# Patient Record
Sex: FEMALE | Race: WHITE | NOT HISPANIC OR LATINO | URBAN - METROPOLITAN AREA
[De-identification: names, ages, dates, MRNs, and addresses within clinical notes are randomized per-mention and may not be internally consistent; named-entity substitution may affect disease eponyms.]

---

## 2017-04-12 ENCOUNTER — APPOINTMENT (RX ONLY)
Dept: URBAN - METROPOLITAN AREA CLINIC 23 | Facility: CLINIC | Age: 67
Setting detail: DERMATOLOGY
End: 2017-04-12

## 2017-04-12 DIAGNOSIS — L81.4 OTHER MELANIN HYPERPIGMENTATION: ICD-10-CM

## 2017-04-12 DIAGNOSIS — L82.1 OTHER SEBORRHEIC KERATOSIS: ICD-10-CM

## 2017-04-12 DIAGNOSIS — Z85.828 PERSONAL HISTORY OF OTHER MALIGNANT NEOPLASM OF SKIN: ICD-10-CM

## 2017-04-12 DIAGNOSIS — D485 NEOPLASM OF UNCERTAIN BEHAVIOR OF SKIN: ICD-10-CM

## 2017-04-12 DIAGNOSIS — D22 MELANOCYTIC NEVI: ICD-10-CM

## 2017-04-12 PROBLEM — D48.5 NEOPLASM OF UNCERTAIN BEHAVIOR OF SKIN: Status: ACTIVE | Noted: 2017-04-12

## 2017-04-12 PROBLEM — K21.9 GASTRO-ESOPHAGEAL REFLUX DISEASE WITHOUT ESOPHAGITIS: Status: ACTIVE | Noted: 2017-04-12

## 2017-04-12 PROBLEM — J30.1 ALLERGIC RHINITIS DUE TO POLLEN: Status: ACTIVE | Noted: 2017-04-12

## 2017-04-12 PROBLEM — D22.5 MELANOCYTIC NEVI OF TRUNK: Status: ACTIVE | Noted: 2017-04-12

## 2017-04-12 PROCEDURE — ? COUNSELING

## 2017-04-12 PROCEDURE — 99214 OFFICE O/P EST MOD 30 MIN: CPT | Mod: 25

## 2017-04-12 PROCEDURE — 11100: CPT

## 2017-04-12 PROCEDURE — ? BIOPSY BY SHAVE METHOD

## 2017-04-12 ASSESSMENT — LOCATION SIMPLE DESCRIPTION DERM
LOCATION SIMPLE: UPPER BACK
LOCATION SIMPLE: POSTERIOR NECK
LOCATION SIMPLE: LEFT UPPER BACK
LOCATION SIMPLE: ABDOMEN
LOCATION SIMPLE: RIGHT UPPER BACK
LOCATION SIMPLE: RIGHT CLAVICULAR SKIN

## 2017-04-12 ASSESSMENT — LOCATION DETAILED DESCRIPTION DERM
LOCATION DETAILED: LEFT INFERIOR MEDIAL UPPER BACK
LOCATION DETAILED: SUPERIOR THORACIC SPINE
LOCATION DETAILED: LEFT SUPERIOR UPPER BACK
LOCATION DETAILED: RIGHT SUPERIOR UPPER BACK
LOCATION DETAILED: EPIGASTRIC SKIN
LOCATION DETAILED: RIGHT CLAVICULAR SKIN
LOCATION DETAILED: RIGHT LATERAL TRAPEZIAL NECK
LOCATION DETAILED: RIGHT MEDIAL UPPER BACK

## 2017-04-12 ASSESSMENT — LOCATION ZONE DERM
LOCATION ZONE: NECK
LOCATION ZONE: TRUNK

## 2017-04-12 NOTE — PROCEDURE: BIOPSY BY SHAVE METHOD
Render Post-Care Instructions In Note?: no
Notification Instructions: Patient will be notified of biopsy results. However, patient instructed to call the office if not contacted within 2 weeks.
Additional Anesthesia Volume In Cc (Will Not Render If 0): 0
Post-care instructions were reviewed in detail and written instructions are provided. Patient is to keep the biopsy site dry overnight, and then apply bacitracin twice daily until healed. Patient may apply hydrogen peroxide soaks to remove any crusting.
Billing Type: Third-Party Bill
Hemostasis: Aluminum Chloride
Anesthesia Type: 1% lidocaine with epinephrine
Wound Care: Vaseline
Consent: Written consent was obtained and risks were reviewed including but not limited to scarring, infection, bleeding, scabbing, incomplete removal, and allergy to anesthesia.
Electrodesiccation Text: The wound bed was treated with electrodesiccation after the biopsy was performed.
Anesthesia Volume In Cc: 0.3
Biopsy Type: H and E
Dressing: bandage
Electrodesiccation And Curettage Text: The wound bed was treated with electrodesiccation and curettage after the biopsy was performed.
Cryotherapy Text: The wound bed was treated with cryotherapy after the biopsy was performed.
Detail Level: Detailed
Type Of Destruction Used: Curettage
Silver Nitrate Text: The wound bed was treated with silver nitrate after the biopsy was performed.
Accession #: [id number]
Biopsy Method: Dermablade

## 2017-05-26 ENCOUNTER — APPOINTMENT (RX ONLY)
Dept: URBAN - METROPOLITAN AREA CLINIC 23 | Facility: CLINIC | Age: 67
Setting detail: DERMATOLOGY
End: 2017-05-26

## 2017-05-26 DIAGNOSIS — D485 NEOPLASM OF UNCERTAIN BEHAVIOR OF SKIN: ICD-10-CM

## 2017-05-26 PROBLEM — E03.9 HYPOTHYROIDISM, UNSPECIFIED: Status: ACTIVE | Noted: 2017-05-26

## 2017-05-26 PROBLEM — L55.1 SUNBURN OF SECOND DEGREE: Status: ACTIVE | Noted: 2017-05-26

## 2017-05-26 PROBLEM — K21.9 GASTRO-ESOPHAGEAL REFLUX DISEASE WITHOUT ESOPHAGITIS: Status: ACTIVE | Noted: 2017-05-26

## 2017-05-26 PROBLEM — C44.519 BASAL CELL CARCINOMA OF SKIN OF OTHER PART OF TRUNK: Status: ACTIVE | Noted: 2017-05-26

## 2017-05-26 PROBLEM — D48.5 NEOPLASM OF UNCERTAIN BEHAVIOR OF SKIN: Status: ACTIVE | Noted: 2017-05-26

## 2017-05-26 PROBLEM — J45.909 UNSPECIFIED ASTHMA, UNCOMPLICATED: Status: ACTIVE | Noted: 2017-05-26

## 2017-05-26 PROCEDURE — ? EXCISION

## 2017-05-26 PROCEDURE — 11602 EXC TR-EXT MAL+MARG 1.1-2 CM: CPT

## 2017-05-26 PROCEDURE — 11101: CPT

## 2017-05-26 PROCEDURE — ? BIOPSY BY SHAVE METHOD

## 2017-05-26 PROCEDURE — 12032 INTMD RPR S/A/T/EXT 2.6-7.5: CPT

## 2017-05-26 PROCEDURE — 11100: CPT | Mod: 59

## 2017-05-26 ASSESSMENT — LOCATION DETAILED DESCRIPTION DERM
LOCATION DETAILED: LEFT SUPERIOR MEDIAL UPPER BACK
LOCATION DETAILED: SUPERIOR THORACIC SPINE

## 2017-05-26 ASSESSMENT — LOCATION SIMPLE DESCRIPTION DERM
LOCATION SIMPLE: LEFT UPPER BACK
LOCATION SIMPLE: UPPER BACK

## 2017-05-26 ASSESSMENT — LOCATION ZONE DERM: LOCATION ZONE: TRUNK

## 2017-05-26 NOTE — PROCEDURE: EXCISION
Melolabial Interpolation Flap Text: A decision was made to reconstruct the defect utilizing an interpolation axial flap and a staged reconstruction.  A telfa template was made of the defect.  This telfa template was then used to outline the melolabial interpolation flap.  The donor area for the pedicle flap was then injected with anesthesia.  The flap was excised through the skin and subcutaneous tissue down to the layer of the underlying musculature.  The pedicle flap was carefully excised within this deep plane to maintain its blood supply.  The edges of the donor site were undermined.   The donor site was closed in a primary fashion.  The pedicle was then rotated into position and sutured.  Once the tube was sutured into place, adequate blood supply was confirmed with blanching and refill.  The pedicle was then wrapped with xeroform gauze and dressed appropriately with a telfa and gauze bandage to ensure continued blood supply and protect the attached pedicle.
Epidermal Closure: running
Fusiform Excision Additional Text (Leave Blank If You Do Not Want): The margin was drawn around the clinically apparent lesion.  A fusiform shape was then drawn on the skin incorporating the lesion and margins.  Incisions were then made along these lines to the appropriate tissue plane and the lesion was extirpated.
Primary Defect Width (In Cm): 0
Advancement-Rotation Flap Text: The defect edges were debeveled with a #15 scalpel blade.  Given the location of the defect, shape of the defect and the proximity to free margins an advancement-rotation flap was deemed most appropriate.  Using a sterile surgical marker, an appropriate flap was drawn incorporating the defect and placing the expected incisions within the relaxed skin tension lines where possible. The area thus outlined was incised deep to adipose tissue with a #15 scalpel blade.  The skin margins were undermined to an appropriate distance in all directions utilizing iris scissors.
Melolabial Transposition Flap Text: The defect edges were debeveled with a #15 scalpel blade.  Given the location of the defect and the proximity to free margins a melolabial flap was deemed most appropriate.  Using a sterile surgical marker, an appropriate melolabial transposition flap was drawn incorporating the defect.    The area thus outlined was incised deep to adipose tissue with a #15 scalpel blade.  The skin margins were undermined to an appropriate distance in all directions utilizing iris scissors.
Star Wedge Flap Text: The defect edges were debeveled with a #15 scalpel blade.  Given the location of the defect, shape of the defect and the proximity to free margins a star wedge flap was deemed most appropriate.  Using a sterile surgical marker, an appropriate rotation flap was drawn incorporating the defect and placing the expected incisions within the relaxed skin tension lines where possible. The area thus outlined was incised deep to adipose tissue with a #15 scalpel blade.  The skin margins were undermined to an appropriate distance in all directions utilizing iris scissors.
Scalpel Size: 15 blade
Cheek Interpolation Flap Text: A decision was made to reconstruct the defect utilizing an interpolation axial flap and a staged reconstruction.  A telfa template was made of the defect.  This telfa template was then used to outline the Cheek Interpolation flap.  The donor area for the pedicle flap was then injected with anesthesia.  The flap was excised through the skin and subcutaneous tissue down to the layer of the underlying musculature.  The interpolation flap was carefully excised within this deep plane to maintain its blood supply.  The edges of the donor site were undermined.   The donor site was closed in a primary fashion.  The pedicle was then rotated into position and sutured.  Once the tube was sutured into place, adequate blood supply was confirmed with blanching and refill.  The pedicle was then wrapped with xeroform gauze and dressed appropriately with a telfa and gauze bandage to ensure continued blood supply and protect the attached pedicle.
Complex Repair And Split-Thickness Skin Graft Text: The defect edges were debeveled with a #15 scalpel blade.  The primary defect was closed partially with a complex linear closure.  Given the location of the defect, shape of the defect and the proximity to free margins a split thickness skin graft was deemed most appropriate to repair the remaining defect.  The graft was trimmed to fit the size of the remaining defect.  The graft was then placed in the primary defect, oriented appropriately, and sutured into place.
Partial Purse String (Intermediate) Text: Given the location of the defect and the characteristics of the surrounding skin an intermediate purse string closure was deemed most appropriate.  Undermining was performed circumferentially around the surgical defect.  A purse string suture was then placed and tightened. Wound tension of the circular defect prevented complete closure of the wound.
Complex Repair And Z Plasty Text: The defect edges were debeveled with a #15 scalpel blade.  The primary defect was closed partially with a complex linear closure.  Given the location of the remaining defect, shape of the defect and the proximity to free margins a Z plasty was deemed most appropriate for complete closure of the defect.  Using a sterile surgical marker, an appropriate advancement flap was drawn incorporating the defect and placing the expected incisions within the relaxed skin tension lines where possible.    The area thus outlined was incised deep to adipose tissue with a #15 scalpel blade.  The skin margins were undermined to an appropriate distance in all directions utilizing iris scissors.
V-Y Plasty Text: The defect edges were debeveled with a #15 scalpel blade.  Given the location of the defect, shape of the defect and the proximity to free margins an V-Y advancement flap was deemed most appropriate.  Using a sterile surgical marker, an appropriate advancement flap was drawn incorporating the defect and placing the expected incisions within the relaxed skin tension lines where possible.    The area thus outlined was incised deep to adipose tissue with a #15 scalpel blade.  The skin margins were undermined to an appropriate distance in all directions utilizing iris scissors.
Complex Repair Preamble Text (Leave Blank If You Do Not Want): Extensive wide undermining was performed.
Cartilage Graft Text: The defect edges were debeveled with a #15 scalpel blade.  Given the location of the defect, shape of the defect, the fact the defect involved a full thickness cartilage defect a cartilage graft was deemed most appropriate.  An appropriate donor site was identified, cleansed, and anesthetized. The cartilage graft was then harvested and transferred to the recipient site, oriented appropriately and then sutured into place.  The secondary defect was then repaired using a primary closure.
Bill For Surgical Tray: no
Complex Repair And Tissue Cultured Epidermal Autograft Text: The defect edges were debeveled with a #15 scalpel blade.  The primary defect was closed partially with a complex linear closure.  Given the location of the defect, shape of the defect and the proximity to free margins an tissue cultured epidermal autograft was deemed most appropriate to repair the remaining defect.  The graft was trimmed to fit the size of the remaining defect.  The graft was then placed in the primary defect, oriented appropriately, and sutured into place.
Modified Advancement Flap Text: The defect edges were debeveled with a #15 scalpel blade.  Given the location of the defect, shape of the defect and the proximity to free margins a modified advancement flap was deemed most appropriate.  Using a sterile surgical marker, an appropriate advancement flap was drawn incorporating the defect and placing the expected incisions within the relaxed skin tension lines where possible.    The area thus outlined was incised deep to adipose tissue with a #15 scalpel blade.  The skin margins were undermined to an appropriate distance in all directions utilizing iris scissors.
Saucerization Excision Additional Text (Leave Blank If You Do Not Want): The margin was drawn around the clinically apparent lesion.  Incisions were then made along these lines, in a tangential fashion, to the appropriate tissue plane and the lesion was extirpated.
Size Of Margin In Cm: 0.2
Excision Method: Elliptical
Skin Substitute Text: The defect edges were debeveled with a #15 scalpel blade.  Given the location of the defect, shape of the defect and the proximity to free margins a skin substitute graft was deemed most appropriate.  The graft material was trimmed to fit the size of the defect. The graft was then placed in the primary defect and oriented appropriately.
Mastoid Interpolation Flap Text: A decision was made to reconstruct the defect utilizing an interpolation axial flap and a staged reconstruction.  A telfa template was made of the defect.  This telfa template was then used to outline the mastoid interpolation flap.  The donor area for the pedicle flap was then injected with anesthesia.  The flap was excised through the skin and subcutaneous tissue down to the layer of the underlying musculature.  The pedicle flap was carefully excised within this deep plane to maintain its blood supply.  The edges of the donor site were undermined.   The donor site was closed in a primary fashion.  The pedicle was then rotated into position and sutured.  Once the tube was sutured into place, adequate blood supply was confirmed with blanching and refill.  The pedicle was then wrapped with xeroform gauze and dressed appropriately with a telfa and gauze bandage to ensure continued blood supply and protect the attached pedicle.
Muscle Hinge Flap Text: The defect edges were debeveled with a #15 scalpel blade.  Given the size, depth and location of the defect and the proximity to free margins a muscle hinge flap was deemed most appropriate.  Using a sterile surgical marker, an appropriate hinge flap was drawn incorporating the defect. The area thus outlined was incised with a #15 scalpel blade.  The skin margins were undermined to an appropriate distance in all directions utilizing iris scissors.
No Repair - Repaired With Adjacent Surgical Defect Text (Leave Blank If You Do Not Want): After the excision the defect was repaired concurrently with another surgical defect which was in close approximation.
Purse String (Intermediate) Text: Given the location of the defect and the characteristics of the surrounding skin a pursestring intermediate closure was deemed most appropriate.  Undermining was performed circumfirentially around the surgical defect.  A purstring suture was then placed and tightened.
W Plasty Text: The lesion was extirpated to the level of the fat with a #15 scalpel blade.  Given the location of the defect, shape of the defect and the proximity to free margins a W-plasty was deemed most appropriate for repair.  Using a sterile surgical marker, the appropriate transposition arms of the W-plasty were drawn incorporating the defect and placing the expected incisions within the relaxed skin tension lines where possible.    The area thus outlined was incised deep to adipose tissue with a #15 scalpel blade.  The skin margins were undermined to an appropriate distance in all directions utilizing iris scissors.  The opposing transposition arms were then transposed into place in opposite direction and anchored with interrupted buried subcutaneous sutures.
Post-Care Instructions: I reviewed with the patient in detail post-care instructions. Patient is not to engage in any heavy lifting, exercise, or swimming for the next 7 days. Should the patient develop any fevers, chills, bleeding, severe pain patient will contact the office immediately.
Z Plasty Text: The lesion was extirpated to the level of the fat with a #15 scalpel blade.  Given the location of the defect, shape of the defect and the proximity to free margins a Z-plasty was deemed most appropriate for repair.  Using a sterile surgical marker, the appropriate transposition arms of the Z-plasty were drawn incorporating the defect and placing the expected incisions within the relaxed skin tension lines where possible.    The area thus outlined was incised deep to adipose tissue with a #15 scalpel blade.  The skin margins were undermined to an appropriate distance in all directions utilizing iris scissors.  The opposing transposition arms were then transposed into place in opposite direction and anchored with interrupted buried subcutaneous sutures.
Size Of Lesion In Cm: 1.5
Complex Repair And Bilobe Flap Text: The defect edges were debeveled with a #15 scalpel blade.  The primary defect was closed partially with a complex linear closure.  Given the location of the remaining defect, shape of the defect and the proximity to free margins a bilobe flap was deemed most appropriate for complete closure of the defect.  Using a sterile surgical marker, an appropriate advancement flap was drawn incorporating the defect and placing the expected incisions within the relaxed skin tension lines where possible.    The area thus outlined was incised deep to adipose tissue with a #15 scalpel blade.  The skin margins were undermined to an appropriate distance in all directions utilizing iris scissors.
O-L Flap Text: The defect edges were debeveled with a #15 scalpel blade.  Given the location of the defect, shape of the defect and the proximity to free margins an O-L flap was deemed most appropriate.  Using a sterile surgical marker, an appropriate advancement flap was drawn incorporating the defect and placing the expected incisions within the relaxed skin tension lines where possible.    The area thus outlined was incised deep to adipose tissue with a #15 scalpel blade.  The skin margins were undermined to an appropriate distance in all directions utilizing iris scissors.
Spiral Flap Text: The defect edges were debeveled with a #15 scalpel blade.  Given the location of the defect, shape of the defect and the proximity to free margins a spiral flap was deemed most appropriate.  Using a sterile surgical marker, an appropriate rotation flap was drawn incorporating the defect and placing the expected incisions within the relaxed skin tension lines where possible. The area thus outlined was incised deep to adipose tissue with a #15 scalpel blade.  The skin margins were undermined to an appropriate distance in all directions utilizing iris scissors.
Keystone Flap Text: The defect edges were debeveled with a #15 scalpel blade.  Given the location of the defect, shape of the defect a keystone flap was deemed most appropriate.  Using a sterile surgical marker, an appropriate keystone flap was drawn incorporating the defect, outlining the appropriate donor tissue and placing the expected incisions within the relaxed skin tension lines where possible. The area thus outlined was incised deep to adipose tissue with a #15 scalpel blade.  The skin margins were undermined to an appropriate distance in all directions around the primary defect and laterally outward around the flap utilizing iris scissors.
Anesthesia Volume In Cc: 5
Consent was obtained from the patient. The risks and benefits to therapy were discussed in detail. Specifically, the risks of infection, scarring, bleeding, prolonged wound healing, incomplete removal, allergy to anesthesia, nerve injury and recurrence were addressed. Prior to the procedure, the treatment site was clearly identified and confirmed by the patient. All components of Universal Protocol/PAUSE Rule completed.
Hemostasis: Electrocautery
A-T Advancement Flap Text: The defect edges were debeveled with a #15 scalpel blade.  Given the location of the defect, shape of the defect and the proximity to free margins an A-T advancement flap was deemed most appropriate.  Using a sterile surgical marker, an appropriate advancement flap was drawn incorporating the defect and placing the expected incisions within the relaxed skin tension lines where possible.    The area thus outlined was incised deep to adipose tissue with a #15 scalpel blade.  The skin margins were undermined to an appropriate distance in all directions utilizing iris scissors.
Burow's Advancement Flap Text: The defect edges were debeveled with a #15 scalpel blade.  Given the location of the defect and the proximity to free margins a Burow's advancement flap was deemed most appropriate.  Using a sterile surgical marker, the appropriate advancement flap was drawn incorporating the defect and placing the expected incisions within the relaxed skin tension lines where possible.    The area thus outlined was incised deep to adipose tissue with a #15 scalpel blade.  The skin margins were undermined to an appropriate distance in all directions utilizing iris scissors.
Purse String (Simple) Text: Given the location of the defect and the characteristics of the surrounding skin a purse string simple closure was deemed most appropriate.  Undermining was performed circumferentially around the surgical defect.  A purse string suture was then placed and tightened.
O-T Plasty Text: The defect edges were debeveled with a #15 scalpel blade.  Given the location of the defect, shape of the defect and the proximity to free margins an O-T plasty was deemed most appropriate.  Using a sterile surgical marker, an appropriate O-T plasty was drawn incorporating the defect and placing the expected incisions within the relaxed skin tension lines where possible.    The area thus outlined was incised deep to adipose tissue with a #15 scalpel blade.  The skin margins were undermined to an appropriate distance in all directions utilizing iris scissors.
Complex Repair And Dorsal Nasal Flap Text: The defect edges were debeveled with a #15 scalpel blade.  The primary defect was closed partially with a complex linear closure.  Given the location of the remaining defect, shape of the defect and the proximity to free margins a dorsal nasal flap was deemed most appropriate for complete closure of the defect.  Using a sterile surgical marker, an appropriate flap was drawn incorporating the defect and placing the expected incisions within the relaxed skin tension lines where possible.    The area thus outlined was incised deep to adipose tissue with a #15 scalpel blade.  The skin margins were undermined to an appropriate distance in all directions utilizing iris scissors.
Complex Repair And V-Y Plasty Text: The defect edges were debeveled with a #15 scalpel blade.  The primary defect was closed partially with a complex linear closure.  Given the location of the remaining defect, shape of the defect and the proximity to free margins a V-Y plasty was deemed most appropriate for complete closure of the defect.  Using a sterile surgical marker, an appropriate advancement flap was drawn incorporating the defect and placing the expected incisions within the relaxed skin tension lines where possible.    The area thus outlined was incised deep to adipose tissue with a #15 scalpel blade.  The skin margins were undermined to an appropriate distance in all directions utilizing iris scissors.
Complex Repair And M Plasty Text: The defect edges were debeveled with a #15 scalpel blade.  The primary defect was closed partially with a complex linear closure.  Given the location of the remaining defect, shape of the defect and the proximity to free margins an M plasty was deemed most appropriate for complete closure of the defect.  Using a sterile surgical marker, an appropriate advancement flap was drawn incorporating the defect and placing the expected incisions within the relaxed skin tension lines where possible.    The area thus outlined was incised deep to adipose tissue with a #15 scalpel blade.  The skin margins were undermined to an appropriate distance in all directions utilizing iris scissors.
Complex Repair And Rotation Flap Text: The defect edges were debeveled with a #15 scalpel blade.  The primary defect was closed partially with a complex linear closure.  Given the location of the remaining defect, shape of the defect and the proximity to free margins a rotation flap was deemed most appropriate for complete closure of the defect.  Using a sterile surgical marker, an appropriate advancement flap was drawn incorporating the defect and placing the expected incisions within the relaxed skin tension lines where possible.    The area thus outlined was incised deep to adipose tissue with a #15 scalpel blade.  The skin margins were undermined to an appropriate distance in all directions utilizing iris scissors.
Anesthesia Type: 1% lidocaine with epinephrine
Dressing: pressure dressing
V-Y Flap Text: The defect edges were debeveled with a #15 scalpel blade.  Given the location of the defect, shape of the defect and the proximity to free margins a V-Y flap was deemed most appropriate.  Using a sterile surgical marker, an appropriate advancement flap was drawn incorporating the defect and placing the expected incisions within the relaxed skin tension lines where possible.    The area thus outlined was incised deep to adipose tissue with a #15 scalpel blade.  The skin margins were undermined to an appropriate distance in all directions utilizing iris scissors.
Complex Repair And Dermal Autograft Text: The defect edges were debeveled with a #15 scalpel blade.  The primary defect was closed partially with a complex linear closure.  Given the location of the defect, shape of the defect and the proximity to free margins an dermal autograft was deemed most appropriate to repair the remaining defect.  The graft was trimmed to fit the size of the remaining defect.  The graft was then placed in the primary defect, oriented appropriately, and sutured into place.
Mucosal Advancement Flap Text: Given the location of the defect, shape of the defect and the proximity to free margins a mucosal advancement flap was deemed most appropriate. Incisions were made with a 15 blade scalpel in the appropriate fashion along the cutaneous vermillion border and the mucosal lip. The remaining actinically damaged mucosal tissue was excised.  The mucosal advancement flap was then elevated to the gingival sulcus with care taken to preserve the neurovascular structures and advanced into the primary defect. Care was taken to ensure that precise realignment of the vermillion border was achieved.
Elliptical Excision Additional Text (Leave Blank If You Do Not Want): The margin was drawn around the clinically apparent lesion.  An elliptical shape was then drawn on the skin incorporating the lesion and margins.  Incisions were then made along these lines to the appropriate tissue plane and the lesion was extirpated.
Complex Repair And Melolabial Flap Text: The defect edges were debeveled with a #15 scalpel blade.  The primary defect was closed partially with a complex linear closure.  Given the location of the remaining defect, shape of the defect and the proximity to free margins a melolabial flap was deemed most appropriate for complete closure of the defect.  Using a sterile surgical marker, an appropriate advancement flap was drawn incorporating the defect and placing the expected incisions within the relaxed skin tension lines where possible.    The area thus outlined was incised deep to adipose tissue with a #15 scalpel blade.  The skin margins were undermined to an appropriate distance in all directions utilizing iris scissors.
Repair Type: Intermediate
Perilesional Excision Additional Text (Leave Blank If You Do Not Want): The margin was drawn around the clinically apparent lesion. Incisions were then made along these lines to the appropriate tissue plane and the lesion was extirpated.
Interpolation Flap Text: A decision was made to reconstruct the defect utilizing an interpolation axial flap and a staged reconstruction.  A telfa template was made of the defect.  This telfa template was then used to outline the interpolation flap.  The donor area for the pedicle flap was then injected with anesthesia.  The flap was excised through the skin and subcutaneous tissue down to the layer of the underlying musculature.  The interpolation flap was carefully excised within this deep plane to maintain its blood supply.  The edges of the donor site were undermined.   The donor site was closed in a primary fashion.  The pedicle was then rotated into position and sutured.  Once the tube was sutured into place, adequate blood supply was confirmed with blanching and refill.  The pedicle was then wrapped with xeroform gauze and dressed appropriately with a telfa and gauze bandage to ensure continued blood supply and protect the attached pedicle.
Trilobed Flap Text: The defect edges were debeveled with a #15 scalpel blade.  Given the location of the defect and the proximity to free margins a trilobed flap was deemed most appropriate.  Using a sterile surgical marker, an appropriate trilobed flap drawn around the defect.    The area thus outlined was incised deep to adipose tissue with a #15 scalpel blade.  The skin margins were undermined to an appropriate distance in all directions utilizing iris scissors.
Crescentic Advancement Flap Text: The defect edges were debeveled with a #15 scalpel blade.  Given the location of the defect and the proximity to free margins a crescentic advancement flap was deemed most appropriate.  Using a sterile surgical marker, the appropriate advancement flap was drawn incorporating the defect and placing the expected incisions within the relaxed skin tension lines where possible.    The area thus outlined was incised deep to adipose tissue with a #15 scalpel blade.  The skin margins were undermined to an appropriate distance in all directions utilizing iris scissors.
H Plasty Text: Given the location of the defect, shape of the defect and the proximity to free margins a H-plasty was deemed most appropriate for repair.  Using a sterile surgical marker, the appropriate advancement arms of the H-plasty were drawn incorporating the defect and placing the expected incisions within the relaxed skin tension lines where possible. The area thus outlined was incised deep to adipose tissue with a #15 scalpel blade. The skin margins were undermined to an appropriate distance in all directions utilizing iris scissors.  The opposing advancement arms were then advanced into place in opposite direction and anchored with interrupted buried subcutaneous sutures.
Rhombic Flap Text: The defect edges were debeveled with a #15 scalpel blade.  Given the location of the defect and the proximity to free margins a rhombic flap was deemed most appropriate.  Using a sterile surgical marker, an appropriate rhombic flap was drawn incorporating the defect.    The area thus outlined was incised deep to adipose tissue with a #15 scalpel blade.  The skin margins were undermined to an appropriate distance in all directions utilizing iris scissors.
Detail Level: Detailed
Complex Repair And Double Advancement Flap Text: The defect edges were debeveled with a #15 scalpel blade.  The primary defect was closed partially with a complex linear closure.  Given the location of the remaining defect, shape of the defect and the proximity to free margins a double advancement flap was deemed most appropriate for complete closure of the defect.  Using a sterile surgical marker, an appropriate advancement flap was drawn incorporating the defect and placing the expected incisions within the relaxed skin tension lines where possible.    The area thus outlined was incised deep to adipose tissue with a #15 scalpel blade.  The skin margins were undermined to an appropriate distance in all directions utilizing iris scissors.
Path Notes (To The Dermatopathologist): Please check margins
Accession #: [id number]
Slit Excision Additional Text (Leave Blank If You Do Not Want): A linear line was drawn on the skin overlying the lesion. An incision was made slowly until the lesion was visualized.  Once visualized, the lesion was removed with blunt dissection.
Intermediate / Complex Repair - Final Wound Length In Cm: 4.6
Lip Wedge Excision Repair Text: Given the location of the defect and the proximity to free margins a full thickness wedge repair was deemed most appropriate.  Using a sterile surgical marker, the appropriate repair was drawn incorporating the defect and placing the expected incisions perpendicular to the vermillion border.  The vermillion border was also meticulously outlined to ensure appropriate reapproximation during the repair.  The area thus outlined was incised through and through with a #15 scalpel blade.  The muscularis and dermis were reaproximated with deep sutures following hemostasis. Care was taken to realign the vermillion border before proceeding with the superficial closure.  Once the vermillion was realigned the superfical and mucosal closure was finished.
Suture Removal: 14 days
Pre-Excision Curettage Text (Leave Blank If You Do Not Want): Prior to drawing the surgical margin the visible lesion was removed with electrodesiccation and curettage to clearly define the lesion size.
Composite Graft Text: The defect edges were debeveled with a #15 scalpel blade.  Given the location of the defect, shape of the defect, the proximity to free margins and the fact the defect was full thickness a composite graft was deemed most appropriate.  The defect was outline and then transferred to the donor site.  A full thickness graft was then excised from the donor site. The graft was then placed in the primary defect, oriented appropriately and then sutured into place.  The secondary defect was then repaired using a primary closure.
Tissue Cultured Epidermal Autograft Text: The defect edges were debeveled with a #15 scalpel blade.  Given the location of the defect, shape of the defect and the proximity to free margins a tissue cultured epidermal autograft was deemed most appropriate.  The graft was then trimmed to fit the size of the defect.  The graft was then placed in the primary defect and oriented appropriately.
Dorsal Nasal Flap Text: The defect edges were debeveled with a #15 scalpel blade.  Given the location of the defect and the proximity to free margins a dorsal nasal flap was deemed most appropriate.  Using a sterile surgical marker, an appropriate dorsal nasal flap was drawn around the defect.    The area thus outlined was incised deep to adipose tissue with a #15 scalpel blade.  The skin margins were undermined to an appropriate distance in all directions utilizing iris scissors.
Ftsg Text: The defect edges were debeveled with a #15 scalpel blade.  Given the location of the defect, shape of the defect and the proximity to free margins a full thickness skin graft was deemed most appropriate.  Using a sterile surgical marker, the primary defect shape was transferred to the donor site. The area thus outlined was incised deep to adipose tissue with a #15 scalpel blade.  The harvested graft was then trimmed of adipose tissue until only dermis and epidermis was left.  The skin margins of the secondary defect were undermined to an appropriate distance in all directions utilizing iris scissors.  The secondary defect was closed with interrupted buried subcutaneous sutures.  The skin edges were then re-apposed with running  sutures.  The skin graft was then placed in the primary defect and oriented appropriately.
Complex Repair And Skin Substitute Graft Text: The defect edges were debeveled with a #15 scalpel blade.  The primary defect was closed partially with a complex linear closure.  Given the location of the remaining defect, shape of the defect and the proximity to free margins a skin substitute graft was deemed most appropriate to repair the remaining defect.  The graft was trimmed to fit the size of the remaining defect.  The graft was then placed in the primary defect, oriented appropriately, and sutured into place.
Island Pedicle Flap Text: The defect edges were debeveled with a #15 scalpel blade.  Given the location of the defect, shape of the defect and the proximity to free margins an island pedicle advancement flap was deemed most appropriate.  Using a sterile surgical marker, an appropriate advancement flap was drawn incorporating the defect, outlining the appropriate donor tissue and placing the expected incisions within the relaxed skin tension lines where possible.    The area thus outlined was incised deep to adipose tissue with a #15 scalpel blade.  The skin margins were undermined to an appropriate distance in all directions around the primary defect and laterally outward around the island pedicle utilizing iris scissors.  There was minimal undermining beneath the pedicle flap.
Complex Repair And Rhombic Flap Text: The defect edges were debeveled with a #15 scalpel blade.  The primary defect was closed partially with a complex linear closure.  Given the location of the remaining defect, shape of the defect and the proximity to free margins a rhombic flap was deemed most appropriate for complete closure of the defect.  Using a sterile surgical marker, an appropriate advancement flap was drawn incorporating the defect and placing the expected incisions within the relaxed skin tension lines where possible.    The area thus outlined was incised deep to adipose tissue with a #15 scalpel blade.  The skin margins were undermined to an appropriate distance in all directions utilizing iris scissors.
Alar Island Pedicle Flap Text: The defect edges were debeveled with a #15 scalpel blade.  Given the location of the defect, shape of the defect and the proximity to the alar rim an island pedicle advancement flap was deemed most appropriate.  Using a sterile surgical marker, an appropriate advancement flap was drawn incorporating the defect, outlining the appropriate donor tissue and placing the expected incisions within the nasal ala running parallel to the alar rim. The area thus outlined was incised with a #15 scalpel blade.  The skin margins were undermined minimally to an appropriate distance in all directions around the primary defect and laterally outward around the island pedicle utilizing iris scissors.  There was minimal undermining beneath the pedicle flap.
O-Z Plasty Text: The defect edges were debeveled with a #15 scalpel blade.  Given the location of the defect, shape of the defect and the proximity to free margins an O-Z plasty (double transposition flap) was deemed most appropriate.  Using a sterile surgical marker, the appropriate transposition flaps were drawn incorporating the defect and placing the expected incisions within the relaxed skin tension lines where possible.    The area thus outlined was incised deep to adipose tissue with a #15 scalpel blade.  The skin margins were undermined to an appropriate distance in all directions utilizing iris scissors.  Hemostasis was achieved with electrocautery.  The flaps were then transposed into place, one clockwise and the other counterclockwise, and anchored with interrupted buried subcutaneous sutures.
Home Suture Removal Text: Patient was provided a home suture removal kit and will remove their sutures at home.  If they have any questions or difficulties they will call the office.
Island Pedicle Flap With Canthal Suspension Text: The defect edges were debeveled with a #15 scalpel blade.  Given the location of the defect, shape of the defect and the proximity to free margins an island pedicle advancement flap was deemed most appropriate.  Using a sterile surgical marker, an appropriate advancement flap was drawn incorporating the defect, outlining the appropriate donor tissue and placing the expected incisions within the relaxed skin tension lines where possible. The area thus outlined was incised deep to adipose tissue with a #15 scalpel blade.  The skin margins were undermined to an appropriate distance in all directions around the primary defect and laterally outward around the island pedicle utilizing iris scissors.  There was minimal undermining beneath the pedicle flap. A suspension suture was placed in the canthal tendon to prevent tension and prevent ectropion.
Island Pedicle Flap-Requiring Vessel Identification Text: The defect edges were debeveled with a #15 scalpel blade.  Given the location of the defect, shape of the defect and the proximity to free margins an island pedicle advancement flap was deemed most appropriate.  Using a sterile surgical marker, an appropriate advancement flap was drawn, based on the axial vessel mentioned above, incorporating the defect, outlining the appropriate donor tissue and placing the expected incisions within the relaxed skin tension lines where possible.    The area thus outlined was incised deep to adipose tissue with a #15 scalpel blade.  The skin margins were undermined to an appropriate distance in all directions around the primary defect and laterally outward around the island pedicle utilizing iris scissors.  There was minimal undermining beneath the pedicle flap.
Complex Repair And O-L Flap Text: The defect edges were debeveled with a #15 scalpel blade.  The primary defect was closed partially with a complex linear closure.  Given the location of the remaining defect, shape of the defect and the proximity to free margins an O-L flap was deemed most appropriate for complete closure of the defect.  Using a sterile surgical marker, an appropriate flap was drawn incorporating the defect and placing the expected incisions within the relaxed skin tension lines where possible.    The area thus outlined was incised deep to adipose tissue with a #15 scalpel blade.  The skin margins were undermined to an appropriate distance in all directions utilizing iris scissors.
Render Post-Care Instructions In Note?: yes
Complex Repair And Ftsg Text: The defect edges were debeveled with a #15 scalpel blade.  The primary defect was closed partially with a complex linear closure.  Given the location of the defect, shape of the defect and the proximity to free margins a full thickness skin graft was deemed most appropriate to repair the remaining defect.  The graft was trimmed to fit the size of the remaining defect.  The graft was then placed in the primary defect, oriented appropriately, and sutured into place.
Partial Purse String (Simple) Text: Given the location of the defect and the characteristics of the surrounding skin a simple purse string closure was deemed most appropriate.  Undermining was performed circumferentially around the surgical defect.  A purse string suture was then placed and tightened. Wound tension of the circular defect prevented complete closure of the wound.
Complex Repair And Epidermal Autograft Text: The defect edges were debeveled with a #15 scalpel blade.  The primary defect was closed partially with a complex linear closure.  Given the location of the defect, shape of the defect and the proximity to free margins an epidermal autograft was deemed most appropriate to repair the remaining defect.  The graft was trimmed to fit the size of the remaining defect.  The graft was then placed in the primary defect, oriented appropriately, and sutured into place.
S Plasty Text: Given the location and shape of the defect, and the orientation of relaxed skin tension lines, an S-plasty was deemed most appropriate for repair.  Using a sterile surgical marker, the appropriate outline of the S-plasty was drawn, incorporating the defect and placing the expected incisions within the relaxed skin tension lines where possible.  The area thus outlined was incised deep to adipose tissue with a #15 scalpel blade.  The skin margins were undermined to an appropriate distance in all directions utilizing iris scissors. The skin flaps were advanced over the defect.  The opposing margins were then approximated with interrupted buried subcutaneous sutures.
Complex Repair And Modified Advancement Flap Text: The defect edges were debeveled with a #15 scalpel blade.  The primary defect was closed partially with a complex linear closure.  Given the location of the remaining defect, shape of the defect and the proximity to free margins a modified advancement flap was deemed most appropriate for complete closure of the defect.  Using a sterile surgical marker, an appropriate advancement flap was drawn incorporating the defect and placing the expected incisions within the relaxed skin tension lines where possible.    The area thus outlined was incised deep to adipose tissue with a #15 scalpel blade.  The skin margins were undermined to an appropriate distance in all directions utilizing iris scissors.
Complex Repair And Single Advancement Flap Text: The defect edges were debeveled with a #15 scalpel blade.  The primary defect was closed partially with a complex linear closure.  Given the location of the remaining defect, shape of the defect and the proximity to free margins a single advancement flap was deemed most appropriate for complete closure of the defect.  Using a sterile surgical marker, an appropriate advancement flap was drawn incorporating the defect and placing the expected incisions within the relaxed skin tension lines where possible.    The area thus outlined was incised deep to adipose tissue with a #15 scalpel blade.  The skin margins were undermined to an appropriate distance in all directions utilizing iris scissors.
Curvilinear Excision Additional Text (Leave Blank If You Do Not Want): The margin was drawn around the clinically apparent lesion.  A curvilinear shape was then drawn on the skin incorporating the lesion and margins.  Incisions were then made along these lines to the appropriate tissue plane and the lesion was extirpated.
Complex Repair And Double M Plasty Text: The defect edges were debeveled with a #15 scalpel blade.  The primary defect was closed partially with a complex linear closure.  Given the location of the remaining defect, shape of the defect and the proximity to free margins a double M plasty was deemed most appropriate for complete closure of the defect.  Using a sterile surgical marker, an appropriate advancement flap was drawn incorporating the defect and placing the expected incisions within the relaxed skin tension lines where possible.    The area thus outlined was incised deep to adipose tissue with a #15 scalpel blade.  The skin margins were undermined to an appropriate distance in all directions utilizing iris scissors.
Split-Thickness Skin Graft Text: The defect edges were debeveled with a #15 scalpel blade.  Given the location of the defect, shape of the defect and the proximity to free margins a split thickness skin graft was deemed most appropriate.  Using a sterile surgical marker, the primary defect shape was transferred to the donor site. The split thickness graft was then harvested.  The skin graft was then placed in the primary defect and oriented appropriately.
Helical Rim Advancement Flap Text: The defect edges were debeveled with a #15 blade scalpel.  Given the location of the defect and the proximity to free margins (helical rim) a double helical rim advancement flap was deemed most appropriate.  Using a sterile surgical marker, the appropriate advancement flaps were drawn incorporating the defect and placing the expected incisions between the helical rim and antihelix where possible.  The area thus outlined was incised through and through with a #15 scalpel blade.  With a skin hook and iris scissors, the flaps were gently and sharply undermined and freed up.
Intermediate Repair Preamble Text (Leave Blank If You Do Not Want): Undermining was performed with blunt dissection.
Bilobed Transposition Flap Text: The defect edges were debeveled with a #15 scalpel blade.  Given the location of the defect and the proximity to free margins a bilobed transposition flap was deemed most appropriate.  Using a sterile surgical marker, an appropriate bilobe flap drawn around the defect.    The area thus outlined was incised deep to adipose tissue with a #15 scalpel blade.  The skin margins were undermined to an appropriate distance in all directions utilizing iris scissors.
Transposition Flap Text: The defect edges were debeveled with a #15 scalpel blade.  Given the location of the defect and the proximity to free margins a transposition flap was deemed most appropriate.  Using a sterile surgical marker, an appropriate transposition flap was drawn incorporating the defect.    The area thus outlined was incised deep to adipose tissue with a #15 scalpel blade.  The skin margins were undermined to an appropriate distance in all directions utilizing iris scissors.
Hatchet Flap Text: The defect edges were debeveled with a #15 scalpel blade.  Given the location of the defect, shape of the defect and the proximity to free margins a hatchet flap was deemed most appropriate.  Using a sterile surgical marker, an appropriate hatchet flap was drawn incorporating the defect and placing the expected incisions within the relaxed skin tension lines where possible.    The area thus outlined was incised deep to adipose tissue with a #15 scalpel blade.  The skin margins were undermined to an appropriate distance in all directions utilizing iris scissors.
Dermal Autograft Text: The defect edges were debeveled with a #15 scalpel blade.  Given the location of the defect, shape of the defect and the proximity to free margins a dermal autograft was deemed most appropriate.  Using a sterile surgical marker, the primary defect shape was transferred to the donor site. The area thus outlined was incised deep to adipose tissue with a #15 scalpel blade.  The harvested graft was then trimmed of adipose and epidermal tissue until only dermis was left.  The skin graft was then placed in the primary defect and oriented appropriately.
Ear Star Wedge Flap Text: The defect edges were debeveled with a #15 blade scalpel.  Given the location of the defect and the proximity to free margins (helical rim) an ear star wedge flap was deemed most appropriate.  Using a sterile surgical marker, the appropriate flap was drawn incorporating the defect and placing the expected incisions between the helical rim and antihelix where possible.  The area thus outlined was incised through and through with a #15 scalpel blade.
Advancement Flap (Double) Text: The defect edges were debeveled with a #15 scalpel blade.  Given the location of the defect and the proximity to free margins a double advancement flap was deemed most appropriate.  Using a sterile surgical marker, the appropriate advancement flaps were drawn incorporating the defect and placing the expected incisions within the relaxed skin tension lines where possible.    The area thus outlined was incised deep to adipose tissue with a #15 scalpel blade.  The skin margins were undermined to an appropriate distance in all directions utilizing iris scissors.
Epidermal Autograft Text: The defect edges were debeveled with a #15 scalpel blade.  Given the location of the defect, shape of the defect and the proximity to free margins an epidermal autograft was deemed most appropriate.  Using a sterile surgical marker, the primary defect shape was transferred to the donor site. The epidermal graft was then harvested.  The skin graft was then placed in the primary defect and oriented appropriately.
Cheek-To-Nose Interpolation Flap Text: A decision was made to reconstruct the defect utilizing an interpolation axial flap and a staged reconstruction.  A telfa template was made of the defect.  This telfa template was then used to outline the Cheek-To-Nose Interpolation flap.  The donor area for the pedicle flap was then injected with anesthesia.  The flap was excised through the skin and subcutaneous tissue down to the layer of the underlying musculature.  The interpolation flap was carefully excised within this deep plane to maintain its blood supply.  The edges of the donor site were undermined.   The donor site was closed in a primary fashion.  The pedicle was then rotated into position and sutured.  Once the tube was sutured into place, adequate blood supply was confirmed with blanching and refill.  The pedicle was then wrapped with xeroform gauze and dressed appropriately with a telfa and gauze bandage to ensure continued blood supply and protect the attached pedicle.
Estimated Blood Loss (Cc): minimal
Complex Repair And O-T Advancement Flap Text: The defect edges were debeveled with a #15 scalpel blade.  The primary defect was closed partially with a complex linear closure.  Given the location of the remaining defect, shape of the defect and the proximity to free margins an O-T advancement flap was deemed most appropriate for complete closure of the defect.  Using a sterile surgical marker, an appropriate advancement flap was drawn incorporating the defect and placing the expected incisions within the relaxed skin tension lines where possible.    The area thus outlined was incised deep to adipose tissue with a #15 scalpel blade.  The skin margins were undermined to an appropriate distance in all directions utilizing iris scissors.
Paramedian Forehead Flap Text: A decision was made to reconstruct the defect utilizing an interpolation axial flap and a staged reconstruction.  A telfa template was made of the defect.  This telfa template was then used to outline the paramedian forehead pedicle flap.  The donor area for the pedicle flap was then injected with anesthesia.  The flap was excised through the skin and subcutaneous tissue down to the layer of the underlying musculature.  The pedicle flap was carefully excised within this deep plane to maintain its blood supply.  The edges of the donor site were undermined.   The donor site was closed in a primary fashion.  The pedicle was then rotated into position and sutured.  Once the tube was sutured into place, adequate blood supply was confirmed with blanching and refill.  The pedicle was then wrapped with xeroform gauze and dressed appropriately with a telfa and gauze bandage to ensure continued blood supply and protect the attached pedicle.
Rotation Flap Text: The defect edges were debeveled with a #15 scalpel blade.  Given the location of the defect, shape of the defect and the proximity to free margins a rotation flap was deemed most appropriate.  Using a sterile surgical marker, an appropriate rotation flap was drawn incorporating the defect and placing the expected incisions within the relaxed skin tension lines where possible.    The area thus outlined was incised deep to adipose tissue with a #15 scalpel blade.  The skin margins were undermined to an appropriate distance in all directions utilizing iris scissors.
Xenograft Text: The defect edges were debeveled with a #15 scalpel blade.  Given the location of the defect, shape of the defect and the proximity to free margins a xenograft was deemed most appropriate.  The graft was then trimmed to fit the size of the defect.  The graft was then placed in the primary defect and oriented appropriately.
Billing Type: Third-Party Bill
Epidermal Sutures: 4-0 Prolene
O-T Advancement Flap Text: The defect edges were debeveled with a #15 scalpel blade.  Given the location of the defect, shape of the defect and the proximity to free margins an O-T advancement flap was deemed most appropriate.  Using a sterile surgical marker, an appropriate advancement flap was drawn incorporating the defect and placing the expected incisions within the relaxed skin tension lines where possible.    The area thus outlined was incised deep to adipose tissue with a #15 scalpel blade.  The skin margins were undermined to an appropriate distance in all directions utilizing iris scissors.
Complex Repair And W Plasty Text: The defect edges were debeveled with a #15 scalpel blade.  The primary defect was closed partially with a complex linear closure.  Given the location of the remaining defect, shape of the defect and the proximity to free margins a W plasty was deemed most appropriate for complete closure of the defect.  Using a sterile surgical marker, an appropriate advancement flap was drawn incorporating the defect and placing the expected incisions within the relaxed skin tension lines where possible.    The area thus outlined was incised deep to adipose tissue with a #15 scalpel blade.  The skin margins were undermined to an appropriate distance in all directions utilizing iris scissors.
Excision Depth: adipose tissue
Wound Care: Vaseline
Bilobed Flap Text: The defect edges were debeveled with a #15 scalpel blade.  Given the location of the defect and the proximity to free margins a bilobe flap was deemed most appropriate.  Using a sterile surgical marker, an appropriate bilobe flap drawn around the defect.    The area thus outlined was incised deep to adipose tissue with a #15 scalpel blade.  The skin margins were undermined to an appropriate distance in all directions utilizing iris scissors.
Posterior Auricular Interpolation Flap Text: A decision was made to reconstruct the defect utilizing an interpolation axial flap and a staged reconstruction.  A telfa template was made of the defect.  This telfa template was then used to outline the posterior auricular interpolation flap.  The donor area for the pedicle flap was then injected with anesthesia.  The flap was excised through the skin and subcutaneous tissue down to the layer of the underlying musculature.  The pedicle flap was carefully excised within this deep plane to maintain its blood supply.  The edges of the donor site were undermined.   The donor site was closed in a primary fashion.  The pedicle was then rotated into position and sutured.  Once the tube was sutured into place, adequate blood supply was confirmed with blanching and refill.  The pedicle was then wrapped with xeroform gauze and dressed appropriately with a telfa and gauze bandage to ensure continued blood supply and protect the attached pedicle.
Bi-Rhombic Flap Text: The defect edges were debeveled with a #15 scalpel blade.  Given the location of the defect and the proximity to free margins a bi-rhombic flap was deemed most appropriate.  Using a sterile surgical marker, an appropriate rhombic flap was drawn incorporating the defect. The area thus outlined was incised deep to adipose tissue with a #15 scalpel blade.  The skin margins were undermined to an appropriate distance in all directions utilizing iris scissors.
Complex Repair And A-T Advancement Flap Text: The defect edges were debeveled with a #15 scalpel blade.  The primary defect was closed partially with a complex linear closure.  Given the location of the remaining defect, shape of the defect and the proximity to free margins an A-T advancement flap was deemed most appropriate for complete closure of the defect.  Using a sterile surgical marker, an appropriate advancement flap was drawn incorporating the defect and placing the expected incisions within the relaxed skin tension lines where possible.    The area thus outlined was incised deep to adipose tissue with a #15 scalpel blade.  The skin margins were undermined to an appropriate distance in all directions utilizing iris scissors.
Double Island Pedicle Flap Text: The defect edges were debeveled with a #15 scalpel blade.  Given the location of the defect, shape of the defect and the proximity to free margins a double island pedicle advancement flap was deemed most appropriate.  Using a sterile surgical marker, an appropriate advancement flap was drawn incorporating the defect, outlining the appropriate donor tissue and placing the expected incisions within the relaxed skin tension lines where possible.    The area thus outlined was incised deep to adipose tissue with a #15 scalpel blade.  The skin margins were undermined to an appropriate distance in all directions around the primary defect and laterally outward around the island pedicle utilizing iris scissors.  There was minimal undermining beneath the pedicle flap.
Advancement Flap (Single) Text: The defect edges were debeveled with a #15 scalpel blade.  Given the location of the defect and the proximity to free margins a single advancement flap was deemed most appropriate.  Using a sterile surgical marker, an appropriate advancement flap was drawn incorporating the defect and placing the expected incisions within the relaxed skin tension lines where possible.    The area thus outlined was incised deep to adipose tissue with a #15 scalpel blade.  The skin margins were undermined to an appropriate distance in all directions utilizing iris scissors.
Complex Repair And Transposition Flap Text: The defect edges were debeveled with a #15 scalpel blade.  The primary defect was closed partially with a complex linear closure.  Given the location of the remaining defect, shape of the defect and the proximity to free margins a transposition flap was deemed most appropriate for complete closure of the defect.  Using a sterile surgical marker, an appropriate advancement flap was drawn incorporating the defect and placing the expected incisions within the relaxed skin tension lines where possible.    The area thus outlined was incised deep to adipose tissue with a #15 scalpel blade.  The skin margins were undermined to an appropriate distance in all directions utilizing iris scissors.
Bilateral Helical Rim Advancement Flap Text: The defect edges were debeveled with a #15 blade scalpel.  Given the location of the defect and the proximity to free margins (helical rim) a bilateral helical rim advancement flap was deemed most appropriate.  Using a sterile surgical marker, the appropriate advancement flaps were drawn incorporating the defect and placing the expected incisions between the helical rim and antihelix where possible.  The area thus outlined was incised through and through with a #15 scalpel blade.  With a skin hook and iris scissors, the flaps were gently and sharply undermined and freed up.
Deep Sutures: 4-0 Vicryl

## 2017-05-26 NOTE — PROCEDURE: BIOPSY BY SHAVE METHOD
Detail Level: Detailed
Biopsy Method: Dermablade
Type Of Destruction Used: Curettage
Post-care instructions were reviewed in detail and written instructions are provided. Patient is to keep the biopsy site dry overnight, and then apply bacitracin twice daily until healed. Patient may apply hydrogen peroxide soaks to remove any crusting.
Wound Care: Vaseline
Dressing: bandage
Electrodesiccation And Curettage Text: The wound bed was treated with electrodesiccation and curettage after the biopsy was performed.
Notification Instructions: Patient will be notified of biopsy results. However, patient instructed to call the office if not contacted within 2 weeks.
Anesthesia Type: 1% lidocaine with epinephrine
Anesthesia Volume In Cc: 0.3
Biopsy Type: H and E
Silver Nitrate Text: The wound bed was treated with silver nitrate after the biopsy was performed.
Electrodesiccation Text: The wound bed was treated with electrodesiccation after the biopsy was performed.
Render Post-Care Instructions In Note?: no
Hemostasis: Aluminum Chloride
Size Of Lesion In Cm: 0
Billing Type: Third-Party Bill
Cryotherapy Text: The wound bed was treated with cryotherapy after the biopsy was performed.
Accession #: [id number]
Consent: Written consent was obtained and risks were reviewed including but not limited to scarring, infection, bleeding, scabbing, incomplete removal, and allergy to anesthesia.

## 2017-06-09 ENCOUNTER — APPOINTMENT (RX ONLY)
Dept: URBAN - METROPOLITAN AREA CLINIC 23 | Facility: CLINIC | Age: 67
Setting detail: DERMATOLOGY
End: 2017-06-09

## 2017-06-09 DIAGNOSIS — Z48.01 ENCOUNTER FOR CHANGE OR REMOVAL OF SURGICAL WOUND DRESSING: ICD-10-CM

## 2017-06-09 PROBLEM — E03.9 HYPOTHYROIDISM, UNSPECIFIED: Status: ACTIVE | Noted: 2017-06-09

## 2017-06-09 PROCEDURE — ? SUTURE REMOVAL (GLOBAL PERIOD)

## 2017-06-09 PROCEDURE — ? PRESCRIPTION

## 2017-06-09 RX ORDER — CEPHALEXIN 500 MG/1
CAPSULE ORAL
Qty: 20 | Refills: 0 | Status: ERX

## 2017-06-09 ASSESSMENT — LOCATION SIMPLE DESCRIPTION DERM: LOCATION SIMPLE: RIGHT UPPER BACK

## 2017-06-09 ASSESSMENT — LOCATION DETAILED DESCRIPTION DERM: LOCATION DETAILED: RIGHT MEDIAL UPPER BACK

## 2017-06-09 ASSESSMENT — LOCATION ZONE DERM: LOCATION ZONE: TRUNK

## 2017-06-09 NOTE — PROCEDURE: SUTURE REMOVAL (GLOBAL PERIOD)
Detail Level: Simple
Add 53574 Cpt? (Important Note: In 2017 The Use Of 25626 Is Being Tracked By Cms To Determine Future Global Period Reimbursement For Global Periods): no

## 2017-06-19 ENCOUNTER — HOSPITAL ENCOUNTER (OUTPATIENT)
Dept: ULTRASOUND IMAGING | Age: 67
Discharge: HOME | End: 2017-06-19
Attending: PHYSICIAN ASSISTANT
Payer: MEDICARE

## 2017-06-19 DIAGNOSIS — R09.89 BILATERAL CAROTID BRUITS: ICD-10-CM

## 2017-06-19 PROCEDURE — 93880 EXTRACRANIAL BILAT STUDY: CPT

## 2017-06-22 ENCOUNTER — RX ONLY (OUTPATIENT)
Age: 67
Setting detail: RX ONLY
End: 2017-06-22

## 2017-06-22 RX ORDER — IMIQUIMOD 50 MG/G
CREAM TOPICAL
Qty: 1 | Refills: 0 | Status: ERX | COMMUNITY
Start: 2017-06-22 | End: 2019-04-24

## 2017-06-22 RX ORDER — CEPHALEXIN 500 MG/1
CAPSULE ORAL
Qty: 6 | Refills: 0 | Status: ERX

## 2017-07-19 ENCOUNTER — APPOINTMENT (RX ONLY)
Dept: URBAN - METROPOLITAN AREA CLINIC 23 | Facility: CLINIC | Age: 67
Setting detail: DERMATOLOGY
End: 2017-07-19

## 2017-07-19 PROBLEM — C44.519 BASAL CELL CARCINOMA OF SKIN OF OTHER PART OF TRUNK: Status: ACTIVE | Noted: 2017-07-19

## 2017-07-19 PROCEDURE — ? OTHER

## 2017-07-19 PROCEDURE — 99212 OFFICE O/P EST SF 10 MIN: CPT | Mod: 25

## 2017-07-19 PROCEDURE — 11602 EXC TR-EXT MAL+MARG 1.1-2 CM: CPT

## 2017-07-19 PROCEDURE — 12032 INTMD RPR S/A/T/EXT 2.6-7.5: CPT

## 2017-07-19 PROCEDURE — ? EXCISION

## 2017-07-19 NOTE — PROCEDURE: EXCISION
Size Of Margin In Cm: 0.2
Secondary Defect Length (In Cm): 0
Dermal Autograft Text: The defect edges were debeveled with a #15 scalpel blade.  Given the location of the defect, shape of the defect and the proximity to free margins a dermal autograft was deemed most appropriate.  Using a sterile surgical marker, the primary defect shape was transferred to the donor site. The area thus outlined was incised deep to adipose tissue with a #15 scalpel blade.  The harvested graft was then trimmed of adipose and epidermal tissue until only dermis was left.  The skin graft was then placed in the primary defect and oriented appropriately.
Purse String (Intermediate) Text: Given the location of the defect and the characteristics of the surrounding skin a pursestring intermediate closure was deemed most appropriate.  Undermining was performed circumfirentially around the surgical defect.  A purstring suture was then placed and tightened.
Spiral Flap Text: The defect edges were debeveled with a #15 scalpel blade.  Given the location of the defect, shape of the defect and the proximity to free margins a spiral flap was deemed most appropriate.  Using a sterile surgical marker, an appropriate rotation flap was drawn incorporating the defect and placing the expected incisions within the relaxed skin tension lines where possible. The area thus outlined was incised deep to adipose tissue with a #15 scalpel blade.  The skin margins were undermined to an appropriate distance in all directions utilizing iris scissors.
Complex Repair And Rhombic Flap Text: The defect edges were debeveled with a #15 scalpel blade.  The primary defect was closed partially with a complex linear closure.  Given the location of the remaining defect, shape of the defect and the proximity to free margins a rhombic flap was deemed most appropriate for complete closure of the defect.  Using a sterile surgical marker, an appropriate advancement flap was drawn incorporating the defect and placing the expected incisions within the relaxed skin tension lines where possible.    The area thus outlined was incised deep to adipose tissue with a #15 scalpel blade.  The skin margins were undermined to an appropriate distance in all directions utilizing iris scissors.
Curvilinear Excision Additional Text (Leave Blank If You Do Not Want): The margin was drawn around the clinically apparent lesion.  A curvilinear shape was then drawn on the skin incorporating the lesion and margins.  Incisions were then made along these lines to the appropriate tissue plane and the lesion was extirpated.
Island Pedicle Flap Text: The defect edges were debeveled with a #15 scalpel blade.  Given the location of the defect, shape of the defect and the proximity to free margins an island pedicle advancement flap was deemed most appropriate.  Using a sterile surgical marker, an appropriate advancement flap was drawn incorporating the defect, outlining the appropriate donor tissue and placing the expected incisions within the relaxed skin tension lines where possible.    The area thus outlined was incised deep to adipose tissue with a #15 scalpel blade.  The skin margins were undermined to an appropriate distance in all directions around the primary defect and laterally outward around the island pedicle utilizing iris scissors.  There was minimal undermining beneath the pedicle flap.
Star Wedge Flap Text: The defect edges were debeveled with a #15 scalpel blade.  Given the location of the defect, shape of the defect and the proximity to free margins a star wedge flap was deemed most appropriate.  Using a sterile surgical marker, an appropriate rotation flap was drawn incorporating the defect and placing the expected incisions within the relaxed skin tension lines where possible. The area thus outlined was incised deep to adipose tissue with a #15 scalpel blade.  The skin margins were undermined to an appropriate distance in all directions utilizing iris scissors.
V-Y Flap Text: The defect edges were debeveled with a #15 scalpel blade.  Given the location of the defect, shape of the defect and the proximity to free margins a V-Y flap was deemed most appropriate.  Using a sterile surgical marker, an appropriate advancement flap was drawn incorporating the defect and placing the expected incisions within the relaxed skin tension lines where possible.    The area thus outlined was incised deep to adipose tissue with a #15 scalpel blade.  The skin margins were undermined to an appropriate distance in all directions utilizing iris scissors.
O-T Plasty Text: The defect edges were debeveled with a #15 scalpel blade.  Given the location of the defect, shape of the defect and the proximity to free margins an O-T plasty was deemed most appropriate.  Using a sterile surgical marker, an appropriate O-T plasty was drawn incorporating the defect and placing the expected incisions within the relaxed skin tension lines where possible.    The area thus outlined was incised deep to adipose tissue with a #15 scalpel blade.  The skin margins were undermined to an appropriate distance in all directions utilizing iris scissors.
Cartilage Graft Text: The defect edges were debeveled with a #15 scalpel blade.  Given the location of the defect, shape of the defect, the fact the defect involved a full thickness cartilage defect a cartilage graft was deemed most appropriate.  An appropriate donor site was identified, cleansed, and anesthetized. The cartilage graft was then harvested and transferred to the recipient site, oriented appropriately and then sutured into place.  The secondary defect was then repaired using a primary closure.
Melolabial Transposition Flap Text: The defect edges were debeveled with a #15 scalpel blade.  Given the location of the defect and the proximity to free margins a melolabial flap was deemed most appropriate.  Using a sterile surgical marker, an appropriate melolabial transposition flap was drawn incorporating the defect.    The area thus outlined was incised deep to adipose tissue with a #15 scalpel blade.  The skin margins were undermined to an appropriate distance in all directions utilizing iris scissors.
Excision Method: Elliptical
V-Y Plasty Text: The defect edges were debeveled with a #15 scalpel blade.  Given the location of the defect, shape of the defect and the proximity to free margins an V-Y advancement flap was deemed most appropriate.  Using a sterile surgical marker, an appropriate advancement flap was drawn incorporating the defect and placing the expected incisions within the relaxed skin tension lines where possible.    The area thus outlined was incised deep to adipose tissue with a #15 scalpel blade.  The skin margins were undermined to an appropriate distance in all directions utilizing iris scissors.
Intermediate Repair Preamble Text (Leave Blank If You Do Not Want): Undermining was performed with blunt dissection.
Excisional Biopsy Additional Text (Leave Blank If You Do Not Want): The margin was drawn around the clinically apparent lesion.  An elliptical shape was then drawn on the skin incorporating the lesion and margins.  Incisions were then made along these lines to the appropriate tissue plane and the lesion was extirpated.
Pre-Excision Curettage Text (Leave Blank If You Do Not Want): Prior to drawing the surgical margin the visible lesion was removed with electrodesiccation and curettage to clearly define the lesion size.
Transposition Flap Text: The defect edges were debeveled with a #15 scalpel blade.  Given the location of the defect and the proximity to free margins a transposition flap was deemed most appropriate.  Using a sterile surgical marker, an appropriate transposition flap was drawn incorporating the defect.    The area thus outlined was incised deep to adipose tissue with a #15 scalpel blade.  The skin margins were undermined to an appropriate distance in all directions utilizing iris scissors.
Split-Thickness Skin Graft Text: The defect edges were debeveled with a #15 scalpel blade.  Given the location of the defect, shape of the defect and the proximity to free margins a split thickness skin graft was deemed most appropriate.  Using a sterile surgical marker, the primary defect shape was transferred to the donor site. The split thickness graft was then harvested.  The skin graft was then placed in the primary defect and oriented appropriately.
Advancement Flap (Double) Text: The defect edges were debeveled with a #15 scalpel blade.  Given the location of the defect and the proximity to free margins a double advancement flap was deemed most appropriate.  Using a sterile surgical marker, the appropriate advancement flaps were drawn incorporating the defect and placing the expected incisions within the relaxed skin tension lines where possible.    The area thus outlined was incised deep to adipose tissue with a #15 scalpel blade.  The skin margins were undermined to an appropriate distance in all directions utilizing iris scissors.
Tissue Cultured Epidermal Autograft Text: The defect edges were debeveled with a #15 scalpel blade.  Given the location of the defect, shape of the defect and the proximity to free margins a tissue cultured epidermal autograft was deemed most appropriate.  The graft was then trimmed to fit the size of the defect.  The graft was then placed in the primary defect and oriented appropriately.
Bill For Surgical Tray: no
Anesthesia Type: 1% lidocaine with epinephrine
Suture Removal: 14 days
Complex Repair And O-T Advancement Flap Text: The defect edges were debeveled with a #15 scalpel blade.  The primary defect was closed partially with a complex linear closure.  Given the location of the remaining defect, shape of the defect and the proximity to free margins an O-T advancement flap was deemed most appropriate for complete closure of the defect.  Using a sterile surgical marker, an appropriate advancement flap was drawn incorporating the defect and placing the expected incisions within the relaxed skin tension lines where possible.    The area thus outlined was incised deep to adipose tissue with a #15 scalpel blade.  The skin margins were undermined to an appropriate distance in all directions utilizing iris scissors.
Complex Repair And Split-Thickness Skin Graft Text: The defect edges were debeveled with a #15 scalpel blade.  The primary defect was closed partially with a complex linear closure.  Given the location of the defect, shape of the defect and the proximity to free margins a split thickness skin graft was deemed most appropriate to repair the remaining defect.  The graft was trimmed to fit the size of the remaining defect.  The graft was then placed in the primary defect, oriented appropriately, and sutured into place.
Complex Repair And Dermal Autograft Text: The defect edges were debeveled with a #15 scalpel blade.  The primary defect was closed partially with a complex linear closure.  Given the location of the defect, shape of the defect and the proximity to free margins an dermal autograft was deemed most appropriate to repair the remaining defect.  The graft was trimmed to fit the size of the remaining defect.  The graft was then placed in the primary defect, oriented appropriately, and sutured into place.
Helical Rim Advancement Flap Text: The defect edges were debeveled with a #15 blade scalpel.  Given the location of the defect and the proximity to free margins (helical rim) a double helical rim advancement flap was deemed most appropriate.  Using a sterile surgical marker, the appropriate advancement flaps were drawn incorporating the defect and placing the expected incisions between the helical rim and antihelix where possible.  The area thus outlined was incised through and through with a #15 scalpel blade.  With a skin hook and iris scissors, the flaps were gently and sharply undermined and freed up.
Rhombic Flap Text: The defect edges were debeveled with a #15 scalpel blade.  Given the location of the defect and the proximity to free margins a rhombic flap was deemed most appropriate.  Using a sterile surgical marker, an appropriate rhombic flap was drawn incorporating the defect.    The area thus outlined was incised deep to adipose tissue with a #15 scalpel blade.  The skin margins were undermined to an appropriate distance in all directions utilizing iris scissors.
Bilateral Helical Rim Advancement Flap Text: The defect edges were debeveled with a #15 blade scalpel.  Given the location of the defect and the proximity to free margins (helical rim) a bilateral helical rim advancement flap was deemed most appropriate.  Using a sterile surgical marker, the appropriate advancement flaps were drawn incorporating the defect and placing the expected incisions between the helical rim and antihelix where possible.  The area thus outlined was incised through and through with a #15 scalpel blade.  With a skin hook and iris scissors, the flaps were gently and sharply undermined and freed up.
H Plasty Text: Given the location of the defect, shape of the defect and the proximity to free margins a H-plasty was deemed most appropriate for repair.  Using a sterile surgical marker, the appropriate advancement arms of the H-plasty were drawn incorporating the defect and placing the expected incisions within the relaxed skin tension lines where possible. The area thus outlined was incised deep to adipose tissue with a #15 scalpel blade. The skin margins were undermined to an appropriate distance in all directions utilizing iris scissors.  The opposing advancement arms were then advanced into place in opposite direction and anchored with interrupted buried subcutaneous sutures.
Paramedian Forehead Flap Text: A decision was made to reconstruct the defect utilizing an interpolation axial flap and a staged reconstruction.  A telfa template was made of the defect.  This telfa template was then used to outline the paramedian forehead pedicle flap.  The donor area for the pedicle flap was then injected with anesthesia.  The flap was excised through the skin and subcutaneous tissue down to the layer of the underlying musculature.  The pedicle flap was carefully excised within this deep plane to maintain its blood supply.  The edges of the donor site were undermined.   The donor site was closed in a primary fashion.  The pedicle was then rotated into position and sutured.  Once the tube was sutured into place, adequate blood supply was confirmed with blanching and refill.  The pedicle was then wrapped with xeroform gauze and dressed appropriately with a telfa and gauze bandage to ensure continued blood supply and protect the attached pedicle.
Complex Repair And Skin Substitute Graft Text: The defect edges were debeveled with a #15 scalpel blade.  The primary defect was closed partially with a complex linear closure.  Given the location of the remaining defect, shape of the defect and the proximity to free margins a skin substitute graft was deemed most appropriate to repair the remaining defect.  The graft was trimmed to fit the size of the remaining defect.  The graft was then placed in the primary defect, oriented appropriately, and sutured into place.
Cheek-To-Nose Interpolation Flap Text: A decision was made to reconstruct the defect utilizing an interpolation axial flap and a staged reconstruction.  A telfa template was made of the defect.  This telfa template was then used to outline the Cheek-To-Nose Interpolation flap.  The donor area for the pedicle flap was then injected with anesthesia.  The flap was excised through the skin and subcutaneous tissue down to the layer of the underlying musculature.  The interpolation flap was carefully excised within this deep plane to maintain its blood supply.  The edges of the donor site were undermined.   The donor site was closed in a primary fashion.  The pedicle was then rotated into position and sutured.  Once the tube was sutured into place, adequate blood supply was confirmed with blanching and refill.  The pedicle was then wrapped with xeroform gauze and dressed appropriately with a telfa and gauze bandage to ensure continued blood supply and protect the attached pedicle.
Partial Purse String (Simple) Text: Given the location of the defect and the characteristics of the surrounding skin a simple purse string closure was deemed most appropriate.  Undermining was performed circumferentially around the surgical defect.  A purse string suture was then placed and tightened. Wound tension of the circular defect prevented complete closure of the wound.
O-Z Plasty Text: The defect edges were debeveled with a #15 scalpel blade.  Given the location of the defect, shape of the defect and the proximity to free margins an O-Z plasty (double transposition flap) was deemed most appropriate.  Using a sterile surgical marker, the appropriate transposition flaps were drawn incorporating the defect and placing the expected incisions within the relaxed skin tension lines where possible.    The area thus outlined was incised deep to adipose tissue with a #15 scalpel blade.  The skin margins were undermined to an appropriate distance in all directions utilizing iris scissors.  Hemostasis was achieved with electrocautery.  The flaps were then transposed into place, one clockwise and the other counterclockwise, and anchored with interrupted buried subcutaneous sutures.
Epidermal Closure: running
Dressing: pressure dressing
Keystone Flap Text: The defect edges were debeveled with a #15 scalpel blade.  Given the location of the defect, shape of the defect a keystone flap was deemed most appropriate.  Using a sterile surgical marker, an appropriate keystone flap was drawn incorporating the defect, outlining the appropriate donor tissue and placing the expected incisions within the relaxed skin tension lines where possible. The area thus outlined was incised deep to adipose tissue with a #15 scalpel blade.  The skin margins were undermined to an appropriate distance in all directions around the primary defect and laterally outward around the flap utilizing iris scissors.
Accession #: [id number]
Burow's Advancement Flap Text: The defect edges were debeveled with a #15 scalpel blade.  Given the location of the defect and the proximity to free margins a Burow's advancement flap was deemed most appropriate.  Using a sterile surgical marker, the appropriate advancement flap was drawn incorporating the defect and placing the expected incisions within the relaxed skin tension lines where possible.    The area thus outlined was incised deep to adipose tissue with a #15 scalpel blade.  The skin margins were undermined to an appropriate distance in all directions utilizing iris scissors.
Perilesional Excision Additional Text (Leave Blank If You Do Not Want): The margin was drawn around the clinically apparent lesion. Incisions were then made along these lines to the appropriate tissue plane and the lesion was extirpated.
Complex Repair And M Plasty Text: The defect edges were debeveled with a #15 scalpel blade.  The primary defect was closed partially with a complex linear closure.  Given the location of the remaining defect, shape of the defect and the proximity to free margins an M plasty was deemed most appropriate for complete closure of the defect.  Using a sterile surgical marker, an appropriate advancement flap was drawn incorporating the defect and placing the expected incisions within the relaxed skin tension lines where possible.    The area thus outlined was incised deep to adipose tissue with a #15 scalpel blade.  The skin margins were undermined to an appropriate distance in all directions utilizing iris scissors.
Z Plasty Text: The lesion was extirpated to the level of the fat with a #15 scalpel blade.  Given the location of the defect, shape of the defect and the proximity to free margins a Z-plasty was deemed most appropriate for repair.  Using a sterile surgical marker, the appropriate transposition arms of the Z-plasty were drawn incorporating the defect and placing the expected incisions within the relaxed skin tension lines where possible.    The area thus outlined was incised deep to adipose tissue with a #15 scalpel blade.  The skin margins were undermined to an appropriate distance in all directions utilizing iris scissors.  The opposing transposition arms were then transposed into place in opposite direction and anchored with interrupted buried subcutaneous sutures.
Partial Purse String (Intermediate) Text: Given the location of the defect and the characteristics of the surrounding skin an intermediate purse string closure was deemed most appropriate.  Undermining was performed circumferentially around the surgical defect.  A purse string suture was then placed and tightened. Wound tension of the circular defect prevented complete closure of the wound.
Bilobed Flap Text: The defect edges were debeveled with a #15 scalpel blade.  Given the location of the defect and the proximity to free margins a bilobe flap was deemed most appropriate.  Using a sterile surgical marker, an appropriate bilobe flap drawn around the defect.    The area thus outlined was incised deep to adipose tissue with a #15 scalpel blade.  The skin margins were undermined to an appropriate distance in all directions utilizing iris scissors.
Ftsg Text: The defect edges were debeveled with a #15 scalpel blade.  Given the location of the defect, shape of the defect and the proximity to free margins a full thickness skin graft was deemed most appropriate.  Using a sterile surgical marker, the primary defect shape was transferred to the donor site. The area thus outlined was incised deep to adipose tissue with a #15 scalpel blade.  The harvested graft was then trimmed of adipose tissue until only dermis and epidermis was left.  The skin margins of the secondary defect were undermined to an appropriate distance in all directions utilizing iris scissors.  The secondary defect was closed with interrupted buried subcutaneous sutures.  The skin edges were then re-apposed with running  sutures.  The skin graft was then placed in the primary defect and oriented appropriately.
Purse String (Simple) Text: Given the location of the defect and the characteristics of the surrounding skin a purse string simple closure was deemed most appropriate.  Undermining was performed circumferentially around the surgical defect.  A purse string suture was then placed and tightened.
Size Of Lesion In Cm: 1.3
Bi-Rhombic Flap Text: The defect edges were debeveled with a #15 scalpel blade.  Given the location of the defect and the proximity to free margins a bi-rhombic flap was deemed most appropriate.  Using a sterile surgical marker, an appropriate rhombic flap was drawn incorporating the defect. The area thus outlined was incised deep to adipose tissue with a #15 scalpel blade.  The skin margins were undermined to an appropriate distance in all directions utilizing iris scissors.
Consent was obtained from the patient. The risks and benefits to therapy were discussed in detail. Specifically, the risks of infection, scarring, bleeding, prolonged wound healing, incomplete removal, allergy to anesthesia, nerve injury and recurrence were addressed. Prior to the procedure, the treatment site was clearly identified and confirmed by the patient. All components of Universal Protocol/PAUSE Rule completed.
Repair Type: Intermediate
Complex Repair And Bilobe Flap Text: The defect edges were debeveled with a #15 scalpel blade.  The primary defect was closed partially with a complex linear closure.  Given the location of the remaining defect, shape of the defect and the proximity to free margins a bilobe flap was deemed most appropriate for complete closure of the defect.  Using a sterile surgical marker, an appropriate advancement flap was drawn incorporating the defect and placing the expected incisions within the relaxed skin tension lines where possible.    The area thus outlined was incised deep to adipose tissue with a #15 scalpel blade.  The skin margins were undermined to an appropriate distance in all directions utilizing iris scissors.
Complex Repair And A-T Advancement Flap Text: The defect edges were debeveled with a #15 scalpel blade.  The primary defect was closed partially with a complex linear closure.  Given the location of the remaining defect, shape of the defect and the proximity to free margins an A-T advancement flap was deemed most appropriate for complete closure of the defect.  Using a sterile surgical marker, an appropriate advancement flap was drawn incorporating the defect and placing the expected incisions within the relaxed skin tension lines where possible.    The area thus outlined was incised deep to adipose tissue with a #15 scalpel blade.  The skin margins were undermined to an appropriate distance in all directions utilizing iris scissors.
Advancement Flap (Single) Text: The defect edges were debeveled with a #15 scalpel blade.  Given the location of the defect and the proximity to free margins a single advancement flap was deemed most appropriate.  Using a sterile surgical marker, an appropriate advancement flap was drawn incorporating the defect and placing the expected incisions within the relaxed skin tension lines where possible.    The area thus outlined was incised deep to adipose tissue with a #15 scalpel blade.  The skin margins were undermined to an appropriate distance in all directions utilizing iris scissors.
Home Suture Removal Text: Patient was provided a home suture removal kit and will remove their sutures at home.  If they have any questions or difficulties they will call the office.
W Plasty Text: The lesion was extirpated to the level of the fat with a #15 scalpel blade.  Given the location of the defect, shape of the defect and the proximity to free margins a W-plasty was deemed most appropriate for repair.  Using a sterile surgical marker, the appropriate transposition arms of the W-plasty were drawn incorporating the defect and placing the expected incisions within the relaxed skin tension lines where possible.    The area thus outlined was incised deep to adipose tissue with a #15 scalpel blade.  The skin margins were undermined to an appropriate distance in all directions utilizing iris scissors.  The opposing transposition arms were then transposed into place in opposite direction and anchored with interrupted buried subcutaneous sutures.
Complex Repair And V-Y Plasty Text: The defect edges were debeveled with a #15 scalpel blade.  The primary defect was closed partially with a complex linear closure.  Given the location of the remaining defect, shape of the defect and the proximity to free margins a V-Y plasty was deemed most appropriate for complete closure of the defect.  Using a sterile surgical marker, an appropriate advancement flap was drawn incorporating the defect and placing the expected incisions within the relaxed skin tension lines where possible.    The area thus outlined was incised deep to adipose tissue with a #15 scalpel blade.  The skin margins were undermined to an appropriate distance in all directions utilizing iris scissors.
Excision Depth: adipose tissue
Complex Repair And O-L Flap Text: The defect edges were debeveled with a #15 scalpel blade.  The primary defect was closed partially with a complex linear closure.  Given the location of the remaining defect, shape of the defect and the proximity to free margins an O-L flap was deemed most appropriate for complete closure of the defect.  Using a sterile surgical marker, an appropriate flap was drawn incorporating the defect and placing the expected incisions within the relaxed skin tension lines where possible.    The area thus outlined was incised deep to adipose tissue with a #15 scalpel blade.  The skin margins were undermined to an appropriate distance in all directions utilizing iris scissors.
Post-Care Instructions: I reviewed with the patient in detail post-care instructions. Patient is not to engage in any heavy lifting, exercise, or swimming for the next 7 days. Should the patient develop any fevers, chills, bleeding, severe pain patient will contact the office immediately.
Cheek Interpolation Flap Text: A decision was made to reconstruct the defect utilizing an interpolation axial flap and a staged reconstruction.  A telfa template was made of the defect.  This telfa template was then used to outline the Cheek Interpolation flap.  The donor area for the pedicle flap was then injected with anesthesia.  The flap was excised through the skin and subcutaneous tissue down to the layer of the underlying musculature.  The interpolation flap was carefully excised within this deep plane to maintain its blood supply.  The edges of the donor site were undermined.   The donor site was closed in a primary fashion.  The pedicle was then rotated into position and sutured.  Once the tube was sutured into place, adequate blood supply was confirmed with blanching and refill.  The pedicle was then wrapped with xeroform gauze and dressed appropriately with a telfa and gauze bandage to ensure continued blood supply and protect the attached pedicle.
Xenograft Text: The defect edges were debeveled with a #15 scalpel blade.  Given the location of the defect, shape of the defect and the proximity to free margins a xenograft was deemed most appropriate.  The graft was then trimmed to fit the size of the defect.  The graft was then placed in the primary defect and oriented appropriately.
Saucerization Excision Additional Text (Leave Blank If You Do Not Want): The margin was drawn around the clinically apparent lesion.  Incisions were then made along these lines, in a tangential fashion, to the appropriate tissue plane and the lesion was extirpated.
Epidermal Sutures: 4-0 Prolene
Complex Repair And Z Plasty Text: The defect edges were debeveled with a #15 scalpel blade.  The primary defect was closed partially with a complex linear closure.  Given the location of the remaining defect, shape of the defect and the proximity to free margins a Z plasty was deemed most appropriate for complete closure of the defect.  Using a sterile surgical marker, an appropriate advancement flap was drawn incorporating the defect and placing the expected incisions within the relaxed skin tension lines where possible.    The area thus outlined was incised deep to adipose tissue with a #15 scalpel blade.  The skin margins were undermined to an appropriate distance in all directions utilizing iris scissors.
Complex Repair And Dorsal Nasal Flap Text: The defect edges were debeveled with a #15 scalpel blade.  The primary defect was closed partially with a complex linear closure.  Given the location of the remaining defect, shape of the defect and the proximity to free margins a dorsal nasal flap was deemed most appropriate for complete closure of the defect.  Using a sterile surgical marker, an appropriate flap was drawn incorporating the defect and placing the expected incisions within the relaxed skin tension lines where possible.    The area thus outlined was incised deep to adipose tissue with a #15 scalpel blade.  The skin margins were undermined to an appropriate distance in all directions utilizing iris scissors.
Alar Island Pedicle Flap Text: The defect edges were debeveled with a #15 scalpel blade.  Given the location of the defect, shape of the defect and the proximity to the alar rim an island pedicle advancement flap was deemed most appropriate.  Using a sterile surgical marker, an appropriate advancement flap was drawn incorporating the defect, outlining the appropriate donor tissue and placing the expected incisions within the nasal ala running parallel to the alar rim. The area thus outlined was incised with a #15 scalpel blade.  The skin margins were undermined minimally to an appropriate distance in all directions around the primary defect and laterally outward around the island pedicle utilizing iris scissors.  There was minimal undermining beneath the pedicle flap.
Mucosal Advancement Flap Text: Given the location of the defect, shape of the defect and the proximity to free margins a mucosal advancement flap was deemed most appropriate. Incisions were made with a 15 blade scalpel in the appropriate fashion along the cutaneous vermillion border and the mucosal lip. The remaining actinically damaged mucosal tissue was excised.  The mucosal advancement flap was then elevated to the gingival sulcus with care taken to preserve the neurovascular structures and advanced into the primary defect. Care was taken to ensure that precise realignment of the vermillion border was achieved.
Epidermal Autograft Text: The defect edges were debeveled with a #15 scalpel blade.  Given the location of the defect, shape of the defect and the proximity to free margins an epidermal autograft was deemed most appropriate.  Using a sterile surgical marker, the primary defect shape was transferred to the donor site. The epidermal graft was then harvested.  The skin graft was then placed in the primary defect and oriented appropriately.
Posterior Auricular Interpolation Flap Text: A decision was made to reconstruct the defect utilizing an interpolation axial flap and a staged reconstruction.  A telfa template was made of the defect.  This telfa template was then used to outline the posterior auricular interpolation flap.  The donor area for the pedicle flap was then injected with anesthesia.  The flap was excised through the skin and subcutaneous tissue down to the layer of the underlying musculature.  The pedicle flap was carefully excised within this deep plane to maintain its blood supply.  The edges of the donor site were undermined.   The donor site was closed in a primary fashion.  The pedicle was then rotated into position and sutured.  Once the tube was sutured into place, adequate blood supply was confirmed with blanching and refill.  The pedicle was then wrapped with xeroform gauze and dressed appropriately with a telfa and gauze bandage to ensure continued blood supply and protect the attached pedicle.
Billing Type: Third-Party Bill
Complex Repair And Tissue Cultured Epidermal Autograft Text: The defect edges were debeveled with a #15 scalpel blade.  The primary defect was closed partially with a complex linear closure.  Given the location of the defect, shape of the defect and the proximity to free margins an tissue cultured epidermal autograft was deemed most appropriate to repair the remaining defect.  The graft was trimmed to fit the size of the remaining defect.  The graft was then placed in the primary defect, oriented appropriately, and sutured into place.
Advancement-Rotation Flap Text: The defect edges were debeveled with a #15 scalpel blade.  Given the location of the defect, shape of the defect and the proximity to free margins an advancement-rotation flap was deemed most appropriate.  Using a sterile surgical marker, an appropriate flap was drawn incorporating the defect and placing the expected incisions within the relaxed skin tension lines where possible. The area thus outlined was incised deep to adipose tissue with a #15 scalpel blade.  The skin margins were undermined to an appropriate distance in all directions utilizing iris scissors.
Complex Repair And Single Advancement Flap Text: The defect edges were debeveled with a #15 scalpel blade.  The primary defect was closed partially with a complex linear closure.  Given the location of the remaining defect, shape of the defect and the proximity to free margins a single advancement flap was deemed most appropriate for complete closure of the defect.  Using a sterile surgical marker, an appropriate advancement flap was drawn incorporating the defect and placing the expected incisions within the relaxed skin tension lines where possible.    The area thus outlined was incised deep to adipose tissue with a #15 scalpel blade.  The skin margins were undermined to an appropriate distance in all directions utilizing iris scissors.
Slit Excision Additional Text (Leave Blank If You Do Not Want): A linear line was drawn on the skin overlying the lesion. An incision was made slowly until the lesion was visualized.  Once visualized, the lesion was removed with blunt dissection.
Complex Repair And Modified Advancement Flap Text: The defect edges were debeveled with a #15 scalpel blade.  The primary defect was closed partially with a complex linear closure.  Given the location of the remaining defect, shape of the defect and the proximity to free margins a modified advancement flap was deemed most appropriate for complete closure of the defect.  Using a sterile surgical marker, an appropriate advancement flap was drawn incorporating the defect and placing the expected incisions within the relaxed skin tension lines where possible.    The area thus outlined was incised deep to adipose tissue with a #15 scalpel blade.  The skin margins were undermined to an appropriate distance in all directions utilizing iris scissors.
Complex Repair And W Plasty Text: The defect edges were debeveled with a #15 scalpel blade.  The primary defect was closed partially with a complex linear closure.  Given the location of the remaining defect, shape of the defect and the proximity to free margins a W plasty was deemed most appropriate for complete closure of the defect.  Using a sterile surgical marker, an appropriate advancement flap was drawn incorporating the defect and placing the expected incisions within the relaxed skin tension lines where possible.    The area thus outlined was incised deep to adipose tissue with a #15 scalpel blade.  The skin margins were undermined to an appropriate distance in all directions utilizing iris scissors.
Interpolation Flap Text: A decision was made to reconstruct the defect utilizing an interpolation axial flap and a staged reconstruction.  A telfa template was made of the defect.  This telfa template was then used to outline the interpolation flap.  The donor area for the pedicle flap was then injected with anesthesia.  The flap was excised through the skin and subcutaneous tissue down to the layer of the underlying musculature.  The interpolation flap was carefully excised within this deep plane to maintain its blood supply.  The edges of the donor site were undermined.   The donor site was closed in a primary fashion.  The pedicle was then rotated into position and sutured.  Once the tube was sutured into place, adequate blood supply was confirmed with blanching and refill.  The pedicle was then wrapped with xeroform gauze and dressed appropriately with a telfa and gauze bandage to ensure continued blood supply and protect the attached pedicle.
Scalpel Size: 15 blade
Intermediate / Complex Repair - Final Wound Length In Cm: 4.5
Detail Level: Detailed
Skin Substitute Text: The defect edges were debeveled with a #15 scalpel blade.  Given the location of the defect, shape of the defect and the proximity to free margins a skin substitute graft was deemed most appropriate.  The graft material was trimmed to fit the size of the defect. The graft was then placed in the primary defect and oriented appropriately.
Bilobed Transposition Flap Text: The defect edges were debeveled with a #15 scalpel blade.  Given the location of the defect and the proximity to free margins a bilobed transposition flap was deemed most appropriate.  Using a sterile surgical marker, an appropriate bilobe flap drawn around the defect.    The area thus outlined was incised deep to adipose tissue with a #15 scalpel blade.  The skin margins were undermined to an appropriate distance in all directions utilizing iris scissors.
Complex Repair And Epidermal Autograft Text: The defect edges were debeveled with a #15 scalpel blade.  The primary defect was closed partially with a complex linear closure.  Given the location of the defect, shape of the defect and the proximity to free margins an epidermal autograft was deemed most appropriate to repair the remaining defect.  The graft was trimmed to fit the size of the remaining defect.  The graft was then placed in the primary defect, oriented appropriately, and sutured into place.
A-T Advancement Flap Text: The defect edges were debeveled with a #15 scalpel blade.  Given the location of the defect, shape of the defect and the proximity to free margins an A-T advancement flap was deemed most appropriate.  Using a sterile surgical marker, an appropriate advancement flap was drawn incorporating the defect and placing the expected incisions within the relaxed skin tension lines where possible.    The area thus outlined was incised deep to adipose tissue with a #15 scalpel blade.  The skin margins were undermined to an appropriate distance in all directions utilizing iris scissors.
Ear Star Wedge Flap Text: The defect edges were debeveled with a #15 blade scalpel.  Given the location of the defect and the proximity to free margins (helical rim) an ear star wedge flap was deemed most appropriate.  Using a sterile surgical marker, the appropriate flap was drawn incorporating the defect and placing the expected incisions between the helical rim and antihelix where possible.  The area thus outlined was incised through and through with a #15 scalpel blade.
Estimated Blood Loss (Cc): minimal
Render Post-Care Instructions In Note?: yes
Path Notes (To The Dermatopathologist): Please check margins
Fusiform Excision Additional Text (Leave Blank If You Do Not Want): The margin was drawn around the clinically apparent lesion.  A fusiform shape was then drawn on the skin incorporating the lesion and margins.  Incisions were then made along these lines to the appropriate tissue plane and the lesion was extirpated.
Complex Repair Preamble Text (Leave Blank If You Do Not Want): Extensive wide undermining was performed.
Dorsal Nasal Flap Text: The defect edges were debeveled with a #15 scalpel blade.  Given the location of the defect and the proximity to free margins a dorsal nasal flap was deemed most appropriate.  Using a sterile surgical marker, an appropriate dorsal nasal flap was drawn around the defect.    The area thus outlined was incised deep to adipose tissue with a #15 scalpel blade.  The skin margins were undermined to an appropriate distance in all directions utilizing iris scissors.
Melolabial Interpolation Flap Text: A decision was made to reconstruct the defect utilizing an interpolation axial flap and a staged reconstruction.  A telfa template was made of the defect.  This telfa template was then used to outline the melolabial interpolation flap.  The donor area for the pedicle flap was then injected with anesthesia.  The flap was excised through the skin and subcutaneous tissue down to the layer of the underlying musculature.  The pedicle flap was carefully excised within this deep plane to maintain its blood supply.  The edges of the donor site were undermined.   The donor site was closed in a primary fashion.  The pedicle was then rotated into position and sutured.  Once the tube was sutured into place, adequate blood supply was confirmed with blanching and refill.  The pedicle was then wrapped with xeroform gauze and dressed appropriately with a telfa and gauze bandage to ensure continued blood supply and protect the attached pedicle.
Modified Advancement Flap Text: The defect edges were debeveled with a #15 scalpel blade.  Given the location of the defect, shape of the defect and the proximity to free margins a modified advancement flap was deemed most appropriate.  Using a sterile surgical marker, an appropriate advancement flap was drawn incorporating the defect and placing the expected incisions within the relaxed skin tension lines where possible.    The area thus outlined was incised deep to adipose tissue with a #15 scalpel blade.  The skin margins were undermined to an appropriate distance in all directions utilizing iris scissors.
Positioning (Leave Blank If You Do Not Want): The patient was placed in a comfortable position exposing the surgical site.
Anesthesia Volume In Cc: 5
Repair Performed By Another Provider Text (Leave Blank If You Do Not Want): After the tissue was excised the defect was repaired by another provider.
Island Pedicle Flap-Requiring Vessel Identification Text: The defect edges were debeveled with a #15 scalpel blade.  Given the location of the defect, shape of the defect and the proximity to free margins an island pedicle advancement flap was deemed most appropriate.  Using a sterile surgical marker, an appropriate advancement flap was drawn, based on the axial vessel mentioned above, incorporating the defect, outlining the appropriate donor tissue and placing the expected incisions within the relaxed skin tension lines where possible.    The area thus outlined was incised deep to adipose tissue with a #15 scalpel blade.  The skin margins were undermined to an appropriate distance in all directions around the primary defect and laterally outward around the island pedicle utilizing iris scissors.  There was minimal undermining beneath the pedicle flap.
Crescentic Advancement Flap Text: The defect edges were debeveled with a #15 scalpel blade.  Given the location of the defect and the proximity to free margins a crescentic advancement flap was deemed most appropriate.  Using a sterile surgical marker, the appropriate advancement flap was drawn incorporating the defect and placing the expected incisions within the relaxed skin tension lines where possible.    The area thus outlined was incised deep to adipose tissue with a #15 scalpel blade.  The skin margins were undermined to an appropriate distance in all directions utilizing iris scissors.
Hatchet Flap Text: The defect edges were debeveled with a #15 scalpel blade.  Given the location of the defect, shape of the defect and the proximity to free margins a hatchet flap was deemed most appropriate.  Using a sterile surgical marker, an appropriate hatchet flap was drawn incorporating the defect and placing the expected incisions within the relaxed skin tension lines where possible.    The area thus outlined was incised deep to adipose tissue with a #15 scalpel blade.  The skin margins were undermined to an appropriate distance in all directions utilizing iris scissors.
S Plasty Text: Given the location and shape of the defect, and the orientation of relaxed skin tension lines, an S-plasty was deemed most appropriate for repair.  Using a sterile surgical marker, the appropriate outline of the S-plasty was drawn, incorporating the defect and placing the expected incisions within the relaxed skin tension lines where possible.  The area thus outlined was incised deep to adipose tissue with a #15 scalpel blade.  The skin margins were undermined to an appropriate distance in all directions utilizing iris scissors. The skin flaps were advanced over the defect.  The opposing margins were then approximated with interrupted buried subcutaneous sutures.
Hemostasis: Electrocautery
Complex Repair And Transposition Flap Text: The defect edges were debeveled with a #15 scalpel blade.  The primary defect was closed partially with a complex linear closure.  Given the location of the remaining defect, shape of the defect and the proximity to free margins a transposition flap was deemed most appropriate for complete closure of the defect.  Using a sterile surgical marker, an appropriate advancement flap was drawn incorporating the defect and placing the expected incisions within the relaxed skin tension lines where possible.    The area thus outlined was incised deep to adipose tissue with a #15 scalpel blade.  The skin margins were undermined to an appropriate distance in all directions utilizing iris scissors.
Rotation Flap Text: The defect edges were debeveled with a #15 scalpel blade.  Given the location of the defect, shape of the defect and the proximity to free margins a rotation flap was deemed most appropriate.  Using a sterile surgical marker, an appropriate rotation flap was drawn incorporating the defect and placing the expected incisions within the relaxed skin tension lines where possible.    The area thus outlined was incised deep to adipose tissue with a #15 scalpel blade.  The skin margins were undermined to an appropriate distance in all directions utilizing iris scissors.
Complex Repair And Ftsg Text: The defect edges were debeveled with a #15 scalpel blade.  The primary defect was closed partially with a complex linear closure.  Given the location of the defect, shape of the defect and the proximity to free margins a full thickness skin graft was deemed most appropriate to repair the remaining defect.  The graft was trimmed to fit the size of the remaining defect.  The graft was then placed in the primary defect, oriented appropriately, and sutured into place.
Complex Repair And Rotation Flap Text: The defect edges were debeveled with a #15 scalpel blade.  The primary defect was closed partially with a complex linear closure.  Given the location of the remaining defect, shape of the defect and the proximity to free margins a rotation flap was deemed most appropriate for complete closure of the defect.  Using a sterile surgical marker, an appropriate advancement flap was drawn incorporating the defect and placing the expected incisions within the relaxed skin tension lines where possible.    The area thus outlined was incised deep to adipose tissue with a #15 scalpel blade.  The skin margins were undermined to an appropriate distance in all directions utilizing iris scissors.
Composite Graft Text: The defect edges were debeveled with a #15 scalpel blade.  Given the location of the defect, shape of the defect, the proximity to free margins and the fact the defect was full thickness a composite graft was deemed most appropriate.  The defect was outline and then transferred to the donor site.  A full thickness graft was then excised from the donor site. The graft was then placed in the primary defect, oriented appropriately and then sutured into place.  The secondary defect was then repaired using a primary closure.
Muscle Hinge Flap Text: The defect edges were debeveled with a #15 scalpel blade.  Given the size, depth and location of the defect and the proximity to free margins a muscle hinge flap was deemed most appropriate.  Using a sterile surgical marker, an appropriate hinge flap was drawn incorporating the defect. The area thus outlined was incised with a #15 scalpel blade.  The skin margins were undermined to an appropriate distance in all directions utilizing iris scissors.
No Repair - Repaired With Adjacent Surgical Defect Text (Leave Blank If You Do Not Want): After the excision the defect was repaired concurrently with another surgical defect which was in close approximation.
O-T Advancement Flap Text: The defect edges were debeveled with a #15 scalpel blade.  Given the location of the defect, shape of the defect and the proximity to free margins an O-T advancement flap was deemed most appropriate.  Using a sterile surgical marker, an appropriate advancement flap was drawn incorporating the defect and placing the expected incisions within the relaxed skin tension lines where possible.    The area thus outlined was incised deep to adipose tissue with a #15 scalpel blade.  The skin margins were undermined to an appropriate distance in all directions utilizing iris scissors.
Island Pedicle Flap With Canthal Suspension Text: The defect edges were debeveled with a #15 scalpel blade.  Given the location of the defect, shape of the defect and the proximity to free margins an island pedicle advancement flap was deemed most appropriate.  Using a sterile surgical marker, an appropriate advancement flap was drawn incorporating the defect, outlining the appropriate donor tissue and placing the expected incisions within the relaxed skin tension lines where possible. The area thus outlined was incised deep to adipose tissue with a #15 scalpel blade.  The skin margins were undermined to an appropriate distance in all directions around the primary defect and laterally outward around the island pedicle utilizing iris scissors.  There was minimal undermining beneath the pedicle flap. A suspension suture was placed in the canthal tendon to prevent tension and prevent ectropion.
Wound Care: Vaseline
Lip Wedge Excision Repair Text: Given the location of the defect and the proximity to free margins a full thickness wedge repair was deemed most appropriate.  Using a sterile surgical marker, the appropriate repair was drawn incorporating the defect and placing the expected incisions perpendicular to the vermillion border.  The vermillion border was also meticulously outlined to ensure appropriate reapproximation during the repair.  The area thus outlined was incised through and through with a #15 scalpel blade.  The muscularis and dermis were reaproximated with deep sutures following hemostasis. Care was taken to realign the vermillion border before proceeding with the superficial closure.  Once the vermillion was realigned the superfical and mucosal closure was finished.
O-L Flap Text: The defect edges were debeveled with a #15 scalpel blade.  Given the location of the defect, shape of the defect and the proximity to free margins an O-L flap was deemed most appropriate.  Using a sterile surgical marker, an appropriate advancement flap was drawn incorporating the defect and placing the expected incisions within the relaxed skin tension lines where possible.    The area thus outlined was incised deep to adipose tissue with a #15 scalpel blade.  The skin margins were undermined to an appropriate distance in all directions utilizing iris scissors.
Mastoid Interpolation Flap Text: A decision was made to reconstruct the defect utilizing an interpolation axial flap and a staged reconstruction.  A telfa template was made of the defect.  This telfa template was then used to outline the mastoid interpolation flap.  The donor area for the pedicle flap was then injected with anesthesia.  The flap was excised through the skin and subcutaneous tissue down to the layer of the underlying musculature.  The pedicle flap was carefully excised within this deep plane to maintain its blood supply.  The edges of the donor site were undermined.   The donor site was closed in a primary fashion.  The pedicle was then rotated into position and sutured.  Once the tube was sutured into place, adequate blood supply was confirmed with blanching and refill.  The pedicle was then wrapped with xeroform gauze and dressed appropriately with a telfa and gauze bandage to ensure continued blood supply and protect the attached pedicle.
Double Island Pedicle Flap Text: The defect edges were debeveled with a #15 scalpel blade.  Given the location of the defect, shape of the defect and the proximity to free margins a double island pedicle advancement flap was deemed most appropriate.  Using a sterile surgical marker, an appropriate advancement flap was drawn incorporating the defect, outlining the appropriate donor tissue and placing the expected incisions within the relaxed skin tension lines where possible.    The area thus outlined was incised deep to adipose tissue with a #15 scalpel blade.  The skin margins were undermined to an appropriate distance in all directions around the primary defect and laterally outward around the island pedicle utilizing iris scissors.  There was minimal undermining beneath the pedicle flap.
Trilobed Flap Text: The defect edges were debeveled with a #15 scalpel blade.  Given the location of the defect and the proximity to free margins a trilobed flap was deemed most appropriate.  Using a sterile surgical marker, an appropriate trilobed flap drawn around the defect.    The area thus outlined was incised deep to adipose tissue with a #15 scalpel blade.  The skin margins were undermined to an appropriate distance in all directions utilizing iris scissors.
Deep Sutures: 4-0 Vicryl
Complex Repair And Double Advancement Flap Text: The defect edges were debeveled with a #15 scalpel blade.  The primary defect was closed partially with a complex linear closure.  Given the location of the remaining defect, shape of the defect and the proximity to free margins a double advancement flap was deemed most appropriate for complete closure of the defect.  Using a sterile surgical marker, an appropriate advancement flap was drawn incorporating the defect and placing the expected incisions within the relaxed skin tension lines where possible.    The area thus outlined was incised deep to adipose tissue with a #15 scalpel blade.  The skin margins were undermined to an appropriate distance in all directions utilizing iris scissors.
Complex Repair And Melolabial Flap Text: The defect edges were debeveled with a #15 scalpel blade.  The primary defect was closed partially with a complex linear closure.  Given the location of the remaining defect, shape of the defect and the proximity to free margins a melolabial flap was deemed most appropriate for complete closure of the defect.  Using a sterile surgical marker, an appropriate advancement flap was drawn incorporating the defect and placing the expected incisions within the relaxed skin tension lines where possible.    The area thus outlined was incised deep to adipose tissue with a #15 scalpel blade.  The skin margins were undermined to an appropriate distance in all directions utilizing iris scissors.
Complex Repair And Double M Plasty Text: The defect edges were debeveled with a #15 scalpel blade.  The primary defect was closed partially with a complex linear closure.  Given the location of the remaining defect, shape of the defect and the proximity to free margins a double M plasty was deemed most appropriate for complete closure of the defect.  Using a sterile surgical marker, an appropriate advancement flap was drawn incorporating the defect and placing the expected incisions within the relaxed skin tension lines where possible.    The area thus outlined was incised deep to adipose tissue with a #15 scalpel blade.  The skin margins were undermined to an appropriate distance in all directions utilizing iris scissors.

## 2017-07-19 NOTE — PROCEDURE: OTHER
Note Text (......Xxx Chief Complaint.): This diagnosis correlates with the
Detail Level: Simple
Other (Free Text): Pt stated she was on her 2 weeks break with the Zyclara. Pt did get some redness while applying the medication. Lesion clinically looks like it should at this point in the treatment.

## 2017-07-31 ENCOUNTER — APPOINTMENT (RX ONLY)
Dept: URBAN - METROPOLITAN AREA CLINIC 23 | Facility: CLINIC | Age: 67
Setting detail: DERMATOLOGY
End: 2017-07-31

## 2017-07-31 DIAGNOSIS — Z48.01 ENCOUNTER FOR CHANGE OR REMOVAL OF SURGICAL WOUND DRESSING: ICD-10-CM

## 2017-07-31 PROBLEM — J30.1 ALLERGIC RHINITIS DUE TO POLLEN: Status: ACTIVE | Noted: 2017-07-31

## 2017-07-31 PROBLEM — E03.9 HYPOTHYROIDISM, UNSPECIFIED: Status: ACTIVE | Noted: 2017-07-31

## 2017-07-31 PROCEDURE — ? SUTURE REMOVAL (GLOBAL PERIOD)

## 2017-07-31 ASSESSMENT — LOCATION ZONE DERM: LOCATION ZONE: TRUNK

## 2017-07-31 ASSESSMENT — LOCATION DETAILED DESCRIPTION DERM: LOCATION DETAILED: LEFT SUPERIOR MEDIAL UPPER BACK

## 2017-07-31 ASSESSMENT — LOCATION SIMPLE DESCRIPTION DERM: LOCATION SIMPLE: LEFT UPPER BACK

## 2017-07-31 NOTE — PROCEDURE: SUTURE REMOVAL (GLOBAL PERIOD)
Add 11843 Cpt? (Important Note: In 2017 The Use Of 45735 Is Being Tracked By Cms To Determine Future Global Period Reimbursement For Global Periods): no
Detail Level: Simple

## 2017-09-13 ENCOUNTER — HOSPITAL ENCOUNTER (OUTPATIENT)
Dept: MAMMOGRAPHY | Age: 67
Discharge: HOME | End: 2017-09-13
Attending: PHYSICIAN ASSISTANT
Payer: MEDICARE

## 2017-09-13 DIAGNOSIS — Z12.31 ENCOUNTER FOR SCREENING MAMMOGRAM FOR BREAST CANCER: ICD-10-CM

## 2017-09-13 PROCEDURE — 77067 SCR MAMMO BI INCL CAD: CPT

## 2018-04-11 ENCOUNTER — APPOINTMENT (RX ONLY)
Dept: URBAN - METROPOLITAN AREA CLINIC 23 | Facility: CLINIC | Age: 68
Setting detail: DERMATOLOGY
End: 2018-04-11

## 2018-04-11 DIAGNOSIS — Z85.828 PERSONAL HISTORY OF OTHER MALIGNANT NEOPLASM OF SKIN: ICD-10-CM

## 2018-04-11 DIAGNOSIS — L81.4 OTHER MELANIN HYPERPIGMENTATION: ICD-10-CM

## 2018-04-11 DIAGNOSIS — L82.1 OTHER SEBORRHEIC KERATOSIS: ICD-10-CM

## 2018-04-11 DIAGNOSIS — D18.0 HEMANGIOMA: ICD-10-CM

## 2018-04-11 PROBLEM — J45.909 UNSPECIFIED ASTHMA, UNCOMPLICATED: Status: ACTIVE | Noted: 2018-04-11

## 2018-04-11 PROBLEM — K21.9 GASTRO-ESOPHAGEAL REFLUX DISEASE WITHOUT ESOPHAGITIS: Status: ACTIVE | Noted: 2018-04-11

## 2018-04-11 PROBLEM — L55.1 SUNBURN OF SECOND DEGREE: Status: ACTIVE | Noted: 2018-04-11

## 2018-04-11 PROBLEM — L70.0 ACNE VULGARIS: Status: ACTIVE | Noted: 2018-04-11

## 2018-04-11 PROBLEM — D18.01 HEMANGIOMA OF SKIN AND SUBCUTANEOUS TISSUE: Status: ACTIVE | Noted: 2018-04-11

## 2018-04-11 PROBLEM — J30.1 ALLERGIC RHINITIS DUE TO POLLEN: Status: ACTIVE | Noted: 2018-04-11

## 2018-04-11 PROBLEM — E03.9 HYPOTHYROIDISM, UNSPECIFIED: Status: ACTIVE | Noted: 2018-04-11

## 2018-04-11 PROCEDURE — ? COUNSELING

## 2018-04-11 PROCEDURE — 99214 OFFICE O/P EST MOD 30 MIN: CPT

## 2018-04-11 ASSESSMENT — LOCATION SIMPLE DESCRIPTION DERM
LOCATION SIMPLE: LEFT SHOULDER
LOCATION SIMPLE: RIGHT UPPER BACK
LOCATION SIMPLE: RIGHT SHOULDER
LOCATION SIMPLE: ABDOMEN
LOCATION SIMPLE: RIGHT THIGH
LOCATION SIMPLE: LEFT UPPER BACK
LOCATION SIMPLE: UPPER BACK
LOCATION SIMPLE: LEFT THIGH
LOCATION SIMPLE: LEFT LOWER BACK
LOCATION SIMPLE: RIGHT CALF

## 2018-04-11 ASSESSMENT — LOCATION DETAILED DESCRIPTION DERM
LOCATION DETAILED: RIGHT MEDIAL UPPER BACK
LOCATION DETAILED: RIGHT PROXIMAL CALF
LOCATION DETAILED: SUPERIOR THORACIC SPINE
LOCATION DETAILED: LEFT SUPERIOR MEDIAL MIDBACK
LOCATION DETAILED: RIGHT INFERIOR UPPER BACK
LOCATION DETAILED: RIGHT ANTERIOR PROXIMAL THIGH
LOCATION DETAILED: RIGHT POSTERIOR SHOULDER
LOCATION DETAILED: LEFT SUPERIOR UPPER BACK
LOCATION DETAILED: EPIGASTRIC SKIN
LOCATION DETAILED: LEFT POSTERIOR SHOULDER
LOCATION DETAILED: LEFT SUPERIOR MEDIAL UPPER BACK
LOCATION DETAILED: LEFT ANTERIOR DISTAL THIGH

## 2018-04-11 ASSESSMENT — LOCATION ZONE DERM
LOCATION ZONE: ARM
LOCATION ZONE: LEG
LOCATION ZONE: TRUNK

## 2018-09-17 ENCOUNTER — HOSPITAL ENCOUNTER (OUTPATIENT)
Dept: MAMMOGRAPHY | Age: 68
Discharge: HOME | End: 2018-09-17
Attending: OBSTETRICS & GYNECOLOGY
Payer: MEDICARE

## 2018-09-17 DIAGNOSIS — Z12.39 SCREENING BREAST EXAMINATION: ICD-10-CM

## 2018-09-17 PROCEDURE — 77067 SCR MAMMO BI INCL CAD: CPT

## 2018-10-12 PROBLEM — I65.23 CAROTID STENOSIS, ASYMPTOMATIC, BILATERAL: Status: ACTIVE | Noted: 2018-10-12

## 2019-01-01 LAB
ACID FAST STN SPEC: NEGATIVE
MYCOBACTERIUM SPEC QL CULT: NEGATIVE
SPECIMEN PREPARATION: NORMAL
SPECIMEN SOURCE: NORMAL

## 2019-04-24 ENCOUNTER — APPOINTMENT (RX ONLY)
Dept: URBAN - METROPOLITAN AREA CLINIC 23 | Facility: CLINIC | Age: 69
Setting detail: DERMATOLOGY
End: 2019-04-24

## 2019-04-24 DIAGNOSIS — L81.4 OTHER MELANIN HYPERPIGMENTATION: ICD-10-CM

## 2019-04-24 DIAGNOSIS — Z85.828 PERSONAL HISTORY OF OTHER MALIGNANT NEOPLASM OF SKIN: ICD-10-CM

## 2019-04-24 DIAGNOSIS — L81.8 OTHER SPECIFIED DISORDERS OF PIGMENTATION: ICD-10-CM

## 2019-04-24 DIAGNOSIS — D22 MELANOCYTIC NEVI: ICD-10-CM

## 2019-04-24 PROBLEM — K21.9 GASTRO-ESOPHAGEAL REFLUX DISEASE WITHOUT ESOPHAGITIS: Status: ACTIVE | Noted: 2019-04-24

## 2019-04-24 PROBLEM — D22.5 MELANOCYTIC NEVI OF TRUNK: Status: ACTIVE | Noted: 2019-04-24

## 2019-04-24 PROBLEM — J45.909 UNSPECIFIED ASTHMA, UNCOMPLICATED: Status: ACTIVE | Noted: 2019-04-24

## 2019-04-24 PROCEDURE — ? COUNSELING

## 2019-04-24 PROCEDURE — 99214 OFFICE O/P EST MOD 30 MIN: CPT

## 2019-04-24 ASSESSMENT — LOCATION SIMPLE DESCRIPTION DERM
LOCATION SIMPLE: LEFT UPPER BACK
LOCATION SIMPLE: CHEST
LOCATION SIMPLE: ABDOMEN
LOCATION SIMPLE: RIGHT PRETIBIAL REGION
LOCATION SIMPLE: RIGHT UPPER BACK
LOCATION SIMPLE: LEFT PRETIBIAL REGION
LOCATION SIMPLE: UPPER BACK
LOCATION SIMPLE: LEFT FOREARM
LOCATION SIMPLE: LOWER BACK
LOCATION SIMPLE: RIGHT FOREARM

## 2019-04-24 ASSESSMENT — LOCATION ZONE DERM
LOCATION ZONE: ARM
LOCATION ZONE: LEG
LOCATION ZONE: TRUNK

## 2019-04-24 ASSESSMENT — LOCATION DETAILED DESCRIPTION DERM
LOCATION DETAILED: LEFT INFERIOR MEDIAL UPPER BACK
LOCATION DETAILED: EPIGASTRIC SKIN
LOCATION DETAILED: SUPERIOR LUMBAR SPINE
LOCATION DETAILED: RIGHT MEDIAL UPPER BACK
LOCATION DETAILED: LEFT SUPERIOR UPPER BACK
LOCATION DETAILED: SUPERIOR THORACIC SPINE
LOCATION DETAILED: LEFT SUPERIOR MEDIAL UPPER BACK
LOCATION DETAILED: MIDDLE STERNUM
LOCATION DETAILED: LEFT DISTAL DORSAL FOREARM
LOCATION DETAILED: RIGHT DISTAL PRETIBIAL REGION
LOCATION DETAILED: RIGHT DISTAL DORSAL FOREARM
LOCATION DETAILED: LEFT DISTAL PRETIBIAL REGION

## 2019-04-24 NOTE — HPI: EVALUATION OF SKIN LESION(S)
Hpi Title: Evaluation of Skin Lesions
Problem: Patient Care Overview  Goal: Plan of Care/Patient Progress Review  Outcome: No Change  AOx4, ice applied to painful left shoulder area and left thigh. Remains on RA, sats 94%, lung sounds clear and equal bilaterally. Up w/SBA to BRV. Rested comfortably this shift.      
How Severe Are Your Spot(S)?: mild
Have Your Spot(S) Been Treated In The Past?: has not been treated

## 2019-06-07 ENCOUNTER — HOSPITAL ENCOUNTER (OUTPATIENT)
Dept: MAMMOGRAPHY | Age: 69
Discharge: HOME | End: 2019-06-07
Attending: INTERNAL MEDICINE
Payer: MEDICARE

## 2019-06-07 DIAGNOSIS — M94.9 DISORDER OF BONE AND CARTILAGE: ICD-10-CM

## 2019-06-07 DIAGNOSIS — M89.9 DISORDER OF BONE AND CARTILAGE: ICD-10-CM

## 2019-06-07 PROCEDURE — 77080 DXA BONE DENSITY AXIAL: CPT

## 2019-06-27 ENCOUNTER — HOSPITAL ENCOUNTER (OUTPATIENT)
Dept: CT IMAGING | Age: 69
Discharge: HOME | End: 2019-06-27
Attending: NURSE PRACTITIONER
Payer: MEDICARE

## 2019-06-27 DIAGNOSIS — G45.3 AMAUROSIS FUGAX OF RIGHT EYE: ICD-10-CM

## 2019-06-27 DIAGNOSIS — I65.23 CAROTID STENOSIS, ASYMPTOMATIC, BILATERAL: ICD-10-CM

## 2019-06-27 PROCEDURE — 74011636320 HC RX REV CODE- 636/320: Performed by: NURSE PRACTITIONER

## 2019-06-27 PROCEDURE — 70498 CT ANGIOGRAPHY NECK: CPT

## 2019-06-27 PROCEDURE — 74011000258 HC RX REV CODE- 258: Performed by: NURSE PRACTITIONER

## 2019-06-27 RX ORDER — SODIUM CHLORIDE 0.9 % (FLUSH) 0.9 %
10 SYRINGE (ML) INJECTION
Status: COMPLETED | OUTPATIENT
Start: 2019-06-27 | End: 2019-06-27

## 2019-06-27 RX ADMIN — IOPAMIDOL 80 ML: 755 INJECTION, SOLUTION INTRAVENOUS at 15:24

## 2019-06-27 RX ADMIN — Medication 10 ML: at 15:24

## 2019-06-27 RX ADMIN — SODIUM CHLORIDE 100 ML: 900 INJECTION, SOLUTION INTRAVENOUS at 15:24

## 2019-09-18 ENCOUNTER — APPOINTMENT (RX ONLY)
Dept: URBAN - METROPOLITAN AREA CLINIC 23 | Facility: CLINIC | Age: 69
Setting detail: DERMATOLOGY
End: 2019-09-18

## 2019-09-18 DIAGNOSIS — H61.03 CHONDRITIS OF EXTERNAL EAR: ICD-10-CM

## 2019-09-18 PROBLEM — H61.031 CHONDRITIS OF RIGHT EXTERNAL EAR: Status: ACTIVE | Noted: 2019-09-18

## 2019-09-18 PROBLEM — L70.0 ACNE VULGARIS: Status: ACTIVE | Noted: 2019-09-18

## 2019-09-18 PROBLEM — L55.1 SUNBURN OF SECOND DEGREE: Status: ACTIVE | Noted: 2019-09-18

## 2019-09-18 PROBLEM — J45.909 UNSPECIFIED ASTHMA, UNCOMPLICATED: Status: ACTIVE | Noted: 2019-09-18

## 2019-09-18 PROCEDURE — ? COUNSELING

## 2019-09-18 PROCEDURE — 99212 OFFICE O/P EST SF 10 MIN: CPT

## 2019-09-18 ASSESSMENT — LOCATION SIMPLE DESCRIPTION DERM: LOCATION SIMPLE: RIGHT EAR

## 2019-09-18 ASSESSMENT — LOCATION DETAILED DESCRIPTION DERM: LOCATION DETAILED: RIGHT ANTIHELIX

## 2019-09-18 ASSESSMENT — LOCATION ZONE DERM: LOCATION ZONE: EAR

## 2019-09-19 ENCOUNTER — HOSPITAL ENCOUNTER (OUTPATIENT)
Dept: MAMMOGRAPHY | Age: 69
Discharge: HOME | End: 2019-09-19
Attending: OBSTETRICS & GYNECOLOGY
Payer: MEDICARE

## 2019-09-19 DIAGNOSIS — Z12.39 BREAST SCREENING, UNSPECIFIED: ICD-10-CM

## 2019-09-19 PROCEDURE — 77067 SCR MAMMO BI INCL CAD: CPT

## 2019-09-26 ENCOUNTER — HOSPITAL ENCOUNTER (OUTPATIENT)
Dept: MAMMOGRAPHY | Age: 69
Discharge: HOME | End: 2019-09-26
Attending: OBSTETRICS & GYNECOLOGY
Payer: MEDICARE

## 2019-09-26 DIAGNOSIS — R92.8 ABNORMAL SCREENING MAMMOGRAM: ICD-10-CM

## 2019-09-26 PROCEDURE — 77065 DX MAMMO INCL CAD UNI: CPT

## 2019-10-02 NOTE — PROGRESS NOTES
Her left diagnostic mammogram noted some increase in calcification that still looked benign but 6 mo f/u mammogram is suggested by them

## 2019-10-09 ENCOUNTER — HOSPITAL ENCOUNTER (OUTPATIENT)
Dept: GENERAL RADIOLOGY | Age: 69
Discharge: HOME | End: 2019-10-09

## 2019-10-09 DIAGNOSIS — R04.2 HEMOPTYSIS: ICD-10-CM

## 2019-10-11 PROBLEM — I77.1 SUBCLAVIAN ARTERY STENOSIS (HCC): Status: ACTIVE | Noted: 2019-10-11

## 2019-10-11 PROBLEM — I65.22 LEFT CAROTID STENOSIS: Status: ACTIVE | Noted: 2018-10-12

## 2019-10-11 PROBLEM — I65.21 RIGHT CAROTID ARTERY OCCLUSION: Status: ACTIVE | Noted: 2019-10-11

## 2019-10-11 NOTE — H&P (VIEW-ONLY)
[address] Cristal Aparicio. [address] [facility], [address] [place] [address] 
[phone] Patient Name:  Crispin Avelar YOB: 1950 Office Visit 10/11/2019 CHIEF COMPLAINT:   
Chief Complaint Patient presents with  Asthma HISTORY OF PRESENT ILLNESS:   
I had the pleasure of seeing Ms. Drake Carpenter in our clinic today. Ms. Benny Johnson is a 78 y.o. female who presents with a history of asthma and chronic sinusitis here for routine follow-up appointment. She states since her last appointment her breathing has been quite stable. She denies any major exacerbations of her asthma. She states she is using her albuterol inhaler about 1-2 times a week. She reports compliance with her Brio 200 mg inhaler. Her only new complaint is 2 episodes of hemoptysis that occurred this past week. She describes coughing up small amounts of blood twice, most recently 2 days ago. She blames this on increasing sinus congestion and has since started using her Flonase nasal spray without any further episodes. She was also taking off of her Xyzal and aspirin by her primary care physician. A chest x-ray was performed on 10/9 with no new changes compared to old scans going back to 2013. In addition we reviewed a CT scan of the neck from June which showed some likely postradiation changes in the left lung but no obvious new pathology seen. Past Medical History:  
Diagnosis Date  Asthma  Bite of nonvenomous arthropod ? ??  
 Cough  Esophageal stricture 2002  
 dilated 2002  GERD (gastroesophageal reflux disease)  History of rectal bleeding ???  
 Hodgkin's lymphoma ([facility] [address] 1980 Radiation therapy  Hypercholesterolemia  Menopause  Positive RAST testing 2007 IgE level of 1,391  Thyroid disease Problem List  Date Reviewed: 10/11/2019 Codes Class Noted Right carotid artery occlusion ICD-10-CM: F09.35 ICD-9-CM: 433.10  10/11/2019 Subclavian artery stenosis (HCC) ICD-10-CM: I77.1 ICD-9-CM: 447.1  10/11/2019 Left carotid stenosis ICD-10-CM: R45.47 
ICD-9-CM: 433.10  10/12/2018 Hypercholesterolemia (Chronic) ICD-10-CM: E78.00 ICD-9-CM: 272.0  6/30/2015 Dysphagia (Chronic) ICD-10-CM: R13.10 ICD-9-CM: 787.20  6/30/2015 Mild persistent asthma without complication (Chronic) TFH-60-MI: J45.30 ICD-9-CM: 493.90  10/17/2013 Acquired hypothyroidism ICD-10-CM: E03.9 ICD-9-CM: 244.9  10/17/2013 Allergic rhinitis (Chronic) ICD-10-CM: J30.9 ICD-9-CM: 477.9  10/17/2013 GERD (gastroesophageal reflux disease) (Chronic) ICD-10-CM: K21.9 ICD-9-CM: 530.81  10/17/2013 Past Surgical History:  
Procedure Laterality Date [place]  HX CATARACT REMOVAL Bilateral 2007, 2009  HX COLONOSCOPY    
 HX OTHER SURGICAL    
 dental x3  
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 [facility] Pulmonology Studies 10/18/2013 Spirometry Date 10/18/2013 Spirometry Result Numeric clean normal spirometry with no significant change. Social History Socioeconomic History  Marital status:  Spouse name: Not on file  Number of children: Not on file  Years of education: Not on file  Highest education level: Not on file Occupational History  Occupation:  Social Needs  Financial resource strain: Not on file  Food insecurity:  
  Worry: Not on file Inability: Not on file  Transportation needs:  
  Medical: Not on file Non-medical: Not on file Tobacco Use  Smoking status: Never Smoker  Smokeless tobacco: Never Used Substance and Sexual Activity  Alcohol use: Yes Alcohol/week: 21.0 standard drinks Types: 7 Shots of liquor, 14 Standard drinks or equivalent per week  Drug use: No  
 Sexual activity: Not on file Lifestyle  Physical activity:  
  Days per week: Not on file Minutes per session: Not on file  Stress: Not on file Relationships  Social connections:  
  Talks on phone: Not on file Gets together: Not on file Attends Yarsani service: Not on file Active member of club or organization: Not on file Attends meetings of clubs or organizations: Not on file Relationship status: Not on file  Intimate partner violence:  
  Fear of current or ex partner: Not on file Emotionally abused: Not on file Physically abused: Not on file Forced sexual activity: Not on file Other Topics Concern  Not on file Social History Narrative There is no significant environmental or industrial exposure. She has no known exposure to TB. She has worked as an . Family History Problem Relation Age of Onset  Cancer Mother Kidney cancer  Cancer Father Prostate cnaceer  Heart Surgery Brother  Breast Cancer Neg Hx Allergies Allergen Reactions  Compazine [Prochlorperazine Edisylate] Swelling Current Outpatient Medications Medication Sig  
 montelukast (SINGULAIR) 10 mg tablet Take 1 Tab by mouth daily.  levocetirizine (XYZAL) 5 mg tablet Take 1 Tab by mouth daily.  fluticasone furoate-vilanterol (BREO ELLIPTA) 200-25 mcg/dose inhaler Take 1 Puff by inhalation daily.  albuterol (VENTOLIN HFA) 90 mcg/actuation inhaler Take 2 Puffs by inhalation four (4) times daily. For wheezing.  fluticasone propionate (FLONASE) 50 mcg/actuation nasal spray 2 Sprays by Both Nostrils route daily.  melatonin 10 mg cap Take 10 mg by mouth nightly as needed.  levothyroxine (SYNTHROID) 75 mcg tablet 1/2 tab MW and one AOD (Patient taking differently: Take 75 mcg by mouth Daily (before breakfast). 1/2 tab MW and one AOD)  Omeprazole delayed release (PRILOSEC D/R) 20 mg tablet Take 1 Tab by mouth daily.  simvastatin (ZOCOR) 20 mg tablet Take 1 Tab by mouth nightly.  desonide (DESOWEN) 0.05 % topical ointment Apply  to affected area three (3) times daily as needed for Skin Irritation.  aspirin delayed-release 81 mg tablet Take 81 mg by mouth daily.  omega-3 fatty acids-vitamin e 1,000 mg cap Take 1 Cap by mouth.  multivitamin (ONE A DAY) tablet Take 1 Tab by mouth daily. No current facility-administered medications for this visit. REVIEW OF SYSTEMS: 
 
Review of Systems Constitutional: Positive for malaise/fatigue. Negative for chills, fever and weight loss. HENT: Positive for congestion. Negative for ear discharge, ear pain, hearing loss, nosebleeds, sinus pain, sore throat and tinnitus. Eyes: Negative for blurred vision, pain and redness. Respiratory: Negative for cough, hemoptysis, sputum production, shortness of breath and wheezing. Cardiovascular: Negative for chest pain, palpitations, orthopnea, claudication and leg swelling. Gastrointestinal: Negative for abdominal pain, blood in stool, constipation, diarrhea, heartburn, nausea and vomiting. Genitourinary: Positive for urgency. Negative for dysuria, frequency and hematuria. Musculoskeletal: Negative for back pain, joint pain and neck pain. Skin: Negative for itching and rash. Neurological: Positive for dizziness. Negative for tingling, tremors, focal weakness, seizures, weakness and headaches. Endo/Heme/Allergies: Positive for environmental allergies. Bruises/bleeds easily. Psychiatric/Behavioral: Negative for depression, hallucinations, memory loss and suicidal ideas. The patient is nervous/anxious. PHYSICAL EXAM: 
Visit Vitals /67 (BP 1 Location: Left arm, BP Patient Position: Sitting) Pulse 90 Temp 97.8 °F (36.6 °C) (Temporal) Resp 16 Ht 5' 2.99\" (1.6 m) Wt 120 lb 14.4 oz (54.8 kg) SpO2 100% BMI 21.42 kg/m² GENERAL APPEARANCE: 
The patient is normal weight and in no respiratory distress. HEENT: 
PERRL. Conjunctivae unremarkable. Nasal mucosa is without epistaxis, exudate, or polyps. Gums and dentition are unremarkable. There is no oropharyngeal narrowing. NECK/LYMPHATIC: 
Symmetrical with no elevation of jugular venous pulsation. Trachea midline. No thyroid enlargement. No cervical adenopathy. LUNGS: 
Normal respiratory effort with symmetrical lung expansion. Breath sounds are clear to auscultation bilaterally. HEART: 
There is a regular rate and rhythm. No murmur, rub, or gallop. ABDOMEN: 
Soft and non-tender. No hepatosplenomegaly. Bowel sounds are normal.   
NEURO/PSYCH: 
The patient is alert and oriented to person, place, and time. Memory appears intact and mood is normal.  No gross sensorimotor deficits are present. MS/SKIN: 
There is no edema in the lower extremities. No rashes, bruises, lesions, ulcers visible. DIAGNOSTIC TESTS: 
CT of chest:   
Results from Hospital Encounter encounter on 06/27/19 CTA NECK Impression Impression: Extensive, severe atherosclerotic disease. 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 Left brachiocephalic artery is the 1st branch off the aortic arch. Left common carotid artery arises from the left brachiocephalic artery and is 
patent. 15-20% stenosis in the left carotid bulb. Left vertebral artery arises from the proximal left subclavian artery and is 
patent. Right common carotid artery is the 2nd branch off the aortic arch and is 
severely diseased with only faint contrast enhancement. Right internal carotid 
artery is occluded. Right subclavian artery is the 3rd branch off the aortic arch and is occluded at 
its origin. The distal right subclavian artery is reconstituted via retrograde 
flow in the right vertebral artery consistent with SUBCLAVIAN STEAL SYNDROME. No results found for this or any previous visit. No results found for this or any previous visit. No results found for this or any previous visit.  
 
CXR:   
 Results for orders placed during the hospital encounter of 10/09/19 XR CHEST PA LAT Impression IMPRESSION: 
 
1. Left lung volume loss, similar to prior exam. No evidence of acute pulmonary 
disease. PET/CT:  
No results found for this or any previous visit. No results found for this or any previous visit. Exercise oximetry:   
 
Spirometry:  
Date:   10/11/2019 FVC    2.75-84 FEV1    2.16-76 FEV1/FVC    79-91 FEF 25-75%   2.07-57 Bronchodilator Response TLC           
RV           
DLCO Echo: 
No results found for this or any previous visit. ASSESSMENT:  (Medical Decision Making) ICD-10-CM ICD-9-CM 1. Mild persistent asthma without complication Z86.17 925.26 AMB POC SPIROMETRY 2. Hemoptysis R04.2 786.30 Patient with mild persistent asthma well controlled on her current regimen. Hemoptysis seems likely due to sinus congestion and postnasal drip. Discussed with her that the options going forward include monitoring for worsening symptoms while treating her sinus issues versus performing a CT scan of the chest now. She would like to wait and monitor for any further episodes of hemoptysis. PLAN: 
-Refilled all of her inhalers today. 
-Continue her Brio 200 mg and as needed albuterol. 
-She will call us if she has any further episodes of hemoptysis and if that occurs she should have a CT scan of the chest immediately. 
-Up-to-date on pneumonia and flu vaccines 
-Otherwise we will plan to see her back in 1 year. Portions of this note were created using voice recognition software. As such, error of speech recognition may have occurred. Orders Placed This Encounter  AMB POC SPIROMETRY  montelukast (SINGULAIR) 10 mg tablet Sig: Take 1 Tab by mouth daily. Dispense:  90 Tab Refill:  3  
 levocetirizine (XYZAL) 5 mg tablet Sig: Take 1 Tab by mouth daily. Dispense:  90 Tab Refill:  3  fluticasone furoate-vilanterol (BREO ELLIPTA) 200-25 mcg/dose inhaler Sig: Take 1 Puff by inhalation daily. Dispense:  3 Inhaler Refill:  3  
 albuterol (VENTOLIN HFA) 90 mcg/actuation inhaler Sig: Take 2 Puffs by inhalation four (4) times daily. For wheezing. Dispense:  3 Inhaler Refill:  3  
 fluticasone propionate (FLONASE) 50 mcg/actuation nasal spray Si Sprays by Both Nostrils route daily. Dispense:  3 Bottle Refill:  3 Sam Carrillo MD 
Electronically signed

## 2019-10-14 ENCOUNTER — HOSPITAL ENCOUNTER (OUTPATIENT)
Dept: CT IMAGING | Age: 69
Discharge: HOME | End: 2019-10-14
Attending: INTERNAL MEDICINE
Payer: MEDICARE

## 2019-10-14 DIAGNOSIS — R04.2 HEMOPTYSIS: ICD-10-CM

## 2019-10-14 PROCEDURE — 71250 CT THORAX DX C-: CPT

## 2019-10-16 ENCOUNTER — APPOINTMENT (OUTPATIENT)
Dept: GENERAL RADIOLOGY | Age: 69
End: 2019-10-16
Attending: INTERNAL MEDICINE
Payer: MEDICARE

## 2019-10-16 ENCOUNTER — HOSPITAL ENCOUNTER (OUTPATIENT)
Age: 69
Setting detail: OUTPATIENT SURGERY
Discharge: HOME | End: 2019-10-16
Attending: INTERNAL MEDICINE | Admitting: INTERNAL MEDICINE
Payer: MEDICARE

## 2019-10-16 VITALS
TEMPERATURE: 97.6 F | WEIGHT: 120 LBS | DIASTOLIC BLOOD PRESSURE: 64 MMHG | SYSTOLIC BLOOD PRESSURE: 144 MMHG | OXYGEN SATURATION: 99 % | RESPIRATION RATE: 18 BRPM | HEART RATE: 79 BPM | HEIGHT: 62 IN | BODY MASS INDEX: 22.08 KG/M2

## 2019-10-16 DIAGNOSIS — R91.8 MASS OF LINGULA OF LUNG: ICD-10-CM

## 2019-10-16 LAB
BRONCH. LAVAGE DIFF.,BR: NORMAL
EOSINOPHIL NFR BRONCH MANUAL: 8 %
LYMPHOCYTES NFR BRONCH MANUAL: 22 %
MACROPHAGES NFR BRONCH MANUAL: 1 %
NEUTROPHILS NFR BRONCH MANUAL: 69 %

## 2019-10-16 PROCEDURE — 31628 BRONCHOSCOPY/LUNG BX EACH: CPT | Performed by: INTERNAL MEDICINE

## 2019-10-16 PROCEDURE — 31654 BRONCH EBUS IVNTJ PERPH LES: CPT | Performed by: INTERNAL MEDICINE

## 2019-10-16 PROCEDURE — 87102 FUNGUS ISOLATION CULTURE: CPT

## 2019-10-16 PROCEDURE — 89051 BODY FLUID CELL COUNT: CPT

## 2019-10-16 PROCEDURE — 77030031404 HC PTCH SENS SD DISP SPDM -A: Performed by: INTERNAL MEDICINE

## 2019-10-16 PROCEDURE — 76040000026: Performed by: INTERNAL MEDICINE

## 2019-10-16 PROCEDURE — 31624 DX BRONCHOSCOPE/LAVAGE: CPT | Performed by: INTERNAL MEDICINE

## 2019-10-16 PROCEDURE — 88305 TISSUE EXAM BY PATHOLOGIST: CPT

## 2019-10-16 PROCEDURE — 77030028571 HC CATH ENDOBRNCH DISP BIOP KT SPMD -G1: Performed by: INTERNAL MEDICINE

## 2019-10-16 PROCEDURE — 99152 MOD SED SAME PHYS/QHP 5/>YRS: CPT | Performed by: INTERNAL MEDICINE

## 2019-10-16 PROCEDURE — 88112 CYTOPATH CELL ENHANCE TECH: CPT

## 2019-10-16 PROCEDURE — 74011250636 HC RX REV CODE- 250/636: Performed by: INTERNAL MEDICINE

## 2019-10-16 PROCEDURE — 99153 MOD SED SAME PHYS/QHP EA: CPT | Performed by: INTERNAL MEDICINE

## 2019-10-16 PROCEDURE — 74011000250 HC RX REV CODE- 250: Performed by: INTERNAL MEDICINE

## 2019-10-16 PROCEDURE — 77030012699 HC VLV SUC CNTRL OCOA -A: Performed by: INTERNAL MEDICINE

## 2019-10-16 PROCEDURE — 31627 NAVIGATIONAL BRONCHOSCOPY: CPT | Performed by: INTERNAL MEDICINE

## 2019-10-16 PROCEDURE — 77030021922 HC FCPS BIOP SD DISP SPDM -D: Performed by: INTERNAL MEDICINE

## 2019-10-16 PROCEDURE — 87070 CULTURE OTHR SPECIMN AEROBIC: CPT

## 2019-10-16 PROCEDURE — 87116 MYCOBACTERIA CULTURE: CPT

## 2019-10-16 RX ORDER — SODIUM CHLORIDE 9 MG/ML
50 INJECTION, SOLUTION INTRAVENOUS CONTINUOUS
Status: DISCONTINUED | OUTPATIENT
Start: 2019-10-16 | End: 2019-10-16 | Stop reason: HOSPADM

## 2019-10-16 RX ORDER — LIDOCAINE HYDROCHLORIDE 20 MG/ML
JELLY TOPICAL AS NEEDED
Status: DISCONTINUED | OUTPATIENT
Start: 2019-10-16 | End: 2019-10-16 | Stop reason: HOSPADM

## 2019-10-16 RX ORDER — NALOXONE HYDROCHLORIDE 0.4 MG/ML
0.4 INJECTION, SOLUTION INTRAMUSCULAR; INTRAVENOUS; SUBCUTANEOUS
Status: DISCONTINUED | OUTPATIENT
Start: 2019-10-16 | End: 2019-10-16 | Stop reason: SDUPTHER

## 2019-10-16 RX ORDER — FENTANYL CITRATE 50 UG/ML
25-100 INJECTION, SOLUTION INTRAMUSCULAR; INTRAVENOUS
Status: DISCONTINUED | OUTPATIENT
Start: 2019-10-16 | End: 2019-10-16 | Stop reason: HOSPADM

## 2019-10-16 RX ORDER — SODIUM CHLORIDE 9 MG/ML
1000 INJECTION, SOLUTION INTRAVENOUS CONTINUOUS
Status: DISCONTINUED | OUTPATIENT
Start: 2019-10-16 | End: 2019-10-16 | Stop reason: HOSPADM

## 2019-10-16 RX ORDER — SODIUM CHLORIDE 0.9 % (FLUSH) 0.9 %
5-40 SYRINGE (ML) INJECTION AS NEEDED
Status: DISCONTINUED | OUTPATIENT
Start: 2019-10-16 | End: 2019-10-16 | Stop reason: HOSPADM

## 2019-10-16 RX ORDER — FLUMAZENIL 0.1 MG/ML
0.2 INJECTION INTRAVENOUS
Status: DISCONTINUED | OUTPATIENT
Start: 2019-10-16 | End: 2019-10-16 | Stop reason: HOSPADM

## 2019-10-16 RX ORDER — FLUMAZENIL 0.1 MG/ML
0.2 INJECTION INTRAVENOUS
Status: DISCONTINUED | OUTPATIENT
Start: 2019-10-16 | End: 2019-10-16 | Stop reason: SDUPTHER

## 2019-10-16 RX ORDER — ONDANSETRON 2 MG/ML
4-8 INJECTION INTRAMUSCULAR; INTRAVENOUS
Status: DISCONTINUED | OUTPATIENT
Start: 2019-10-16 | End: 2019-10-16 | Stop reason: HOSPADM

## 2019-10-16 RX ORDER — LIDOCAINE HYDROCHLORIDE 20 MG/ML
JELLY TOPICAL ONCE
Status: DISCONTINUED | OUTPATIENT
Start: 2019-10-16 | End: 2019-10-16 | Stop reason: HOSPADM

## 2019-10-16 RX ORDER — MIDAZOLAM HYDROCHLORIDE 1 MG/ML
.25-5 INJECTION, SOLUTION INTRAMUSCULAR; INTRAVENOUS
Status: DISCONTINUED | OUTPATIENT
Start: 2019-10-16 | End: 2019-10-16 | Stop reason: HOSPADM

## 2019-10-16 RX ORDER — NALOXONE HYDROCHLORIDE 0.4 MG/ML
0.4 INJECTION, SOLUTION INTRAMUSCULAR; INTRAVENOUS; SUBCUTANEOUS
Status: DISCONTINUED | OUTPATIENT
Start: 2019-10-16 | End: 2019-10-16 | Stop reason: HOSPADM

## 2019-10-16 RX ORDER — ONDANSETRON 2 MG/ML
4 INJECTION INTRAMUSCULAR; INTRAVENOUS
Status: DISCONTINUED | OUTPATIENT
Start: 2019-10-16 | End: 2019-10-16 | Stop reason: SDUPTHER

## 2019-10-16 RX ORDER — MIDAZOLAM HYDROCHLORIDE 1 MG/ML
.25-5 INJECTION, SOLUTION INTRAMUSCULAR; INTRAVENOUS
Status: DISCONTINUED | OUTPATIENT
Start: 2019-10-16 | End: 2019-10-16 | Stop reason: SDUPTHER

## 2019-10-16 RX ORDER — SODIUM CHLORIDE 0.9 % (FLUSH) 0.9 %
5-40 SYRINGE (ML) INJECTION EVERY 8 HOURS
Status: DISCONTINUED | OUTPATIENT
Start: 2019-10-16 | End: 2019-10-16 | Stop reason: HOSPADM

## 2019-10-16 RX ORDER — FENTANYL CITRATE 50 UG/ML
25 INJECTION, SOLUTION INTRAMUSCULAR; INTRAVENOUS
Status: DISCONTINUED | OUTPATIENT
Start: 2019-10-16 | End: 2019-10-16 | Stop reason: SDUPTHER

## 2019-10-16 RX ORDER — LIDOCAINE HYDROCHLORIDE 40 MG/ML
SOLUTION TOPICAL ONCE
Status: COMPLETED | OUTPATIENT
Start: 2019-10-16 | End: 2019-10-16

## 2019-10-16 RX ADMIN — LIDOCAINE HYDROCHLORIDE: 40 SOLUTION TOPICAL at 08:52

## 2019-10-16 RX ADMIN — SODIUM CHLORIDE 1000 ML: 900 INJECTION, SOLUTION INTRAVENOUS at 07:55

## 2019-10-16 RX ADMIN — LIDOCAINE HYDROCHLORIDE: 20 JELLY TOPICAL at 08:53

## 2019-10-16 RX ADMIN — MIDAZOLAM 2 MG: 1 INJECTION INTRAMUSCULAR; INTRAVENOUS at 08:37

## 2019-10-16 RX ADMIN — FENTANYL CITRATE 50 MCG: 50 INJECTION INTRAMUSCULAR; INTRAVENOUS at 08:45

## 2019-10-16 RX ADMIN — FENTANYL CITRATE 25 MCG: 50 INJECTION INTRAMUSCULAR; INTRAVENOUS at 09:14

## 2019-10-16 RX ADMIN — FENTANYL CITRATE 50 MCG: 50 INJECTION INTRAMUSCULAR; INTRAVENOUS at 08:37

## 2019-10-16 NOTE — PROCEDURES
PROCEDURE Bronchoscopy with  EMN aided biopsy of peripheral lung nodule. EQUIPMENT: 
Olympus T180 bronchoscope, Super Dimension EMN system, 90 deg steareble sheath, UM 17 Radial probe, Super D forceps, Fluoroscopy. INDICATION Peripheral nodule/mass suspicious for malignancy/staging of presumed malignancy POST OP DIAGNOSIS: 
Super Dimension EMN system was employed to identify and biopsy the LINGULAR mass seen on CT. 
5 biopsies from smaller lesion(see first image) and a BAL were perfomed with touch preparations revealing atypical cells on VINI. Histology from obtained  Biopsies is pending. The larger lesion could not be reached with ENB technique despite several attempts. There were no targets for EBUS Await final path and if negative- will schedule CT guided lingular mass biopsy BAL was sent for cytology AFB Fungus Cell count with diff Routine cultures. ANESTHESIA Concious sedation with: Fentanyl 125 mcg IV; Versed 2 mg IV; Lidocaine 220 mg to tracheo-bronchial tree and vocal cords. AIRWAY INSPECTION After obtaining informed consent, using a bite block, an Olympus T180 viedeo bronchoscope was  introduced into the trachea through the vocal cords without complication. RIGHT 
LOCATION NORM/ABNORMAL DESCRIPTION  
VOCAL CORDS NL   
TRACHEA NL   
DALTON NL   
RMSB NL   
RUL NL   
BI NL   
RML NL   
SUP SEGM RLL NL   
MED BASAL NL   
ANTERIOR BASAL NL   
LATERAL BASAL NL   
POSTERIOR BASAL NL   
     
 
 
 
 
LEFT 
LOCATION NORMAL/ABNORMAL TYPE  
LMSB NL   
ISAURA NL   
LINGULA NL   
SUPERIOR DIVISION NL   
SUPERIOR SEG LLL NL   
HUEY-MEDIAL LLL NL   
LATERAL LLL NL   
POSTERIOR LLL NL Olympus T 180   bronchoscope wasintroduced into the airways to identify and biopsy the Lingular mass/nodule with the aid of Super D EMN system and radial probe US. CT image was acquired on with Super D protocol were used and the pathway to target  planned using super D planning software. Planing data was then used to navigate to the nodule, after verification with radial US: 
 
5 biopsies from smaller lesion(see first image) and a BAL were perfomed with touch preparations revealing atypical cells on VINI. Histology from obtained  Biopsies is pending. The larger lesion could not be reached with ENB technique despite several attempts TOUCH PREP BIOPSY# MALIGNANT SUSPICIOUS ATYPIA H[address]ateig[address] 32 Lingular nodule 1 - - +   
 2 - - - Bronch epith 3 - - +   
 4 - - - Bronch epith 5 - - - Bronch epith EBL: 5 ml Complications: NONE with no evidence of pneumothorax on post-op flouroscopy.  
 
Jean Paul Richardson MD.

## 2019-10-16 NOTE — DISCHARGE INSTRUCTIONS
RESPIRATORY CARE - BRONCHOSCOPY/NAVIGATION/BRONCHIAL LAVAGE - DISCHARGE INSTRUCTIONS      You received a lot of numbing medication for your throat and nose, and you also received medication to make you sleepy during your procedure. Because of this and because the bronchoscopy may have irritated your airways, we ask that you follow these directions:    1. Do not eat or drink until  1030am .   After that, you may have what you please. You may want to try some liquids first, because your throat may be a little sore. 2. Medication may cause drowsiness for several hours, therefore:  · Do not drive or operate machinery for remainder of the day. · No alcohol today  · Do not make any important or legal decisions for 24 hours  · Do not sign any legal documents for 24 hours    3. You may cough up more mucus than usual and you may see some blood, but this is expected and should subside by the following day. 4. If severe throat irritation, coughing, or bleeding continue, call your doctor. 5.         If you run a fever greater than 102, take tylenol and motrin then call [facility] at [phone]. 6.         Dr. Nanci Martínez has asked that you:                A. Call the doctor's office at [phone] for any questions or problems that may occur. Kiran Sanchez will be in touch with you when the results of today's procedure are back. Discharge Medications:  Any additional instructions: Resume all medication as before procedure using caution with any pain medication.   Instructions given to Juliet Short and other family members

## 2019-10-16 NOTE — INTERVAL H&P NOTE
H&P Update: 
Hugo Neville was seen and examined. History and physical has been reviewed. The patient has been examined.  There have been no significant clinical changes since the completion of the originally dated History and Physical.

## 2019-10-16 NOTE — ROUTINE PROCESS
Pt. Discharged to car by [facility] and April with family . Vital signs stable. Able to tolerate ice chips.   Seen by MD.

## 2019-10-18 LAB
BACTERIA SPEC CULT: NORMAL
GRAM STN SPEC: NORMAL
GRAM STN SPEC: NORMAL
SERVICE CMNT-IMP: NORMAL

## 2019-10-25 LAB
FUNGUS CULTURE, RFCO2T: POSITIVE
FUNGUS SMEAR, RFCO1T: ABNORMAL
FUNGUS SPEC CULT: NORMAL
FUNGUS STAIN, 188244: NORMAL
REFLEX TO ID, RFCO3T: ABNORMAL
SPECIMEN SOURCE: ABNORMAL
SPECIMEN SOURCE: NORMAL

## 2019-10-29 ENCOUNTER — HOSPITAL ENCOUNTER (OUTPATIENT)
Dept: PET IMAGING | Age: 69
Discharge: HOME | End: 2019-10-29
Payer: MEDICARE

## 2019-10-29 DIAGNOSIS — R91.8 LUNG MASS: ICD-10-CM

## 2019-10-29 PROCEDURE — A9552 F18 FDG: HCPCS

## 2019-10-29 PROCEDURE — 74011636320 HC RX REV CODE- 636/320: Performed by: INTERNAL MEDICINE

## 2019-10-29 RX ORDER — SODIUM CHLORIDE 0.9 % (FLUSH) 0.9 %
10 SYRINGE (ML) INJECTION
Status: COMPLETED | OUTPATIENT
Start: 2019-10-29 | End: 2019-10-29

## 2019-10-29 RX ADMIN — Medication 10 ML: at 09:27

## 2019-10-29 RX ADMIN — DIATRIZOATE MEGLUMINE AND DIATRIZOATE SODIUM 10 ML: 660; 100 LIQUID ORAL; RECTAL at 09:27

## 2019-11-04 ENCOUNTER — HOSPITAL ENCOUNTER (OUTPATIENT)
Dept: LAB | Age: 69
Discharge: HOME | End: 2019-11-04
Attending: INTERNAL MEDICINE
Payer: MEDICARE

## 2019-11-04 LAB
ANION GAP SERPL CALC-SCNC: 5 MMOL/L (ref 7–16)
APTT PPP: 28.4 SEC (ref 24.7–39.8)
BASOPHILS # BLD: 0.1 K/UL (ref 0–0.2)
BASOPHILS NFR BLD: 1 % (ref 0–2)
BUN SERPL-MCNC: 17 MG/DL (ref 8–23)
CALCIUM SERPL-MCNC: 9.5 MG/DL (ref 8.3–10.4)
CHLORIDE SERPL-SCNC: 104 MMOL/L (ref 98–107)
CO2 SERPL-SCNC: 30 MMOL/L (ref 21–32)
CREAT SERPL-MCNC: 0.72 MG/DL (ref 0.6–1)
DIFFERENTIAL METHOD BLD: ABNORMAL
EOSINOPHIL # BLD: 0.6 K/UL (ref 0–0.8)
EOSINOPHIL NFR BLD: 5 % (ref 0.5–7.8)
ERYTHROCYTE [DISTWIDTH] IN BLOOD BY AUTOMATED COUNT: 13.5 % (ref 11.9–14.6)
GLUCOSE SERPL-MCNC: 107 MG/DL (ref 65–100)
HCT VFR BLD AUTO: 40.6 % (ref 35.8–46.3)
HGB BLD-MCNC: 13.1 G/DL (ref 11.7–15.4)
IMM GRANULOCYTES # BLD AUTO: 0 K/UL (ref 0–0.5)
IMM GRANULOCYTES NFR BLD AUTO: 0 % (ref 0–5)
INR PPP: 0.8
LYMPHOCYTES # BLD: 2.6 K/UL (ref 0.5–4.6)
LYMPHOCYTES NFR BLD: 22 % (ref 13–44)
MCH RBC QN AUTO: 31.6 PG (ref 26.1–32.9)
MCHC RBC AUTO-ENTMCNC: 32.3 G/DL (ref 31.4–35)
MCV RBC AUTO: 97.8 FL (ref 79.6–97.8)
MONOCYTES # BLD: 1.2 K/UL (ref 0.1–1.3)
MONOCYTES NFR BLD: 11 % (ref 4–12)
NEUTS SEG # BLD: 7.2 K/UL (ref 1.7–8.2)
NEUTS SEG NFR BLD: 62 % (ref 43–78)
NRBC # BLD: 0 K/UL (ref 0–0.2)
PLATELET # BLD AUTO: 439 K/UL (ref 150–450)
PMV BLD AUTO: 9.1 FL (ref 9.4–12.3)
POTASSIUM SERPL-SCNC: 4.3 MMOL/L (ref 3.5–5.1)
PROTHROMBIN TIME: 11.4 SEC (ref 11.7–14.5)
RBC # BLD AUTO: 4.15 M/UL (ref 4.05–5.2)
SODIUM SERPL-SCNC: 139 MMOL/L (ref 136–145)
WBC # BLD AUTO: 11.7 K/UL (ref 4.3–11.1)

## 2019-11-04 PROCEDURE — 85730 THROMBOPLASTIN TIME PARTIAL: CPT

## 2019-11-04 PROCEDURE — 85610 PROTHROMBIN TIME: CPT

## 2019-11-04 PROCEDURE — 80048 BASIC METABOLIC PNL TOTAL CA: CPT

## 2019-11-04 PROCEDURE — 36415 COLL VENOUS BLD VENIPUNCTURE: CPT

## 2019-11-04 PROCEDURE — 85025 COMPLETE CBC W/AUTO DIFF WBC: CPT

## 2019-11-11 ENCOUNTER — HOSPITAL ENCOUNTER (OUTPATIENT)
Dept: CT IMAGING | Age: 69
Discharge: HOME | End: 2019-11-11
Attending: INTERNAL MEDICINE
Payer: MEDICARE

## 2019-11-11 ENCOUNTER — HOSPITAL ENCOUNTER (OUTPATIENT)
Dept: GENERAL RADIOLOGY | Age: 69
Discharge: HOME | End: 2019-11-11
Attending: RADIOLOGY
Payer: MEDICARE

## 2019-11-11 VITALS
TEMPERATURE: 97.6 F | DIASTOLIC BLOOD PRESSURE: 65 MMHG | OXYGEN SATURATION: 97 % | RESPIRATION RATE: 16 BRPM | SYSTOLIC BLOOD PRESSURE: 104 MMHG | HEART RATE: 90 BPM | BODY MASS INDEX: 21.58 KG/M2 | WEIGHT: 118 LBS

## 2019-11-11 DIAGNOSIS — R91.8 LUNG MASS: ICD-10-CM

## 2019-11-11 PROCEDURE — 99152 MOD SED SAME PHYS/QHP 5/>YRS: CPT

## 2019-11-11 PROCEDURE — 71045 X-RAY EXAM CHEST 1 VIEW: CPT

## 2019-11-11 PROCEDURE — 88305 TISSUE EXAM BY PATHOLOGIST: CPT

## 2019-11-11 PROCEDURE — 32405 CT BX LUNG NDL PERC: CPT

## 2019-11-11 PROCEDURE — 74011250636 HC RX REV CODE- 250/636: Performed by: RADIOLOGY

## 2019-11-11 PROCEDURE — 74011000250 HC RX REV CODE- 250: Performed by: RADIOLOGY

## 2019-11-11 RX ORDER — LIDOCAINE HYDROCHLORIDE 20 MG/ML
8-10 INJECTION, SOLUTION EPIDURAL; INFILTRATION; INTRACAUDAL; PERINEURAL ONCE
Status: COMPLETED | OUTPATIENT
Start: 2019-11-11 | End: 2019-11-11

## 2019-11-11 RX ORDER — SODIUM CHLORIDE 9 MG/ML
25 INJECTION, SOLUTION INTRAVENOUS ONCE
Status: COMPLETED | OUTPATIENT
Start: 2019-11-11 | End: 2019-11-11

## 2019-11-11 RX ORDER — MIDAZOLAM HYDROCHLORIDE 1 MG/ML
.5-2 INJECTION, SOLUTION INTRAMUSCULAR; INTRAVENOUS
Status: DISCONTINUED | OUTPATIENT
Start: 2019-11-11 | End: 2019-11-15 | Stop reason: HOSPADM

## 2019-11-11 RX ORDER — DIPHENHYDRAMINE HYDROCHLORIDE 50 MG/ML
12.5-5 INJECTION, SOLUTION INTRAMUSCULAR; INTRAVENOUS ONCE
Status: COMPLETED | OUTPATIENT
Start: 2019-11-11 | End: 2019-11-11

## 2019-11-11 RX ORDER — FENTANYL CITRATE 50 UG/ML
25-50 INJECTION, SOLUTION INTRAMUSCULAR; INTRAVENOUS
Status: DISCONTINUED | OUTPATIENT
Start: 2019-11-11 | End: 2019-11-15 | Stop reason: HOSPADM

## 2019-11-11 RX ADMIN — MIDAZOLAM 1 MG: 1 INJECTION INTRAMUSCULAR; INTRAVENOUS at 10:05

## 2019-11-11 RX ADMIN — MIDAZOLAM 1 MG: 1 INJECTION INTRAMUSCULAR; INTRAVENOUS at 09:57

## 2019-11-11 RX ADMIN — FENTANYL CITRATE 50 MCG: 50 INJECTION, SOLUTION INTRAMUSCULAR; INTRAVENOUS at 10:03

## 2019-11-11 RX ADMIN — SODIUM CHLORIDE 500 ML: 900 INJECTION, SOLUTION INTRAVENOUS at 11:23

## 2019-11-11 RX ADMIN — LIDOCAINE HYDROCHLORIDE 10 ML: 20 INJECTION, SOLUTION EPIDURAL; INFILTRATION; INTRACAUDAL; PERINEURAL at 10:13

## 2019-11-11 RX ADMIN — SODIUM CHLORIDE 25 ML/HR: 900 INJECTION, SOLUTION INTRAVENOUS at 09:45

## 2019-11-11 RX ADMIN — DIPHENHYDRAMINE HYDROCHLORIDE 50 MG: 50 INJECTION, SOLUTION INTRAMUSCULAR; INTRAVENOUS at 09:35

## 2019-11-11 NOTE — DISCHARGE INSTRUCTIONS
[address]  Department of Interventional Radiology  [facility]  [phone] Office  [phone] Fax    BIOPSY DISCHARGE INSTRUCTIONS    General Instructions:       A biopsy is the removal of a small piece of tissue for microscopic examination or testing. Healthy tissue can be obtained for the purpose of tissue-type matching for transplants. Unhealthy tissues are more commonly biopsied to diagnose disease. Lung Biopsy:     A needle lung biopsy is performed when there is a mass discovered in the lung area. The most serious risk is infection, bleeding, and pneumothorax (a collapsed lung). Signs of pneumothorax include shortness of breath, rapid heart rate, and blueness of the skin. If any of these occur, call 911. Home Care Instructions: You may resume your regular diet and medication regimen. Do not drink alcohol, drive, or make any important legal decisions in the next 24 hours. Do not lift anything heavier than a gallon of milk until the soreness goes away. You may use over the counter acetaminophen or ibuprofen for the soreness. You may apply an ice pack to the affected area for 20-30 minutes at time for the first 24 hours. After that, you may apply a heat pack. Call If: You should call your Physician and/or the Radiology Nurse if you have any questions or concerns about the biopsy site. Call if you should have increased pain, fever, redness, drainage, or bleeding more than a small spot on the bandage. Follow-Up Instructions: Please see your ordering doctor as he/she has requested. To Reach Us: If you have any questions about your procedure, please call the Interventional Radiology department at [phone]. After business hours (5pm) and weekends, call the answering service at [phone] and ask for the Radiologist on call to be paged.      Interventional Radiology General Nurse Discharge    After general anesthesia or intravenous sedation, for 24 hours or while taking prescription Narcotics:  · Limit your activities  · Do not drive and operate hazardous machinery  · Do not make important personal or business decisions  · Do  not drink alcoholic beverages  · If you have not urinated within 8 hours after discharge, please contact your surgeon on call. * Please give a list of your current medications to your Primary Care Provider. * Please update this list whenever your medications are discontinued, doses are     changed, or new medications (including over-the-counter products) are added. * Please carry medication information at all times in case of emergency situations. These are general instructions for a healthy lifestyle:    No smoking/ No tobacco products/ Avoid exposure to second hand smoke  Surgeon General's Warning:  Quitting smoking now greatly reduces serious risk to your health. Obesity, smoking, and sedentary lifestyle greatly increases your risk for illness  A healthy diet, regular physical exercise & weight monitoring are important for maintaining a healthy lifestyle    You may be retaining fluid if you have a history of heart failure or if you experience any of the following symptoms:  Weight gain of 3 pounds or more overnight or 5 pounds in a week, increased swelling in our hands or feet or shortness of breath while lying flat in bed. Please call your doctor as soon as you notice any of these symptoms; do not wait until your next office visit. Recognize signs and symptoms of STROKE:  F-face looks uneven    A-arms unable to move or move unevenly    S-speech slurred or non-existent    T-time-call 911 as soon as signs and symptoms begin-DO NOT go       Back to bed or wait to see if you get better-TIME IS BRAIN.         Patient Signature:  Date: 11/11/2019  Discharging Nurse: Cristina Riedel, RN

## 2019-11-11 NOTE — PROCEDURES
Department of Interventional Radiology  [phone]        Interventional Radiology Brief Procedure Note    Patient: Allison York MRN: [id number]  SSN: [id number]    YOB: 1950  Age: 78 y.o. Sex: female      Date of Procedure: 11/11/2019    Pre-Procedure Diagnosis: lung mass    Post-Procedure Diagnosis: SAME    Procedure(s): Image Guided Biopsy    Brief Description of Procedure: CT guided lung mass biopsy    Performed By: Delphine Bassett MD     Assistants: None    Anesthesia:Moderate Sedation    Estimated Blood Loss: Less than 10ml    Specimens:  Pathology    Implants:  None    Findings: Lingular nodule.   Small post procedure pneumo    Complications: None    Recommendations: follow up chest xrays     Follow Up: as above    Signed By: Delphine Bassett MD     November 11, 2019

## 2019-11-11 NOTE — PROGRESS NOTES
Patient ambulating around the department. Denies feeling short of breath or lightheaded. Dual site assessment performed with the .

## 2019-11-11 NOTE — H&P
Department of Interventional Radiology  [phone]    History and Physical    Patient:  Noel Pickett MRN:  [id number]  SSN:  [id number]    YOB: 1950  Age:  78 y.o. Sex:  female      Primary Care Provider:  Ramon Mcginnis MD  Referring Physician:  Mukesh Carter MD    Subjective:     Chief Complaint: biopsy    History of the Present Illness: The patient is a 78 y.o. female who presents for lung mass biopsy. Left lung mass in lingula, non-hypermetabolic. Nonsmoker. NPO. Past Medical History:   Diagnosis Date    Asthma     Bite of nonvenomous arthropod ? ??    Cough     Esophageal stricture 2002    dilated 2002    GERD (gastroesophageal reflux disease)     History of rectal bleeding ???    Hodgkin's lymphoma ([facility] [address] 1980    Radiation therapy    Hypercholesterolemia     Menopause     Positive RAST testing 2007    IgE level of 1,391    Thyroid disease      Past Surgical History:   Procedure Laterality Date    HX APPENDECTOMY  1973    HX CATARACT REMOVAL Bilateral 2007, 2009    HX COLONOSCOPY      HX OTHER SURGICAL      dental x3   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    HX TONSILLECTOMY  1954        Review of Systems:    Pertinent items are noted in the History of Present Illness. Prior to Admission medications    Medication Sig Start Date End Date Taking? Authorizing Provider   montelukast (SINGULAIR) 10 mg tablet Take 1 Tab by mouth daily. 10/11/19  Yes Mukesh Carter MD   levocetirizine (XYZAL) 5 mg tablet Take 1 Tab by mouth daily. 10/11/19  Yes Mukesh Carter MD   fluticasone furoate-vilanterol (BREO ELLIPTA) 200-25 mcg/dose inhaler Take 1 Puff by inhalation daily. 10/11/19  Yes Mukesh Carter MD   albuterol (VENTOLIN HFA) 90 mcg/actuation inhaler Take 2 Puffs by inhalation four (4) times daily. For wheezing. 10/11/19  Yes Mukesh Carter MD   melatonin 10 mg cap Take 10 mg by mouth nightly as needed.    Yes Provider, Historical   levothyroxine (SYNTHROID) 75 mcg tablet 1/2 tab MW and one AOD  Patient taking differently: Take 75 mcg by mouth Daily (before breakfast). 1/2 tab MW and one AOD 5/29/19  Yes Mickey Hardy MD   Omeprazole delayed release (PRILOSEC D/R) 20 mg tablet Take 1 Tab by mouth daily. 5/28/19  Yes Mickey Hardy MD   simvastatin (ZOCOR) 20 mg tablet Take 1 Tab by mouth nightly. 5/28/19  Yes Mickey Hardy MD   omega-3 fatty acids-vitamin e 1,000 mg cap Take 1 Cap by mouth. Yes Provider, Historical   multivitamin (ONE A DAY) tablet Take 1 Tab by mouth daily. Yes Provider, Historical   fluticasone propionate (FLONASE) 50 mcg/actuation nasal spray 2 Sprays by Both Nostrils route daily. 10/11/19   Willis Jurado MD   desonide (DESOWEN) 0.05 % topical ointment Apply  to affected area three (3) times daily as needed for Skin Irritation. 10/17/18   Mickey Hardy MD   aspirin delayed-release 81 mg tablet Take 81 mg by mouth daily. Provider, Historical        Allergies   Allergen Reactions    Compazine [Prochlorperazine Edisylate] Swelling       Family History   Problem Relation Age of Onset    Cancer Mother         Kidney cancer    Cancer Father         Prostate cnaceer    Heart Surgery Brother     Breast Cancer Neg Hx      Social History     Tobacco Use    Smoking status: Never Smoker    Smokeless tobacco: Never Used   Substance Use Topics    Alcohol use:  Yes     Alcohol/week: 21.0 standard drinks     Types: 7 Shots of liquor, 14 Standard drinks or equivalent per week        Objective:       Physical Examination:    Vitals:    11/11/19 0812 11/11/19 0906   BP: 109/66    Pulse: 92    Resp: 16    Temp: 97.6 °F (36.4 °C)    SpO2: 99%    Weight:  53.5 kg (118 lb)       Pain Assessment  Pain Intensity 1: 3 (11/11/19 0859)  Pain Location 1: Back   intervention: reposition  Patient Stated Pain Goal: 0      HEART: regular rate and rhythm  LUNG: clear to auscultation bilaterally  ABDOMEN: normal findings: soft, non-tender  EXTREMITIES: normal strength, tone, and muscle mass    Laboratory:     Lab Results   Component Value Date/Time    Sodium 139 11/04/2019 02:40 PM    Sodium 141 05/28/2019 09:15 AM    Potassium 4.3 11/04/2019 02:40 PM    Potassium 5.1 05/28/2019 09:15 AM    Chloride 104 11/04/2019 02:40 PM    Chloride 101 05/28/2019 09:15 AM    CO2 30 11/04/2019 02:40 PM    CO2 25 05/28/2019 09:15 AM    Anion gap 5 (L) 11/04/2019 02:40 PM    Glucose 107 (H) 11/04/2019 02:40 PM    Glucose 86 05/28/2019 09:15 AM    BUN 17 11/04/2019 02:40 PM    BUN 12 05/28/2019 09:15 AM    Creatinine 0.72 11/04/2019 02:40 PM    Creatinine 0.64 05/28/2019 09:15 AM    GFR est AA >60 11/04/2019 02:40 PM    GFR est  05/28/2019 09:15 AM    GFR est non-AA >60 11/04/2019 02:40 PM    GFR est non-AA 91 05/28/2019 09:15 AM    Calcium 9.5 11/04/2019 02:40 PM    Calcium 10.3 05/28/2019 09:15 AM    Albumin 4.5 05/28/2019 09:15 AM    Albumin 4.4 04/12/2018 10:39 AM    Protein, total 7.2 05/28/2019 09:15 AM    Protein, total 7.2 04/12/2018 10:39 AM    A-G Ratio 1.7 05/28/2019 09:15 AM    A-G Ratio 1.6 04/12/2018 10:39 AM    AST (SGOT) 27 05/28/2019 09:15 AM    AST (SGOT) 25 04/12/2018 10:39 AM    ALT (SGPT) 21 05/28/2019 09:15 AM    ALT (SGPT) 20 04/12/2018 10:39 AM     Lab Results   Component Value Date/Time    WBC 11.7 (H) 11/04/2019 02:40 PM    WBC 11.2 (H) 05/28/2019 09:15 AM    HGB 13.1 11/04/2019 02:40 PM    HGB 14.5 05/28/2019 09:15 AM    HCT 40.6 11/04/2019 02:40 PM    HCT 42.3 05/28/2019 09:15 AM    PLATELET 680 97/10/5214 02:40 PM    PLATELET 114 (H) 80/98/5029 09:15 AM     Lab Results   Component Value Date/Time    aPTT 28.4 11/04/2019 02:40 PM    Prothrombin time 11.4 (L) 11/04/2019 02:40 PM    INR 0.8 11/04/2019 02:40 PM       Assessment:     Left lung mass    Hospital Problems  Date Reviewed: 10/11/2019    None          Plan:     Planned Procedure:  Biopsy lung mass    Risks, benefits, and alternatives reviewed with patient and she agrees to proceed with the procedure.       Signed By: Benito Rosas PA-C     November 11, 2019

## 2019-11-11 NOTE — PROGRESS NOTES
Dr. Lopez Senters at bedside speaking with the patient. Patient verbalizes her understanding to call 911 if she becomes short of breath. Discharge instructions given and questions answered.

## 2019-11-18 NOTE — PROGRESS NOTES
Left patient message via voicemail to please give me a call as soon as they are available. // Tony REYES

## 2019-11-19 NOTE — PROGRESS NOTES
Spoke with the patient in regards to their Lab results, explained to the patient per Dr. 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 I also explained that we would like to do a repeat CT scan in 6 months with a follow up appointment. Patient understood results and did not have any questions or concerns at this time. CT scan has been established for 6 months.  // [place]. A.

## 2020-01-01 ENCOUNTER — APPOINTMENT (OUTPATIENT)
Dept: GENERAL RADIOLOGY | Age: 70
DRG: 193 | End: 2020-01-01
Attending: HOSPITALIST
Payer: MEDICARE

## 2020-01-01 ENCOUNTER — APPOINTMENT (OUTPATIENT)
Dept: GENERAL RADIOLOGY | Age: 70
DRG: 064 | End: 2020-01-01
Attending: NURSE PRACTITIONER
Payer: MEDICARE

## 2020-01-01 ENCOUNTER — APPOINTMENT (OUTPATIENT)
Dept: CT IMAGING | Age: 70
DRG: 064 | End: 2020-01-01
Attending: NURSE PRACTITIONER
Payer: MEDICARE

## 2020-01-01 ENCOUNTER — APPOINTMENT (OUTPATIENT)
Dept: GENERAL RADIOLOGY | Age: 70
DRG: 193 | End: 2020-01-01
Attending: INTERNAL MEDICINE
Payer: MEDICARE

## 2020-01-01 ENCOUNTER — HOSPITAL ENCOUNTER (OUTPATIENT)
Dept: NUCLEAR MEDICINE | Age: 70
Discharge: HOME | End: 2020-09-25
Attending: INTERNAL MEDICINE

## 2020-01-01 ENCOUNTER — APPOINTMENT (OUTPATIENT)
Dept: GENERAL RADIOLOGY | Age: 70
DRG: 064 | End: 2020-01-01
Payer: MEDICARE

## 2020-01-01 ENCOUNTER — HOME HEALTH ADMISSION (OUTPATIENT)
Dept: HOME HEALTH SERVICES | Facility: HOME HEALTH | Age: 70
End: 2020-01-01
Payer: MEDICARE

## 2020-01-01 ENCOUNTER — HOSPITAL ENCOUNTER (INPATIENT)
Age: 70
LOS: 13 days | Discharge: HOSPICE | DRG: 064 | End: 2020-11-07
Admitting: HOSPITALIST
Payer: MEDICARE

## 2020-01-01 ENCOUNTER — HOME CARE VISIT (OUTPATIENT)
Dept: SCHEDULING | Facility: HOME HEALTH | Age: 70
End: 2020-01-01
Payer: MEDICARE

## 2020-01-01 ENCOUNTER — APPOINTMENT (OUTPATIENT)
Dept: ULTRASOUND IMAGING | Age: 70
DRG: 064 | End: 2020-01-01
Attending: HOSPITALIST
Payer: MEDICARE

## 2020-01-01 ENCOUNTER — PATIENT OUTREACH (OUTPATIENT)
Dept: CASE MANAGEMENT | Age: 70
End: 2020-01-01

## 2020-01-01 ENCOUNTER — APPOINTMENT (OUTPATIENT)
Dept: GENERAL RADIOLOGY | Age: 70
DRG: 064 | End: 2020-01-01
Attending: RADIOLOGY
Payer: MEDICARE

## 2020-01-01 ENCOUNTER — HOSPITAL ENCOUNTER (INPATIENT)
Age: 70
LOS: 20 days | Discharge: HOME HEALTH | DRG: 193 | End: 2020-10-21
Attending: EMERGENCY MEDICINE | Admitting: INTERNAL MEDICINE
Payer: MEDICARE

## 2020-01-01 ENCOUNTER — APPOINTMENT (OUTPATIENT)
Dept: MRI IMAGING | Age: 70
DRG: 064 | End: 2020-01-01
Payer: MEDICARE

## 2020-01-01 ENCOUNTER — APPOINTMENT (OUTPATIENT)
Dept: CT IMAGING | Age: 70
DRG: 064 | End: 2020-01-01
Payer: MEDICARE

## 2020-01-01 ENCOUNTER — APPOINTMENT (OUTPATIENT)
Dept: GENERAL RADIOLOGY | Age: 70
DRG: 064 | End: 2020-01-01
Attending: FAMILY MEDICINE
Payer: MEDICARE

## 2020-01-01 ENCOUNTER — HOSPITAL ENCOUNTER (OUTPATIENT)
Dept: INFUSION THERAPY | Age: 70
Discharge: HOME | End: 2020-08-03
Payer: MEDICARE

## 2020-01-01 ENCOUNTER — APPOINTMENT (OUTPATIENT)
Dept: CT IMAGING | Age: 70
DRG: 064 | End: 2020-01-01
Attending: FAMILY MEDICINE
Payer: MEDICARE

## 2020-01-01 ENCOUNTER — APPOINTMENT (OUTPATIENT)
Dept: GENERAL RADIOLOGY | Age: 70
DRG: 064 | End: 2020-01-01
Attending: HOSPITALIST
Payer: MEDICARE

## 2020-01-01 ENCOUNTER — HOSPITAL ENCOUNTER (OUTPATIENT)
Dept: NON INVASIVE DIAGNOSTICS | Age: 70
Discharge: HOME | DRG: 193 | End: 2020-09-30
Attending: NURSE PRACTITIONER
Payer: MEDICARE

## 2020-01-01 ENCOUNTER — HOSPITAL ENCOUNTER (OUTPATIENT)
Dept: GENERAL RADIOLOGY | Age: 70
Discharge: HOME | End: 2020-09-25
Attending: INTERNAL MEDICINE
Payer: MEDICARE

## 2020-01-01 ENCOUNTER — APPOINTMENT (OUTPATIENT)
Dept: GENERAL RADIOLOGY | Age: 70
DRG: 193 | End: 2020-01-01
Attending: PHYSICIAN ASSISTANT
Payer: MEDICARE

## 2020-01-01 ENCOUNTER — HOSPITAL ENCOUNTER (OUTPATIENT)
Dept: CT IMAGING | Age: 70
Discharge: HOME | End: 2020-06-01
Attending: INTERNAL MEDICINE
Payer: MEDICARE

## 2020-01-01 ENCOUNTER — HOSPITAL ENCOUNTER (INPATIENT)
Age: 70
LOS: 17 days | End: 2020-11-24
Attending: INTERNAL MEDICINE | Admitting: INTERNAL MEDICINE

## 2020-01-01 ENCOUNTER — HOME CARE VISIT (OUTPATIENT)
Dept: HOME HEALTH SERVICES | Facility: HOME HEALTH | Age: 70
End: 2020-01-01
Payer: MEDICARE

## 2020-01-01 ENCOUNTER — APPOINTMENT (OUTPATIENT)
Dept: GENERAL RADIOLOGY | Age: 70
DRG: 193 | End: 2020-01-01
Attending: EMERGENCY MEDICINE
Payer: MEDICARE

## 2020-01-01 ENCOUNTER — HOSPITAL ENCOUNTER (OUTPATIENT)
Dept: GENERAL RADIOLOGY | Age: 70
Discharge: HOME | End: 2020-09-17
Payer: MEDICARE

## 2020-01-01 ENCOUNTER — HOSPITAL ENCOUNTER (OUTPATIENT)
Dept: CT IMAGING | Age: 70
Discharge: HOME | End: 2020-09-21
Attending: NURSE PRACTITIONER
Payer: MEDICARE

## 2020-01-01 ENCOUNTER — HOSPITAL ENCOUNTER (OUTPATIENT)
Dept: GENERAL RADIOLOGY | Age: 70
Discharge: HOME | End: 2020-08-18
Payer: MEDICARE

## 2020-01-01 ENCOUNTER — HOSPICE ADMISSION (OUTPATIENT)
Dept: HOSPICE | Facility: HOSPICE | Age: 70
End: 2020-01-01
Payer: MEDICARE

## 2020-01-01 ENCOUNTER — APPOINTMENT (OUTPATIENT)
Dept: CT IMAGING | Age: 70
DRG: 064 | End: 2020-01-01
Attending: INTERNAL MEDICINE
Payer: MEDICARE

## 2020-01-01 ENCOUNTER — HOSPITAL ENCOUNTER (OUTPATIENT)
Dept: MAMMOGRAPHY | Age: 70
Discharge: HOME | End: 2020-05-18
Attending: OBSTETRICS & GYNECOLOGY
Payer: MEDICARE

## 2020-01-01 ENCOUNTER — HOSPITAL ENCOUNTER (OUTPATIENT)
Dept: MAMMOGRAPHY | Age: 70
Discharge: HOME | End: 2020-05-21
Attending: OBSTETRICS & GYNECOLOGY
Payer: MEDICARE

## 2020-01-01 ENCOUNTER — HOSPITAL ENCOUNTER (EMERGENCY)
Dept: CT IMAGING | Age: 70
Discharge: HOME | DRG: 064 | End: 2020-10-25
Payer: MEDICARE

## 2020-01-01 VITALS
TEMPERATURE: 98.7 F | HEART RATE: 90 BPM | OXYGEN SATURATION: 100 % | RESPIRATION RATE: 18 BRPM | SYSTOLIC BLOOD PRESSURE: 137 MMHG | DIASTOLIC BLOOD PRESSURE: 87 MMHG

## 2020-01-01 VITALS
SYSTOLIC BLOOD PRESSURE: 66 MMHG | HEART RATE: 80 BPM | DIASTOLIC BLOOD PRESSURE: 38 MMHG | TEMPERATURE: 97.4 F | RESPIRATION RATE: 18 BRPM

## 2020-01-01 VITALS
SYSTOLIC BLOOD PRESSURE: 115 MMHG | TEMPERATURE: 97.6 F | BODY MASS INDEX: 22.07 KG/M2 | HEIGHT: 61 IN | DIASTOLIC BLOOD PRESSURE: 46 MMHG | HEART RATE: 111 BPM | RESPIRATION RATE: 19 BRPM | WEIGHT: 116.9 LBS | OXYGEN SATURATION: 99 %

## 2020-01-01 VITALS
HEIGHT: 66 IN | HEART RATE: 93 BPM | BODY MASS INDEX: 18.48 KG/M2 | TEMPERATURE: 97.5 F | OXYGEN SATURATION: 100 % | RESPIRATION RATE: 16 BRPM | SYSTOLIC BLOOD PRESSURE: 113 MMHG | DIASTOLIC BLOOD PRESSURE: 68 MMHG | WEIGHT: 115 LBS

## 2020-01-01 VITALS
TEMPERATURE: 97.2 F | SYSTOLIC BLOOD PRESSURE: 98 MMHG | RESPIRATION RATE: 18 BRPM | HEART RATE: 99 BPM | OXYGEN SATURATION: 94 % | DIASTOLIC BLOOD PRESSURE: 60 MMHG

## 2020-01-01 VITALS
HEART RATE: 92 BPM | SYSTOLIC BLOOD PRESSURE: 121 MMHG | RESPIRATION RATE: 18 BRPM | TEMPERATURE: 97.8 F | DIASTOLIC BLOOD PRESSURE: 82 MMHG

## 2020-01-01 VITALS
OXYGEN SATURATION: 90 % | TEMPERATURE: 98 F | HEART RATE: 83 BPM | SYSTOLIC BLOOD PRESSURE: 122 MMHG | DIASTOLIC BLOOD PRESSURE: 74 MMHG

## 2020-01-01 VITALS
HEART RATE: 95 BPM | TEMPERATURE: 96.4 F | DIASTOLIC BLOOD PRESSURE: 52 MMHG | OXYGEN SATURATION: 94 % | SYSTOLIC BLOOD PRESSURE: 99 MMHG

## 2020-01-01 DIAGNOSIS — R91.8 MULTIPLE LUNG NODULES ON CT: Chronic | ICD-10-CM

## 2020-01-01 DIAGNOSIS — R41.82 ALTERED MENTAL STATUS, UNSPECIFIED ALTERED MENTAL STATUS TYPE: ICD-10-CM

## 2020-01-01 DIAGNOSIS — R09.02 HYPOXEMIA: ICD-10-CM

## 2020-01-01 DIAGNOSIS — R93.7 MULTIPLE LESIONS ON CT OF BRAIN AND SPINE: ICD-10-CM

## 2020-01-01 DIAGNOSIS — I77.1 SUBCLAVIAN ARTERY STENOSIS (HCC): ICD-10-CM

## 2020-01-01 DIAGNOSIS — G93.40 ACUTE ENCEPHALOPATHY: ICD-10-CM

## 2020-01-01 DIAGNOSIS — Q85.9 HAMARTOMA (HCC): Chronic | ICD-10-CM

## 2020-01-01 DIAGNOSIS — J90 BILATERAL PLEURAL EFFUSION: ICD-10-CM

## 2020-01-01 DIAGNOSIS — J18.9 PNEUMONIA OF LEFT LUNG DUE TO INFECTIOUS ORGANISM, UNSPECIFIED PART OF LUNG: Primary | ICD-10-CM

## 2020-01-01 DIAGNOSIS — R91.8 MASS OF LINGULA OF LUNG: ICD-10-CM

## 2020-01-01 DIAGNOSIS — R06.02 SOB (SHORTNESS OF BREATH): ICD-10-CM

## 2020-01-01 DIAGNOSIS — G93.89 FRONTAL MASS OF BRAIN: ICD-10-CM

## 2020-01-01 DIAGNOSIS — I63.9 ACUTE CVA (CEREBROVASCULAR ACCIDENT) (HCC): ICD-10-CM

## 2020-01-01 DIAGNOSIS — R47.01 EXPRESSIVE APHASIA: ICD-10-CM

## 2020-01-01 DIAGNOSIS — Z51.5 ENCOUNTER FOR PALLIATIVE CARE: ICD-10-CM

## 2020-01-01 DIAGNOSIS — R92.8 ABNORMAL MAMMOGRAM OF LEFT BREAST: ICD-10-CM

## 2020-01-01 DIAGNOSIS — J90 PLEURAL EFFUSION: ICD-10-CM

## 2020-01-01 DIAGNOSIS — R93.89 ABNORMAL CT OF THE CHEST: ICD-10-CM

## 2020-01-01 DIAGNOSIS — R91.1 PULMONARY NODULE: ICD-10-CM

## 2020-01-01 DIAGNOSIS — J45.30 MILD PERSISTENT ASTHMA WITHOUT COMPLICATION: Chronic | ICD-10-CM

## 2020-01-01 DIAGNOSIS — I73.9 PERIPHERAL VASCULAR DISEASE (HCC): ICD-10-CM

## 2020-01-01 DIAGNOSIS — R53.81 DEBILITY: ICD-10-CM

## 2020-01-01 DIAGNOSIS — C81.90 HODGKIN LYMPHOMA, UNSPECIFIED HODGKIN LYMPHOMA TYPE, UNSPECIFIED BODY REGION (HCC): ICD-10-CM

## 2020-01-01 DIAGNOSIS — Z12.31 ENCOUNTER FOR SCREENING MAMMOGRAM FOR HIGH-RISK PATIENT: ICD-10-CM

## 2020-01-01 DIAGNOSIS — I42.8 OTHER CARDIOMYOPATHY (HCC): ICD-10-CM

## 2020-01-01 DIAGNOSIS — I50.21 ACUTE SYSTOLIC HEART FAILURE (HCC): ICD-10-CM

## 2020-01-01 DIAGNOSIS — K21.9 GASTROESOPHAGEAL REFLUX DISEASE WITHOUT ESOPHAGITIS: Chronic | ICD-10-CM

## 2020-01-01 DIAGNOSIS — R53.1 ACUTE RIGHT-SIDED WEAKNESS: Primary | ICD-10-CM

## 2020-01-01 DIAGNOSIS — R06.09 DYSPNEA ON EXERTION: ICD-10-CM

## 2020-01-01 DIAGNOSIS — R56.9 SEIZURE (HCC): ICD-10-CM

## 2020-01-01 DIAGNOSIS — E78.00 HYPERCHOLESTEROLEMIA: Chronic | ICD-10-CM

## 2020-01-01 DIAGNOSIS — B59 PNEUMONIA DUE TO PNEUMOCYSTIS JIROVECII, UNSPECIFIED LATERALITY, UNSPECIFIED PART OF LUNG (HCC): ICD-10-CM

## 2020-01-01 DIAGNOSIS — R13.10 DYSPHAGIA, UNSPECIFIED TYPE: Chronic | ICD-10-CM

## 2020-01-01 DIAGNOSIS — R93.0 MULTIPLE LESIONS ON CT OF BRAIN AND SPINE: ICD-10-CM

## 2020-01-01 LAB
ALBUMIN SERPL-MCNC: 2.8 G/DL (ref 3.2–4.6)
ALBUMIN SERPL-MCNC: 2.9 G/DL (ref 3.2–4.6)
ALBUMIN SERPL-MCNC: 3.2 G/DL (ref 3.2–4.6)
ALBUMIN SERPL-MCNC: 3.3 G/DL (ref 3.2–4.6)
ALBUMIN/GLOB SERPL: 0.8 {RATIO} (ref 1.2–3.5)
ALBUMIN/GLOB SERPL: 0.8 {RATIO} (ref 1.2–3.5)
ALBUMIN/GLOB SERPL: 0.9 {RATIO} (ref 1.2–3.5)
ALBUMIN/GLOB SERPL: 0.9 {RATIO} (ref 1.2–3.5)
ALP SERPL-CCNC: 110 U/L (ref 50–136)
ALP SERPL-CCNC: 120 U/L (ref 50–136)
ALP SERPL-CCNC: 120 U/L (ref 50–136)
ALP SERPL-CCNC: 85 U/L (ref 50–136)
ALT SERPL-CCNC: 24 U/L (ref 12–65)
ALT SERPL-CCNC: 33 U/L (ref 12–65)
ALT SERPL-CCNC: 58 U/L (ref 12–65)
ALT SERPL-CCNC: 75 U/L (ref 12–65)
ANION GAP SERPL CALC-SCNC: 10 MMOL/L (ref 7–16)
ANION GAP SERPL CALC-SCNC: 2 MMOL/L (ref 7–16)
ANION GAP SERPL CALC-SCNC: 3 MMOL/L (ref 7–16)
ANION GAP SERPL CALC-SCNC: 4 MMOL/L (ref 7–16)
ANION GAP SERPL CALC-SCNC: 5 MMOL/L (ref 7–16)
ANION GAP SERPL CALC-SCNC: 6 MMOL/L (ref 7–16)
ANION GAP SERPL CALC-SCNC: 7 MMOL/L (ref 7–16)
ANION GAP SERPL CALC-SCNC: 8 MMOL/L (ref 7–16)
APPEARANCE FLD: CLEAR
APPEARANCE FLD: NORMAL
APPEARANCE FLD: NORMAL
AST SERPL-CCNC: 22 U/L (ref 15–37)
AST SERPL-CCNC: 25 U/L (ref 15–37)
AST SERPL-CCNC: 38 U/L (ref 15–37)
AST SERPL-CCNC: 49 U/L (ref 15–37)
ATRIAL RATE: 101 BPM
ATRIAL RATE: 102 BPM
ATRIAL RATE: 108 BPM
ATRIAL RATE: 112 BPM
BACTERIA SPEC CULT: NORMAL
BASOPHILS # BLD: 0 K/UL (ref 0–0.2)
BASOPHILS # BLD: 0 K/UL (ref 0–0.2)
BASOPHILS # BLD: 0.1 K/UL (ref 0–0.2)
BASOPHILS NFR BLD: 0 % (ref 0–2)
BASOPHILS NFR BLD: 0 % (ref 0–2)
BASOPHILS NFR BLD: 1 % (ref 0–2)
BILIRUB SERPL-MCNC: 0.3 MG/DL (ref 0.2–1.1)
BILIRUB SERPL-MCNC: 0.3 MG/DL (ref 0.2–1.1)
BILIRUB SERPL-MCNC: 0.4 MG/DL (ref 0.2–1.1)
BILIRUB SERPL-MCNC: 0.4 MG/DL (ref 0.2–1.1)
BNP SERPL-MCNC: 6409 PG/ML (ref 5–125)
BNP SERPL-MCNC: ABNORMAL PG/ML (ref 5–125)
BUN SERPL-MCNC: 10 MG/DL (ref 8–23)
BUN SERPL-MCNC: 10 MG/DL (ref 8–23)
BUN SERPL-MCNC: 11 MG/DL (ref 8–23)
BUN SERPL-MCNC: 11 MG/DL (ref 8–23)
BUN SERPL-MCNC: 12 MG/DL (ref 8–23)
BUN SERPL-MCNC: 13 MG/DL (ref 8–23)
BUN SERPL-MCNC: 13 MG/DL (ref 8–23)
BUN SERPL-MCNC: 14 MG/DL (ref 8–23)
BUN SERPL-MCNC: 15 MG/DL (ref 8–23)
BUN SERPL-MCNC: 16 MG/DL (ref 8–23)
BUN SERPL-MCNC: 17 MG/DL (ref 8–23)
BUN SERPL-MCNC: 17 MG/DL (ref 8–23)
BUN SERPL-MCNC: 18 MG/DL (ref 8–23)
BUN SERPL-MCNC: 18 MG/DL (ref 8–23)
BUN SERPL-MCNC: 20 MG/DL (ref 8–23)
BUN SERPL-MCNC: 22 MG/DL (ref 8–23)
BUN SERPL-MCNC: 23 MG/DL (ref 8–23)
BUN SERPL-MCNC: 25 MG/DL (ref 8–23)
BUN SERPL-MCNC: 26 MG/DL (ref 8–23)
BUN SERPL-MCNC: 27 MG/DL (ref 8–23)
BUN SERPL-MCNC: 37 MG/DL (ref 8–23)
BUN SERPL-MCNC: 42 MG/DL (ref 8–23)
BUN SERPL-MCNC: 44 MG/DL (ref 8–23)
BUN SERPL-MCNC: 48 MG/DL (ref 8–23)
BUN SERPL-MCNC: 48 MG/DL (ref 8–23)
BUN SERPL-MCNC: 49 MG/DL (ref 8–23)
BUN SERPL-MCNC: 54 MG/DL (ref 8–23)
BUN SERPL-MCNC: 54 MG/DL (ref 8–23)
BUN SERPL-MCNC: 55 MG/DL (ref 8–23)
BUN SERPL-MCNC: 56 MG/DL (ref 8–23)
BUN SERPL-MCNC: 60 MG/DL (ref 8–23)
BUN SERPL-MCNC: 9 MG/DL (ref 8–23)
CALCIUM SERPL-MCNC: 7.8 MG/DL (ref 8.3–10.4)
CALCIUM SERPL-MCNC: 8.2 MG/DL (ref 8.3–10.4)
CALCIUM SERPL-MCNC: 8.4 MG/DL (ref 8.3–10.4)
CALCIUM SERPL-MCNC: 8.4 MG/DL (ref 8.3–10.4)
CALCIUM SERPL-MCNC: 8.6 MG/DL (ref 8.3–10.4)
CALCIUM SERPL-MCNC: 8.7 MG/DL (ref 8.3–10.4)
CALCIUM SERPL-MCNC: 8.9 MG/DL (ref 8.3–10.4)
CALCIUM SERPL-MCNC: 8.9 MG/DL (ref 8.3–10.4)
CALCIUM SERPL-MCNC: 9 MG/DL (ref 8.3–10.4)
CALCIUM SERPL-MCNC: 9 MG/DL (ref 8.3–10.4)
CALCIUM SERPL-MCNC: 9.1 MG/DL (ref 8.3–10.4)
CALCIUM SERPL-MCNC: 9.1 MG/DL (ref 8.3–10.4)
CALCIUM SERPL-MCNC: 9.2 MG/DL (ref 8.3–10.4)
CALCIUM SERPL-MCNC: 9.3 MG/DL (ref 8.3–10.4)
CALCIUM SERPL-MCNC: 9.4 MG/DL (ref 8.3–10.4)
CALCIUM SERPL-MCNC: 9.4 MG/DL (ref 8.3–10.4)
CALCIUM SERPL-MCNC: 9.5 MG/DL (ref 8.3–10.4)
CALCIUM SERPL-MCNC: 9.6 MG/DL (ref 8.3–10.4)
CALCIUM SERPL-MCNC: 9.6 MG/DL (ref 8.3–10.4)
CALCIUM SERPL-MCNC: 9.8 MG/DL (ref 8.3–10.4)
CALCULATED P AXIS, ECG09: 100 DEGREES
CALCULATED P AXIS, ECG09: 105 DEGREES
CALCULATED P AXIS, ECG09: 107 DEGREES
CALCULATED P AXIS, ECG09: 69 DEGREES
CALCULATED R AXIS, ECG10: -19 DEGREES
CALCULATED R AXIS, ECG10: 114 DEGREES
CALCULATED R AXIS, ECG10: 116 DEGREES
CALCULATED R AXIS, ECG10: 126 DEGREES
CALCULATED T AXIS, ECG11: -174 DEGREES
CALCULATED T AXIS, ECG11: -82 DEGREES
CALCULATED T AXIS, ECG11: -82 DEGREES
CALCULATED T AXIS, ECG11: -92 DEGREES
CANCER AG125 SERPL-ACNC: 380 U/ML (ref 1.5–35)
CANCER AG15-3 SERPL-ACNC: 20.5 U/ML (ref 1–35)
CANCER AG19-9 SERPL-ACNC: 21.2 U/ML (ref 2–37)
CANCER AG27-29 SERPL-ACNC: 23.5 U/ML (ref 0–38.6)
CEA SERPL-MCNC: 2.5 NG/ML (ref 0–3)
CHLORIDE SERPL-SCNC: 100 MMOL/L (ref 98–107)
CHLORIDE SERPL-SCNC: 100 MMOL/L (ref 98–107)
CHLORIDE SERPL-SCNC: 101 MMOL/L (ref 98–107)
CHLORIDE SERPL-SCNC: 101 MMOL/L (ref 98–107)
CHLORIDE SERPL-SCNC: 103 MMOL/L (ref 98–107)
CHLORIDE SERPL-SCNC: 104 MMOL/L (ref 98–107)
CHLORIDE SERPL-SCNC: 105 MMOL/L (ref 98–107)
CHLORIDE SERPL-SCNC: 106 MMOL/L (ref 98–107)
CHLORIDE SERPL-SCNC: 106 MMOL/L (ref 98–107)
CHLORIDE SERPL-SCNC: 107 MMOL/L (ref 98–107)
CHLORIDE SERPL-SCNC: 108 MMOL/L (ref 98–107)
CHLORIDE SERPL-SCNC: 92 MMOL/L (ref 98–107)
CHLORIDE SERPL-SCNC: 96 MMOL/L (ref 98–107)
CHLORIDE SERPL-SCNC: 96 MMOL/L (ref 98–107)
CHLORIDE SERPL-SCNC: 97 MMOL/L (ref 98–107)
CHLORIDE SERPL-SCNC: 98 MMOL/L (ref 98–107)
CHLORIDE SERPL-SCNC: 99 MMOL/L (ref 98–107)
CHLORIDE SERPL-SCNC: 99 MMOL/L (ref 98–107)
CHOLEST SERPL-MCNC: 243 MG/DL
CO2 SERPL-SCNC: 22 MMOL/L (ref 21–32)
CO2 SERPL-SCNC: 23 MMOL/L (ref 21–32)
CO2 SERPL-SCNC: 25 MMOL/L (ref 21–32)
CO2 SERPL-SCNC: 26 MMOL/L (ref 21–32)
CO2 SERPL-SCNC: 27 MMOL/L (ref 21–32)
CO2 SERPL-SCNC: 28 MMOL/L (ref 21–32)
CO2 SERPL-SCNC: 29 MMOL/L (ref 21–32)
CO2 SERPL-SCNC: 30 MMOL/L (ref 21–32)
CO2 SERPL-SCNC: 31 MMOL/L (ref 21–32)
CO2 SERPL-SCNC: 32 MMOL/L (ref 21–32)
CO2 SERPL-SCNC: 33 MMOL/L (ref 21–32)
CO2 SERPL-SCNC: 34 MMOL/L (ref 21–32)
CO2 SERPL-SCNC: 35 MMOL/L (ref 21–32)
CO2 SERPL-SCNC: 37 MMOL/L (ref 21–32)
COLOR FLD: NORMAL
COLOR FLD: YELLOW
COLOR FLD: YELLOW
CORTIS 30M P CHAL SERPL-MCNC: 18.8 UG/DL
CORTIS BS SERPL-MCNC: 11.1 UG/DL
COVID-19 RAPID TEST, COVR: NOT DETECTED
CREAT BLD-MCNC: 0.8 MG/DL (ref 0.8–1.5)
CREAT SERPL-MCNC: 0.49 MG/DL (ref 0.6–1)
CREAT SERPL-MCNC: 0.5 MG/DL (ref 0.6–1)
CREAT SERPL-MCNC: 0.53 MG/DL (ref 0.6–1)
CREAT SERPL-MCNC: 0.53 MG/DL (ref 0.6–1)
CREAT SERPL-MCNC: 0.55 MG/DL (ref 0.6–1)
CREAT SERPL-MCNC: 0.57 MG/DL (ref 0.6–1)
CREAT SERPL-MCNC: 0.6 MG/DL (ref 0.6–1)
CREAT SERPL-MCNC: 0.61 MG/DL (ref 0.6–1)
CREAT SERPL-MCNC: 0.62 MG/DL (ref 0.6–1)
CREAT SERPL-MCNC: 0.63 MG/DL (ref 0.6–1)
CREAT SERPL-MCNC: 0.64 MG/DL (ref 0.6–1)
CREAT SERPL-MCNC: 0.65 MG/DL (ref 0.6–1)
CREAT SERPL-MCNC: 0.66 MG/DL (ref 0.6–1)
CREAT SERPL-MCNC: 0.66 MG/DL (ref 0.6–1)
CREAT SERPL-MCNC: 0.67 MG/DL (ref 0.6–1)
CREAT SERPL-MCNC: 0.68 MG/DL (ref 0.6–1)
CREAT SERPL-MCNC: 0.69 MG/DL (ref 0.6–1)
CREAT SERPL-MCNC: 0.69 MG/DL (ref 0.6–1)
CREAT SERPL-MCNC: 0.71 MG/DL (ref 0.6–1)
CREAT SERPL-MCNC: 0.72 MG/DL (ref 0.6–1)
CREAT SERPL-MCNC: 0.73 MG/DL (ref 0.6–1)
CREAT SERPL-MCNC: 0.73 MG/DL (ref 0.6–1)
CREAT SERPL-MCNC: 0.75 MG/DL (ref 0.6–1)
CREAT SERPL-MCNC: 0.77 MG/DL (ref 0.6–1)
CREAT SERPL-MCNC: 0.81 MG/DL (ref 0.6–1)
CREAT SERPL-MCNC: 0.88 MG/DL (ref 0.6–1)
CREAT SERPL-MCNC: 0.9 MG/DL (ref 0.6–1)
CREAT SERPL-MCNC: 0.96 MG/DL (ref 0.6–1)
CREAT SERPL-MCNC: 0.99 MG/DL (ref 0.6–1)
CREAT SERPL-MCNC: 1.05 MG/DL (ref 0.6–1)
D DIMER PPP FEU-MCNC: 0.48 UG/ML(FEU)
DIAGNOSIS, 93000: NORMAL
DIFFERENTIAL METHOD BLD: ABNORMAL
DIFFERENTIAL METHOD BLD: NORMAL
EOSINOPHIL # BLD: 0 K/UL (ref 0–0.8)
EOSINOPHIL # BLD: 0.5 K/UL (ref 0–0.8)
EOSINOPHIL # BLD: 0.5 K/UL (ref 0–0.8)
EOSINOPHIL # BLD: 0.6 K/UL (ref 0–0.8)
EOSINOPHIL # BLD: 0.7 K/UL (ref 0–0.8)
EOSINOPHIL # BLD: 0.8 K/UL (ref 0–0.8)
EOSINOPHIL # BLD: 0.9 K/UL (ref 0–0.8)
EOSINOPHIL # BLD: 1 K/UL (ref 0–0.8)
EOSINOPHIL # BLD: 1.1 K/UL (ref 0–0.8)
EOSINOPHIL # BLD: 1.1 K/UL (ref 0–0.8)
EOSINOPHIL # BLD: 1.4 K/UL (ref 0–0.8)
EOSINOPHIL NFR BLD: 0 % (ref 0.5–7.8)
EOSINOPHIL NFR BLD: 10 % (ref 0.5–7.8)
EOSINOPHIL NFR BLD: 11 % (ref 0.5–7.8)
EOSINOPHIL NFR BLD: 12 % (ref 0.5–7.8)
EOSINOPHIL NFR BLD: 5 % (ref 0.5–7.8)
EOSINOPHIL NFR BLD: 5 % (ref 0.5–7.8)
EOSINOPHIL NFR BLD: 6 % (ref 0.5–7.8)
EOSINOPHIL NFR BLD: 7 % (ref 0.5–7.8)
EOSINOPHIL NFR BLD: 9 % (ref 0.5–7.8)
EOSINOPHIL NFR BRONCH MANUAL: 1 %
EOSINOPHIL NFR BRONCH MANUAL: 3 %
ERYTHROCYTE [DISTWIDTH] IN BLOOD BY AUTOMATED COUNT: 13.2 % (ref 11.9–14.6)
ERYTHROCYTE [DISTWIDTH] IN BLOOD BY AUTOMATED COUNT: 13.3 % (ref 11.9–14.6)
ERYTHROCYTE [DISTWIDTH] IN BLOOD BY AUTOMATED COUNT: 13.4 % (ref 11.9–14.6)
ERYTHROCYTE [DISTWIDTH] IN BLOOD BY AUTOMATED COUNT: 13.5 % (ref 11.9–14.6)
ERYTHROCYTE [DISTWIDTH] IN BLOOD BY AUTOMATED COUNT: 13.7 % (ref 11.9–14.6)
ERYTHROCYTE [DISTWIDTH] IN BLOOD BY AUTOMATED COUNT: 13.8 % (ref 11.9–14.6)
ERYTHROCYTE [DISTWIDTH] IN BLOOD BY AUTOMATED COUNT: 13.8 % (ref 11.9–14.6)
ERYTHROCYTE [DISTWIDTH] IN BLOOD BY AUTOMATED COUNT: 13.9 % (ref 11.9–14.6)
ERYTHROCYTE [DISTWIDTH] IN BLOOD BY AUTOMATED COUNT: 14 % (ref 11.9–14.6)
EST. AVERAGE GLUCOSE BLD GHB EST-MCNC: 114 MG/DL
FLOW CYTOMETRY, FBTC1: NORMAL
FUNGUS CULTURE, RFCO2T: NEGATIVE
FUNGUS SMEAR, RFCO1T: NORMAL
FUNGUS SPEC CULT: NORMAL
FUNGUS STAIN, 188244: NORMAL
GLOBULIN SER CALC-MCNC: 3.6 G/DL (ref 2.3–3.5)
GLOBULIN SER CALC-MCNC: 3.7 G/DL (ref 2.3–3.5)
GLUCOSE BLD STRIP.AUTO-MCNC: 128 MG/DL (ref 65–100)
GLUCOSE BLD STRIP.AUTO-MCNC: 129 MG/DL (ref 65–100)
GLUCOSE BLD STRIP.AUTO-MCNC: 129 MG/DL (ref 65–100)
GLUCOSE BLD STRIP.AUTO-MCNC: 130 MG/DL (ref 65–100)
GLUCOSE BLD STRIP.AUTO-MCNC: 131 MG/DL (ref 65–100)
GLUCOSE BLD STRIP.AUTO-MCNC: 133 MG/DL (ref 65–100)
GLUCOSE BLD STRIP.AUTO-MCNC: 134 MG/DL (ref 65–100)
GLUCOSE BLD STRIP.AUTO-MCNC: 134 MG/DL (ref 65–100)
GLUCOSE BLD STRIP.AUTO-MCNC: 136 MG/DL (ref 65–100)
GLUCOSE BLD STRIP.AUTO-MCNC: 138 MG/DL (ref 65–100)
GLUCOSE BLD STRIP.AUTO-MCNC: 139 MG/DL (ref 65–100)
GLUCOSE BLD STRIP.AUTO-MCNC: 145 MG/DL (ref 65–100)
GLUCOSE BLD STRIP.AUTO-MCNC: 145 MG/DL (ref 65–100)
GLUCOSE BLD STRIP.AUTO-MCNC: 149 MG/DL (ref 65–100)
GLUCOSE BLD STRIP.AUTO-MCNC: 149 MG/DL (ref 65–100)
GLUCOSE BLD STRIP.AUTO-MCNC: 150 MG/DL (ref 65–100)
GLUCOSE BLD STRIP.AUTO-MCNC: 153 MG/DL (ref 65–100)
GLUCOSE BLD STRIP.AUTO-MCNC: 154 MG/DL (ref 65–100)
GLUCOSE BLD STRIP.AUTO-MCNC: 154 MG/DL (ref 65–100)
GLUCOSE BLD STRIP.AUTO-MCNC: 156 MG/DL (ref 65–100)
GLUCOSE BLD STRIP.AUTO-MCNC: 157 MG/DL (ref 65–100)
GLUCOSE BLD STRIP.AUTO-MCNC: 159 MG/DL (ref 65–100)
GLUCOSE BLD STRIP.AUTO-MCNC: 159 MG/DL (ref 65–100)
GLUCOSE BLD STRIP.AUTO-MCNC: 160 MG/DL (ref 65–100)
GLUCOSE BLD STRIP.AUTO-MCNC: 160 MG/DL (ref 65–100)
GLUCOSE BLD STRIP.AUTO-MCNC: 161 MG/DL (ref 65–100)
GLUCOSE BLD STRIP.AUTO-MCNC: 161 MG/DL (ref 65–100)
GLUCOSE BLD STRIP.AUTO-MCNC: 163 MG/DL (ref 65–100)
GLUCOSE BLD STRIP.AUTO-MCNC: 170 MG/DL (ref 65–100)
GLUCOSE BLD STRIP.AUTO-MCNC: 171 MG/DL (ref 65–100)
GLUCOSE BLD STRIP.AUTO-MCNC: 173 MG/DL (ref 65–100)
GLUCOSE BLD STRIP.AUTO-MCNC: 175 MG/DL (ref 65–100)
GLUCOSE BLD STRIP.AUTO-MCNC: 178 MG/DL (ref 65–100)
GLUCOSE BLD STRIP.AUTO-MCNC: 181 MG/DL (ref 65–100)
GLUCOSE BLD STRIP.AUTO-MCNC: 182 MG/DL (ref 65–100)
GLUCOSE BLD STRIP.AUTO-MCNC: 192 MG/DL (ref 65–100)
GLUCOSE BLD STRIP.AUTO-MCNC: 198 MG/DL (ref 65–100)
GLUCOSE BLD STRIP.AUTO-MCNC: 204 MG/DL (ref 65–100)
GLUCOSE BLD STRIP.AUTO-MCNC: 205 MG/DL (ref 65–100)
GLUCOSE BLD STRIP.AUTO-MCNC: 215 MG/DL (ref 65–100)
GLUCOSE BLD STRIP.AUTO-MCNC: 217 MG/DL (ref 65–100)
GLUCOSE BLD STRIP.AUTO-MCNC: 238 MG/DL (ref 65–100)
GLUCOSE BLD STRIP.AUTO-MCNC: 279 MG/DL (ref 65–100)
GLUCOSE BLD STRIP.AUTO-MCNC: 95 MG/DL (ref 65–100)
GLUCOSE FLD-MCNC: 112 MG/DL
GLUCOSE FLD-MCNC: 97 MG/DL
GLUCOSE FLD-MCNC: 99 MG/DL
GLUCOSE SERPL-MCNC: 106 MG/DL (ref 65–100)
GLUCOSE SERPL-MCNC: 116 MG/DL (ref 65–100)
GLUCOSE SERPL-MCNC: 132 MG/DL (ref 65–100)
GLUCOSE SERPL-MCNC: 140 MG/DL (ref 65–100)
GLUCOSE SERPL-MCNC: 143 MG/DL (ref 65–100)
GLUCOSE SERPL-MCNC: 150 MG/DL (ref 65–100)
GLUCOSE SERPL-MCNC: 153 MG/DL (ref 65–100)
GLUCOSE SERPL-MCNC: 154 MG/DL (ref 65–100)
GLUCOSE SERPL-MCNC: 158 MG/DL (ref 65–100)
GLUCOSE SERPL-MCNC: 158 MG/DL (ref 65–100)
GLUCOSE SERPL-MCNC: 164 MG/DL (ref 65–100)
GLUCOSE SERPL-MCNC: 174 MG/DL (ref 65–100)
GLUCOSE SERPL-MCNC: 182 MG/DL (ref 65–100)
GLUCOSE SERPL-MCNC: 76 MG/DL (ref 65–100)
GLUCOSE SERPL-MCNC: 77 MG/DL (ref 65–100)
GLUCOSE SERPL-MCNC: 78 MG/DL (ref 65–100)
GLUCOSE SERPL-MCNC: 83 MG/DL (ref 65–100)
GLUCOSE SERPL-MCNC: 84 MG/DL (ref 65–100)
GLUCOSE SERPL-MCNC: 84 MG/DL (ref 65–100)
GLUCOSE SERPL-MCNC: 85 MG/DL (ref 65–100)
GLUCOSE SERPL-MCNC: 87 MG/DL (ref 65–100)
GLUCOSE SERPL-MCNC: 87 MG/DL (ref 65–100)
GLUCOSE SERPL-MCNC: 88 MG/DL (ref 65–100)
GLUCOSE SERPL-MCNC: 89 MG/DL (ref 65–100)
GLUCOSE SERPL-MCNC: 89 MG/DL (ref 65–100)
GLUCOSE SERPL-MCNC: 90 MG/DL (ref 65–100)
GLUCOSE SERPL-MCNC: 91 MG/DL (ref 65–100)
GLUCOSE SERPL-MCNC: 91 MG/DL (ref 65–100)
GLUCOSE SERPL-MCNC: 93 MG/DL (ref 65–100)
GLUCOSE SERPL-MCNC: 96 MG/DL (ref 65–100)
GLUCOSE SERPL-MCNC: 96 MG/DL (ref 65–100)
GLUCOSE SERPL-MCNC: 98 MG/DL (ref 65–100)
GRAM STN SPEC: NORMAL
HBA1C MFR BLD: 5.6 % (ref 4.8–6)
HCT VFR BLD AUTO: 33 % (ref 35.8–46.3)
HCT VFR BLD AUTO: 33 % (ref 35.8–46.3)
HCT VFR BLD AUTO: 33.6 % (ref 35.8–46.3)
HCT VFR BLD AUTO: 33.7 % (ref 35.8–46.3)
HCT VFR BLD AUTO: 33.9 % (ref 35.8–46.3)
HCT VFR BLD AUTO: 34.3 % (ref 35.8–46.3)
HCT VFR BLD AUTO: 34.4 % (ref 35.8–46.3)
HCT VFR BLD AUTO: 34.7 % (ref 35.8–46.3)
HCT VFR BLD AUTO: 34.8 % (ref 35.8–46.3)
HCT VFR BLD AUTO: 34.8 % (ref 35.8–46.3)
HCT VFR BLD AUTO: 34.9 % (ref 35.8–46.3)
HCT VFR BLD AUTO: 35.2 % (ref 35.8–46.3)
HCT VFR BLD AUTO: 35.4 % (ref 35.8–46.3)
HCT VFR BLD AUTO: 36.1 % (ref 35.8–46.3)
HCT VFR BLD AUTO: 36.4 % (ref 35.8–46.3)
HCT VFR BLD AUTO: 36.6 % (ref 35.8–46.3)
HCT VFR BLD AUTO: 37.7 % (ref 35.8–46.3)
HCT VFR BLD AUTO: 38.7 % (ref 35.8–46.3)
HCT VFR BLD AUTO: 39.1 % (ref 35.8–46.3)
HCT VFR BLD AUTO: 39.7 % (ref 35.8–46.3)
HCT VFR BLD AUTO: 41.3 % (ref 35.8–46.3)
HCT VFR BLD AUTO: 41.5 % (ref 35.8–46.3)
HDLC SERPL-MCNC: 60 MG/DL (ref 40–60)
HDLC SERPL: 4.1 {RATIO}
HGB BLD-MCNC: 10.6 G/DL (ref 11.7–15.4)
HGB BLD-MCNC: 10.9 G/DL (ref 11.7–15.4)
HGB BLD-MCNC: 10.9 G/DL (ref 11.7–15.4)
HGB BLD-MCNC: 11 G/DL (ref 11.7–15.4)
HGB BLD-MCNC: 11.1 G/DL (ref 11.7–15.4)
HGB BLD-MCNC: 11.2 G/DL (ref 11.7–15.4)
HGB BLD-MCNC: 11.2 G/DL (ref 11.7–15.4)
HGB BLD-MCNC: 11.3 G/DL (ref 11.7–15.4)
HGB BLD-MCNC: 11.6 G/DL (ref 11.7–15.4)
HGB BLD-MCNC: 11.7 G/DL (ref 11.7–15.4)
HGB BLD-MCNC: 11.8 G/DL (ref 11.7–15.4)
HGB BLD-MCNC: 11.9 G/DL (ref 11.7–15.4)
HGB BLD-MCNC: 12.1 G/DL (ref 11.7–15.4)
HGB BLD-MCNC: 12.1 G/DL (ref 11.7–15.4)
HGB BLD-MCNC: 12.5 G/DL (ref 11.7–15.4)
HGB BLD-MCNC: 12.7 G/DL (ref 11.7–15.4)
HGB BLD-MCNC: 12.7 G/DL (ref 11.7–15.4)
HGB BLD-MCNC: 12.9 G/DL (ref 11.7–15.4)
HGB BLD-MCNC: 13.4 G/DL (ref 11.7–15.4)
HGB BLD-MCNC: 13.7 G/DL (ref 11.7–15.4)
IMM GRANULOCYTES # BLD AUTO: 0 K/UL (ref 0–0.5)
IMM GRANULOCYTES # BLD AUTO: 0.1 K/UL (ref 0–0.5)
IMM GRANULOCYTES # BLD AUTO: 0.3 K/UL (ref 0–0.5)
IMM GRANULOCYTES NFR BLD AUTO: 0 % (ref 0–5)
IMM GRANULOCYTES NFR BLD AUTO: 1 % (ref 0–5)
INR BLD: 1 (ref 0.9–1.2)
INR PPP: 0.9
LACTATE SERPL-SCNC: 1.2 MMOL/L (ref 0.4–2)
LDH FLD L TO P-CCNC: 63 U/L
LDH FLD L TO P-CCNC: 71 U/L
LDH FLD L TO P-CCNC: 85 U/L
LDH SERPL L TO P-CCNC: 176 U/L (ref 110–210)
LDLC SERPL CALC-MCNC: 161.8 MG/DL
LIPID PROFILE,FLP: ABNORMAL
LYMPHOCYTES # BLD: 1 K/UL (ref 0.5–4.6)
LYMPHOCYTES # BLD: 1.9 K/UL (ref 0.5–4.6)
LYMPHOCYTES # BLD: 2.1 K/UL (ref 0.5–4.6)
LYMPHOCYTES # BLD: 2.1 K/UL (ref 0.5–4.6)
LYMPHOCYTES # BLD: 2.2 K/UL (ref 0.5–4.6)
LYMPHOCYTES # BLD: 2.2 K/UL (ref 0.5–4.6)
LYMPHOCYTES # BLD: 2.3 K/UL (ref 0.5–4.6)
LYMPHOCYTES # BLD: 2.4 K/UL (ref 0.5–4.6)
LYMPHOCYTES # BLD: 2.4 K/UL (ref 0.5–4.6)
LYMPHOCYTES # BLD: 2.5 K/UL (ref 0.5–4.6)
LYMPHOCYTES # BLD: 2.7 K/UL (ref 0.5–4.6)
LYMPHOCYTES # BLD: 2.8 K/UL (ref 0.5–4.6)
LYMPHOCYTES NFR BLD: 19 % (ref 13–44)
LYMPHOCYTES NFR BLD: 20 % (ref 13–44)
LYMPHOCYTES NFR BLD: 21 % (ref 13–44)
LYMPHOCYTES NFR BLD: 21 % (ref 13–44)
LYMPHOCYTES NFR BLD: 22 % (ref 13–44)
LYMPHOCYTES NFR BLD: 23 % (ref 13–44)
LYMPHOCYTES NFR BLD: 25 % (ref 13–44)
LYMPHOCYTES NFR BLD: 26 % (ref 13–44)
LYMPHOCYTES NFR BLD: 4 % (ref 13–44)
LYMPHOCYTES NFR BRONCH MANUAL: 82 %
LYMPHOCYTES NFR BRONCH MANUAL: 91 %
LYMPHOCYTES NFR BRONCH MANUAL: 95 %
MACROPHAGES NFR BRONCH MANUAL: 1 %
MACROPHAGES NFR BRONCH MANUAL: 14 %
MACROPHAGES NFR BRONCH MANUAL: 6 %
MAGNESIUM SERPL-MCNC: 2.3 MG/DL (ref 1.8–2.4)
MAGNESIUM SERPL-MCNC: 2.4 MG/DL (ref 1.8–2.4)
MAGNESIUM SERPL-MCNC: 2.4 MG/DL (ref 1.8–2.4)
MAGNESIUM SERPL-MCNC: 2.5 MG/DL (ref 1.8–2.4)
MAGNESIUM SERPL-MCNC: 2.6 MG/DL (ref 1.8–2.4)
MAGNESIUM SERPL-MCNC: 3.4 MG/DL (ref 1.8–2.4)
MCH RBC QN AUTO: 30.4 PG (ref 26.1–32.9)
MCH RBC QN AUTO: 30.4 PG (ref 26.1–32.9)
MCH RBC QN AUTO: 30.5 PG (ref 26.1–32.9)
MCH RBC QN AUTO: 30.6 PG (ref 26.1–32.9)
MCH RBC QN AUTO: 30.7 PG (ref 26.1–32.9)
MCH RBC QN AUTO: 30.8 PG (ref 26.1–32.9)
MCH RBC QN AUTO: 30.9 PG (ref 26.1–32.9)
MCH RBC QN AUTO: 31 PG (ref 26.1–32.9)
MCH RBC QN AUTO: 31.1 PG (ref 26.1–32.9)
MCH RBC QN AUTO: 31.2 PG (ref 26.1–32.9)
MCH RBC QN AUTO: 31.4 PG (ref 26.1–32.9)
MCH RBC QN AUTO: 31.6 PG (ref 26.1–32.9)
MCHC RBC AUTO-ENTMCNC: 32.1 G/DL (ref 31.4–35)
MCHC RBC AUTO-ENTMCNC: 32.1 G/DL (ref 31.4–35)
MCHC RBC AUTO-ENTMCNC: 32.2 G/DL (ref 31.4–35)
MCHC RBC AUTO-ENTMCNC: 32.3 G/DL (ref 31.4–35)
MCHC RBC AUTO-ENTMCNC: 32.3 G/DL (ref 31.4–35)
MCHC RBC AUTO-ENTMCNC: 32.4 G/DL (ref 31.4–35)
MCHC RBC AUTO-ENTMCNC: 32.4 G/DL (ref 31.4–35)
MCHC RBC AUTO-ENTMCNC: 32.5 G/DL (ref 31.4–35)
MCHC RBC AUTO-ENTMCNC: 32.5 G/DL (ref 31.4–35)
MCHC RBC AUTO-ENTMCNC: 32.7 G/DL (ref 31.4–35)
MCHC RBC AUTO-ENTMCNC: 32.8 G/DL (ref 31.4–35)
MCHC RBC AUTO-ENTMCNC: 33 G/DL (ref 31.4–35)
MCHC RBC AUTO-ENTMCNC: 33.1 G/DL (ref 31.4–35)
MCHC RBC AUTO-ENTMCNC: 33.1 G/DL (ref 31.4–35)
MCHC RBC AUTO-ENTMCNC: 33.2 G/DL (ref 31.4–35)
MCHC RBC AUTO-ENTMCNC: 33.5 G/DL (ref 31.4–35)
MCHC RBC AUTO-ENTMCNC: 33.6 G/DL (ref 31.4–35)
MCHC RBC AUTO-ENTMCNC: 33.7 G/DL (ref 31.4–35)
MCHC RBC AUTO-ENTMCNC: 33.8 G/DL (ref 31.4–35)
MCHC RBC AUTO-ENTMCNC: 34 G/DL (ref 31.4–35)
MCV RBC AUTO: 90.8 FL (ref 79.6–97.8)
MCV RBC AUTO: 91.7 FL (ref 79.6–97.8)
MCV RBC AUTO: 92.3 FL (ref 79.6–97.8)
MCV RBC AUTO: 92.3 FL (ref 79.6–97.8)
MCV RBC AUTO: 93.3 FL (ref 79.6–97.8)
MCV RBC AUTO: 93.4 FL (ref 79.6–97.8)
MCV RBC AUTO: 93.5 FL (ref 79.6–97.8)
MCV RBC AUTO: 93.5 FL (ref 79.6–97.8)
MCV RBC AUTO: 93.6 FL (ref 79.6–97.8)
MCV RBC AUTO: 93.9 FL (ref 79.6–97.8)
MCV RBC AUTO: 94 FL (ref 79.6–97.8)
MCV RBC AUTO: 94.4 FL (ref 79.6–97.8)
MCV RBC AUTO: 94.5 FL (ref 79.6–97.8)
MCV RBC AUTO: 94.6 FL (ref 79.6–97.8)
MCV RBC AUTO: 94.7 FL (ref 79.6–97.8)
MCV RBC AUTO: 94.8 FL (ref 79.6–97.8)
MCV RBC AUTO: 94.9 FL (ref 79.6–97.8)
MCV RBC AUTO: 95 FL (ref 79.6–97.8)
MCV RBC AUTO: 95.3 FL (ref 79.6–97.8)
MCV RBC AUTO: 95.3 FL (ref 79.6–97.8)
MCV RBC AUTO: 95.7 FL (ref 79.6–97.8)
MCV RBC AUTO: 95.8 FL (ref 79.6–97.8)
MM INDURATION POC: 0 MM (ref 0–5)
MM INDURATION POC: 0 MM (ref 0–5)
MONOCYTES # BLD: 0.9 K/UL (ref 0.1–1.3)
MONOCYTES # BLD: 1 K/UL (ref 0.1–1.3)
MONOCYTES # BLD: 1 K/UL (ref 0.1–1.3)
MONOCYTES # BLD: 1.1 K/UL (ref 0.1–1.3)
MONOCYTES # BLD: 1.2 K/UL (ref 0.1–1.3)
MONOCYTES # BLD: 1.3 K/UL (ref 0.1–1.3)
MONOCYTES # BLD: 1.4 K/UL (ref 0.1–1.3)
MONOCYTES # BLD: 1.5 K/UL (ref 0.1–1.3)
MONOCYTES # BLD: 1.5 K/UL (ref 0.1–1.3)
MONOCYTES NFR BLD: 10 % (ref 4–12)
MONOCYTES NFR BLD: 10 % (ref 4–12)
MONOCYTES NFR BLD: 11 % (ref 4–12)
MONOCYTES NFR BLD: 12 % (ref 4–12)
MONOCYTES NFR BLD: 12 % (ref 4–12)
MONOCYTES NFR BLD: 13 % (ref 4–12)
MONOCYTES NFR BLD: 14 % (ref 4–12)
MONOCYTES NFR BLD: 15 % (ref 4–12)
MONOCYTES NFR BLD: 4 % (ref 4–12)
MONOCYTES NFR BLD: 9 % (ref 4–12)
NEUTROPHILS NFR BRONCH MANUAL: 3 %
NEUTROPHILS NFR BRONCH MANUAL: 4 %
NEUTS SEG # BLD: 23.8 K/UL (ref 1.7–8.2)
NEUTS SEG # BLD: 4.6 K/UL (ref 1.7–8.2)
NEUTS SEG # BLD: 5.3 K/UL (ref 1.7–8.2)
NEUTS SEG # BLD: 5.4 K/UL (ref 1.7–8.2)
NEUTS SEG # BLD: 5.4 K/UL (ref 1.7–8.2)
NEUTS SEG # BLD: 5.7 K/UL (ref 1.7–8.2)
NEUTS SEG # BLD: 6 K/UL (ref 1.7–8.2)
NEUTS SEG # BLD: 6.1 K/UL (ref 1.7–8.2)
NEUTS SEG # BLD: 6.4 K/UL (ref 1.7–8.2)
NEUTS SEG # BLD: 6.6 K/UL (ref 1.7–8.2)
NEUTS SEG # BLD: 6.6 K/UL (ref 1.7–8.2)
NEUTS SEG # BLD: 6.7 K/UL (ref 1.7–8.2)
NEUTS SEG # BLD: 6.9 K/UL (ref 1.7–8.2)
NEUTS SEG # BLD: 7.1 K/UL (ref 1.7–8.2)
NEUTS SEG NFR BLD: 49 % (ref 43–78)
NEUTS SEG NFR BLD: 53 % (ref 43–78)
NEUTS SEG NFR BLD: 54 % (ref 43–78)
NEUTS SEG NFR BLD: 56 % (ref 43–78)
NEUTS SEG NFR BLD: 56 % (ref 43–78)
NEUTS SEG NFR BLD: 57 % (ref 43–78)
NEUTS SEG NFR BLD: 58 % (ref 43–78)
NEUTS SEG NFR BLD: 59 % (ref 43–78)
NEUTS SEG NFR BLD: 60 % (ref 43–78)
NEUTS SEG NFR BLD: 63 % (ref 43–78)
NEUTS SEG NFR BLD: 64 % (ref 43–78)
NEUTS SEG NFR BLD: 91 % (ref 43–78)
NRBC # BLD: 0 K/UL (ref 0–0.2)
NRBC # BLD: 0.02 K/UL (ref 0–0.2)
NRBC # BLD: 0.02 K/UL (ref 0–0.2)
NRBC # BLD: 0.03 K/UL (ref 0–0.2)
NRBC # BLD: 0.04 K/UL (ref 0–0.2)
NRBC # BLD: 0.05 K/UL (ref 0–0.2)
NUC CELL # FLD: 1200 /CU MM
NUC CELL # FLD: 1596 /CU MM
NUC CELL # FLD: 503 /CU MM
P-R INTERVAL, ECG05: 148 MS
P-R INTERVAL, ECG05: 156 MS
P-R INTERVAL, ECG05: 158 MS
P-R INTERVAL, ECG05: 160 MS
PHOSPHATE SERPL-MCNC: 4 MG/DL (ref 2.3–3.7)
PLATELET # BLD AUTO: 243 K/UL (ref 150–450)
PLATELET # BLD AUTO: 341 K/UL (ref 150–450)
PLATELET # BLD AUTO: 345 K/UL (ref 150–450)
PLATELET # BLD AUTO: 353 K/UL (ref 150–450)
PLATELET # BLD AUTO: 369 K/UL (ref 150–450)
PLATELET # BLD AUTO: 370 K/UL (ref 150–450)
PLATELET # BLD AUTO: 373 K/UL (ref 150–450)
PLATELET # BLD AUTO: 378 K/UL (ref 150–450)
PLATELET # BLD AUTO: 390 K/UL (ref 150–450)
PLATELET # BLD AUTO: 391 K/UL (ref 150–450)
PLATELET # BLD AUTO: 399 K/UL (ref 150–450)
PLATELET # BLD AUTO: 403 K/UL (ref 150–450)
PLATELET # BLD AUTO: 409 K/UL (ref 150–450)
PLATELET # BLD AUTO: 415 K/UL (ref 150–450)
PLATELET # BLD AUTO: 415 K/UL (ref 150–450)
PLATELET # BLD AUTO: 419 K/UL (ref 150–450)
PLATELET # BLD AUTO: 425 K/UL (ref 150–450)
PLATELET # BLD AUTO: 428 K/UL (ref 150–450)
PLATELET # BLD AUTO: 441 K/UL (ref 150–450)
PLATELET # BLD AUTO: 446 K/UL (ref 150–450)
PLATELET # BLD AUTO: 476 K/UL (ref 150–450)
PLATELET # BLD AUTO: 502 K/UL (ref 150–450)
PMV BLD AUTO: 10 FL (ref 9.4–12.3)
PMV BLD AUTO: 10.2 FL (ref 9.4–12.3)
PMV BLD AUTO: 10.2 FL (ref 9.4–12.3)
PMV BLD AUTO: 10.3 FL (ref 9.4–12.3)
PMV BLD AUTO: 10.4 FL (ref 9.4–12.3)
PMV BLD AUTO: 11.2 FL (ref 9.4–12.3)
PMV BLD AUTO: 11.5 FL (ref 9.4–12.3)
PMV BLD AUTO: 11.9 FL (ref 9.4–12.3)
PMV BLD AUTO: 9.5 FL (ref 9.4–12.3)
PMV BLD AUTO: 9.5 FL (ref 9.4–12.3)
PMV BLD AUTO: 9.6 FL (ref 9.4–12.3)
PMV BLD AUTO: 9.6 FL (ref 9.4–12.3)
PMV BLD AUTO: 9.7 FL (ref 9.4–12.3)
PMV BLD AUTO: 9.8 FL (ref 9.4–12.3)
PMV BLD AUTO: 9.8 FL (ref 9.4–12.3)
PMV BLD AUTO: 9.9 FL (ref 9.4–12.3)
POTASSIUM SERPL-SCNC: 2.6 MMOL/L (ref 3.5–5.1)
POTASSIUM SERPL-SCNC: 3.1 MMOL/L (ref 3.5–5.1)
POTASSIUM SERPL-SCNC: 3.2 MMOL/L (ref 3.5–5.1)
POTASSIUM SERPL-SCNC: 3.2 MMOL/L (ref 3.5–5.1)
POTASSIUM SERPL-SCNC: 3.4 MMOL/L (ref 3.5–5.1)
POTASSIUM SERPL-SCNC: 3.5 MMOL/L (ref 3.5–5.1)
POTASSIUM SERPL-SCNC: 3.5 MMOL/L (ref 3.5–5.1)
POTASSIUM SERPL-SCNC: 3.6 MMOL/L (ref 3.5–5.1)
POTASSIUM SERPL-SCNC: 3.7 MMOL/L (ref 3.5–5.1)
POTASSIUM SERPL-SCNC: 3.8 MMOL/L (ref 3.5–5.1)
POTASSIUM SERPL-SCNC: 3.9 MMOL/L (ref 3.5–5.1)
POTASSIUM SERPL-SCNC: 4 MMOL/L (ref 3.5–5.1)
POTASSIUM SERPL-SCNC: 4.1 MMOL/L (ref 3.5–5.1)
POTASSIUM SERPL-SCNC: 4.3 MMOL/L (ref 3.5–5.1)
POTASSIUM SERPL-SCNC: 4.4 MMOL/L (ref 3.5–5.1)
POTASSIUM SERPL-SCNC: 4.5 MMOL/L (ref 3.5–5.1)
POTASSIUM SERPL-SCNC: 4.6 MMOL/L (ref 3.5–5.1)
POTASSIUM SERPL-SCNC: 4.7 MMOL/L (ref 3.5–5.1)
POTASSIUM SERPL-SCNC: 4.8 MMOL/L (ref 3.5–5.1)
POTASSIUM SERPL-SCNC: 4.9 MMOL/L (ref 3.5–5.1)
POTASSIUM SERPL-SCNC: 4.9 MMOL/L (ref 3.5–5.1)
POTASSIUM SERPL-SCNC: 5 MMOL/L (ref 3.5–5.1)
POTASSIUM SERPL-SCNC: 5.2 MMOL/L (ref 3.5–5.1)
POTASSIUM SERPL-SCNC: 5.3 MMOL/L (ref 3.5–5.1)
PPD POC: NEGATIVE NEGATIVE
PPD POC: NEGATIVE NEGATIVE
PROCALCITONIN SERPL-MCNC: <0.05 NG/ML
PROCALCITONIN SERPL-MCNC: <0.05 NG/ML
PROT FLD-MCNC: 2.2 G/DL
PROT FLD-MCNC: 2.2 G/DL
PROT FLD-MCNC: 2.6 G/DL
PROT SERPL-MCNC: 5.6 G/DL (ref 6.3–8.2)
PROT SERPL-MCNC: 6.4 G/DL (ref 6.3–8.2)
PROT SERPL-MCNC: 6.6 G/DL (ref 6.3–8.2)
PROT SERPL-MCNC: 6.8 G/DL (ref 6.3–8.2)
PROT SERPL-MCNC: 6.9 G/DL (ref 6.3–8.2)
PROTHROMBIN TIME: 12.4 SEC (ref 12.5–14.7)
PT BLD: 12.5 SECS (ref 9.6–11.6)
Q-T INTERVAL, ECG07: 336 MS
Q-T INTERVAL, ECG07: 368 MS
Q-T INTERVAL, ECG07: 372 MS
Q-T INTERVAL, ECG07: 384 MS
QRS DURATION, ECG06: 104 MS
QRS DURATION, ECG06: 104 MS
QRS DURATION, ECG06: 90 MS
QRS DURATION, ECG06: 94 MS
QTC CALCULATION (BEZET), ECG08: 458 MS
QTC CALCULATION (BEZET), ECG08: 479 MS
QTC CALCULATION (BEZET), ECG08: 497 MS
QTC CALCULATION (BEZET), ECG08: 498 MS
RBC # BLD AUTO: 3.49 M/UL (ref 4.05–5.2)
RBC # BLD AUTO: 3.51 M/UL (ref 4.05–5.2)
RBC # BLD AUTO: 3.54 M/UL (ref 4.05–5.2)
RBC # BLD AUTO: 3.56 M/UL (ref 4.05–5.2)
RBC # BLD AUTO: 3.59 M/UL (ref 4.05–5.2)
RBC # BLD AUTO: 3.62 M/UL (ref 4.05–5.2)
RBC # BLD AUTO: 3.65 M/UL (ref 4.05–5.2)
RBC # BLD AUTO: 3.68 M/UL (ref 4.05–5.2)
RBC # BLD AUTO: 3.77 M/UL (ref 4.05–5.2)
RBC # BLD AUTO: 3.78 M/UL (ref 4.05–5.2)
RBC # BLD AUTO: 3.79 M/UL (ref 4.05–5.2)
RBC # BLD AUTO: 3.82 M/UL (ref 4.05–5.2)
RBC # BLD AUTO: 3.82 M/UL (ref 4.05–5.2)
RBC # BLD AUTO: 3.83 M/UL (ref 4.05–5.2)
RBC # BLD AUTO: 3.84 M/UL (ref 4.05–5.2)
RBC # BLD AUTO: 3.91 M/UL (ref 4.05–5.2)
RBC # BLD AUTO: 4.04 M/UL (ref 4.05–5.2)
RBC # BLD AUTO: 4.06 M/UL (ref 4.05–5.2)
RBC # BLD AUTO: 4.08 M/UL (ref 4.05–5.2)
RBC # BLD AUTO: 4.15 M/UL (ref 4.05–5.2)
RBC # BLD AUTO: 4.4 M/UL (ref 4.05–5.2)
RBC # BLD AUTO: 4.44 M/UL (ref 4.05–5.2)
RBC # FLD: 1000 /CU MM
REFLEX TO ID, RFCO3T: NORMAL
SARS COV-2, XPGCVT: NEGATIVE
SERVICE CMNT-IMP: NORMAL
SODIUM SERPL-SCNC: 131 MMOL/L (ref 136–145)
SODIUM SERPL-SCNC: 133 MMOL/L (ref 136–145)
SODIUM SERPL-SCNC: 134 MMOL/L (ref 136–145)
SODIUM SERPL-SCNC: 135 MMOL/L (ref 136–145)
SODIUM SERPL-SCNC: 135 MMOL/L (ref 136–145)
SODIUM SERPL-SCNC: 136 MMOL/L (ref 136–145)
SODIUM SERPL-SCNC: 136 MMOL/L (ref 138–145)
SODIUM SERPL-SCNC: 137 MMOL/L (ref 136–145)
SODIUM SERPL-SCNC: 138 MMOL/L (ref 136–145)
SODIUM SERPL-SCNC: 138 MMOL/L (ref 138–145)
SODIUM SERPL-SCNC: 139 MMOL/L (ref 136–145)
SODIUM SERPL-SCNC: 140 MMOL/L (ref 136–145)
SODIUM SERPL-SCNC: 141 MMOL/L (ref 136–145)
SODIUM SERPL-SCNC: 141 MMOL/L (ref 136–145)
SODIUM SERPL-SCNC: 141 MMOL/L (ref 138–145)
SOURCE, COVRS: NORMAL
SPECIMEN SOURCE FLD: 1
SPECIMEN SOURCE FLD: NORMAL
SPECIMEN SOURCE: NORMAL
TEST ORDERED:: NORMAL
TRIGL SERPL-MCNC: 106 MG/DL (ref 35–150)
TROPONIN-HIGH SENSITIVITY: 112.3 PG/ML (ref 0–14)
TROPONIN-HIGH SENSITIVITY: 151 PG/ML (ref 0–14)
TROPONIN-HIGH SENSITIVITY: 155.8 PG/ML (ref 0–14)
TROPONIN-HIGH SENSITIVITY: 202.9 PG/ML (ref 0–14)
VENTRICULAR RATE, ECG03: 101 BPM
VENTRICULAR RATE, ECG03: 102 BPM
VENTRICULAR RATE, ECG03: 108 BPM
VENTRICULAR RATE, ECG03: 112 BPM
VLDLC SERPL CALC-MCNC: 21.2 MG/DL (ref 6–23)
WBC # BLD AUTO: 10.5 K/UL (ref 4.3–11.1)
WBC # BLD AUTO: 10.7 K/UL (ref 4.3–11.1)
WBC # BLD AUTO: 10.8 K/UL (ref 4.3–11.1)
WBC # BLD AUTO: 10.8 K/UL (ref 4.3–11.1)
WBC # BLD AUTO: 11.3 K/UL (ref 4.3–11.1)
WBC # BLD AUTO: 11.6 K/UL (ref 4.3–11.1)
WBC # BLD AUTO: 12.2 K/UL (ref 4.3–11.1)
WBC # BLD AUTO: 12.4 K/UL (ref 4.3–11.1)
WBC # BLD AUTO: 13.8 K/UL (ref 4.3–11.1)
WBC # BLD AUTO: 19 K/UL (ref 4.3–11.1)
WBC # BLD AUTO: 20 K/UL (ref 4.3–11.1)
WBC # BLD AUTO: 21.4 K/UL (ref 4.3–11.1)
WBC # BLD AUTO: 22.3 K/UL (ref 4.3–11.1)
WBC # BLD AUTO: 26.3 K/UL (ref 4.3–11.1)
WBC # BLD AUTO: 27.6 K/UL (ref 4.3–11.1)
WBC # BLD AUTO: 38.6 K/UL (ref 4.3–11.1)
WBC # BLD AUTO: 9.3 K/UL (ref 4.3–11.1)
WBC # BLD AUTO: 9.3 K/UL (ref 4.3–11.1)
WBC # BLD AUTO: 9.5 K/UL (ref 4.3–11.1)
WBC # BLD AUTO: 9.5 K/UL (ref 4.3–11.1)
WBC # BLD AUTO: 9.6 K/UL (ref 4.3–11.1)
WBC # BLD AUTO: 9.6 K/UL (ref 4.3–11.1)

## 2020-01-01 PROCEDURE — 74011250637 HC RX REV CODE- 250/637: Performed by: FAMILY MEDICINE

## 2020-01-01 PROCEDURE — 2709999900 HC NON-CHARGEABLE SUPPLY

## 2020-01-01 PROCEDURE — 74011250636 HC RX REV CODE- 250/636: Performed by: INTERNAL MEDICINE

## 2020-01-01 PROCEDURE — 3331090002 HH PPS REVENUE DEBIT

## 2020-01-01 PROCEDURE — 71045 X-RAY EXAM CHEST 1 VIEW: CPT

## 2020-01-01 PROCEDURE — 70553 MRI BRAIN STEM W/O & W/DYE: CPT

## 2020-01-01 PROCEDURE — 3331090001 HH PPS REVENUE CREDIT

## 2020-01-01 PROCEDURE — G0155 HHCP-SVS OF CSW,EA 15 MIN: HCPCS

## 2020-01-01 PROCEDURE — 74011000250 HC RX REV CODE- 250: Performed by: FAMILY MEDICINE

## 2020-01-01 PROCEDURE — G0299 HHS/HOSPICE OF RN EA 15 MIN: HCPCS

## 2020-01-01 PROCEDURE — 92611 MOTION FLUOROSCOPY/SWALLOW: CPT

## 2020-01-01 PROCEDURE — 80053 COMPREHEN METABOLIC PANEL: CPT

## 2020-01-01 PROCEDURE — 74011250637 HC RX REV CODE- 250/637: Performed by: INTERNAL MEDICINE

## 2020-01-01 PROCEDURE — 32555 ASPIRATE PLEURA W/ IMAGING: CPT | Performed by: INTERNAL MEDICINE

## 2020-01-01 PROCEDURE — 77030014147 HC TY THORCENT PARA TELE -B: Performed by: INTERNAL MEDICINE

## 2020-01-01 PROCEDURE — 82962 GLUCOSE BLOOD TEST: CPT

## 2020-01-01 PROCEDURE — 99223 1ST HOSP IP/OBS HIGH 75: CPT | Performed by: INTERNAL MEDICINE

## 2020-01-01 PROCEDURE — 74011250637 HC RX REV CODE- 250/637: Performed by: HOSPITALIST

## 2020-01-01 PROCEDURE — 94760 N-INVAS EAR/PLS OXIMETRY 1: CPT

## 2020-01-01 PROCEDURE — 0656 HSPC GENERAL INPATIENT

## 2020-01-01 PROCEDURE — 97535 SELF CARE MNGMENT TRAINING: CPT

## 2020-01-01 PROCEDURE — 65270000029 HC RM PRIVATE

## 2020-01-01 PROCEDURE — 74011000250 HC RX REV CODE- 250: Performed by: RADIOLOGY

## 2020-01-01 PROCEDURE — 36415 COLL VENOUS BLD VENIPUNCTURE: CPT

## 2020-01-01 PROCEDURE — 0W9930Z DRAINAGE OF RIGHT PLEURAL CAVITY WITH DRAINAGE DEVICE, PERCUTANEOUS APPROACH: ICD-10-PCS | Performed by: INTERNAL MEDICINE

## 2020-01-01 PROCEDURE — 74011250636 HC RX REV CODE- 250/636: Performed by: FAMILY MEDICINE

## 2020-01-01 PROCEDURE — 85025 COMPLETE CBC W/AUTO DIFF WBC: CPT

## 2020-01-01 PROCEDURE — 80048 BASIC METABOLIC PNL TOTAL CA: CPT

## 2020-01-01 PROCEDURE — 97165 OT EVAL LOW COMPLEX 30 MIN: CPT

## 2020-01-01 PROCEDURE — 94640 AIRWAY INHALATION TREATMENT: CPT

## 2020-01-01 PROCEDURE — 74018 RADEX ABDOMEN 1 VIEW: CPT

## 2020-01-01 PROCEDURE — 92523 SPEECH SOUND LANG COMPREHEN: CPT

## 2020-01-01 PROCEDURE — 74011000250 HC RX REV CODE- 250: Performed by: PHYSICIAN ASSISTANT

## 2020-01-01 PROCEDURE — 87635 SARS-COV-2 COVID-19 AMP PRB: CPT

## 2020-01-01 PROCEDURE — 99232 SBSQ HOSP IP/OBS MODERATE 35: CPT | Performed by: INTERNAL MEDICINE

## 2020-01-01 PROCEDURE — G0152 HHCP-SERV OF OT,EA 15 MIN: HCPCS

## 2020-01-01 PROCEDURE — 71046 X-RAY EXAM CHEST 2 VIEWS: CPT

## 2020-01-01 PROCEDURE — 2709999900 HC NON-CHARGEABLE SUPPLY: Performed by: INTERNAL MEDICINE

## 2020-01-01 PROCEDURE — 76040000026: Performed by: INTERNAL MEDICINE

## 2020-01-01 PROCEDURE — 77030018798 HC PMP KT ENTRL FED COVD -A

## 2020-01-01 PROCEDURE — 3336500001 HSPC ELECTION

## 2020-01-01 PROCEDURE — 88305 TISSUE EXAM BY PATHOLOGIST: CPT

## 2020-01-01 PROCEDURE — 97112 NEUROMUSCULAR REEDUCATION: CPT

## 2020-01-01 PROCEDURE — 0W9B3ZZ DRAINAGE OF LEFT PLEURAL CAVITY, PERCUTANEOUS APPROACH: ICD-10-PCS | Performed by: INTERNAL MEDICINE

## 2020-01-01 PROCEDURE — 77010033678 HC OXYGEN DAILY

## 2020-01-01 PROCEDURE — 74011000250 HC RX REV CODE- 250: Performed by: HOSPITALIST

## 2020-01-01 PROCEDURE — 65660000000 HC RM CCU STEPDOWN

## 2020-01-01 PROCEDURE — 84484 ASSAY OF TROPONIN QUANT: CPT

## 2020-01-01 PROCEDURE — 74011250636 HC RX REV CODE- 250/636: Performed by: NEUROLOGICAL SURGERY

## 2020-01-01 PROCEDURE — 74011250636 HC RX REV CODE- 250/636: Performed by: NURSE PRACTITIONER

## 2020-01-01 PROCEDURE — 94664 DEMO&/EVAL PT USE INHALER: CPT

## 2020-01-01 PROCEDURE — 85027 COMPLETE CBC AUTOMATED: CPT

## 2020-01-01 PROCEDURE — 83735 ASSAY OF MAGNESIUM: CPT

## 2020-01-01 PROCEDURE — 65610000001 HC ROOM ICU GENERAL

## 2020-01-01 PROCEDURE — 74011250636 HC RX REV CODE- 250/636: Performed by: PHYSICIAN ASSISTANT

## 2020-01-01 PROCEDURE — 83615 LACTATE (LD) (LDH) ENZYME: CPT

## 2020-01-01 PROCEDURE — 99233 SBSQ HOSP IP/OBS HIGH 50: CPT | Performed by: NURSE PRACTITIONER

## 2020-01-01 PROCEDURE — 93005 ELECTROCARDIOGRAM TRACING: CPT | Performed by: INTERNAL MEDICINE

## 2020-01-01 PROCEDURE — 400013 HH SOC

## 2020-01-01 PROCEDURE — 74011250636 HC RX REV CODE- 250/636: Performed by: EMERGENCY MEDICINE

## 2020-01-01 PROCEDURE — 82945 GLUCOSE OTHER FLUID: CPT

## 2020-01-01 PROCEDURE — 96374 THER/PROPH/DIAG INJ IV PUSH: CPT

## 2020-01-01 PROCEDURE — C8924 2D TTE W OR W/O FOL W/CON,FU: HCPCS

## 2020-01-01 PROCEDURE — 74011636637 HC RX REV CODE- 636/637: Performed by: HOSPITALIST

## 2020-01-01 PROCEDURE — 74011250636 HC RX REV CODE- 250/636: Performed by: HOSPITALIST

## 2020-01-01 PROCEDURE — 86300 IMMUNOASSAY TUMOR CA 15-3: CPT

## 2020-01-01 PROCEDURE — 92507 TX SP LANG VOICE COMM INDIV: CPT

## 2020-01-01 PROCEDURE — 93971 EXTREMITY STUDY: CPT

## 2020-01-01 PROCEDURE — 74011000258 HC RX REV CODE- 258

## 2020-01-01 PROCEDURE — 74011000250 HC RX REV CODE- 250: Performed by: NURSE PRACTITIONER

## 2020-01-01 PROCEDURE — 0651 HSPC ROUTINE HOME CARE

## 2020-01-01 PROCEDURE — 74011000258 HC RX REV CODE- 258: Performed by: NURSE PRACTITIONER

## 2020-01-01 PROCEDURE — 97110 THERAPEUTIC EXERCISES: CPT | Performed by: PHYSICAL THERAPIST

## 2020-01-01 PROCEDURE — 85610 PROTHROMBIN TIME: CPT

## 2020-01-01 PROCEDURE — 4A03X5D MEASUREMENT OF ARTERIAL FLOW, INTRACRANIAL, EXTERNAL APPROACH: ICD-10-PCS | Performed by: RADIOLOGY

## 2020-01-01 PROCEDURE — 74011000258 HC RX REV CODE- 258: Performed by: INTERNAL MEDICINE

## 2020-01-01 PROCEDURE — 97162 PT EVAL MOD COMPLEX 30 MIN: CPT

## 2020-01-01 PROCEDURE — 82533 TOTAL CORTISOL: CPT

## 2020-01-01 PROCEDURE — 77030014146 HC TY THORCENT/PARACENT BD -B

## 2020-01-01 PROCEDURE — 97110 THERAPEUTIC EXERCISES: CPT

## 2020-01-01 PROCEDURE — 77030040393 HC DRSG OPTIFOAM GENT MDII -B

## 2020-01-01 PROCEDURE — 74011250637 HC RX REV CODE- 250/637: Performed by: NURSE PRACTITIONER

## 2020-01-01 PROCEDURE — 99231 SBSQ HOSP IP/OBS SF/LOW 25: CPT | Performed by: INTERNAL MEDICINE

## 2020-01-01 PROCEDURE — 71250 CT THORAX DX C-: CPT

## 2020-01-01 PROCEDURE — 74011000250 HC RX REV CODE- 250: Performed by: INTERNAL MEDICINE

## 2020-01-01 PROCEDURE — 74011000250 HC RX REV CODE- 250

## 2020-01-01 PROCEDURE — 74011000250 HC RX REV CODE- 250: Performed by: EMERGENCY MEDICINE

## 2020-01-01 PROCEDURE — 84100 ASSAY OF PHOSPHORUS: CPT

## 2020-01-01 PROCEDURE — 77030041247 HC PROTECTOR HEEL HEELMEDIX MDII -B

## 2020-01-01 PROCEDURE — 99222 1ST HOSP IP/OBS MODERATE 55: CPT | Performed by: PSYCHIATRY & NEUROLOGY

## 2020-01-01 PROCEDURE — 84155 ASSAY OF PROTEIN SERUM: CPT

## 2020-01-01 PROCEDURE — 86301 IMMUNOASSAY TUMOR CA 19-9: CPT

## 2020-01-01 PROCEDURE — 71260 CT THORAX DX C+: CPT

## 2020-01-01 PROCEDURE — 96368 THER/DIAG CONCURRENT INF: CPT

## 2020-01-01 PROCEDURE — 99222 1ST HOSP IP/OBS MODERATE 55: CPT | Performed by: PHYSICIAN ASSISTANT

## 2020-01-01 PROCEDURE — 87040 BLOOD CULTURE FOR BACTERIA: CPT

## 2020-01-01 PROCEDURE — 86304 IMMUNOASSAY TUMOR CA 125: CPT

## 2020-01-01 PROCEDURE — 97530 THERAPEUTIC ACTIVITIES: CPT

## 2020-01-01 PROCEDURE — 77030016152 HC PRSSR SYS POS VYRM -B

## 2020-01-01 PROCEDURE — 74011000255 HC RX REV CODE- 255: Performed by: HOSPITALIST

## 2020-01-01 PROCEDURE — 74230 X-RAY XM SWLNG FUNCJ C+: CPT

## 2020-01-01 PROCEDURE — 85379 FIBRIN DEGRADATION QUANT: CPT

## 2020-01-01 PROCEDURE — 77030008771 HC TU NG SALEM SUMP -A

## 2020-01-01 PROCEDURE — 87205 SMEAR GRAM STAIN: CPT

## 2020-01-01 PROCEDURE — 92610 EVALUATE SWALLOWING FUNCTION: CPT

## 2020-01-01 PROCEDURE — 74011250636 HC RX REV CODE- 250/636

## 2020-01-01 PROCEDURE — 77065 DX MAMMO INCL CAD UNI: CPT

## 2020-01-01 PROCEDURE — 80061 LIPID PANEL: CPT

## 2020-01-01 PROCEDURE — G0151 HHCP-SERV OF PT,EA 15 MIN: HCPCS

## 2020-01-01 PROCEDURE — 97161 PT EVAL LOW COMPLEX 20 MIN: CPT | Performed by: PHYSICAL THERAPIST

## 2020-01-01 PROCEDURE — 99285 EMERGENCY DEPT VISIT HI MDM: CPT

## 2020-01-01 PROCEDURE — 88112 CYTOPATH CELL ENHANCE TECH: CPT

## 2020-01-01 PROCEDURE — 84145 PROCALCITONIN (PCT): CPT

## 2020-01-01 PROCEDURE — 77030032008 NM LUNG PERFUSION W VENT

## 2020-01-01 PROCEDURE — 83605 ASSAY OF LACTIC ACID: CPT

## 2020-01-01 PROCEDURE — 92526 ORAL FUNCTION THERAPY: CPT

## 2020-01-01 PROCEDURE — 3331090003 HH PPS REVENUE ADJ

## 2020-01-01 PROCEDURE — 51798 US URINE CAPACITY MEASURE: CPT

## 2020-01-01 PROCEDURE — 0BBL3ZX EXCISION OF LEFT LUNG, PERCUTANEOUS APPROACH, DIAGNOSTIC: ICD-10-PCS | Performed by: RADIOLOGY

## 2020-01-01 PROCEDURE — 74011000302 HC RX REV CODE- 302: Performed by: HOSPITALIST

## 2020-01-01 PROCEDURE — 94761 N-INVAS EAR/PLS OXIMETRY MLT: CPT

## 2020-01-01 PROCEDURE — 88185 FLOWCYTOMETRY/TC ADD-ON: CPT

## 2020-01-01 PROCEDURE — 77030040832 HC IRR TRAY MDII -A

## 2020-01-01 PROCEDURE — 97116 GAIT TRAINING THERAPY: CPT | Performed by: PHYSICAL THERAPIST

## 2020-01-01 PROCEDURE — 86580 TB INTRADERMAL TEST: CPT | Performed by: HOSPITALIST

## 2020-01-01 PROCEDURE — 77030040361 HC SLV COMPR DVT MDII -B

## 2020-01-01 PROCEDURE — 96375 TX/PRO/DX INJ NEW DRUG ADDON: CPT

## 2020-01-01 PROCEDURE — 74011000258 HC RX REV CODE- 258: Performed by: FAMILY MEDICINE

## 2020-01-01 PROCEDURE — 70498 CT ANGIOGRAPHY NECK: CPT

## 2020-01-01 PROCEDURE — 19081 BX BREAST 1ST LESION STRTCTC: CPT

## 2020-01-01 PROCEDURE — 82565 ASSAY OF CREATININE: CPT

## 2020-01-01 PROCEDURE — 83880 ASSAY OF NATRIURETIC PEPTIDE: CPT

## 2020-01-01 PROCEDURE — 84157 ASSAY OF PROTEIN OTHER: CPT

## 2020-01-01 PROCEDURE — 93005 ELECTROCARDIOGRAM TRACING: CPT | Performed by: EMERGENCY MEDICINE

## 2020-01-01 PROCEDURE — 88184 FLOWCYTOMETRY/ TC 1 MARKER: CPT

## 2020-01-01 PROCEDURE — 89050 BODY FLUID CELL COUNT: CPT

## 2020-01-01 PROCEDURE — 76604 US EXAM CHEST: CPT | Performed by: INTERNAL MEDICINE

## 2020-01-01 PROCEDURE — 99232 SBSQ HOSP IP/OBS MODERATE 35: CPT | Performed by: PSYCHIATRY & NEUROLOGY

## 2020-01-01 PROCEDURE — 84132 ASSAY OF SERUM POTASSIUM: CPT

## 2020-01-01 PROCEDURE — A9575 INJ GADOTERATE MEGLUMI 0.1ML: HCPCS

## 2020-01-01 PROCEDURE — 70450 CT HEAD/BRAIN W/O DYE: CPT

## 2020-01-01 PROCEDURE — 96365 THER/PROPH/DIAG IV INF INIT: CPT

## 2020-01-01 PROCEDURE — 82378 CARCINOEMBRYONIC ANTIGEN: CPT

## 2020-01-01 PROCEDURE — 76040000025: Performed by: INTERNAL MEDICINE

## 2020-01-01 PROCEDURE — 83036 HEMOGLOBIN GLYCOSYLATED A1C: CPT

## 2020-01-01 PROCEDURE — 74011000636 HC RX REV CODE- 636: Performed by: NURSE PRACTITIONER

## 2020-01-01 PROCEDURE — 87102 FUNGUS ISOLATION CULTURE: CPT

## 2020-01-01 PROCEDURE — 76450000000

## 2020-01-01 PROCEDURE — 93306 TTE W/DOPPLER COMPLETE: CPT

## 2020-01-01 PROCEDURE — BB4BZZZ ULTRASONOGRAPHY OF PLEURA: ICD-10-PCS | Performed by: INTERNAL MEDICINE

## 2020-01-01 PROCEDURE — 77030019905 HC CATH URETH INTMIT MDII -A

## 2020-01-01 PROCEDURE — 0042T CT PERF W CBF: CPT

## 2020-01-01 PROCEDURE — 74011000636 HC RX REV CODE- 636

## 2020-01-01 PROCEDURE — 99152 MOD SED SAME PHYS/QHP 5/>YRS: CPT

## 2020-01-01 PROCEDURE — 74177 CT ABD & PELVIS W/CONTRAST: CPT

## 2020-01-01 PROCEDURE — 87116 MYCOBACTERIA CULTURE: CPT

## 2020-01-01 PROCEDURE — 77030018836 HC SOL IRR NACL ICUM -A

## 2020-01-01 PROCEDURE — 99223 1ST HOSP IP/OBS HIGH 75: CPT | Performed by: NURSE PRACTITIONER

## 2020-01-01 PROCEDURE — 74011000258 HC RX REV CODE- 258: Performed by: EMERGENCY MEDICINE

## 2020-01-01 PROCEDURE — 77030038269 HC DRN EXT URIN PURWCK BARD -A

## 2020-01-01 PROCEDURE — 77030040830 HC CATH URETH FOL MDII -A

## 2020-01-01 PROCEDURE — 74011000636 HC RX REV CODE- 636: Performed by: FAMILY MEDICINE

## 2020-01-01 PROCEDURE — 74011250636 HC RX REV CODE- 250/636: Performed by: RADIOLOGY

## 2020-01-01 PROCEDURE — 77030036720 CT BX LUNG NDL PERC

## 2020-01-01 PROCEDURE — 99233 SBSQ HOSP IP/OBS HIGH 50: CPT | Performed by: PSYCHIATRY & NEUROLOGY

## 2020-01-01 PROCEDURE — 3331090004 HSPC SERVICE INTENSITY ADD-ON

## 2020-01-01 PROCEDURE — 0107U C DIFF TOX AG DETCJ IA STOOL: CPT

## 2020-01-01 RX ORDER — GLYCOPYRROLATE 0.2 MG/ML
0.2 INJECTION INTRAMUSCULAR; INTRAVENOUS
Status: DISCONTINUED | OUTPATIENT
Start: 2020-01-01 | End: 2020-01-01 | Stop reason: HOSPADM

## 2020-01-01 RX ORDER — DEXAMETHASONE SODIUM PHOSPHATE 100 MG/10ML
10 INJECTION INTRAMUSCULAR; INTRAVENOUS
Status: COMPLETED | OUTPATIENT
Start: 2020-01-01 | End: 2020-01-01

## 2020-01-01 RX ORDER — MONTELUKAST SODIUM 10 MG/1
10 TABLET ORAL DAILY
Status: DISCONTINUED | OUTPATIENT
Start: 2020-01-01 | End: 2020-01-01

## 2020-01-01 RX ORDER — LIDOCAINE HYDROCHLORIDE AND EPINEPHRINE 10; 10 MG/ML; UG/ML
4.5 INJECTION, SOLUTION INFILTRATION; PERINEURAL
Status: COMPLETED | OUTPATIENT
Start: 2020-01-01 | End: 2020-01-01

## 2020-01-01 RX ORDER — MIDODRINE HYDROCHLORIDE 5 MG/1
2.5 TABLET ORAL
Status: DISCONTINUED | OUTPATIENT
Start: 2020-01-01 | End: 2020-01-01

## 2020-01-01 RX ORDER — LISINOPRIL 2.5 MG/1
2.5 TABLET ORAL DAILY
Qty: 30 TAB | Refills: 0 | Status: SHIPPED | OUTPATIENT
Start: 2020-01-01 | End: 2020-01-01

## 2020-01-01 RX ORDER — POTASSIUM CHLORIDE 14.9 MG/ML
20 INJECTION INTRAVENOUS
Status: COMPLETED | OUTPATIENT
Start: 2020-01-01 | End: 2020-01-01

## 2020-01-01 RX ORDER — ATROPINE SULFATE 10 MG/ML
3 SOLUTION/ DROPS OPHTHALMIC
Status: DISCONTINUED | OUTPATIENT
Start: 2020-01-01 | End: 2020-01-01 | Stop reason: HOSPADM

## 2020-01-01 RX ORDER — LEVOTHYROXINE SODIUM 75 UG/1
75 TABLET ORAL
Status: DISCONTINUED | OUTPATIENT
Start: 2020-01-01 | End: 2020-01-01

## 2020-01-01 RX ORDER — FENTANYL 25 UG/1
1 PATCH TRANSDERMAL
Status: DISCONTINUED | OUTPATIENT
Start: 2020-01-01 | End: 2020-01-01 | Stop reason: HOSPADM

## 2020-01-01 RX ORDER — SODIUM CHLORIDE 0.9 % (FLUSH) 0.9 %
5-40 SYRINGE (ML) INJECTION AS NEEDED
Status: DISCONTINUED | OUTPATIENT
Start: 2020-01-01 | End: 2020-01-01 | Stop reason: HOSPADM

## 2020-01-01 RX ORDER — FLUTICASONE PROPIONATE 50 MCG
2 SPRAY, SUSPENSION (ML) NASAL DAILY
Status: DISCONTINUED | OUTPATIENT
Start: 2020-01-01 | End: 2020-01-01 | Stop reason: HOSPADM

## 2020-01-01 RX ORDER — CYCLOBENZAPRINE HCL 10 MG
5 TABLET ORAL
Status: DISCONTINUED | OUTPATIENT
Start: 2020-01-01 | End: 2020-01-01 | Stop reason: HOSPADM

## 2020-01-01 RX ORDER — FUROSEMIDE 20 MG/1
10 TABLET ORAL DAILY
Status: DISCONTINUED | OUTPATIENT
Start: 2020-01-01 | End: 2020-01-01

## 2020-01-01 RX ORDER — CARVEDILOL 6.25 MG/1
3.12 TABLET ORAL 2 TIMES DAILY WITH MEALS
Status: DISCONTINUED | OUTPATIENT
Start: 2020-01-01 | End: 2020-01-01

## 2020-01-01 RX ORDER — CARVEDILOL 3.12 MG/1
3.12 TABLET ORAL 2 TIMES DAILY WITH MEALS
Status: DISCONTINUED | OUTPATIENT
Start: 2020-01-01 | End: 2020-01-01

## 2020-01-01 RX ORDER — FUROSEMIDE 40 MG/1
20 TABLET ORAL DAILY
Status: DISCONTINUED | OUTPATIENT
Start: 2020-01-01 | End: 2020-01-01

## 2020-01-01 RX ORDER — FUROSEMIDE 10 MG/ML
20 INJECTION INTRAMUSCULAR; INTRAVENOUS ONCE
Status: COMPLETED | OUTPATIENT
Start: 2020-01-01 | End: 2020-01-01

## 2020-01-01 RX ORDER — LEVALBUTEROL INHALATION SOLUTION 1.25 MG/3ML
1.25 SOLUTION RESPIRATORY (INHALATION)
Status: DISCONTINUED | OUTPATIENT
Start: 2020-01-01 | End: 2020-01-01 | Stop reason: CLARIF

## 2020-01-01 RX ORDER — SODIUM CHLORIDE 0.9 % (FLUSH) 0.9 %
10 SYRINGE (ML) INJECTION
Status: COMPLETED | OUTPATIENT
Start: 2020-01-01 | End: 2020-01-01

## 2020-01-01 RX ORDER — MORPHINE SULFATE 2 MG/ML
2 INJECTION, SOLUTION INTRAMUSCULAR; INTRAVENOUS EVERY 6 HOURS
Status: DISCONTINUED | OUTPATIENT
Start: 2020-01-01 | End: 2020-01-01

## 2020-01-01 RX ORDER — DEXAMETHASONE SODIUM PHOSPHATE 100 MG/10ML
6 INJECTION INTRAMUSCULAR; INTRAVENOUS EVERY 6 HOURS
Status: DISCONTINUED | OUTPATIENT
Start: 2020-01-01 | End: 2020-01-01

## 2020-01-01 RX ORDER — POLYETHYLENE GLYCOL 3350 17 G/17G
17 POWDER, FOR SOLUTION ORAL DAILY PRN
Status: DISCONTINUED | OUTPATIENT
Start: 2020-01-01 | End: 2020-01-01 | Stop reason: HOSPADM

## 2020-01-01 RX ORDER — POLYETHYLENE GLYCOL 3350 17 G/17G
17 POWDER, FOR SOLUTION ORAL DAILY
Status: DISCONTINUED | OUTPATIENT
Start: 2020-01-01 | End: 2020-01-01

## 2020-01-01 RX ORDER — DEXAMETHASONE SODIUM PHOSPHATE 4 MG/ML
4 INJECTION, SOLUTION INTRA-ARTICULAR; INTRALESIONAL; INTRAMUSCULAR; INTRAVENOUS; SOFT TISSUE EVERY 6 HOURS
Status: DISCONTINUED | OUTPATIENT
Start: 2020-01-01 | End: 2020-01-01

## 2020-01-01 RX ORDER — POLYETHYLENE GLYCOL 3350 17 G/17G
17 POWDER, FOR SOLUTION ORAL DAILY PRN
Status: DISCONTINUED | OUTPATIENT
Start: 2020-01-01 | End: 2020-01-01

## 2020-01-01 RX ORDER — LISINOPRIL 5 MG/1
5 TABLET ORAL DAILY
Status: DISCONTINUED | OUTPATIENT
Start: 2020-01-01 | End: 2020-01-01

## 2020-01-01 RX ORDER — FENTANYL CITRATE 50 UG/ML
25-50 INJECTION, SOLUTION INTRAMUSCULAR; INTRAVENOUS
Status: DISCONTINUED | OUTPATIENT
Start: 2020-01-01 | End: 2020-01-01

## 2020-01-01 RX ORDER — SODIUM CHLORIDE 0.9 % (FLUSH) 0.9 %
5-40 SYRINGE (ML) INJECTION EVERY 8 HOURS
Status: DISCONTINUED | OUTPATIENT
Start: 2020-01-01 | End: 2020-01-01

## 2020-01-01 RX ORDER — FUROSEMIDE 10 MG/ML
40 INJECTION INTRAMUSCULAR; INTRAVENOUS 2 TIMES DAILY
Status: DISCONTINUED | OUTPATIENT
Start: 2020-01-01 | End: 2020-01-01

## 2020-01-01 RX ORDER — DEXAMETHASONE 4 MG/1
4 TABLET ORAL EVERY 8 HOURS
Status: DISCONTINUED | OUTPATIENT
Start: 2020-01-01 | End: 2020-01-01

## 2020-01-01 RX ORDER — FLUTICASONE PROPIONATE 50 MCG
2 SPRAY, SUSPENSION (ML) NASAL DAILY
Status: DISCONTINUED | OUTPATIENT
Start: 2020-01-01 | End: 2020-01-01

## 2020-01-01 RX ORDER — MORPHINE SULFATE 4 MG/ML
4 INJECTION INTRAVENOUS
Status: DISCONTINUED | OUTPATIENT
Start: 2020-01-01 | End: 2020-01-01 | Stop reason: HOSPADM

## 2020-01-01 RX ORDER — MIDAZOLAM HYDROCHLORIDE 1 MG/ML
.5-2 INJECTION, SOLUTION INTRAMUSCULAR; INTRAVENOUS
Status: DISCONTINUED | OUTPATIENT
Start: 2020-01-01 | End: 2020-01-01

## 2020-01-01 RX ORDER — PHENOBARBITAL SODIUM 65 MG/ML
65 INJECTION INTRAMUSCULAR EVERY 12 HOURS
Status: DISCONTINUED | OUTPATIENT
Start: 2020-01-01 | End: 2020-01-01 | Stop reason: HOSPADM

## 2020-01-01 RX ORDER — LORAZEPAM 2 MG/ML
1 INJECTION INTRAMUSCULAR
Status: DISCONTINUED | OUTPATIENT
Start: 2020-01-01 | End: 2020-01-01 | Stop reason: HOSPADM

## 2020-01-01 RX ORDER — ALBUTEROL SULFATE 90 UG/1
2 AEROSOL, METERED RESPIRATORY (INHALATION)
Status: DISCONTINUED | OUTPATIENT
Start: 2020-01-01 | End: 2020-01-01

## 2020-01-01 RX ORDER — ENOXAPARIN SODIUM 100 MG/ML
40 INJECTION SUBCUTANEOUS DAILY
Status: DISCONTINUED | OUTPATIENT
Start: 2020-01-01 | End: 2020-01-01 | Stop reason: HOSPADM

## 2020-01-01 RX ORDER — FLUDROCORTISONE ACETATE 0.1 MG/1
0.1 TABLET ORAL DAILY
COMMUNITY
End: 2020-01-01

## 2020-01-01 RX ORDER — ALBUTEROL SULFATE 0.83 MG/ML
2.5 SOLUTION RESPIRATORY (INHALATION)
Status: DISCONTINUED | OUTPATIENT
Start: 2020-01-01 | End: 2020-01-01

## 2020-01-01 RX ORDER — ATORVASTATIN CALCIUM 10 MG/1
10 TABLET, FILM COATED ORAL DAILY
Status: DISCONTINUED | OUTPATIENT
Start: 2020-01-01 | End: 2020-01-01 | Stop reason: HOSPADM

## 2020-01-01 RX ORDER — MORPHINE SULFATE 2 MG/ML
1 INJECTION, SOLUTION INTRAMUSCULAR; INTRAVENOUS
Status: DISCONTINUED | OUTPATIENT
Start: 2020-01-01 | End: 2020-01-01 | Stop reason: HOSPADM

## 2020-01-01 RX ORDER — SODIUM CHLORIDE, SODIUM LACTATE, POTASSIUM CHLORIDE, CALCIUM CHLORIDE 600; 310; 30; 20 MG/100ML; MG/100ML; MG/100ML; MG/100ML
50 INJECTION, SOLUTION INTRAVENOUS CONTINUOUS
Status: DISCONTINUED | OUTPATIENT
Start: 2020-01-01 | End: 2020-01-01

## 2020-01-01 RX ORDER — ONDANSETRON 2 MG/ML
4 INJECTION INTRAMUSCULAR; INTRAVENOUS
Status: DISCONTINUED | OUTPATIENT
Start: 2020-01-01 | End: 2020-01-01 | Stop reason: HOSPADM

## 2020-01-01 RX ORDER — IPRATROPIUM BROMIDE AND ALBUTEROL SULFATE 2.5; .5 MG/3ML; MG/3ML
3 SOLUTION RESPIRATORY (INHALATION)
Status: COMPLETED | OUTPATIENT
Start: 2020-01-01 | End: 2020-01-01

## 2020-01-01 RX ORDER — LEVETIRACETAM 500 MG/1
500 TABLET ORAL EVERY 12 HOURS
Status: DISCONTINUED | OUTPATIENT
Start: 2020-01-01 | End: 2020-01-01 | Stop reason: SDUPTHER

## 2020-01-01 RX ORDER — IBUPROFEN 400 MG/1
400 TABLET ORAL
Status: DISPENSED | OUTPATIENT
Start: 2020-01-01 | End: 2020-01-01

## 2020-01-01 RX ORDER — FUROSEMIDE 40 MG/1
20 TABLET ORAL
Status: DISCONTINUED | OUTPATIENT
Start: 2020-01-01 | End: 2020-01-01

## 2020-01-01 RX ORDER — MORPHINE SULFATE 2 MG/ML
1 INJECTION, SOLUTION INTRAMUSCULAR; INTRAVENOUS
Status: DISCONTINUED | OUTPATIENT
Start: 2020-01-01 | End: 2020-01-01

## 2020-01-01 RX ORDER — CARVEDILOL 6.25 MG/1
6.25 TABLET ORAL 2 TIMES DAILY WITH MEALS
Status: DISCONTINUED | OUTPATIENT
Start: 2020-01-01 | End: 2020-01-01

## 2020-01-01 RX ORDER — CALCIUM CARBONATE 200(500)MG
200 TABLET,CHEWABLE ORAL
Status: DISCONTINUED | OUTPATIENT
Start: 2020-01-01 | End: 2020-01-01 | Stop reason: HOSPADM

## 2020-01-01 RX ORDER — LIDOCAINE HYDROCHLORIDE 10 MG/ML
5 INJECTION INFILTRATION; PERINEURAL
Status: COMPLETED | OUTPATIENT
Start: 2020-01-01 | End: 2020-01-01

## 2020-01-01 RX ORDER — FUROSEMIDE 20 MG/1
20 TABLET ORAL DAILY
Status: DISCONTINUED | OUTPATIENT
Start: 2020-01-01 | End: 2020-01-01

## 2020-01-01 RX ORDER — SODIUM CHLORIDE 0.9 % (FLUSH) 0.9 %
3 SYRINGE (ML) INJECTION EVERY 12 HOURS
Status: DISCONTINUED | OUTPATIENT
Start: 2020-01-01 | End: 2020-01-01 | Stop reason: HOSPADM

## 2020-01-01 RX ORDER — HALOPERIDOL 5 MG/ML
4 INJECTION INTRAMUSCULAR
Status: DISCONTINUED | OUTPATIENT
Start: 2020-01-01 | End: 2020-01-01 | Stop reason: HOSPADM

## 2020-01-01 RX ORDER — LISINOPRIL 5 MG/1
2.5 TABLET ORAL DAILY
Status: DISCONTINUED | OUTPATIENT
Start: 2020-01-01 | End: 2020-01-01 | Stop reason: HOSPADM

## 2020-01-01 RX ORDER — FENTANYL 12.5 UG/1
1 PATCH TRANSDERMAL
Status: DISCONTINUED | OUTPATIENT
Start: 2020-01-01 | End: 2020-01-01

## 2020-01-01 RX ORDER — BUDESONIDE 0.5 MG/2ML
500 INHALANT ORAL
Status: DISCONTINUED | OUTPATIENT
Start: 2020-01-01 | End: 2020-01-01

## 2020-01-01 RX ORDER — MORPHINE SULFATE 2 MG/ML
2 INJECTION, SOLUTION INTRAMUSCULAR; INTRAVENOUS
Status: DISCONTINUED | OUTPATIENT
Start: 2020-01-01 | End: 2020-01-01

## 2020-01-01 RX ORDER — FUROSEMIDE 10 MG/ML
40 INJECTION INTRAMUSCULAR; INTRAVENOUS ONCE
Status: COMPLETED | OUTPATIENT
Start: 2020-01-01 | End: 2020-01-01

## 2020-01-01 RX ORDER — METOPROLOL TARTRATE 5 MG/5ML
5 INJECTION INTRAVENOUS
Status: DISCONTINUED | OUTPATIENT
Start: 2020-01-01 | End: 2020-01-01

## 2020-01-01 RX ORDER — FACIAL-BODY WIPES
10 EACH TOPICAL AS NEEDED
Status: DISCONTINUED | OUTPATIENT
Start: 2020-01-01 | End: 2020-01-01 | Stop reason: HOSPADM

## 2020-01-01 RX ORDER — ALBUTEROL SULFATE 0.83 MG/ML
2.5 SOLUTION RESPIRATORY (INHALATION)
Status: DISCONTINUED | OUTPATIENT
Start: 2020-01-01 | End: 2020-01-01 | Stop reason: HOSPADM

## 2020-01-01 RX ORDER — METOPROLOL SUCCINATE 25 MG/1
25 TABLET, EXTENDED RELEASE ORAL DAILY
Status: DISCONTINUED | OUTPATIENT
Start: 2020-01-01 | End: 2020-01-01 | Stop reason: HOSPADM

## 2020-01-01 RX ORDER — ROSUVASTATIN CALCIUM 20 MG/1
40 TABLET, COATED ORAL
Status: DISCONTINUED | OUTPATIENT
Start: 2020-01-01 | End: 2020-01-01

## 2020-01-01 RX ORDER — MIDODRINE HYDROCHLORIDE 5 MG/1
5 TABLET ORAL ONCE
Status: COMPLETED | OUTPATIENT
Start: 2020-01-01 | End: 2020-01-01

## 2020-01-01 RX ORDER — LISINOPRIL 5 MG/1
2.5 TABLET ORAL DAILY
Status: DISCONTINUED | OUTPATIENT
Start: 2020-01-01 | End: 2020-01-01

## 2020-01-01 RX ORDER — ONDANSETRON 4 MG/1
4 TABLET, ORALLY DISINTEGRATING ORAL
Status: DISCONTINUED | OUTPATIENT
Start: 2020-01-01 | End: 2020-01-01 | Stop reason: HOSPADM

## 2020-01-01 RX ORDER — LEVOTHYROXINE SODIUM 75 UG/1
75 TABLET ORAL
Status: DISCONTINUED | OUTPATIENT
Start: 2020-01-01 | End: 2020-01-01 | Stop reason: HOSPADM

## 2020-01-01 RX ORDER — LORAZEPAM 2 MG/ML
2 INJECTION INTRAMUSCULAR
Status: DISCONTINUED | OUTPATIENT
Start: 2020-01-01 | End: 2020-01-01

## 2020-01-01 RX ORDER — MIDODRINE HYDROCHLORIDE 2.5 MG/1
2.5 TABLET ORAL
COMMUNITY
End: 2020-01-01

## 2020-01-01 RX ORDER — LORAZEPAM 2 MG/ML
2 INJECTION INTRAMUSCULAR
Status: DISCONTINUED | OUTPATIENT
Start: 2020-01-01 | End: 2020-01-01 | Stop reason: HOSPADM

## 2020-01-01 RX ORDER — ACETAMINOPHEN 325 MG/1
650 TABLET ORAL
Status: DISCONTINUED | OUTPATIENT
Start: 2020-01-01 | End: 2020-01-01 | Stop reason: HOSPADM

## 2020-01-01 RX ORDER — SODIUM CHLORIDE 0.9 % (FLUSH) 0.9 %
5-40 SYRINGE (ML) INJECTION EVERY 8 HOURS
Status: DISCONTINUED | OUTPATIENT
Start: 2020-01-01 | End: 2020-01-01 | Stop reason: HOSPADM

## 2020-01-01 RX ORDER — SODIUM CHLORIDE 0.9 % (FLUSH) 0.9 %
5-10 SYRINGE (ML) INJECTION
Status: COMPLETED | OUTPATIENT
Start: 2020-01-01 | End: 2020-01-01

## 2020-01-01 RX ORDER — MONTELUKAST SODIUM 10 MG/1
10 TABLET ORAL DAILY
Status: DISCONTINUED | OUTPATIENT
Start: 2020-01-01 | End: 2020-01-01 | Stop reason: HOSPADM

## 2020-01-01 RX ORDER — FUROSEMIDE 10 MG/ML
40 INJECTION INTRAMUSCULAR; INTRAVENOUS EVERY 12 HOURS
Status: DISCONTINUED | OUTPATIENT
Start: 2020-01-01 | End: 2020-01-01

## 2020-01-01 RX ORDER — TRAMADOL HYDROCHLORIDE 50 MG/1
50 TABLET ORAL
Status: DISCONTINUED | OUTPATIENT
Start: 2020-01-01 | End: 2020-01-01

## 2020-01-01 RX ORDER — ACETAMINOPHEN 650 MG/1
650 SUPPOSITORY RECTAL
Status: DISCONTINUED | OUTPATIENT
Start: 2020-01-01 | End: 2020-01-01 | Stop reason: HOSPADM

## 2020-01-01 RX ORDER — SODIUM CHLORIDE 0.9 % (FLUSH) 0.9 %
3 SYRINGE (ML) INJECTION AS NEEDED
Status: DISCONTINUED | OUTPATIENT
Start: 2020-01-01 | End: 2020-01-01 | Stop reason: HOSPADM

## 2020-01-01 RX ORDER — METOPROLOL SUCCINATE 25 MG/1
25 TABLET, EXTENDED RELEASE ORAL DAILY
Qty: 30 TAB | Refills: 0 | Status: SHIPPED | OUTPATIENT
Start: 2020-01-01 | End: 2020-01-01

## 2020-01-01 RX ORDER — PANTOPRAZOLE SODIUM 40 MG/1
40 TABLET, DELAYED RELEASE ORAL
Status: DISCONTINUED | OUTPATIENT
Start: 2020-01-01 | End: 2020-01-01 | Stop reason: HOSPADM

## 2020-01-01 RX ORDER — POTASSIUM CHLORIDE 20 MEQ/1
40 TABLET, EXTENDED RELEASE ORAL EVERY 4 HOURS
Status: COMPLETED | OUTPATIENT
Start: 2020-01-01 | End: 2020-01-01

## 2020-01-01 RX ORDER — FUROSEMIDE 10 MG/ML
20 INJECTION INTRAMUSCULAR; INTRAVENOUS DAILY
Status: DISCONTINUED | OUTPATIENT
Start: 2020-01-01 | End: 2020-01-01

## 2020-01-01 RX ORDER — SCOLOPAMINE TRANSDERMAL SYSTEM 1 MG/1
1 PATCH, EXTENDED RELEASE TRANSDERMAL
Status: DISCONTINUED | OUTPATIENT
Start: 2020-01-01 | End: 2020-01-01 | Stop reason: HOSPADM

## 2020-01-01 RX ORDER — INSULIN LISPRO 100 [IU]/ML
INJECTION, SOLUTION INTRAVENOUS; SUBCUTANEOUS EVERY 6 HOURS
Status: DISCONTINUED | OUTPATIENT
Start: 2020-01-01 | End: 2020-01-01

## 2020-01-01 RX ORDER — FUROSEMIDE 40 MG/1
40 TABLET ORAL
Status: DISCONTINUED | OUTPATIENT
Start: 2020-01-01 | End: 2020-01-01

## 2020-01-01 RX ORDER — LEVALBUTEROL INHALATION SOLUTION 1.25 MG/3ML
1.25 SOLUTION RESPIRATORY (INHALATION)
Status: DISCONTINUED | OUTPATIENT
Start: 2020-01-01 | End: 2020-01-01

## 2020-01-01 RX ORDER — SODIUM CHLORIDE 9 MG/ML
150 INJECTION, SOLUTION INTRAVENOUS CONTINUOUS
Status: DISCONTINUED | OUTPATIENT
Start: 2020-01-01 | End: 2020-01-01

## 2020-01-01 RX ORDER — ACETAMINOPHEN 650 MG/1
650 SUPPOSITORY RECTAL
Status: DISCONTINUED | OUTPATIENT
Start: 2020-01-01 | End: 2020-01-01

## 2020-01-01 RX ORDER — ENOXAPARIN SODIUM 100 MG/ML
40 INJECTION SUBCUTANEOUS EVERY 24 HOURS
Status: DISCONTINUED | OUTPATIENT
Start: 2020-01-01 | End: 2020-01-01

## 2020-01-01 RX ORDER — FLUDROCORTISONE ACETATE 0.1 MG/1
0.1 TABLET ORAL DAILY
Status: DISCONTINUED | OUTPATIENT
Start: 2020-01-01 | End: 2020-01-01

## 2020-01-01 RX ORDER — IPRATROPIUM BROMIDE AND ALBUTEROL SULFATE 2.5; .5 MG/3ML; MG/3ML
3 SOLUTION RESPIRATORY (INHALATION)
Status: DISCONTINUED | OUTPATIENT
Start: 2020-01-01 | End: 2020-01-01

## 2020-01-01 RX ORDER — DEXTROSE MONOHYDRATE, SODIUM CHLORIDE, SODIUM LACTATE, POTASSIUM CHLORIDE, CALCIUM CHLORIDE 5; 600; 310; 179; 20 G/100ML; MG/100ML; MG/100ML; MG/100ML; MG/100ML
INJECTION, SOLUTION INTRAVENOUS CONTINUOUS
Status: DISCONTINUED | OUTPATIENT
Start: 2020-01-01 | End: 2020-01-01

## 2020-01-01 RX ORDER — POTASSIUM CHLORIDE 20 MEQ/1
40 TABLET, EXTENDED RELEASE ORAL
Status: COMPLETED | OUTPATIENT
Start: 2020-01-01 | End: 2020-01-01

## 2020-01-01 RX ORDER — FUROSEMIDE 40 MG/1
40 TABLET ORAL DAILY
Qty: 30 TAB | Refills: 0 | Status: SHIPPED | OUTPATIENT
Start: 2020-01-01 | End: 2020-01-01

## 2020-01-01 RX ORDER — LORAZEPAM 0.5 MG/1
0.5 TABLET ORAL
Status: DISCONTINUED | OUTPATIENT
Start: 2020-01-01 | End: 2020-01-01

## 2020-01-01 RX ORDER — GADOTERATE MEGLUMINE 376.9 MG/ML
10 INJECTION INTRAVENOUS
Status: COMPLETED | OUTPATIENT
Start: 2020-01-01 | End: 2020-01-01

## 2020-01-01 RX ORDER — SODIUM CHLORIDE 0.9 % (FLUSH) 0.9 %
5-40 SYRINGE (ML) INJECTION AS NEEDED
Status: DISCONTINUED | OUTPATIENT
Start: 2020-01-01 | End: 2020-01-01

## 2020-01-01 RX ORDER — DEXAMETHASONE 4 MG/1
4 TABLET ORAL EVERY 6 HOURS
Qty: 20 TAB | Refills: 0 | Status: SHIPPED | OUTPATIENT
Start: 2020-01-01

## 2020-01-01 RX ORDER — ASPIRIN 325 MG
325 TABLET ORAL ONCE
Status: COMPLETED | OUTPATIENT
Start: 2020-01-01 | End: 2020-01-01

## 2020-01-01 RX ORDER — MIDODRINE HYDROCHLORIDE 5 MG/1
5 TABLET ORAL
Status: DISCONTINUED | OUTPATIENT
Start: 2020-01-01 | End: 2020-01-01

## 2020-01-01 RX ORDER — MIDODRINE HYDROCHLORIDE 5 MG/1
10 TABLET ORAL
Status: DISCONTINUED | OUTPATIENT
Start: 2020-01-01 | End: 2020-01-01

## 2020-01-01 RX ORDER — LIDOCAINE HYDROCHLORIDE 20 MG/ML
2-10 INJECTION, SOLUTION INFILTRATION; PERINEURAL ONCE
Status: COMPLETED | OUTPATIENT
Start: 2020-01-01 | End: 2020-01-01

## 2020-01-01 RX ORDER — SODIUM CHLORIDE 0.9 % (FLUSH) 0.9 %
10 SYRINGE (ML) INJECTION AS NEEDED
Status: DISCONTINUED | OUTPATIENT
Start: 2020-01-01 | End: 2020-01-01 | Stop reason: HOSPADM

## 2020-01-01 RX ORDER — BUDESONIDE AND FORMOTEROL FUMARATE DIHYDRATE 160; 4.5 UG/1; UG/1
2 AEROSOL RESPIRATORY (INHALATION)
Status: DISCONTINUED | OUTPATIENT
Start: 2020-01-01 | End: 2020-01-01

## 2020-01-01 RX ORDER — FUROSEMIDE 40 MG/1
40 TABLET ORAL DAILY
Status: DISCONTINUED | OUTPATIENT
Start: 2020-01-01 | End: 2020-01-01 | Stop reason: HOSPADM

## 2020-01-01 RX ORDER — BUDESONIDE AND FORMOTEROL FUMARATE DIHYDRATE 160; 4.5 UG/1; UG/1
2 AEROSOL RESPIRATORY (INHALATION)
Status: DISCONTINUED | OUTPATIENT
Start: 2020-01-01 | End: 2020-01-01 | Stop reason: HOSPADM

## 2020-01-01 RX ORDER — DEXAMETHASONE SODIUM PHOSPHATE 4 MG/ML
4 INJECTION, SOLUTION INTRA-ARTICULAR; INTRALESIONAL; INTRAMUSCULAR; INTRAVENOUS; SOFT TISSUE EVERY 8 HOURS
Status: DISCONTINUED | OUTPATIENT
Start: 2020-01-01 | End: 2020-01-01

## 2020-01-01 RX ORDER — FUROSEMIDE 10 MG/ML
INJECTION INTRAMUSCULAR; INTRAVENOUS
Status: COMPLETED
Start: 2020-01-01 | End: 2020-01-01

## 2020-01-01 RX ORDER — FUROSEMIDE 10 MG/ML
40 INJECTION INTRAMUSCULAR; INTRAVENOUS DAILY
Status: DISCONTINUED | OUTPATIENT
Start: 2020-01-01 | End: 2020-01-01

## 2020-01-01 RX ORDER — DEXAMETHASONE 4 MG/1
4 TABLET ORAL EVERY 6 HOURS
Status: DISCONTINUED | OUTPATIENT
Start: 2020-01-01 | End: 2020-01-01 | Stop reason: HOSPADM

## 2020-01-01 RX ORDER — GUAIFENESIN 100 MG/5ML
81 LIQUID (ML) ORAL DAILY
Status: DISCONTINUED | OUTPATIENT
Start: 2020-01-01 | End: 2020-01-01

## 2020-01-01 RX ORDER — ONDANSETRON 2 MG/ML
4 INJECTION INTRAMUSCULAR; INTRAVENOUS
Status: DISCONTINUED | OUTPATIENT
Start: 2020-01-01 | End: 2020-01-01

## 2020-01-01 RX ORDER — ASPIRIN 81 MG/1
81 TABLET ORAL DAILY
Status: DISCONTINUED | OUTPATIENT
Start: 2020-01-01 | End: 2020-01-01 | Stop reason: HOSPADM

## 2020-01-01 RX ORDER — MORPHINE SULFATE 2 MG/ML
2 INJECTION, SOLUTION INTRAMUSCULAR; INTRAVENOUS
Status: DISCONTINUED | OUTPATIENT
Start: 2020-01-01 | End: 2020-01-01 | Stop reason: HOSPADM

## 2020-01-01 RX ADMIN — LEVOTHYROXINE SODIUM 75 MCG: 75 TABLET ORAL at 06:09

## 2020-01-01 RX ADMIN — DEXAMETHASONE SODIUM PHOSPHATE 10 MG: 10 INJECTION INTRAMUSCULAR; INTRAVENOUS at 09:02

## 2020-01-01 RX ADMIN — ALBUTEROL SULFATE 2.5 MG: 2.5 SOLUTION RESPIRATORY (INHALATION) at 07:28

## 2020-01-01 RX ADMIN — FLUDROCORTISONE ACETATE 0.1 MG: 0.1 TABLET ORAL at 09:02

## 2020-01-01 RX ADMIN — ATORVASTATIN CALCIUM 10 MG: 10 TABLET, FILM COATED ORAL at 08:50

## 2020-01-01 RX ADMIN — LEVETIRACETAM 500 MG: 100 INJECTION, SOLUTION INTRAVENOUS at 21:09

## 2020-01-01 RX ADMIN — METOPROLOL SUCCINATE 25 MG: 25 TABLET, EXTENDED RELEASE ORAL at 08:35

## 2020-01-01 RX ADMIN — FLUTICASONE PROPIONATE 2 SPRAY: 50 SPRAY, METERED NASAL at 08:50

## 2020-01-01 RX ADMIN — BUDESONIDE AND FORMOTEROL FUMARATE DIHYDRATE 2 PUFF: 160; 4.5 AEROSOL RESPIRATORY (INHALATION) at 08:28

## 2020-01-01 RX ADMIN — ATORVASTATIN CALCIUM 10 MG: 10 TABLET, FILM COATED ORAL at 09:40

## 2020-01-01 RX ADMIN — BUDESONIDE 500 MCG: 0.5 INHALANT RESPIRATORY (INHALATION) at 20:16

## 2020-01-01 RX ADMIN — ATORVASTATIN CALCIUM 10 MG: 10 TABLET, FILM COATED ORAL at 09:13

## 2020-01-01 RX ADMIN — LISINOPRIL 5 MG: 5 TABLET ORAL at 08:50

## 2020-01-01 RX ADMIN — LIDOCAINE HYDROCHLORIDE 100 MG: 20 INJECTION, SOLUTION INFILTRATION; PERINEURAL at 10:40

## 2020-01-01 RX ADMIN — Medication 3 ML: at 08:43

## 2020-01-01 RX ADMIN — DEXAMETHASONE SODIUM PHOSPHATE 6 MG: 10 INJECTION INTRAMUSCULAR; INTRAVENOUS at 18:00

## 2020-01-01 RX ADMIN — FLUTICASONE PROPIONATE 2 SPRAY: 50 SPRAY, METERED NASAL at 08:25

## 2020-01-01 RX ADMIN — SODIUM CHLORIDE 10 ML: 9 INJECTION, SOLUTION INTRAMUSCULAR; INTRAVENOUS; SUBCUTANEOUS at 21:09

## 2020-01-01 RX ADMIN — ENOXAPARIN SODIUM 40 MG: 40 INJECTION SUBCUTANEOUS at 08:52

## 2020-01-01 RX ADMIN — MONTELUKAST SODIUM 10 MG: 10 TABLET, FILM COATED ORAL at 08:50

## 2020-01-01 RX ADMIN — LEVOTHYROXINE SODIUM 75 MCG: 75 TABLET ORAL at 05:31

## 2020-01-01 RX ADMIN — ASPIRIN 81 MG: 81 TABLET, COATED ORAL at 09:49

## 2020-01-01 RX ADMIN — LEVETIRACETAM 500 MG: 100 SOLUTION ORAL at 18:21

## 2020-01-01 RX ADMIN — MONTELUKAST 10 MG: 10 TABLET, FILM COATED ORAL at 08:53

## 2020-01-01 RX ADMIN — Medication 3 ML: at 08:24

## 2020-01-01 RX ADMIN — LEVOTHYROXINE SODIUM 75 MCG: 75 TABLET ORAL at 05:51

## 2020-01-01 RX ADMIN — ASPIRIN 81 MG: 81 TABLET, COATED ORAL at 09:22

## 2020-01-01 RX ADMIN — PANTOPRAZOLE SODIUM 40 MG: 40 TABLET, DELAYED RELEASE ORAL at 09:06

## 2020-01-01 RX ADMIN — DEXAMETHASONE SODIUM PHOSPHATE 4 MG: 4 INJECTION, SOLUTION INTRAMUSCULAR; INTRAVENOUS at 21:58

## 2020-01-01 RX ADMIN — Medication 3 ML: at 09:01

## 2020-01-01 RX ADMIN — LANSOPRAZOLE 30 MG: KIT at 06:10

## 2020-01-01 RX ADMIN — IBUPROFEN 400 MG: 400 TABLET, FILM COATED ORAL at 08:39

## 2020-01-01 RX ADMIN — ALBUTEROL SULFATE 2.5 MG: 2.5 SOLUTION RESPIRATORY (INHALATION) at 20:40

## 2020-01-01 RX ADMIN — SODIUM CHLORIDE 10 ML: 9 INJECTION, SOLUTION INTRAMUSCULAR; INTRAVENOUS; SUBCUTANEOUS at 15:42

## 2020-01-01 RX ADMIN — MORPHINE SULFATE 4 MG: 4 INJECTION INTRAVENOUS at 20:05

## 2020-01-01 RX ADMIN — MONTELUKAST 10 MG: 10 TABLET, FILM COATED ORAL at 09:05

## 2020-01-01 RX ADMIN — INSULIN LISPRO 2 UNITS: 100 INJECTION, SOLUTION INTRAVENOUS; SUBCUTANEOUS at 00:07

## 2020-01-01 RX ADMIN — DEXAMETHASONE SODIUM PHOSPHATE 4 MG: 4 INJECTION, SOLUTION INTRAMUSCULAR; INTRAVENOUS at 00:00

## 2020-01-01 RX ADMIN — DEXAMETHASONE SODIUM PHOSPHATE 6 MG: 10 INJECTION INTRAMUSCULAR; INTRAVENOUS at 12:00

## 2020-01-01 RX ADMIN — MIDODRINE HYDROCHLORIDE 10 MG: 5 TABLET ORAL at 12:16

## 2020-01-01 RX ADMIN — ALBUTEROL SULFATE 2.5 MG: 2.5 SOLUTION RESPIRATORY (INHALATION) at 13:50

## 2020-01-01 RX ADMIN — FLUTICASONE PROPIONATE 2 SPRAY: 50 SPRAY, METERED NASAL at 08:24

## 2020-01-01 RX ADMIN — MORPHINE SULFATE 2 MG: 2 INJECTION, SOLUTION INTRAMUSCULAR; INTRAVENOUS at 22:00

## 2020-01-01 RX ADMIN — MIDODRINE HYDROCHLORIDE 5 MG: 5 TABLET ORAL at 09:11

## 2020-01-01 RX ADMIN — DEXAMETHASONE SODIUM PHOSPHATE 4 MG: 4 INJECTION, SOLUTION INTRAMUSCULAR; INTRAVENOUS at 05:31

## 2020-01-01 RX ADMIN — MORPHINE SULFATE 4 MG: 4 INJECTION INTRAVENOUS at 10:45

## 2020-01-01 RX ADMIN — CARVEDILOL 6.25 MG: 6.25 TABLET, FILM COATED ORAL at 17:36

## 2020-01-01 RX ADMIN — FLUTICASONE PROPIONATE 2 SPRAY: 50 SPRAY, METERED NASAL at 12:25

## 2020-01-01 RX ADMIN — FLUTICASONE PROPIONATE 2 SPRAY: 50 SPRAY, METERED NASAL at 09:02

## 2020-01-01 RX ADMIN — BUDESONIDE 500 MCG: 0.5 INHALANT RESPIRATORY (INHALATION) at 07:17

## 2020-01-01 RX ADMIN — LISINOPRIL 5 MG: 5 TABLET ORAL at 08:37

## 2020-01-01 RX ADMIN — PIPERACILLIN SODIUM AND TAZOBACTAM SODIUM 3.38 G: 3; .375 INJECTION, POWDER, LYOPHILIZED, FOR SOLUTION INTRAVENOUS at 05:35

## 2020-01-01 RX ADMIN — INSULIN LISPRO 4 UNITS: 100 INJECTION, SOLUTION INTRAVENOUS; SUBCUTANEOUS at 17:44

## 2020-01-01 RX ADMIN — FLUTICASONE PROPIONATE 2 SPRAY: 50 SPRAY, METERED NASAL at 09:23

## 2020-01-01 RX ADMIN — FUROSEMIDE 40 MG: 40 TABLET ORAL at 08:50

## 2020-01-01 RX ADMIN — DEXAMETHASONE SODIUM PHOSPHATE 6 MG: 10 INJECTION INTRAMUSCULAR; INTRAVENOUS at 18:22

## 2020-01-01 RX ADMIN — ASPIRIN 81 MG: 81 TABLET, COATED ORAL at 08:40

## 2020-01-01 RX ADMIN — SODIUM CHLORIDE 10 ML: 9 INJECTION, SOLUTION INTRAMUSCULAR; INTRAVENOUS; SUBCUTANEOUS at 21:24

## 2020-01-01 RX ADMIN — ATORVASTATIN CALCIUM 10 MG: 10 TABLET, FILM COATED ORAL at 08:53

## 2020-01-01 RX ADMIN — ROSUVASTATIN CALCIUM 40 MG: 20 TABLET, COATED ORAL at 21:48

## 2020-01-01 RX ADMIN — PIPERACILLIN SODIUM AND TAZOBACTAM SODIUM 3.38 G: 3; .375 INJECTION, POWDER, LYOPHILIZED, FOR SOLUTION INTRAVENOUS at 05:38

## 2020-01-01 RX ADMIN — PHENOBARBITAL SODIUM 65 MG: 65 INJECTION INTRAMUSCULAR; INTRAVENOUS at 20:29

## 2020-01-01 RX ADMIN — FLUTICASONE PROPIONATE 2 SPRAY: 50 SPRAY, METERED NASAL at 08:38

## 2020-01-01 RX ADMIN — LEVOTHYROXINE SODIUM 75 MCG: 75 TABLET ORAL at 07:30

## 2020-01-01 RX ADMIN — ALBUTEROL SULFATE 2.5 MG: 2.5 SOLUTION RESPIRATORY (INHALATION) at 21:11

## 2020-01-01 RX ADMIN — PHENOBARBITAL SODIUM 65 MG: 65 INJECTION INTRAMUSCULAR; INTRAVENOUS at 21:16

## 2020-01-01 RX ADMIN — Medication 3 ML: at 08:35

## 2020-01-01 RX ADMIN — ONDANSETRON 4 MG: 2 INJECTION INTRAMUSCULAR; INTRAVENOUS at 10:54

## 2020-01-01 RX ADMIN — BUDESONIDE AND FORMOTEROL FUMARATE DIHYDRATE 2 PUFF: 160; 4.5 AEROSOL RESPIRATORY (INHALATION) at 20:03

## 2020-01-01 RX ADMIN — LANSOPRAZOLE 30 MG: KIT at 05:45

## 2020-01-01 RX ADMIN — IPRATROPIUM BROMIDE AND ALBUTEROL SULFATE 3 ML: .5; 3 SOLUTION RESPIRATORY (INHALATION) at 00:11

## 2020-01-01 RX ADMIN — MORPHINE SULFATE 2 MG: 2 INJECTION, SOLUTION INTRAMUSCULAR; INTRAVENOUS at 05:38

## 2020-01-01 RX ADMIN — LEVOTHYROXINE SODIUM 75 MCG: 0.07 TABLET ORAL at 08:11

## 2020-01-01 RX ADMIN — SODIUM CHLORIDE, PRESERVATIVE FREE 3 ML: 5 INJECTION INTRAVENOUS at 13:51

## 2020-01-01 RX ADMIN — ALBUTEROL SULFATE 2.5 MG: 2.5 SOLUTION RESPIRATORY (INHALATION) at 05:35

## 2020-01-01 RX ADMIN — MONTELUKAST 10 MG: 10 TABLET, FILM COATED ORAL at 08:36

## 2020-01-01 RX ADMIN — ATORVASTATIN CALCIUM 10 MG: 10 TABLET, FILM COATED ORAL at 08:51

## 2020-01-01 RX ADMIN — CARVEDILOL 6.25 MG: 6.25 TABLET, FILM COATED ORAL at 18:22

## 2020-01-01 RX ADMIN — DEXAMETHASONE SODIUM PHOSPHATE 6 MG: 10 INJECTION INTRAMUSCULAR; INTRAVENOUS at 00:00

## 2020-01-01 RX ADMIN — FLUTICASONE PROPIONATE 2 SPRAY: 50 SPRAY, METERED NASAL at 08:53

## 2020-01-01 RX ADMIN — PANTOPRAZOLE SODIUM 40 MG: 40 TABLET, DELAYED RELEASE ORAL at 09:08

## 2020-01-01 RX ADMIN — BUDESONIDE 500 MCG: 0.5 INHALANT RESPIRATORY (INHALATION) at 08:28

## 2020-01-01 RX ADMIN — DEXAMETHASONE 4 MG: 4 TABLET ORAL at 10:04

## 2020-01-01 RX ADMIN — FLUTICASONE PROPIONATE 2 SPRAY: 50 SPRAY, METERED NASAL at 09:21

## 2020-01-01 RX ADMIN — Medication 3 ML: at 09:10

## 2020-01-01 RX ADMIN — FUROSEMIDE 40 MG: 10 INJECTION, SOLUTION INTRAMUSCULAR; INTRAVENOUS at 08:17

## 2020-01-01 RX ADMIN — PHENOBARBITAL SODIUM 65 MG: 65 INJECTION INTRAMUSCULAR; INTRAVENOUS at 21:11

## 2020-01-01 RX ADMIN — INSULIN LISPRO 2 UNITS: 100 INJECTION, SOLUTION INTRAVENOUS; SUBCUTANEOUS at 11:17

## 2020-01-01 RX ADMIN — MONTELUKAST 10 MG: 10 TABLET, FILM COATED ORAL at 09:03

## 2020-01-01 RX ADMIN — ALBUTEROL SULFATE 2.5 MG: 2.5 SOLUTION RESPIRATORY (INHALATION) at 08:05

## 2020-01-01 RX ADMIN — MIDODRINE HYDROCHLORIDE 10 MG: 5 TABLET ORAL at 17:18

## 2020-01-01 RX ADMIN — MORPHINE SULFATE 2 MG: 2 INJECTION, SOLUTION INTRAMUSCULAR; INTRAVENOUS at 10:53

## 2020-01-01 RX ADMIN — MIDODRINE HYDROCHLORIDE 10 MG: 5 TABLET ORAL at 08:40

## 2020-01-01 RX ADMIN — SODIUM CHLORIDE 10 ML: 9 INJECTION, SOLUTION INTRAMUSCULAR; INTRAVENOUS; SUBCUTANEOUS at 21:26

## 2020-01-01 RX ADMIN — BUDESONIDE 500 MCG: 0.5 INHALANT RESPIRATORY (INHALATION) at 10:20

## 2020-01-01 RX ADMIN — ALBUTEROL SULFATE 2.5 MG: 2.5 SOLUTION RESPIRATORY (INHALATION) at 12:32

## 2020-01-01 RX ADMIN — ALBUTEROL SULFATE 2.5 MG: 2.5 SOLUTION RESPIRATORY (INHALATION) at 13:33

## 2020-01-01 RX ADMIN — DEXAMETHASONE SODIUM PHOSPHATE 4 MG: 4 INJECTION, SOLUTION INTRAMUSCULAR; INTRAVENOUS at 21:42

## 2020-01-01 RX ADMIN — SODIUM CHLORIDE 100 ML: 900 INJECTION, SOLUTION INTRAVENOUS at 08:45

## 2020-01-01 RX ADMIN — DEXAMETHASONE SODIUM PHOSPHATE 4 MG: 4 INJECTION, SOLUTION INTRAMUSCULAR; INTRAVENOUS at 14:38

## 2020-01-01 RX ADMIN — BUDESONIDE AND FORMOTEROL FUMARATE DIHYDRATE 2 PUFF: 160; 4.5 AEROSOL RESPIRATORY (INHALATION) at 21:35

## 2020-01-01 RX ADMIN — SODIUM CHLORIDE, PRESERVATIVE FREE 3 ML: 5 INJECTION INTRAVENOUS at 05:58

## 2020-01-01 RX ADMIN — PHENOBARBITAL SODIUM 65 MG: 65 INJECTION INTRAMUSCULAR; INTRAVENOUS at 20:02

## 2020-01-01 RX ADMIN — BUDESONIDE AND FORMOTEROL FUMARATE DIHYDRATE 2 PUFF: 160; 4.5 AEROSOL RESPIRATORY (INHALATION) at 05:36

## 2020-01-01 RX ADMIN — CARVEDILOL 3.12 MG: 6.25 TABLET, FILM COATED ORAL at 18:39

## 2020-01-01 RX ADMIN — Medication 5 ML: at 22:05

## 2020-01-01 RX ADMIN — ALBUTEROL SULFATE 2.5 MG: 2.5 SOLUTION RESPIRATORY (INHALATION) at 07:01

## 2020-01-01 RX ADMIN — ACETAMINOPHEN 650 MG: 325 TABLET, FILM COATED ORAL at 12:37

## 2020-01-01 RX ADMIN — LEVOTHYROXINE SODIUM 75 MCG: 75 TABLET ORAL at 05:50

## 2020-01-01 RX ADMIN — SODIUM CHLORIDE 5 ML: 9 INJECTION, SOLUTION INTRAMUSCULAR; INTRAVENOUS; SUBCUTANEOUS at 23:24

## 2020-01-01 RX ADMIN — METOPROLOL SUCCINATE 25 MG: 25 TABLET, EXTENDED RELEASE ORAL at 08:07

## 2020-01-01 RX ADMIN — BUDESONIDE 500 MCG: 0.5 INHALANT RESPIRATORY (INHALATION) at 08:19

## 2020-01-01 RX ADMIN — LEVETIRACETAM 500 MG: 100 SOLUTION ORAL at 17:18

## 2020-01-01 RX ADMIN — BUDESONIDE AND FORMOTEROL FUMARATE DIHYDRATE 2 PUFF: 160; 4.5 AEROSOL RESPIRATORY (INHALATION) at 21:28

## 2020-01-01 RX ADMIN — MORPHINE SULFATE 2 MG: 2 INJECTION, SOLUTION INTRAMUSCULAR; INTRAVENOUS at 04:00

## 2020-01-01 RX ADMIN — SODIUM CHLORIDE, SODIUM LACTATE, POTASSIUM CHLORIDE, AND CALCIUM CHLORIDE 50 ML/HR: 600; 310; 30; 20 INJECTION, SOLUTION INTRAVENOUS at 19:14

## 2020-01-01 RX ADMIN — FLUTICASONE PROPIONATE 2 SPRAY: 50 SPRAY, METERED NASAL at 09:06

## 2020-01-01 RX ADMIN — Medication 10 ML: at 05:35

## 2020-01-01 RX ADMIN — MONTELUKAST SODIUM 10 MG: 10 TABLET, FILM COATED ORAL at 09:08

## 2020-01-01 RX ADMIN — FUROSEMIDE 40 MG: 10 INJECTION, SOLUTION INTRAMUSCULAR; INTRAVENOUS at 08:50

## 2020-01-01 RX ADMIN — POTASSIUM CHLORIDE 40 MEQ: 20 TABLET, EXTENDED RELEASE ORAL at 17:10

## 2020-01-01 RX ADMIN — POLYETHYLENE GLYCOL 3350 17 G: 17 POWDER, FOR SOLUTION ORAL at 12:32

## 2020-01-01 RX ADMIN — Medication 3 ML: at 21:12

## 2020-01-01 RX ADMIN — FAMOTIDINE 20 MG: 10 INJECTION INTRAVENOUS at 08:25

## 2020-01-01 RX ADMIN — DEXAMETHASONE SODIUM PHOSPHATE 4 MG: 4 INJECTION, SOLUTION INTRAMUSCULAR; INTRAVENOUS at 12:24

## 2020-01-01 RX ADMIN — CARVEDILOL 3.12 MG: 6.25 TABLET, FILM COATED ORAL at 09:12

## 2020-01-01 RX ADMIN — Medication 3 ML: at 09:21

## 2020-01-01 RX ADMIN — PHENOBARBITAL SODIUM 65 MG: 65 INJECTION INTRAMUSCULAR; INTRAVENOUS at 20:45

## 2020-01-01 RX ADMIN — ASPIRIN 81 MG: 81 TABLET, COATED ORAL at 07:39

## 2020-01-01 RX ADMIN — LORAZEPAM 1 MG: 2 INJECTION INTRAMUSCULAR; INTRAVENOUS at 16:50

## 2020-01-01 RX ADMIN — FLUDROCORTISONE ACETATE 0.1 MG: 0.1 TABLET ORAL at 09:52

## 2020-01-01 RX ADMIN — LEVOTHYROXINE SODIUM 75 MCG: 0.07 TABLET ORAL at 09:06

## 2020-01-01 RX ADMIN — CARVEDILOL 6.25 MG: 6.25 TABLET, FILM COATED ORAL at 17:04

## 2020-01-01 RX ADMIN — SODIUM CHLORIDE, PRESERVATIVE FREE 3 ML: 5 INJECTION INTRAVENOUS at 18:12

## 2020-01-01 RX ADMIN — FUROSEMIDE 20 MG: 10 INJECTION INTRAMUSCULAR; INTRAVENOUS at 14:18

## 2020-01-01 RX ADMIN — Medication 10 ML: at 14:00

## 2020-01-01 RX ADMIN — POTASSIUM CHLORIDE, SODIUM CHLORIDE, CALCIUM CHLORIDE, SODIUM LACTATE, AND DEXTROSE MONOHYDRATE: 1.79; 6; .2; 3.1; 5 INJECTION, SOLUTION INTRAVENOUS at 13:56

## 2020-01-01 RX ADMIN — FLUTICASONE PROPIONATE 2 SPRAY: 50 SPRAY, METERED NASAL at 09:05

## 2020-01-01 RX ADMIN — FUROSEMIDE 40 MG: 10 INJECTION INTRAMUSCULAR; INTRAVENOUS at 08:17

## 2020-01-01 RX ADMIN — ACETAMINOPHEN 650 MG: 325 TABLET, FILM COATED ORAL at 10:34

## 2020-01-01 RX ADMIN — ENOXAPARIN SODIUM 40 MG: 40 INJECTION SUBCUTANEOUS at 13:04

## 2020-01-01 RX ADMIN — COSYNTROPIN 0.25 MG: 0.25 INJECTION, POWDER, LYOPHILIZED, FOR SOLUTION INTRAMUSCULAR; INTRAVENOUS at 09:26

## 2020-01-01 RX ADMIN — ALBUTEROL SULFATE 2.5 MG: 2.5 SOLUTION RESPIRATORY (INHALATION) at 19:28

## 2020-01-01 RX ADMIN — SODIUM CHLORIDE 5 ML: 9 INJECTION, SOLUTION INTRAMUSCULAR; INTRAVENOUS; SUBCUTANEOUS at 23:15

## 2020-01-01 RX ADMIN — ASPIRIN 81 MG: 81 TABLET, COATED ORAL at 08:15

## 2020-01-01 RX ADMIN — PHENOBARBITAL SODIUM 65 MG: 65 INJECTION INTRAMUSCULAR; INTRAVENOUS at 21:58

## 2020-01-01 RX ADMIN — PIPERACILLIN SODIUM AND TAZOBACTAM SODIUM 3.38 G: 3; .375 INJECTION, POWDER, LYOPHILIZED, FOR SOLUTION INTRAVENOUS at 22:21

## 2020-01-01 RX ADMIN — ACETAMINOPHEN 650 MG: 325 TABLET, FILM COATED ORAL at 10:11

## 2020-01-01 RX ADMIN — ENOXAPARIN SODIUM 40 MG: 40 INJECTION SUBCUTANEOUS at 09:05

## 2020-01-01 RX ADMIN — LEVETIRACETAM 500 MG: 100 SOLUTION ORAL at 08:49

## 2020-01-01 RX ADMIN — MORPHINE SULFATE 2 MG: 2 INJECTION, SOLUTION INTRAMUSCULAR; INTRAVENOUS at 00:47

## 2020-01-01 RX ADMIN — FLUTICASONE PROPIONATE 2 SPRAY: 50 SPRAY, METERED NASAL at 08:51

## 2020-01-01 RX ADMIN — ASPIRIN 81 MG: 81 TABLET, COATED ORAL at 09:08

## 2020-01-01 RX ADMIN — LEVETIRACETAM 500 MG: 100 INJECTION, SOLUTION INTRAVENOUS at 08:24

## 2020-01-01 RX ADMIN — PIPERACILLIN SODIUM AND TAZOBACTAM SODIUM 3.38 G: 3; .375 INJECTION, POWDER, LYOPHILIZED, FOR SOLUTION INTRAVENOUS at 22:08

## 2020-01-01 RX ADMIN — CARVEDILOL 6.25 MG: 6.25 TABLET, FILM COATED ORAL at 08:50

## 2020-01-01 RX ADMIN — LORAZEPAM 1 MG: 2 INJECTION INTRAMUSCULAR; INTRAVENOUS at 23:54

## 2020-01-01 RX ADMIN — CYCLOBENZAPRINE 5 MG: 10 TABLET, FILM COATED ORAL at 10:57

## 2020-01-01 RX ADMIN — MIDODRINE HYDROCHLORIDE 2.5 MG: 5 TABLET ORAL at 18:19

## 2020-01-01 RX ADMIN — BUDESONIDE AND FORMOTEROL FUMARATE DIHYDRATE 2 PUFF: 160; 4.5 AEROSOL RESPIRATORY (INHALATION) at 08:52

## 2020-01-01 RX ADMIN — Medication 10 ML: at 16:16

## 2020-01-01 RX ADMIN — BUDESONIDE AND FORMOTEROL FUMARATE DIHYDRATE 2 PUFF: 160; 4.5 AEROSOL RESPIRATORY (INHALATION) at 21:10

## 2020-01-01 RX ADMIN — LEVOTHYROXINE SODIUM 75 MCG: 0.07 TABLET ORAL at 06:44

## 2020-01-01 RX ADMIN — IPRATROPIUM BROMIDE AND ALBUTEROL SULFATE 3 ML: .5; 3 SOLUTION RESPIRATORY (INHALATION) at 09:08

## 2020-01-01 RX ADMIN — MONTELUKAST 10 MG: 10 TABLET, FILM COATED ORAL at 09:46

## 2020-01-01 RX ADMIN — MIDODRINE HYDROCHLORIDE 5 MG: 5 TABLET ORAL at 23:10

## 2020-01-01 RX ADMIN — ALBUTEROL SULFATE 2.5 MG: 2.5 SOLUTION RESPIRATORY (INHALATION) at 08:19

## 2020-01-01 RX ADMIN — METOPROLOL SUCCINATE 25 MG: 25 TABLET, EXTENDED RELEASE ORAL at 08:15

## 2020-01-01 RX ADMIN — FUROSEMIDE 20 MG: 40 TABLET ORAL at 09:24

## 2020-01-01 RX ADMIN — BUDESONIDE AND FORMOTEROL FUMARATE DIHYDRATE 2 PUFF: 160; 4.5 AEROSOL RESPIRATORY (INHALATION) at 08:58

## 2020-01-01 RX ADMIN — MIDODRINE HYDROCHLORIDE 10 MG: 5 TABLET ORAL at 17:21

## 2020-01-01 RX ADMIN — DEXAMETHASONE SODIUM PHOSPHATE 6 MG: 10 INJECTION INTRAMUSCULAR; INTRAVENOUS at 11:33

## 2020-01-01 RX ADMIN — SODIUM CHLORIDE 10 ML: 9 INJECTION, SOLUTION INTRAMUSCULAR; INTRAVENOUS; SUBCUTANEOUS at 06:33

## 2020-01-01 RX ADMIN — ASPIRIN 81 MG: 81 TABLET, COATED ORAL at 08:50

## 2020-01-01 RX ADMIN — Medication 5 ML: at 22:08

## 2020-01-01 RX ADMIN — BUDESONIDE 500 MCG: 0.5 INHALANT RESPIRATORY (INHALATION) at 09:01

## 2020-01-01 RX ADMIN — TRAMADOL HYDROCHLORIDE 50 MG: 50 TABLET ORAL at 18:01

## 2020-01-01 RX ADMIN — FLUTICASONE PROPIONATE 2 SPRAY: 50 SPRAY, METERED NASAL at 08:07

## 2020-01-01 RX ADMIN — FLUTICASONE PROPIONATE 2 SPRAY: 50 SPRAY, METERED NASAL at 09:46

## 2020-01-01 RX ADMIN — ASPIRIN 81 MG: 81 TABLET, CHEWABLE ORAL at 09:05

## 2020-01-01 RX ADMIN — BARIUM SULFATE 45 ML: 980 POWDER, FOR SUSPENSION ORAL at 11:25

## 2020-01-01 RX ADMIN — DEXAMETHASONE SODIUM PHOSPHATE 4 MG: 4 INJECTION, SOLUTION INTRAMUSCULAR; INTRAVENOUS at 17:58

## 2020-01-01 RX ADMIN — DEXAMETHASONE SODIUM PHOSPHATE 4 MG: 4 INJECTION, SOLUTION INTRAMUSCULAR; INTRAVENOUS at 18:05

## 2020-01-01 RX ADMIN — ALBUTEROL SULFATE 2.5 MG: 2.5 SOLUTION RESPIRATORY (INHALATION) at 10:08

## 2020-01-01 RX ADMIN — DEXAMETHASONE SODIUM PHOSPHATE 6 MG: 10 INJECTION INTRAMUSCULAR; INTRAVENOUS at 00:07

## 2020-01-01 RX ADMIN — FLUTICASONE PROPIONATE 2 SPRAY: 50 SPRAY, METERED NASAL at 08:41

## 2020-01-01 RX ADMIN — ALBUTEROL SULFATE 2.5 MG: 2.5 SOLUTION RESPIRATORY (INHALATION) at 01:58

## 2020-01-01 RX ADMIN — LEVETIRACETAM 500 MG: 100 INJECTION, SOLUTION INTRAVENOUS at 08:37

## 2020-01-01 RX ADMIN — ALBUTEROL SULFATE 2.5 MG: 2.5 SOLUTION RESPIRATORY (INHALATION) at 09:13

## 2020-01-01 RX ADMIN — CARVEDILOL 3.12 MG: 3.12 TABLET, FILM COATED ORAL at 16:46

## 2020-01-01 RX ADMIN — CARVEDILOL 3.12 MG: 6.25 TABLET, FILM COATED ORAL at 09:41

## 2020-01-01 RX ADMIN — PANTOPRAZOLE SODIUM 40 MG: 40 TABLET, DELAYED RELEASE ORAL at 07:13

## 2020-01-01 RX ADMIN — PHENOBARBITAL SODIUM 65 MG: 65 INJECTION INTRAMUSCULAR; INTRAVENOUS at 08:43

## 2020-01-01 RX ADMIN — PANTOPRAZOLE SODIUM 40 MG: 40 TABLET, DELAYED RELEASE ORAL at 07:25

## 2020-01-01 RX ADMIN — SODIUM CHLORIDE 10 ML: 9 INJECTION, SOLUTION INTRAMUSCULAR; INTRAVENOUS; SUBCUTANEOUS at 17:37

## 2020-01-01 RX ADMIN — PIPERACILLIN SODIUM AND TAZOBACTAM SODIUM 3.38 G: 3; .375 INJECTION, POWDER, LYOPHILIZED, FOR SOLUTION INTRAVENOUS at 16:35

## 2020-01-01 RX ADMIN — FLUTICASONE PROPIONATE 2 SPRAY: 50 SPRAY, METERED NASAL at 09:00

## 2020-01-01 RX ADMIN — FLUTICASONE PROPIONATE 2 SPRAY: 50 SPRAY, METERED NASAL at 09:10

## 2020-01-01 RX ADMIN — PHENOBARBITAL SODIUM 65 MG: 65 INJECTION INTRAMUSCULAR; INTRAVENOUS at 09:19

## 2020-01-01 RX ADMIN — ALBUTEROL SULFATE 2.5 MG: 2.5 SOLUTION RESPIRATORY (INHALATION) at 10:23

## 2020-01-01 RX ADMIN — DEXAMETHASONE 4 MG: 4 TABLET ORAL at 21:12

## 2020-01-01 RX ADMIN — LEVETIRACETAM 500 MG: 100 SOLUTION ORAL at 17:52

## 2020-01-01 RX ADMIN — MONTELUKAST SODIUM 10 MG: 10 TABLET, FILM COATED ORAL at 08:15

## 2020-01-01 RX ADMIN — CARVEDILOL 6.25 MG: 6.25 TABLET, FILM COATED ORAL at 18:10

## 2020-01-01 RX ADMIN — ALBUTEROL SULFATE 2.5 MG: 2.5 SOLUTION RESPIRATORY (INHALATION) at 13:57

## 2020-01-01 RX ADMIN — MIDODRINE HYDROCHLORIDE 10 MG: 5 TABLET ORAL at 07:39

## 2020-01-01 RX ADMIN — Medication 3 ML: at 08:49

## 2020-01-01 RX ADMIN — ALBUTEROL SULFATE 2.5 MG: 2.5 SOLUTION RESPIRATORY (INHALATION) at 08:52

## 2020-01-01 RX ADMIN — ALBUTEROL SULFATE 2.5 MG: 2.5 SOLUTION RESPIRATORY (INHALATION) at 08:56

## 2020-01-01 RX ADMIN — ALBUTEROL SULFATE 2.5 MG: 2.5 SOLUTION RESPIRATORY (INHALATION) at 14:21

## 2020-01-01 RX ADMIN — LEVOTHYROXINE SODIUM 75 MCG: 0.07 TABLET ORAL at 05:34

## 2020-01-01 RX ADMIN — MONTELUKAST SODIUM 10 MG: 10 TABLET, FILM COATED ORAL at 08:00

## 2020-01-01 RX ADMIN — FLUDROCORTISONE ACETATE 0.1 MG: 0.1 TABLET ORAL at 09:08

## 2020-01-01 RX ADMIN — PIPERACILLIN SODIUM AND TAZOBACTAM SODIUM 3.38 G: 3; .375 INJECTION, POWDER, LYOPHILIZED, FOR SOLUTION INTRAVENOUS at 05:46

## 2020-01-01 RX ADMIN — ALBUTEROL SULFATE 2.5 MG: 2.5 SOLUTION RESPIRATORY (INHALATION) at 20:02

## 2020-01-01 RX ADMIN — IOPAMIDOL 100 ML: 755 INJECTION, SOLUTION INTRAVENOUS at 15:59

## 2020-01-01 RX ADMIN — BUDESONIDE AND FORMOTEROL FUMARATE DIHYDRATE 2 PUFF: 160; 4.5 AEROSOL RESPIRATORY (INHALATION) at 09:30

## 2020-01-01 RX ADMIN — BUDESONIDE 500 MCG: 0.5 INHALANT RESPIRATORY (INHALATION) at 07:09

## 2020-01-01 RX ADMIN — FLUDROCORTISONE ACETATE 0.1 MG: 0.1 TABLET ORAL at 09:12

## 2020-01-01 RX ADMIN — BUDESONIDE AND FORMOTEROL FUMARATE DIHYDRATE 2 PUFF: 160; 4.5 AEROSOL RESPIRATORY (INHALATION) at 21:41

## 2020-01-01 RX ADMIN — Medication 5 ML: at 09:17

## 2020-01-01 RX ADMIN — PHENOBARBITAL SODIUM 65 MG: 65 INJECTION INTRAMUSCULAR; INTRAVENOUS at 21:41

## 2020-01-01 RX ADMIN — DEXAMETHASONE SODIUM PHOSPHATE 4 MG: 4 INJECTION, SOLUTION INTRAMUSCULAR; INTRAVENOUS at 13:26

## 2020-01-01 RX ADMIN — PHENOBARBITAL SODIUM 65 MG: 65 INJECTION INTRAMUSCULAR; INTRAVENOUS at 08:49

## 2020-01-01 RX ADMIN — ROSUVASTATIN CALCIUM 40 MG: 20 TABLET, COATED ORAL at 23:14

## 2020-01-01 RX ADMIN — SODIUM CHLORIDE, PRESERVATIVE FREE 3 ML: 5 INJECTION INTRAVENOUS at 04:13

## 2020-01-01 RX ADMIN — MIDODRINE HYDROCHLORIDE 5 MG: 5 TABLET ORAL at 16:25

## 2020-01-01 RX ADMIN — TRAMADOL HYDROCHLORIDE 50 MG: 50 TABLET ORAL at 14:51

## 2020-01-01 RX ADMIN — LEVETIRACETAM 500 MG: 100 SOLUTION ORAL at 17:32

## 2020-01-01 RX ADMIN — BUDESONIDE 500 MCG: 0.5 INHALANT RESPIRATORY (INHALATION) at 20:40

## 2020-01-01 RX ADMIN — Medication 10 ML: at 13:34

## 2020-01-01 RX ADMIN — MIDODRINE HYDROCHLORIDE 10 MG: 5 TABLET ORAL at 09:21

## 2020-01-01 RX ADMIN — FAMOTIDINE 20 MG: 10 INJECTION INTRAVENOUS at 08:54

## 2020-01-01 RX ADMIN — MIDODRINE HYDROCHLORIDE 5 MG: 5 TABLET ORAL at 17:25

## 2020-01-01 RX ADMIN — DEXAMETHASONE SODIUM PHOSPHATE 4 MG: 4 INJECTION, SOLUTION INTRAMUSCULAR; INTRAVENOUS at 00:44

## 2020-01-01 RX ADMIN — ATORVASTATIN CALCIUM 10 MG: 10 TABLET, FILM COATED ORAL at 08:04

## 2020-01-01 RX ADMIN — Medication 5 ML: at 05:08

## 2020-01-01 RX ADMIN — LEVOTHYROXINE SODIUM 75 MCG: 0.07 TABLET ORAL at 09:40

## 2020-01-01 RX ADMIN — METOPROLOL SUCCINATE 25 MG: 25 TABLET, EXTENDED RELEASE ORAL at 09:01

## 2020-01-01 RX ADMIN — FLUTICASONE PROPIONATE 2 SPRAY: 50 SPRAY, METERED NASAL at 08:04

## 2020-01-01 RX ADMIN — DEXAMETHASONE 4 MG: 4 TABLET ORAL at 13:33

## 2020-01-01 RX ADMIN — MIDODRINE HYDROCHLORIDE 10 MG: 5 TABLET ORAL at 12:39

## 2020-01-01 RX ADMIN — DEXAMETHASONE SODIUM PHOSPHATE 4 MG: 4 INJECTION, SOLUTION INTRAMUSCULAR; INTRAVENOUS at 21:21

## 2020-01-01 RX ADMIN — DEXAMETHASONE SODIUM PHOSPHATE 4 MG: 4 INJECTION, SOLUTION INTRAMUSCULAR; INTRAVENOUS at 05:58

## 2020-01-01 RX ADMIN — SODIUM CHLORIDE 10 ML: 9 INJECTION, SOLUTION INTRAMUSCULAR; INTRAVENOUS; SUBCUTANEOUS at 16:51

## 2020-01-01 RX ADMIN — DEXAMETHASONE SODIUM PHOSPHATE 6 MG: 10 INJECTION INTRAMUSCULAR; INTRAVENOUS at 19:00

## 2020-01-01 RX ADMIN — LEVETIRACETAM 500 MG: 100 SOLUTION ORAL at 09:10

## 2020-01-01 RX ADMIN — Medication 5 ML: at 22:09

## 2020-01-01 RX ADMIN — ENOXAPARIN SODIUM 40 MG: 40 INJECTION SUBCUTANEOUS at 08:58

## 2020-01-01 RX ADMIN — ALBUTEROL SULFATE 2.5 MG: 2.5 SOLUTION RESPIRATORY (INHALATION) at 07:40

## 2020-01-01 RX ADMIN — Medication 3 ML: at 08:41

## 2020-01-01 RX ADMIN — PIPERACILLIN SODIUM AND TAZOBACTAM SODIUM 3.38 G: 3; .375 INJECTION, POWDER, LYOPHILIZED, FOR SOLUTION INTRAVENOUS at 14:23

## 2020-01-01 RX ADMIN — FUROSEMIDE 40 MG: 10 INJECTION, SOLUTION INTRAMUSCULAR; INTRAVENOUS at 17:06

## 2020-01-01 RX ADMIN — BUDESONIDE AND FORMOTEROL FUMARATE DIHYDRATE 2 PUFF: 160; 4.5 AEROSOL RESPIRATORY (INHALATION) at 21:02

## 2020-01-01 RX ADMIN — ALBUTEROL SULFATE 2.5 MG: 2.5 SOLUTION RESPIRATORY (INHALATION) at 15:07

## 2020-01-01 RX ADMIN — SODIUM CHLORIDE, PRESERVATIVE FREE 3 ML: 5 INJECTION INTRAVENOUS at 07:50

## 2020-01-01 RX ADMIN — PIPERACILLIN SODIUM AND TAZOBACTAM SODIUM 3.38 G: 3; .375 INJECTION, POWDER, LYOPHILIZED, FOR SOLUTION INTRAVENOUS at 05:30

## 2020-01-01 RX ADMIN — FUROSEMIDE 40 MG: 10 INJECTION, SOLUTION INTRAMUSCULAR; INTRAVENOUS at 17:04

## 2020-01-01 RX ADMIN — POTASSIUM CHLORIDE 40 MEQ: 20 TABLET, EXTENDED RELEASE ORAL at 17:06

## 2020-01-01 RX ADMIN — MIDODRINE HYDROCHLORIDE 10 MG: 5 TABLET ORAL at 17:15

## 2020-01-01 RX ADMIN — ENOXAPARIN SODIUM 40 MG: 40 INJECTION SUBCUTANEOUS at 09:01

## 2020-01-01 RX ADMIN — FLUTICASONE PROPIONATE 2 SPRAY: 50 SPRAY, METERED NASAL at 08:18

## 2020-01-01 RX ADMIN — BUDESONIDE 500 MCG: 0.5 INHALANT RESPIRATORY (INHALATION) at 08:56

## 2020-01-01 RX ADMIN — SODIUM CHLORIDE 250 ML: 900 INJECTION, SOLUTION INTRAVENOUS at 19:00

## 2020-01-01 RX ADMIN — LEVETIRACETAM 500 MG: 100 SOLUTION ORAL at 12:26

## 2020-01-01 RX ADMIN — MIDODRINE HYDROCHLORIDE 10 MG: 5 TABLET ORAL at 12:08

## 2020-01-01 RX ADMIN — ALBUTEROL SULFATE 2.5 MG: 2.5 SOLUTION RESPIRATORY (INHALATION) at 19:17

## 2020-01-01 RX ADMIN — MORPHINE SULFATE 2 MG: 2 INJECTION, SOLUTION INTRAMUSCULAR; INTRAVENOUS at 13:10

## 2020-01-01 RX ADMIN — ATORVASTATIN CALCIUM 10 MG: 10 TABLET, FILM COATED ORAL at 08:15

## 2020-01-01 RX ADMIN — ATORVASTATIN CALCIUM 10 MG: 10 TABLET, FILM COATED ORAL at 07:39

## 2020-01-01 RX ADMIN — SODIUM CHLORIDE, PRESERVATIVE FREE 3 ML: 5 INJECTION INTRAVENOUS at 00:33

## 2020-01-01 RX ADMIN — INSULIN LISPRO 4 UNITS: 100 INJECTION, SOLUTION INTRAVENOUS; SUBCUTANEOUS at 06:17

## 2020-01-01 RX ADMIN — SODIUM CHLORIDE, PRESERVATIVE FREE 3 ML: 5 INJECTION INTRAVENOUS at 07:51

## 2020-01-01 RX ADMIN — ALBUTEROL SULFATE 2.5 MG: 2.5 SOLUTION RESPIRATORY (INHALATION) at 08:40

## 2020-01-01 RX ADMIN — DEXAMETHASONE SODIUM PHOSPHATE 6 MG: 10 INJECTION INTRAMUSCULAR; INTRAVENOUS at 06:32

## 2020-01-01 RX ADMIN — FENTANYL CITRATE 25 MCG: 50 INJECTION, SOLUTION INTRAMUSCULAR; INTRAVENOUS at 10:39

## 2020-01-01 RX ADMIN — Medication 5 ML: at 17:18

## 2020-01-01 RX ADMIN — TRAMADOL HYDROCHLORIDE 50 MG: 50 TABLET ORAL at 23:31

## 2020-01-01 RX ADMIN — BUDESONIDE AND FORMOTEROL FUMARATE DIHYDRATE 2 PUFF: 160; 4.5 AEROSOL RESPIRATORY (INHALATION) at 09:13

## 2020-01-01 RX ADMIN — ENOXAPARIN SODIUM 40 MG: 40 INJECTION SUBCUTANEOUS at 08:06

## 2020-01-01 RX ADMIN — FENTANYL CITRATE 25 MCG: 50 INJECTION, SOLUTION INTRAMUSCULAR; INTRAVENOUS at 10:44

## 2020-01-01 RX ADMIN — ENOXAPARIN SODIUM 40 MG: 40 INJECTION SUBCUTANEOUS at 08:19

## 2020-01-01 RX ADMIN — ALBUTEROL SULFATE 2.5 MG: 2.5 SOLUTION RESPIRATORY (INHALATION) at 20:00

## 2020-01-01 RX ADMIN — ALBUTEROL SULFATE 2.5 MG: 2.5 SOLUTION RESPIRATORY (INHALATION) at 09:06

## 2020-01-01 RX ADMIN — BUDESONIDE 500 MCG: 0.5 INHALANT RESPIRATORY (INHALATION) at 09:06

## 2020-01-01 RX ADMIN — PANTOPRAZOLE SODIUM 40 MG: 40 TABLET, DELAYED RELEASE ORAL at 09:11

## 2020-01-01 RX ADMIN — DIATRIZOATE MEGLUMINE AND DIATRIZOATE SODIUM 15 ML: 660; 100 LIQUID ORAL; RECTAL at 12:58

## 2020-01-01 RX ADMIN — LEVETIRACETAM 500 MG: 100 SOLUTION ORAL at 08:53

## 2020-01-01 RX ADMIN — DEXAMETHASONE 4 MG: 4 TABLET ORAL at 13:13

## 2020-01-01 RX ADMIN — MORPHINE SULFATE 2 MG: 2 INJECTION, SOLUTION INTRAMUSCULAR; INTRAVENOUS at 06:16

## 2020-01-01 RX ADMIN — BUDESONIDE AND FORMOTEROL FUMARATE DIHYDRATE 2 PUFF: 160; 4.5 AEROSOL RESPIRATORY (INHALATION) at 21:33

## 2020-01-01 RX ADMIN — LEVOTHYROXINE SODIUM 75 MCG: 75 TABLET ORAL at 05:46

## 2020-01-01 RX ADMIN — Medication 10 ML: at 21:58

## 2020-01-01 RX ADMIN — MONTELUKAST 10 MG: 10 TABLET, FILM COATED ORAL at 08:50

## 2020-01-01 RX ADMIN — ATORVASTATIN CALCIUM 10 MG: 10 TABLET, FILM COATED ORAL at 08:40

## 2020-01-01 RX ADMIN — Medication 3 ML: at 13:00

## 2020-01-01 RX ADMIN — MIDAZOLAM 0.5 MG: 1 INJECTION INTRAMUSCULAR; INTRAVENOUS at 10:39

## 2020-01-01 RX ADMIN — ALBUTEROL SULFATE 2.5 MG: 2.5 SOLUTION RESPIRATORY (INHALATION) at 19:26

## 2020-01-01 RX ADMIN — MORPHINE SULFATE 2 MG: 2 INJECTION, SOLUTION INTRAMUSCULAR; INTRAVENOUS at 18:11

## 2020-01-01 RX ADMIN — BUDESONIDE AND FORMOTEROL FUMARATE DIHYDRATE 2 PUFF: 160; 4.5 AEROSOL RESPIRATORY (INHALATION) at 20:36

## 2020-01-01 RX ADMIN — CARVEDILOL 6.25 MG: 6.25 TABLET, FILM COATED ORAL at 17:18

## 2020-01-01 RX ADMIN — PANTOPRAZOLE SODIUM 40 MG: 40 TABLET, DELAYED RELEASE ORAL at 06:44

## 2020-01-01 RX ADMIN — BUDESONIDE AND FORMOTEROL FUMARATE DIHYDRATE 2 PUFF: 160; 4.5 AEROSOL RESPIRATORY (INHALATION) at 21:57

## 2020-01-01 RX ADMIN — MONTELUKAST SODIUM 10 MG: 10 TABLET, FILM COATED ORAL at 08:40

## 2020-01-01 RX ADMIN — MIDODRINE HYDROCHLORIDE 2.5 MG: 5 TABLET ORAL at 12:50

## 2020-01-01 RX ADMIN — ALBUTEROL SULFATE 2.5 MG: 2.5 SOLUTION RESPIRATORY (INHALATION) at 07:19

## 2020-01-01 RX ADMIN — LEVOTHYROXINE SODIUM 75 MCG: 0.07 TABLET ORAL at 06:42

## 2020-01-01 RX ADMIN — FLUTICASONE PROPIONATE 2 SPRAY: 50 SPRAY, METERED NASAL at 03:04

## 2020-01-01 RX ADMIN — LISINOPRIL 2.5 MG: 5 TABLET ORAL at 08:17

## 2020-01-01 RX ADMIN — Medication 10 ML: at 05:07

## 2020-01-01 RX ADMIN — MORPHINE SULFATE 2 MG: 2 INJECTION, SOLUTION INTRAMUSCULAR; INTRAVENOUS at 00:33

## 2020-01-01 RX ADMIN — ALBUTEROL SULFATE 2 PUFF: 108 INHALANT RESPIRATORY (INHALATION) at 08:09

## 2020-01-01 RX ADMIN — LEVOTHYROXINE SODIUM 75 MCG: 0.07 TABLET ORAL at 09:11

## 2020-01-01 RX ADMIN — FUROSEMIDE 40 MG: 10 INJECTION, SOLUTION INTRAMUSCULAR; INTRAVENOUS at 21:14

## 2020-01-01 RX ADMIN — ASPIRIN 81 MG: 81 TABLET, CHEWABLE ORAL at 09:46

## 2020-01-01 RX ADMIN — ATORVASTATIN CALCIUM 10 MG: 10 TABLET, FILM COATED ORAL at 09:52

## 2020-01-01 RX ADMIN — SODIUM CHLORIDE 5 ML: 9 INJECTION, SOLUTION INTRAMUSCULAR; INTRAVENOUS; SUBCUTANEOUS at 05:16

## 2020-01-01 RX ADMIN — DEXAMETHASONE SODIUM PHOSPHATE 6 MG: 10 INJECTION INTRAMUSCULAR; INTRAVENOUS at 17:39

## 2020-01-01 RX ADMIN — ALBUTEROL SULFATE 2.5 MG: 2.5 SOLUTION RESPIRATORY (INHALATION) at 03:26

## 2020-01-01 RX ADMIN — MIDODRINE HYDROCHLORIDE 5 MG: 5 TABLET ORAL at 12:42

## 2020-01-01 RX ADMIN — POTASSIUM CHLORIDE 40 MEQ: 20 TABLET, EXTENDED RELEASE ORAL at 16:26

## 2020-01-01 RX ADMIN — ALBUTEROL SULFATE 2 PUFF: 108 INHALANT RESPIRATORY (INHALATION) at 03:32

## 2020-01-01 RX ADMIN — DEXAMETHASONE SODIUM PHOSPHATE 4 MG: 4 INJECTION, SOLUTION INTRAMUSCULAR; INTRAVENOUS at 17:32

## 2020-01-01 RX ADMIN — FLUTICASONE PROPIONATE 2 SPRAY: 50 SPRAY, METERED NASAL at 07:44

## 2020-01-01 RX ADMIN — GADOTERATE MEGLUMINE 10 ML: 376.9 INJECTION INTRAVENOUS at 11:22

## 2020-01-01 RX ADMIN — PANTOPRAZOLE SODIUM 40 MG: 40 TABLET, DELAYED RELEASE ORAL at 07:26

## 2020-01-01 RX ADMIN — ASPIRIN 81 MG: 81 TABLET, CHEWABLE ORAL at 09:23

## 2020-01-01 RX ADMIN — ALBUTEROL SULFATE 2.5 MG: 2.5 SOLUTION RESPIRATORY (INHALATION) at 21:10

## 2020-01-01 RX ADMIN — ENOXAPARIN SODIUM 40 MG: 40 INJECTION SUBCUTANEOUS at 15:52

## 2020-01-01 RX ADMIN — ALBUTEROL SULFATE 2.5 MG: 2.5 SOLUTION RESPIRATORY (INHALATION) at 13:27

## 2020-01-01 RX ADMIN — SODIUM CHLORIDE 5 ML: 9 INJECTION, SOLUTION INTRAMUSCULAR; INTRAVENOUS; SUBCUTANEOUS at 06:39

## 2020-01-01 RX ADMIN — SODIUM CHLORIDE, PRESERVATIVE FREE 3 ML: 5 INJECTION INTRAVENOUS at 05:07

## 2020-01-01 RX ADMIN — LEVOTHYROXINE SODIUM 75 MCG: 0.07 TABLET ORAL at 06:30

## 2020-01-01 RX ADMIN — POTASSIUM CHLORIDE 40 MEQ: 20 TABLET, EXTENDED RELEASE ORAL at 09:02

## 2020-01-01 RX ADMIN — Medication 10 ML: at 07:14

## 2020-01-01 RX ADMIN — MONTELUKAST SODIUM 10 MG: 10 TABLET, FILM COATED ORAL at 08:51

## 2020-01-01 RX ADMIN — Medication 10 ML: at 22:22

## 2020-01-01 RX ADMIN — PHENOL 1 SPRAY: 1.5 LIQUID ORAL at 17:47

## 2020-01-01 RX ADMIN — BUDESONIDE AND FORMOTEROL FUMARATE DIHYDRATE 2 PUFF: 160; 4.5 AEROSOL RESPIRATORY (INHALATION) at 20:55

## 2020-01-01 RX ADMIN — ASPIRIN 81 MG: 81 TABLET, COATED ORAL at 09:01

## 2020-01-01 RX ADMIN — FLUDROCORTISONE ACETATE 0.1 MG: 0.1 TABLET ORAL at 09:49

## 2020-01-01 RX ADMIN — MORPHINE SULFATE 2 MG: 2 INJECTION, SOLUTION INTRAMUSCULAR; INTRAVENOUS at 19:43

## 2020-01-01 RX ADMIN — MIDODRINE HYDROCHLORIDE 2.5 MG: 5 TABLET ORAL at 13:24

## 2020-01-01 RX ADMIN — ALBUTEROL SULFATE 2.5 MG: 2.5 SOLUTION RESPIRATORY (INHALATION) at 21:27

## 2020-01-01 RX ADMIN — ACETAMINOPHEN 650 MG: 325 TABLET, FILM COATED ORAL at 12:28

## 2020-01-01 RX ADMIN — Medication 10 ML: at 06:07

## 2020-01-01 RX ADMIN — Medication 3 ML: at 20:02

## 2020-01-01 RX ADMIN — ALBUTEROL SULFATE 2.5 MG: 2.5 SOLUTION RESPIRATORY (INHALATION) at 21:02

## 2020-01-01 RX ADMIN — DEXAMETHASONE SODIUM PHOSPHATE 4 MG: 4 INJECTION, SOLUTION INTRAMUSCULAR; INTRAVENOUS at 11:41

## 2020-01-01 RX ADMIN — DEXAMETHASONE 4 MG: 4 TABLET ORAL at 05:57

## 2020-01-01 RX ADMIN — ENOXAPARIN SODIUM 40 MG: 40 INJECTION SUBCUTANEOUS at 08:05

## 2020-01-01 RX ADMIN — TUBERCULIN PURIFIED PROTEIN DERIVATIVE 5 UNITS: 5 INJECTION, SOLUTION INTRADERMAL at 14:31

## 2020-01-01 RX ADMIN — BUDESONIDE AND FORMOTEROL FUMARATE DIHYDRATE 2 PUFF: 160; 4.5 AEROSOL RESPIRATORY (INHALATION) at 20:35

## 2020-01-01 RX ADMIN — MORPHINE SULFATE 2 MG: 2 INJECTION, SOLUTION INTRAMUSCULAR; INTRAVENOUS at 23:51

## 2020-01-01 RX ADMIN — MONTELUKAST SODIUM 10 MG: 10 TABLET, FILM COATED ORAL at 08:04

## 2020-01-01 RX ADMIN — ATORVASTATIN CALCIUM 10 MG: 10 TABLET, FILM COATED ORAL at 08:35

## 2020-01-01 RX ADMIN — SODIUM CHLORIDE 5 ML: 9 INJECTION, SOLUTION INTRAMUSCULAR; INTRAVENOUS; SUBCUTANEOUS at 06:00

## 2020-01-01 RX ADMIN — Medication 10 ML: at 00:45

## 2020-01-01 RX ADMIN — INSULIN LISPRO 2 UNITS: 100 INJECTION, SOLUTION INTRAVENOUS; SUBCUTANEOUS at 23:41

## 2020-01-01 RX ADMIN — MIDODRINE HYDROCHLORIDE 10 MG: 5 TABLET ORAL at 09:05

## 2020-01-01 RX ADMIN — MIDODRINE HYDROCHLORIDE 2.5 MG: 5 TABLET ORAL at 08:12

## 2020-01-01 RX ADMIN — PIPERACILLIN SODIUM AND TAZOBACTAM SODIUM 3.38 G: 3; .375 INJECTION, POWDER, LYOPHILIZED, FOR SOLUTION INTRAVENOUS at 16:00

## 2020-01-01 RX ADMIN — ENOXAPARIN SODIUM 40 MG: 40 INJECTION SUBCUTANEOUS at 09:10

## 2020-01-01 RX ADMIN — FLUTICASONE PROPIONATE 2 SPRAY: 50 SPRAY, METERED NASAL at 09:44

## 2020-01-01 RX ADMIN — ROSUVASTATIN CALCIUM 40 MG: 20 TABLET, COATED ORAL at 21:31

## 2020-01-01 RX ADMIN — ATORVASTATIN CALCIUM 10 MG: 10 TABLET, FILM COATED ORAL at 09:02

## 2020-01-01 RX ADMIN — Medication 5 ML: at 17:06

## 2020-01-01 RX ADMIN — MIDAZOLAM 0.5 MG: 1 INJECTION INTRAMUSCULAR; INTRAVENOUS at 10:44

## 2020-01-01 RX ADMIN — LIDOCAINE HYDROCHLORIDE 5 ML: 10 INJECTION, SOLUTION INFILTRATION; PERINEURAL at 10:47

## 2020-01-01 RX ADMIN — Medication 3 ML: at 21:19

## 2020-01-01 RX ADMIN — MORPHINE SULFATE 2 MG: 2 INJECTION, SOLUTION INTRAMUSCULAR; INTRAVENOUS at 15:41

## 2020-01-01 RX ADMIN — BUDESONIDE AND FORMOTEROL FUMARATE DIHYDRATE 2 PUFF: 160; 4.5 AEROSOL RESPIRATORY (INHALATION) at 21:53

## 2020-01-01 RX ADMIN — ROSUVASTATIN CALCIUM 40 MG: 20 TABLET, COATED ORAL at 21:49

## 2020-01-01 RX ADMIN — PHENOBARBITAL SODIUM 65 MG: 65 INJECTION INTRAMUSCULAR; INTRAVENOUS at 09:00

## 2020-01-01 RX ADMIN — Medication 10 ML: at 21:16

## 2020-01-01 RX ADMIN — BUDESONIDE AND FORMOTEROL FUMARATE DIHYDRATE 2 PUFF: 160; 4.5 AEROSOL RESPIRATORY (INHALATION) at 21:11

## 2020-01-01 RX ADMIN — MORPHINE SULFATE 2 MG: 2 INJECTION, SOLUTION INTRAMUSCULAR; INTRAVENOUS at 21:07

## 2020-01-01 RX ADMIN — SODIUM CHLORIDE 5 ML: 9 INJECTION, SOLUTION INTRAMUSCULAR; INTRAVENOUS; SUBCUTANEOUS at 22:06

## 2020-01-01 RX ADMIN — ATORVASTATIN CALCIUM 10 MG: 10 TABLET, FILM COATED ORAL at 09:21

## 2020-01-01 RX ADMIN — MORPHINE SULFATE 1 MG: 2 INJECTION, SOLUTION INTRAMUSCULAR; INTRAVENOUS at 22:13

## 2020-01-01 RX ADMIN — Medication 10 ML: at 11:22

## 2020-01-01 RX ADMIN — PHENOBARBITAL SODIUM 65 MG: 65 INJECTION INTRAMUSCULAR; INTRAVENOUS at 09:29

## 2020-01-01 RX ADMIN — CARVEDILOL 6.25 MG: 6.25 TABLET, FILM COATED ORAL at 16:33

## 2020-01-01 RX ADMIN — ASPIRIN 325 MG ORAL TABLET 325 MG: 325 PILL ORAL at 19:14

## 2020-01-01 RX ADMIN — FUROSEMIDE 40 MG: 10 INJECTION, SOLUTION INTRAMUSCULAR; INTRAVENOUS at 08:35

## 2020-01-01 RX ADMIN — ALBUTEROL SULFATE 2.5 MG: 2.5 SOLUTION RESPIRATORY (INHALATION) at 21:35

## 2020-01-01 RX ADMIN — POTASSIUM CHLORIDE, SODIUM CHLORIDE, CALCIUM CHLORIDE, SODIUM LACTATE, AND DEXTROSE MONOHYDRATE: 1.79; 6; .2; 3.1; 5 INJECTION, SOLUTION INTRAVENOUS at 09:35

## 2020-01-01 RX ADMIN — ASPIRIN 81 MG: 81 TABLET, CHEWABLE ORAL at 08:50

## 2020-01-01 RX ADMIN — Medication 3 ML: at 21:00

## 2020-01-01 RX ADMIN — Medication 5 ML: at 05:23

## 2020-01-01 RX ADMIN — ALBUTEROL SULFATE 2.5 MG: 2.5 SOLUTION RESPIRATORY (INHALATION) at 02:02

## 2020-01-01 RX ADMIN — Medication 5 ML: at 23:06

## 2020-01-01 RX ADMIN — ROSUVASTATIN CALCIUM 40 MG: 20 TABLET, COATED ORAL at 22:07

## 2020-01-01 RX ADMIN — Medication 10 ML: at 15:59

## 2020-01-01 RX ADMIN — MONTELUKAST SODIUM 10 MG: 10 TABLET, FILM COATED ORAL at 09:49

## 2020-01-01 RX ADMIN — MORPHINE SULFATE 2 MG: 2 INJECTION, SOLUTION INTRAMUSCULAR; INTRAVENOUS at 15:50

## 2020-01-01 RX ADMIN — SODIUM CHLORIDE, PRESERVATIVE FREE 3 ML: 5 INJECTION INTRAVENOUS at 21:21

## 2020-01-01 RX ADMIN — ENOXAPARIN SODIUM 40 MG: 40 INJECTION SUBCUTANEOUS at 09:21

## 2020-01-01 RX ADMIN — SODIUM CHLORIDE 5 ML: 9 INJECTION, SOLUTION INTRAMUSCULAR; INTRAVENOUS; SUBCUTANEOUS at 05:51

## 2020-01-01 RX ADMIN — ENOXAPARIN SODIUM 40 MG: 40 INJECTION SUBCUTANEOUS at 09:40

## 2020-01-01 RX ADMIN — MIDODRINE HYDROCHLORIDE 10 MG: 5 TABLET ORAL at 12:10

## 2020-01-01 RX ADMIN — FLUDROCORTISONE ACETATE 0.1 MG: 0.1 TABLET ORAL at 09:21

## 2020-01-01 RX ADMIN — BUDESONIDE AND FORMOTEROL FUMARATE DIHYDRATE 2 PUFF: 160; 4.5 AEROSOL RESPIRATORY (INHALATION) at 07:55

## 2020-01-01 RX ADMIN — Medication 10 ML: at 05:51

## 2020-01-01 RX ADMIN — CARVEDILOL 6.25 MG: 6.25 TABLET, FILM COATED ORAL at 09:05

## 2020-01-01 RX ADMIN — PANTOPRAZOLE SODIUM 40 MG: 40 TABLET, DELAYED RELEASE ORAL at 05:34

## 2020-01-01 RX ADMIN — ROSUVASTATIN CALCIUM 40 MG: 20 TABLET, COATED ORAL at 21:26

## 2020-01-01 RX ADMIN — MIDODRINE HYDROCHLORIDE 10 MG: 5 TABLET ORAL at 16:26

## 2020-01-01 RX ADMIN — DEXAMETHASONE SODIUM PHOSPHATE 4 MG: 4 INJECTION, SOLUTION INTRAMUSCULAR; INTRAVENOUS at 12:32

## 2020-01-01 RX ADMIN — ACETAMINOPHEN 650 MG: 325 TABLET, FILM COATED ORAL at 12:43

## 2020-01-01 RX ADMIN — MORPHINE SULFATE 2 MG: 2 INJECTION, SOLUTION INTRAMUSCULAR; INTRAVENOUS at 11:42

## 2020-01-01 RX ADMIN — POTASSIUM CHLORIDE 40 MEQ: 20 TABLET, EXTENDED RELEASE ORAL at 12:01

## 2020-01-01 RX ADMIN — PIPERACILLIN SODIUM AND TAZOBACTAM SODIUM 3.38 G: 3; .375 INJECTION, POWDER, LYOPHILIZED, FOR SOLUTION INTRAVENOUS at 05:51

## 2020-01-01 RX ADMIN — MORPHINE SULFATE 2 MG: 2 INJECTION, SOLUTION INTRAMUSCULAR; INTRAVENOUS at 07:49

## 2020-01-01 RX ADMIN — TRAMADOL HYDROCHLORIDE 50 MG: 50 TABLET ORAL at 21:49

## 2020-01-01 RX ADMIN — INSULIN LISPRO 2 UNITS: 100 INJECTION, SOLUTION INTRAVENOUS; SUBCUTANEOUS at 23:38

## 2020-01-01 RX ADMIN — MORPHINE SULFATE 2 MG: 2 INJECTION, SOLUTION INTRAMUSCULAR; INTRAVENOUS at 11:20

## 2020-01-01 RX ADMIN — SODIUM CHLORIDE 100 ML: 900 INJECTION, SOLUTION INTRAVENOUS at 15:59

## 2020-01-01 RX ADMIN — MIDODRINE HYDROCHLORIDE 10 MG: 5 TABLET ORAL at 17:52

## 2020-01-01 RX ADMIN — MONTELUKAST SODIUM 10 MG: 10 TABLET, FILM COATED ORAL at 09:14

## 2020-01-01 RX ADMIN — BUDESONIDE 500 MCG: 0.5 INHALANT RESPIRATORY (INHALATION) at 19:17

## 2020-01-01 RX ADMIN — DEXAMETHASONE SODIUM PHOSPHATE 4 MG: 4 INJECTION, SOLUTION INTRAMUSCULAR; INTRAVENOUS at 11:53

## 2020-01-01 RX ADMIN — INSULIN LISPRO 6 UNITS: 100 INJECTION, SOLUTION INTRAVENOUS; SUBCUTANEOUS at 01:35

## 2020-01-01 RX ADMIN — Medication 10 ML: at 22:00

## 2020-01-01 RX ADMIN — MORPHINE SULFATE 4 MG: 4 INJECTION INTRAVENOUS at 13:51

## 2020-01-01 RX ADMIN — Medication 5 ML: at 13:13

## 2020-01-01 RX ADMIN — BUDESONIDE AND FORMOTEROL FUMARATE DIHYDRATE 2 PUFF: 160; 4.5 AEROSOL RESPIRATORY (INHALATION) at 07:40

## 2020-01-01 RX ADMIN — ASPIRIN 81 MG: 81 TABLET, COATED ORAL at 08:05

## 2020-01-01 RX ADMIN — DEXAMETHASONE SODIUM PHOSPHATE 4 MG: 4 INJECTION, SOLUTION INTRAMUSCULAR; INTRAVENOUS at 12:23

## 2020-01-01 RX ADMIN — ENOXAPARIN SODIUM 40 MG: 40 INJECTION SUBCUTANEOUS at 09:09

## 2020-01-01 RX ADMIN — METOPROLOL SUCCINATE 25 MG: 25 TABLET, EXTENDED RELEASE ORAL at 08:05

## 2020-01-01 RX ADMIN — ALBUTEROL SULFATE 2.5 MG: 2.5 SOLUTION RESPIRATORY (INHALATION) at 13:30

## 2020-01-01 RX ADMIN — ALBUTEROL SULFATE 2.5 MG: 2.5 SOLUTION RESPIRATORY (INHALATION) at 21:41

## 2020-01-01 RX ADMIN — SODIUM CHLORIDE 10 ML: 9 INJECTION, SOLUTION INTRAMUSCULAR; INTRAVENOUS; SUBCUTANEOUS at 06:32

## 2020-01-01 RX ADMIN — IOPAMIDOL 100 ML: 755 INJECTION, SOLUTION INTRAVENOUS at 07:14

## 2020-01-01 RX ADMIN — PHENOBARBITAL SODIUM 65 MG: 65 INJECTION INTRAMUSCULAR; INTRAVENOUS at 08:16

## 2020-01-01 RX ADMIN — Medication 5 ML: at 05:39

## 2020-01-01 RX ADMIN — Medication 5 ML: at 05:31

## 2020-01-01 RX ADMIN — FUROSEMIDE 40 MG: 10 INJECTION, SOLUTION INTRAMUSCULAR; INTRAVENOUS at 08:25

## 2020-01-01 RX ADMIN — TRAMADOL HYDROCHLORIDE 50 MG: 50 TABLET ORAL at 17:04

## 2020-01-01 RX ADMIN — ALBUTEROL SULFATE 2.5 MG: 2.5 SOLUTION RESPIRATORY (INHALATION) at 14:52

## 2020-01-01 RX ADMIN — DEXAMETHASONE SODIUM PHOSPHATE 4 MG: 4 INJECTION, SOLUTION INTRAMUSCULAR; INTRAVENOUS at 14:00

## 2020-01-01 RX ADMIN — MIDODRINE HYDROCHLORIDE 2.5 MG: 5 TABLET ORAL at 09:40

## 2020-01-01 RX ADMIN — ASPIRIN 81 MG: 81 TABLET, CHEWABLE ORAL at 09:03

## 2020-01-01 RX ADMIN — LEVETIRACETAM 500 MG: 100 SOLUTION ORAL at 09:23

## 2020-01-01 RX ADMIN — CARVEDILOL 6.25 MG: 6.25 TABLET, FILM COATED ORAL at 12:24

## 2020-01-01 RX ADMIN — ENOXAPARIN SODIUM 40 MG: 40 INJECTION SUBCUTANEOUS at 08:41

## 2020-01-01 RX ADMIN — BUDESONIDE AND FORMOTEROL FUMARATE DIHYDRATE 2 PUFF: 160; 4.5 AEROSOL RESPIRATORY (INHALATION) at 09:16

## 2020-01-01 RX ADMIN — DEXAMETHASONE SODIUM PHOSPHATE 4 MG: 4 INJECTION, SOLUTION INTRAMUSCULAR; INTRAVENOUS at 05:51

## 2020-01-01 RX ADMIN — PANTOPRAZOLE SODIUM 40 MG: 40 TABLET, DELAYED RELEASE ORAL at 06:35

## 2020-01-01 RX ADMIN — Medication 3 ML: at 21:07

## 2020-01-01 RX ADMIN — BUDESONIDE AND FORMOTEROL FUMARATE DIHYDRATE 2 PUFF: 160; 4.5 AEROSOL RESPIRATORY (INHALATION) at 08:05

## 2020-01-01 RX ADMIN — PERFLUTREN 1 ML: 6.52 INJECTION, SUSPENSION INTRAVENOUS at 09:00

## 2020-01-01 RX ADMIN — MORPHINE SULFATE 1 MG: 2 INJECTION, SOLUTION INTRAMUSCULAR; INTRAVENOUS at 01:34

## 2020-01-01 RX ADMIN — PANTOPRAZOLE SODIUM 40 MG: 40 TABLET, DELAYED RELEASE ORAL at 08:17

## 2020-01-01 RX ADMIN — Medication 3 ML: at 20:05

## 2020-01-01 RX ADMIN — SODIUM CHLORIDE, PRESERVATIVE FREE 3 ML: 5 INJECTION INTRAVENOUS at 17:56

## 2020-01-01 RX ADMIN — POTASSIUM CHLORIDE 40 MEQ: 20 TABLET, EXTENDED RELEASE ORAL at 17:56

## 2020-01-01 RX ADMIN — ASPIRIN 81 MG: 81 TABLET, COATED ORAL at 09:13

## 2020-01-01 RX ADMIN — ALBUTEROL SULFATE 2.5 MG: 2.5 SOLUTION RESPIRATORY (INHALATION) at 22:28

## 2020-01-01 RX ADMIN — ALBUTEROL SULFATE 2.5 MG: 2.5 SOLUTION RESPIRATORY (INHALATION) at 07:09

## 2020-01-01 RX ADMIN — BUDESONIDE AND FORMOTEROL FUMARATE DIHYDRATE 2 PUFF: 160; 4.5 AEROSOL RESPIRATORY (INHALATION) at 08:14

## 2020-01-01 RX ADMIN — Medication 10 ML: at 05:46

## 2020-01-01 RX ADMIN — PIPERACILLIN SODIUM AND TAZOBACTAM SODIUM 3.38 G: 3; .375 INJECTION, POWDER, LYOPHILIZED, FOR SOLUTION INTRAVENOUS at 21:35

## 2020-01-01 RX ADMIN — PHENOBARBITAL SODIUM 65 MG: 65 INJECTION INTRAMUSCULAR; INTRAVENOUS at 08:24

## 2020-01-01 RX ADMIN — BUDESONIDE AND FORMOTEROL FUMARATE DIHYDRATE 2 PUFF: 160; 4.5 AEROSOL RESPIRATORY (INHALATION) at 20:33

## 2020-01-01 RX ADMIN — BARIUM SULFATE 15 ML: 400 PASTE ORAL at 11:24

## 2020-01-01 RX ADMIN — LANSOPRAZOLE 30 MG: KIT at 07:30

## 2020-01-01 RX ADMIN — LEVETIRACETAM 500 MG: 100 SOLUTION ORAL at 22:08

## 2020-01-01 RX ADMIN — CARVEDILOL 3.12 MG: 3.12 TABLET, FILM COATED ORAL at 08:25

## 2020-01-01 RX ADMIN — DEXAMETHASONE SODIUM PHOSPHATE 4 MG: 4 INJECTION, SOLUTION INTRAMUSCULAR; INTRAVENOUS at 05:50

## 2020-01-01 RX ADMIN — BUDESONIDE AND FORMOTEROL FUMARATE DIHYDRATE 2 PUFF: 160; 4.5 AEROSOL RESPIRATORY (INHALATION) at 08:49

## 2020-01-01 RX ADMIN — CARVEDILOL 6.25 MG: 6.25 TABLET, FILM COATED ORAL at 09:23

## 2020-01-01 RX ADMIN — DEXAMETHASONE SODIUM PHOSPHATE 6 MG: 10 INJECTION INTRAMUSCULAR; INTRAVENOUS at 23:32

## 2020-01-01 RX ADMIN — SODIUM CHLORIDE 5 ML: 9 INJECTION, SOLUTION INTRAMUSCULAR; INTRAVENOUS; SUBCUTANEOUS at 05:47

## 2020-01-01 RX ADMIN — BUDESONIDE AND FORMOTEROL FUMARATE DIHYDRATE 2 PUFF: 160; 4.5 AEROSOL RESPIRATORY (INHALATION) at 08:19

## 2020-01-01 RX ADMIN — FLUDROCORTISONE ACETATE 0.1 MG: 0.1 TABLET ORAL at 09:41

## 2020-01-01 RX ADMIN — PIPERACILLIN SODIUM AND TAZOBACTAM SODIUM 3.38 G: 3; .375 INJECTION, POWDER, LYOPHILIZED, FOR SOLUTION INTRAVENOUS at 13:58

## 2020-01-01 RX ADMIN — TRAMADOL HYDROCHLORIDE 50 MG: 50 TABLET ORAL at 21:31

## 2020-01-01 RX ADMIN — MIDODRINE HYDROCHLORIDE 5 MG: 5 TABLET ORAL at 20:18

## 2020-01-01 RX ADMIN — PHENOBARBITAL SODIUM 65 MG: 65 INJECTION INTRAMUSCULAR; INTRAVENOUS at 08:47

## 2020-01-01 RX ADMIN — PHENOBARBITAL SODIUM 65 MG: 65 INJECTION INTRAMUSCULAR; INTRAVENOUS at 21:08

## 2020-01-01 RX ADMIN — DEXAMETHASONE SODIUM PHOSPHATE 4 MG: 4 INJECTION, SOLUTION INTRAMUSCULAR; INTRAVENOUS at 12:02

## 2020-01-01 RX ADMIN — LISINOPRIL 2.5 MG: 5 TABLET ORAL at 08:51

## 2020-01-01 RX ADMIN — ACETAMINOPHEN 650 MG: 325 TABLET, FILM COATED ORAL at 21:00

## 2020-01-01 RX ADMIN — DEXAMETHASONE SODIUM PHOSPHATE 6 MG: 10 INJECTION INTRAMUSCULAR; INTRAVENOUS at 18:09

## 2020-01-01 RX ADMIN — PANTOPRAZOLE SODIUM 40 MG: 40 TABLET, DELAYED RELEASE ORAL at 07:39

## 2020-01-01 RX ADMIN — METOPROLOL SUCCINATE 25 MG: 25 TABLET, EXTENDED RELEASE ORAL at 08:23

## 2020-01-01 RX ADMIN — BUDESONIDE 500 MCG: 0.5 INHALANT RESPIRATORY (INHALATION) at 20:15

## 2020-01-01 RX ADMIN — CARVEDILOL 6.25 MG: 6.25 TABLET, FILM COATED ORAL at 08:37

## 2020-01-01 RX ADMIN — LEVOTHYROXINE SODIUM 75 MCG: 0.07 TABLET ORAL at 06:35

## 2020-01-01 RX ADMIN — MONTELUKAST SODIUM 10 MG: 10 TABLET, FILM COATED ORAL at 09:22

## 2020-01-01 RX ADMIN — LISINOPRIL 2.5 MG: 5 TABLET ORAL at 08:34

## 2020-01-01 RX ADMIN — MIDODRINE HYDROCHLORIDE 5 MG: 5 TABLET ORAL at 11:45

## 2020-01-01 RX ADMIN — ALBUTEROL SULFATE 2.5 MG: 2.5 SOLUTION RESPIRATORY (INHALATION) at 08:28

## 2020-01-01 RX ADMIN — DEXAMETHASONE SODIUM PHOSPHATE 4 MG: 4 INJECTION, SOLUTION INTRAMUSCULAR; INTRAVENOUS at 06:08

## 2020-01-01 RX ADMIN — FUROSEMIDE 40 MG: 40 TABLET ORAL at 12:49

## 2020-01-01 RX ADMIN — ALBUTEROL SULFATE 2.5 MG: 2.5 SOLUTION RESPIRATORY (INHALATION) at 13:01

## 2020-01-01 RX ADMIN — MONTELUKAST SODIUM 10 MG: 10 TABLET, FILM COATED ORAL at 08:17

## 2020-01-01 RX ADMIN — ACETAMINOPHEN 650 MG: 325 TABLET, FILM COATED ORAL at 10:32

## 2020-01-01 RX ADMIN — DEXAMETHASONE SODIUM PHOSPHATE 4 MG: 4 INJECTION, SOLUTION INTRAMUSCULAR; INTRAVENOUS at 05:26

## 2020-01-01 RX ADMIN — METOPROLOL SUCCINATE 25 MG: 25 TABLET, EXTENDED RELEASE ORAL at 08:51

## 2020-01-01 RX ADMIN — Medication 3 ML: at 21:35

## 2020-01-01 RX ADMIN — LEVETIRACETAM 500 MG: 100 SOLUTION ORAL at 18:09

## 2020-01-01 RX ADMIN — LEVETIRACETAM 500 MG: 100 SOLUTION ORAL at 18:05

## 2020-01-01 RX ADMIN — SODIUM CHLORIDE 5 ML: 9 INJECTION, SOLUTION INTRAMUSCULAR; INTRAVENOUS; SUBCUTANEOUS at 21:32

## 2020-01-01 RX ADMIN — SODIUM CHLORIDE 5 ML: 9 INJECTION, SOLUTION INTRAMUSCULAR; INTRAVENOUS; SUBCUTANEOUS at 13:18

## 2020-01-01 RX ADMIN — DEXAMETHASONE SODIUM PHOSPHATE 6 MG: 10 INJECTION INTRAMUSCULAR; INTRAVENOUS at 01:12

## 2020-01-01 RX ADMIN — MONTELUKAST SODIUM 10 MG: 10 TABLET, FILM COATED ORAL at 08:34

## 2020-01-01 RX ADMIN — CARVEDILOL 6.25 MG: 6.25 TABLET, FILM COATED ORAL at 08:52

## 2020-01-01 RX ADMIN — FLUTICASONE PROPIONATE 2 SPRAY: 50 SPRAY, METERED NASAL at 08:36

## 2020-01-01 RX ADMIN — SODIUM CHLORIDE 250 ML: 900 INJECTION, SOLUTION INTRAVENOUS at 10:50

## 2020-01-01 RX ADMIN — INSULIN LISPRO 2 UNITS: 100 INJECTION, SOLUTION INTRAVENOUS; SUBCUTANEOUS at 23:47

## 2020-01-01 RX ADMIN — INSULIN LISPRO 2 UNITS: 100 INJECTION, SOLUTION INTRAVENOUS; SUBCUTANEOUS at 12:24

## 2020-01-01 RX ADMIN — PANTOPRAZOLE SODIUM 40 MG: 40 TABLET, DELAYED RELEASE ORAL at 06:37

## 2020-01-01 RX ADMIN — Medication 10 ML: at 15:00

## 2020-01-01 RX ADMIN — FUROSEMIDE 40 MG: 40 TABLET ORAL at 16:06

## 2020-01-01 RX ADMIN — FUROSEMIDE 20 MG: 10 INJECTION INTRAMUSCULAR; INTRAVENOUS at 12:44

## 2020-01-01 RX ADMIN — INSULIN LISPRO 2 UNITS: 100 INJECTION, SOLUTION INTRAVENOUS; SUBCUTANEOUS at 06:08

## 2020-01-01 RX ADMIN — Medication 10 ML: at 06:11

## 2020-01-01 RX ADMIN — INSULIN LISPRO 2 UNITS: 100 INJECTION, SOLUTION INTRAVENOUS; SUBCUTANEOUS at 05:25

## 2020-01-01 RX ADMIN — LEVOTHYROXINE SODIUM 75 MCG: 0.07 TABLET ORAL at 07:26

## 2020-01-01 RX ADMIN — Medication 10 ML: at 05:59

## 2020-01-01 RX ADMIN — ALBUTEROL SULFATE 2.5 MG: 2.5 SOLUTION RESPIRATORY (INHALATION) at 04:28

## 2020-01-01 RX ADMIN — POTASSIUM CHLORIDE 40 MEQ: 20 TABLET, EXTENDED RELEASE ORAL at 11:45

## 2020-01-01 RX ADMIN — DEXAMETHASONE SODIUM PHOSPHATE 4 MG: 4 INJECTION, SOLUTION INTRAMUSCULAR; INTRAVENOUS at 12:17

## 2020-01-01 RX ADMIN — LORAZEPAM 1 MG: 2 INJECTION INTRAMUSCULAR; INTRAVENOUS at 23:22

## 2020-01-01 RX ADMIN — INSULIN LISPRO 2 UNITS: 100 INJECTION, SOLUTION INTRAVENOUS; SUBCUTANEOUS at 01:05

## 2020-01-01 RX ADMIN — BUDESONIDE AND FORMOTEROL FUMARATE DIHYDRATE 2 PUFF: 160; 4.5 AEROSOL RESPIRATORY (INHALATION) at 19:26

## 2020-01-01 RX ADMIN — DEXAMETHASONE SODIUM PHOSPHATE 4 MG: 4 INJECTION, SOLUTION INTRAMUSCULAR; INTRAVENOUS at 21:50

## 2020-01-01 RX ADMIN — ALBUTEROL SULFATE 2.5 MG: 2.5 SOLUTION RESPIRATORY (INHALATION) at 20:15

## 2020-01-01 RX ADMIN — MIDODRINE HYDROCHLORIDE 10 MG: 5 TABLET ORAL at 12:05

## 2020-01-01 RX ADMIN — Medication 10 ML: at 22:14

## 2020-01-01 RX ADMIN — DEXAMETHASONE SODIUM PHOSPHATE 4 MG: 4 INJECTION, SOLUTION INTRAMUSCULAR; INTRAVENOUS at 23:37

## 2020-01-01 RX ADMIN — ASPIRIN 81 MG: 81 TABLET, COATED ORAL at 09:06

## 2020-01-01 RX ADMIN — ALBUTEROL SULFATE 2.5 MG: 2.5 SOLUTION RESPIRATORY (INHALATION) at 09:01

## 2020-01-01 RX ADMIN — DEXAMETHASONE SODIUM PHOSPHATE 6 MG: 10 INJECTION INTRAMUSCULAR; INTRAVENOUS at 05:39

## 2020-01-01 RX ADMIN — PANTOPRAZOLE SODIUM 40 MG: 40 TABLET, DELAYED RELEASE ORAL at 06:45

## 2020-01-01 RX ADMIN — POLYETHYLENE GLYCOL 3350 17 G: 17 POWDER, FOR SOLUTION ORAL at 09:06

## 2020-01-01 RX ADMIN — LEVOTHYROXINE SODIUM 75 MCG: 75 TABLET ORAL at 05:15

## 2020-01-01 RX ADMIN — LEVETIRACETAM 500 MG: 100 SOLUTION ORAL at 17:58

## 2020-01-01 RX ADMIN — BUDESONIDE 500 MCG: 0.5 INHALANT RESPIRATORY (INHALATION) at 21:04

## 2020-01-01 RX ADMIN — ACETAMINOPHEN 650 MG: 325 TABLET, FILM COATED ORAL at 22:43

## 2020-01-01 RX ADMIN — DEXAMETHASONE SODIUM PHOSPHATE 4 MG: 4 INJECTION, SOLUTION INTRAMUSCULAR; INTRAVENOUS at 05:28

## 2020-01-01 RX ADMIN — Medication 10 ML: at 08:45

## 2020-01-01 RX ADMIN — TRAMADOL HYDROCHLORIDE 50 MG: 50 TABLET ORAL at 17:32

## 2020-01-01 RX ADMIN — DEXAMETHASONE SODIUM PHOSPHATE 4 MG: 4 INJECTION, SOLUTION INTRAMUSCULAR; INTRAVENOUS at 17:18

## 2020-01-01 RX ADMIN — ALBUTEROL SULFATE 2.5 MG: 2.5 SOLUTION RESPIRATORY (INHALATION) at 15:01

## 2020-01-01 RX ADMIN — Medication 10 ML: at 10:05

## 2020-01-01 RX ADMIN — Medication 3 ML: at 21:42

## 2020-01-01 RX ADMIN — LEVETIRACETAM 500 MG: 100 SOLUTION ORAL at 10:04

## 2020-01-01 RX ADMIN — Medication 5 ML: at 17:09

## 2020-01-01 RX ADMIN — LEVETIRACETAM 500 MG: 100 INJECTION, SOLUTION INTRAVENOUS at 21:24

## 2020-01-01 RX ADMIN — FUROSEMIDE 20 MG: 10 INJECTION INTRAMUSCULAR; INTRAVENOUS at 08:49

## 2020-01-01 RX ADMIN — MORPHINE SULFATE 4 MG: 4 INJECTION INTRAVENOUS at 07:50

## 2020-01-01 RX ADMIN — ATORVASTATIN CALCIUM 10 MG: 10 TABLET, FILM COATED ORAL at 09:00

## 2020-01-01 RX ADMIN — PHENOBARBITAL SODIUM 65 MG: 65 INJECTION INTRAMUSCULAR; INTRAVENOUS at 08:37

## 2020-01-01 RX ADMIN — ENOXAPARIN SODIUM 40 MG: 40 INJECTION SUBCUTANEOUS at 09:51

## 2020-01-01 RX ADMIN — INSULIN LISPRO 2 UNITS: 100 INJECTION, SOLUTION INTRAVENOUS; SUBCUTANEOUS at 00:08

## 2020-01-01 RX ADMIN — LISINOPRIL 5 MG: 5 TABLET ORAL at 12:26

## 2020-01-01 RX ADMIN — LEVOTHYROXINE SODIUM 75 MCG: 0.07 TABLET ORAL at 07:25

## 2020-01-01 RX ADMIN — Medication 5 ML: at 05:48

## 2020-01-01 RX ADMIN — BUDESONIDE 500 MCG: 0.5 INHALANT RESPIRATORY (INHALATION) at 20:33

## 2020-01-01 RX ADMIN — ALBUTEROL SULFATE 2.5 MG: 2.5 SOLUTION RESPIRATORY (INHALATION) at 13:26

## 2020-01-01 RX ADMIN — MORPHINE SULFATE 2 MG: 2 INJECTION, SOLUTION INTRAMUSCULAR; INTRAVENOUS at 23:10

## 2020-01-01 RX ADMIN — PANTOPRAZOLE SODIUM 40 MG: 40 TABLET, DELAYED RELEASE ORAL at 08:05

## 2020-01-01 RX ADMIN — FLUDROCORTISONE ACETATE 0.1 MG: 0.1 TABLET ORAL at 09:06

## 2020-01-01 RX ADMIN — DEXAMETHASONE 4 MG: 4 TABLET ORAL at 05:14

## 2020-01-01 RX ADMIN — LISINOPRIL 5 MG: 5 TABLET ORAL at 09:03

## 2020-01-01 RX ADMIN — DEXAMETHASONE SODIUM PHOSPHATE 4 MG: 4 INJECTION, SOLUTION INTRAMUSCULAR; INTRAVENOUS at 05:07

## 2020-01-01 RX ADMIN — PHENOBARBITAL SODIUM 65 MG: 65 INJECTION INTRAMUSCULAR; INTRAVENOUS at 21:18

## 2020-01-01 RX ADMIN — DEXAMETHASONE SODIUM PHOSPHATE 6 MG: 10 INJECTION INTRAMUSCULAR; INTRAVENOUS at 06:00

## 2020-01-01 RX ADMIN — ALBUTEROL SULFATE 2.5 MG: 2.5 SOLUTION RESPIRATORY (INHALATION) at 21:57

## 2020-01-01 RX ADMIN — MONTELUKAST SODIUM 10 MG: 10 TABLET, FILM COATED ORAL at 09:02

## 2020-01-01 RX ADMIN — DEXAMETHASONE SODIUM PHOSPHATE 4 MG: 4 INJECTION, SOLUTION INTRAMUSCULAR; INTRAVENOUS at 17:04

## 2020-01-01 RX ADMIN — ASPIRIN 81 MG: 81 TABLET, COATED ORAL at 09:52

## 2020-01-01 RX ADMIN — Medication 5 ML: at 07:29

## 2020-01-01 RX ADMIN — BUDESONIDE AND FORMOTEROL FUMARATE DIHYDRATE 2 PUFF: 160; 4.5 AEROSOL RESPIRATORY (INHALATION) at 21:27

## 2020-01-01 RX ADMIN — SODIUM CHLORIDE 10 ML: 9 INJECTION, SOLUTION INTRAMUSCULAR; INTRAVENOUS; SUBCUTANEOUS at 13:01

## 2020-01-01 RX ADMIN — MONTELUKAST SODIUM 10 MG: 10 TABLET, FILM COATED ORAL at 09:01

## 2020-01-01 RX ADMIN — INSULIN LISPRO 2 UNITS: 100 INJECTION, SOLUTION INTRAVENOUS; SUBCUTANEOUS at 11:33

## 2020-01-01 RX ADMIN — Medication 3 ML: at 09:24

## 2020-01-01 RX ADMIN — TRAMADOL HYDROCHLORIDE 50 MG: 50 TABLET ORAL at 05:35

## 2020-01-01 RX ADMIN — MIDODRINE HYDROCHLORIDE 5 MG: 5 TABLET ORAL at 09:08

## 2020-01-01 RX ADMIN — ENOXAPARIN SODIUM 40 MG: 40 INJECTION SUBCUTANEOUS at 08:51

## 2020-01-01 RX ADMIN — PANTOPRAZOLE SODIUM 40 MG: 40 TABLET, DELAYED RELEASE ORAL at 06:42

## 2020-01-01 RX ADMIN — FUROSEMIDE 40 MG: 10 INJECTION, SOLUTION INTRAMUSCULAR; INTRAVENOUS at 08:07

## 2020-01-01 RX ADMIN — DEXAMETHASONE SODIUM PHOSPHATE 6 MG: 10 INJECTION INTRAMUSCULAR; INTRAVENOUS at 05:16

## 2020-01-01 RX ADMIN — LISINOPRIL 2.5 MG: 5 TABLET ORAL at 16:45

## 2020-01-01 RX ADMIN — ALBUTEROL SULFATE 2.5 MG: 2.5 SOLUTION RESPIRATORY (INHALATION) at 14:25

## 2020-01-01 RX ADMIN — DEXAMETHASONE SODIUM PHOSPHATE 4 MG: 4 INJECTION, SOLUTION INTRAMUSCULAR; INTRAVENOUS at 05:46

## 2020-01-01 RX ADMIN — Medication 3 ML: at 21:09

## 2020-01-01 RX ADMIN — SODIUM CHLORIDE 10 ML: 9 INJECTION, SOLUTION INTRAMUSCULAR; INTRAVENOUS; SUBCUTANEOUS at 21:47

## 2020-01-01 RX ADMIN — PHENOBARBITAL SODIUM 65 MG: 65 INJECTION INTRAMUSCULAR; INTRAVENOUS at 20:51

## 2020-01-01 RX ADMIN — ALBUTEROL SULFATE 2.5 MG: 2.5 SOLUTION RESPIRATORY (INHALATION) at 21:33

## 2020-01-01 RX ADMIN — DEXAMETHASONE SODIUM PHOSPHATE 6 MG: 10 INJECTION INTRAMUSCULAR; INTRAVENOUS at 13:40

## 2020-01-01 RX ADMIN — FLUTICASONE PROPIONATE 2 SPRAY: 50 SPRAY, METERED NASAL at 09:16

## 2020-01-01 RX ADMIN — FENTANYL CITRATE 50 MCG: 50 INJECTION, SOLUTION INTRAMUSCULAR; INTRAVENOUS at 10:32

## 2020-01-01 RX ADMIN — ALBUTEROL SULFATE 2.5 MG: 2.5 SOLUTION RESPIRATORY (INHALATION) at 14:08

## 2020-01-01 RX ADMIN — MIDODRINE HYDROCHLORIDE 5 MG: 5 TABLET ORAL at 13:53

## 2020-01-01 RX ADMIN — DEXAMETHASONE 4 MG: 4 TABLET ORAL at 05:52

## 2020-01-01 RX ADMIN — DEXAMETHASONE 4 MG: 4 TABLET ORAL at 14:17

## 2020-01-01 RX ADMIN — ROSUVASTATIN CALCIUM 40 MG: 20 TABLET, COATED ORAL at 21:24

## 2020-01-01 RX ADMIN — BUDESONIDE AND FORMOTEROL FUMARATE DIHYDRATE 2 PUFF: 160; 4.5 AEROSOL RESPIRATORY (INHALATION) at 22:28

## 2020-01-01 RX ADMIN — SODIUM CHLORIDE, PRESERVATIVE FREE 3 ML: 5 INJECTION INTRAVENOUS at 00:38

## 2020-01-01 RX ADMIN — FLUDROCORTISONE ACETATE 0.1 MG: 0.1 TABLET ORAL at 07:40

## 2020-01-01 RX ADMIN — Medication 10 ML: at 16:00

## 2020-01-01 RX ADMIN — MORPHINE SULFATE 2 MG: 2 INJECTION, SOLUTION INTRAMUSCULAR; INTRAVENOUS at 20:02

## 2020-01-01 RX ADMIN — BUDESONIDE 500 MCG: 0.5 INHALANT RESPIRATORY (INHALATION) at 07:28

## 2020-01-01 RX ADMIN — CARVEDILOL 3.12 MG: 3.12 TABLET, FILM COATED ORAL at 17:00

## 2020-01-01 RX ADMIN — MONTELUKAST 10 MG: 10 TABLET, FILM COATED ORAL at 08:25

## 2020-01-01 RX ADMIN — ATORVASTATIN CALCIUM 10 MG: 10 TABLET, FILM COATED ORAL at 08:17

## 2020-01-01 RX ADMIN — POLYETHYLENE GLYCOL 3350 17 G: 17 POWDER, FOR SOLUTION ORAL at 12:27

## 2020-01-01 RX ADMIN — ATORVASTATIN CALCIUM 10 MG: 10 TABLET, FILM COATED ORAL at 09:08

## 2020-01-01 RX ADMIN — ALBUTEROL SULFATE 2.5 MG: 2.5 SOLUTION RESPIRATORY (INHALATION) at 14:22

## 2020-01-01 RX ADMIN — Medication 10 ML: at 14:51

## 2020-01-01 RX ADMIN — Medication 3 ML: at 09:33

## 2020-01-01 RX ADMIN — ALBUTEROL SULFATE 2.5 MG: 2.5 SOLUTION RESPIRATORY (INHALATION) at 19:54

## 2020-01-01 RX ADMIN — MONTELUKAST 10 MG: 10 TABLET, FILM COATED ORAL at 09:23

## 2020-01-01 RX ADMIN — Medication 10 ML: at 06:37

## 2020-01-01 RX ADMIN — LORAZEPAM 1 MG: 2 INJECTION INTRAMUSCULAR; INTRAVENOUS at 09:00

## 2020-01-01 RX ADMIN — SODIUM CHLORIDE 10 ML: 9 INJECTION, SOLUTION INTRAMUSCULAR; INTRAVENOUS; SUBCUTANEOUS at 06:00

## 2020-01-01 RX ADMIN — CARVEDILOL 6.25 MG: 6.25 TABLET, FILM COATED ORAL at 18:21

## 2020-01-01 RX ADMIN — Medication 3 ML: at 21:17

## 2020-01-01 RX ADMIN — ONDANSETRON 4 MG: 2 INJECTION INTRAMUSCULAR; INTRAVENOUS at 05:58

## 2020-01-01 RX ADMIN — BUDESONIDE AND FORMOTEROL FUMARATE DIHYDRATE 2 PUFF: 160; 4.5 AEROSOL RESPIRATORY (INHALATION) at 09:29

## 2020-01-01 RX ADMIN — ALBUTEROL SULFATE 2.5 MG: 2.5 SOLUTION RESPIRATORY (INHALATION) at 20:16

## 2020-01-01 RX ADMIN — LEVETIRACETAM 500 MG: 100 SOLUTION ORAL at 17:04

## 2020-01-01 RX ADMIN — FUROSEMIDE 20 MG: 40 TABLET ORAL at 09:52

## 2020-01-01 RX ADMIN — LEVETIRACETAM 500 MG: 100 SOLUTION ORAL at 08:36

## 2020-01-01 RX ADMIN — Medication 3 ML: at 21:51

## 2020-01-01 RX ADMIN — PHENOBARBITAL SODIUM 65 MG: 65 INJECTION INTRAMUSCULAR; INTRAVENOUS at 21:35

## 2020-01-01 RX ADMIN — DEXAMETHASONE 4 MG: 4 TABLET ORAL at 23:12

## 2020-01-01 RX ADMIN — ASPIRIN 81 MG: 81 TABLET, COATED ORAL at 09:02

## 2020-01-01 RX ADMIN — PHENOBARBITAL SODIUM 65 MG: 65 INJECTION INTRAMUSCULAR; INTRAVENOUS at 21:50

## 2020-01-01 RX ADMIN — Medication 10 ML: at 15:16

## 2020-01-01 RX ADMIN — Medication 10 ML: at 21:27

## 2020-01-01 RX ADMIN — PANTOPRAZOLE SODIUM 40 MG: 40 TABLET, DELAYED RELEASE ORAL at 09:40

## 2020-01-01 RX ADMIN — PHENOBARBITAL SODIUM 65 MG: 65 INJECTION INTRAMUSCULAR; INTRAVENOUS at 20:05

## 2020-01-01 RX ADMIN — FUROSEMIDE 10 MG: 20 TABLET ORAL at 08:52

## 2020-01-01 RX ADMIN — MONTELUKAST SODIUM 10 MG: 10 TABLET, FILM COATED ORAL at 08:53

## 2020-01-01 RX ADMIN — FAMOTIDINE 20 MG: 10 INJECTION INTRAVENOUS at 09:43

## 2020-01-01 RX ADMIN — Medication 5 ML: at 06:00

## 2020-01-01 RX ADMIN — FUROSEMIDE 40 MG: 10 INJECTION, SOLUTION INTRAMUSCULAR; INTRAVENOUS at 12:39

## 2020-01-01 RX ADMIN — LEVETIRACETAM 500 MG: 100 INJECTION, SOLUTION INTRAVENOUS at 21:47

## 2020-01-01 RX ADMIN — Medication 5 ML: at 21:14

## 2020-01-01 RX ADMIN — DEXAMETHASONE 4 MG: 4 TABLET ORAL at 17:48

## 2020-01-01 RX ADMIN — ALBUTEROL SULFATE 2.5 MG: 2.5 SOLUTION RESPIRATORY (INHALATION) at 21:04

## 2020-01-01 RX ADMIN — ACETAMINOPHEN 650 MG: 325 TABLET, FILM COATED ORAL at 23:04

## 2020-01-01 RX ADMIN — DEXAMETHASONE SODIUM PHOSPHATE 4 MG: 4 INJECTION, SOLUTION INTRAMUSCULAR; INTRAVENOUS at 05:12

## 2020-01-01 RX ADMIN — LANSOPRAZOLE 30 MG: KIT at 08:36

## 2020-01-01 RX ADMIN — ALBUTEROL SULFATE 2.5 MG: 2.5 SOLUTION RESPIRATORY (INHALATION) at 14:58

## 2020-01-01 RX ADMIN — ASPIRIN 81 MG: 81 TABLET, CHEWABLE ORAL at 13:40

## 2020-01-01 RX ADMIN — FLUTICASONE PROPIONATE 2 SPRAY: 50 SPRAY, METERED NASAL at 08:57

## 2020-01-01 RX ADMIN — MONTELUKAST SODIUM 10 MG: 10 TABLET, FILM COATED ORAL at 09:06

## 2020-01-01 RX ADMIN — MORPHINE SULFATE 4 MG: 4 INJECTION INTRAVENOUS at 17:55

## 2020-01-01 RX ADMIN — DEXAMETHASONE 4 MG: 4 TABLET ORAL at 14:27

## 2020-01-01 RX ADMIN — LORAZEPAM 1 MG: 2 INJECTION INTRAMUSCULAR; INTRAVENOUS at 15:50

## 2020-01-01 RX ADMIN — SODIUM CHLORIDE, PRESERVATIVE FREE 3 ML: 5 INJECTION INTRAVENOUS at 06:16

## 2020-01-01 RX ADMIN — MIDODRINE HYDROCHLORIDE 10 MG: 5 TABLET ORAL at 07:47

## 2020-01-01 RX ADMIN — PIPERACILLIN SODIUM AND TAZOBACTAM SODIUM 3.38 G: 3; .375 INJECTION, POWDER, LYOPHILIZED, FOR SOLUTION INTRAVENOUS at 05:48

## 2020-01-01 RX ADMIN — INSULIN LISPRO 4 UNITS: 100 INJECTION, SOLUTION INTRAVENOUS; SUBCUTANEOUS at 05:31

## 2020-01-01 RX ADMIN — ATORVASTATIN CALCIUM 10 MG: 10 TABLET, FILM COATED ORAL at 08:07

## 2020-01-01 RX ADMIN — LEVETIRACETAM 1000 MG: 100 INJECTION, SOLUTION INTRAVENOUS at 09:02

## 2020-01-01 RX ADMIN — DEXAMETHASONE SODIUM PHOSPHATE 4 MG: 4 INJECTION, SOLUTION INTRAMUSCULAR; INTRAVENOUS at 05:35

## 2020-01-01 RX ADMIN — DEXAMETHASONE SODIUM PHOSPHATE 4 MG: 4 INJECTION, SOLUTION INTRAMUSCULAR; INTRAVENOUS at 00:07

## 2020-01-01 RX ADMIN — ASPIRIN 81 MG: 81 TABLET, COATED ORAL at 08:07

## 2020-01-01 RX ADMIN — LEVETIRACETAM 500 MG: 100 SOLUTION ORAL at 09:46

## 2020-01-01 RX ADMIN — MIDAZOLAM: 1 INJECTION INTRAMUSCULAR; INTRAVENOUS at 10:32

## 2020-01-01 RX ADMIN — ALBUTEROL SULFATE 2.5 MG: 2.5 SOLUTION RESPIRATORY (INHALATION) at 08:11

## 2020-01-01 RX ADMIN — PHENOBARBITAL SODIUM 65 MG: 65 INJECTION INTRAMUSCULAR; INTRAVENOUS at 21:03

## 2020-01-01 RX ADMIN — SODIUM CHLORIDE 100 ML: 900 INJECTION, SOLUTION INTRAVENOUS at 07:14

## 2020-01-01 RX ADMIN — CARVEDILOL 6.25 MG: 6.25 TABLET, FILM COATED ORAL at 17:32

## 2020-01-01 RX ADMIN — PHENOBARBITAL SODIUM 65 MG: 65 INJECTION INTRAMUSCULAR; INTRAVENOUS at 21:12

## 2020-01-01 RX ADMIN — BUDESONIDE AND FORMOTEROL FUMARATE DIHYDRATE 2 PUFF: 160; 4.5 AEROSOL RESPIRATORY (INHALATION) at 10:08

## 2020-01-01 RX ADMIN — DEXAMETHASONE SODIUM PHOSPHATE 6 MG: 10 INJECTION INTRAMUSCULAR; INTRAVENOUS at 06:31

## 2020-01-01 RX ADMIN — SODIUM CHLORIDE, PRESERVATIVE FREE 3 ML: 5 INJECTION INTRAVENOUS at 05:12

## 2020-01-01 RX ADMIN — Medication 5 ML: at 22:06

## 2020-01-01 RX ADMIN — PHENOBARBITAL SODIUM 65 MG: 65 INJECTION INTRAMUSCULAR; INTRAVENOUS at 08:41

## 2020-01-01 RX ADMIN — DEXAMETHASONE SODIUM PHOSPHATE 4 MG: 4 INJECTION, SOLUTION INTRAMUSCULAR; INTRAVENOUS at 12:27

## 2020-01-01 RX ADMIN — MONTELUKAST SODIUM 10 MG: 10 TABLET, FILM COATED ORAL at 09:40

## 2020-01-01 RX ADMIN — LISINOPRIL 2.5 MG: 5 TABLET ORAL at 09:00

## 2020-01-01 RX ADMIN — INSULIN LISPRO 4 UNITS: 100 INJECTION, SOLUTION INTRAVENOUS; SUBCUTANEOUS at 23:23

## 2020-01-01 RX ADMIN — DEXAMETHASONE SODIUM PHOSPHATE 4 MG: 4 INJECTION, SOLUTION INTRAMUSCULAR; INTRAVENOUS at 21:11

## 2020-01-01 RX ADMIN — BUDESONIDE 500 MCG: 0.5 INHALANT RESPIRATORY (INHALATION) at 20:00

## 2020-01-01 RX ADMIN — ENOXAPARIN SODIUM 40 MG: 40 INJECTION SUBCUTANEOUS at 08:40

## 2020-01-01 RX ADMIN — MIDODRINE HYDROCHLORIDE 2.5 MG: 5 TABLET ORAL at 17:49

## 2020-01-01 RX ADMIN — DEXAMETHASONE SODIUM PHOSPHATE 4 MG: 4 INJECTION, SOLUTION INTRAMUSCULAR; INTRAVENOUS at 18:21

## 2020-01-01 RX ADMIN — POLYETHYLENE GLYCOL 3350 17 G: 17 POWDER, FOR SOLUTION ORAL at 09:46

## 2020-01-01 RX ADMIN — MORPHINE SULFATE 4 MG: 4 INJECTION INTRAVENOUS at 00:37

## 2020-01-01 RX ADMIN — CARVEDILOL 6.25 MG: 6.25 TABLET, FILM COATED ORAL at 08:53

## 2020-01-01 RX ADMIN — PHENOBARBITAL SODIUM 65 MG: 65 INJECTION INTRAMUSCULAR; INTRAVENOUS at 21:07

## 2020-01-01 RX ADMIN — PHENOBARBITAL SODIUM 65 MG: 65 INJECTION INTRAMUSCULAR; INTRAVENOUS at 09:11

## 2020-01-01 RX ADMIN — ALBUTEROL SULFATE 2.5 MG: 2.5 SOLUTION RESPIRATORY (INHALATION) at 07:52

## 2020-01-01 RX ADMIN — ATORVASTATIN CALCIUM 10 MG: 10 TABLET, FILM COATED ORAL at 09:49

## 2020-01-01 RX ADMIN — LEVOTHYROXINE SODIUM 75 MCG: 0.07 TABLET ORAL at 07:17

## 2020-01-01 RX ADMIN — PHENOBARBITAL SODIUM 65 MG: 65 INJECTION INTRAMUSCULAR; INTRAVENOUS at 09:24

## 2020-01-01 RX ADMIN — DEXAMETHASONE SODIUM PHOSPHATE 4 MG: 4 INJECTION, SOLUTION INTRAMUSCULAR; INTRAVENOUS at 05:37

## 2020-01-01 RX ADMIN — Medication 10 ML: at 22:15

## 2020-01-01 RX ADMIN — ALBUTEROL SULFATE 2.5 MG: 2.5 SOLUTION RESPIRATORY (INHALATION) at 13:28

## 2020-01-01 RX ADMIN — FUROSEMIDE 20 MG: 40 TABLET ORAL at 08:53

## 2020-01-01 RX ADMIN — INSULIN LISPRO 2 UNITS: 100 INJECTION, SOLUTION INTRAVENOUS; SUBCUTANEOUS at 18:10

## 2020-01-01 RX ADMIN — ALBUTEROL SULFATE 2.5 MG: 2.5 SOLUTION RESPIRATORY (INHALATION) at 14:50

## 2020-01-01 RX ADMIN — DEXAMETHASONE 4 MG: 4 TABLET ORAL at 21:54

## 2020-01-01 RX ADMIN — MONTELUKAST 10 MG: 10 TABLET, FILM COATED ORAL at 12:24

## 2020-01-01 RX ADMIN — ONDANSETRON 4 MG: 2 INJECTION INTRAMUSCULAR; INTRAVENOUS at 23:17

## 2020-01-01 RX ADMIN — METOPROLOL TARTRATE 5 MG: 5 INJECTION INTRAVENOUS at 15:25

## 2020-01-01 RX ADMIN — FLUTICASONE PROPIONATE 2 SPRAY: 50 SPRAY, METERED NASAL at 09:54

## 2020-01-01 RX ADMIN — Medication 5 ML: at 12:48

## 2020-01-01 RX ADMIN — FLUDROCORTISONE ACETATE 0.1 MG: 0.1 TABLET ORAL at 08:53

## 2020-01-01 RX ADMIN — DEXAMETHASONE SODIUM PHOSPHATE 4 MG: 4 INJECTION, SOLUTION INTRAMUSCULAR; INTRAVENOUS at 21:17

## 2020-01-01 RX ADMIN — TRAMADOL HYDROCHLORIDE 50 MG: 50 TABLET ORAL at 06:08

## 2020-01-01 RX ADMIN — DEXAMETHASONE SODIUM PHOSPHATE 4 MG: 4 INJECTION, SOLUTION INTRAMUSCULAR; INTRAVENOUS at 23:48

## 2020-01-01 RX ADMIN — SODIUM CHLORIDE, PRESERVATIVE FREE 3 ML: 5 INJECTION INTRAVENOUS at 13:52

## 2020-01-01 RX ADMIN — FUROSEMIDE 40 MG: 10 INJECTION, SOLUTION INTRAMUSCULAR; INTRAVENOUS at 08:18

## 2020-01-01 RX ADMIN — PANTOPRAZOLE SODIUM 40 MG: 40 TABLET, DELAYED RELEASE ORAL at 08:12

## 2020-01-01 RX ADMIN — LIDOCAINE HYDROCHLORIDE,EPINEPHRINE BITARTRATE 45 MG: 10; .01 INJECTION, SOLUTION INFILTRATION; PERINEURAL at 10:48

## 2020-01-01 RX ADMIN — ALBUTEROL SULFATE 2.5 MG: 2.5 SOLUTION RESPIRATORY (INHALATION) at 07:31

## 2020-01-01 RX ADMIN — PIPERACILLIN SODIUM AND TAZOBACTAM SODIUM 3.38 G: 3; .375 INJECTION, POWDER, LYOPHILIZED, FOR SOLUTION INTRAVENOUS at 21:11

## 2020-01-01 RX ADMIN — BUDESONIDE 500 MCG: 0.5 INHALANT RESPIRATORY (INHALATION) at 19:54

## 2020-01-01 RX ADMIN — BUDESONIDE AND FORMOTEROL FUMARATE DIHYDRATE 2 PUFF: 160; 4.5 AEROSOL RESPIRATORY (INHALATION) at 08:12

## 2020-01-01 RX ADMIN — Medication 10 ML: at 21:35

## 2020-01-01 RX ADMIN — ALBUTEROL SULFATE 2.5 MG: 2.5 SOLUTION RESPIRATORY (INHALATION) at 03:19

## 2020-01-01 RX ADMIN — ACETAMINOPHEN 650 MG: 325 TABLET, FILM COATED ORAL at 16:46

## 2020-01-01 RX ADMIN — DEXAMETHASONE SODIUM PHOSPHATE 4 MG: 4 INJECTION, SOLUTION INTRAMUSCULAR; INTRAVENOUS at 13:51

## 2020-01-01 RX ADMIN — SODIUM CHLORIDE, PRESERVATIVE FREE 3 ML: 5 INJECTION INTRAVENOUS at 05:37

## 2020-01-01 RX ADMIN — SODIUM CHLORIDE, PRESERVATIVE FREE 3 ML: 5 INJECTION INTRAVENOUS at 05:26

## 2020-01-01 RX ADMIN — LEVOTHYROXINE SODIUM 75 MCG: 0.07 TABLET ORAL at 07:38

## 2020-01-01 RX ADMIN — MORPHINE SULFATE 4 MG: 4 INJECTION INTRAVENOUS at 04:13

## 2020-01-01 RX ADMIN — MIDODRINE HYDROCHLORIDE 2.5 MG: 5 TABLET ORAL at 18:39

## 2020-01-01 RX ADMIN — CARVEDILOL 6.25 MG: 6.25 TABLET, FILM COATED ORAL at 08:36

## 2020-01-01 RX ADMIN — SODIUM CHLORIDE 10 ML: 9 INJECTION, SOLUTION INTRAMUSCULAR; INTRAVENOUS; SUBCUTANEOUS at 13:57

## 2020-01-01 RX ADMIN — LORAZEPAM 1 MG: 2 INJECTION INTRAMUSCULAR; INTRAVENOUS at 17:55

## 2020-01-01 RX ADMIN — LORAZEPAM 1 MG: 2 INJECTION INTRAMUSCULAR; INTRAVENOUS at 13:51

## 2020-01-01 RX ADMIN — CARVEDILOL 3.12 MG: 6.25 TABLET, FILM COATED ORAL at 18:18

## 2020-01-01 RX ADMIN — FUROSEMIDE 40 MG: 10 INJECTION, SOLUTION INTRAMUSCULAR; INTRAVENOUS at 17:10

## 2020-01-01 RX ADMIN — Medication 3 ML: at 21:03

## 2020-01-01 RX ADMIN — MONTELUKAST SODIUM 10 MG: 10 TABLET, FILM COATED ORAL at 08:07

## 2020-01-01 RX ADMIN — FUROSEMIDE 40 MG: 10 INJECTION, SOLUTION INTRAMUSCULAR; INTRAVENOUS at 08:15

## 2020-01-01 RX ADMIN — LEVOTHYROXINE SODIUM 75 MCG: 0.07 TABLET ORAL at 08:05

## 2020-01-01 RX ADMIN — LEVOTHYROXINE SODIUM 75 MCG: 0.07 TABLET ORAL at 09:08

## 2020-01-01 RX ADMIN — POTASSIUM CHLORIDE 40 MEQ: 20 TABLET, EXTENDED RELEASE ORAL at 13:11

## 2020-01-01 RX ADMIN — ENOXAPARIN SODIUM 40 MG: 40 INJECTION SUBCUTANEOUS at 14:29

## 2020-01-01 RX ADMIN — SODIUM CHLORIDE 10 ML: 9 INJECTION, SOLUTION INTRAMUSCULAR; INTRAVENOUS; SUBCUTANEOUS at 13:43

## 2020-01-01 RX ADMIN — ROSUVASTATIN CALCIUM 40 MG: 20 TABLET, COATED ORAL at 23:09

## 2020-01-01 RX ADMIN — LANSOPRAZOLE 30 MG: KIT at 05:34

## 2020-01-01 RX ADMIN — ROSUVASTATIN CALCIUM 40 MG: 20 TABLET, COATED ORAL at 22:06

## 2020-01-01 RX ADMIN — LANSOPRAZOLE 30 MG: KIT at 05:53

## 2020-01-01 RX ADMIN — ALBUTEROL SULFATE 2.5 MG: 2.5 SOLUTION RESPIRATORY (INHALATION) at 03:16

## 2020-01-01 RX ADMIN — IBUPROFEN 400 MG: 400 TABLET, FILM COATED ORAL at 16:15

## 2020-01-01 RX ADMIN — FAMOTIDINE 20 MG: 10 INJECTION INTRAVENOUS at 21:10

## 2020-01-01 RX ADMIN — PHENOBARBITAL SODIUM 65 MG: 65 INJECTION INTRAMUSCULAR; INTRAVENOUS at 08:35

## 2020-01-01 RX ADMIN — FUROSEMIDE 40 MG: 10 INJECTION, SOLUTION INTRAMUSCULAR; INTRAVENOUS at 08:36

## 2020-01-01 RX ADMIN — SODIUM CHLORIDE 10 ML: 9 INJECTION, SOLUTION INTRAMUSCULAR; INTRAVENOUS; SUBCUTANEOUS at 22:08

## 2020-01-01 RX ADMIN — PIPERACILLIN SODIUM AND TAZOBACTAM SODIUM 3.38 G: 3; .375 INJECTION, POWDER, LYOPHILIZED, FOR SOLUTION INTRAVENOUS at 14:28

## 2020-01-01 RX ADMIN — SODIUM CHLORIDE 10 ML: 9 INJECTION, SOLUTION INTRAMUSCULAR; INTRAVENOUS; SUBCUTANEOUS at 05:31

## 2020-01-01 RX ADMIN — SODIUM CHLORIDE 5 ML: 9 INJECTION, SOLUTION INTRAMUSCULAR; INTRAVENOUS; SUBCUTANEOUS at 16:33

## 2020-01-01 RX ADMIN — MONTELUKAST 10 MG: 10 TABLET, FILM COATED ORAL at 08:37

## 2020-01-01 RX ADMIN — MORPHINE SULFATE 2 MG: 2 INJECTION, SOLUTION INTRAMUSCULAR; INTRAVENOUS at 08:14

## 2020-01-01 RX ADMIN — Medication 10 ML: at 05:31

## 2020-01-01 RX ADMIN — LEVETIRACETAM 500 MG: 100 SOLUTION ORAL at 09:03

## 2020-01-01 RX ADMIN — Medication 10 ML: at 22:05

## 2020-01-01 RX ADMIN — ALBUTEROL SULFATE 2.5 MG: 2.5 SOLUTION RESPIRATORY (INHALATION) at 14:30

## 2020-01-01 RX ADMIN — INSULIN LISPRO 2 UNITS: 100 INJECTION, SOLUTION INTRAVENOUS; SUBCUTANEOUS at 06:31

## 2020-01-01 RX ADMIN — PIPERACILLIN SODIUM AND TAZOBACTAM SODIUM 3.38 G: 3; .375 INJECTION, POWDER, LYOPHILIZED, FOR SOLUTION INTRAVENOUS at 15:00

## 2020-01-01 RX ADMIN — PHENOBARBITAL SODIUM 65 MG: 65 INJECTION INTRAMUSCULAR; INTRAVENOUS at 09:30

## 2020-01-01 RX ADMIN — FUROSEMIDE 20 MG: 10 INJECTION INTRAMUSCULAR; INTRAVENOUS at 09:03

## 2020-01-01 RX ADMIN — LEVOTHYROXINE SODIUM 75 MCG: 0.07 TABLET ORAL at 06:37

## 2020-01-01 RX ADMIN — DEXAMETHASONE SODIUM PHOSPHATE 4 MG: 4 INJECTION, SOLUTION INTRAMUSCULAR; INTRAVENOUS at 14:12

## 2020-01-01 RX ADMIN — LORAZEPAM 1 MG: 2 INJECTION INTRAMUSCULAR; INTRAVENOUS at 13:30

## 2020-01-01 RX ADMIN — MORPHINE SULFATE 2 MG: 2 INJECTION, SOLUTION INTRAMUSCULAR; INTRAVENOUS at 19:23

## 2020-01-01 RX ADMIN — BUDESONIDE AND FORMOTEROL FUMARATE DIHYDRATE 2 PUFF: 160; 4.5 AEROSOL RESPIRATORY (INHALATION) at 07:52

## 2020-01-01 RX ADMIN — Medication 5 ML: at 22:37

## 2020-01-01 RX ADMIN — IOPAMIDOL 100 ML: 755 INJECTION, SOLUTION INTRAVENOUS at 08:45

## 2020-01-01 RX ADMIN — LEVOTHYROXINE SODIUM 75 MCG: 0.07 TABLET ORAL at 08:17

## 2020-01-01 RX ADMIN — CALCIUM CARBONATE (ANTACID) CHEW TAB 500 MG 200 MG: 500 CHEW TAB at 16:15

## 2020-01-01 RX ADMIN — ASPIRIN 81 MG: 81 TABLET, COATED ORAL at 08:53

## 2020-01-01 RX ADMIN — LORAZEPAM 1 MG: 2 INJECTION INTRAMUSCULAR; INTRAVENOUS at 18:12

## 2020-01-01 RX ADMIN — FLUTICASONE PROPIONATE 2 SPRAY: 50 SPRAY, METERED NASAL at 08:56

## 2020-01-01 RX ADMIN — Medication 3 ML: at 09:29

## 2020-01-01 RX ADMIN — ALBUTEROL SULFATE 2.5 MG: 2.5 SOLUTION RESPIRATORY (INHALATION) at 01:57

## 2020-01-01 RX ADMIN — LEVOTHYROXINE SODIUM 75 MCG: 75 TABLET ORAL at 05:35

## 2020-01-01 RX ADMIN — LEVOTHYROXINE SODIUM 75 MCG: 0.07 TABLET ORAL at 08:53

## 2020-01-01 RX ADMIN — SODIUM CHLORIDE 10 ML: 9 INJECTION, SOLUTION INTRAMUSCULAR; INTRAVENOUS; SUBCUTANEOUS at 14:00

## 2020-01-01 RX ADMIN — DEXAMETHASONE 4 MG: 4 TABLET ORAL at 21:07

## 2020-01-01 RX ADMIN — SODIUM CHLORIDE 150 ML/HR: 900 INJECTION, SOLUTION INTRAVENOUS at 12:48

## 2020-01-01 RX ADMIN — POTASSIUM CHLORIDE 20 MEQ: 200 INJECTION, SOLUTION INTRAVENOUS at 12:17

## 2020-01-01 RX ADMIN — Medication 3 ML: at 21:58

## 2020-01-01 RX ADMIN — LANSOPRAZOLE 30 MG: KIT at 06:23

## 2020-01-01 RX ADMIN — DEXAMETHASONE SODIUM PHOSPHATE 4 MG: 4 INJECTION, SOLUTION INTRAMUSCULAR; INTRAVENOUS at 13:06

## 2020-01-01 RX ADMIN — BUDESONIDE AND FORMOTEROL FUMARATE DIHYDRATE 2 PUFF: 160; 4.5 AEROSOL RESPIRATORY (INHALATION) at 21:16

## 2020-01-01 RX ADMIN — ALBUTEROL SULFATE 2.5 MG: 2.5 SOLUTION RESPIRATORY (INHALATION) at 01:28

## 2020-01-01 RX ADMIN — Medication 10 ML: at 09:20

## 2020-01-01 RX ADMIN — PANTOPRAZOLE SODIUM 40 MG: 40 TABLET, DELAYED RELEASE ORAL at 06:30

## 2020-01-01 RX ADMIN — MIDODRINE HYDROCHLORIDE 10 MG: 5 TABLET ORAL at 09:52

## 2020-01-01 RX ADMIN — PIPERACILLIN SODIUM AND TAZOBACTAM SODIUM 3.38 G: 3; .375 INJECTION, POWDER, LYOPHILIZED, FOR SOLUTION INTRAVENOUS at 22:37

## 2020-01-01 RX ADMIN — DEXAMETHASONE SODIUM PHOSPHATE 4 MG: 4 INJECTION, SOLUTION INTRAMUSCULAR; INTRAVENOUS at 23:10

## 2020-01-01 RX ADMIN — ASPIRIN 81 MG: 81 TABLET, COATED ORAL at 08:17

## 2020-01-01 RX ADMIN — Medication 3 ML: at 08:47

## 2020-01-01 RX ADMIN — IPRATROPIUM BROMIDE AND ALBUTEROL SULFATE 3 ML: .5; 3 SOLUTION RESPIRATORY (INHALATION) at 06:25

## 2020-01-01 RX ADMIN — ASPIRIN 81 MG: 81 TABLET, COATED ORAL at 09:40

## 2020-01-01 RX ADMIN — SODIUM CHLORIDE 5 ML: 9 INJECTION, SOLUTION INTRAMUSCULAR; INTRAVENOUS; SUBCUTANEOUS at 22:16

## 2020-01-01 RX ADMIN — ALBUTEROL SULFATE 2.5 MG: 2.5 SOLUTION RESPIRATORY (INHALATION) at 03:52

## 2020-01-01 RX ADMIN — MORPHINE SULFATE 4 MG: 4 INJECTION INTRAVENOUS at 16:50

## 2020-01-01 RX ADMIN — LEVETIRACETAM 500 MG: 100 INJECTION, SOLUTION INTRAVENOUS at 09:43

## 2020-01-01 RX ADMIN — Medication 10 ML: at 00:21

## 2020-01-01 RX ADMIN — METOPROLOL SUCCINATE 25 MG: 25 TABLET, EXTENDED RELEASE ORAL at 08:50

## 2020-01-01 RX ADMIN — LEVOTHYROXINE SODIUM 75 MCG: 0.07 TABLET ORAL at 06:45

## 2020-01-01 RX ADMIN — Medication 5 ML: at 06:44

## 2020-01-01 RX ADMIN — LISINOPRIL 5 MG: 5 TABLET ORAL at 08:52

## 2020-01-01 RX ADMIN — ALBUTEROL SULFATE 2.5 MG: 2.5 SOLUTION RESPIRATORY (INHALATION) at 10:20

## 2020-01-01 RX ADMIN — INSULIN LISPRO 2 UNITS: 100 INJECTION, SOLUTION INTRAVENOUS; SUBCUTANEOUS at 12:23

## 2020-01-01 RX ADMIN — ENOXAPARIN SODIUM 40 MG: 40 INJECTION SUBCUTANEOUS at 14:51

## 2020-01-01 RX ADMIN — FLUDROCORTISONE ACETATE 0.1 MG: 0.1 TABLET ORAL at 08:40

## 2020-01-01 RX ADMIN — Medication 5 ML: at 14:35

## 2020-01-01 RX ADMIN — ALBUTEROL SULFATE 2.5 MG: 2.5 SOLUTION RESPIRATORY (INHALATION) at 07:17

## 2020-01-01 RX ADMIN — BUDESONIDE AND FORMOTEROL FUMARATE DIHYDRATE 2 PUFF: 160; 4.5 AEROSOL RESPIRATORY (INHALATION) at 10:23

## 2020-01-01 RX ADMIN — SODIUM CHLORIDE 10 ML: 9 INJECTION, SOLUTION INTRAMUSCULAR; INTRAVENOUS; SUBCUTANEOUS at 21:10

## 2020-01-01 RX ADMIN — ALBUTEROL SULFATE 2 PUFF: 108 INHALANT RESPIRATORY (INHALATION) at 05:50

## 2020-01-01 RX ADMIN — MORPHINE SULFATE 2 MG: 2 INJECTION, SOLUTION INTRAMUSCULAR; INTRAVENOUS at 05:57

## 2020-01-01 RX ADMIN — ROSUVASTATIN CALCIUM 40 MG: 20 TABLET, COATED ORAL at 22:16

## 2020-01-01 RX ADMIN — SODIUM CHLORIDE 5 ML: 9 INJECTION, SOLUTION INTRAMUSCULAR; INTRAVENOUS; SUBCUTANEOUS at 06:17

## 2020-01-01 RX ADMIN — DEXAMETHASONE SODIUM PHOSPHATE 6 MG: 10 INJECTION INTRAMUSCULAR; INTRAVENOUS at 00:35

## 2020-01-01 RX ADMIN — MORPHINE SULFATE 2 MG: 2 INJECTION, SOLUTION INTRAMUSCULAR; INTRAVENOUS at 05:58

## 2020-01-01 RX ADMIN — MONTELUKAST SODIUM 10 MG: 10 TABLET, FILM COATED ORAL at 09:52

## 2020-01-01 RX ADMIN — ASPIRIN 81 MG: 81 TABLET, COATED ORAL at 08:34

## 2020-01-01 RX ADMIN — CEFTRIAXONE SODIUM 1 G: 1 INJECTION, POWDER, FOR SOLUTION INTRAMUSCULAR; INTRAVENOUS at 11:39

## 2020-01-01 RX ADMIN — LEVOTHYROXINE SODIUM 75 MCG: 0.07 TABLET ORAL at 07:13

## 2020-01-01 RX ADMIN — ACETAMINOPHEN 650 MG: 325 TABLET, FILM COATED ORAL at 15:19

## 2020-01-01 RX ADMIN — ALBUTEROL SULFATE 2.5 MG: 2.5 SOLUTION RESPIRATORY (INHALATION) at 21:56

## 2020-01-01 RX ADMIN — PANTOPRAZOLE SODIUM 40 MG: 40 TABLET, DELAYED RELEASE ORAL at 07:17

## 2020-01-01 RX ADMIN — MIDODRINE HYDROCHLORIDE 5 MG: 5 TABLET ORAL at 07:45

## 2020-01-01 RX ADMIN — ACETAMINOPHEN 650 MG: 325 TABLET, FILM COATED ORAL at 16:06

## 2020-01-01 RX ADMIN — DEXAMETHASONE SODIUM PHOSPHATE 6 MG: 10 INJECTION INTRAMUSCULAR; INTRAVENOUS at 17:36

## 2020-01-01 RX ADMIN — Medication 10 ML: at 06:49

## 2020-01-01 RX ADMIN — BUDESONIDE 500 MCG: 0.5 INHALANT RESPIRATORY (INHALATION) at 07:19

## 2020-01-01 RX ADMIN — SODIUM CHLORIDE 500 ML: 900 INJECTION, SOLUTION INTRAVENOUS at 00:56

## 2020-01-01 RX ADMIN — BISACODYL 10 MG: 10 SUPPOSITORY RECTAL at 05:57

## 2020-01-01 RX ADMIN — INSULIN LISPRO 2 UNITS: 100 INJECTION, SOLUTION INTRAVENOUS; SUBCUTANEOUS at 18:24

## 2020-01-01 RX ADMIN — POTASSIUM CHLORIDE 20 MEQ: 200 INJECTION, SOLUTION INTRAVENOUS at 16:25

## 2020-01-01 RX ADMIN — LEVOTHYROXINE SODIUM 75 MCG: 0.07 TABLET ORAL at 06:43

## 2020-01-01 RX ADMIN — BUDESONIDE AND FORMOTEROL FUMARATE DIHYDRATE 2 PUFF: 160; 4.5 AEROSOL RESPIRATORY (INHALATION) at 08:33

## 2020-01-01 RX ADMIN — ACETAMINOPHEN 650 MG: 325 TABLET, FILM COATED ORAL at 08:50

## 2020-01-01 RX ADMIN — ALBUTEROL SULFATE 2.5 MG: 2.5 SOLUTION RESPIRATORY (INHALATION) at 09:30

## 2020-01-01 RX ADMIN — ALBUTEROL SULFATE 2.5 MG: 2.5 SOLUTION RESPIRATORY (INHALATION) at 20:33

## 2020-01-01 RX ADMIN — SODIUM CHLORIDE 10 ML: 9 INJECTION, SOLUTION INTRAMUSCULAR; INTRAVENOUS; SUBCUTANEOUS at 05:35

## 2020-01-01 RX ADMIN — SODIUM CHLORIDE 10 ML: 9 INJECTION, SOLUTION INTRAMUSCULAR; INTRAVENOUS; SUBCUTANEOUS at 14:29

## 2020-01-01 RX ADMIN — INSULIN LISPRO 2 UNITS: 100 INJECTION, SOLUTION INTRAVENOUS; SUBCUTANEOUS at 06:39

## 2020-01-01 RX ADMIN — CARVEDILOL 6.25 MG: 6.25 TABLET, FILM COATED ORAL at 09:03

## 2020-01-01 RX ADMIN — ENOXAPARIN SODIUM 40 MG: 40 INJECTION SUBCUTANEOUS at 09:49

## 2020-01-01 RX ADMIN — ATORVASTATIN CALCIUM 10 MG: 10 TABLET, FILM COATED ORAL at 09:06

## 2020-01-01 RX ADMIN — SODIUM CHLORIDE 5 ML: 9 INJECTION, SOLUTION INTRAMUSCULAR; INTRAVENOUS; SUBCUTANEOUS at 21:48

## 2020-01-01 RX ADMIN — FUROSEMIDE 20 MG: 40 TABLET ORAL at 12:42

## 2020-01-01 RX ADMIN — ENOXAPARIN SODIUM 40 MG: 40 INJECTION SUBCUTANEOUS at 08:15

## 2020-01-01 RX ADMIN — TRAMADOL HYDROCHLORIDE 50 MG: 50 TABLET ORAL at 14:50

## 2020-01-01 RX ADMIN — Medication 3 ML: at 21:11

## 2020-01-01 RX ADMIN — BUDESONIDE 500 MCG: 0.5 INHALANT RESPIRATORY (INHALATION) at 07:31

## 2020-01-01 RX ADMIN — Medication 3 ML: at 08:14

## 2020-01-01 RX ADMIN — MORPHINE SULFATE 2 MG: 2 INJECTION, SOLUTION INTRAMUSCULAR; INTRAVENOUS at 11:41

## 2020-01-01 RX ADMIN — BUDESONIDE AND FORMOTEROL FUMARATE DIHYDRATE 2 PUFF: 160; 4.5 AEROSOL RESPIRATORY (INHALATION) at 21:56

## 2020-01-01 RX ADMIN — ALBUTEROL SULFATE 2.5 MG: 2.5 SOLUTION RESPIRATORY (INHALATION) at 00:34

## 2020-01-01 RX ADMIN — ASPIRIN 81 MG: 81 TABLET, CHEWABLE ORAL at 08:53

## 2020-01-01 RX ADMIN — LEVOTHYROXINE SODIUM 75 MCG: 0.07 TABLET ORAL at 06:32

## 2020-01-01 RX ADMIN — FUROSEMIDE 40 MG: 40 TABLET ORAL at 07:13

## 2020-01-01 RX ADMIN — FUROSEMIDE 40 MG: 10 INJECTION, SOLUTION INTRAMUSCULAR; INTRAVENOUS at 08:54

## 2020-01-01 RX ADMIN — SODIUM CHLORIDE 10 ML: 9 INJECTION, SOLUTION INTRAMUSCULAR; INTRAVENOUS; SUBCUTANEOUS at 13:16

## 2020-01-01 RX ADMIN — BUDESONIDE 500 MCG: 0.5 INHALANT RESPIRATORY (INHALATION) at 19:28

## 2020-01-01 RX ADMIN — ACETAMINOPHEN 650 MG: 325 TABLET, FILM COATED ORAL at 20:22

## 2020-01-01 RX ADMIN — Medication 10 ML: at 21:57

## 2020-01-01 RX ADMIN — LORAZEPAM 1 MG: 2 INJECTION INTRAMUSCULAR; INTRAVENOUS at 00:37

## 2020-01-01 RX ADMIN — MONTELUKAST 10 MG: 10 TABLET, FILM COATED ORAL at 16:45

## 2020-01-01 RX ADMIN — Medication 3 ML: at 20:29

## 2020-01-01 RX ADMIN — LEVETIRACETAM 500 MG: 100 INJECTION, SOLUTION INTRAVENOUS at 08:53

## 2020-01-01 RX ADMIN — INSULIN LISPRO 2 UNITS: 100 INJECTION, SOLUTION INTRAVENOUS; SUBCUTANEOUS at 18:28

## 2020-01-01 RX ADMIN — ACETAMINOPHEN 650 MG: 325 TABLET, FILM COATED ORAL at 10:39

## 2020-01-01 RX ADMIN — MORPHINE SULFATE 2 MG: 2 INJECTION, SOLUTION INTRAMUSCULAR; INTRAVENOUS at 17:31

## 2020-01-01 RX ADMIN — MIDODRINE HYDROCHLORIDE 5 MG: 5 TABLET ORAL at 17:20

## 2020-01-01 RX ADMIN — Medication 3 ML: at 20:45

## 2020-01-01 RX ADMIN — BUDESONIDE 500 MCG: 0.5 INHALANT RESPIRATORY (INHALATION) at 07:01

## 2020-01-01 RX ADMIN — CALCIUM CARBONATE (ANTACID) CHEW TAB 500 MG 200 MG: 500 CHEW TAB at 22:42

## 2020-01-01 RX ADMIN — Medication 5 ML: at 17:16

## 2020-01-01 RX ADMIN — Medication 10 ML: at 22:23

## 2020-01-01 RX ADMIN — AZITHROMYCIN 500 MG: 500 INJECTION, POWDER, LYOPHILIZED, FOR SOLUTION INTRAVENOUS at 11:47

## 2020-01-01 RX ADMIN — PIPERACILLIN SODIUM AND TAZOBACTAM SODIUM 3.38 G: 3; .375 INJECTION, POWDER, LYOPHILIZED, FOR SOLUTION INTRAVENOUS at 22:13

## 2020-01-01 RX ADMIN — CALCIUM CARBONATE (ANTACID) CHEW TAB 500 MG 200 MG: 500 CHEW TAB at 22:36

## 2020-06-03 NOTE — PROGRESS NOTES
Spoke with the patient in regards to their CT scan results, explained to the patient per Dr. Leeann Gregory that the CT is stable and that we would like for her to have a follow up appointment. Patient understood the results and did not have any further questions or concerns at this time. Patient agreed to appointment with Kevin FELIX on 07/16/20 @ 8:30 am.  // Natalie REYES

## 2020-06-24 ENCOUNTER — APPOINTMENT (RX ONLY)
Dept: URBAN - METROPOLITAN AREA CLINIC 23 | Facility: CLINIC | Age: 70
Setting detail: DERMATOLOGY
End: 2020-06-24

## 2020-06-24 DIAGNOSIS — Z85.828 PERSONAL HISTORY OF OTHER MALIGNANT NEOPLASM OF SKIN: ICD-10-CM

## 2020-06-24 DIAGNOSIS — H61.03 CHONDRITIS OF EXTERNAL EAR: ICD-10-CM

## 2020-06-24 DIAGNOSIS — L81.4 OTHER MELANIN HYPERPIGMENTATION: ICD-10-CM

## 2020-06-24 DIAGNOSIS — D22 MELANOCYTIC NEVI: ICD-10-CM

## 2020-06-24 PROBLEM — J30.1 ALLERGIC RHINITIS DUE TO POLLEN: Status: ACTIVE | Noted: 2020-06-24

## 2020-06-24 PROBLEM — L55.1 SUNBURN OF SECOND DEGREE: Status: ACTIVE | Noted: 2020-06-24

## 2020-06-24 PROBLEM — D22.5 MELANOCYTIC NEVI OF TRUNK: Status: ACTIVE | Noted: 2020-06-24

## 2020-06-24 PROBLEM — H61.031 CHONDRITIS OF RIGHT EXTERNAL EAR: Status: ACTIVE | Noted: 2020-06-24

## 2020-06-24 PROCEDURE — 99214 OFFICE O/P EST MOD 30 MIN: CPT

## 2020-06-24 PROCEDURE — ? COUNSELING

## 2020-06-24 ASSESSMENT — LOCATION SIMPLE DESCRIPTION DERM
LOCATION SIMPLE: CHEST
LOCATION SIMPLE: LEFT PRETIBIAL REGION
LOCATION SIMPLE: LEFT UPPER BACK
LOCATION SIMPLE: RIGHT EAR
LOCATION SIMPLE: RIGHT PRETIBIAL REGION
LOCATION SIMPLE: RIGHT UPPER BACK
LOCATION SIMPLE: LEFT FOREARM
LOCATION SIMPLE: UPPER BACK
LOCATION SIMPLE: RIGHT FOREARM

## 2020-06-24 ASSESSMENT — LOCATION DETAILED DESCRIPTION DERM
LOCATION DETAILED: RIGHT DISTAL DORSAL FOREARM
LOCATION DETAILED: RIGHT ANTIHELIX
LOCATION DETAILED: RIGHT DISTAL PRETIBIAL REGION
LOCATION DETAILED: RIGHT MEDIAL UPPER BACK
LOCATION DETAILED: LEFT SUPERIOR UPPER BACK
LOCATION DETAILED: SUPERIOR THORACIC SPINE
LOCATION DETAILED: LEFT DISTAL PRETIBIAL REGION
LOCATION DETAILED: LEFT SUPERIOR MEDIAL UPPER BACK
LOCATION DETAILED: LEFT INFERIOR MEDIAL UPPER BACK
LOCATION DETAILED: LEFT DISTAL DORSAL FOREARM
LOCATION DETAILED: MIDDLE STERNUM

## 2020-06-24 ASSESSMENT — LOCATION ZONE DERM
LOCATION ZONE: ARM
LOCATION ZONE: EAR
LOCATION ZONE: TRUNK
LOCATION ZONE: LEG

## 2020-08-03 NOTE — PROGRESS NOTES
Arrived to the [facility]. Cortrosyn Stimulation completed. Patient tolerated well. Any issues or concerns during appointment: no. 
Patient has no future infusion appointments at this time. Discharged ambulatory with self.

## 2020-08-04 NOTE — PROGRESS NOTES
Cortisol stimulates from 11 to 18.8 so adrenal is intact-we want to see at 18 or higher --use the florinef for now and can recheck in 4-6 week to assess the BP and symptoms

## 2020-09-24 NOTE — PROGRESS NOTES
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 There are several new pulmonary nodules noted which may suggest some metastatic disease. This require further follow-up CT of the chest in 2 months or doing PET scan. Please call the patient let her know there was finding and ask her to see if she preferred to do a follow-up CT scan or do PET scan to evaluate these nodules further.

## 2020-10-01 PROBLEM — J18.9 PNA (PNEUMONIA): Status: ACTIVE | Noted: 2020-01-01

## 2020-10-01 NOTE — ED NOTES
TRANSFER - OUT REPORT: 
 
Verbal report given to Kofi Tong RN (name) on [address]  being transferred to 8th floor (unit) for routine progression of care Report consisted of patients Situation, Background, Assessment and  
Recommendations(SBAR). Information from the following report(s) SBAR was reviewed with the receiving nurse. Lines:  
Peripheral IV 10/01/20 Right Antecubital (Active) Site Assessment Clean, dry, & intact 10/01/20 8485 Phlebitis Assessment 0 10/01/20 0835 Infiltration Assessment 0 10/01/20 0835 Dressing Status Clean, dry, & intact 10/01/20 3802 Opportunity for questions and clarification was provided. Patient transported with: 
 [employer]

## 2020-10-01 NOTE — ED PROVIDER NOTES
Patient has a history of GERD, hyperlipidemia and a Hodgkin's lymphoma complaining of dyspnea for the past couple months. She says it is intermittent, typically occurs with exertion. Over the past couple weeks her symptoms have worsened. She is seeing [facility] for this very problem. She states they recently did a series of tests including echocardiogram and CT scan, she does not know the results. She was diagnosed with pneumonia 2 weeks ago, took antibiotics for 7 days. She improved initially but her dyspnea then recurred. She describes some occasional orthopnea at night. She denies any lower extremity swelling. She denies any chest pain or cough. Past Medical History:  
Diagnosis Date  Asthma  Bite of nonvenomous arthropod ? ??  
 Cough  Esophageal stricture 2002  
 dilated 2002  GERD (gastroesophageal reflux disease)  History of rectal bleeding ???  
 Hodgkin's lymphoma ([facility] [address] 1980 Radiation therapy  Hypercholesterolemia  Menopause  Positive RAST testing 2007 IgE level of 1,391  Thyroid disease Past Surgical History:  
Procedure Laterality Date [place]  HX BREAST BIOPSY Left 05/21/2020  
 calcs at 2:00  
 HX CATARACT REMOVAL Bilateral 2007, 2009  HX COLONOSCOPY    
 HX OTHER SURGICAL    
 dental x3  
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 [address] Family History:  
Problem Relation Age of Onset  Cancer Mother Kidney cancer  Cancer Father Prostate cnaceer  Heart Surgery Brother  Breast Cancer Neg Hx Social History Socioeconomic History  Marital status:  Spouse name: Not on file  Number of children: Not on file  Years of education: Not on file  Highest education level: Not on file Occupational History  Occupation:  Social Needs  Financial resource strain: Not on file  Food insecurity Worry: Not on file Inability: Not on file  Transportation needs Medical: Not on file Non-medical: Not on file Tobacco Use  Smoking status: Never Smoker  Smokeless tobacco: Never Used Substance and Sexual Activity  Alcohol use: Yes Alcohol/week: 21.0 standard drinks Types: 7 Shots of liquor, 14 Standard drinks or equivalent per week  Drug use: No  
 Sexual activity: Not on file Lifestyle  Physical activity Days per week: Not on file Minutes per session: Not on file  Stress: Not on file Relationships  Social connections Talks on phone: Not on file Gets together: Not on file Attends Congregation service: Not on file Active member of club or organization: Not on file Attends meetings of clubs or organizations: Not on file Relationship status: Not on file  Intimate partner violence Fear of current or ex partner: Not on file Emotionally abused: Not on file Physically abused: Not on file Forced sexual activity: Not on file Other Topics Concern  Not on file Social History Narrative There is no significant environmental or industrial exposure. She has no known exposure to TB. She has worked as an . ALLERGIES: Compazine [prochlorperazine edisylate] Review of Systems Constitutional: Negative for chills and fever. Respiratory: Positive for shortness of breath. Gastrointestinal: Negative for nausea and vomiting. All other systems reviewed and are negative. Vitals:  
 10/01/20 8498 BP: (!) 149/100 Pulse: (!) 116 Resp: 20 Temp: 98.1 °F (36.7 °C) SpO2: 95% Weight: 54.4 kg (120 lb) Height: 5' 2.5\" (1.588 m) Physical Exam 
Vitals signs and nursing note reviewed. Constitutional:   
   Appearance: Normal appearance. She is well-developed and normal weight. HENT:  
   Head: Normocephalic and atraumatic.   
Eyes:  
   Conjunctiva/sclera: Conjunctivae normal.  
 Pupils: Pupils are equal, round, and reactive to light. Neck: Musculoskeletal: Normal range of motion and neck supple. Pulmonary:  
   Effort: Pulmonary effort is normal. No respiratory distress. Breath sounds: Wheezing present. Musculoskeletal:     
   General: No tenderness. Right lower leg: No edema. Left lower leg: No edema. Skin: 
   General: Skin is warm and dry. Neurological:  
   Mental Status: She is alert and oriented to person, place, and time. Psychiatric:     
   Behavior: Behavior normal.  
 
  
 
MDM Number of Diagnoses or Management Options Pleural effusion: Pneumonia of left lung due to infectious organism, unspecified part of lung: new and requires workup Diagnosis management comments: 9/25/20 NM lung scan: 1. Triple matched decreased perfusion and ventilation and volume loss in left hemithorax in patient with absent left pulmonary artery and right sided aortic arch. 2. No evidence of pulmonary embolic disease involving the aerated and perfused right lung. 9/21/20 CT chest: 1. 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 2. Left lung mass with mild improvement however several additional pulmonary nodules have developed as well as a sclerotic focus at T6 concerning for neoplasm neoplasm/metastatic disease. 3. Small bilateral pleural effusions. 4. Soft tissue density within the left paratracheal region concerning for adenopathy. 5. Patchy groundglass and airspace opacities with mild progression which may be infectious/inflammatory. 6. 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 11:35 AM discussed results with patient, need for admission for IV antibiotics. I spoke with Dr. Gely Berg of pulmonary, recommends hospitalist admission with pulmonary consult. I spoke with Dr. Jo Torres, to see patient for admission. Amount and/or Complexity of Data Reviewed Clinical lab tests: ordered and reviewed Tests in the radiology section of CPT®: ordered and reviewed Tests in the medicine section of CPT®: ordered and reviewed Decide to obtain previous medical records or to obtain history from someone other than the patient: yes Review and summarize past medical records: yes Discuss the patient with other providers: yes Risk of Complications, Morbidity, and/or Mortality Presenting problems: high Diagnostic procedures: moderate Management options: moderate Patient Progress Patient progress: improved Procedures

## 2020-10-01 NOTE — PROGRESS NOTES
Received report from [facility] in ED. Pt. Arrived to floor by stretcher and ambulatory to bed. Pt. Denies pain. 96% on RA. Pt reports dyspnea with activity.

## 2020-10-01 NOTE — H&P
Admit date: 10/1/2020 Name:  Reymundo Johnson Age:  69 y.o.  
:  1950 MRN:  [id number] PCP:  Saima Posey MD  
Provider:  Lily Soto MD 
 
 
CHIEF COMPLAINT: 
SOB HISTORY OF PRESENT ILLNESS: 
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 The patient states that for the past few weeks she has been developing worsening shortness of breath especially on exertion, and associated with some dry cough. She was recently treated with levofloxacin for 7 days for community-acquired pneumonia. She was seen by pulmonary yesterday but she feels that her symptoms significantly got worse today so she decided to come to the emergency department. She denies any fever or chills, no sore throat, no chest pain, no nausea vomiting or diarrhea. In the emergency department patient was found to be tachycardic with radiologic findings concerning of pneumonia. She was given 1 dose of ceftriaxone and 1 dose of azithromycin. She will be admitted to the medical floors for further management. PAST MEDICAL HISTORY: 
Past Medical History:  
Diagnosis Date  Asthma  Bite of nonvenomous arthropod ? ??  
 Cough  Esophageal stricture   
 dilated   GERD (gastroesophageal reflux disease)  History of rectal bleeding ???  
 Hodgkin's lymphoma ([facility] [address] 1980 Radiation therapy  Hypercholesterolemia  Menopause  Positive RAST testing 2007 IgE level of 1,391  Thyroid disease HOME MEDICATION: 
Prior to Admission medications Medication Sig Start Date End Date Taking?  Authorizing Provider  
albuterol (Ventolin HFA) 90 mcg/actuation inhaler Take 2 Puffs by inhalation every four (4) hours as needed for Wheezing. For wheezing. 9/17/20   Rock Ravinder NP  
fluticasone furoate-vilanteroL (Breo Ellipta) 200-25 mcg/dose inhaler Take 1 Puff by inhalation daily. 9/17/20   Rock Ravinder NP  
fluticasone propionate (FLONASE) 50 mcg/actuation nasal spray 2 Sprays by Both Nostrils route daily. 9/17/20   Rock Ravinder NP  
montelukast (SINGULAIR) 10 mg tablet Take 1 Tab by mouth daily. 9/17/20   Rock Ravinder NP  
levoFLOXacin (Levaquin) 750 mg tablet Take 1 Tab by mouth daily. 9/17/20   Rock Ravinder NP  
baclofen (LIORESAL) 10 mg tablet Take 1 Tab by mouth three (3) times daily as needed for Muscle Spasm(s). 9/14/20   Saima Posey MD  
melatonin 5 mg tablet Take 5 mg by mouth nightly. Provider, Historical  
naproxen sodium (NAPROSYN) 220 mg tablet Take 220 mg by mouth two (2) times daily (with meals). Provider, Historical  
acetaminophen (Tylenol Arthritis Pain) 650 mg TbER Take 650 mg by mouth every eight (8) hours. Provider, Historical  
fludrocortisone (FLORINEF) 0.1 mg tablet Take 1 Tab by mouth daily. 7/20/20   Saima Posey MD  
levocetirizine (XYZAL) 5 mg tablet Take 1 Tab by mouth daily. 7/16/20   Rock Ravinder NP  
levothyroxine (SYNTHROID) 75 mcg tablet Take 1 Tab by mouth Daily (before breakfast). Except 1/2 on Monday 6/2/20   Saima Posey MD  
simvastatin (ZOCOR) 20 mg tablet Take 1 Tab by mouth nightly. 5/29/20   Saima Posey MD  
Omeprazole delayed release (PRILOSEC D/R) 20 mg tablet Take 1 Tab by mouth daily. 5/29/20   Saima Posey MD  
melatonin 10 mg cap Take 10 mg by mouth nightly as needed. Provider, Historical  
aspirin delayed-release 81 mg tablet Take 81 mg by mouth daily. Provider, Historical  
omega-3 fatty acids-vitamin e 1,000 mg cap Take 1 Cap by mouth. Provider, Historical  
multivitamin (ONE A DAY) tablet Take 1 Tab by mouth daily.     Provider, Historical  
 
 
 
REVIEW OF SYSTEMS: 
 14 ROS negative except from stated on HPI 
 
 
SOCIAL HISTORY: 
Social History Tobacco Use  Smoking status: Never Smoker  Smokeless tobacco: Never Used Substance Use Topics  Alcohol use: Yes Alcohol/week: 21.0 standard drinks Types: 7 Shots of liquor, 14 Standard drinks or equivalent per week  Drug use: No  
 
 
 
FAMILY HISTORY: 
Family History Problem Relation Age of Onset  Cancer Mother Kidney cancer  Cancer Father Prostate cnaceer  Heart Surgery Brother  Breast Cancer Neg Hx ALLERGIES: 
Allergies Allergen Reactions  Compazine [Prochlorperazine Edisylate] Swelling VITAL SIGNS: 
Vitals:  
 10/01/20 1224 10/01/20 1259 10/01/20 1327 10/01/20 1604 BP:  113/75 (!) 102/91 94/60 Pulse: (!) 110 (!) 114 (!) 120 (!) 106 Resp: 19 21 18 18 Temp:   98.2 °F (36.8 °C) 98 °F (36.7 °C) SpO2: 96% 95% 96% 94% Weight:      
Height: PHYSICAL EXAM: 
General: Alert, oriented, NAD HEENT: NC/AT, EOM are intact Neck: supple, no JVD Cardiovascular: RRR, S1, S2, no murmurs Respiratory: Lungs are clear, no wheezes or rales Abdomen: Soft, NT, ND Back: No CVA tenderness, no paraspinal tenderness Extremities: LE without pedal edema, no erythema Neuro: A&O, CN are intact, no focal deficits Skin: no rash or ulcers Psych: good mood and affect I have personally reviewed patients laboratory data showing Lab Results Component Value Date WBC 11.6 (H) 10/01/2020 HGB 13.7 10/01/2020 HCT 41.5 10/01/2020 MCV 93.5 10/01/2020  10/01/2020 No results found for: CKMB Lab Results Component Value Date GLU 96 10/01/2020  10/01/2020  
 K 3.8 10/01/2020 CO2 29 10/01/2020  10/01/2020 BUN 14 10/01/2020 I have personally reviewed patients EKG showing Sinus tachycardia, ST depressions and T wave inversions on V5 and V6, no prior ER of EKG for comparison I have personally reviewed patients imaging showing XR CHEST PORT Final Result IMPRESSION:  
1. Worsening consolidation in the left mid and lower lung zones suspicious for  
pneumonia. 2.  Enlarging bilateral effusions. ASSESSMENT AND PLAN 
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 1.  Worsening shortness of breath and cough with radiologic findings concerning of community-acquired pneumonia at risk of MDR organisms in the setting of underlying left lung mass. She was given 1 dose of ceftriaxone and 1 dose of azithromycin in the emergency department. Will start the patient on Zosyn for now. Since recently finish 7 days of levofloxacin will not start coverage for atypical organisms. Oxygen therapy to maintain oxygen saturation more than 92%. Symptomatic management. The patient currently does not seem to be septic. Monitor vital signs closely. Will consult pulmonary. Will consider CT of the chest pending pulmonary recommendations. CT of the chest on October 17 did not show pulmonary embolism. COVID test obtained in ED, droplet plus precautions for now. Her dyspnea on exertion could be also cardiac related. She had a echocardiogram yesterday and results are pending. 2. EKG changes with sinus tachycardia and ST depressions and T wave inversions on V5 and V6 concerning of NSTEMI ?demand ischemia. There is no prior EKG to compare. First set of high sensitivity troponin 151, patient does not complain of chest pain. We will continue aspirin, will trend troponin and if trending up will start the patient on heparin drip. Will consult cardiology. 3. 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 Will consult pulmonary. Continue aspirin. 4.  For her asthma we will continue on home inhalers and Singulair. Currently not on exacerbation. 5.  For her hypotension we will continue on her home dose of fludrocortisone and midodrine. 6.  For her hyperlipidemia we will continue on atorvastatin. 7.  For her GERD we will continue on PPI. 8.  For her hypothyroidism will continue on levothyroxine. DVT prophylaxis with Lovenox Full code Patient aware of plan Likely length of stay more than 2 midnights in the setting of community-acquired pneumonia and left lung mass requiring further management

## 2020-10-01 NOTE — ED TRIAGE NOTES
Patient to ed via [facility] with mask in place from home with complaints of shortness of breath for 2-3 months. Patient had an echo yesterday to check her heart but does not know results. Per ems patient was diagnosed with pneumonia 2 weeks ago and  Took a full round of antibiotics but started feeling more short of breath yesterday. Ems placed 20 g in rt ac, bgl 170 temp 98.1, hr 112, bp wnl with ems, 12 lead sinus tach per ems

## 2020-10-02 NOTE — H&P (VIEW-ONLY)
CONSULT NOTE Allen Garcia 10/2/2020 Date of Admission:  10/1/2020 The patient's chart is reviewed and the patient is discussed with the staff. Subjective:  
 
Patient is a 69 y.o.  female seen and evaluated at the request of Dr. Teresa Marin. 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 Pt has recently had issues with hypotension which is being managed by her PCP. She was treated with LVQ x 7 days for CAP. She had a virtual visit with KE Venegas on 9/30/2020 and pt mentioned that her O2 sats had been borderline low and that she had been tachycardic. An echocardiogram was obtained to evaluate for PAH that same day, but the TV jet was inadequate for estimation of RVSP. Pt felt worse and presented to the ER and her CXR was concerning for PNA. Pt was admitted and started on Zosyn. Pt is being ruled out for COVID. We were consulted to assist with workup and treatment. CT was performed which reveals multiple new nodules, bilateral effusions. REVIEW OF SYSTEMS: 
Constitutional:  There is no history of fever, chills, night sweats, weight loss, weight gain, persistent fatigue, or lethargy/hypersomnolence. CV: No chest pain, pressure, discomfort, palpitations, orthopnea, murmurs, or edema. GI: No dysphagia, heartburn reflux, nausea/vomiting, diarrhea, abdominal pain, or bleeding. Neuro: There is no history of AMS, persistent headache, decreased level of consciousness, seizures, or motor or sensory deficits. Patient Active Problem List  
Diagnosis Code  Mild persistent asthma without complication M68.14  Acquired hypothyroidism E03.9  Allergic rhinitis J30.9  GERD (gastroesophageal reflux disease) K21.9  Hypercholesterolemia E78.00  Dysphagia R13.10  Left carotid stenosis I65.22  
 Right carotid artery occlusion I65.21  
 Subclavian artery stenosis (HCC) I77.1  Mass of lingula of lung R91.8  PNA (pneumonia) J18.9 Prior to Admission Medications Prescriptions Last Dose Informant Patient Reported? Taking? Omeprazole delayed release (PRILOSEC D/R) 20 mg tablet   No No  
Sig: Take 1 Tab by mouth daily. acetaminophen (Tylenol Arthritis Pain) 650 mg TbER   Yes No  
Sig: Take 650 mg by mouth every eight (8) hours. albuterol (Ventolin HFA) 90 mcg/actuation inhaler   No No  
Sig: Take 2 Puffs by inhalation every four (4) hours as needed for Wheezing. For wheezing. aspirin delayed-release 81 mg tablet   Yes No  
Sig: Take 81 mg by mouth daily. baclofen (LIORESAL) 10 mg tablet   No No  
Sig: Take 1 Tab by mouth three (3) times daily as needed for Muscle Spasm(s). fludrocortisone (FLORINEF) 0.1 mg tablet   No No  
Sig: Take 1 Tab by mouth daily. fluticasone furoate-vilanteroL (Breo Ellipta) 200-25 mcg/dose inhaler   No No  
Sig: Take 1 Puff by inhalation daily. fluticasone propionate (FLONASE) 50 mcg/actuation nasal spray   No No  
Si Sprays by Both Nostrils route daily. levoFLOXacin (Levaquin) 750 mg tablet   No No  
Sig: Take 1 Tab by mouth daily. levocetirizine (XYZAL) 5 mg tablet   No No  
Sig: Take 1 Tab by mouth daily. levothyroxine (SYNTHROID) 75 mcg tablet   No No  
Sig: Take 1 Tab by mouth Daily (before breakfast). Except 1/2 on Monday  
melatonin 10 mg cap   Yes No  
Sig: Take 10 mg by mouth nightly as needed. melatonin 5 mg tablet   Yes No  
Sig: Take 5 mg by mouth nightly. montelukast (SINGULAIR) 10 mg tablet   No No  
Sig: Take 1 Tab by mouth daily. multivitamin (ONE A DAY) tablet   Yes No  
Sig: Take 1 Tab by mouth daily. naproxen sodium (NAPROSYN) 220 mg tablet   Yes No  
Sig: Take 220 mg by mouth two (2) times daily (with meals).   
omega-3 fatty acids-vitamin e 1,000 mg cap   Yes No  
 Sig: Take 1 Cap by mouth. simvastatin (ZOCOR) 20 mg tablet   No No  
Sig: Take 1 Tab by mouth nightly. Facility-Administered Medications: None Past Medical History:  
Diagnosis Date  Asthma  Bite of nonvenomous arthropod ? ??  
 Cough  Esophageal stricture 2002  
 dilated 2002  GERD (gastroesophageal reflux disease)  History of rectal bleeding ???  
 Hodgkin's lymphoma ([facility] [address] 1980 Radiation therapy  Hypercholesterolemia  Menopause  Positive RAST testing 2007 IgE level of 1,391  Thyroid disease Past Surgical History:  
Procedure Laterality Date [place]  HX BREAST BIOPSY Left 05/21/2020  
 calcs at 2:00  
 HX CATARACT REMOVAL Bilateral 2007, 2009  HX COLONOSCOPY    
 HX OTHER SURGICAL    
 dental x3  
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 [address] Social History Socioeconomic History  Marital status:  Spouse name: Not on file  Number of children: Not on file  Years of education: Not on file  Highest education level: Not on file Occupational History  Occupation:  Social Needs  Financial resource strain: Not on file  Food insecurity Worry: Not on file Inability: Not on file  Transportation needs Medical: Not on file Non-medical: Not on file Tobacco Use  Smoking status: Never Smoker  Smokeless tobacco: Never Used Substance and Sexual Activity  Alcohol use: Yes Alcohol/week: 21.0 standard drinks Types: 7 Shots of liquor, 14 Standard drinks or equivalent per week  Drug use: No  
 Sexual activity: Not on file Lifestyle  Physical activity Days per week: Not on file Minutes per session: Not on file  Stress: Not on file Relationships  Social connections Talks on phone: Not on file Gets together: Not on file Attends Yazidism service: Not on file Active member of club or organization: Not on file Attends meetings of clubs or organizations: Not on file Relationship status: Not on file  Intimate partner violence Fear of current or ex partner: Not on file Emotionally abused: Not on file Physically abused: Not on file Forced sexual activity: Not on file Other Topics Concern  Not on file Social History Narrative There is no significant environmental or industrial exposure. She has no known exposure to TB. She has worked as an . Family History Problem Relation Age of Onset  Cancer Mother Kidney cancer  Cancer Father Prostate cnaceer  Heart Surgery Brother  Breast Cancer Neg Hx Allergies Allergen Reactions  Compazine [Prochlorperazine Edisylate] Swelling Current Facility-Administered Medications Medication Dose Route Frequency  sodium chloride (NS) flush 5-40 mL  5-40 mL IntraVENous Q8H  
 sodium chloride (NS) flush 5-40 mL  5-40 mL IntraVENous PRN  
 acetaminophen (TYLENOL) tablet 650 mg  650 mg Oral Q6H PRN Or  
 acetaminophen (TYLENOL) suppository 650 mg  650 mg Rectal Q6H PRN  polyethylene glycol (MIRALAX) packet 17 g  17 g Oral DAILY PRN  
 enoxaparin (LOVENOX) injection 40 mg  40 mg SubCUTAneous DAILY  ondansetron (ZOFRAN ODT) tablet 4 mg  4 mg Oral Q8H PRN Or  
 ondansetron (ZOFRAN) injection 4 mg  4 mg IntraVENous Q6H PRN  
 albuterol (PROVENTIL HFA, VENTOLIN HFA, PROAIR HFA) inhaler 2 Puff  2 Puff Inhalation Q4H PRN  
 aspirin delayed-release tablet 81 mg  81 mg Oral DAILY  fludrocortisone (FLORINEF) tablet 0.1 mg  0.1 mg Oral DAILY  levothyroxine (SYNTHROID) tablet 75 mcg  75 mcg Oral ACB  montelukast (SINGULAIR) tablet 10 mg  10 mg Oral DAILY  pantoprazole (PROTONIX) tablet 40 mg  40 mg Oral ACB  atorvastatin (LIPITOR) tablet 10 mg  10 mg Oral DAILY  budesonide-formoteroL (SYMBICORT) 160-4.5 mcg/actuation HFA inhaler 2 Puff  2 Puff Inhalation BID RT  
  piperacillin-tazobactam (ZOSYN) 3.375 g in 0.9% sodium chloride (MBP/ADV) 100 mL  3.375 g IntraVENous Q8H  
 midodrine (PROAMATINE) tablet 2.5 mg  2.5 mg Oral TID WITH MEALS  lactated Ringers infusion  50 mL/hr IntraVENous CONTINUOUS Objective:  
 
Vitals:  
 10/02/20 9449 10/02/20 3280 10/02/20 3290 10/02/20 2188 BP: (!) 86/63 103/73 102/73 Pulse: 99 100 99 Resp: 18  16 Temp: 97.9 °F (36.6 °C)  97.8 °F (36.6 °C) SpO2: 96%   95% Weight:      
Height: PHYSICAL EXAM  
 
Constitutional:  the patient is well developed and in no acute distress EENMT:  Sclera clear, pupils equal, oral mucosa moist 
Respiratory: crackles in bilateral bases Cardiovascular:  RRR without M,G,R 
Gastrointestinal: soft and non-tender; with positive bowel sounds. Musculoskeletal: warm without cyanosis. There is trace left lower extremity edema. Skin:  no jaundice or rashes, no wounds Neurologic: no gross neuro deficits Psychiatric:  alert and oriented x 3 CXR:   
 
 
 
 
 
9/21/20 6/1/2020 Echo: -  Left ventricle: Moderate global hypokinesis. Systolic function was 
moderately reduced. Ejection fraction was estimated in the range of 35 % to  
40%. -  Mitral valve: There was mild to moderate regurgitation. 
-  Other Measurements: High velocity overlapping flow noted during Suprasternal imaging (descending aorta appears with normal flow velocities and Superimposed high flow velocities from alternative source of uncertain KDJSEXYZ-~1.4A/A). Consider further imaging as clinically indicated. Recent Labs 10/02/20 
0400 10/01/20 
8751 WBC 12.2* 11.6* HGB 12.7 13.7 HCT 37.7 41.5  390 Recent Labs 10/02/20 
0400 10/01/20 
9151  140  
K 3.9 3.8  107 GLU 85 96 CO2 27 29 BUN 17 14 CREA 0.64 0.67 CA 9.0 9.1 ALB  --  3.3 TBILI  --  0.4 ALT  --  24 No results for input(s): PH, PCO2, PO2, HCO3, PHI, PCO2I, PO2I, HCO3I in the last 72 hours. Recent Labs 10/01/20 
7003 LAC 1.2 Assessment:  (Medical Decision Making) Hospital Problems  Date Reviewed: 10/2/2020 Codes Class Noted POA * (Principal) PNA (pneumonia) ICD-10-CM: J18.9 ICD-9-CM: 100  10/1/2020 Unknown Mass of lingula of lung ICD-10-CM: R91.8 ICD-9-CM: 786.6  10/16/2019 Yes Hypercholesterolemia (Chronic) ICD-10-CM: E78.00 ICD-9-CM: 272.0  6/30/2015 Yes Mild persistent asthma without complication (Chronic) DND-19-RK: J45.30 ICD-9-CM: 493.90  10/17/2013 Yes Acquired hypothyroidism ICD-10-CM: E03.9 ICD-9-CM: 244.9  10/17/2013 Yes GERD (gastroesophageal reflux disease) (Chronic) ICD-10-CM: K21.9 ICD-9-CM: 530.81  10/17/2013 Yes Plan:  (Medical Decision Making) -- The larger mass in the left lung is stable, hamartoma. However the more inferior nodular lingular lesions are more suspicious for metastases. Will need PET-CT and likely CT-guided biopsy. 
-- d/c IV fluids -- continue therapy for pneumonia 
-- f/u COVID test. 
-- based on recent CT I doubt that effusions are large enough to safely attempt thoracentesis. -- will follow with you. More than 50% of the time documented was spent in face-to-face contact with the patient and in the care of the patient on the floor/unit where the patient is located. Thank you very much for this referral.  We appreciate the opportunity to participate in this patient's care. Will follow along with above stated plan.

## 2020-10-02 NOTE — CONSULTS
[facility] Cardiology Consult Date of  Admission: 10/1/2020  8:19 AM  
 
Primary Care Physician:  Dr. Star Morse Primary Cardiologist:  None Referring Physician:   Dr. Olivier Mcmahon Consulting Physician:  [facility]/Reason for consult:  Elevated troponin Kwame Deluca is a 69 y.o. female with PMH of NHL, harmatoma (LLL mass s/p biopsy 11/2019), asthma, chronic L pulmonary artery occlusion (secondary to mass), and new lung nodules, who presented to the ED with c/o progressive shortness of breath for 2-3 months. She is follow closely by [facility] and was diagnosed with pneumonia 2 weeks ago and completed a round of abx. EMS noted patient was tachycardic with a HR of 112. CXR concerning for pneumonia. Labs show WBC 12.2, H&H 12.7/37.7, plt 399, , K 3.9, BUN 17, creatinine 0.64, hs trop 151/202/155. EKG showed ST with inferolateral ST depression. Patient was admitted for treatment. Echo was obtained on 9/30/2020 and showed a reduced EF of 35%. COVID test is pending. Patient denies chest pain, palpitations or syncope. No history of CAD. Past Medical History:  
Diagnosis Date  Asthma  Bite of nonvenomous arthropod ? ??  
 Cough  Esophageal stricture 2002  
 dilated 2002  GERD (gastroesophageal reflux disease)  History of rectal bleeding ???  
 Hodgkin's lymphoma ([facility] [address] 1980 Radiation therapy  Hypercholesterolemia  Menopause  Positive RAST testing 2007 IgE level of 1,391  Thyroid disease Past Surgical History:  
Procedure Laterality Date [place]  HX BREAST BIOPSY Left 05/21/2020  
 calcs at 2:00  
 HX CATARACT REMOVAL Bilateral 2007, 2009  HX COLONOSCOPY    
 HX OTHER SURGICAL    
 dental x3  
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 [address] Allergies Allergen Reactions  Compazine [Prochlorperazine Edisylate] Swelling Family History Problem Relation Age of Onset  Cancer Mother Kidney cancer  Cancer Father Prostate cnaceer  Heart Surgery Brother  Breast Cancer Neg Hx Current Facility-Administered Medications Medication Dose Route Frequency  sodium chloride (NS) flush 5-40 mL  5-40 mL IntraVENous Q8H  
 sodium chloride (NS) flush 5-40 mL  5-40 mL IntraVENous PRN  
 acetaminophen (TYLENOL) tablet 650 mg  650 mg Oral Q6H PRN Or  
 acetaminophen (TYLENOL) suppository 650 mg  650 mg Rectal Q6H PRN  polyethylene glycol (MIRALAX) packet 17 g  17 g Oral DAILY PRN  
 enoxaparin (LOVENOX) injection 40 mg  40 mg SubCUTAneous DAILY  ondansetron (ZOFRAN ODT) tablet 4 mg  4 mg Oral Q8H PRN Or  
 ondansetron (ZOFRAN) injection 4 mg  4 mg IntraVENous Q6H PRN  
 albuterol (PROVENTIL HFA, VENTOLIN HFA, PROAIR HFA) inhaler 2 Puff  2 Puff Inhalation Q4H PRN  
 aspirin delayed-release tablet 81 mg  81 mg Oral DAILY  fludrocortisone (FLORINEF) tablet 0.1 mg  0.1 mg Oral DAILY  levothyroxine (SYNTHROID) tablet 75 mcg  75 mcg Oral ACB  montelukast (SINGULAIR) tablet 10 mg  10 mg Oral DAILY  pantoprazole (PROTONIX) tablet 40 mg  40 mg Oral ACB  atorvastatin (LIPITOR) tablet 10 mg  10 mg Oral DAILY  budesonide-formoteroL (SYMBICORT) 160-4.5 mcg/actuation HFA inhaler 2 Puff  2 Puff Inhalation BID RT  
 piperacillin-tazobactam (ZOSYN) 3.375 g in 0.9% sodium chloride (MBP/ADV) 100 mL  3.375 g IntraVENous Q8H  
 midodrine (PROAMATINE) tablet 2.5 mg  2.5 mg Oral TID WITH MEALS  lactated Ringers infusion  50 mL/hr IntraVENous CONTINUOUS Review of Systems Constitutional: Negative. HENT: Negative. Eyes: Negative. Respiratory: Positive for shortness of breath. Cardiovascular: Negative. Gastrointestinal: Negative. Genitourinary: Negative. Musculoskeletal: Negative. Skin: Negative. Neurological: Negative. Endo/Heme/Allergies: Negative. Psychiatric/Behavioral: Negative. Physical Exam 
Vitals: 10/02/20 2737 10/02/20 0604 10/02/20 4268 10/02/20 6319 BP: (!) 86/63 103/73 102/73 Pulse: 99 100 99 Resp: 18  16 Temp: 97.9 °F (36.6 °C)  97.8 °F (36.6 °C) SpO2: 96%   95% Weight:      
Height:      
 
 
Physical Exam: 
Physical Exam 
 
Cardiographics Telemetry: ST 
ECG: sinus tachycardia, ST depression in inferolateral  
Echocardiogram: -  Left ventricle: Moderate global hypokinesis. Systolic function was 
moderately reduced. Ejection fraction was estimated in the range of 35 % to  
40 
%.   
-  Mitral valve: There was mild to moderate regurgitation. 
  
-  Other Measurements: High velocity overlapping flow noted during  
suprasternal 
imaging (descending aorta appears with normal flow velocities and  
superimposed 
high flow velocities from alternative source of uncertain [id number]-~6.6F/V). Consider further imaging as clinically indicated. Labs:  
Recent Labs 10/02/20 
0400 10/01/20 
3604  140  
K 3.9 3.8 BUN 17 14 CREA 0.64 0.67 GLU 85 96 WBC 12.2* 11.6* HGB 12.7 13.7 HCT 37.7 41.5  390 Assessment/Plan: 
 
 Assessment:  
  
Principal Problem: 
  PNA (pneumonia) --  On abx, pulmonary following Elevated troponin --  Will start coreg 3.125 mg BID, no ACE/ARB due to hypotension. No ischemic work up at this time. Will reevaluate pending clinical course. Mild persistent asthma without complication Acquired hypothyroidism -- continue synthroid GERD (gastroesophageal reflux disease) -- PPI ordered Hypercholesterolemia-- on statin Mass of lingula of lung -- per pulmonary Thank you very much for this referral. We appreciate the opportunity to participate in this patient's care. We will follow along with above stated plan. Guido Pandey NP Consulting MD: Elia

## 2020-10-02 NOTE — PROGRESS NOTES
Progress Note Patient: Paco Bhagat MRN: [id number]  SSN: [id number] YOB: 1950  Age: 69 y.o. Sex: female Admit Date: 10/1/2020 LOS: 1 day Subjective:  
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 The patient states that for the past few weeks she has been developing worsening shortness of breath especially on exertion, and associated with some dry cough. Patient seen and examined at bedside. This morning slightly improvement but still has SOB. Denies chest pain, no abdominal pain, nausea or vomiting. Objective:  
 
Vitals:  
 10/02/20 4197 10/02/20 4948 10/02/20 3514 10/02/20 1066 BP: (!) 86/63 103/73 102/73 Pulse: 99 100 99 Resp: 18  16 Temp: 97.9 °F (36.6 °C)  97.8 °F (36.6 °C) SpO2: 96%   95% Weight:      
Height:      
  
 
Intake and Output: 
Current Shift: No intake/output data recorded. Last three shifts: No intake/output data recorded. ROS 
10 ROS negative except from stated on subjective Physical Exam:  
General: Alert, oriented, NAD HEENT: NC/AT, EOM are intact Neck: supple, no JVD Cardiovascular: RRR, S1, S2, no murmurs Respiratory: Decrease breath sounds, rales, no wheezes Abdomen: Soft, NT, ND Back: No CVA tenderness, no paraspinal tenderness Extremities: LE without pedal edema, no erythema Neuro: A&O, CN are intact, no focal deficits Skin: no rash or ulcers Psych: good mood and affect Lab/Data Review: 
I have personally reviewed patients laboratory data showing Recent Results (from the past 24 hour(s)) TROPONIN-HIGH SENSITIVITY Collection Time: 10/01/20  6:23 PM  
Result Value Ref Range Troponin-High Sensitivity 202.9 (HH) 0 - 14 pg/mL EKG, 12 LEAD, SUBSEQUENT Collection Time: 10/01/20  8:47 PM  
Result Value Ref Range Ventricular Rate 108 BPM  
 Atrial Rate 108 BPM  
 P-R Interval 160 ms QRS Duration 94 ms Q-T Interval 372 ms QTC Calculation (Bezet) 498 ms Calculated P Axis 105 degrees Calculated R Axis 126 degrees Calculated T Axis -92 degrees Diagnosis    
  !!! Suspect arm lead reversal, interpretation assumes no reversal 
Sinus tachycardia Right axis deviation ST & T wave abnormality, consider inferolateral ischemia Abnormal ECG When compared with ECG of 01-OCT-2020 08:27, No significant change was found Confirmed by Gilbert Don MD (), Lorraine James ([id number]) on 10/2/2020 4:72:98 AM 
  
METABOLIC PANEL, BASIC Collection Time: 10/02/20  4:00 AM  
Result Value Ref Range Sodium 140 136 - 145 mmol/L Potassium 3.9 3.5 - 5.1 mmol/L Chloride 107 98 - 107 mmol/L  
 CO2 27 21 - 32 mmol/L Anion gap 6 (L) 7 - 16 mmol/L Glucose 85 65 - 100 mg/dL BUN 17 8 - 23 MG/DL Creatinine 0.64 0.6 - 1.0 MG/DL  
 GFR est AA >60 >60 ml/min/1.73m2 GFR est non-AA >60 >60 ml/min/1.73m2 Calcium 9.0 8.3 - 10.4 MG/DL  
CBC WITH AUTOMATED DIFF Collection Time: 10/02/20  4:00 AM  
Result Value Ref Range WBC 12.2 (H) 4.3 - 11.1 K/uL  
 RBC 4.04 (L) 4.05 - 5.2 M/uL  
 HGB 12.7 11.7 - 15.4 g/dL HCT 37.7 35.8 - 46.3 % MCV 93.3 79.6 - 97.8 FL  
 MCH 31.4 26.1 - 32.9 PG  
 MCHC 33.7 31.4 - 35.0 g/dL  
 RDW 13.5 11.9 - 14.6 % PLATELET 822 594 - 052 K/uL MPV 9.9 9.4 - 12.3 FL ABSOLUTE NRBC 0.00 0.0 - 0.2 K/uL  
 DF AUTOMATED NEUTROPHILS 58 43 - 78 % LYMPHOCYTES 21 13 - 44 % MONOCYTES 13 (H) 4.0 - 12.0 % EOSINOPHILS 7 0.5 - 7.8 % BASOPHILS 1 0.0 - 2.0 % IMMATURE GRANULOCYTES 1 0.0 - 5.0 %  
 ABS. NEUTROPHILS 7.1 1.7 - 8.2 K/UL  
 ABS. LYMPHOCYTES 2.5 0.5 - 4.6 K/UL  
 ABS. MONOCYTES 1.5 (H) 0.1 - 1.3 K/UL  
 ABS. EOSINOPHILS 0.9 (H) 0.0 - 0.8 K/UL  
 ABS. BASOPHILS 0.1 0.0 - 0.2 K/UL ABS. IMM. GRANS. 0.1 0.0 - 0.5 K/UL  
TROPONIN-HIGH SENSITIVITY Collection Time: 10/02/20  4:00 AM  
Result Value Ref Range Troponin-High Sensitivity 155.8 (HH) 0 - 14 pg/mL Image: 
I have personally reviewed patients imaging showing XR CHEST PORT Final Result IMPRESSION:  
1. Worsening consolidation in the left mid and lower lung zones suspicious for  
pneumonia. 2.  Enlarging bilateral effusions. Hospital problems Principal Problem: 
  PNA (pneumonia) (10/1/2020) Active Problems: 
  Mild persistent asthma without complication (48/41/9061) Acquired hypothyroidism (10/17/2013) GERD (gastroesophageal reflux disease) (10/17/2013) Hypercholesterolemia (6/30/2015) Mass of lingula of lung (10/16/2019) Assessment and Plan:  
69-year-old female with a past medical history of asthma, GERD, treatment lymphoma, hyperlipidemia, hypothyroidism, hypotension, chronic left pulmonary artery occlusion, left lung mass status post needle biopsy on November 2019 demonstrating harmatoma, that presents in the setting of worsening shortness of breath.  
  
1. Worsening shortness of breath and cough with radiologic findings concerning of community-acquired pneumonia at risk of MDR organisms in the setting of underlying left lung mass. - Continue Zosyn  
- Since recently finish 7 days of levofloxacin will not start coverage for atypical organisms 
- Oxygen therapy to maintain oxygen saturation more than 92%. - Symptomatic management - Pulm consulted - CT of the chest on October 17 did not show pulmonary embolism - Repeat CT chest 
- SARS CoV2 PCR negative 
  
2. EKG changes with sinus tachycardia and ST depressions and T wave inversions on V5 and V6 likely from demand ischemia - Tropnin trended down - Telemetry - Continue aspirin 
- TTE results pending - Cardiology consulted 
  
3.  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 Pulm consulted. Continue aspirin. 
  
4. For her asthma we will continue on home inhalers and Singulair. Currently not on exacerbation. 
  
5. For her hypotension we will continue on her home dose of fludrocortisone and midodrine. 
  
6. For her hyperlipidemia we will continue on atorvastatin. 
  
7. For her GERD we will continue on PPI. 
  
8. For her hypothyroidism will continue on levothyroxine. 
  
DVT prophylaxis with Lovenox I have reviewed, updated, and verified this note's content and spent 38 minutes of my 42 minutes visit performing counseling and coordination of care regarding medical management. Signed By: Alfie Linares MD   
 October 2, 2020

## 2020-10-02 NOTE — PROGRESS NOTES
Shift assessment complete. Pt alert and oriented x4. Respiration even and unlabored. Lung sounds diminished on room air. HR regular. Tele in place per MD orders. Abdomen soft with active bowel sounds in all 4 quadrants. No edema noted. IV patent and capped. Pt denies pain and nausea. Pt having some SOB. Raised head of bed to 60 degree. Safety measures in place; call light within reach, bed lowered and locked, gripper socks on, and side rails x2 up. Pt needs all met at this time. Will continue to monitor.

## 2020-10-03 NOTE — PROCEDURES
THORACENTESIS  
10/03/20 Indication/Preprocedure diagnosis: LEFT Pleural effusion Volume of fluid withdrawn: 1050ml Postprocedural Diagnosis:  Pleural effusion After obtaining informed consent the patient was placed sitting up on the side of the bed and using ultrasound guidance the pleural fluid was located on the LEFT side  and marked. The diaphragm and atelectatic lung were clearly visualized. The patient's skin was cleaned with ChloraPrep. Using sterile technique the skin was anesthetized with 10mL of 1% Xylocaine and a stab wound made and a catheter inserted into the pleural space with 1050 cc of clear/yellow -appearing fluid was removed. The patient tolerated the procedure well. The pleural fluid is sent for diagnostic studies (see orders). Ultrasound revealed no evidence of pneumothorax. There where no complications and negligible blood loss.   
 
John Vazuqez MD

## 2020-10-03 NOTE — PROGRESS NOTES
PROGRESS NOTE Larey Claude 10/3/2020 Date of Admission:  10/1/2020 The patient's chart is reviewed and the patient is discussed with the staff. Subjective:  
 
Patient is a 69 y.o.  female seen and evaluated at the request of Dr. Reanna Olson. 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 Pt has recently had issues with hypotension which is being managed by her PCP. She was treated with LVQ x 7 days for CAP. She had a virtual visit with KE Hernandez on 9/30/2020 and pt mentioned that her O2 sats had been borderline low and that she had been tachycardic. An echocardiogram was obtained to evaluate for PAH that same day, but the TV jet was inadequate for estimation of RVSP. Pt felt worse and presented to the ER and her CXR was concerning for PNA. Pt was admitted and started on Zosyn. Pt is being ruled out for COVID. We were consulted to assist with workup and treatment. CT was performed which reveals multiple new nodules, bilateral effusions. REVIEW OF SYSTEMS: 
Constitutional:  There is no history of fever, chills, night sweats, weight loss, weight gain, persistent fatigue, or lethargy/hypersomnolence. CV: No chest pain, pressure, discomfort, palpitations, orthopnea, murmurs, or edema. GI: No dysphagia, heartburn reflux, nausea/vomiting, diarrhea, abdominal pain, or bleeding. Neuro: There is no history of AMS, persistent headache, decreased level of consciousness, seizures, or motor or sensory deficits. Patient Active Problem List  
Diagnosis Code  Mild persistent asthma without complication U55.34  Acquired hypothyroidism E03.9  Allergic rhinitis J30.9  GERD (gastroesophageal reflux disease) K21.9  Hypercholesterolemia E78.00  Dysphagia R13.10  Left carotid stenosis I65.22  
 Right carotid artery occlusion I65.21  
 Subclavian artery stenosis (HCC) I77.1  Mass of lingula of lung R91.8  PNA (pneumonia) J18.9 Prior to Admission Medications Prescriptions Last Dose Informant Patient Reported? Taking? Omeprazole delayed release (PRILOSEC D/R) 20 mg tablet   No No  
Sig: Take 1 Tab by mouth daily. acetaminophen (Tylenol Arthritis Pain) 650 mg TbER   Yes No  
Sig: Take 650 mg by mouth every eight (8) hours. albuterol (Ventolin HFA) 90 mcg/actuation inhaler   No No  
Sig: Take 2 Puffs by inhalation every four (4) hours as needed for Wheezing. For wheezing. aspirin delayed-release 81 mg tablet   Yes No  
Sig: Take 81 mg by mouth daily. baclofen (LIORESAL) 10 mg tablet   No No  
Sig: Take 1 Tab by mouth three (3) times daily as needed for Muscle Spasm(s). fludrocortisone (FLORINEF) 0.1 mg tablet   No No  
Sig: Take 1 Tab by mouth daily. fluticasone furoate-vilanteroL (Breo Ellipta) 200-25 mcg/dose inhaler   No No  
Sig: Take 1 Puff by inhalation daily. fluticasone propionate (FLONASE) 50 mcg/actuation nasal spray   No No  
Si Sprays by Both Nostrils route daily. levoFLOXacin (Levaquin) 750 mg tablet   No No  
Sig: Take 1 Tab by mouth daily. levocetirizine (XYZAL) 5 mg tablet   No No  
Sig: Take 1 Tab by mouth daily. levothyroxine (SYNTHROID) 75 mcg tablet   No No  
Sig: Take 1 Tab by mouth Daily (before breakfast). Except 1/2 on Monday  
melatonin 10 mg cap   Yes No  
Sig: Take 10 mg by mouth nightly as needed. melatonin 5 mg tablet   Yes No  
Sig: Take 5 mg by mouth nightly. montelukast (SINGULAIR) 10 mg tablet   No No  
Sig: Take 1 Tab by mouth daily. multivitamin (ONE A DAY) tablet   Yes No  
Sig: Take 1 Tab by mouth daily. naproxen sodium (NAPROSYN) 220 mg tablet   Yes No  
Sig: Take 220 mg by mouth two (2) times daily (with meals).   
omega-3 fatty acids-vitamin e 1,000 mg cap   Yes No  
 Sig: Take 1 Cap by mouth. simvastatin (ZOCOR) 20 mg tablet   No No  
Sig: Take 1 Tab by mouth nightly. Facility-Administered Medications: None Past Medical History:  
Diagnosis Date  Asthma  Bite of nonvenomous arthropod ? ??  
 Cough  Esophageal stricture 2002  
 dilated 2002  GERD (gastroesophageal reflux disease)  History of rectal bleeding ???  
 Hodgkin's lymphoma ([facility] [address] 1980 Radiation therapy  Hypercholesterolemia  Menopause  Positive RAST testing 2007 IgE level of 1,391  Thyroid disease Past Surgical History:  
Procedure Laterality Date [place]  HX BREAST BIOPSY Left 05/21/2020  
 calcs at 2:00  
 HX CATARACT REMOVAL Bilateral 2007, 2009  HX COLONOSCOPY    
 HX OTHER SURGICAL    
 dental x3  
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 [address] Social History Socioeconomic History  Marital status:  Spouse name: Not on file  Number of children: Not on file  Years of education: Not on file  Highest education level: Not on file Occupational History  Occupation:  Social Needs  Financial resource strain: Not on file  Food insecurity Worry: Not on file Inability: Not on file  Transportation needs Medical: Not on file Non-medical: Not on file Tobacco Use  Smoking status: Never Smoker  Smokeless tobacco: Never Used Substance and Sexual Activity  Alcohol use: Yes Alcohol/week: 21.0 standard drinks Types: 7 Shots of liquor, 14 Standard drinks or equivalent per week  Drug use: No  
 Sexual activity: Not on file Lifestyle  Physical activity Days per week: Not on file Minutes per session: Not on file  Stress: Not on file Relationships  Social connections Talks on phone: Not on file Gets together: Not on file Attends Mandaeism service: Not on file Active member of club or organization: Not on file Attends meetings of clubs or organizations: Not on file Relationship status: Not on file  Intimate partner violence Fear of current or ex partner: Not on file Emotionally abused: Not on file Physically abused: Not on file Forced sexual activity: Not on file Other Topics Concern  Not on file Social History Narrative There is no significant environmental or industrial exposure. She has no known exposure to TB. She has worked as an . Family History Problem Relation Age of Onset  Cancer Mother Kidney cancer  Cancer Father Prostate cnaceer  Heart Surgery Brother  Breast Cancer Neg Hx Allergies Allergen Reactions  Compazine [Prochlorperazine Edisylate] Swelling Current Facility-Administered Medications Medication Dose Route Frequency  fluticasone propionate (FLONASE) 50 mcg/actuation nasal spray 2 Spray  2 Spray Both Nostrils DAILY  carvediloL (COREG) tablet 3.125 mg  3.125 mg Oral BID WITH MEALS  sodium chloride (NS) flush 5-40 mL  5-40 mL IntraVENous Q8H  
 sodium chloride (NS) flush 5-40 mL  5-40 mL IntraVENous PRN  
 acetaminophen (TYLENOL) tablet 650 mg  650 mg Oral Q6H PRN Or  
 acetaminophen (TYLENOL) suppository 650 mg  650 mg Rectal Q6H PRN  polyethylene glycol (MIRALAX) packet 17 g  17 g Oral DAILY PRN  
 enoxaparin (LOVENOX) injection 40 mg  40 mg SubCUTAneous DAILY  ondansetron (ZOFRAN ODT) tablet 4 mg  4 mg Oral Q8H PRN Or  
 ondansetron (ZOFRAN) injection 4 mg  4 mg IntraVENous Q6H PRN  
 albuterol (PROVENTIL HFA, VENTOLIN HFA, PROAIR HFA) inhaler 2 Puff  2 Puff Inhalation Q4H PRN  
 aspirin delayed-release tablet 81 mg  81 mg Oral DAILY  fludrocortisone (FLORINEF) tablet 0.1 mg  0.1 mg Oral DAILY  levothyroxine (SYNTHROID) tablet 75 mcg  75 mcg Oral ACB  montelukast (SINGULAIR) tablet 10 mg  10 mg Oral DAILY  pantoprazole (PROTONIX) tablet 40 mg  40 mg Oral ACB  atorvastatin (LIPITOR) tablet 10 mg  10 mg Oral DAILY  budesonide-formoteroL (SYMBICORT) 160-4.5 mcg/actuation HFA inhaler 2 Puff  2 Puff Inhalation BID RT  
 piperacillin-tazobactam (ZOSYN) 3.375 g in 0.9% sodium chloride (MBP/ADV) 100 mL  3.375 g IntraVENous Q8H  
 midodrine (PROAMATINE) tablet 2.5 mg  2.5 mg Oral TID WITH MEALS Objective:  
 
Vitals:  
 10/02/20 3908 10/03/20 0003 10/03/20 2246 10/03/20 7616 BP: (!) 97/55 105/70 106/74 113/83 Pulse: 95  95 100 Resp: 16  17 22 Temp: 97.8 °F (36.6 °C)  97.8 °F (36.6 °C) 97.6 °F (36.4 °C) SpO2: 98%  98% 97% Weight:      
Height: PHYSICAL EXAM  
 
Constitutional:  the patient is well developed and in no acute distress EENMT:  Sclera clear, pupils equal, oral mucosa moist 
Respiratory: crackles in bilateral bases Cardiovascular:  RRR without M,G,R 
Gastrointestinal: soft and non-tender; with positive bowel sounds. Musculoskeletal: warm without cyanosis. There is trace left lower extremity edema. Skin:  no jaundice or rashes, no wounds Neurologic: no gross neuro deficits Psychiatric:  alert and oriented x 3 CXR:   
 
 
 
 
 
 
9/21/20 6/1/2020 Echo: -  Left ventricle: Moderate global hypokinesis. Systolic function was 
moderately reduced. Ejection fraction was estimated in the range of 35 % to  
40%. -  Mitral valve: There was mild to moderate regurgitation. 
-  Other Measurements: High velocity overlapping flow noted during Suprasternal imaging (descending aorta appears with normal flow velocities and Superimposed high flow velocities from alternative source of uncertain CUKDYYLI-~4.7X/W). Consider further imaging as clinically indicated. Recent Labs 10/03/20 
4732 10/02/20 
0400 10/01/20 
5336 WBC 12.4* 12.2* 11.6* HGB 12.5 12.7 13.7 HCT 38.7 37.7 41.5  399 390 Recent Labs 10/03/20 
0616 10/02/20 
0400 10/01/20 0828  
 140 140  
K 3.7 3.9 3.8 * 107 107 GLU 93 85 96 CO2 27 27 29 BUN 18 17 14 CREA 0.53* 0.64 0.67 CA 8.6 9.0 9.1 ALB  --   --  3.3 TBILI  --   --  0.4 ALT  --   --  24 No results for input(s): PH, PCO2, PO2, HCO3, PHI, PCO2I, PO2I, HCO3I in the last 72 hours. Recent Labs 10/01/20 
2898 LAC 1.2 Assessment:  (Medical Decision Making) Hospital Problems  Date Reviewed: 10/2/2020 Codes Class Noted POA * (Principal) PNA (pneumonia) ICD-10-CM: J18.9 ICD-9-CM: 852  10/1/2020 Unknown Mass of lingula of lung ICD-10-CM: R91.8 ICD-9-CM: 786.6  10/16/2019 Yes Hypercholesterolemia (Chronic) ICD-10-CM: E78.00 ICD-9-CM: 272.0  6/30/2015 Yes Mild persistent asthma without complication (Chronic) FKI-24-FH: J45.30 ICD-9-CM: 493.90  10/17/2013 Yes Acquired hypothyroidism ICD-10-CM: E03.9 ICD-9-CM: 244.9  10/17/2013 Yes GERD (gastroesophageal reflux disease) (Chronic) ICD-10-CM: K21.9 ICD-9-CM: 530.81  10/17/2013 Yes Plan:  (Medical Decision Making) -- The larger mass in the left lung is stable, hamartoma. However the more inferior nodular lingular lesions are more suspicious for metastases. Will need PET-CT and likely CT-guided biopsy. 
-- d/c IV fluids -- continue therapy for pneumonia 
-- f/u COVID test. 
-- based on recent CT I doubt that effusions are large enough to safely attempt thoracentesis. -- will follow with you. More than 50% of the time documented was spent in face-to-face contact with the patient and in the care of the patient on the floor/unit where the patient is located. Thank you very much for this referral.  We appreciate the opportunity to participate in this patient's care. Will follow along with above stated plan.

## 2020-10-03 NOTE — INTERVAL H&P NOTE
Update History & Physical 
 
The Patient's History and Physical of 10//1/20 was reviewed with the patient and I examined the patient. There was no change. The surgical site was confirmed by the patient and me. Plan:  The risk, benefits, expected outcome, and alternative to the recommended procedure have been discussed with the patient. Patient understands and wants to proceed with the procedure.  
 
Electronically signed by Ami Goldstein MD on 10/3/2020 at 2:12 PM

## 2020-10-03 NOTE — PROGRESS NOTES
Problem: Risk for Spread of Infection Goal: Prevent transmission of infectious organism to others Description: Prevent the transmission of infectious organisms to other patients, staff members, and visitors. Outcome: Progressing Towards Goal 
  
Problem: Patient Education:  Go to Education Activity Goal: Patient/Family Education Outcome: Progressing Towards Goal 
  
Problem: Breathing Pattern - Ineffective Goal: *Absence of hypoxia Outcome: Progressing Towards Goal 
Goal: *Use of effective breathing techniques Outcome: Progressing Towards Goal 
Goal: *PALLIATIVE CARE:  Alleviation of Dyspnea Outcome: Progressing Towards Goal 
  
Problem: Patient Education: Go to Patient Education Activity Goal: Patient/Family Education Outcome: Progressing Towards Goal

## 2020-10-03 NOTE — PROGRESS NOTES
Shift assessment: 
Pt alert, oriented X4. On 3 L NC. Dyspnea with exertion. Crackles on auscultation. No acute distress noted. HR regular, tachycardic. Abdomen soft, non tender. Denies pain or other needs. Call light within reach. Bed in low, locked position.

## 2020-10-03 NOTE — PROGRESS NOTES
Kain Marie Admission Date: 10/1/2020 Daily Progress Note: 10/3/2020 The patient's chart is reviewed and the patient is discussed with the staff. 64yoF with h/o LLL hamartoma s/p biopsy 11/2019, ashtma, chronic absence of L pulmonary artery, prior Hodgkin lymphoma in 1973 who was admitted with hypoxia, new pulmonary nodules, pleural effusions and newly reduced EF. Subjective: CXR suggestive of worsening effusions and patient was more dyspneic yesterday. Getting lasix now. Current Facility-Administered Medications Medication Dose Route Frequency  fluticasone propionate (FLONASE) 50 mcg/actuation nasal spray 2 Spray  2 Spray Both Nostrils DAILY  carvediloL (COREG) tablet 3.125 mg  3.125 mg Oral BID WITH MEALS  sodium chloride (NS) flush 5-40 mL  5-40 mL IntraVENous Q8H  
 sodium chloride (NS) flush 5-40 mL  5-40 mL IntraVENous PRN  
 acetaminophen (TYLENOL) tablet 650 mg  650 mg Oral Q6H PRN Or  
 acetaminophen (TYLENOL) suppository 650 mg  650 mg Rectal Q6H PRN  polyethylene glycol (MIRALAX) packet 17 g  17 g Oral DAILY PRN  
 enoxaparin (LOVENOX) injection 40 mg  40 mg SubCUTAneous DAILY  ondansetron (ZOFRAN ODT) tablet 4 mg  4 mg Oral Q8H PRN Or  
 ondansetron (ZOFRAN) injection 4 mg  4 mg IntraVENous Q6H PRN  
 albuterol (PROVENTIL HFA, VENTOLIN HFA, PROAIR HFA) inhaler 2 Puff  2 Puff Inhalation Q4H PRN  
 aspirin delayed-release tablet 81 mg  81 mg Oral DAILY  fludrocortisone (FLORINEF) tablet 0.1 mg  0.1 mg Oral DAILY  levothyroxine (SYNTHROID) tablet 75 mcg  75 mcg Oral ACB  montelukast (SINGULAIR) tablet 10 mg  10 mg Oral DAILY  pantoprazole (PROTONIX) tablet 40 mg  40 mg Oral ACB  atorvastatin (LIPITOR) tablet 10 mg  10 mg Oral DAILY  budesonide-formoteroL (SYMBICORT) 160-4.5 mcg/actuation HFA inhaler 2 Puff  2 Puff Inhalation BID RT  
 piperacillin-tazobactam (ZOSYN) 3.375 g in 0.9% sodium chloride (MBP/ADV) 100 mL  3.375 g IntraVENous Q8H  
 midodrine (PROAMATINE) tablet 2.5 mg  2.5 mg Oral TID WITH MEALS Review of Systems Constitutional: negative for fever, chills, sweats Cardiovascular: negative for chest pain, palpitations, syncope, edema Gastrointestinal:  negative for dysphagia, reflux, vomiting, diarrhea, abdominal pain, or melena Neurologic:  negative for focal weakness, numbness, headache Objective:  
 
Vitals:  
 10/03/20 0003 10/03/20 9785 10/03/20 5955 10/03/20 9987 BP: 105/70 106/74 113/83 Pulse:  95 100 Resp:  17 22 Temp:  97.8 °F (36.6 °C) 97.6 °F (36.4 °C) SpO2:  98% 97% 95% Weight:      
Height:      
 
 
 
Intake/Output Summary (Last 24 hours) at 10/3/2020 1257 Last data filed at 10/3/2020 0345 Gross per 24 hour Intake 200 ml Output  Net 200 ml Physical Exam:  
Constitution:  the patient is well developed and in no acute distress EENMT:  Sclera clear, pupils equal, oral mucosa moist 
Respiratory: crackles in bases Cardiovascular:  RRR without M,G,R 
Gastrointestinal: soft and non-tender; with positive bowel sounds. Musculoskeletal: warm without cyanosis. There is no lower extremity edema. Skin:  no jaundice or rashes, no wounds Neurologic: no gross neuro deficits Psychiatric:  alert and oriented x 3 CXR: worsened effusions. LAB No results for input(s): GLUCPOC in the last 72 hours. No lab exists for component: [place] Recent Labs 10/03/20 
5667 10/02/20 
0400 10/01/20 
0260 WBC 12.4* 12.2* 11.6* HGB 12.5 12.7 13.7 HCT 38.7 37.7 41.5  399 390 Recent Labs 10/03/20 
9323 10/02/20 
0400 10/01/20 
1013  140 140  
K 3.7 3.9 3.8 * 107 107 CO2 27 27 29 GLU 93 85 96 BUN 18 17 14 CREA 0.53* 0.64 0.67 CA 8.6 9.0 9.1 ALB  --   --  3.3 TBILI  --   --  0.4 ALT  --   --  24 No results for input(s): PH, PCO2, PO2, HCO3, PHI, PCO2I, PO2I, HCO3I in the last 72 hours. Recent Labs 10/01/20 
8035 LAC 1.2 Assessment:  (Medical Decision Making) Hospital Problems  Date Reviewed: 10/2/2020 Codes Class Noted POA * (Principal) PNA (pneumonia) ICD-10-CM: J18.9 ICD-9-CM: 441  10/1/2020 Unknown Mass of lingula of lung ICD-10-CM: R91.8 ICD-9-CM: 786.6  10/16/2019 Yes Hypercholesterolemia (Chronic) ICD-10-CM: E78.00 ICD-9-CM: 272.0  6/30/2015 Yes Mild persistent asthma without complication (Chronic) AQI-08-AS: J45.30 ICD-9-CM: 493.90  10/17/2013 Yes Acquired hypothyroidism ICD-10-CM: E03.9 ICD-9-CM: 244.9  10/17/2013 Yes GERD (gastroesophageal reflux disease) (Chronic) ICD-10-CM: K21.9 ICD-9-CM: 530.81  10/17/2013 Yes Plan:  (Medical Decision Making) --Will ultrasound and consider thoracentesis as CXR is now suggestive of enlarged effusions. Please avoid more IV fluids. --will arrange outpatient PET-CT and CT-guided biopsy of nodule. More than 50% of the time documented was spent in face-to-face contact with the patient and in the care of the patient on the floor/unit where the patient is located.  
 
Shady Messina MD

## 2020-10-03 NOTE — PROCEDURES
THORACENTESIS  
10/03/20 Indication/Preprocedure diagnosis: RIGHT  Pleural effusion Volume of fluid withdrawn: 800ml Postprocedural Diagnosis:  Pleural effusion After obtaining informed consent the patient was placed sitting up on the side of the bed and using ultrasound guidance the pleural fluid was located on the RIGHT side  and marked. The diaphragm and atelectatic lung were clearly visualized. The patient's skin was cleaned with ChloraPrep. Using sterile technique the skin was anesthetized with 10mL of 1% Xylocaine and a stab wound made and a catheter inserted into the pleural space with 800 cc of clear/yellow-appearing fluid was removed. The patient tolerated the procedure well. The pleural fluid is sent for diagnostic studies (see orders). Ultrasound revealed no evidence of pneumothorax. There where no complications and negligible blood loss.   
 
Shady Messina MD

## 2020-10-03 NOTE — PROGRESS NOTES
[facility] CARDIOLOGY PROGRESS NOTE 
      
10/3/2020 11:51 AM 
 
Admit Date: 10/1/2020 Subjective: Dyspnea had improved yesterday, but worsened today. Cough. ROS: 
Cardiovascular:  As noted above Objective:  
  
Vitals:  
 10/02/20 6830 10/03/20 0003 10/03/20 6029 10/03/20 8086 BP: (!) 97/55 105/70 106/74 113/83 Pulse: 95  95 100 Resp: 16  17 22 Temp: 97.8 °F (36.6 °C)  97.8 °F (36.6 °C) 97.6 °F (36.4 °C) SpO2: 98%  98% 97% Weight:      
Height:      
 
Physical Exam: 
General-No Acute Distress, awake , alert Neck- supple, no JVD 
CV- regular rate and rhythm no MRG Lung- decreased at the bases bilaterally, rare wheezes Abd- soft, nontender, nondistended Ext- no edema bilaterally in legs Skin- warm and dry Data Review:  
Recent Labs 10/03/20 
0616 10/02/20 
0400  140  
K 3.7 3.9 BUN 18 17 CREA 0.53* 0.64 GLU 93 85 WBC 12.4* 12.2* HGB 12.5 12.7 HCT 38.7 37.7  399 Assessment/Plan:  
 
Principal Problem: 
  PNA (pneumonia) (10/1/2020) 
  - on ABX, pulm following , no plan for thora - COVID negative Active Problems: 
  Newly diagnosed cardiomyopathy 
  - unclear etiology at this time  
  - continue ASA, Atorvastatin for risk factor modification - Lasix today. - no evidence of ACS, no emergent indication for coronary angiography  
  - tolerating Coreg 3.125 mg P BID , although BP limits therapy escalation  
  - would defer ischemic evaluation to outpatient setting unless patient were to become unstable given concerns about underlying progressing malignancy. Mild persistent asthma without complication (26/23/6977) - nebs Acquired hypothyroidism (10/17/2013) 
  - on synthroid GERD (gastroesophageal reflux disease) (10/17/2013) - ppi Hypercholesterolemia (6/30/2015) - continue statin Mass of lingula of lung (10/16/2019) - per pulmonary, possible CT/PEt in future Tex Scheuermann, DO 
10/3/2020 11:51 AM

## 2020-10-03 NOTE — PROGRESS NOTES
Pt sat up on side of bed for thoracentesis. Consent obtained. Time out performed. Pts vitals monitored throughout procedure. Left ultrasound done and pic taken of pleural fluid.  ~1050 ml yellow pleural fluid from L. Pt tolerated procedure well with no adverse rxn. Specimens sent to the lab x 3 and labeled appropriately. Right ultrasound done and pic taken of pleural fluid. ~800ml yellow pleural fluid from R. Site dressed appropriately and report given to pts RN. Lung sliding done and ultrasound findings reviewed by MD.  
O2 decreased to 1L, pt tolerated well.

## 2020-10-03 NOTE — PROGRESS NOTES
Progress Note Patient: Bay Sloan MRN: [id number]  SSN: [id number] YOB: 1950  Age: 69 y.o. Sex: female Admit Date: 10/1/2020 LOS: 2 days Subjective:  
65-year-old female with a past medical history of asthma, GERD, treatment lymphoma, hyperlipidemia, hypothyroidism, hypotension, chronic left pulmonary artery occlusion, left lung mass status post needle biopsy on November 2019 demonstrating harmatoma, that presents in the setting of worsening shortness of breath. The patient states that for the past few weeks she has been developing worsening shortness of breath especially on exertion, and associated with some dry cough. Patient seen and examined at bedside. This morning worsening SOB. Denies chest pain, no abdominal pain, nausea or vomiting. Objective:  
 
Vitals:  
 10/03/20 0003 10/03/20 0927 10/03/20 5590 10/03/20 1087 BP: 105/70 106/74 113/83 Pulse:  95 100 Resp:  17 22 Temp:  97.8 °F (36.6 °C) 97.6 °F (36.4 °C) SpO2:  98% 97% 95% Weight:      
Height:      
  
 
Intake and Output: 
Current Shift: No intake/output data recorded. Last three shifts: 10/01 1901 - 10/03 0700 In: 200 [P.O.:200] Out: -  
 
ROS 
10 ROS negative except from stated on subjective Physical Exam:  
General: Alert, oriented, NAD HEENT: NC/AT, EOM are intact Neck: supple, no JVD Cardiovascular: RRR, S1, S2, no murmurs Respiratory: Decrease breath sounds, rales, no wheezes Abdomen: Soft, NT, ND Back: No CVA tenderness, no paraspinal tenderness Extremities: LE without pedal edema, no erythema Neuro: A&O, CN are intact, no focal deficits Skin: no rash or ulcers Psych: good mood and affect Lab/Data Review: 
I have personally reviewed patients laboratory data showing Recent Results (from the past 24 hour(s)) CBC WITH AUTOMATED DIFF Collection Time: 10/03/20  6:16 AM  
Result Value Ref Range WBC 12.4 (H) 4.3 - 11.1 K/uL  
 RBC 4.06 4.05 - 5.2 M/uL HGB 12.5 11.7 - 15.4 g/dL HCT 38.7 35.8 - 46.3 % MCV 95.3 79.6 - 97.8 FL  
 MCH 30.8 26.1 - 32.9 PG  
 MCHC 32.3 31.4 - 35.0 g/dL  
 RDW 13.5 11.9 - 14.6 % PLATELET 591 441 - 726 K/uL MPV 9.7 9.4 - 12.3 FL ABSOLUTE NRBC 0.00 0.0 - 0.2 K/uL  
 DF AUTOMATED NEUTROPHILS 54 43 - 78 % LYMPHOCYTES 23 13 - 44 % MONOCYTES 11 4.0 - 12.0 % EOSINOPHILS 11 (H) 0.5 - 7.8 % BASOPHILS 1 0.0 - 2.0 % IMMATURE GRANULOCYTES 0 0.0 - 5.0 %  
 ABS. NEUTROPHILS 6.6 1.7 - 8.2 K/UL  
 ABS. LYMPHOCYTES 2.8 0.5 - 4.6 K/UL  
 ABS. MONOCYTES 1.4 (H) 0.1 - 1.3 K/UL  
 ABS. EOSINOPHILS 1.4 (H) 0.0 - 0.8 K/UL  
 ABS. BASOPHILS 0.1 0.0 - 0.2 K/UL  
 ABS. IMM. GRANS. 0.0 0.0 - 0.5 K/UL METABOLIC PANEL, BASIC Collection Time: 10/03/20  6:16 AM  
Result Value Ref Range Sodium 140 136 - 145 mmol/L Potassium 3.7 3.5 - 5.1 mmol/L Chloride 108 (H) 98 - 107 mmol/L  
 CO2 27 21 - 32 mmol/L Anion gap 5 (L) 7 - 16 mmol/L Glucose 93 65 - 100 mg/dL BUN 18 8 - 23 MG/DL Creatinine 0.53 (L) 0.6 - 1.0 MG/DL  
 GFR est AA >60 >60 ml/min/1.73m2 GFR est non-AA >60 >60 ml/min/1.73m2 Calcium 8.6 8.3 - 10.4 MG/DL Image: 
I have personally reviewed patients imaging showing XR CHEST SNGL V Final Result IMPRESSION: 1. Slight worsening of bilateral infiltrates. 2. Persisting bilateral small pleural effusions. XR CHEST PORT Final Result IMPRESSION:  
1. Worsening consolidation in the left mid and lower lung zones suspicious for  
pneumonia. 2.  Enlarging bilateral effusions. Hospital problems Principal Problem: 
  PNA (pneumonia) (10/1/2020) Active Problems: 
  Mild persistent asthma without complication (98/00/1451) Acquired hypothyroidism (10/17/2013) GERD (gastroesophageal reflux disease) (10/17/2013) Hypercholesterolemia (6/30/2015) Mass of lingula of lung (10/16/2019) Assessment and Plan: 56-year-old female with a past medical history of asthma, GERD, treatment lymphoma, hyperlipidemia, hypothyroidism, hypotension, chronic left pulmonary artery occlusion, left lung mass status post needle biopsy on November 2019 demonstrating harmatoma, that presents in the setting of worsening shortness of breath.  
  
1. Worsening shortness of breath and cough with radiologic findings concerning of community-acquired pneumonia at risk of MDR organisms in the setting of underlying left lung mass. Also concerns of pulmonary edema in the setting of HFrEF.   
- Continue Zosyn  
- Since recently finish 7 days of levofloxacin will not start coverage for atypical organisms 
- Oxygen therapy to maintain oxygen saturation more than 92%. - Symptomatic management 
- CT of the chest on October 17 did not show pulmonary embolism - SARS CoV2 PCR negative - Lasix 20mg IV once 
- I&Os 
- Daily weights - Pulm recs appreciated 
  
2. EKG changes with sinus tachycardia and ST depressions and T wave inversions on V5 and V6 likely from demand ischemia. HFrEF. 
- Tropnin trended down - Telemetry - Continue aspirin 
- TTE EF 35-40% 
- Started on coreg - Cardiology recs appreciated 
  
3. 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 Pulm consulted. Continue aspirin. 
  
4. For her asthma we will continue on home inhalers and Singulair. Currently not on exacerbation. 
  
5. For her hypotension we will continue on her home dose of fludrocortisone and midodrine. 
  
6. For her hyperlipidemia we will continue on atorvastatin. 
  
7. For her GERD we will continue on PPI. 
  
8. For her hypothyroidism will continue on levothyroxine. 
  
DVT prophylaxis with Lovenox I have reviewed, updated, and verified this note's content and spent 38 minutes of my 42 minutes visit performing counseling and coordination of care regarding medical management. Signed By: Jo Richards MD   
 October 3, 2020

## 2020-10-04 NOTE — PROGRESS NOTES
Problem: Risk for Spread of Infection Goal: Prevent transmission of infectious organism to others Description: Prevent the transmission of infectious organisms to other patients, staff members, and visitors. Outcome: Progressing Towards Goal 
  
Problem: Patient Education:  Go to Education Activity Goal: Patient/Family Education Outcome: Progressing Towards Goal 
  
Problem: Breathing Pattern - Ineffective Goal: *Absence of hypoxia Outcome: Progressing Towards Goal 
Goal: *Use of effective breathing techniques Outcome: Progressing Towards Goal 
Goal: *PALLIATIVE CARE:  Alleviation of Dyspnea Outcome: Progressing Towards Goal 
  
Problem: Patient Education: Go to Patient Education Activity Goal: Patient/Family Education Outcome: Progressing Towards Goal 
  
Problem: Falls - Risk of 
Goal: *Absence of Falls Description: Document Abner Foley Fall Risk and appropriate interventions in the flowsheet. Outcome: Progressing Towards Goal 
Note: Fall Risk Interventions: 
  
 
  
 
  
 
Elimination Interventions: Patient to call for help with toileting needs Problem: Patient Education: Go to Patient Education Activity Goal: Patient/Family Education Outcome: Progressing Towards Goal

## 2020-10-04 NOTE — PROGRESS NOTES
[place] Patient with chronic hypotension on midodrine and recently started on coreg. Called regarding hypotension Have increased midodrine dose to 5 mg tid (will give extra dose tonight) Put holding parameters on coreg. May have to dc this med all together.  Defer to day team.

## 2020-10-04 NOTE — PROGRESS NOTES
Problem: Falls - Risk of 
Goal: *Absence of Falls Description: Document Elina Flores Fall Risk and appropriate interventions in the flowsheet. Outcome: Progressing Towards Goal 
Note: Fall Risk Interventions: 
  
 
  
 
  
 
Elimination Interventions: Patient to call for help with toileting needs

## 2020-10-04 NOTE — PROGRESS NOTES
Progress Note Patient: Jarek Lewis MRN: [id number]  SSN: [id number] YOB: 1950  Age: 69 y.o. Sex: female Admit Date: 10/1/2020 LOS: 3 days Subjective:  
65-year-old female with a past medical history of asthma, GERD, treatment lymphoma, hyperlipidemia, hypothyroidism, hypotension, chronic left pulmonary artery occlusion, left lung mass status post needle biopsy on November 2019 demonstrating harmatoma, that presents in the setting of worsening shortness of breath. The patient states that for the past few weeks she has been developing worsening shortness of breath especially on exertion, and associated with some dry cough. Patient seen and examined at bedside. This morning improved SOB. Denies chest pain, no abdominal pain, nausea or vomiting. Objective:  
 
Vitals:  
 10/04/20 0339 10/04/20 0820 10/04/20 0849 10/04/20 1200 BP: 93/63 (!) 87/60  (!) 73/46 Pulse: 87 87  87 Resp: 17 17  16 Temp: 98 °F (36.7 °C) 98 °F (36.7 °C)  98.2 °F (36.8 °C) SpO2: 98% 100% 98% 98% Weight:      
Height:      
  
 
Intake and Output: 
Current Shift: No intake/output data recorded. Last three shifts: 10/02 1901 - 10/04 0700 In: 5 [P.O.:420] Out: 400 [Urine:400] ROS 
10 ROS negative except from stated on subjective Physical Exam:  
General: Alert, oriented, NAD HEENT: NC/AT, EOM are intact Neck: supple, no JVD Cardiovascular: RRR, S1, S2, no murmurs Respiratory: Decrease breath sounds, rales, no wheezes Abdomen: Soft, NT, ND Back: No CVA tenderness, no paraspinal tenderness Extremities: LE without pedal edema, no erythema Neuro: A&O, CN are intact, no focal deficits Skin: no rash or ulcers Psych: good mood and affect Lab/Data Review: 
I have personally reviewed patients laboratory data showing Recent Results (from the past 24 hour(s)) CBC WITH AUTOMATED DIFF Collection Time: 10/04/20  7:04 AM  
Result Value Ref Range WBC 9.3 4.3 - 11.1 K/uL RBC 3.49 (L) 4.05 - 5.2 M/uL  
 HGB 10.6 (L) 11.7 - 15.4 g/dL HCT 33.0 (L) 35.8 - 46.3 % MCV 94.6 79.6 - 97.8 FL  
 MCH 30.4 26.1 - 32.9 PG  
 MCHC 32.1 31.4 - 35.0 g/dL  
 RDW 13.5 11.9 - 14.6 % PLATELET 032 412 - 855 K/uL MPV 9.9 9.4 - 12.3 FL ABSOLUTE NRBC 0.00 0.0 - 0.2 K/uL  
 DF AUTOMATED NEUTROPHILS 49 43 - 78 % LYMPHOCYTES 25 13 - 44 % MONOCYTES 13 (H) 4.0 - 12.0 % EOSINOPHILS 12 (H) 0.5 - 7.8 % BASOPHILS 1 0.0 - 2.0 % IMMATURE GRANULOCYTES 0 0.0 - 5.0 %  
 ABS. NEUTROPHILS 4.6 1.7 - 8.2 K/UL  
 ABS. LYMPHOCYTES 2.3 0.5 - 4.6 K/UL  
 ABS. MONOCYTES 1.2 0.1 - 1.3 K/UL  
 ABS. EOSINOPHILS 1.1 (H) 0.0 - 0.8 K/UL  
 ABS. BASOPHILS 0.1 0.0 - 0.2 K/UL  
 ABS. IMM. GRANS. 0.0 0.0 - 0.5 K/UL METABOLIC PANEL, BASIC Collection Time: 10/04/20  7:04 AM  
Result Value Ref Range Sodium 140 136 - 145 mmol/L Potassium 3.4 (L) 3.5 - 5.1 mmol/L Chloride 106 98 - 107 mmol/L  
 CO2 27 21 - 32 mmol/L Anion gap 7 7 - 16 mmol/L Glucose 76 65 - 100 mg/dL BUN 17 8 - 23 MG/DL Creatinine 0.66 0.6 - 1.0 MG/DL  
 GFR est AA >60 >60 ml/min/1.73m2 GFR est non-AA >60 >60 ml/min/1.73m2 Calcium 8.7 8.3 - 10.4 MG/DL  
LD Collection Time: 10/04/20  7:04 AM  
Result Value Ref Range  110 - 210 U/L  
PROTEIN, TOTAL Collection Time: 10/04/20  7:04 AM  
Result Value Ref Range Protein, total 5.6 (L) 6.3 - 8.2 g/dL Image: 
I have personally reviewed patients imaging showing XR CHEST SNGL V Final Result XR CHEST SNGL V Final Result IMPRESSION: 1. Slight worsening of bilateral infiltrates. 2. Persisting bilateral small pleural effusions. XR CHEST PORT Final Result IMPRESSION:  
1. Worsening consolidation in the left mid and lower lung zones suspicious for  
pneumonia. 2.  Enlarging bilateral effusions. Hospital problems Principal Problem: 
  PNA (pneumonia) (10/1/2020) Active Problems: Mild persistent asthma without complication (03/19/5797) Acquired hypothyroidism (10/17/2013) GERD (gastroesophageal reflux disease) (10/17/2013) Hypercholesterolemia (6/30/2015) Mass of lingula of lung (10/16/2019) Assessment and Plan:  
27-year-old female with a past medical history of asthma, GERD, treatment lymphoma, hyperlipidemia, hypothyroidism, hypotension, chronic left pulmonary artery occlusion, left lung mass status post needle biopsy on November 2019 demonstrating harmatoma, that presents in the setting of worsening shortness of breath.  
  
1. Worsening shortness of breath and cough with radiologic findings concerning of community-acquired pneumonia at risk of MDR organisms in the setting of underlying left lung mass. Also concerns of pulmonary edema in the setting of HFrEF.   
- Continue Zosyn  
- Since recently finish 7 days of levofloxacin will not start coverage for atypical organisms 
- Oxygen therapy to maintain oxygen saturation more than 92%. - Symptomatic management 
- CT of the chest on October 17 did not show pulmonary embolism - SARS CoV2 PCR negative - Lasix 20mg IV once yesterday (hold today due to hypotension) - S/p thoracentesis yesterday, follow cultures 
- I&Os 
- Daily weights - Pulm recs appreciated 
  
2. EKG changes with sinus tachycardia and ST depressions and T wave inversions on V5 and V6 likely from demand ischemia. HFrEF. 
- Tropnin trended down - Telemetry - Continue aspirin 
- TTE EF 35-40% 
- SD/c coreg due to hypotension - Cardiology recs appreciated 
  
3. 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 Pulm recs. Continue aspirin. 
  
4. For her asthma we will continue on home inhalers and Singulair. Currently not on exacerbation. 
  
5.   For her hypotension we will continue on her home dose of fludrocortisone and midodrine. 
  
 6.  For her hyperlipidemia we will continue on atorvastatin. 
  
7. For her GERD we will continue on PPI. 
  
8. For her hypothyroidism will continue on levothyroxine. 
  
DVT prophylaxis with Lovenox I have reviewed, updated, and verified this note's content and spent 38 minutes of my 42 minutes visit performing counseling and coordination of care regarding medical management. Signed By: Chet Dukes MD   
 October 4, 2020

## 2020-10-04 NOTE — PROGRESS NOTES
Carlos Taylor Admission Date: 10/1/2020 Daily Progress Note: 10/4/2020 The patient's chart is reviewed and the patient is discussed with the staff. Patient is a 69 y.o.  female seen and evaluated at the request of Dr. Joyce Sweeney. 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 Pt has recently had issues with hypotension which is being managed by her PCP. She was treated with LVQ x 7 days for CAP. She had a virtual visit with KE Linares on 9/30/2020 and pt mentioned that her O2 sats had been borderline low and that she had been tachycardic. An echocardiogram was obtained to evaluate for PAH that same day, but the TV jet was inadequate for estimation of RVSP. Pt felt worse and presented to the ER and her CXR was concerning for PNA. Pt was admitted and started on Zosyn. Pt is being ruled out for COVID. We were consulted to assist with workup and treatment. CT was performed which reveals multiple new nodules, bilateral effusions. Subjective:  
Mrs. Marcelina Dickens is feeling much better today. Enjoyed her breakfast this AM and reports sleeping well last night with the use of Melatonin. She reports not having any more issues with oozing/bleeding from her thoracentesis sites. Current Facility-Administered Medications Medication Dose Route Frequency  fluticasone propionate (FLONASE) 50 mcg/actuation nasal spray 2 Spray  2 Spray Both Nostrils DAILY  midodrine (PROAMATINE) tablet 5 mg  5 mg Oral TID WITH MEALS  sodium chloride (NS) flush 5-40 mL  5-40 mL IntraVENous Q8H  
 sodium chloride (NS) flush 5-40 mL  5-40 mL IntraVENous PRN  
 acetaminophen (TYLENOL) tablet 650 mg  650 mg Oral Q6H PRN  Or  
 acetaminophen (TYLENOL) suppository 650 mg  650 mg Rectal Q6H PRN  
  polyethylene glycol (MIRALAX) packet 17 g  17 g Oral DAILY PRN  
 enoxaparin (LOVENOX) injection 40 mg  40 mg SubCUTAneous DAILY  ondansetron (ZOFRAN ODT) tablet 4 mg  4 mg Oral Q8H PRN Or  
 ondansetron (ZOFRAN) injection 4 mg  4 mg IntraVENous Q6H PRN  
 albuterol (PROVENTIL HFA, VENTOLIN HFA, PROAIR HFA) inhaler 2 Puff  2 Puff Inhalation Q4H PRN  
 aspirin delayed-release tablet 81 mg  81 mg Oral DAILY  fludrocortisone (FLORINEF) tablet 0.1 mg  0.1 mg Oral DAILY  levothyroxine (SYNTHROID) tablet 75 mcg  75 mcg Oral ACB  montelukast (SINGULAIR) tablet 10 mg  10 mg Oral DAILY  pantoprazole (PROTONIX) tablet 40 mg  40 mg Oral ACB  atorvastatin (LIPITOR) tablet 10 mg  10 mg Oral DAILY  budesonide-formoteroL (SYMBICORT) 160-4.5 mcg/actuation HFA inhaler 2 Puff  2 Puff Inhalation BID RT  
 piperacillin-tazobactam (ZOSYN) 3.375 g in 0.9% sodium chloride (MBP/ADV) 100 mL  3.375 g IntraVENous Q8H Review of Systems Constitutional: negative for fever, chills, sweats Cardiovascular: negative for chest pain, palpitations, syncope, edema Gastrointestinal:  negative for dysphagia, reflux, vomiting, diarrhea, abdominal pain, or melena Neurologic:  negative for focal weakness, numbness, headache Objective:  
 
Vitals:  
 10/03/20 0717 10/03/20 2340 10/04/20 2980 10/04/20 0820 BP:  91/64 93/63 (!) 87/60 Pulse:  90 87 87 Resp:  17 17 17 Temp:  97.8 °F (36.6 °C) 98 °F (36.7 °C) 98 °F (36.7 °C) SpO2:  99% 98% 100% Weight: 121 lb 14.4 oz (55.3 kg) Height:      
 
 
 
Intake/Output Summary (Last 24 hours) at 10/4/2020 1002 Last data filed at 10/4/2020 8588 Gross per 24 hour Intake 220 ml Output 400 ml Net -180 ml Physical Exam:  
Constitution:  the patient is well developed and in no acute distress EENMT:  Sclera clear, pupils equal, oral mucosa moist 
Respiratory: Nonlabored, diminished throughout, crackles at lower bases bilateral 
Cardiovascular:  RRR 
 Gastrointestinal: soft and non-tender; with positive bowel sounds. Musculoskeletal: warm without cyanosis. There is no lower extremity edema. Skin:  no jaundice or rashes, bilateral puncture wounds s/p thoraecentesis assessed. No drainage, oozing, or bleeding noted. No redness or warm at puncture sites. No crepitus palp. Areas redressed with light gauze. Neurologic: no gross neuro deficits Psychiatric:  alert and oriented x 3 CXR:  
10/4/2020 - Single view FINDINGS: Bilateral lung edema or infiltrates have improved, with mild residual. 
There are also decreased small bilateral pleural effusions. No pneumothorax is 
identified. 
  
IMPRESSION: Improving bilateral lung edema or infiltrates and small pleural 
effusions. CXR 10/3/2020 
   
 
  
  
  
 
  
9/21/20 
 
 
 
  
6/1/2020 Echo:  
-  Left ventricle: Moderate global hypokinesis. Systolic function was 
moderately reduced. Ejection fraction was estimated in the range of 35 % to  
40%. -  Mitral valve: There was mild to moderate regurgitation. -  Other Measurements: High velocity overlapping flow noted during Suprasternal imaging (descending aorta appears with normal flow velocities and Superimposed high flow velocities from alternative source of uncertain [id number]~0.9H/D). Consider further imaging as clinically indicated. 
  
LAB No results for input(s): GLUCPOC in the last 72 hours. No lab exists for component: [place] Recent Labs 10/04/20 
0704 10/03/20 
0616 10/02/20 
0400 WBC 9.3 12.4* 12.2* HGB 10.6* 12.5 12.7 HCT 33.0* 38.7 37.7  369 399 Recent Labs 10/04/20 
0704 10/03/20 
0616 10/02/20 
0400  140 140  
K 3.4* 3.7 3.9  108* 107 CO2 27 27 27 GLU 76 93 85 BUN 17 18 17 CREA 0.66 0.53* 0.64 CA 8.7 8.6 9.0 No results for input(s): PH, PCO2, PO2, HCO3, PHI, PCO2I, PO2I, HCO3I in the last 72 hours. No results for input(s): LCAD, LAC in the last 72 hours. Assessment:  (Medical Decision Making) Hospital Problems  Date Reviewed: 10/2/2020 Codes Class Noted POA * (Principal) PNA (pneumonia) ICD-10-CM: J18.9 ICD-9-CM: 258  10/1/2020 Unknown Mass of lingula of lung ICD-10-CM: R91.8 ICD-9-CM: 786.6  10/16/2019 Yes Hypercholesterolemia (Chronic) ICD-10-CM: E78.00 ICD-9-CM: 272.0  6/30/2015 Yes Mild persistent asthma without complication (Chronic) RNN-33-KR: J45.30 ICD-9-CM: 493.90  10/17/2013 Yes Acquired hypothyroidism ICD-10-CM: E03.9 ICD-9-CM: 244.9  10/17/2013 Yes GERD (gastroesophageal reflux disease) (Chronic) ICD-10-CM: K21.9 ICD-9-CM: 530.81  10/17/2013 Yes Plan:  (Medical Decision Making)  
--O2 at 2L NC - SPO2 %. --WBC down to 9.4 
--CXR demonstrating improvement  
--Post thoracentesis bilaterally - bleeding/oozing resolved 
--K+ level down --Continue symbicort & montelukast 
--Zosyn 3.375 q 8 hrs: D4 
 
More than 50% of the time documented was spent in face-to-face contact with the patient and in the care of the patient on the floor/unit where the patient is located. Audra Kevin NP I have spoken with and examined the patient. I agree with the above assessment and plan as documented. Gen: pleasant on nasal cannula Lungs:  Crackles in bases improved Heart:  RRR no MRG Abd:NTND Ext: no edema Impression/Assessment: 
64yoF with h/o bilateral effusions, hamartoma, congenital absence of L PA and new nodular densities in lingula, h/o asthma. Recommendations:  
Wean oxygen and assess needs Will need PET-CT and consideration for CT-guided biopsy of new nodule. We will coordinate through our office. Message sent to [facility]or[facility] via [facility]. Complete abx Note rising eosinophilia and review meds which may contribute.  
 
Praveen Lucio MD

## 2020-10-04 NOTE — PROGRESS NOTES
[facility] CARDIOLOGY PROGRESS NOTE 
      
 
10/4/2020 9:44 AM 
 
Admit Date: 10/1/2020 Subjective:  
Breathing improved overnight since undergoing thoracentesis (R and L), no chest pain, no other complaints at this time. ROS: 
Cardiovascular:  As noted above Objective:  
  
Vitals:  
 10/03/20 8446 10/03/20 2340 10/04/20 6697 10/04/20 0820 BP:  91/64 93/63 (!) 87/60 Pulse:  90 87 87 Resp:  17 17 17 Temp:  97.8 °F (36.6 °C) 98 °F (36.7 °C) 98 °F (36.7 °C) SpO2:  99% 98% 100% Weight: 121 lb 14.4 oz (55.3 kg) Height:      
 
Physical Exam: 
General-No Acute Distress, awake , interactive Neck- supple, no JVD 
CV- regular rate and rhythm , 2/6 R/L upper sternal border murmur systolic Lung- decreased at the bases bilaterally Abd- soft, nontender, nondistended Ext- no edema bilaterally. Skin- warm and dry Data Review:  
Recent Labs 10/04/20 
0503 10/03/20 
5344  140  
K 3.4* 3.7 BUN 17 18 CREA 0.66 0.53* GLU 76 93 WBC 9.3 12.4* HGB 10.6* 12.5 HCT 33.0* 38.7  369 Assessment/Plan:  
 
Principal Problem: 
  PNA (pneumonia) (10/1/2020) 
  - on ABX, pulm following , no plan for thora - COVID negative  
  - feels better after bilateral thoracentesis  
  
Active Problems: 
  Newly diagnosed cardiomyopathy 
  - unclear etiology at this time  
  - continue ASA, Atorvastatin for risk factor modification - Lasix today. - no evidence of ACS, no emergent indication for coronary angiography - DC Coreg 3.125 mg PO BID re: hypotension 
  - would defer ischemic evaluation to outpatient setting unless patient were to become unstable given concerns about underlying progressing malignancy.  
  
  Mild persistent asthma without complication (07/12/6705) - nebs 
  Acquired hypothyroidism (10/17/2013) 
  - on synthroid  
  
  GERD (gastroesophageal reflux disease) (10/17/2013) - ppi  
  
  Hypercholesterolemia (6/30/2015) - continue statin  
  
  Mass of lingula of lung (10/16/2019) - per pulmonary, possible CT/PET in future for staging prognosis, possible bx Geeta Donnelly DO 
10/4/2020 9:44 AM

## 2020-10-04 NOTE — PROGRESS NOTES
Shift assessment: 
Pt alert, oriented X4. On 1 L NC. Dyspnea with exertion. Lung sounds diminished bilaterally. No acute distress noted. HR regular. Abdomen soft, non tender. Denies pain or other needs. Oozing blood  From thoracentesis incision site on right side. Called ICU charge nurse with the request to change pressure dressing. Call light within reach. Bed in low, locked position.

## 2020-10-04 NOTE — PROGRESS NOTES
CM made contact with patient via phone to discuss d/c plan. Patient alert/orient x4. Patient verified demographic no changes needed. Patient states she lives in a one level home with spouse with one step to enter into the home. States she's independent with her ADL's and do drives, has no DME's in the home. Patient has no needs identified at this time. Patient states she will be returning home. CM will continue to monitor and remain available for any needs or concerns that may occur. Care Management Interventions PCP Verified by CM: Yes(Dr. John Stapleton) Mode of Transport at Discharge: Other (see comment)(family) Transition of Care Consult (CM Consult): Discharge Planning Discharge Durable Medical Equipment: No 
Physical Therapy Consult: No 
Occupational Therapy Consult: No 
Speech Therapy Consult: No 
Current Support Network: Own Home, Lives with Spouse Confirm Follow Up Transport: Family The Patient and/or Patient Representative was Provided with a Choice of Provider and Agrees with the Discharge Plan?: No 
Freedom of Choice List was Provided with Basic Dialogue that Supports the Patient's Individualized Plan of Care/Goals, Treatment Preferences and Shares the Quality Data Associated with the Providers?: No 
Discharge Location Discharge Placement: Home

## 2020-10-04 NOTE — PROGRESS NOTES
ICU nurse change pressure dressing on the right site of the back (thoracentesis incision site) at 2010. Dressing intact. No new bleeding noticed. Will continue to monitor.

## 2020-10-04 NOTE — PROGRESS NOTES
BP 74/54, HR 90. Pt states she feels a little weak and dizzy. She states that she often has low BP and takes home medications  to keep it stable. MD notified. New orders received.

## 2020-10-05 NOTE — PROGRESS NOTES
[facility] CARDIOLOGY PROGRESS NOTE 
      
 
10/5/2020 9:44 AM 
 
Admit Date: 10/1/2020 Subjective:  
Breathing improved overnight since undergoing thoracentesis (R and L), no chest pain, no other complaints at this time. Remains stable thisam 
 
ROS: 
Cardiovascular:  As noted above Objective:  
  
Vitals:  
 10/04/20 2343 10/05/20 2099 10/05/20 4012 10/05/20 6366 BP: (!) 95/58 94/67 Pulse: 88 (!) 105 Resp: 18 18 Temp: 98.2 °F (36.8 °C) 98.1 °F (36.7 °C) SpO2: 96% 95%  94% Weight:   123 lb 1.6 oz (55.8 kg) Height:      
 
Physical Exam: 
General-No Acute Distress, awake , interactive Neck- supple, no JVD 
CV- regular rate and rhythm , 2/6 R/L upper sternal border murmur systolic Lung- decreased at the bases bilaterally Abd- soft, nontender, nondistended Ext- no edema bilaterally. Skin- warm and dry Data Review:  
Recent Labs 10/04/20 
5055 10/03/20 
4041  140  
K 3.4* 3.7 BUN 17 18 CREA 0.66 0.53* GLU 76 93 WBC 9.3 12.4* HGB 10.6* 12.5 HCT 33.0* 38.7  369 Assessment/Plan:  
 
Principal Problem: 
  PNA (pneumonia) (10/1/2020) 
  - on ABX, pulm following , no plan for thora - COVID negative  
  - feels better after bilateral thoracentesis  
  
Active Problems: 
  Newly diagnosed cardiomyopathy 
  - unclear etiology at this time  
  - continue ASA, Atorvastatin for risk factor modification - Lasix today. - no evidence of ACS, no emergent indication for coronary angiography - DC Coreg 3.125 mg PO BID re: hypotension 
  - would defer ischemic evaluation to outpatient setting unless patient were to become unstable given concerns about underlying progressing malignancy.  
  
  Mild persistent asthma without complication (13/59/9488) - nebs 
  Acquired hypothyroidism (10/17/2013) 
  - on synthroid  
  
  GERD (gastroesophageal reflux disease) (10/17/2013)   - ppi  
  
 Hypercholesterolemia (6/30/2015) - continue statin  
  
  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  
 
BP too low for OMT for HFrEF (heart failure reduced EF) Will address as outpatient. No further cardiac workup at this time Call if needed Poncho Justin MD 
10/5/2020 9:44 AM

## 2020-10-05 NOTE — PROGRESS NOTES
CM met with the patient. Patient was independent with ambulation and ADLs. She assists spouse with ADLs and is concerned that she's not home with him to help him as he broke his shoulder in May. No history of HH or rehab. Patient was very weak prior to admission and having difficulty with preparing meals. She requested Meals on Wheels for her spouse. CM will give her the application to take to her 's pcp to fill out so that he can get meals delivered. Patient didn't request this for herself. CM will continue to follow and ask patient at discharge if she would like CM to fill out a MOW application for her.

## 2020-10-05 NOTE — ADT AUTH CERT NOTES
Patient Demographics Patient Name Hammad Hannon  
[id number]  Sex Female    
1950  Address [address] [place] [address]  Phone [phone] (Home) [phone] (Mobile) *Preferred*   
CSN:   
[id number] Admit Date:  Admit Time  Room  Bed Oct 1, 2020   8:19 AM  834 [[id number]]  01 [977] Attending Providers Provider  Pager  From  To   
Kaley Norris MD   10/01/20  10/01/20 Inga Zhang MD   10/01/20 Emergency Contact(s) Name  Relation  Home  Work  Mobile [address]    [phone] Pancho Hews  [phone] Utilization Reviews  
 
   
Pneumonia - Care Day 3 (10/3/2020) by Ginna Buckner RN  
 
   
Review Entered  Review Status 10/5/2020 11:44  Completed   
   
Criteria Review Care Day: 3 Care Date: 10/3/2020 Level of Care:   
Guideline Day 2 Level Of Care (X) Floor 10/5/2020 11:44:54 EDT by Shyla Yu. Clinical Status ( ) * No CO2 retention or acidosis   
(X) * No requirement for mechanical ventilation ( ) * Hypotension absent   
(X) * Afebrile or fever improved 10/5/2020 11:44:54 EDT by Ginna Buckner   
  afebrile. ( ) * No hypoxia on room air or oxygenation improved   
(X) * Mental status improved or at baseline 10/5/2020 11:44:54 EDT by Ginna Buckner   
  Neuro: A&O, CN are intact, no focal deficits Activity   
(X) * Increased activity 10/5/2020 11:44:54 EDT by Pasqual Burkitt Up ad olga. Routes   
(X) Oral hydration, medications 10/5/2020 11:44:54 EDT by Ginna Buckner   
  Lasix 20 mg iv x 1.  Tylenol prn.  Albuterol inhaler Q 4 hours.  ASA 81 mg po daily.  Lipitor 10 mg po daily.  Symbicort bid.  Lovenox 40 mg Sq daily.  Florinef 0.1 mg po daily.  Flonase daily.  Synthroid 75 mcg po daily.  Midodrine 5 mg po  tid. (X) Usual diet 10/5/2020 11:44:54 EDT by Ginna Buckner   Regular diet. Interventions (X) Pulse oximetry 10/5/2020 11:44:54 EDT by Charlie Petersen   
  O2 sat- 97% on 2 lpm.   
(X) Possible oxygen 10/5/2020 11:44:54 EDT by Charlie Petersen   
  O2 sat- 97% on 2 lpm.   
Medications (X) IV or oral antibiotics 10/5/2020 11:44:54 EDT by Charlie Petersen   
  Zosyn 3.375 mg iv q 8 hours. * Milestone Additional Notes 10/3/20-   
  
VS:  97.6- P- 100- R- 22- 75/58. Abnormal Labs:   
WBC= 12.4.  Eosinophils= 11.  Chl= 108. Anion gap= 5.  Cr= 0.53.     
Body fluid cx pending. CXR=   1. Slight worsening of bilateral infiltrates. 2. Persisting bilateral small pleural effusions. IM Note:   
Physical Exam:   
General: Alert, oriented, NAD HEENT: NC/AT, EOM are intact Neck: supple, no JVD Cardiovascular: RRR, S1, S2, no murmurs Respiratory: Decrease breath sounds, rales, no wheezes Abdomen: Soft, NT, ND Back: No CVA tenderness, no paraspinal tenderness Extremities: LE without pedal edema, no erythema Skin: no rash or ulcers Psych: good mood and affect   
    
Assessment and Plan:   
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   
    
1.  Worsening shortness of breath and cough with radiologic findings concerning of community-acquired pneumonia at risk of MDR organisms in the setting of underlying left lung mass. Also concerns of pulmonary edema in the setting of HFrEF.     
- Continue Zosyn   
- Since recently finish 7 days of levofloxacin will not start coverage for atypical organisms   
- Oxygen therapy to maintain oxygen saturation more than 92%. - Symptomatic management - CT of the chest on October 17 did not show pulmonary embolism - SARS CoV2 PCR negative - Lasix 20mg IV once   
- I&Os   
- Daily weights - Pulm recs appreciated   
    
2. EKG changes with sinus tachycardia and ST depressions and T wave inversions on V5 and V6 likely from demand ischemia. HFrEF.   
- Tropnin trended down - Telemetry - Continue aspirin   
- TTE EF 35-40%   
- Started on coreg - Cardiology recs appreciated   
    
3. 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 Pulm consulted.  Continue aspirin.   
    
4.  For her asthma we will continue on home inhalers and Singulair. Currently not on exacerbation.   
    
5.  For her hypotension we will continue on her home dose of fludrocortisone and midodrine.   
    
6.  For her hyperlipidemia we will continue on atorvastatin.   
    
7.  For her GERD we will continue on PPI.   
    
8.  For her hypothyroidism will continue on levothyroxine.   
    
DVT prophylaxis with Lovenox   
    
Pulmonary Note:   
Constitutional:  the patient is well developed and in no acute distress EENMT:  Sclera clear, pupils equal, oral mucosa moist   
Respiratory: crackles in bilateral bases Cardiovascular:  RRR without M,G,R   
Gastrointestinal: soft and non-tender; with positive bowel sounds. Musculoskeletal: warm without cyanosis. There is trace left lower extremity edema. Skin:  no jaundice or rashes, no wounds Neurologic: no gross neuro deficits     
Psychiatric:  alert and oriented x 3   
-- The larger mass in the left lung is stable, hamartoma.  However the more inferior nodular lingular lesions are more suspicious for metastases.  Will need PET-CT and likely CT-guided biopsy.   
-- d/c IV fluids -- continue therapy for pneumonia   
-- f/u COVID test.   
-- based on recent CT I doubt that effusions are large enough to safely attempt thoracentesis. -- will follow with you.   
    
  
  
Cardiology Note:   
Dyspnea had improved yesterday, but worsened today. Cough. Physical Exam:   
General-No Acute Distress, awake , alert Neck- supple, no JVD   
CV- regular rate and rhythm no MRG Lung- decreased at the bases bilaterally, rare wheezes Abd- soft, nontender, nondistended Ext- no edema bilaterally in legs Skin- warm and dry Assessment/Plan:   
    
Principal Problem:   
  PNA (pneumonia) (10/1/2020)   
  - on ABX, pulm following , no plan for thora   
  - COVID negative   
    
Active Problems:   
  Newly diagnosed cardiomyopathy   
  - unclear etiology at this time   
  - continue ASA, Atorvastatin for risk factor modification   
  - Lasix today.   
  - no evidence of ACS, no emergent indication for coronary angiography   
  - tolerating Coreg 3.125 mg P BID , although BP limits therapy escalation   
  - would defer ischemic evaluation to outpatient setting unless patient were to become unstable given concerns about underlying progressing malignancy.   
    
  Mild persistent asthma without complication (76/14/9993)   
  - nebs     
  Acquired hypothyroidism (10/17/2013)   
  - on synthroid   
    
  GERD (gastroesophageal reflux disease) (10/17/2013)   
  - ppi   
    
  Hypercholesterolemia (6/30/2015)   
  - continue statin   
    
  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   
  - per pulmonary, possible CT/PEt in future Procedure Note:   
THORACENTESIS   
10/03/20   
    
Indication/Preprocedure diagnosis: LEFT Pleural effusion Volume of fluid withdrawn: 1050ml Postprocedural Diagnosis:  Pleural effusion   
    
After obtaining informed consent the patient was placed sitting up on the side of the bed and using ultrasound guidance the pleural fluid was located on the LEFT side  and marked.  The diaphragm and atelectatic lung were clearly visualized.  The patient's skin was cleaned with ChloraPrep.  Using sterile technique the skin was anesthetized with 10mL of 1% Xylocaine and a stab wound made and a catheter inserted into the pleural space with 1050 cc of clear/yellow -appearing fluid was removed.   
    
The patient tolerated the procedure well.  The pleural fluid is sent for diagnostic studies (see orders).  Ultrasound revealed no evidence of pneumothorax. There where no complications and negligible blood loss. THORACENTESIS   
10/03/20   
    
Indication/Preprocedure diagnosis: RIGHT  Pleural effusion Volume of fluid withdrawn: 800ml Postprocedural Diagnosis:  Pleural effusion   
    
After obtaining informed consent the patient was placed sitting up on the side of the bed and using ultrasound guidance the pleural fluid was located on the RIGHT side  and marked.  The diaphragm and atelectatic lung were clearly visualized.  The patient's skin was cleaned with ChloraPrep.  Using sterile technique the skin was anesthetized with 10mL of 1% Xylocaine and a stab wound made and a catheter inserted into the pleural space with 800 cc of clear/yellow-appearing fluid was removed.   
    
The patient tolerated the procedure well.  The pleural fluid is sent for diagnostic studies (see orders).  Ultrasound revealed no evidence of pneumothorax. There where no complications and negligible blood loss. Additional Orders:   
Full code.  Daily weights.  I and O. O2 at 2 lpm.  Continuous VS.     
  
Singulair 10 mg po daily.  Protonix 40 mg po daily.  Coreg 3.125 mg po bid.     
  
   
Pneumonia - Care Day 2 (10/2/2020) by Slim Shepherd RN  
 
   
Review Entered  Review Status 10/2/2020 16:11  Completed   
   
Criteria Review Care Day: 2 Care Date: 10/2/2020 Level of Care:   
Guideline Day 2 Level Of Care (X) Floor 10/2/2020 16:11:22 EDT by [facility]. Clinical Status ( ) * No CO2 retention or acidosis (X) * No requirement for mechanical ventilation 10/2/2020 16:11:22 EDT by Netta Knapp   
  no vent. (X) * Hypotension absent 10/2/2020 16:11:22 EDT by Divya Howe 102/73. (X) * Afebrile or fever improved 10/2/2020 16:11:22 EDT by Netta Knapp   
  afebrile. ( ) * No hypoxia on room air or oxygenation improved   
(X) * Mental status improved or at baseline 10/2/2020 16:11:22 EDT by Divya Howe Psychiatric:  alert and oriented x 3 Activity   
(X) * Increased activity 10/2/2020 16:11:22 EDT by Divya Howe up ad olga. Routes   
(X) Oral hydration, medications 10/2/2020 16:11:22 EDT by Netta Knapp   
  AsA 81 mg po daily.  Lipitor 10 mg po daily.  Symbicort daily.  Lovenox 40 mg SQ daily.  FLorinef 0.1 mg po daily. Synthroid 75 mcg po daily.  Midodrine 2.5 mg po  tid.  Singulair 10 mg po daily.  Protonix 40 mg po daily. (X) Usual diet 10/2/2020 16:11:22 EDT by Divya Howe regular diet. Interventions (X) Pulse oximetry 10/2/2020 16:11:22 EDT by Nettalouis Knapp   
  O2 sat= 95% on 2 lpm.   
(X) Possible oxygen 10/2/2020 16:11:22 EDT by Netta [facility]   
  O2 sat= 95% on 2 lpm.   
Medications (X) IV or oral antibiotics 10/2/2020 16:11:22 EDT by Netta Bareshwetha   
  Zosyn 3.375 mg iv Q 8 hours. * Milestone Additional Notes 10/2/20- Attending note available at time of review. VS:  97.8- P- 99- R- 16- 102/73. Abnormal Labs: Anion gap= 6.  WBC= 12.2.  Monocyte= 13.  High sensitivity troponin= 155.8. Blood cx pending. Pulmonary Consult:   
Patient is a 69 y.o.  female seen and evaluated at the request of Dr. Bhavya Gracia.  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 Pt has recently had issues with hypotension which is being managed by her PCP. She was treated with LVQ x 7 days for CAP. She had a virtual visit with KE Rodriguez on 9/30/2020 and pt mentioned that her O2 sats had been borderline low and that she had been tachycardic. An echocardiogram was obtained to evaluate for PAH that same day, but the TV jet was inadequate for estimation of RVSP. Pt felt worse and presented to the ER and her CXR was concerning for PNA. Pt was admitted and started on Zosyn. Pt is being ruled out for COVID. We were consulted to assist with workup and treatment. PHYSICAL EXAM   
    
Constitutional:  the patient is well developed and in no acute distress EENMT:  Sclera clear, pupils equal, oral mucosa moist   
Respiratory: crackles in bilateral bases Cardiovascular:  RRR without M,G,R   
Gastrointestinal: soft and non-tender; with positive bowel sounds. Musculoskeletal: warm without cyanosis. There is trace left lower extremity edema. Skin:  no jaundice or rashes, no wounds Neurologic: no gross neuro deficits     
  
Assessment:   
Pneumonia Lung mass. -- The larger mass in the left lung is stable, hamartoma.  However the more inferior nodular lingular lesions are more suspicious for metastases.  Will need PET-CT and likely CT-guided biopsy.   
-- d/c IV fluids -- continue therapy for pneumonia   
-- f/u COVID test.   
-- based on recent CT I doubt that effusions are large enough to safely attempt thoracentesis. -- will follow with you.    
  
  
Additional Orders:   
cardiac monitoring.   Full code.  Droplet Plus isolation.  Continuous VS.  Cardio consult.

## 2020-10-05 NOTE — PROGRESS NOTES
Progress Note Patient: Anneliese Torres MRN: [id number]  SSN: [id number] YOB: 1950  Age: 69 y.o. Sex: female Admit Date: 10/1/2020 LOS: 4 days Subjective:  
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 The patient states that for the past few weeks she has been developing worsening shortness of breath especially on exertion, and associated with some dry cough. Patient seen and examined at bedside. This morning still presenting with SOB. Denies chest pain, no abdominal pain, nausea or vomiting. Objective:  
 
Vitals:  
 10/05/20 0810 10/05/20 0834 10/05/20 1148 10/05/20 1530 BP: 94/62  92/67 (!) 88/68 Pulse: 90  88 84 Resp: 20 20 20 Temp: 98.2 °F (36.8 °C)  97.9 °F (36.6 °C) 98 °F (36.7 °C) SpO2: 93% 96% 92% 93% Weight:      
Height:      
  
 
Intake and Output: 
Current Shift: 10/05 0701 - 10/05 1900 In: 480 [P.O.:480] Out: - Last three shifts: 10/03 1901 - 10/05 0700 In: 26 [P.O.:220] Out: 900 [Urine:900] ROS 
10 ROS negative except from stated on subjective Physical Exam:  
General: Alert, oriented, NAD HEENT: NC/AT, EOM are intact Neck: supple, no JVD Cardiovascular: RRR, S1, S2, no murmurs Respiratory: Decrease breath sounds, rales, no wheezes Abdomen: Soft, NT, ND Back: No CVA tenderness, no paraspinal tenderness Extremities: LE without pedal edema, no erythema Neuro: A&O, CN are intact, no focal deficits Skin: no rash or ulcers Psych: good mood and affect Lab/Data Review: 
I have personally reviewed patients laboratory data showing Recent Results (from the past 24 hour(s)) CBC WITH AUTOMATED DIFF Collection Time: 10/05/20  5:46 AM  
Result Value Ref Range WBC 10.8 4.3 - 11.1 K/uL  
 RBC 3.91 (L) 4.05 - 5.2 M/uL  
 HGB 12.1 11.7 - 15.4 g/dL HCT 36.6 35.8 - 46.3 % MCV 93.6 79.6 - 97.8 FL  
 MCH 30.9 26.1 - 32.9 PG  
 MCHC 33.1 31.4 - 35.0 g/dL  
 RDW 13.4 11.9 - 14.6 % PLATELET 739 803 - 018 K/uL MPV 10.0 9.4 - 12.3 FL ABSOLUTE NRBC 0.00 0.0 - 0.2 K/uL  
 DF AUTOMATED NEUTROPHILS 53 43 - 78 % LYMPHOCYTES 26 13 - 44 % MONOCYTES 11 4.0 - 12.0 % EOSINOPHILS 10 (H) 0.5 - 7.8 % BASOPHILS 1 0.0 - 2.0 % IMMATURE GRANULOCYTES 0 0.0 - 5.0 %  
 ABS. NEUTROPHILS 5.7 1.7 - 8.2 K/UL  
 ABS. LYMPHOCYTES 2.7 0.5 - 4.6 K/UL  
 ABS. MONOCYTES 1.2 0.1 - 1.3 K/UL  
 ABS. EOSINOPHILS 1.0 (H) 0.0 - 0.8 K/UL  
 ABS. BASOPHILS 0.1 0.0 - 0.2 K/UL  
 ABS. IMM. GRANS. 0.0 0.0 - 0.5 K/UL METABOLIC PANEL, BASIC Collection Time: 10/05/20  5:46 AM  
Result Value Ref Range Sodium 141 136 - 145 mmol/L Potassium 3.1 (L) 3.5 - 5.1 mmol/L Chloride 106 98 - 107 mmol/L  
 CO2 27 21 - 32 mmol/L Anion gap 8 7 - 16 mmol/L Glucose 96 65 - 100 mg/dL BUN 18 8 - 23 MG/DL Creatinine 0.72 0.6 - 1.0 MG/DL  
 GFR est AA >60 >60 ml/min/1.73m2 GFR est non-AA >60 >60 ml/min/1.73m2 Calcium 8.9 8.3 - 10.4 MG/DL Image: 
I have personally reviewed patients imaging showing XR CHEST SNGL V Final Result XR CHEST SNGL V Final Result IMPRESSION: 1. Slight worsening of bilateral infiltrates. 2. Persisting bilateral small pleural effusions. XR CHEST PORT Final Result IMPRESSION:  
1. Worsening consolidation in the left mid and lower lung zones suspicious for  
pneumonia. 2.  Enlarging bilateral effusions. Hospital problems Principal Problem: 
  PNA (pneumonia) (10/1/2020) Active Problems: 
  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  
  
1. Worsening shortness of breath and cough with radiologic findings concerning of community-acquired pneumonia at risk of MDR organisms in the setting of underlying left lung mass. Also concerns of pulmonary edema in the setting of HFrEF.   
- Continue Zosyn  
- Since recently finish 7 days of levofloxacin will not start coverage for atypical organisms 
- Oxygen therapy to maintain oxygen saturation more than 92%. - Symptomatic management 
- CT of the chest on October 17 did not show pulmonary embolism - SARS CoV2 PCR negative - Continue lasix - S/p thoracentesis, follow cultures 
- I&Os 
- Daily weights - Pulm recs appreciated 
  
2. EKG changes with sinus tachycardia and ST depressions and T wave inversions on V5 and V6 likely from demand ischemia. HFrEF. 
- Tropnin trended down - Telemetry - Continue aspirin 
- TTE EF 35-40% - D/c coreg due to hypotension - Cardiology recs appreciated 
  
3. 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 Pulm recs. Continue aspirin. 
  
4. For her asthma continue on home inhalers and Singulair. Currently not on exacerbation. 
  
5. For her hypotension continue on fludrocortisone and midodrine. 
  
6. For her hyperlipidemia continue on atorvastatin. 
  
7. For her GERD continue on PPI. 
  
8. For her hypothyroidism continue on levothyroxine. 
  
DVT prophylaxis with Lovenox I have reviewed, updated, and verified this note's content and spent 38 minutes of my 42 minutes visit performing counseling and coordination of care regarding medical management. Signed By: Chet Dukes MD   
 October 5, 2020

## 2020-10-06 PROBLEM — I50.21 ACUTE SYSTOLIC HEART FAILURE (HCC): Status: ACTIVE | Noted: 2020-01-01

## 2020-10-06 PROBLEM — E87.6 HYPOKALEMIA: Status: ACTIVE | Noted: 2020-01-01

## 2020-10-06 NOTE — PROGRESS NOTES
Problem: Risk for Spread of Infection Goal: Prevent transmission of infectious organism to others Description: Prevent the transmission of infectious organisms to other patients, staff members, and visitors. Outcome: Progressing Towards Goal 
  
Problem: Patient Education:  Go to Education Activity Goal: Patient/Family Education Outcome: Progressing Towards Goal 
  
Problem: Breathing Pattern - Ineffective Goal: *Absence of hypoxia Outcome: Progressing Towards Goal 
Goal: *Use of effective breathing techniques Outcome: Progressing Towards Goal 
Goal: *PALLIATIVE CARE:  Alleviation of Dyspnea Outcome: Progressing Towards Goal 
  
Problem: Patient Education: Go to Patient Education Activity Goal: Patient/Family Education Outcome: Progressing Towards Goal 
  
Problem: Falls - Risk of 
Goal: *Absence of Falls Description: Document Yvrose Kendall Fall Risk and appropriate interventions in the flowsheet. Outcome: Progressing Towards Goal 
Note: Fall Risk Interventions: 
  
 
  
 
Medication Interventions: Teach patient to arise slowly Elimination Interventions: Call light in reach Problem: Patient Education: Go to Patient Education Activity Goal: Patient/Family Education Outcome: Progressing Towards Goal

## 2020-10-06 NOTE — PROGRESS NOTES
Williams Padron Admission Date: 10/1/2020 Daily Progress Note: 10/6/2020 The patient's chart is reviewed and the patient is discussed with the staff. Patient is a 69 y.o.  female seen and evaluated at the request of Dr. Taylor Dasilva. 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 Pt has recently had issues with hypotension which is being managed by her PCP. She was treated with LVQ x 7 days for CAP. She had a virtual visit with KE Ha on 9/30/2020 and pt mentioned that her O2 sats had been borderline low and that she had been tachycardic. An echocardiogram was obtained to evaluate for PAH that same day, but the TV jet was inadequate for estimation of RVSP. Pt felt worse and presented to the ER and her CXR was concerning for PNA. Pt was admitted and started on Zosyn. Pt is being ruled out for COVID. We were consulted to assist with workup and treatment. CT was performed which reveals multiple new nodules, and bilateral pleural effusions. Pt had a L thoracentesis with 1050mL removed and R thoracentesis with 800mL removed on 10/3/2020. Subjective:  
 
Pt is sitting up in bed on 2L O2. Pt reports that again she had an episode around 3-4am feeling short of breath. She did not sleep well at all and then got very winded. RT gave her a breathing treatment and she did improve. She was not hypoxic, but was started on O2. She did using some saline nasal spray and is now coughing up very minimal clear to pale yellow phlegm. She admits to dyspnea with exertion such as ambulating to the bathroom and back to bed. She reports that she had previously been told that she may require morphine in low doses for dyspnea. She is finally ready to consider this. Current Facility-Administered Medications Medication Dose Route Frequency  morphine injection 1 mg  1 mg IntraVENous Q4H PRN  
 albuterol (PROVENTIL VENTOLIN) nebulizer solution 2.5 mg  2.5 mg Nebulization Q6H RT  
 furosemide (LASIX) tablet 20 mg  20 mg Oral DAILY  fluticasone propionate (FLONASE) 50 mcg/actuation nasal spray 2 Spray  2 Spray Both Nostrils DAILY  midodrine (PROAMATINE) tablet 5 mg  5 mg Oral TID WITH MEALS  sodium chloride (NS) flush 5-40 mL  5-40 mL IntraVENous Q8H  
 sodium chloride (NS) flush 5-40 mL  5-40 mL IntraVENous PRN  
 acetaminophen (TYLENOL) tablet 650 mg  650 mg Oral Q6H PRN Or  
 acetaminophen (TYLENOL) suppository 650 mg  650 mg Rectal Q6H PRN  polyethylene glycol (MIRALAX) packet 17 g  17 g Oral DAILY PRN  
 enoxaparin (LOVENOX) injection 40 mg  40 mg SubCUTAneous DAILY  ondansetron (ZOFRAN ODT) tablet 4 mg  4 mg Oral Q8H PRN Or  
 ondansetron (ZOFRAN) injection 4 mg  4 mg IntraVENous Q6H PRN  
 aspirin delayed-release tablet 81 mg  81 mg Oral DAILY  fludrocortisone (FLORINEF) tablet 0.1 mg  0.1 mg Oral DAILY  levothyroxine (SYNTHROID) tablet 75 mcg  75 mcg Oral ACB  montelukast (SINGULAIR) tablet 10 mg  10 mg Oral DAILY  pantoprazole (PROTONIX) tablet 40 mg  40 mg Oral ACB  atorvastatin (LIPITOR) tablet 10 mg  10 mg Oral DAILY  budesonide-formoteroL (SYMBICORT) 160-4.5 mcg/actuation HFA inhaler 2 Puff  2 Puff Inhalation BID RT  
 piperacillin-tazobactam (ZOSYN) 3.375 g in 0.9% sodium chloride (MBP/ADV) 100 mL  3.375 g IntraVENous Q8H Review of Systems 
+cough 
+dyspnea on exertion Constitutional: negative for fever, chills, sweats Cardiovascular: negative for chest pain, palpitations, syncope, edema Gastrointestinal:  negative for dysphagia, reflux, vomiting, diarrhea, abdominal pain, or melena Neurologic:  negative for focal weakness, numbness, headache Objective:  
 
Vitals:  
 10/06/20 0393 10/06/20 0746 10/06/20 0758 10/06/20 1127 BP:  95/61  (!) 90/52 Pulse:  100  89 Resp:  18  18 Temp:  97.9 °F (36.6 °C)  99.1 °F (37.3 °C) SpO2: 95% 98% 96% 98% Weight:      
Height:      
 
 
 
Intake/Output Summary (Last 24 hours) at 10/6/2020 1254 Last data filed at 10/5/2020 2138 Gross per 24 hour Intake 480 ml Output 750 ml Net -270 ml Physical Exam:  
Constitution:  the patient is well developed and in no acute distress, on 2L 
EENMT:  Sclera clear, pupils equal, oral mucosa moist 
Respiratory: Diminished throughout, crackles at bases 'Cardiovascular:  RRR Gastrointestinal: soft and non-tender; with positive bowel sounds. Musculoskeletal: warm without cyanosis. There is no lower extremity edema. Skin:  no jaundice or rashes Neurologic: no gross neuro deficits Psychiatric:  alert and oriented x 3 CXR:  
10/4/2020 - Single view FINDINGS: Bilateral lung edema or infiltrates have improved, with mild residual. 
There are also decreased small bilateral pleural effusions. No pneumothorax is 
identified. 
  
IMPRESSION: Improving bilateral lung edema or infiltrates and small pleural 
effusions. CXR 10/3/2020 
   
 
  
  
  
 
  
9/21/20 
 
 
 
  
6/1/2020 Echo:  
-  Left ventricle: Moderate global hypokinesis. Systolic function was 
moderately reduced. Ejection fraction was estimated in the range of 35 % to  
40%. -  Mitral valve: There was mild to moderate regurgitation. -  Other Measurements: High velocity overlapping flow noted during Suprasternal imaging (descending aorta appears with normal flow velocities and Superimposed high flow velocities from alternative source of uncertain HZHKYODF-~6.9L/L). Consider further imaging as clinically indicated. 
  
LAB No results for input(s): GLUCPOC in the last 72 hours. No lab exists for component: [place] Recent Labs 10/06/20 
6851 10/05/20 
4304 10/04/20 
2292 WBC 11.3* 10.8 9.3 HGB 11.7 12.1 10.6* HCT 34.9* 36.6 33.0*  
 403 353 Recent Labs 10/06/20 
9137 10/05/20 
5856 10/04/20 
1519  141 140  
K 2.6* 3.1* 3.4*  
 106 106 CO2 29 27 27 GLU 87 96 76 BUN 13 18 17 CREA 0.71 0.72 0.66  
MG 2.4  --   --   
CA 9.1 8.9 8.7 No results for input(s): PH, PCO2, PO2, HCO3, PHI, PCO2I, PO2I, HCO3I in the last 72 hours. No results for input(s): LCAD, LAC in the last 72 hours. Assessment:  (Medical Decision Making) Hospital Problems  Date Reviewed: 10/2/2020 Codes Class Noted POA * (Principal) PNA (pneumonia) ICD-10-CM: J18.9 ICD-9-CM: 759  10/1/2020 Unknown On zosyn Mass of lingula of lung ICD-10-CM: R91.8 ICD-9-CM: 786.6  10/16/2019 Yes Needs Bx Hypercholesterolemia (Chronic) ICD-10-CM: E78.00 ICD-9-CM: 272.0  6/30/2015 Yes Mild persistent asthma without complication (Chronic) FYA-93-JV: J45.30 ICD-9-CM: 493.90  10/17/2013 Yes Acquired hypothyroidism ICD-10-CM: E03.9 ICD-9-CM: 244.9  10/17/2013 Yes GERD (gastroesophageal reflux disease) (Chronic) ICD-10-CM: K21.9 ICD-9-CM: 530.81  10/17/2013 Yes Plan:  (Medical Decision Making) --Pt on 2L O2 
--Continue symbicort & montelukast 
--check CXR 
--add albuterol nebs 
--add morphine 1mg q4* prn 
--Zosyn 3.375 q 8 hrs: D6 
--C. Diff stool pending --Will need PET-CT and consideration for CT-guided biopsy of new nodule. More than 50% of the time documented was spent in face-to-face contact with the patient and in the care of the patient on the floor/unit where the patient is located. Jade Busby MD 
 
 
 
Lungs:  B crackles. Heart:  RRR with no Murmur/Rubs/Gallops Additional Comments:   
Patient's case reviewed. No ongoing signs of PNA. May or may not have been PNA from the beginning. No left shift, no fever, no purulent sputum. BNP elevated, having PND/orthopnea and EF 35%. Will check pct in the am and if normal stop abx. Will need PET scan as outpatient to w/u CT findings.  L sided lesion previously biopsied with findings suggestive of hamartoma but if this is acting more aggressive would certainly warrant additional sampling. I have spoken with and examined the patient. I agree with the above assessment and plan as documented.  
 
Souleymane Sawyer MD

## 2020-10-06 NOTE — PROGRESS NOTES
Problem: Risk for Spread of Infection Goal: Prevent transmission of infectious organism to others Description: Prevent the transmission of infectious organisms to other patients, staff members, and visitors. Outcome: Progressing Towards Goal 
  
Problem: Patient Education:  Go to Education Activity Goal: Patient/Family Education Outcome: Progressing Towards Goal 
  
Problem: Breathing Pattern - Ineffective Goal: *Absence of hypoxia Outcome: Progressing Towards Goal 
Goal: *Use of effective breathing techniques Outcome: Progressing Towards Goal 
Goal: *PALLIATIVE CARE:  Alleviation of Dyspnea Outcome: Progressing Towards Goal 
  
Problem: Patient Education: Go to Patient Education Activity Goal: Patient/Family Education Outcome: Progressing Towards Goal 
  
Problem: Falls - Risk of 
Goal: *Absence of Falls Description: Document [place] Fall Risk and appropriate interventions in the flowsheet. Outcome: Progressing Towards Goal 
Note: Fall Risk Interventions: 
  
 
  
 
  
 
Elimination Interventions: Call light in reach Problem: Patient Education: Go to Patient Education Activity Goal: Patient/Family Education Outcome: Progressing Towards Goal

## 2020-10-06 NOTE — ACP (ADVANCE CARE PLANNING)
Advance Care Planning Advance Care Planning Activator (Inpatient) Conversation Note Date of ACP Conversation: 10/06/20 Conversation Conducted with:   Patient with Decision Making Capacity ACP Activator: Silvino Pratt *When Decision Maker makes decisions on behalf of the incapacitated patient: Decision Maker is asked to consider and make decisions based on patient values, known preferences, or best interests. Health Care Decision Maker: 
 
Current Designated Health Care Decision Maker:  
(If there is a valid [place] named in the [address] [place] [address] Makers\" box in the ACP activity, but it is not visible above, be sure to open that field and then select the health care decision maker relationship (ie \"primary\") in the blank space to the right of the name.) Validate  this information as still accurate & up-to-date; edit [place] field as needed.) Note: Assess and validate information in current ACP documents, as indicated. If no Decision Maker listed above or available through scanned documents, then: 
 
If no Authorized Decision Maker has previously been identified, then patient chooses [place]: \"Who would you like to name as your primary health care decision-maker? \" Name: Mary Vasquezman: spouse Phone number: [phone] \"Can this person be reached easily? \" Daryle Legacy \"Who would you like to name as your back-up decision maker? \" Name: Seymour Ching  Relationship: brother Phone number: [phone] \"Can this person be reached easily? \" [place]ary[place] Legacy Note: If the relationship of these Decision-Makers to the patient does NOT follow your state's Next of Kin hierarchy, recommend that patient complete ACP document that meets state-specific requirements to allow them to act on the patient's behalf when appropriate. Care Preferences Ventilation: \"If you were in your present state of health and suddenly became very ill and were unable to breathe on your own, what would your preference be about the use of a ventilator (breathing machine) if it were available to you? \" If patient would desire the use of a ventilator (breathing machine), answer \"yes\", if not \"no\":yes \"If your health worsens and it becomes clear that your chance of recovery is unlikely, what would your preference be about the use of a ventilator (breathing machine) if it were available to you? \" Would the patient desire the use of a ventilator (breathing machine)? YES Resuscitation \"CPR works best to restart the heart when there is a sudden event, like a heart attack, in someone who is otherwise healthy. Unfortunately, CPR does not typically restart the heart for people who have serious health conditions or who are very sick. \" \"In the event your heart stopped as a result of an underlying serious health condition, would you want attempts to be made to restart your heart (answer \"yes\" for attempt to resuscitate) or would you prefer a natural death (answer \"no\" for do not attempt to resuscitate)? \" yes NOTE: If the patient has a valid advance directive AND now provides care preference(s) that are inconsistent with that prior directive, advise the patient to consider either: creating a new advance directive that complies with state-specific requirements; or, if that is not possible, orally revoking that prior directive in accordance with state-specific requirements, which must be documented in the EHR. [x] Yes  [] No   Educated Patient / Sana Orts regarding differences between Advance Directives and portable DNR orders. Length of ACP Conversation in minutes:   
 
Conversation Outcomes: 
[x] ACP discussion completed 
[] Existing advance directive reviewed with patient; no changes to patient's previously recorded wishes 
 
 [] New Advance Directive completed 
 [] Portable Do Not Resuscitate prepared for Provider review and signature [] POLST/POST/MOLST/MOST prepared for Provider review and signature Follow-up plan:   
[] Schedule follow-up conversation to continue planning 
[] Referred individual to Provider for additional questions/concerns  
[] Advised patient/agent/surrogate to review completed ACP document and update if needed with changes in condition, patient preferences or care setting  
 
[] This note routed to one or more involved healthcare providers

## 2020-10-06 NOTE — PROGRESS NOTES
Hospitalist Progress Note Admit Date:  10/1/2020  8:19 AM  
Name:  Phyllis Collado Age:  69 y.o. 
:  1950 MRN:  [id number] PCP:  Drew Huynh MD 
Treatment Team: Attending Provider: Glen López MD; Primary Nurse: Oliver Traylor; Consulting Provider: Iraj Sánchez MD; Consulting Provider: Zoe Mccormick MD; Consulting Provider: Jessica Fernández MD; Utilization Review: Deandre Siegel RN; Care Manager: Pilar Mann Subjective:  
59-year-old female with a past medical history of asthma, GERD, treatment lymphoma, hyperlipidemia, hypothyroidism, hypotension, chronic left pulmonary artery occlusion, left lung mass status post needle biopsy on 2019 demonstrating harmatoma, that presents in the setting of worsening shortness of breath.  The patient states that for the past few weeks she has been developing worsening shortness of breath especially on exertion, and associated with some dry cough. Patient seen and examined at bedside. This morning still presenting with SOB. Denies chest pain, no abdominal pain, nausea or vomiting 10/6/2020 C/o mild shortness of breath , on 2 lit/min oxygen Objective:  
 
Patient Vitals for the past 24 hrs: 
 Temp Pulse Resp BP SpO2  
10/06/20 0758     96 % 10/06/20 0746 97.9 °F (36.6 °C) 100 18 95/61 98 % 10/06/20 0332     95 % 10/06/20 0323 98 °F (36.7 °C) 97 18 106/77 94 % 10/05/20 2245 98.2 °F (36.8 °C) 94 18 (!) 94/55 93 % 10/05/20 2057     96 % 10/05/20 1955 98.2 °F (36.8 °C) 94 20 103/73 95 % 10/05/20 1530 98 °F (36.7 °C) 84 20 (!) 88/68 93 % 10/05/20 1148 97.9 °F (36.6 °C) 88 20 92/67 92 % Oxygen Therapy O2 Sat (%): 96 % (10/06/20 0758) Pulse via Oximetry: 97 beats per minute (10/06/20 0758) O2 Device: Nasal cannula (10/06/20 0758) O2 Flow Rate (L/min): 2 l/min (10/06/20 0758) Intake/Output Summary (Last 24 hours) at 10/6/2020 1139 Last data filed at 10/5/2020 9391 Gross per 24 hour Intake 720 ml Output 750 ml Net -30 ml General:    Well nourished. Alert. On 2 lit/min HEENT- normal 
CV:   RRR. No murmur, rub, or gallop. Lungs:   Coarse breath sounds Abdomen:   Soft, nontender, nondistended. Cns- no focal neurological deficit Extremities: Warm and dry. No cyanosis or edema. Skin:     No rashes or jaundice. Data Review: 
I have reviewed all labs, meds, telemetry events, and studies from the last 24 hours. Recent Results (from the past 24 hour(s)) CBC WITH AUTOMATED DIFF Collection Time: 10/06/20  5:57 AM  
Result Value Ref Range WBC 11.3 (H) 4.3 - 11.1 K/uL  
 RBC 3.78 (L) 4.05 - 5.2 M/uL  
 HGB 11.7 11.7 - 15.4 g/dL HCT 34.9 (L) 35.8 - 46.3 % MCV 92.3 79.6 - 97.8 FL  
 MCH 31.0 26.1 - 32.9 PG  
 MCHC 33.5 31.4 - 35.0 g/dL  
 RDW 13.2 11.9 - 14.6 % PLATELET 870 295 - 339 K/uL MPV 10.0 9.4 - 12.3 FL ABSOLUTE NRBC 0.00 0.0 - 0.2 K/uL  
 DF AUTOMATED NEUTROPHILS 58 43 - 78 % LYMPHOCYTES 21 13 - 44 % MONOCYTES 13 (H) 4.0 - 12.0 % EOSINOPHILS 7 0.5 - 7.8 % BASOPHILS 1 0.0 - 2.0 % IMMATURE GRANULOCYTES 0 0.0 - 5.0 %  
 ABS. NEUTROPHILS 6.6 1.7 - 8.2 K/UL  
 ABS. LYMPHOCYTES 2.4 0.5 - 4.6 K/UL  
 ABS. MONOCYTES 1.4 (H) 0.1 - 1.3 K/UL  
 ABS. EOSINOPHILS 0.8 0.0 - 0.8 K/UL  
 ABS. BASOPHILS 0.1 0.0 - 0.2 K/UL  
 ABS. IMM. GRANS. 0.0 0.0 - 0.5 K/UL METABOLIC PANEL, BASIC Collection Time: 10/06/20  5:57 AM  
Result Value Ref Range Sodium 141 136 - 145 mmol/L Potassium 2.6 (LL) 3.5 - 5.1 mmol/L Chloride 104 98 - 107 mmol/L  
 CO2 29 21 - 32 mmol/L Anion gap 8 7 - 16 mmol/L Glucose 87 65 - 100 mg/dL BUN 13 8 - 23 MG/DL Creatinine 0.71 0.6 - 1.0 MG/DL  
 GFR est AA >60 >60 ml/min/1.73m2 GFR est non-AA >60 >60 ml/min/1.73m2 Calcium 9.1 8.3 - 10.4 MG/DL All Micro Results Procedure Component Value Units Date/Time CULTURE, BLOOD [[id number]] Collected:  10/01/20 1132 Order Status:  Completed Specimen:  Blood Updated:  10/06/20 8696 Special Requests: --     
  RIGHT 
FOREARM Culture result: NO GROWTH 5 DAYS     
 CULTURE, BLOOD [[id number]] Collected:  10/01/20 1135 Order Status:  Completed Specimen:  Blood Updated:  10/06/20 3392 Special Requests: --     
  LEFT 
FOREARM Culture result: NO GROWTH 5 DAYS     
 CULTURE, BODY FLUID W Rosezella Bellis [[id number]] Collected:  10/03/20 1348 Order Status:  Completed Specimen:  Pleural Fluid Updated:  10/06/20 9783 Special Requests: NO SPECIAL REQUESTS     
  GRAM STAIN 0 TO 13 WBC'S SEEN PER OIF  
   NO DEFINITE ORGANISM SEEN Culture result: NO GROWTH 2 DAYS C. DIFFICILE AG & TOXIN A/B [[id number]] Order Status:  Sent Specimen:  Stool FUNGUS CULTURE AND SMEAR [[id number]] Collected:  10/03/20 1348 Order Status:  Completed Updated:  10/03/20 1635 AFB CULTURE + SMEAR W/RFLX ID FROM CULTURE [[id number]] Collected:  10/03/20 1348 Order Status:  Completed Updated:  10/03/20 1631 Current Meds: 
Current Facility-Administered Medications Medication Dose Route Frequency  potassium chloride (K-DUR, KLOR-CON) SR tablet 40 mEq  40 mEq Oral Q4H  
 furosemide (LASIX) tablet 20 mg  20 mg Oral DAILY  fluticasone propionate (FLONASE) 50 mcg/actuation nasal spray 2 Spray  2 Spray Both Nostrils DAILY  midodrine (PROAMATINE) tablet 5 mg  5 mg Oral TID WITH MEALS  sodium chloride (NS) flush 5-40 mL  5-40 mL IntraVENous Q8H  
 sodium chloride (NS) flush 5-40 mL  5-40 mL IntraVENous PRN  
 acetaminophen (TYLENOL) tablet 650 mg  650 mg Oral Q6H PRN Or  
 acetaminophen (TYLENOL) suppository 650 mg  650 mg Rectal Q6H PRN  polyethylene glycol (MIRALAX) packet 17 g  17 g Oral DAILY PRN  
 enoxaparin (LOVENOX) injection 40 mg  40 mg SubCUTAneous DAILY  ondansetron (ZOFRAN ODT) tablet 4 mg  4 mg Oral Q8H PRN  Or  
  ondansetron (ZOFRAN) injection 4 mg  4 mg IntraVENous Q6H PRN  
 albuterol (PROVENTIL HFA, VENTOLIN HFA, PROAIR HFA) inhaler 2 Puff  2 Puff Inhalation Q4H PRN  
 aspirin delayed-release tablet 81 mg  81 mg Oral DAILY  fludrocortisone (FLORINEF) tablet 0.1 mg  0.1 mg Oral DAILY  levothyroxine (SYNTHROID) tablet 75 mcg  75 mcg Oral ACB  montelukast (SINGULAIR) tablet 10 mg  10 mg Oral DAILY  pantoprazole (PROTONIX) tablet 40 mg  40 mg Oral ACB  atorvastatin (LIPITOR) tablet 10 mg  10 mg Oral DAILY  budesonide-formoteroL (SYMBICORT) 160-4.5 mcg/actuation HFA inhaler 2 Puff  2 Puff Inhalation BID RT  
 piperacillin-tazobactam (ZOSYN) 3.375 g in 0.9% sodium chloride (MBP/ADV) 100 mL  3.375 g IntraVENous Q8H Other Studies (last 24 hours): No results found. Assessment and Plan:  
 
Hospital Problems as of 10/6/2020 Date Reviewed: 10/2/2020 Codes Class Noted - Resolved POA Hypokalemia ICD-10-CM: E87.6 ICD-9-CM: 276.8  10/6/2020 - Present Unknown * (Principal) PNA (pneumonia) ICD-10-CM: J18.9 ICD-9-CM: 773  10/1/2020 - Present Unknown Mass of lingula of lung ICD-10-CM: R91.8 ICD-9-CM: 786.6  10/16/2019 - Present Yes Hypercholesterolemia (Chronic) ICD-10-CM: E78.00 ICD-9-CM: 272.0  6/30/2015 - Present Yes Mild persistent asthma without complication (Chronic) ADRIEL-[id number]: J45.30 ICD-9-CM: 493.90  10/17/2013 - Present Yes Acquired hypothyroidism ICD-10-CM: E03.9 ICD-9-CM: 244.9  10/17/2013 - Present Yes GERD (gastroesophageal reflux disease) (Chronic) ICD-10-CM: K21.9 ICD-9-CM: 530.81  10/17/2013 - Present Yes PLAN:   
 
 
 
Hypokalemia- replace. 1.  shortness of breath and cough with radiologic findings concerning of community-acquired pneumonia at risk of MDR organisms in the setting of underlying left lung mass. Also concerns of pulmonary edema in the setting of HFrEF.   
- Continue Zosyn - Since recently finish 7 days of levofloxacin will not start coverage for atypical organisms 
- Oxygen therapy to maintain oxygen saturation more than 92%. - presently requiring 2 lit/min - Symptomatic management 
- CT of the chest on October 17 did not show pulmonary embolism - SARS CoV2 PCR negative - Continue lasix - S/p thoracentesis, follow cultures 
- I&Os 
- Daily weights - Pulm recs appreciated 
  
2. EKG changes with sinus tachycardia and ST depressions and T wave inversions on V5 and V6 likely from demand ischemia. HFrEF. 
- Tropnin trended down - Telemetry - Continue aspirin 
- TTE EF 35-40% - D/c coreg due to hypotension - Cardiology recs appreciated 
  
3. Left lung mass status post needle biopsy on November 2019 demonstrating harmatoma / Chronic left pulmonary artery occlusion. Pulm recs.  Continue aspirin. 
  
4.  For her asthma continue on home inhalers and Singulair. Currently not on exacerbation. 
  
5.  For her hypotension continue on fludrocortisone and midodrine. 
  
6.  For her hyperlipidemia continue on atorvastatin. 
  
7.  For her GERD continue on PPI. 
  
8.  For her hypothyroidism continue on levothyroxine. 
  
DVT prophylaxis with Lovenox Signed: 
Fredy Barton MD

## 2020-10-07 PROBLEM — J90 BILATERAL PLEURAL EFFUSION: Status: ACTIVE | Noted: 2020-01-01

## 2020-10-07 NOTE — PROGRESS NOTES
Pt Bp 82/58. Asymptomatic, resting in bed. No distress noted. MD notified. Dr. Makenzie Shabazz order midodrine 5 mg once PO. Will continue to monitor.

## 2020-10-07 NOTE — PROGRESS NOTES
Problem: Risk for Spread of Infection Goal: Prevent transmission of infectious organism to others Description: Prevent the transmission of infectious organisms to other patients, staff members, and visitors. Outcome: Progressing Towards Goal 
  
Problem: Patient Education:  Go to Education Activity Goal: Patient/Family Education Outcome: Progressing Towards Goal 
  
Problem: Breathing Pattern - Ineffective Goal: *Absence of hypoxia Outcome: Progressing Towards Goal 
Goal: *Use of effective breathing techniques Outcome: Progressing Towards Goal 
Goal: *PALLIATIVE CARE:  Alleviation of Dyspnea Outcome: Progressing Towards Goal 
  
Problem: Patient Education: Go to Patient Education Activity Goal: Patient/Family Education Outcome: Progressing Towards Goal 
  
Problem: Falls - Risk of 
Goal: *Absence of Falls Description: Document Jean Paul Going Fall Risk and appropriate interventions in the flowsheet. Outcome: Progressing Towards Goal 
Note: Fall Risk Interventions: 
  
 
  
 
Medication Interventions: Teach patient to arise slowly Elimination Interventions: Call light in reach Problem: Patient Education: Go to Patient Education Activity Goal: Patient/Family Education Outcome: Progressing Towards Goal

## 2020-10-07 NOTE — PROGRESS NOTES
Bilateral Ultra Sound done of the chest, pictures taken and placed in chart. Dr Kristopher Corcoran here (see his note) BP low Thoracentesis not completed.

## 2020-10-07 NOTE — PROGRESS NOTES
Hospitalist Progress Note Admit Date:  10/1/2020  8:19 AM  
Name:  Nahum Hebert Age:  69 y.o. 
:  1950 MRN:  [id number] PCP:  Levon Elliott MD 
Treatment Team: Attending Provider: Mery Ding MD; Primary Nurse: Hansel Aguilar; Consulting Provider: Sarabjit Franklin MD; Consulting Provider: Karin Gay MD; Consulting Provider: Lor Valladares MD; Utilization Review: Jensen Montes RN; Care Manager: Sriram Self Subjective:  
65-year-old female with a past medical history of asthma, GERD, treatment lymphoma, hyperlipidemia, hypothyroidism, hypotension, chronic left pulmonary artery occlusion, left lung mass status post needle biopsy on 2019 demonstrating harmatoma, that presents in the setting of worsening shortness of breath.  The patient states that for the past few weeks she has been developing worsening shortness of breath especially on exertion, and associated with some dry cough. Patient seen and examined at bedside. This morning still presenting with SOB. Denies chest pain, no abdominal pain, nausea or vomiting 10/6/2020 C/o mild shortness of breath , on 2 lit/min oxygen 10/7/2020 Says does get short of breath on walking short distance Episode of low bp last night Objective:  
 
Patient Vitals for the past 24 hrs: 
 Temp Pulse Resp BP SpO2  
10/07/20 1545 (P) 97.9 °F (36.6 °C) (P) 91 (P) 13 (!) (P) 88/49 (P) 99 % 10/07/20 1422     99 % 10/07/20 1220 98 °F (36.7 °C) (!) 110 18 92/63 96 % 10/07/20 0752     98 % 10/07/20 0747 97.8 °F (36.6 °C) 93 16 99/65 98 % 10/07/20 0428     98 % 10/07/20 0313 98.3 °F (36.8 °C) 88 17 (!) 78/54 97 % 10/07/20 0017 97.8 °F (36.6 °C) 72 17 97/70 92 % 10/06/20 2144     90 % 10/06/20 1928    (!) 82/58   
10/06/20 1919 97.8 °F (36.6 °C) 71 18 (!) 76/51 98 % Oxygen Therapy O2 Sat (%): (P) 99 % (10/07/20 1545) Pulse via Oximetry: 86 beats per minute (10/07/20 1422) O2 Device: Nasal cannula (10/07/20 1422) O2 Flow Rate (L/min): 2 l/min (10/07/20 1422) Intake/Output Summary (Last 24 hours) at 10/7/2020 1625 Last data filed at 10/7/2020 1339 Gross per 24 hour Intake 720 ml Output  Net 720 ml General:    Well nourished. Alert. On 2 lit/min HEENT- normal 
CV:   RRR. No murmur, rub, or gallop. Lungs:   Coarse breath sounds Abdomen:   Soft, nontender, nondistended. Cns- no focal neurological deficit Extremities: Warm and dry. No cyanosis or edema. Skin:     No rashes or jaundice. Data Review: 
I have reviewed all labs, meds, telemetry events, and studies from the last 24 hours. Recent Results (from the past 24 hour(s)) POTASSIUM Collection Time: 10/06/20  4:35 PM  
Result Value Ref Range Potassium 3.7 3.5 - 5.1 mmol/L METABOLIC PANEL, BASIC Collection Time: 10/07/20  6:46 AM  
Result Value Ref Range Sodium 140 136 - 145 mmol/L Potassium 3.2 (L) 3.5 - 5.1 mmol/L Chloride 104 98 - 107 mmol/L  
 CO2 31 21 - 32 mmol/L Anion gap 5 (L) 7 - 16 mmol/L Glucose 91 65 - 100 mg/dL BUN 10 8 - 23 MG/DL Creatinine 0.65 0.6 - 1.0 MG/DL  
 GFR est AA >60 >60 ml/min/1.73m2 GFR est non-AA >60 >60 ml/min/1.73m2 Calcium 8.6 8.3 - 10.4 MG/DL PROCALCITONIN Collection Time: 10/07/20  6:46 AM  
Result Value Ref Range Procalcitonin <0.05 ng/mL All Micro Results Procedure Component Value Units Date/Time C. DIFFICILE AG & TOXIN A/B [[id number]] Collected:  10/06/20 1938 Order Status:  Canceled Specimen:  Stool AFB CULTURE + SMEAR W/RFLX ID FROM CULTURE [[id number]] Collected:  10/03/20 1348 Order Status:  Completed Specimen:  Miscellaneous sample Updated:  10/06/20 1538 Source PLEURAL FLUID Comment: L1  
  
  AFB Specimen processing Concentration Acid Fast Smear Negative Comment: (NOTE) Performed At: [address] [id number] Mendez Link MD [id number] Acid Fast Culture PENDING  
 CULTURE, BLOOD [[id number]] Collected:  10/01/20 1135 Order Status:  Completed Specimen:  Blood Updated:  10/06/20 2784 Special Requests: --     
  RIGHT 
FOREARM Culture result: NO GROWTH 5 DAYS     
 CULTURE, BLOOD [[id number]] Collected:  10/01/20 1135 Order Status:  Completed Specimen:  Blood Updated:  10/06/20 2296 Special Requests: --     
  LEFT 
FOREARM Culture result: NO GROWTH 5 DAYS     
 CULTURE, BODY FLUID W Mary Mcduffie [[id number]] Collected:  10/03/20 1348 Order Status:  Completed Specimen:  Pleural Fluid Updated:  10/06/20 9005 Special Requests: NO SPECIAL REQUESTS     
  GRAM STAIN 0 TO 13 WBC'S SEEN PER OIF  
   NO DEFINITE ORGANISM SEEN Culture result: NO GROWTH 2 DAYS FUNGUS CULTURE AND SMEAR [[id number]] Collected:  10/03/20 1348 Order Status:  Completed Updated:  10/03/20 1635 Current Meds: 
Current Facility-Administered Medications Medication Dose Route Frequency  midodrine (PROAMATINE) tablet 10 mg  10 mg Oral TID WITH MEALS  potassium chloride (K-DUR, KLOR-CON) SR tablet 40 mEq  40 mEq Oral Q4H  
 morphine injection 1 mg  1 mg IntraVENous Q4H PRN  
 albuterol (PROVENTIL VENTOLIN) nebulizer solution 2.5 mg  2.5 mg Nebulization Q6H RT  
 furosemide (LASIX) tablet 20 mg  20 mg Oral DAILY  fluticasone propionate (FLONASE) 50 mcg/actuation nasal spray 2 Spray  2 Spray Both Nostrils DAILY  sodium chloride (NS) flush 5-40 mL  5-40 mL IntraVENous Q8H  
 sodium chloride (NS) flush 5-40 mL  5-40 mL IntraVENous PRN  
 acetaminophen (TYLENOL) tablet 650 mg  650 mg Oral Q6H PRN Or  
 acetaminophen (TYLENOL) suppository 650 mg  650 mg Rectal Q6H PRN  polyethylene glycol (MIRALAX) packet 17 g  17 g Oral DAILY PRN  
 enoxaparin (LOVENOX) injection 40 mg  40 mg SubCUTAneous DAILY  ondansetron (ZOFRAN ODT) tablet 4 mg  4 mg Oral Q8H PRN  Or  
  ondansetron (ZOFRAN) injection 4 mg  4 mg IntraVENous Q6H PRN  
 aspirin delayed-release tablet 81 mg  81 mg Oral DAILY  fludrocortisone (FLORINEF) tablet 0.1 mg  0.1 mg Oral DAILY  levothyroxine (SYNTHROID) tablet 75 mcg  75 mcg Oral ACB  montelukast (SINGULAIR) tablet 10 mg  10 mg Oral DAILY  pantoprazole (PROTONIX) tablet 40 mg  40 mg Oral ACB  atorvastatin (LIPITOR) tablet 10 mg  10 mg Oral DAILY  budesonide-formoteroL (SYMBICORT) 160-4.5 mcg/actuation HFA inhaler 2 Puff  2 Puff Inhalation BID RT Other Studies (last 24 hours): No results found. Assessment and Plan:  
 
Hospital Problems as of 10/7/2020 Date Reviewed: 10/7/2020 Codes Class Noted - Resolved POA Bilateral pleural effusion ICD-10-CM: J90 ICD-9-CM: 511.9  10/7/2020 - Present Unknown Hypokalemia ICD-10-CM: E87.6 ICD-9-CM: 276.8  10/6/2020 - Present Unknown Acute systolic heart failure (HCC) ICD-10-CM: I50.21 ICD-9-CM: 428.21  10/6/2020 - Present Unknown * (Principal) PNA (pneumonia) ICD-10-CM: J18.9 ICD-9-CM: 466  10/1/2020 - Present Unknown Mass of lingula of lung ICD-10-CM: R91.8 ICD-9-CM: 786.6  10/16/2019 - Present Yes Hypercholesterolemia (Chronic) ICD-10-CM: E78.00 ICD-9-CM: 272.0  6/30/2015 - Present Yes Mild persistent asthma without complication (Chronic) P-61-QO: J45.30 ICD-9-CM: 493.90  10/17/2013 - Present Yes Acquired hypothyroidism ICD-10-CM: E03.9 ICD-9-CM: 244.9  10/17/2013 - Present Yes GERD (gastroesophageal reflux disease) (Chronic) ICD-10-CM: K21.9 ICD-9-CM: 530.81  10/17/2013 - Present Yes PLAN:   
 
 
 
Hypokalemia- replace. Low bp- increase midodrine to 10 mg tid. 1.  shortness of breath and cough with radiologic findings concerning of community-acquired pneumonia at risk of MDR organisms in the setting of underlying left lung mass.  Also concerns of pulmonary edema in the setting of HFrEF.   
 - finished Zosyn  
- Since recently finish 7 days of levofloxacin will not start coverage for atypical organisms 
- Oxygen therapy to maintain oxygen saturation more than 92%. - presently requiring 2 lit/min - Symptomatic management 
- CT of the chest on October 17 did not show pulmonary embolism - SARS CoV2 PCR negative - Continue lasix - S/p thoracentesis, follow cultures 
- I&Os 
- Daily weights - Pulm recs appreciated 
  
2. EKG changes with sinus tachycardia and ST depressions and T wave inversions on V5 and V6 likely from demand ischemia. HFrEF. 
- Tropnin trended down - Telemetry - Continue aspirin 
- TTE EF 35-40% - D/c coreg due to hypotension - Cardiology recs appreciated 
  
3. Left lung mass status post needle biopsy on November 2019 demonstrating harmatoma / Chronic left pulmonary artery occlusion. Pulm recs.  Continue aspirin. 
  
4.  For her asthma continue on home inhalers and Singulair. Currently not on exacerbation. 
  
5.  For her hypotension continue on fludrocortisone and midodrine. 
  
6.  For her hyperlipidemia continue on atorvastatin. 
  
7.  For her GERD continue on PPI. 
  
8.  For her hypothyroidism continue on levothyroxine. 
  
DVT prophylaxis with Lovenox Signed: 
Fredy Barton MD

## 2020-10-07 NOTE — PROCEDURES
PROCEDURE: 
DIAGNOSTIC ULTRASOUND OF CHEST 
 
DIAGNOSIS: 
BILATERALPLEURAL EFFUSION 
 
CHEST ULTRASOUND FINDINGS: 
 
A Turbo-M, Sonosite ultrasound with a 5-16 mHz probe was used to image the chest and localize the pleural effusion on the bilateral  chest. 
 
A moderate anechoic space was seen on the bilateral  consistent with an uncomplicated pleural effusion. The left chest presented a layer of lung just below the surface, so only the right side was going to be drained. 2-3cc of lidocaine was injected under the skin. However the patient's next BP check was in the 70's. It was repeated on the other arm and was in the 80's/30's. This was felt to be too low to safely perform thoracentesis. Estimated 400-500cc of fluid on the right, not urgently needing draining with a high risk of BP drop. Will hold on thoracentesis at this time and continue to monitor.   
 
 
 
Viet Lopez MD

## 2020-10-07 NOTE — PROGRESS NOTES
Benjamin Primer Admission Date: 10/1/2020 Daily Progress Note: 10/7/2020 The patient's chart is reviewed and the patient is discussed with the staff. Patient is a 69 y.o.  female seen and evaluated at the request of Dr. Josef Bose. 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 Pt has recently had issues with hypotension which is being managed by her PCP. She was treated with LVQ x 7 days for CAP. She had a virtual visit with KE Romero on 9/30/2020 and pt mentioned that her O2 sats had been borderline low and that she had been tachycardic. An echocardiogram was obtained to evaluate for PAH that same day, but the TV jet was inadequate for estimation of RVSP. Pt felt worse and presented to the ER and her CXR was concerning for PNA. Pt was admitted and started on Zosyn. Pt is being ruled out for COVID. We were consulted to assist with workup and treatment. CT was performed which reveals multiple new nodules, and bilateral pleural effusions. Pt had a L thoracentesis with 1050mL removed and R thoracentesis with 800mL removed on 10/3/2020. Subjective:  
 
Pt is sitting in bed on 2L O2. She reports that she ate her breakfast standing straight up because she felt like she could breath better. She still gets very winded with any exertion. Current Facility-Administered Medications Medication Dose Route Frequency  midodrine (PROAMATINE) tablet 10 mg  10 mg Oral TID WITH MEALS  potassium chloride (K-DUR, KLOR-CON) SR tablet 40 mEq  40 mEq Oral Q4H  
 morphine injection 1 mg  1 mg IntraVENous Q4H PRN  
 albuterol (PROVENTIL VENTOLIN) nebulizer solution 2.5 mg  2.5 mg Nebulization Q6H RT  
 furosemide (LASIX) tablet 20 mg  20 mg Oral DAILY  fluticasone propionate (FLONASE) 50 mcg/actuation nasal spray 2 Spray  2 Spray Both Nostrils DAILY  sodium chloride (NS) flush 5-40 mL  5-40 mL IntraVENous Q8H  
 sodium chloride (NS) flush 5-40 mL  5-40 mL IntraVENous PRN  
 acetaminophen (TYLENOL) tablet 650 mg  650 mg Oral Q6H PRN Or  
 acetaminophen (TYLENOL) suppository 650 mg  650 mg Rectal Q6H PRN  polyethylene glycol (MIRALAX) packet 17 g  17 g Oral DAILY PRN  
 enoxaparin (LOVENOX) injection 40 mg  40 mg SubCUTAneous DAILY  ondansetron (ZOFRAN ODT) tablet 4 mg  4 mg Oral Q8H PRN Or  
 ondansetron (ZOFRAN) injection 4 mg  4 mg IntraVENous Q6H PRN  
 aspirin delayed-release tablet 81 mg  81 mg Oral DAILY  fludrocortisone (FLORINEF) tablet 0.1 mg  0.1 mg Oral DAILY  levothyroxine (SYNTHROID) tablet 75 mcg  75 mcg Oral ACB  montelukast (SINGULAIR) tablet 10 mg  10 mg Oral DAILY  pantoprazole (PROTONIX) tablet 40 mg  40 mg Oral ACB  atorvastatin (LIPITOR) tablet 10 mg  10 mg Oral DAILY  budesonide-formoteroL (SYMBICORT) 160-4.5 mcg/actuation HFA inhaler 2 Puff  2 Puff Inhalation BID RT  
 piperacillin-tazobactam (ZOSYN) 3.375 g in 0.9% sodium chloride (MBP/ADV) 100 mL  3.375 g IntraVENous Q8H Review of Systems 
+cough 
+dyspnea on exertion Constitutional: negative for fever, chills, sweats Cardiovascular: negative for chest pain, palpitations, syncope, edema Gastrointestinal:  negative for dysphagia, reflux, vomiting, diarrhea, abdominal pain, or melena Neurologic:  negative for focal weakness, numbness, headache Objective:  
 
Vitals:  
 10/07/20 9821 10/07/20 0747 10/07/20 0752 10/07/20 1220 BP:  99/65  92/63 Pulse:  93  (!) 110 Resp:  16  18 Temp:  97.8 °F (36.6 °C)  98 °F (36.7 °C) SpO2: 98% 98% 98% 96% Weight:      
Height:      
 
 
 
Intake/Output Summary (Last 24 hours) at 10/7/2020 1233 Last data filed at 10/7/2020 6047 Gross per 24 hour Intake 480 ml Output 2 ml Net 478 ml Physical Exam:  
Constitution:  the patient is well developed and in no acute distress, on 2L 
EENMT:  Sclera clear, pupils equal, oral mucosa moist 
Respiratory: Diminished throughout, crackles at bases 'Cardiovascular:  RRR Gastrointestinal: soft and non-tender; with positive bowel sounds. Musculoskeletal: warm without cyanosis. There is no lower extremity edema. Skin:  no jaundice or rashes Neurologic: no gross neuro deficits Psychiatric:  alert and oriented x 3 CXR:  
 
 
 
10/4/2020 - Single view FINDINGS: Bilateral lung edema or infiltrates have improved, with mild residual. 
There are also decreased small bilateral pleural effusions. No pneumothorax is 
identified. 
  
IMPRESSION: Improving bilateral lung edema or infiltrates and small pleural 
effusions. CXR 10/3/2020 
   
 
  
 
 
  
 
  
9/21/20 
 
 
 
  
6/1/2020 Echo:  
-  Left ventricle: Moderate global hypokinesis. Systolic function was 
moderately reduced. Ejection fraction was estimated in the range of 35 % to  
40%. -  Mitral valve: There was mild to moderate regurgitation. -  Other Measurements: High velocity overlapping flow noted during Suprasternal imaging (descending aorta appears with normal flow velocities and Superimposed high flow velocities from alternative source of uncertain [id number]~5.9T/X). Consider further imaging as clinically indicated. 
  
LAB No results for input(s): GLUCPOC in the last 72 hours. No lab exists for component: [place] Recent Labs 10/06/20 
6120 10/05/20 
7231 WBC 11.3* 10.8 HGB 11.7 12.1 HCT 34.9* 36.6  403 Recent Labs 10/07/20 
4566 10/06/20 
1635 10/06/20 
0557 10/05/20 
1141   --  141 141  
K 3.2* 3.7 2.6* 3.1*  
  --  104 106 CO2 31  --  29 27 GLU 91  --  87 96 BUN 10  --  13 18 CREA 0.65  --  0.71 0.72  
MG  --   --  2.4  --   
CA 8.6  --  9.1 8.9 No results for input(s): PH, PCO2, PO2, HCO3, PHI, PCO2I, PO2I, HCO3I in the last 72 hours. No results for input(s): LCAD, LAC in the last 72 hours. Assessment:  (Medical Decision Making) Hospital Problems  Date Reviewed: 10/2/2020 Codes Class Noted POA * (Principal) PNA (pneumonia) ICD-10-CM: J18.9 ICD-9-CM: 160  10/1/2020 Unknown On zosyn Mass of lingula of lung ICD-10-CM: R91.8 ICD-9-CM: 786.6  10/16/2019 Yes Needs Bx Hypercholesterolemia (Chronic) ICD-10-CM: E78.00 ICD-9-CM: 272.0  6/30/2015 Yes Mild persistent asthma without complication (Chronic) KAU-01-CD: J45.30 ICD-9-CM: 493.90  10/17/2013 Yes Acquired hypothyroidism ICD-10-CM: E03.9 ICD-9-CM: 244.9  10/17/2013 Yes GERD (gastroesophageal reflux disease) (Chronic) ICD-10-CM: K21.9 ICD-9-CM: 530.81  10/17/2013 Yes Plan:  (Medical Decision Making) --Pt on 2L O2 
--Continue symbicort & montelukast 
--continue albuterol nebs 
--continue morphine 1mg q4* prn 
--Zosyn 3.375 q 8 hrs: D7, procalcitonin normal 
--C. Diff stool pending  
--for bedside ultrasound today  
--Will need PET-CT and consideration for CT-guided biopsy of new nodule. More than 50% of the time documented was spent in face-to-face contact with the patient and in the care of the patient on the floor/unit where the patient is located. DENI Parham Lungs:  B crackles. Heart:  RRR with no Murmur/Rubs/Gallops Additional Comments:   
Patient's case reviewed. No ongoing signs of PNA. May or may not have been PNA from the beginning. No left shift, no fever, no purulent sputum. BNP elevated, having PND/orthopnea and EF 35%. Will check pct in the am and if normal stop abx. Will need PET scan as outpatient to w/u CT findings. L sided lesion previously biopsied with findings suggestive of hamartoma but if this is acting more aggressive would certainly warrant additional sampling. I have spoken with and examined the patient.  I agree with the above assessment and plan as documented. DENI Purdy Lungs:  Decreased breath sounds on the left Heart:  RRR with no Murmur/Rubs/Gallops Additional Comments:  procal < 0.05, bnp 6409 Now off antibx, dyspnea is main c/o and may be related to  chf-cardiology says they will address as outpt I have spoken with and examined the patient. I agree with the above assessment and plan as documented.  
 
Hazel Rader MD

## 2020-10-08 NOTE — PROGRESS NOTES
Comprehensive Nutrition Assessment Type and Reason for Visit: Initial, RD nutrition re-screen/LOS Nutrition Recommendations/Plan: 
 
Continue with current diet Addition of Ensure Enlive once daily Nutrition Assessment:  Pt admitted due to PNA. PMH notable for lung mass, asthma, GERD, HLD, HTN, lymphoma, and hypothyroidism. Pt reports good po intake PTA. Wt hx has been wt stable per chart review. Pt reports consuming ~50% of 2 meals and eating 1 small meal at night. Pt reports eating soup and a fruit cup yesterday and having oatmeal ordered for tonight. Discussed addition of Ensure Enlive with dinner meal. Pt is agreeable. Estimated Daily Nutrient Needs: 
Energy (kcal):  1395-1674kcal(25-30kcal/kg (CBW: 55.8kg)) Protein (g):  56-67gm(1-1.2gm/kg (CBW: 55.8kg)) Current Nutrition Therapies: DIET REGULAR Anthropometric Measures: 
· Height:  5' 1.34\" (155.8 cm) · Current Body Wt:  55.8 kg (123 lb 0.3 oz)(not specified; 10/5) · Ideal Body Wt:  107 lbs:  115 % · BMI Category:  Normal weight (BMI 22.0-24.9) age over 72 Nutrition Diagnosis:  
· Inadequate oral intake related to (poor appetite) as evidenced by (difficulty breathing) Nutrition Interventions:  
Food and/or Nutrient Delivery: Continue current diet, Start oral nutrition supplement Goals: Will meet >75% estimated nutrition needs within 7 days Nutrition Monitoring and Evaluation:  
Food/Nutrient Intake Outcomes: Food and nutrient intake, Supplement intake Discharge Planning: Too soon to determine Electronically signed by Lin Gaucher MS, JONNYN, LD 10/8/2020 at 12:26 PM 
Contact: [phone]

## 2020-10-08 NOTE — PROGRESS NOTES
Notified MD Marine Bryant that patient is requesting to speak to pulmonary regarding thoracentesis today. Pt states her breathing has gotten worse throughout the day and that she feels like she is drowning. VSS and Primary RN, Gabriela Rodríguez notified. RT notified and administered treatment. No new orders received from MD Marine Bryant.

## 2020-10-08 NOTE — PROGRESS NOTES
END OF SHIFT NOTE: 
 
INTAKE/OUTPUT 
10/07 0701 - 10/08 0700 In: 600 [P.O.:600] Out: -  
Voiding: YES Catheter: NO 
Drain:   
 
Flatus: Patient does have flatus present. Stool:  2 occurrences. Characteristics: 
Stool Assessment Stool Color: Leandrew Fly Stool Appearance: Loose Stool Amount: Small Stool Source/Status: Rectum Emesis: 0 occurrences. Characteristics: VITAL SIGNS Patient Vitals for the past 12 hrs: 
 Temp Pulse Resp BP SpO2  
10/08/20 1708  (!) 101 20 97/64 97 % 10/08/20 1630  (!) 108 20 113/71 98 % 10/08/20 1534 97.8 °F (36.6 °C) (!) 103 18 108/79 97 % 10/08/20 1501     96 % 10/08/20 1130 98.2 °F (36.8 °C) 98 18 102/75 97 % 10/08/20 1054  100  (!) 85/60   
10/08/20 0805     99 % 10/08/20 0758 98.3 °F (36.8 °C) 100 16 95/67 98 % Pain Assessment Pain Intensity 1: 0 (10/08/20 1708) Pain Location 1: Head 
Pain Intervention(s) 1: Medication (see MAR) Patient Stated Pain Goal: 0 Ambulating Yes; to bathroom. Shift report given to oncoming nurse at the bedside.  
 
Jose Herrera RN

## 2020-10-08 NOTE — PROGRESS NOTES
Nichole Yun Admission Date: 10/1/2020 Daily Progress Note: 10/8/2020 The patient's chart is reviewed and the patient is discussed with the staff. Patient is a 69 y.o.  female seen and evaluated at the request of Dr. Chelle Leroy. 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 Pt has recently had issues with hypotension which is being managed by her PCP. She was treated with LVQ x 7 days for CAP. She had a virtual visit with Leeta Soulier, NP-C on 9/30/2020 and pt mentioned that her O2 sats had been borderline low and that she had been tachycardic. An echocardiogram was obtained to evaluate for PAH that same day, but the TV jet was inadequate for estimation of RVSP. Pt felt worse and presented to the ER and her CXR was concerning for PNA. Pt was admitted and started on Zosyn. Pt is being ruled out for COVID. We were consulted to assist with workup and treatment. CT was performed which reveals multiple new nodules, and bilateral pleural effusions. Pt had a L thoracentesis with 1050mL removed and R thoracentesis with 800mL removed on 10/3/2020. Subjective:  
 
Patient resting in bed, on 2L O2. Refused Lasix today because she is concerned about BP Would like thoracentesis today, states had difficulty breathing overnight Current Facility-Administered Medications Medication Dose Route Frequency  midodrine (PROAMATINE) tablet 10 mg  10 mg Oral TID WITH MEALS  morphine injection 1 mg  1 mg IntraVENous Q4H PRN  
 albuterol (PROVENTIL VENTOLIN) nebulizer solution 2.5 mg  2.5 mg Nebulization Q6H RT  
 furosemide (LASIX) tablet 20 mg  20 mg Oral DAILY  fluticasone propionate (FLONASE) 50 mcg/actuation nasal spray 2 Spray  2 Spray Both Nostrils DAILY  sodium chloride (NS) flush 5-40 mL  5-40 mL IntraVENous Q8H  
 sodium chloride (NS) flush 5-40 mL  5-40 mL IntraVENous PRN  
 acetaminophen (TYLENOL) tablet 650 mg  650 mg Oral Q6H PRN Or  
 acetaminophen (TYLENOL) suppository 650 mg  650 mg Rectal Q6H PRN  polyethylene glycol (MIRALAX) packet 17 g  17 g Oral DAILY PRN  
 enoxaparin (LOVENOX) injection 40 mg  40 mg SubCUTAneous DAILY  ondansetron (ZOFRAN ODT) tablet 4 mg  4 mg Oral Q8H PRN Or  
 ondansetron (ZOFRAN) injection 4 mg  4 mg IntraVENous Q6H PRN  
 aspirin delayed-release tablet 81 mg  81 mg Oral DAILY  fludrocortisone (FLORINEF) tablet 0.1 mg  0.1 mg Oral DAILY  levothyroxine (SYNTHROID) tablet 75 mcg  75 mcg Oral ACB  montelukast (SINGULAIR) tablet 10 mg  10 mg Oral DAILY  pantoprazole (PROTONIX) tablet 40 mg  40 mg Oral ACB  atorvastatin (LIPITOR) tablet 10 mg  10 mg Oral DAILY  budesonide-formoteroL (SYMBICORT) 160-4.5 mcg/actuation HFA inhaler 2 Puff  2 Puff Inhalation BID RT Review of Systems 
+cough 
+dyspnea on exertion Constitutional: negative for fever, chills, sweats Cardiovascular: negative for chest pain, palpitations, syncope, edema Gastrointestinal:  negative for dysphagia, reflux, vomiting, diarrhea, abdominal pain, or melena Neurologic:  negative for focal weakness, numbness, headache Objective:  
 
Vitals:  
 10/08/20 0758 10/08/20 0805 10/08/20 1054 10/08/20 1130 BP: 95/67  (!) 85/60 102/75 Pulse: 100  100 98 Resp: 16   18 Temp: 98.3 °F (36.8 °C)   98.2 °F (36.8 °C) SpO2: 98% 99%  97% Weight:      
Height:      
 
 
 
Intake/Output Summary (Last 24 hours) at 10/8/2020 1152 Last data filed at 10/7/2020 1844 Gross per 24 hour Intake 480 ml Output  Net 480 ml Physical Exam:  
Constitution:  the patient is well developed and in no acute distress, on 2L 
EENMT:  Sclera clear, pupils equal, oral mucosa moist 
 Respiratory: Diminished throughout, crackles at bases 'Cardiovascular:  RRR Gastrointestinal: soft and non-tender; with positive bowel sounds. Musculoskeletal: warm without cyanosis. There is no lower extremity edema. Skin:  no jaundice or rashes Neurologic: no gross neuro deficits Psychiatric:  alert and oriented x 3 CXR: 10/6/2020 
 
 
10/4/2020 - Single view FINDINGS: Bilateral lung edema or infiltrates have improved, with mild residual. 
There are also decreased small bilateral pleural effusions. No pneumothorax is 
identified. IMPRESSION: Improving bilateral lung edema or infiltrates and small pleural 
Effusions. CXR 10/3/2020 
 
  
 
 
  
 
  
9/21/20 
 
 
 
  
6/1/2020 Echo:  
-  Left ventricle: Moderate global hypokinesis. Systolic function was 
moderately reduced. Ejection fraction was estimated in the range of 35 % to  
40%. -  Mitral valve: There was mild to moderate regurgitation. -  Other Measurements: High velocity overlapping flow noted during Suprasternal imaging (descending aorta appears with normal flow velocities and Superimposed high flow velocities from alternative source of uncertain [id number]~2.1S/S). Consider further imaging as clinically indicated. 
  
LAB No results for input(s): GLUCPOC in the last 72 hours. No lab exists for component: [place] Recent Labs 10/06/20 
0557 WBC 11.3* HGB 11.7 HCT 34.9*  
 Recent Labs 10/08/20 
7399 10/07/20 
6678 10/06/20 
1635 10/06/20 
0557  140  --  141  
K 3.9 3.2* 3.7 2.6*  
 104  --  104 CO2 30 31  --  29  
GLU 84 91  --  87 BUN 10 10  --  13  
CREA 0.73 0.65  --  0.71  
MG  --   --   --  2.4 CA 9.3 8.6  --  9.1 No results for input(s): PH, PCO2, PO2, HCO3, PHI, PCO2I, PO2I, HCO3I in the last 72 hours. No results for input(s): LCAD, LAC in the last 72 hours. Assessment:  (Medical Decision Making) Hospital Problems  Date Reviewed: 10/8/2020 Codes Class Noted POA * (Principal) PNA (pneumonia) ICD-10-CM: J18.9 ICD-9-CM: 650  10/1/2020 Unknown On zosyn Mass of lingula of lung ICD-10-CM: R91.8 ICD-9-CM: 786.6  10/16/2019 Yes Needs Bx Hypercholesterolemia (Chronic) ICD-10-CM: E78.00 ICD-9-CM: 272.0  6/30/2015 Yes Mild persistent asthma without complication (Chronic) [facility]95-AF: J45.30 ICD-9-CM: 493.90  10/17/2013 Yes Acquired hypothyroidism ICD-10-CM: E03.9 ICD-9-CM: 244.9  10/17/2013 Yes GERD (gastroesophageal reflux disease) (Chronic) ICD-10-CM: K21.9 ICD-9-CM: 530.81  10/17/2013 Yes Plan:  (Medical Decision Making) --Continue O2 at 2L O2, wean as tolerated 
--Continue Symbicort and albuterol nebulizers 
--Continue Singulair --continue morphine 1mg q4* prn 
--Zosyn completed, procalcitonin normal 
--C. Diff stool pending  
--Unable to do thoracentesis yesterday due to hypotension, BP still marginal today 
--Will need PET-CT and consideration for CT-guided biopsy of new nodule. More than 50% of the time documented was spent in face-to-face contact with the patient and in the care of the patient on the floor/unit where the patient is located. Michael Willams, NP Lungs:  Clear Heart:  RRR with no Murmur/Rubs/Gallops Additional Comments: --Will need PET-CT and consideration for CT-guided biopsy of new nodule. Asking to have tap today I have spoken with and examined the patient. I agree with the above assessment and plan as documented.  
 
Greg Monsalve MD

## 2020-10-08 NOTE — PROGRESS NOTES
Lasix was held this morning due to BP of 95/67, . .  Nurse reassessed BP and is now 85/60, . Kelsey Arevalo Lasix was still held and hospitalist was notified.

## 2020-10-08 NOTE — PROGRESS NOTES
Patient Education       Earwax, Home Treatment    Everyone produces earwax from the lining of the ear canal. It serves to lubricate and protect the ear. The wax that forms in the canal naturally moves toward the outside of the ear and falls out. Sometimes the ear canal may contain too much wax. This can cause a blockage and loss of hearing. Directions are given below for home treatment.  Home care  If your doctor has advised you to remove a wax blockage yourself, follow these directions:  · Unless a medicine was prescribed, you may use an over-the-counter product made for clearing earwax. These contain carbamide peroxide. Lie down with the blocked ear facing upward. Apply one dropper full of medicine and wait a few minutes. Grasp the outer ear and wiggle it to help the solution enter the canal.  · Lean over a sink or basin with the blocked ear facing downward. Use a bulb syringe filled with warm (not hot or cold) water to rinse the ear several times. Use gentle pressure only.  · If you are having trouble draining the water out of your ear canal, put a few drops of rubbing alcohol (isopropyl alcohol) into the ear canal. This will help remove the remaining water.  · Repeat this procedure once a day for up to three days, or until your hearing is back to normal. Do not use this treatment for more than three days in a row.  Don’ts  · Don’t use cold water to rinse the ear. This will make you dizzy.  · Don’t perform this procedure if you have an ear infection.  · Don’t perform this procedure if you have a ruptured eardrum.  · Don’t use cotton swabs, matches, hairpins, keys, or other objects to “clean” the ear canal. This can cause infection of the ear canal or rupture the eardrum. Because of their size and shape, cotton swabs can push earwax deeper into the ear canal instead of removing it.  Follow-up care  Follow up with your health care provider if you are not improving after three cleaning attempts, or as  Pt sat up on side of bed for thoracentesis. Consent obtained. Time out performed. Pts vitals monitored throughout procedure. Bilateral ultrasound done and pic taken of pleural fluid.  ~600 ml yellow pleural fluid from R. ~1000 ml yellow pleural fluid from L. Pt tolerated procedure well with no adverse rxn. No specimens sent to the lab per md order. Sites dressed appropriately and report given to pts RN Parva Dom[facility] [id number].   Ultrasound findings reviewed by MD. 
 advised.  When to seek medical advice  Call your health care provider right away if any of these occur:  · Worsening ear pain  · Fever of 101°F (38.3°C) or higher, or as directed by your health care provider  · Hearing does not return to normal after three days of treatment  · Fluid drainage or bleeding from the ear canal  · Swelling, redness, or tenderness of the outer ear  · Headache, neck pain, or stiff neck  © 9530-0769 [employer]. [address] [place] [address], [place] [address]. 5196-7285 [employer]. [address] [place] [address]. All rights reserved. This information is not intended as a substitute for professional medical care. Always follow your healthcare professional's instructions.

## 2020-10-08 NOTE — PROGRESS NOTES
Hospitalist Progress Note Admit Date:  10/1/2020  8:19 AM  
Name:  Anneliese Torres Age:  69 y.o. 
:  1950 MRN:  [id number] PCP:  Muriel Soto MD 
Treatment Team: Attending Provider: Edy Solares MD; Primary Nurse: Maureen Mcnair; Consulting Provider: Karthik Higgins MD; Consulting Provider: Cristian Almaraz MD; Consulting Provider: Yolis Hebert MD; Utilization Review: Nargis Hoover RN; Care Manager: Vincenzo Bowman; Primary Nurse: Emma Schwab RN Subjective:  
79-year-old female with a past medical history of asthma, GERD, treatment lymphoma, hyperlipidemia, hypothyroidism, hypotension, chronic left pulmonary artery occlusion, left lung mass status post needle biopsy on 2019 demonstrating harmatoma, that presents in the setting of worsening shortness of breath.  The patient states that for the past few weeks she has been developing worsening shortness of breath especially on exertion, and associated with some dry cough. Patient seen and examined at bedside. This morning still presenting with SOB. Denies chest pain, no abdominal pain, nausea or vomiting 10/6/2020 C/o mild shortness of breath , on 2 lit/min oxygen 10/7/2020 Says does get short of breath on walking short distance Episode of low bp last night 
 
10/8/2020 C/o shortness of breath Thoracentesis held secondary to low bp yesterday. Had one episode of low bp improved on its own. Objective:  
 
Patient Vitals for the past 24 hrs: 
 Temp Pulse Resp BP SpO2  
10/08/20 1708  (!) (P) 101 (P) 20 (P) 97/64 (P) 97 % 10/08/20 1630  (!) 108 20 113/71 98 % 10/08/20 1534 97.8 °F (36.6 °C) (!) 103 18 108/79 97 % 10/08/20 1501     96 % 10/08/20 1130 98.2 °F (36.8 °C) 98 18 102/75 97 % 10/08/20 1054  100  (!) 85/60   
10/08/20 0805     99 % 10/08/20 0758 98.3 °F (36.8 °C) 100 16 95/67 98 % 10/08/20 0340 97.7 °F (36.5 °C) 96 16 90/65 98 % 10/08/20 0158     97 % 10/07/20 2355 98 °F (36.7 °C) 93 16 91/71 100 % 10/07/20 2136     98 % 10/07/20 1947 98 °F (36.7 °C) 92 16 98/63 99 % Oxygen Therapy O2 Sat (%): (P) 97 % (10/08/20 1708) Pulse via Oximetry: 108 beats per minute (10/08/20 1630) O2 Device: (P) Nasal cannula (10/08/20 1708) O2 Flow Rate (L/min): 2 l/min (10/08/20 1501) Intake/Output Summary (Last 24 hours) at 10/8/2020 1710 Last data filed at 10/8/2020 1615 Gross per 24 hour Intake 240 ml Output 200 ml Net 40 ml General:    Well nourished. Alert. On 2 lit/min. Mild resp distress HEENT- normal 
CV:   RRR. No murmur, rub, or gallop. Lungs:   Coarse breath sounds, mild decreased entry left lung base Abdomen:   Soft, nontender, nondistended. Cns- no focal neurological deficit Extremities: Warm and dry. No cyanosis or edema. Skin:     No rashes or jaundice. Data Review: 
I have reviewed all labs, meds, telemetry events, and studies from the last 24 hours. Recent Results (from the past 24 hour(s)) METABOLIC PANEL, BASIC Collection Time: 10/08/20  6:09 AM  
Result Value Ref Range Sodium 140 136 - 145 mmol/L Potassium 3.9 3.5 - 5.1 mmol/L Chloride 105 98 - 107 mmol/L  
 CO2 30 21 - 32 mmol/L Anion gap 5 (L) 7 - 16 mmol/L Glucose 84 65 - 100 mg/dL BUN 10 8 - 23 MG/DL Creatinine 0.73 0.6 - 1.0 MG/DL  
 GFR est AA >60 >60 ml/min/1.73m2 GFR est non-AA >60 >60 ml/min/1.73m2 Calcium 9.3 8.3 - 10.4 MG/DL All Micro Results Procedure Component Value Units Date/Time FUNGUS CULTURE AND SMEAR [[id number]] Collected:  10/03/20 1348 Order Status:  Completed Specimen:  Miscellaneous sample Updated:  10/08/20 1236 Source PLEURAL FLUID Comment: L1 Fungus stain Direct Inoculation Fungus (Mycology) Culture Other source received Comment: (NOTE) Performed At: [address] [id number] Mendez Link MD [id number] 
  
  
 C. DIFFICILE AG & TOXIN A/B [[id number]] Collected:  10/06/20 1938 Order Status:  Canceled Specimen:  Stool AFB CULTURE + SMEAR W/RFLX ID FROM CULTURE [[id number]] Collected:  10/03/20 1348 Order Status:  Completed Specimen:  Miscellaneous sample Updated:  10/06/20 1538 Source PLEURAL FLUID Comment: L1  
  
  AFB Specimen processing Concentration Acid Fast Smear Negative Comment: (NOTE) Performed At: [address] [id number] Mendez Link MD [id number] Acid Fast Culture PENDING  
 CULTURE, BLOOD [[id number]] Collected:  10/01/20 1135 Order Status:  Completed Specimen:  Blood Updated:  10/06/20 4421 Special Requests: --     
  RIGHT 
FOREARM Culture result: NO GROWTH 5 DAYS     
 CULTURE, BLOOD [[id number]] Collected:  10/01/20 1135 Order Status:  Completed Specimen:  Blood Updated:  10/06/20 4230 Special Requests: --     
  LEFT 
FOREARM Culture result: NO GROWTH 5 DAYS     
 CULTURE, BODY FLUID W Mary Mcduffie [[id number]] Collected:  10/03/20 1348 Order Status:  Completed Specimen:  Pleural Fluid Updated:  10/06/20 7495 Special Requests: NO SPECIAL REQUESTS     
  GRAM STAIN 0 TO 13 WBC'S SEEN PER OIF  
   NO DEFINITE ORGANISM SEEN Culture result: NO GROWTH 2 DAYS Current Meds: 
Current Facility-Administered Medications Medication Dose Route Frequency  midodrine (PROAMATINE) tablet 10 mg  10 mg Oral TID WITH MEALS  morphine injection 1 mg  1 mg IntraVENous Q4H PRN  
 albuterol (PROVENTIL VENTOLIN) nebulizer solution 2.5 mg  2.5 mg Nebulization Q6H RT  
 furosemide (LASIX) tablet 20 mg  20 mg Oral DAILY  fluticasone propionate (FLONASE) 50 mcg/actuation nasal spray 2 Spray  2 Spray Both Nostrils DAILY  sodium chloride (NS) flush 5-40 mL  5-40 mL IntraVENous Q8H  
 sodium chloride (NS) flush 5-40 mL  5-40 mL IntraVENous PRN  
  acetaminophen (TYLENOL) tablet 650 mg  650 mg Oral Q6H PRN Or  
 acetaminophen (TYLENOL) suppository 650 mg  650 mg Rectal Q6H PRN  polyethylene glycol (MIRALAX) packet 17 g  17 g Oral DAILY PRN  
 enoxaparin (LOVENOX) injection 40 mg  40 mg SubCUTAneous DAILY  ondansetron (ZOFRAN ODT) tablet 4 mg  4 mg Oral Q8H PRN Or  
 ondansetron (ZOFRAN) injection 4 mg  4 mg IntraVENous Q6H PRN  
 aspirin delayed-release tablet 81 mg  81 mg Oral DAILY  fludrocortisone (FLORINEF) tablet 0.1 mg  0.1 mg Oral DAILY  levothyroxine (SYNTHROID) tablet 75 mcg  75 mcg Oral ACB  montelukast (SINGULAIR) tablet 10 mg  10 mg Oral DAILY  pantoprazole (PROTONIX) tablet 40 mg  40 mg Oral ACB  atorvastatin (LIPITOR) tablet 10 mg  10 mg Oral DAILY  budesonide-formoteroL (SYMBICORT) 160-4.5 mcg/actuation HFA inhaler 2 Puff  2 Puff Inhalation BID RT Other Studies (last 24 hours): No results found. Assessment and Plan:  
 
Hospital Problems as of 10/8/2020 Date Reviewed: 10/8/2020 Codes Class Noted - Resolved POA Bilateral pleural effusion ICD-10-CM: J90 ICD-9-CM: 511.9  10/7/2020 - Present Unknown Hypokalemia ICD-10-CM: E87.6 ICD-9-CM: 276.8  10/6/2020 - Present Unknown Acute systolic heart failure (HCC) ICD-10-CM: I50.21 ICD-9-CM: 428.21  10/6/2020 - Present Unknown * (Principal) PNA (pneumonia) ICD-10-CM: J18.9 ICD-9-CM: 385  10/1/2020 - Present Unknown Mass of lingula of lung ICD-10-CM: R91.8 ICD-9-CM: 786.6  10/16/2019 - Present Yes Hypercholesterolemia (Chronic) ICD-10-CM: E78.00 ICD-9-CM: 272.0  6/30/2015 - Present Yes Mild persistent asthma without complication (Chronic) SFA-70-AK: J45.30 ICD-9-CM: 493.90  10/17/2013 - Present Yes Acquired hypothyroidism ICD-10-CM: E03.9 ICD-9-CM: 244.9  10/17/2013 - Present Yes GERD (gastroesophageal reflux disease) (Chronic) ICD-10-CM: K21.9 ICD-9-CM: 530.81  10/17/2013 - Present Yes PLAN:   
 
 
 
Hypokalemia- replace. Low bp- increase midodrine to 10 mg tid. 1.  shortness of breath and cough with radiologic findings concerning of community-acquired pneumonia at risk of MDR organisms in the setting of underlying left lung mass. Also concerns of pulmonary edema in the setting of HFrEF.   
- finished Zosyn  
- Since recently finish 7 days of levofloxacin will not start coverage for atypical organisms 
- Oxygen therapy to maintain oxygen saturation more than 92%. - presently requiring 2 lit/min - Symptomatic management 
- CT of the chest on October 17 did not show pulmonary embolism - SARS CoV2 PCR negative - Continue lasix - S/p thoracentesis, follow cultures 
- I&Os 
- Daily weights - Pulm recs appreciated- pleural effusion thoracentesis held on 10/7/2020 secondary to low bp. 
  
2. EKG changes with sinus tachycardia and ST depressions and T wave inversions on V5 and V6 likely from demand ischemia. HFrEF. 
- Tropnin trended down - Telemetry - Continue aspirin 
- TTE EF 35-40% - D/c coreg due to hypotension - Cardiology recs appreciated, signed off. 
  
3. Left lung mass status post needle biopsy on November 2019 demonstrating harmatoma / Chronic left pulmonary artery occlusion. Pulm recs.  Continue aspirin. 
  
4.  For her asthma continue on home inhalers and Singulair. Currently not on exacerbation. 
  
5.  For her hypotension continue on fludrocortisone and midodrine. 
  
6.  For her hyperlipidemia continue on atorvastatin. 
  
7.  For her GERD continue on PPI. 
  
8.  For her hypothyroidism continue on levothyroxine. 
  
DVT prophylaxis with Lovenox Signed: 
Hi Blandon MD

## 2020-10-09 NOTE — PROGRESS NOTES
Informed Dr. Rey Robert of low BP; 85/60 and previous numbers 90's over 50's s/p thoracentesis 10/8. 500 mL bolus ordered.

## 2020-10-09 NOTE — PROGRESS NOTES
Dominguez Kumar Admission Date: 10/1/2020 Daily Progress Note: 10/9/2020 The patient's chart is reviewed and the patient is discussed with the staff. Patient is a 69 y.o.  female seen and evaluated at the request of Dr. Heidi Bullard. 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 Pt has recently had issues with hypotension which is being managed by her PCP. She was treated with LVQ x 7 days for CAP. She had a virtual visit with KE Santana on 9/30/2020 and pt mentioned that her O2 sats had been borderline low and that she had been tachycardic. An echocardiogram was obtained to evaluate for PAH that same day, but the TV jet was inadequate for estimation of RVSP. Pt felt worse and presented to the ER and her CXR was concerning for PNA. Pt was admitted and started on Zosyn. Pt is being ruled out for COVID. We were consulted to assist with workup and treatment. CT was performed which reveals multiple new nodules, and bilateral pleural effusions. Pt had a L thoracentesis with 1050mL removed and R thoracentesis with 800mL removed on 10/3/2020. Subjective:  
 
Patient resting in bed, on 2L O2. Not feeling as well as she has in the past post thoracentesis Still having hypotension Current Facility-Administered Medications Medication Dose Route Frequency  midodrine (PROAMATINE) tablet 10 mg  10 mg Oral TID WITH MEALS  morphine injection 1 mg  1 mg IntraVENous Q4H PRN  
 albuterol (PROVENTIL VENTOLIN) nebulizer solution 2.5 mg  2.5 mg Nebulization Q6H RT  
 furosemide (LASIX) tablet 20 mg  20 mg Oral DAILY  fluticasone propionate (FLONASE) 50 mcg/actuation nasal spray 2 Spray  2 Spray Both Nostrils DAILY  sodium chloride (NS) flush 5-40 mL  5-40 mL IntraVENous Q8H  
  sodium chloride (NS) flush 5-40 mL  5-40 mL IntraVENous PRN  
 acetaminophen (TYLENOL) tablet 650 mg  650 mg Oral Q6H PRN Or  
 acetaminophen (TYLENOL) suppository 650 mg  650 mg Rectal Q6H PRN  polyethylene glycol (MIRALAX) packet 17 g  17 g Oral DAILY PRN  
 enoxaparin (LOVENOX) injection 40 mg  40 mg SubCUTAneous DAILY  ondansetron (ZOFRAN ODT) tablet 4 mg  4 mg Oral Q8H PRN Or  
 ondansetron (ZOFRAN) injection 4 mg  4 mg IntraVENous Q6H PRN  
 aspirin delayed-release tablet 81 mg  81 mg Oral DAILY  fludrocortisone (FLORINEF) tablet 0.1 mg  0.1 mg Oral DAILY  levothyroxine (SYNTHROID) tablet 75 mcg  75 mcg Oral ACB  montelukast (SINGULAIR) tablet 10 mg  10 mg Oral DAILY  pantoprazole (PROTONIX) tablet 40 mg  40 mg Oral ACB  atorvastatin (LIPITOR) tablet 10 mg  10 mg Oral DAILY  budesonide-formoteroL (SYMBICORT) 160-4.5 mcg/actuation HFA inhaler 2 Puff  2 Puff Inhalation BID RT Review of Systems 
+cough 
+dyspnea on exertion Constitutional: negative for fever, chills, sweats Cardiovascular: negative for chest pain, palpitations, syncope, edema Gastrointestinal:  negative for dysphagia, reflux, vomiting, diarrhea, abdominal pain, or melena Neurologic:  negative for focal weakness, numbness, headache Objective:  
 
Vitals:  
 10/09/20 0157 10/09/20 9873 10/09/20 6881 10/09/20 0820 BP:  105/78 (!) 85/54 Pulse:  (!) 105 99 Resp:  18 16 Temp:  97.7 °F (36.5 °C) 98.1 °F (36.7 °C) SpO2: 98% 96% 95% 97% Weight:      
Height:      
 
 
 
Intake/Output Summary (Last 24 hours) at 10/9/2020 1119 Last data filed at 10/9/2020 3870 Gross per 24 hour Intake 500 ml Output 2400 ml Net -1900 ml Physical Exam:  
Constitution:  the patient is well developed and in no acute distress, on 2L 
EENMT:  Sclera clear, pupils equal, oral mucosa moist 
Respiratory: Crackles at bases, on 2L O2 NC Cardiovascular:  RRR 
 Gastrointestinal: soft and non-tender; with positive bowel sounds. Musculoskeletal: warm without cyanosis. There is no lower extremity edema. Skin:  no jaundice or rashes Neurologic: no gross neuro deficits Psychiatric:  alert and oriented x 3 CXR: 10/6/2020 
 
 
10/4/2020 - Single view FINDINGS: Bilateral lung edema or infiltrates have improved, with mild residual. 
There are also decreased small bilateral pleural effusions. No pneumothorax is 
identified. IMPRESSION: Improving bilateral lung edema or infiltrates and small pleural 
Effusions. CXR 10/3/2020 
 
  
 
 
  
 
  
9/21/20 
 
 
 
  
6/1/2020 Echo:  
-  Left ventricle: Moderate global hypokinesis. Systolic function was 
moderately reduced. Ejection fraction was estimated in the range of 35 % to  
40%. -  Mitral valve: There was mild to moderate regurgitation. -  Other Measurements: High velocity overlapping flow noted during Suprasternal imaging (descending aorta appears with normal flow velocities and Superimposed high flow velocities from alternative source of uncertain [id number]~8.8X/S). Consider further imaging as clinically indicated. 
  
LAB No results for input(s): GLUCPOC in the last 72 hours. No lab exists for component: [place] No results for input(s): WBC, HGB, HCT, PLT, INR, HGBEXT, HCTEXT, PLTEXT, INREXT, HGBEXT, HCTEXT, PLTEXT, INREXT in the last 72 hours. Recent Labs 10/09/20 
0559 10/08/20 
4528 10/07/20 
7531  140 140  
K 3.9 3.9 3.2*  
 105 104 CO2 26 30 31 GLU 84 84 91 BUN 9 10 10 CREA 0.64 0.73 0.65 CA 9.4 9.3 8.6 No results for input(s): PH, PCO2, PO2, HCO3, PHI, PCO2I, PO2I, HCO3I in the last 72 hours. No results for input(s): LCAD, LAC in the last 72 hours. Assessment:  (Medical Decision Making) Hospital Problems  Date Reviewed: 10/8/2020 Codes Class Noted POA * (Principal) PNA (pneumonia) ICD-10-CM: J18.9 ICD-9-CM: 502  10/1/2020 Unknown On zosyn Mass of lingula of lung ICD-10-CM: R91.8 ICD-9-CM: 786.6  10/16/2019 Yes Needs Bx Hypercholesterolemia (Chronic) ICD-10-CM: E78.00 ICD-9-CM: 272.0  6/30/2015 Yes Mild persistent asthma without complication (Chronic) XRK-83-SF: J45.30 ICD-9-CM: 493.90  10/17/2013 Yes Acquired hypothyroidism ICD-10-CM: E03.9 ICD-9-CM: 244.9  10/17/2013 Yes GERD (gastroesophageal reflux disease) (Chronic) ICD-10-CM: K21.9 ICD-9-CM: 530.81  10/17/2013 Yes Plan:  (Medical Decision Making) --Continue O2 at 2L O2, wean as tolerated --Still having hypotension 
--Continue Symbicort and albuterol nebulizers 
--Continue Singulair --continue morphine 1mg q4* prn 
--Zosyn completed, procalcitonin normal 
--C. Diff stool pending --Thoracentesis done, 600 cc removed from right, still feels no better --Will need PET-CT and consideration for CT-guided biopsy of new nodule. More than 50% of the time documented was spent in face-to-face contact with the patient and in the care of the patient on the floor/unit where the patient is located. Destiney Fu NP Lungs:  Crackles Heart:  RRR with no Murmur/Rubs/Gallops Additional Comments:  Still SOB with  Exertion ,needs PET scan as outpatient I have spoken with and examined the patient. I agree with the above assessment and plan as documented.  
 
Ken Capellan MD

## 2020-10-09 NOTE — PROCEDURES
PROCEDURE: 
 
DIAGNOSTIC/THERAPEUTIC THORACENTESIS 
 
 
 
PRE-OP DIAGNOSIS: 
 
R PLEURAL EFFUSION POST-OP DIAGNOSIS: 
 
 R PLEURAL EFFUSION 
 
 
 
ANESTHESIA: 
 
LOCAL ANESTHESIA WITH 1% LIDOCAINE 10 CC TOTAL. CHEST ULTRASOUND FINDINGS: 
 
A Turbo-M, Sonosite ultrasound with a 5-16 mHz probe was used to image the chest and localize the pleural effusion on the Right chest. 
 
A moderate anechoic space was seen on the Right consistent with an uncomplicated pleural effusion. DESCRIPTION OF PROCEDURE: 
 
After obtaining informed consent and localizing the safest location for thoracentesis, the  9 intercostal space was marked with a blunt, plastic needle cap in the mid scapular line. An [employer] AK-0100 Pleral-Seal thoracentesis kit was used to perform the procedure. The skin was  cleansed with the supplied  chlorhexididne swab and then draped in the usual fasion. Using the previously marked location as a giude, a 22 G 1.5 inch needle was used to inject 10 cc of 1% lidocaine into the skin and subcutaneous tissue, as well as onto the underlying rib and inter-costal muscles, pleural fluid was aspirated to assure proper location, prior to removing the anesthesia needle. A 3mm  incision was then made, with the supplied scalpel in the usual fashion to facilitate the insertiopn of the thoracentesis needle. The needle with an 8French thoracentesis catheter was then introduced into the chest through the previously made incision in the usual fashion, the rib localized with the needle, and the catheter then marched over the rib into the pleural space. After aspirating fluid, the thoracentesis catheter was then placed into the chest using the needle itself as a trocar. The needle was then removed and the catheter was attached to the supplied tubing without complication. 600 cc of Clear Yellow fluid, was aspirated and sent for analysis. Post procedure US confirmed complete drainage of the effusion. EBL:  
 
1 drop COMPLICATIONS: 
 
none

## 2020-10-09 NOTE — PROCEDURES
PROCEDURE: 
 
DIAGNOSTIC/THERAPEUTIC THORACENTESIS 
 
 
 
PRE-OP DIAGNOSIS: 
 
L PLEURAL EFFUSION POST-OP DIAGNOSIS: 
 
 LPLEURAL EFFUSION 
 
 
 
ANESTHESIA: 
 
LOCAL ANESTHESIA WITH 1% LIDOCAINE 10 CC TOTAL. CHEST ULTRASOUND FINDINGS: 
 
A Turbo-M, Sonosite ultrasound with a 5-16 mHz probe was used to image the chest and localize the pleural effusion on the Left A moderateanechoic space was seen on the Left consistent with an uncomplicated pleural effusion. DESCRIPTION OF PROCEDURE: 
 
After obtaining informed consent and localizing the safest location for thoracentesis, the 9  intercostal space was marked with a blunt, plastic needle cap in the mid scapular line. An [employer] AK-0100 Pleral-Seal thoracentesis kit was used to perform the procedure. The skin was  cleansed with the supplied  chlorhexididne swab and then draped in the usual fasion. Using the previously marked location as a giude, a 22 G 1.5 inch needle was used to inject 10 cc of 1% lidocaine into the skin and subcutaneous tissue, as well as onto the underlying rib and inter-costal muscles, pleural fluid was aspirated to assure proper location, prior to removing the anesthesia needle. A 3mm  incision was then made, with the supplied scalpel in the usual fashion to facilitate the insertiopn of the thoracentesis needle. The needle with an 8French thoracentesis catheter was then introduced into the chest through the previously made incision in the usual fashion, the rib localized with the needle, and the catheter then marched over the rib into the pleural space. After aspirating fluid, the thoracentesis catheter was then placed into the chest using the needle itself as a trocar. The needle was then removed and the catheter was attached to the supplied tubing without complication. 1000  cc of Clear Yellow fluid, was aspirated and sent for analysis. Fluid was  Not sent  As it was done few days ago. Post procedure US confirmed complete drainage of the effusion. EBL:  
 
none COMPLICATIONS: 
 
none Monica Sandoval MD

## 2020-10-09 NOTE — H&P
Date of Surgery Update: 
Aracelis Godinez was seen and examined. History and physical has been reviewed. The patient has been examined.  There have been no significant clinical changes since the completion of the originally dated History and Physical. 
 
Signed By: Sunshine Gayle MD   
 October 9, 2020 8:36 AM

## 2020-10-09 NOTE — PROGRESS NOTES
Hospitalist Progress Note Admit Date:  10/1/2020  8:19 AM  
Name:  Jaime Rebollar Age:  69 y.o. 
:  1950 MRN:  [id number] PCP:  Zita Crawford MD 
Treatment Team: Attending Provider: Izabela Romo MD; Primary Nurse: Emigdio Jackson; Consulting Provider: Keon Brandon MD; Consulting Provider: Shira Alcaraz MD; Consulting Provider: Bethany Heart MD; Utilization Review: Juan Denver, RN; Care Manager: Mariano Galindo Subjective:  
77-year-old female with a past medical history of asthma, GERD, treatment lymphoma, hyperlipidemia, hypothyroidism, hypotension, chronic left pulmonary artery occlusion, left lung mass status post needle biopsy on 2019 demonstrating harmatoma, that presents in the setting of worsening shortness of breath.  The patient states that for the past few weeks she has been developing worsening shortness of breath especially on exertion, and associated with some dry cough. Patient seen and examined at bedside. This morning still presenting with SOB. Denies chest pain, no abdominal pain, nausea or vomiting 10/6/2020 C/o mild shortness of breath , on 2 lit/min oxygen 10/7/2020 Says does get short of breath on walking short distance Episode of low bp last night 
 
10/8/2020 C/o shortness of breath Thoracentesis held secondary to low bp yesterday. Had one episode of low bp improved on its own. 10/9/2020 
 had thoracentesis yesterday Says breathing better Mild low this am- got midodrine Objective:  
 
Patient Vitals for the past 24 hrs: 
 Temp Pulse Resp BP SpO2  
10/09/20 1201 98.4 °F (36.9 °C) (!) 101 16 (!) 84/62 98 % 10/09/20 0820     97 % 10/09/20 0758 98.1 °F (36.7 °C) 99 16 (!) 85/54 95 % 10/09/20 0334 97.7 °F (36.5 °C) (!) 105 18 105/78 96 % 10/09/20 0157     98 % 10/08/20 2323 97.9 °F (36.6 °C) 95 20 (!) 86/57 99 % 10/08/20 2133     97 % 10/08/20 1941 97.7 °F (36.5 °C) 95 20 (!) 90/58 98 % 10/08/20 1708  (!) 101 20 97/64 97 % 10/08/20 1630  (!) 108 20 113/71 98 % Oxygen Therapy O2 Sat (%): 98 % (10/09/20 1201) Pulse via Oximetry: 98 beats per minute (10/09/20 0820) O2 Device: Nasal cannula (10/09/20 0820) O2 Flow Rate (L/min): 2 l/min (10/09/20 0820) Intake/Output Summary (Last 24 hours) at 10/9/2020 1550 Last data filed at 10/9/2020 0923 Gross per 24 hour Intake 500 ml Output 2300 ml Net -1800 ml General:    Well nourished. Alert. On 2 lit/min. Mild resp distress HEENT- normal 
CV:   RRR. No murmur, rub, or gallop. Lungs:   Coarse breath sounds, improved air entry Abdomen:   Soft, nontender, nondistended. Cns- no focal neurological deficit Extremities: Warm and dry. No cyanosis or edema. Skin:     No rashes or jaundice. Data Review: 
I have reviewed all labs, meds, telemetry events, and studies from the last 24 hours. Recent Results (from the past 24 hour(s)) METABOLIC PANEL, BASIC Collection Time: 10/09/20  5:59 AM  
Result Value Ref Range Sodium 139 136 - 145 mmol/L Potassium 3.9 3.5 - 5.1 mmol/L Chloride 105 98 - 107 mmol/L  
 CO2 26 21 - 32 mmol/L Anion gap 8 7 - 16 mmol/L Glucose 84 65 - 100 mg/dL BUN 9 8 - 23 MG/DL Creatinine 0.64 0.6 - 1.0 MG/DL  
 GFR est AA >60 >60 ml/min/1.73m2 GFR est non-AA >60 >60 ml/min/1.73m2 Calcium 9.4 8.3 - 10.4 MG/DL All Micro Results Procedure Component Value Units Date/Time FUNGUS CULTURE AND SMEAR [[id number]] Collected:  10/03/20 1348 Order Status:  Completed Specimen:  Miscellaneous sample Updated:  10/08/20 1236 Source PLEURAL FLUID Comment: L1 Fungus stain Direct Inoculation Fungus (Mycology) Culture Other source received Comment: (NOTE) Performed At: [address] [id number] Brit Jarquin MD [id number] 
  
  
 C. DIFFICILE AG & TOXIN A/B [[id number]] Collected:  10/06/20 1938 Order Status:  Canceled Specimen:  Stool AFB CULTURE + SMEAR W/RFLX ID FROM CULTURE [[id number]] Collected:  10/03/20 1348 Order Status:  Completed Specimen:  Miscellaneous sample Updated:  10/06/20 1538 Source PLEURAL FLUID Comment: L1  
  
  AFB Specimen processing Concentration Acid Fast Smear Negative Comment: (NOTE) Performed At: [address] [id number] Ruth Arauz MD [id number] Acid Fast Culture PENDING  
 CULTURE, BLOOD [[id number]] Collected:  10/01/20 1135 Order Status:  Completed Specimen:  Blood Updated:  10/06/20 0213 Special Requests: --     
  RIGHT 
FOREARM Culture result: NO GROWTH 5 DAYS     
 CULTURE, BLOOD [[id number]] Collected:  10/01/20 1135 Order Status:  Completed Specimen:  Blood Updated:  10/06/20 1852 Special Requests: --     
  LEFT 
FOREARM Culture result: NO GROWTH 5 DAYS     
 CULTURE, BODY FLUID W Slava Cheese [[id number]] Collected:  10/03/20 1348 Order Status:  Completed Specimen:  Pleural Fluid Updated:  10/06/20 0188 Special Requests: NO SPECIAL REQUESTS     
  GRAM STAIN 0 TO 13 WBC'S SEEN PER OIF  
   NO DEFINITE ORGANISM SEEN Culture result: NO GROWTH 2 DAYS Current Meds: 
Current Facility-Administered Medications Medication Dose Route Frequency  midodrine (PROAMATINE) tablet 10 mg  10 mg Oral TID WITH MEALS  morphine injection 1 mg  1 mg IntraVENous Q4H PRN  
 albuterol (PROVENTIL VENTOLIN) nebulizer solution 2.5 mg  2.5 mg Nebulization Q6H RT  
 furosemide (LASIX) tablet 20 mg  20 mg Oral DAILY  fluticasone propionate (FLONASE) 50 mcg/actuation nasal spray 2 Spray  2 Spray Both Nostrils DAILY  sodium chloride (NS) flush 5-40 mL  5-40 mL IntraVENous Q8H  
 sodium chloride (NS) flush 5-40 mL  5-40 mL IntraVENous PRN  
 acetaminophen (TYLENOL) tablet 650 mg  650 mg Oral Q6H PRN  Or  
  acetaminophen (TYLENOL) suppository 650 mg  650 mg Rectal Q6H PRN  polyethylene glycol (MIRALAX) packet 17 g  17 g Oral DAILY PRN  
 enoxaparin (LOVENOX) injection 40 mg  40 mg SubCUTAneous DAILY  ondansetron (ZOFRAN ODT) tablet 4 mg  4 mg Oral Q8H PRN Or  
 ondansetron (ZOFRAN) injection 4 mg  4 mg IntraVENous Q6H PRN  
 aspirin delayed-release tablet 81 mg  81 mg Oral DAILY  fludrocortisone (FLORINEF) tablet 0.1 mg  0.1 mg Oral DAILY  levothyroxine (SYNTHROID) tablet 75 mcg  75 mcg Oral ACB  montelukast (SINGULAIR) tablet 10 mg  10 mg Oral DAILY  pantoprazole (PROTONIX) tablet 40 mg  40 mg Oral ACB  atorvastatin (LIPITOR) tablet 10 mg  10 mg Oral DAILY  budesonide-formoteroL (SYMBICORT) 160-4.5 mcg/actuation HFA inhaler 2 Puff  2 Puff Inhalation BID RT Other Studies (last 24 hours): No results found. Assessment and Plan:  
 
Hospital Problems as of 10/9/2020 Date Reviewed: 10/9/2020 Codes Class Noted - Resolved POA Bilateral pleural effusion ICD-10-CM: J90 ICD-9-CM: 511.9  10/7/2020 - Present Unknown Hypokalemia ICD-10-CM: E87.6 ICD-9-CM: 276.8  10/6/2020 - Present Unknown Acute systolic heart failure (HCC) ICD-10-CM: I50.21 ICD-9-CM: 428.21  10/6/2020 - Present Unknown * (Principal) PNA (pneumonia) ICD-10-CM: J18.9 ICD-9-CM: 060  10/1/2020 - Present Unknown Mass of lingula of lung ICD-10-CM: R91.8 ICD-9-CM: 786.6  10/16/2019 - Present Yes Hypercholesterolemia (Chronic) ICD-10-CM: E78.00 ICD-9-CM: 272.0  6/30/2015 - Present Yes Mild persistent asthma without complication (Chronic) COQ-26-IN: J45.30 ICD-9-CM: 493.90  10/17/2013 - Present Yes Acquired hypothyroidism ICD-10-CM: E03.9 ICD-9-CM: 244.9  10/17/2013 - Present Yes GERD (gastroesophageal reflux disease) (Chronic) ICD-10-CM: K21.9 ICD-9-CM: 530.81  10/17/2013 - Present Yes PLAN:   
 
 
 
Hypokalemia- replace. Low bp- increase midodrine to 10 mg tid. 1.  shortness of breath and cough with radiologic findings concerning of community-acquired pneumonia at risk of MDR organisms in the setting of underlying left lung mass. Also concerns of pulmonary edema in the setting of HFrEF.   
- finished Zosyn  
- Since recently finish 7 days of levofloxacin will not start coverage for atypical organisms 
- Oxygen therapy to maintain oxygen saturation more than 92%. - presently requiring 2 lit/min - Symptomatic management 
- CT of the chest on October 17 did not show pulmonary embolism - SARS CoV2 PCR negative - Continue lasix - S/p thoracentesis, follow cultures 
- I&Os 
- Daily weights - Pulm recs appreciated- 10/8/2020 thoracentesis 
  
2. EKG changes with sinus tachycardia and ST depressions and T wave inversions on V5 and V6 likely from demand ischemia. HFrEF. 
- Tropnin trended down - Telemetry - Continue aspirin 
- TTE EF 35-40% - D/c coreg due to hypotension - Cardiology recs appreciated, signed off. 
  
3. Left lung mass status post needle biopsy on November 2019 demonstrating harmatoma / Chronic left pulmonary artery occlusion. Pulm recs.  Continue aspirin. 
  
4.  For her asthma continue on home inhalers and Singulair. Currently not on exacerbation. 
  
5.  For her hypotension continue on fludrocortisone and midodrine. close f/u 
  
6.  For her hyperlipidemia continue on atorvastatin. 
  
7.  For her GERD continue on PPI. 
  
8.  For her hypothyroidism continue on levothyroxine. 
  
DVT prophylaxis with Lovenox Signed: 
Sintia Powell MD

## 2020-10-09 NOTE — PROGRESS NOTES
Problem: Risk for Spread of Infection Goal: Prevent transmission of infectious organism to others Description: Prevent the transmission of infectious organisms to other patients, staff members, and visitors. Outcome: Progressing Towards Goal 
  
Problem: Patient Education:  Go to Education Activity Goal: Patient/Family Education Outcome: Progressing Towards Goal 
  
Problem: Breathing Pattern - Ineffective Goal: *Absence of hypoxia Outcome: Progressing Towards Goal 
Goal: *Use of effective breathing techniques Outcome: Progressing Towards Goal 
Goal: *PALLIATIVE CARE:  Alleviation of Dyspnea Outcome: Progressing Towards Goal 
  
Problem: Patient Education: Go to Patient Education Activity Goal: Patient/Family Education Outcome: Progressing Towards Goal 
 Patient alert and oriented x 4; VS stable on 2L NC but monitoring closely due to low BP. Had thoracentesis with 1000mL removed on left side, 600 mL removed on right. BP did drop to 80's/50's overnight. Informed Dr. Raisa De La Rosa and advised to give 500 mL bolus. Advised patient to call right away if she felt uncomfortable/SOB, or did not feel like she tolerated the bolus. Will monitor closely. Patient up with assist to bathroom. Says she does feel better but not as good as she did after her last thoracentesis and still SOB when coming back from bathroom. Asymptomatic with hypotension. Lung sounds clear on right but crackles still heard on mid-lower left. Some discomfort at sites of thoracentesis. Tylenol given with relief.

## 2020-10-10 NOTE — PROGRESS NOTES
Gila Smith Admission Date: 10/1/2020 Daily Progress Note: 10/10/2020 The patient's chart is reviewed and the patient is discussed with the staff. Patient is a 69 y.o.  female seen and evaluated at the request of Dr. Zana Pulido. 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 Pt has recently had issues with hypotension which is being managed by her PCP. She was treated with LVQ x 7 days for CAP. She had a virtual visit with KE Kaufman on 9/30/2020 and pt mentioned that her O2 sats had been borderline low and that she had been tachycardic. An echocardiogram was obtained to evaluate for PAH that same day, but the TV jet was inadequate for estimation of RVSP. Pt felt worse and presented to the ER and her CXR was concerning for PNA. Pt was admitted and started on Zosyn. Pt is being ruled out for COVID. We were consulted to assist with workup and treatment. CT was performed which reveals multiple new nodules, and bilateral pleural effusions. Pt had a L thoracentesis with 1050mL removed and R thoracentesis with 800mL removed on 10/3/2020. Subjective:  
 
Patient resting in bed, remains on 2 LPM, reports increased SOB with activity Thoracentesis done yesterday with 600 ml yellow fluid removed from R and 100 ml of yellow fluid removed from L Still having mild hypotension Current Facility-Administered Medications Medication Dose Route Frequency  albuterol (PROVENTIL VENTOLIN) nebulizer solution 2.5 mg  2.5 mg Nebulization Q6H PRN  
 midodrine (PROAMATINE) tablet 10 mg  10 mg Oral TID WITH MEALS  morphine injection 1 mg  1 mg IntraVENous Q4H PRN  
 furosemide (LASIX) tablet 20 mg  20 mg Oral DAILY  fluticasone propionate (FLONASE) 50 mcg/actuation nasal spray 2 Spray  2 [place] Both Nostrils DAILY  sodium chloride (NS) flush 5-40 mL  5-40 mL IntraVENous Q8H  
 sodium chloride (NS) flush 5-40 mL  5-40 mL IntraVENous PRN  
 acetaminophen (TYLENOL) tablet 650 mg  650 mg Oral Q6H PRN Or  
 acetaminophen (TYLENOL) suppository 650 mg  650 mg Rectal Q6H PRN  polyethylene glycol (MIRALAX) packet 17 g  17 g Oral DAILY PRN  
 enoxaparin (LOVENOX) injection 40 mg  40 mg SubCUTAneous DAILY  ondansetron (ZOFRAN ODT) tablet 4 mg  4 mg Oral Q8H PRN Or  
 ondansetron (ZOFRAN) injection 4 mg  4 mg IntraVENous Q6H PRN  
 aspirin delayed-release tablet 81 mg  81 mg Oral DAILY  fludrocortisone (FLORINEF) tablet 0.1 mg  0.1 mg Oral DAILY  levothyroxine (SYNTHROID) tablet 75 mcg  75 mcg Oral ACB  montelukast (SINGULAIR) tablet 10 mg  10 mg Oral DAILY  pantoprazole (PROTONIX) tablet 40 mg  40 mg Oral ACB  atorvastatin (LIPITOR) tablet 10 mg  10 mg Oral DAILY  budesonide-formoteroL (SYMBICORT) 160-4.5 mcg/actuation HFA inhaler 2 Puff  2 Puff Inhalation BID RT Review of Systems 
+cough 
+dyspnea on exertion Constitutional: negative for fever, chills, sweats Cardiovascular: negative for chest pain, palpitations, syncope, edema Gastrointestinal:  negative for dysphagia, reflux, vomiting, diarrhea, abdominal pain, or melena Neurologic:  negative for focal weakness, numbness, headache Objective:  
 
Vitals:  
 10/10/20 0615 10/10/20 4333 10/10/20 5359 10/10/20 9398 BP: 97/63  (!) 94/46 Pulse: (!) 107  96 Resp: 20  18 Temp: 98.3 °F (36.8 °C)  97.8 °F (36.6 °C) SpO2: 94%  99% 96% Weight:  122 lb 12.8 oz (55.7 kg) Height:      
 
 
 
Intake/Output Summary (Last 24 hours) at 10/10/2020 1211 Last data filed at 10/10/2020 5311 Gross per 24 hour Intake  Output 1000 ml Net -1000 ml Physical Exam:  
Constitution:  the patient is well developed and in no acute distress, on 2L 
EENMT:  Sclera clear, pupils equal, oral mucosa moist 
 Respiratory: crackles posteriorly in bases on 2 LPM NC Cardiovascular:  RRR, III/IV systolic murmur Gastrointestinal: soft and non-tender; with positive bowel sounds. Musculoskeletal: warm without cyanosis. There is no lower extremity edema. Skin:  no jaundice or rashes Neurologic: no gross neuro deficits Psychiatric:  alert and oriented x 3 CXR:  
10/6/2020 
 
 
10/4/2020 - Single view FINDINGS: Bilateral lung edema or infiltrates have improved, with mild residual. 
There are also decreased small bilateral pleural effusions. No pneumothorax is 
identified. IMPRESSION: Improving bilateral lung edema or infiltrates and small pleural 
Effusions. CXR 10/3/2020 
 
  
 
 
  
 
  
9/21/20 
 
 
 
  
6/1/2020 Echo:  
-  Left ventricle: Moderate global hypokinesis. Systolic function was 
moderately reduced. Ejection fraction was estimated in the range of 35 % to  
40%. -  Mitral valve: There was mild to moderate regurgitation. -  Other Measurements: High velocity overlapping flow noted during Suprasternal imaging (descending aorta appears with normal flow velocities and Superimposed high flow velocities from alternative source of uncertain [id number]~7.6G/H). Consider further imaging as clinically indicated. 
  
LAB No results for input(s): GLUCPOC in the last 72 hours. No lab exists for component: [place] Recent Labs 10/10/20 
2664 WBC 10.7 HGB 12.7 HCT 39.1  Recent Labs 10/10/20 
0224 10/09/20 
0559 10/08/20 
6003  139 140  
K 4.1 3.9 3.9  105 105 CO2 32 26 30 GLU 93 84 84 BUN 11 9 10 CREA 0.68 0.64 0.73 CA 9.8 9.4 9.3 No results for input(s): PH, PCO2, PO2, HCO3, PHI, PCO2I, PO2I, HCO3I in the last 72 hours. No results for input(s): LCAD, LAC in the last 72 hours. Assessment:  (Medical Decision Making) Hospital Problems  Date Reviewed: 10/8/2020 Codes Class Noted POA * (Principal) PNA (pneumonia) ICD-10-CM: J18.9 ICD-9-CM: 426  10/1/2020 Unknown On zosyn Mass of lingula of lung ICD-10-CM: R91.8 ICD-9-CM: 786.6  10/16/2019 Yes Needs Bx Hypercholesterolemia (Chronic) ICD-10-CM: E78.00 ICD-9-CM: 272.0  6/30/2015 Yes Mild persistent asthma without complication (Chronic) O-17-PZ: J45.30 ICD-9-CM: 493.90  10/17/2013 Yes Acquired hypothyroidism ICD-10-CM: E03.9 ICD-9-CM: 244.9  10/17/2013 Yes GERD (gastroesophageal reflux disease) (Chronic) ICD-10-CM: K21.9 ICD-9-CM: 530.81  10/17/2013 Yes Plan:  (Medical Decision Making) --Continue O2 at 2L O2, wean as tolerated --Still having hypotension 
--Continue Symbicort and albuterol nebulizers 
--Continue Singulair --continue morphine 1mg q4h prn 
--Zosyn completed, procalcitonin normal 
--C. Diff stool pending  
--bilateral thoracentesis done yesterday>>fluid was not sent, as it was done previous tap on 10/3 
--Will need PET-CT and consideration for CT-guided biopsy of new nodule. --if unable to wean off of O2, will need 6 min walk test prior to d/c home More than 50% of the time documented was spent in face-to-face contact with the patient and in the care of the patient on the floor/unit where the patient is located. Amara Kruger, NP Lungs:  crackles Heart:  RRR with no Murmur/Rubs/Gallops Additional Comments:  Patient with new ischemic cardiomyopathy with EF of 35-40 % , I think her new onset of SOB  ,recurrent pleural effusions are related to her CHF, she has normal complete PFT and her she may need work up for nodule on chest CT with PET scan but she may need heart cath while in the hospital 
Will ask cardiology to see again I have spoken with and examined the patient. I agree with the above assessment and plan as documented.  
 
Bettye Mccormack MD

## 2020-10-10 NOTE — PROGRESS NOTES
Problem: Risk for Spread of Infection Goal: Prevent transmission of infectious organism to others Description: Prevent the transmission of infectious organisms to other patients, staff members, and visitors. Outcome: Progressing Towards Goal 
  
Problem: Patient Education:  Go to Education Activity Goal: Patient/Family Education Outcome: Progressing Towards Goal 
  
Problem: Breathing Pattern - Ineffective Goal: *Absence of hypoxia Outcome: Progressing Towards Goal 
Goal: *Use of effective breathing techniques Outcome: Progressing Towards Goal 
Goal: *PALLIATIVE CARE:  Alleviation of Dyspnea Outcome: Progressing Towards Goal 
  
Problem: Patient Education: Go to Patient Education Activity Goal: Patient/Family Education Outcome: Progressing Towards Goal 
  
Patient alert and oriented x4; VS stable on 2L NC. Patient ambulates to the bathroom independently; reports she does get winded when she gets back in to bed. Lung sounds diminished. No c/o pain or discomfort. Advised patient to call with any needs. Bed in low locked position. Call light within reach. Will continue to monitor.

## 2020-10-10 NOTE — PROGRESS NOTES
Hospitalist Progress Note Admit Date:  10/1/2020  8:19 AM  
Name:  Nimco Manley Age:  69 y.o. 
:  1950 MRN:  [id number] PCP:  Dorie Marte MD 
Treatment Team: Attending Provider: Fransisco Rapp MD; Primary Nurse: Zoey Rubio; Consulting Provider: Aleksandr Jones MD; Consulting Provider: Rivera Chu MD; Consulting Provider: Jesse Stein MD; Utilization Review: Senait Degroot RN; Care Manager: Thomas Tong Subjective:  
26-year-old female with a past medical history of asthma, GERD, treatment lymphoma, hyperlipidemia, hypothyroidism, hypotension, chronic left pulmonary artery occlusion, left lung mass status post needle biopsy on 2019 demonstrating harmatoma, that presents in the setting of worsening shortness of breath.  The patient states that for the past few weeks she has been developing worsening shortness of breath especially on exertion, and associated with some dry cough. Patient seen and examined at bedside. This morning still presenting with SOB. Denies chest pain, no abdominal pain, nausea or vomiting 10/6/2020 C/o mild shortness of breath , on 2 lit/min oxygen 10/7/2020 Says does get short of breath on walking short distance Episode of low bp last night 
 
10/8/2020 C/o shortness of breath Thoracentesis held secondary to low bp yesterday. Had one episode of low bp improved on its own. 10/9/2020 
 had thoracentesis 10/8/2020 Says breathing better Mild low this am- got midodrine 10/10/2020 Complaining of shortness of breath. Patient asking if her of the blood vessel? Is because of her shortness of breath(looking back patient did have a CTA neck in 2019 which showed subclavian steal syndrome, she recently had a CT chest on 2020 which showed left pulmonary artery occlusion. right-sided aortic arch with relatively extensive atherosclerotic change at the arch where there appears to be a small 
focal dissection.) Objective:  
 
Patient Vitals for the past 24 hrs: 
 Temp Pulse Resp BP SpO2  
10/10/20 0853     96 % 10/10/20 0737 97.8 °F (36.6 °C) 96 18 (!) 94/46 99 % 10/10/20 0447 98.3 °F (36.8 °C) (!) 107 20 97/63 94 % 10/10/20 0128     95 % 10/09/20 2357 97.8 °F (36.6 °C) (!) 102 18 95/60 94 % 10/09/20 2242     97 % 10/09/20 2013 97.8 °F (36.6 °C) 93 17 97/65 98 % 10/09/20 1550 97.9 °F (36.6 °C) 97 16 (!) 101/59 98 % 10/09/20 1326     96 % 10/09/20 1201 98.4 °F (36.9 °C) (!) 101 16 (!) 84/62 98 % Oxygen Therapy O2 Sat (%): 96 % (10/10/20 0853) Pulse via Oximetry: 85 beats per minute (10/10/20 0853) O2 Device: Room air (10/10/20 0853) O2 Flow Rate (L/min): 2 l/min (10/10/20 0128) Intake/Output Summary (Last 24 hours) at 10/10/2020 1112 Last data filed at 10/10/2020 3300 Gross per 24 hour Intake  Output 1000 ml Net -1000 ml General:    Well nourished. Alert. On 2 lit/min. Mild resp distress HEENT- normal 
CV:   RRR. No murmur, rub, or gallop. Lungs:   Coarse breath sounds, improved air entry Abdomen:   Soft, nontender, nondistended. Cns- no focal neurological deficit Extremities: Warm and dry. No cyanosis or edema. Skin:     No rashes or jaundice. Data Review: 
I have reviewed all labs, meds, telemetry events, and studies from the last 24 hours. Recent Results (from the past 24 hour(s)) METABOLIC PANEL, BASIC Collection Time: 10/10/20  6:25 AM  
Result Value Ref Range Sodium 139 136 - 145 mmol/L Potassium 4.1 3.5 - 5.1 mmol/L Chloride 103 98 - 107 mmol/L  
 CO2 32 21 - 32 mmol/L Anion gap 4 (L) 7 - 16 mmol/L Glucose 93 65 - 100 mg/dL BUN 11 8 - 23 MG/DL Creatinine 0.68 0.6 - 1.0 MG/DL  
 GFR est AA >60 >60 ml/min/1.73m2 GFR est non-AA >60 >60 ml/min/1.73m2 Calcium 9.8 8.3 - 10.4 MG/DL  
CBC WITH AUTOMATED DIFF Collection Time: 10/10/20  6:25 AM  
Result Value Ref Range WBC 10.7 4.3 - 11.1 K/uL RBC 4.08 4.05 - 5.2 M/uL  
 HGB 12.7 11.7 - 15.4 g/dL HCT 39.1 35.8 - 46.3 % MCV 95.8 79.6 - 97.8 FL  
 MCH 31.1 26.1 - 32.9 PG  
 MCHC 32.5 31.4 - 35.0 g/dL  
 RDW 14.0 11.9 - 14.6 % PLATELET 608 299 - 418 K/uL MPV 10.3 9.4 - 12.3 FL ABSOLUTE NRBC 0.00 0.0 - 0.2 K/uL  
 DF AUTOMATED NEUTROPHILS 63 43 - 78 % LYMPHOCYTES 22 13 - 44 % MONOCYTES 9 4.0 - 12.0 % EOSINOPHILS 5 0.5 - 7.8 % BASOPHILS 1 0.0 - 2.0 % IMMATURE GRANULOCYTES 1 0.0 - 5.0 %  
 ABS. NEUTROPHILS 6.7 1.7 - 8.2 K/UL  
 ABS. LYMPHOCYTES 2.3 0.5 - 4.6 K/UL  
 ABS. MONOCYTES 1.0 0.1 - 1.3 K/UL  
 ABS. EOSINOPHILS 0.5 0.0 - 0.8 K/UL  
 ABS. BASOPHILS 0.1 0.0 - 0.2 K/UL  
 ABS. IMM. GRANS. 0.1 0.0 - 0.5 K/UL All Micro Results Procedure Component Value Units Date/Time FUNGUS CULTURE AND SMEAR [[id number]] Collected:  10/03/20 1348 Order Status:  Completed Specimen:  Miscellaneous sample Updated:  10/08/20 1236 Source PLEURAL FLUID Comment: L1 Fungus stain Direct Inoculation Fungus (Mycology) Culture Other source received Comment: (NOTE) Performed At: [address] [id number] Shelia Garza MD SY:[id number] 
  
  
 C. DIFFICILE AG & TOXIN A/B [[id number]] Collected:  10/06/20 1938 Order Status:  Canceled Specimen:  Stool AFB CULTURE + SMEAR W/RFLX ID FROM CULTURE [[id number]] Collected:  10/03/20 1348 Order Status:  Completed Specimen:  Miscellaneous sample Updated:  10/06/20 1538 Source PLEURAL FLUID Comment: L1  
  
  AFB Specimen processing Concentration Acid Fast Smear Negative Comment: (NOTE) Performed At: [facility] [address] [place], [place] [id number] Shelia Garza MD POTTS:[id number] Acid Fast Culture PENDING  
 CULTURE, BLOOD [[id number]] Collected:  10/01/20 1135 Order Status:  Completed Specimen:  Blood Updated:  10/06/20 7154   Special Requests: --     
  RIGHT 
FOREARM 
 Culture result: NO GROWTH 5 DAYS     
 CULTURE, BLOOD [[id number]] Collected:  10/01/20 1135 Order Status:  Completed Specimen:  Blood Updated:  10/06/20 0091 Special Requests: --     
  LEFT 
FOREARM Culture result: NO GROWTH 5 DAYS     
 CULTURE, BODY FLUID MARIA ALEJANDRA White [[id number]] Collected:  10/03/20 1348 Order Status:  Completed Specimen:  Pleural Fluid Updated:  10/06/20 6199 Special Requests: NO SPECIAL REQUESTS     
  GRAM STAIN 0 TO 13 WBC'S SEEN PER OIF  
   NO DEFINITE ORGANISM SEEN Culture result: NO GROWTH 2 DAYS Current Meds: 
Current Facility-Administered Medications Medication Dose Route Frequency  albuterol (PROVENTIL VENTOLIN) nebulizer solution 2.5 mg  2.5 mg Nebulization Q6H PRN  
 midodrine (PROAMATINE) tablet 10 mg  10 mg Oral TID WITH MEALS  morphine injection 1 mg  1 mg IntraVENous Q4H PRN  
 furosemide (LASIX) tablet 20 mg  20 mg Oral DAILY  fluticasone propionate (FLONASE) 50 mcg/actuation nasal spray 2 Spray  2 Spray Both Nostrils DAILY  sodium chloride (NS) flush 5-40 mL  5-40 mL IntraVENous Q8H  
 sodium chloride (NS) flush 5-40 mL  5-40 mL IntraVENous PRN  
 acetaminophen (TYLENOL) tablet 650 mg  650 mg Oral Q6H PRN Or  
 acetaminophen (TYLENOL) suppository 650 mg  650 mg Rectal Q6H PRN  polyethylene glycol (MIRALAX) packet 17 g  17 g Oral DAILY PRN  
 enoxaparin (LOVENOX) injection 40 mg  40 mg SubCUTAneous DAILY  ondansetron (ZOFRAN ODT) tablet 4 mg  4 mg Oral Q8H PRN Or  
 ondansetron (ZOFRAN) injection 4 mg  4 mg IntraVENous Q6H PRN  
 aspirin delayed-release tablet 81 mg  81 mg Oral DAILY  fludrocortisone (FLORINEF) tablet 0.1 mg  0.1 mg Oral DAILY  levothyroxine (SYNTHROID) tablet 75 mcg  75 mcg Oral ACB  montelukast (SINGULAIR) tablet 10 mg  10 mg Oral DAILY  pantoprazole (PROTONIX) tablet 40 mg  40 mg Oral ACB  atorvastatin (LIPITOR) tablet 10 mg  10 mg Oral DAILY  budesonide-formoteroL (SYMBICORT) 160-4.5 mcg/actuation HFA inhaler 2 Puff  2 Puff Inhalation BID RT Other Studies (last 24 hours): No results found. Assessment and Plan:  
 
Hospital Problems as of 10/10/2020 Date Reviewed: 10/9/2020 Codes Class Noted - Resolved POA Bilateral pleural effusion ICD-10-CM: J90 ICD-9-CM: 511.9  10/7/2020 - Present Unknown Hypokalemia ICD-10-CM: E87.6 ICD-9-CM: 276.8  10/6/2020 - Present Unknown Acute systolic heart failure (HCC) ICD-10-CM: I50.21 ICD-9-CM: 428.21  10/6/2020 - Present Unknown * (Principal) PNA (pneumonia) ICD-10-CM: J18.9 ICD-9-CM: 303  10/1/2020 - Present Unknown Mass of lingula of lung ICD-10-CM: R91.8 ICD-9-CM: 786.6  10/16/2019 - Present Yes Hypercholesterolemia (Chronic) ICD-10-CM: E78.00 ICD-9-CM: 272.0  6/30/2015 - Present Yes Mild persistent asthma without complication (Chronic) ALIYAH89GISSEL: J45.30 ICD-9-CM: 493.90  10/17/2013 - Present Yes Acquired hypothyroidism ICD-10-CM: E03.9 ICD-9-CM: 244.9  10/17/2013 - Present Yes GERD (gastroesophageal reflux disease) (Chronic) ICD-10-CM: K21.9 ICD-9-CM: 530.81  10/17/2013 - Present Yes PLAN:   
 
 
 
Hypokalemia-resolved Episodes of low blood pressure-Florinef and Midrin at home, Midrin dose was increased to 10 mg 3 times daily during the stay. CT chest 9/21/2020-right-sided aortic arch with relatively 
extensive atherosclerotic change at the arch where there appears to be a small 
focal dissection. CTA neck done in June 2019 showed subclavian steal syndrome. We will consult vascular if any intervention required. 1.  Hypoxic respiratory failure-on 2 L of oxygen nasal cannula. Finished a course of antibiotics. - Pulm following-10/3/2020 and 10/8/2020 thoracentesis 
  
2. EKG changes with sinus tachycardia and ST depressions and T wave inversions on V5 and V6 likely from demand ischemia. HFrEF. - Tropnin trended down - Telemetry - Continue aspirin 
- TTE EF 35-40% - D/c coreg due to hypotension - Cardiology recs appreciated, signed off. 
  
3. Left lung mass status post needle biopsy on November 2019 demonstrating harmatoma / Chronic left pulmonary artery occlusion. Pulm recs.  Continue aspirin. 
  
4.  For her asthma continue on home inhalers and Singulair. Currently not on exacerbation. 
  
5.  For her hypotension continue on fludrocortisone and midodrine. close f/u 
  
6.  For her hyperlipidemia continue on atorvastatin. 
  
7.  For her GERD continue on PPI. 
  
8.  For her hypothyroidism continue on levothyroxine. 
  
DVT prophylaxis with Lovenox Signed: 
Iliana Coombs MD

## 2020-10-11 NOTE — PROGRESS NOTES
Problem: Risk for Spread of Infection Goal: Prevent transmission of infectious organism to others Description: Prevent the transmission of infectious organisms to other patients, staff members, and visitors. Outcome: Progressing Towards Goal 
  
Problem: Patient Education:  Go to Education Activity Goal: Patient/Family Education Outcome: Progressing Towards Goal 
  
Problem: Breathing Pattern - Ineffective Goal: *Absence of hypoxia Outcome: Progressing Towards Goal 
Goal: *Use of effective breathing techniques Outcome: Progressing Towards Goal 
Goal: *PALLIATIVE CARE:  Alleviation of Dyspnea Outcome: Progressing Towards Goal 
  
Problem: Patient Education: Go to Patient Education Activity Goal: Patient/Family Education Outcome: Progressing Towards Goal 
 
Patient alert and oriented x 4; up ad olga. VS stable, but still mildly hypotensive on 2L NC. Patient reports still very winded when coming back from the bathroom. Bed in low locked position. Call light within reach. Will continue to monitor for needs.

## 2020-10-11 NOTE — PROGRESS NOTES
Jose D Boles Admission Date: 10/1/2020 Daily Progress Note: 10/11/2020 The patient's chart is reviewed and the patient is discussed with the staff. Patient is a 69 y.o.  female seen and evaluated at the request of Dr. Bin Bullock. 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 Pt has recently had issues with hypotension which is being managed by her PCP. She was treated with LVQ x 7 days for CAP. She had a virtual visit with KE Schrader on 9/30/2020 and pt mentioned that her O2 sats had been borderline low and that she had been tachycardic. An echocardiogram was obtained to evaluate for PAH that same day, but the TV jet was inadequate for estimation of RVSP. Pt felt worse and presented to the ER and her CXR was concerning for PNA. Pt was admitted and started on Zosyn. Pt is being ruled out for COVID. We were consulted to assist with workup and treatment. CT was performed which reveals multiple new nodules, and bilateral pleural effusions. Pt had a L thoracentesis with 1050mL removed and R thoracentesis with 800mL removed on 10/3/2020. Subjective:  
 
Patient resting in bed, increased slightly to 2.5 LPM, reports increased SOB with activity Thoracentesis done Friday with 600 ml yellow fluid removed from R and 100 ml of yellow fluid removed from L Still having mild hypotension Current Facility-Administered Medications Medication Dose Route Frequency  albuterol (PROVENTIL VENTOLIN) nebulizer solution 2.5 mg  2.5 mg Nebulization Q6H PRN  
 midodrine (PROAMATINE) tablet 10 mg  10 mg Oral TID WITH MEALS  morphine injection 1 mg  1 mg IntraVENous Q4H PRN  
 furosemide (LASIX) tablet 20 mg  20 mg Oral DAILY  fluticasone propionate (FLONASE) 50 mcg/actuation nasal spray 2 Spray  2 [place] Both Nostrils DAILY  sodium chloride (NS) flush 5-40 mL  5-40 mL IntraVENous Q8H  
 sodium chloride (NS) flush 5-40 mL  5-40 mL IntraVENous PRN  
 acetaminophen (TYLENOL) tablet 650 mg  650 mg Oral Q6H PRN Or  
 acetaminophen (TYLENOL) suppository 650 mg  650 mg Rectal Q6H PRN  polyethylene glycol (MIRALAX) packet 17 g  17 g Oral DAILY PRN  
 enoxaparin (LOVENOX) injection 40 mg  40 mg SubCUTAneous DAILY  ondansetron (ZOFRAN ODT) tablet 4 mg  4 mg Oral Q8H PRN Or  
 ondansetron (ZOFRAN) injection 4 mg  4 mg IntraVENous Q6H PRN  
 aspirin delayed-release tablet 81 mg  81 mg Oral DAILY  fludrocortisone (FLORINEF) tablet 0.1 mg  0.1 mg Oral DAILY  levothyroxine (SYNTHROID) tablet 75 mcg  75 mcg Oral ACB  montelukast (SINGULAIR) tablet 10 mg  10 mg Oral DAILY  pantoprazole (PROTONIX) tablet 40 mg  40 mg Oral ACB  atorvastatin (LIPITOR) tablet 10 mg  10 mg Oral DAILY  budesonide-formoteroL (SYMBICORT) 160-4.5 mcg/actuation HFA inhaler 2 Puff  2 Puff Inhalation BID RT Review of Systems 
+cough 
+dyspnea on exertion Constitutional: negative for fever, chills, sweats Cardiovascular: negative for chest pain, palpitations, syncope, edema Gastrointestinal:  negative for dysphagia, reflux, vomiting, diarrhea, abdominal pain, or melena Neurologic:  negative for focal weakness, numbness, headache Objective:  
 
Vitals:  
 10/10/20 2128 10/10/20 2325 10/11/20 0352 10/11/20 4696 BP:  98/69 98/79 95/67 Pulse:  95 (!) 109 97 Resp:  17 17 18 Temp:  98.1 °F (36.7 °C) 97.9 °F (36.6 °C) 97.6 °F (36.4 °C) SpO2: 96% 99% 95% 97% Weight:      
Height:      
 
 
 
Intake/Output Summary (Last 24 hours) at 10/11/2020 0768 Last data filed at 10/11/2020 1992 Gross per 24 hour Intake  Output 450 ml Net -450 ml Physical Exam:  
Constitution:  the patient is well developed and in no acute distress, on 2.5L 
EENMT:  Sclera clear, pupils equal, oral mucosa moist 
 Respiratory: crackles posteriorly in bases on 2.5 LPM NC Cardiovascular:  RRR, III/IV systolic murmur Gastrointestinal: soft and non-tender; with positive bowel sounds. Musculoskeletal: warm without cyanosis. There is no lower extremity edema. Skin:  no jaundice or rashes Neurologic: no gross neuro deficits Psychiatric:  alert and oriented x 3 CXR:  
10/6/2020 
 
 
10/4/2020 - Single view FINDINGS: Bilateral lung edema or infiltrates have improved, with mild residual. 
There are also decreased small bilateral pleural effusions. No pneumothorax is 
identified. IMPRESSION: Improving bilateral lung edema or infiltrates and small pleural 
Effusions. CXR 10/3/2020 
 
  
 
 
  
 
  
9/21/20 
 
 
 
  
6/1/2020 Echo:  
-  Left ventricle: Moderate global hypokinesis. Systolic function was 
moderately reduced. Ejection fraction was estimated in the range of 35 % to  
40%. -  Mitral valve: There was mild to moderate regurgitation. -  Other Measurements: High velocity overlapping flow noted during Suprasternal imaging (descending aorta appears with normal flow velocities and Superimposed high flow velocities from alternative source of uncertain [id number]-~2.0M/F). Consider further imaging as clinically indicated. 
  
LAB No results for input(s): GLUCPOC in the last 72 hours. No lab exists for component: [place] Recent Labs 10/11/20 
3367 10/10/20 
5752 WBC 9.6 10.7 HGB 11.0* 12.7 HCT 33.6* 39.1  446 Recent Labs 10/11/20 
8908 10/10/20 
5801 10/09/20 
0559  139 139  
K 3.8 4.1 3.9  103 105 CO2 29 32 26 GLU 77 93 84 BUN 11 11 9 CREA 0.49* 0.68 0.64 CA 9.2 9.8 9.4 No results for input(s): PH, PCO2, PO2, HCO3, PHI, PCO2I, PO2I, HCO3I in the last 72 hours. No results for input(s): LCAD, LAC in the last 72 hours. Assessment:  (Medical Decision Making) Hospital Problems  Date Reviewed: 10/8/2020 Codes Class Noted POA * (Principal) PNA (pneumonia) ICD-10-CM: J18.9 ICD-9-CM: 953  10/1/2020 Unknown On zosyn Mass of lingula of lung ICD-10-CM: R91.8 ICD-9-CM: 786.6  10/16/2019 Yes Needs Bx Hypercholesterolemia (Chronic) ICD-10-CM: E78.00 ICD-9-CM: 272.0  6/30/2015 Yes Mild persistent asthma without complication (Chronic) QWA-46-MP: J45.30 ICD-9-CM: 493.90  10/17/2013 Yes Acquired hypothyroidism ICD-10-CM: E03.9 ICD-9-CM: 244.9  10/17/2013 Yes GERD (gastroesophageal reflux disease) (Chronic) ICD-10-CM: K21.9 ICD-9-CM: 530.81  10/17/2013 Yes Plan:  (Medical Decision Making) --Continue O2 at 2.5L O2, wean as tolerate --Still having hypotension. On midodrine 
--Continue Symbicort and albuterol nebulizers 
--Continue Singulair --continue morphine 1mg q4h prn 
--Zosyn completed, procalcitonin normal 
--C. Diff stool pending --Will need PET-CT and consideration for CT-guided biopsy of new nodule. --if unable to wean off of O2, will need 6 min walk test prior to d/c home 
--asked cards to see again>>awaiting recommendations>>?heart cath? EF is now 35-40% on echo, MVR noted on echo>>she continues to develop effusions and is not having improvement with symptoms like she has in the past  
--PFTs normal 
 
More than 50% of the time documented was spent in face-to-face contact with the patient and in the care of the patient on the floor/unit where the patient is located. Savage Sweeney NP Lungs:  Crackles Heart:  RRR with no Murmur/Rubs/Gallops Additional Comments:  Continue diuresis, cardioogy to see I have spoken with and examined the patient. I agree with the above assessment and plan as documented.  
 
Kevin Prater MD

## 2020-10-11 NOTE — PROGRESS NOTES
Patient reports feeling more winded than usual coming back from the bathroom. Turned oxygen up to 4L when patient ambulated to bathroom. Patient stated she's not sure it helped. Turned oxygen down to 2L when patient back in bed. Patient expressed anxiety and appears anxious. 1 mg morphine given for anxiety and to ease work of breathing. Oxygen 94% on 2L. Will continue to monitor.

## 2020-10-11 NOTE — PROGRESS NOTES
Hospitalist Progress Note Admit Date:  10/1/2020  8:19 AM  
Name:  Jacob Gauthier Age:  69 y.o. 
:  1950 MRN:  [id number] PCP:  Chasidy Carrasco MD 
Treatment Team: Attending Provider: Edelmira Emerson MD; Primary Nurse: Ryan Thomas; Consulting Provider: Steven Daigle MD; Consulting Provider: Mariela King MD; Consulting Provider: Vinay Armijo MD; Utilization Review: Eliu Vera RN; Care Manager: Anitha Jean.; Consulting Provider: Saima Enciso MD; Charge Nurse: Lisa Hinojosa; Consulting Provider: Jada Cleveland DO Subjective:  
79-year-old female with a past medical history of asthma, GERD, treatment lymphoma, hyperlipidemia, hypothyroidism, hypotension, chronic left pulmonary artery occlusion, left lung mass status post needle biopsy on 2019 demonstrating harmatoma, that presents in the setting of worsening shortness of breath.  The patient states that for the past few weeks she has been developing worsening shortness of breath especially on exertion, and associated with some dry cough. Patient seen and examined at bedside. This morning still presenting with SOB. Denies chest pain, no abdominal pain, nausea or vomiting 10/6/2020 C/o mild shortness of breath , on 2 lit/min oxygen 10/7/2020 Says does get short of breath on walking short distance Episode of low bp last night 
 
10/8/2020 C/o shortness of breath Thoracentesis held secondary to low bp yesterday. Had one episode of low bp improved on its own. 10/9/2020 
 had thoracentesis 10/8/2020 Says breathing better Mild low this am- got midodrine 10/10/2020 Complaining of shortness of breath. Patient asking if her of the blood vessel? Is because of her shortness of breath(looking back patient did have a CTA neck in 2019 which showed subclavian steal syndrome, she recently had a CT chest on 2020 which showed left pulmonary artery occlusion. right-sided aortic arch with relatively extensive atherosclerotic change at the arch where there appears to be a small 
focal dissection. ) 
 
10/11/2020 C/o sob Asking if cardiology will see her today Objective:  
 
Patient Vitals for the past 24 hrs: 
 Temp Pulse Resp BP SpO2  
10/11/20 1138 98 °F (36.7 °C) (!) 101 16 95/64 96 % 10/11/20 0804 97.6 °F (36.4 °C) 97 18 95/67 97 % 10/11/20 0352 97.9 °F (36.6 °C) (!) 109 17 98/79 95 % 10/10/20 2325 98.1 °F (36.7 °C) 95 17 98/69 99 % 10/10/20 2128     96 % 10/10/20 2042 97.7 °F (36.5 °C) (!) 104 18 114/71 95 % 10/10/20 1545 97.9 °F (36.6 °C) 96 18 92/68 98 % Oxygen Therapy O2 Sat (%): 96 % (10/11/20 1138) Pulse via Oximetry: 93 beats per minute (10/10/20 2128) O2 Device: Nasal cannula (10/11/20 1138) O2 Flow Rate (L/min): 2.5 l/min (10/11/20 1138) Intake/Output Summary (Last 24 hours) at 10/11/2020 1248 Last data filed at 10/11/2020 1100 Gross per 24 hour Intake 240 ml Output 700 ml Net -460 ml General:    Well nourished. Alert. On 2.5 lit/min. Mild resp distress HEENT- normal 
CV:   RRR. No murmur, rub, or gallop. Lungs:   Coarse breath sounds, improved air entry Abdomen:   Soft, nontender, nondistended. Cns- no focal neurological deficit Extremities: Warm and dry. No cyanosis or edema. Skin:     No rashes or jaundice. Data Review: 
I have reviewed all labs, meds, telemetry events, and studies from the last 24 hours. Recent Results (from the past 24 hour(s)) CBC WITH AUTOMATED DIFF Collection Time: 10/11/20  4:55 AM  
Result Value Ref Range WBC 9.6 4.3 - 11.1 K/uL  
 RBC 3.54 (L) 4.05 - 5.2 M/uL  
 HGB 11.0 (L) 11.7 - 15.4 g/dL HCT 33.6 (L) 35.8 - 46.3 % MCV 94.9 79.6 - 97.8 FL  
 MCH 31.1 26.1 - 32.9 PG  
 MCHC 32.7 31.4 - 35.0 g/dL  
 RDW 13.7 11.9 - 14.6 % PLATELET 142 344 - 076 K/uL MPV 10.4 9.4 - 12.3 FL ABSOLUTE NRBC 0.00 0.0 - 0.2 K/uL  
 DF AUTOMATED NEUTROPHILS 56 43 - 78 % LYMPHOCYTES 23 13 - 44 % MONOCYTES 12 4.0 - 12.0 % EOSINOPHILS 7 0.5 - 7.8 % BASOPHILS 1 0.0 - 2.0 % IMMATURE GRANULOCYTES 0 0.0 - 5.0 %  
 ABS. NEUTROPHILS 5.4 1.7 - 8.2 K/UL  
 ABS. LYMPHOCYTES 2.2 0.5 - 4.6 K/UL  
 ABS. MONOCYTES 1.2 0.1 - 1.3 K/UL  
 ABS. EOSINOPHILS 0.7 0.0 - 0.8 K/UL  
 ABS. BASOPHILS 0.1 0.0 - 0.2 K/UL  
 ABS. IMM. GRANS. 0.0 0.0 - 0.5 K/UL METABOLIC PANEL, BASIC Collection Time: 10/11/20  4:55 AM  
Result Value Ref Range Sodium 140 136 - 145 mmol/L Potassium 3.8 3.5 - 5.1 mmol/L Chloride 105 98 - 107 mmol/L  
 CO2 29 21 - 32 mmol/L Anion gap 6 (L) 7 - 16 mmol/L Glucose 77 65 - 100 mg/dL BUN 11 8 - 23 MG/DL Creatinine 0.49 (L) 0.6 - 1.0 MG/DL  
 GFR est AA >60 >60 ml/min/1.73m2 GFR est non-AA >60 >60 ml/min/1.73m2 Calcium 9.2 8.3 - 10.4 MG/DL All Micro Results Procedure Component Value Units Date/Time FUNGUS CULTURE AND SMEAR [[id number]] Collected:  10/03/20 1348 Order Status:  Completed Specimen:  Miscellaneous sample Updated:  10/08/20 1236 Source PLEURAL FLUID Comment: L1 Fungus stain Direct Inoculation Fungus (Mycology) Culture Other source received Comment: (NOTE) Performed At: [address] [id number] Jay Gallegos MD [id number] 
  
  
 C. DIFFICILE AG & TOXIN A/B [[id number]] Collected:  10/06/20 1938 Order Status:  Canceled Specimen:  Stool AFB CULTURE + SMEAR W/RFLX ID FROM CULTURE [[id number]] Collected:  10/03/20 1348 Order Status:  Completed Specimen:  Miscellaneous sample Updated:  10/06/20 1538 Source PLEURAL FLUID Comment: L1  
  
  AFB Specimen processing Concentration Acid Fast Smear Negative Comment: (NOTE) Performed At: [address] [id number] Jay Gallegos MD [id number] Acid Fast Culture PENDING  
 CULTURE, BLOOD [[id number]] Collected:  10/01/20 1138 Order Status:  Completed Specimen:  Blood Updated:  10/06/20 7090 Special Requests: --     
  RIGHT 
FOREARM Culture result: NO GROWTH 5 DAYS     
 CULTURE, BLOOD [[id number]] Collected:  10/01/20 1135 Order Status:  Completed Specimen:  Blood Updated:  10/06/20 9733 Special Requests: --     
  LEFT 
FOREARM Culture result: NO GROWTH 5 DAYS     
 CULTURE, BODY FLUID MARIA ALEJANDRA Olson [[id number]] Collected:  10/03/20 1348 Order Status:  Completed Specimen:  Pleural Fluid Updated:  10/06/20 9389 Special Requests: NO SPECIAL REQUESTS     
  GRAM STAIN 0 TO 13 WBC'S SEEN PER OIF  
   NO DEFINITE ORGANISM SEEN Culture result: NO GROWTH 2 DAYS Current Meds: 
Current Facility-Administered Medications Medication Dose Route Frequency  albuterol (PROVENTIL VENTOLIN) nebulizer solution 2.5 mg  2.5 mg Nebulization Q6H PRN  
 midodrine (PROAMATINE) tablet 10 mg  10 mg Oral TID WITH MEALS  morphine injection 1 mg  1 mg IntraVENous Q4H PRN  
 furosemide (LASIX) tablet 20 mg  20 mg Oral DAILY  fluticasone propionate (FLONASE) 50 mcg/actuation nasal spray 2 Spray  2 Spray Both Nostrils DAILY  sodium chloride (NS) flush 5-40 mL  5-40 mL IntraVENous Q8H  
 sodium chloride (NS) flush 5-40 mL  5-40 mL IntraVENous PRN  
 acetaminophen (TYLENOL) tablet 650 mg  650 mg Oral Q6H PRN Or  
 acetaminophen (TYLENOL) suppository 650 mg  650 mg Rectal Q6H PRN  polyethylene glycol (MIRALAX) packet 17 g  17 g Oral DAILY PRN  
 enoxaparin (LOVENOX) injection 40 mg  40 mg SubCUTAneous DAILY  ondansetron (ZOFRAN ODT) tablet 4 mg  4 mg Oral Q8H PRN Or  
 ondansetron (ZOFRAN) injection 4 mg  4 mg IntraVENous Q6H PRN  
 aspirin delayed-release tablet 81 mg  81 mg Oral DAILY  fludrocortisone (FLORINEF) tablet 0.1 mg  0.1 mg Oral DAILY  levothyroxine (SYNTHROID) tablet 75 mcg  75 mcg Oral ACB  montelukast (SINGULAIR) tablet 10 mg  10 mg Oral DAILY  pantoprazole (PROTONIX) tablet 40 mg  40 mg Oral ACB  atorvastatin (LIPITOR) tablet 10 mg  10 mg Oral DAILY  budesonide-formoteroL (SYMBICORT) 160-4.5 mcg/actuation HFA inhaler 2 Puff  2 Puff Inhalation BID RT Other Studies (last 24 hours): No results found. Assessment and Plan:  
 
Hospital Problems as of 10/11/2020 Date Reviewed: 10/9/2020 Codes Class Noted - Resolved POA Bilateral pleural effusion ICD-10-CM: J90 ICD-9-CM: 511.9  10/7/2020 - Present Unknown Hypokalemia ICD-10-CM: E87.6 ICD-9-CM: 276.8  10/6/2020 - Present Unknown Acute systolic heart failure (HCC) ICD-10-CM: I50.21 ICD-9-CM: 428.21  10/6/2020 - Present Unknown * (Principal) PNA (pneumonia) ICD-10-CM: J18.9 ICD-9-CM: 082  10/1/2020 - Present Unknown Mass of lingula of lung ICD-10-CM: R91.8 ICD-9-CM: 786.6  10/16/2019 - Present Yes Hypercholesterolemia (Chronic) ICD-10-CM: E78.00 ICD-9-CM: 272.0  6/30/2015 - Present Yes Mild persistent asthma without complication (Chronic) HDD-14-PX: J45.30 ICD-9-CM: 493.90  10/17/2013 - Present Yes Acquired hypothyroidism ICD-10-CM: E03.9 ICD-9-CM: 244.9  10/17/2013 - Present Yes GERD (gastroesophageal reflux disease) (Chronic) ICD-10-CM: K21.9 ICD-9-CM: 530.81  10/17/2013 - Present Yes PLAN:   
 
 
 
Hypokalemia-resolved Episodes of low blood pressure-Florinef and Midrin at home, Midrin dose was increased to 10 mg 3 times daily during the stay. CT chest 9/21/2020-right-sided aortic arch with relatively 
extensive atherosclerotic change at the arch where there appears to be a small 
focal dissection. CTA neck done in June 2019 showed subclavian steal syndrome. 
 
- vascular surgery no intervention at this point. 1.  Hypoxic respiratory failure-on 2.5 L of oxygen nasal cannula. Finished a course of antibiotics.  
- Pulm following-10/3/2020 and 10/8/2020 thoracentesis 
  
 2. EKG changes with sinus tachycardia and ST depressions and T wave inversions on V5 and V6 likely from demand ischemia. HFrEF. 
- Tropnin trended down - Telemetry - Continue aspirin 
- TTE EF 35-40% - D/c coreg due to hypotension - Cardiology evaluated pt again today 
  
3. Left lung mass status post needle biopsy on November 2019 demonstrating harmatoma / Chronic left pulmonary artery occlusion. Pulm recs.  Continue aspirin. 
  
4.  For her asthma continue on home inhalers and Singulair. Currently not on exacerbation. 
  
5.  For her hypotension continue on fludrocortisone and midodrine. close f/u 
  
6.  For her hyperlipidemia continue on atorvastatin. 
  
7.  For her GERD continue on PPI. 
  
8.  For her hypothyroidism continue on levothyroxine. 
  
Difficult disposition secondary to dyspnea, pleural effusion,epsiodes of low bp, chf. 
 
DVT prophylaxis with Lovenox Signed: 
Hi Blandon MD

## 2020-10-11 NOTE — CONSULTS
[facility] [address] [place], [facility] [place] [address] Phone: [phone] Fax: [phone] Date of visit: 10/11/2020 Referring physician: No referring provider defined for this encounter. Reason for referral: Aortic arch atherosclerosis,? Focal dissection seen on recent CT Nichole Yun is a 69 y.o. female sent in consultation for Chief Complaint Patient presents with  Shortness of Breath HPI: 79-year-old with a longstanding history of advanced peripheral vascular disease as well as left lower lobe lung mass. She is been seen by Dr. Angela Keenan in our practice in the past for her subclavian disease. 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 She has retrograde flow in the right vertebral artery which is been previously seen. Apparently she was having some lower blood pressures than normal and the patient suggested that it may be related to her atherosclerotic vascular disease and a CT was ordered. This demonstrated the known findings of subclavian and common carotid artery occlusions on the right. There is also a note made of a very focal irregularity in the arch aorta that may be consistent with a limited dissection. She has very significant irregular atherosclerotic vascular disease in her arch and brachiocephalic vessels. She has had no chest or back pain. She denies pain in her upper extremities with repetitive movements. She denies neurologic event. ROS: 
 
Constitutional: Negative for fever and chills. HENT: Negative for congestion and sore throat. Skin: Negative for rash and itching. Eyes: Negative for blurred vision and double vision. Respiratory: Negative for cough and shortness of breath. Cardiovascular: Negative for chest pain, palpitations, claudication and leg swelling. Gastrointestinal: Negative for nausea, vomiting and abdominal pain. Genitourinary: Negative for dysuria, hematuria and flank pain. Musculoskeletal: Negative for joint pain and falls. Neurological: Negative for dizziness, sensory change, focal weakness, loss of consciousness and headaches. Medical history:  
Past Medical History:  
Diagnosis Date  Asthma  Bite of nonvenomous arthropod ? ??  
 Cough  Esophageal stricture 2002  
 dilated 2002  GERD (gastroesophageal reflux disease)  History of rectal bleeding ???  
 Hodgkin's lymphoma ([address] 1980 Radiation therapy  Hypercholesterolemia  Menopause  Positive RAST testing 2007 IgE level of 1,391  Thyroid disease Surgical history:  
Past Surgical History:  
Procedure Laterality Date [place]  HX BREAST BIOPSY Left 05/21/2020  
 calcs at 2:00  
 HX CATARACT REMOVAL Bilateral 2007, 2009  HX COLONOSCOPY    
 HX OTHER SURGICAL    
 dental x3  
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 [address] LYSOGENE [place] Allergies: Allergies Allergen Reactions  Compazine [Prochlorperazine Edisylate] Swelling Current medications:  
Current Facility-Administered Medications Medication Dose Route Frequency  albuterol (PROVENTIL VENTOLIN) nebulizer solution 2.5 mg  2.5 mg Nebulization Q6H PRN  
 midodrine (PROAMATINE) tablet 10 mg  10 mg Oral TID WITH MEALS  morphine injection 1 mg  1 mg IntraVENous Q4H PRN  
 furosemide (LASIX) tablet 20 mg  20 mg Oral DAILY  fluticasone propionate (FLONASE) 50 mcg/actuation nasal spray 2 Spray  2 Spray Both Nostrils DAILY  sodium chloride (NS) flush 5-40 mL  5-40 mL IntraVENous Q8H  
 sodium chloride (NS) flush 5-40 mL  5-40 mL IntraVENous PRN  
 acetaminophen (TYLENOL) tablet 650 mg  650 mg Oral Q6H PRN Or  
 acetaminophen (TYLENOL) suppository 650 mg  650 mg Rectal Q6H PRN  polyethylene glycol (MIRALAX) packet 17 g  17 g Oral DAILY PRN  
 enoxaparin (LOVENOX) injection 40 mg  40 mg SubCUTAneous DAILY  ondansetron (ZOFRAN ODT) tablet 4 mg  4 mg Oral Q8H PRN Or  
 ondansetron (ZOFRAN) injection 4 mg  4 mg IntraVENous Q6H PRN  
 aspirin delayed-release tablet 81 mg  81 mg Oral DAILY  fludrocortisone (FLORINEF) tablet 0.1 mg  0.1 mg Oral DAILY  levothyroxine (SYNTHROID) tablet 75 mcg  75 mcg Oral ACB  montelukast (SINGULAIR) tablet 10 mg  10 mg Oral DAILY  pantoprazole (PROTONIX) tablet 40 mg  40 mg Oral ACB  atorvastatin (LIPITOR) tablet 10 mg  10 mg Oral DAILY  budesonide-formoteroL (SYMBICORT) 160-4.5 mcg/actuation HFA inhaler 2 Puff  2 Puff Inhalation BID RT Social history:  
Social History Tobacco Use Smoking Status Never Smoker Smokeless Tobacco Never Used Social History Substance and Sexual Activity Alcohol Use Yes  Alcohol/week: 21.0 standard drinks  Types: 7 Shots of liquor, 14 Standard drinks or equivalent per week Imaging: 
 
CT chest with contrast (pulmonary embolism protocol) 
  History: dyspnea, hypoxemia, look for PE. Hypoxemia 
  
Technique: Helically acquired images were obtained from the lung apices to the 
domes of the diaphragms reconstructed at 2.5mm intervals after the uneventful 
administration of 100 cc's intravenous [id number]-[id number] in order to evaluate the 
pulmonary arteries. Coronal reformatted images were submitted. 
  
Radiation dose reduction techniques were used for this study:  Our CT scanners 
use one or all of the following: Automated exposure control, adjustment of the 
mA and/or kVp according to patient's size, iterative reconstruction. 
  
Comparison: Noncontrast study dated 06/01/2020 
  
Findings: There are small bilateral pleural effusions. There is no pericardial 
effusion. The left pulmonary artery is nonvisualized and appears to be 
chronically occluded. There are no filling defects within the right pulmonary 
artery or its branches. There is a right-sided aortic arch with relatively extensive atherosclerotic change at the arch where there appears to be a small 
focal dissection. 
  
There are several newly visualized nodules within the left lung with the 
dominant masslike component along the fissure inferiorly measuring approximately 2.3 x 3.1 cm compared to 2.4 x 3.5 cm. An additional 5 mm nodules present within 
the right upper lobe anteromedially on image 37. Patchy groundglass and airspace 
opacities within the left lung elsewhere appears slightly more conspicuous in 
addition to similar but less impressive findings within the right lower lobe. Soft tissue density within the mediastinum along the left paratracheal region 
appears more prominent. The mass within the left breast has decreased in size. 
  
There is a sclerotic lesion, newly visualized at T6. 
  
IMPRESSION IMPRESSION: 
1. 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 2. Left lung mass with mild improvement however several additional pulmonary 
nodules have developed as well as a sclerotic focus at T6 concerning for 
neoplasm neoplasm/metastatic disease. 3. Small bilateral pleural effusions. 4. Soft tissue density within the left paratracheal region concerning for 
adenopathy. 5. Patchy groundglass and airspace opacities with mild progression which may be 
infectious/inflammatory. 6. 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 Vitals:  
Vitals:  
 10/10/20 2128 10/10/20 2325 10/11/20 7336 10/11/20 2745 BP:  98/69 98/79 95/67 BP 1 Location:  Left arm Left arm Left arm BP Patient Position:  At rest At rest At rest  
Pulse:  95 (!) 109 97 Resp:  17 17 18 Temp:  98.1 °F (36.7 °C) 97.9 °F (36.6 °C) 97.6 °F (36.4 °C) SpO2: 96% 99% 95% 97% Weight:      
Height:      
 
 
Physical exam: 
Visit Vitals BP 95/67 (BP 1 Location: Left arm, BP Patient Position: At rest) Pulse 97 Temp 97.6 °F (36.4 °C) Resp 18 Ht 5' 1.34\" (1.558 m) Wt 122 lb 12.8 oz (55.7 kg) SpO2 97% BMI 22.95 kg/m² General appearance: alert, cooperative, no distress, appears stated age Neck: supple, symmetrical, trachea midline, no adenopathy, thyroid: not enlarged, symmetric, no tenderness/mass/nodules, no carotid bruit and no JVD Lungs: clear to auscultation bilaterally Heart: regular rate and rhythm, S1, S2 normal, no murmur, click, rub or gallop Abdomen: soft, non-tender. Bowel sounds normal. No masses,  no organomegaly Extremities: extremities normal, atraumatic, no cyanosis or edema Skin: Skin color, texture, turgor normal. No rashes or lesions Neurologic: Grossly normal 
Vascular Exam: 
 
Right:    LEFT:        
     
   Could not palpate radial brachial pulses on either upper extremity. Chest femoral pulses bilaterally Carotid Bruit: No   Carotid Bruit: No 
 
Impression: ICD-10-CM ICD-9-CM 1. Pneumonia of left lung due to infectious organism, unspecified part of lung  J18.9 486 2. Pleural effusion  J90 511.9 3. Mass of lingula of lung  R91.8 786.6 4. Mild persistent asthma without complication  N22.23 867.98   
5. Other cardiomyopathy ([facility] [address] [address]  I42.8 425.4 6. Hypercholesterolemia  E78.00 272.0   
7. Acute systolic heart failure (HCC)  I50.21 428.21   
8. Bilateral pleural effusion  J90 511.9 Plan: Chronic significant atherosclerotic back disease of her arch and brachiocephalic vessels which is been previously demonstrated. She is having no acute issues at this time. CT suggested a potential for a small focal dissection in the midst of the severe atherosclerotic disease affecting her aortic arch. I suspect this is chronic in nature. If she will develop symptoms she will require evaluation by cardiothoracic surgery given the location of the abnormality in the arch aorta. At this point I do not believe it requires any form of treatment. We will be available to assist if needed.  
 
 
 
Ana Manuel MD 
 
 Elements of this note have been dictated using speech recognition software. As a result, errors of speech recognition may have occurred.

## 2020-10-11 NOTE — PROGRESS NOTES
[facility] CARDIOLOGY PROGRESS NOTE 
      
 
10/11/2020 11:40 AM 
 
Admit Date: 10/1/2020 Subjective:  
Patient with continued dyspnea with exertion despite thoracentesis with fluid removal. Worse with supine positioning. No other complaints at this time. ROS: 
Cardiovascular:  As noted above Objective:  
  
Vitals:  
 10/10/20 2128 10/10/20 2325 10/11/20 0352 10/11/20 2388 BP:  98/69 98/79 95/67 Pulse:  95 (!) 109 97 Resp:  17 17 18 Temp:  98.1 °F (36.7 °C) 97.9 °F (36.6 °C) 97.6 °F (36.4 °C) SpO2: 96% 99% 95% 97% Weight:      
Height:      
 
 
Physical Exam: 
General-No Acute Distress, awake, alert Neck- supple, no JVD 
CV- regular rate and rhythm 2/6 systolic murmur RUSB Lung- decreased at the lung bases bilaterally Abd- soft, nontender, nondistended Ext- no edema bilaterally. Skin- warm and dry Data Review:  
Recent Labs 10/11/20 
9669 10/10/20 
8520  139  
K 3.8 4.1 BUN 11 11 CREA 0.49* 0.68 GLU 77 93 WBC 9.6 10.7 HGB 11.0* 12.7 HCT 33.6* 39.1  446 Assessment/Plan:  
 
Principal Problem: 
  PNA (pneumonia) (10/1/2020) 
  - on ABX, pulm following , no plan for thora 
  - COVID negative - s/p bilateral thoracentesis , minimal improvement in symptoms  
  
Active Problems: 
  Newly diagnosed cardiomyopathy 
  - unclear etiology at this time  
  - continue ASA, Atorvastatin for risk factor modification 
  - no evidence of ACS, no emergent indication for coronary angiography  
  - aortic findings of possible dissection complicate coronary angiography, extensive atherosclerosis in aorta , seen by vascular no plans for intervention 
  - carefullly monitor for hypotension, although her BP may be falsely low due to exensive vascular disease 
  - if patient were to become hypotensive could consider addition of dopamine , no evidence of shock at this time - would attempt IV diuretic dose once now, given pleural effusions in an effort to improve volume status 
  - will check limited echo to see if we can better assess valve gradients 
  
  Mild persistent asthma without complication (62/98/0872) 
  - nebs   
  Acquired hypothyroidism (10/17/2013) 
  - on synthroid  
  
  GERD (gastroesophageal reflux disease) (10/17/2013) 
  - ppi  
  
  Hypercholesterolemia (6/30/2015) 
  - continue statin  
  
  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 
  - per pulmonary, possible CT/PET in future for staging prognosis, possible bx Bilateral pleural effusion (10/7/2020) - post thoracentesis with pulmonology Fredy Martinez DO 
10/11/2020 11:40 AM

## 2020-10-12 PROBLEM — R91.8 MULTIPLE LUNG NODULES ON CT: Chronic | Status: ACTIVE | Noted: 2020-01-01

## 2020-10-12 PROBLEM — I73.9 PERIPHERAL VASCULAR DISEASE (HCC): Status: ACTIVE | Noted: 2020-01-01

## 2020-10-12 PROBLEM — R91.8 MULTIPLE LUNG NODULES ON CT: Status: ACTIVE | Noted: 2020-01-01

## 2020-10-12 PROBLEM — E87.6 HYPOKALEMIA: Status: RESOLVED | Noted: 2020-01-01 | Resolved: 2020-01-01

## 2020-10-12 PROBLEM — Q85.9 HAMARTOMA (HCC): Chronic | Status: ACTIVE | Noted: 2020-01-01

## 2020-10-12 NOTE — PROGRESS NOTES
[address], [place]. [address] [place] [address] FAX: [phone] Vascular Surgery Progress Note Admit Date: 10/1/2020 POD 4 Days Post-Op Procedure:  Procedure(s): ULTRASOUND THORACENTESIS Subjective:  
 
Patient has no new complaints. Objective:  
 
Blood pressure (!) 88/58, pulse (!) 103, temperature 97.4 °F (36.3 °C), resp. rate 24, height 5' 1.34\" (1.558 m), weight 123 lb 3.8 oz (55.9 kg), SpO2 93 %. Temp (24hrs), Av.1 °F (36.7 °C), Min:97.4 °F (36.3 °C), Max:98.4 °F (36.9 °C) Physical Exam:  GENERAL: alert, cooperative, no distress, appears stated age, LUNG:  decreased in the bases, HEART:  regular rate and rhythm, S1, S2 normal, no murmur, click, rub or gallop and EXTREMITIES:  Right Lower Extremity:  no clubbing, cyanosis, or edema and Left Lower Extremity:  no clubbing, cyanosis, or edema Labs:  
Recent Labs 10/12/20 
2077 10/11/20 
0455 HGB  --  11.0* WBC  --  9.6  
K 3.5 3.8 GLU 89 77 Data Review: images and reports reviewed Current Facility-Administered Medications Medication Dose Route Frequency  perflutren lipid microspheres (DEFINITY) in NS bolus IV  1 mL IntraVENous PRN  
 albuterol (PROVENTIL VENTOLIN) nebulizer solution 2.5 mg  2.5 mg Nebulization Q6H PRN  
 midodrine (PROAMATINE) tablet 10 mg  10 mg Oral TID WITH MEALS  morphine injection 1 mg  1 mg IntraVENous Q4H PRN  
 furosemide (LASIX) tablet 20 mg  20 mg Oral DAILY  fluticasone propionate (FLONASE) 50 mcg/actuation nasal spray 2 Spray  2 Spray Both Nostrils DAILY  sodium chloride (NS) flush 5-40 mL  5-40 mL IntraVENous Q8H  
 sodium chloride (NS) flush 5-40 mL  5-40 mL IntraVENous PRN  
 acetaminophen (TYLENOL) tablet 650 mg  650 mg Oral Q6H PRN Or  
 acetaminophen (TYLENOL) suppository 650 mg  650 mg Rectal Q6H PRN  polyethylene glycol (MIRALAX) packet 17 g  17 g Oral DAILY PRN  
  enoxaparin (LOVENOX) injection 40 mg  40 mg SubCUTAneous DAILY  ondansetron (ZOFRAN ODT) tablet 4 mg  4 mg Oral Q8H PRN Or  
 ondansetron (ZOFRAN) injection 4 mg  4 mg IntraVENous Q6H PRN  
 aspirin delayed-release tablet 81 mg  81 mg Oral DAILY  fludrocortisone (FLORINEF) tablet 0.1 mg  0.1 mg Oral DAILY  levothyroxine (SYNTHROID) tablet 75 mcg  75 mcg Oral ACB  montelukast (SINGULAIR) tablet 10 mg  10 mg Oral DAILY  pantoprazole (PROTONIX) tablet 40 mg  40 mg Oral ACB  atorvastatin (LIPITOR) tablet 10 mg  10 mg Oral DAILY  budesonide-formoteroL (SYMBICORT) 160-4.5 mcg/actuation HFA inhaler 2 Puff  2 Puff Inhalation BID RT Assessment:  
 
Principal Problem: 
  PNA (pneumonia) (10/1/2020) Active Problems: 
  Mild persistent asthma without complication (83/95/2423) Acquired hypothyroidism (10/17/2013) GERD (gastroesophageal reflux disease) (10/17/2013) Hypercholesterolemia (6/30/2015) Mass of lingula of lung (10/16/2019) Hypokalemia (10/6/2020) Acute systolic heart failure ([address] (10/6/2020) Bilateral pleural effusion (10/7/2020) Plan/Recommendations/Medical Decision Making:  
 
Continue present treatment No new complaints Keep scheduled outpt f/u Signed By: Threasa Dandy, NP October 12, 2020

## 2020-10-12 NOTE — PROGRESS NOTES
Problem: Breathing Pattern - Ineffective Goal: *Absence of hypoxia Outcome: Progressing Towards Goal 
  
Problem: Falls - Risk of 
Goal: *Absence of Falls Description: Document Rufino Mcclure Fall Risk and appropriate interventions in the flowsheet. Outcome: Progressing Towards Goal 
Note: Fall Risk Interventions: 
  
 
  
 
Medication Interventions: Teach patient to arise slowly Elimination Interventions: Call light in reach, Patient to call for help with toileting needs Problem: Nutrition Deficit Goal: *Optimize nutritional status Outcome: Progressing Towards Goal

## 2020-10-12 NOTE — PROGRESS NOTES
Hospitalist Progress Note Admit Date:  10/1/2020  8:19 AM  
Name:  Kwame Deluca Age:  69 y.o. 
:  1950 MRN:  [id number] PCP:  Angeles Nelson MD 
Treatment Team: Attending Provider: Arturo Zuñiga MD; Primary Nurse: Thuan Uribe; Consulting Provider: Michael Ferguson MD; Consulting Provider: Liz Calles MD; Consulting Provider: José Gutierrez MD; Utilization Review: Jatin Ashraf RN; Care Manager: Jazz Brandon; Consulting Provider: Jarek Stinson MD; Consulting Provider: Raul Lucero DO; Physical Therapist: Rosita Hopper PT Subjective:  
48-year-old female with a past medical history of asthma, GERD, treatment lymphoma, hyperlipidemia, hypothyroidism, hypotension, chronic left pulmonary artery occlusion, left lung mass status post needle biopsy on 2019 demonstrating harmatoma, that presents in the setting of worsening shortness of breath.  The patient states that for the past few weeks she has been developing worsening shortness of breath especially on exertion, and associated with some dry cough. Patient seen and examined at bedside. This morning still presenting with SOB. Denies chest pain, no abdominal pain, nausea or vomiting 10/6/2020 C/o mild shortness of breath , on 2 lit/min oxygen 10/7/2020 Says does get short of breath on walking short distance Episode of low bp last night 
 
10/8/2020 C/o shortness of breath Thoracentesis held secondary to low bp yesterday. Had one episode of low bp improved on its own. 10/9/2020 
 had thoracentesis 10/8/2020 Says breathing better Mild low this am- got midodrine 10/10/2020 Complaining of shortness of breath. Patient asking if her of the blood vessel?   Is because of her shortness of breath(looking back patient did have a CTA neck in 2019 which showed subclavian steal syndrome, she recently had a CT chest on 2020 which showed left pulmonary artery occlusion. right-sided aortic arch with relatively extensive atherosclerotic change at the arch where there appears to be a small 
focal dissection. ) 
 
10/11/2020 C/o sob Asking if cardiology will see her today 10/12/2020 Complains of mild shortness of breath. On 2.5 L oxygen nasal cannula. Mild low blood pressure this morning. Course during the stay Admitted for exertional dyspnea. History of left lung mass previous biopsy in 2019 demonstrated hamartoma, chronic left pulmonary artery occlusion. History of low blood pressures on the Florinef and midodrine at home. Patient was continued on oxygen, pulmonary consulted. Patient had Thoracentesis on 10/3/8233-2643 mL of clear yellow appearing fluid removed from the left pleural effusion. Thoracentesis on 10/9/2020-600 cc of clear yellow fluid aspirated from the left pleural effusion. Patient still complaining of shortness of breath on walking a short distance. Presently has bedside commode. Patient Midodrin was increased from 5 mg 3 times daily to 10 mg 3 times daily during the stay. Cardiology was initially consulted for mild elevated troponin. Start the patient on a small dose of Coreg. Secondary to low blood pressure not a candidate for ACE or ARB. Continue Lasix. Ischemic work-up as an outpatient. Cardiology signed off. Coreg was again discontinued secondary to low blood pressure. later on during the stay as the patient was still having shortness of breath recurrent pleural effusion, pulmonary felt probably more cardiac related. Echo with a EF of 35 to 40%. Cardiology was reconsulted, advised to continue Lasix as long as blood pressure tolerates. Vascular surgery was consulted as patient was concerned that her previous visual blockage could be the reason why she is having shortness of breath. Reviewed her previous CT results- 
CT chest 9/21/2020-right-sided aortic arch with relatively extensive atherosclerotic change at the arch where there appears to be a small 
focal dissection. CTA neck done in June 2019 showed subclavian steal syndrome. 
-Vascular surgery felt no intervention required at this point. Presently patient still has some shortness of breath more on exertion, on 2.5 L of oxygen nasal cannula, followed by cardiology and pulmonary, vascular signed off her felt no intervention required at this point. Patient still has on and off episodes of low blood pressures secondary to which at times patient Lasix had to be held. Difficult discharge. Objective:  
 
Patient Vitals for the past 24 hrs: 
 Temp Pulse Resp BP SpO2  
10/12/20 0807 97.4 °F (36.3 °C) (!) 103 24 (!) 88/58 93 % 10/12/20 0535     93 % 10/12/20 0430 98.4 °F (36.9 °C) 91 18 (!) 88/60 96 % 10/11/20 2318 98.2 °F (36.8 °C) 100 18 (!) 86/62 94 % 10/11/20 2130     96 % 10/11/20 2004 98.3 °F (36.8 °C) 96 18 96/62 97 % 10/11/20 1600 98.3 °F (36.8 °C) 97 18 90/61 96 % 10/11/20 1138 98 °F (36.7 °C) (!) 101 16 95/64 96 % Oxygen Therapy O2 Sat (%): 93 % (10/12/20 0807) Pulse via Oximetry: 91 beats per minute (10/12/20 0535) O2 Device: Nasal cannula (10/12/20 0737) O2 Flow Rate (L/min): 2.5 l/min (10/12/20 0737) Intake/Output Summary (Last 24 hours) at 10/12/2020 1053 Last data filed at 10/12/2020 1021 Gross per 24 hour Intake 240 ml Output 2225 ml Net -1985 ml General:    Well nourished. Alert. On 2.5 lit/min. Mild resp distress HEENT- normal 
CV:   RRR. No murmur, rub, or gallop. Lungs:   Coarse breath sounds, improved air entry Abdomen:   Soft, nontender, nondistended. Cns- no focal neurological deficit Extremities: Warm and dry. No cyanosis or edema. Skin:     No rashes or jaundice. Data Review: 
I have reviewed all labs, meds, telemetry events, and studies from the last 24 hours. Recent Results (from the past 24 hour(s)) METABOLIC PANEL, BASIC  
 Collection Time: 10/12/20  6:50 AM  
Result Value Ref Range Sodium 138 136 - 145 mmol/L Potassium 3.5 3.5 - 5.1 mmol/L Chloride 99 98 - 107 mmol/L  
 CO2 34 (H) 21 - 32 mmol/L Anion gap 5 (L) 7 - 16 mmol/L Glucose 89 65 - 100 mg/dL BUN 13 8 - 23 MG/DL Creatinine 0.63 0.6 - 1.0 MG/DL  
 GFR est AA >60 >60 ml/min/1.73m2 GFR est non-AA >60 >60 ml/min/1.73m2 Calcium 9.3 8.3 - 10.4 MG/DL All Micro Results Procedure Component Value Units Date/Time FUNGUS CULTURE AND SMEAR [[id number]] Collected:  10/03/20 1348 Order Status:  Completed Specimen:  Miscellaneous sample Updated:  10/08/20 1236 Source PLEURAL FLUID Comment: L1 Fungus stain Direct Inoculation Fungus (Mycology) Culture Other source received Comment: (NOTE) Performed At: [address] [id number] Brit Jarquin MD [id number] 
  
  
 C. DIFFICILE AG & TOXIN A/B [[id number]] Collected:  10/06/20 1938 Order Status:  Canceled Specimen:  Stool AFB CULTURE + SMEAR W/RFLX ID FROM CULTURE [[id number]] Collected:  10/03/20 1348 Order Status:  Completed Specimen:  Miscellaneous sample Updated:  10/06/20 1538 Source PLEURAL FLUID Comment: L1  
  
  AFB Specimen processing Concentration Acid Fast Smear Negative Comment: (NOTE) Performed At: [address] [id number] Brit Jarquin MD [id number] Acid Fast Culture PENDING  
 CULTURE, BLOOD [[id number]] Collected:  10/01/20 1135 Order Status:  Completed Specimen:  Blood Updated:  10/06/20 4736 Special Requests: --     
  RIGHT 
FOREARM Culture result: NO GROWTH 5 DAYS     
 CULTURE, BLOOD [[id number]] Collected:  10/01/20 1135 Order Status:  Completed Specimen:  Blood Updated:  10/06/20 0208 Special Requests: --     
  LEFT 
FOREARM Culture result: NO GROWTH 5 DAYS CULTURE, BODY FLUID Susan Rodriguez [[id number]] Collected:  10/03/20 1348 Order Status:  Completed Specimen:  Pleural Fluid Updated:  10/06/20 3060 Special Requests: NO SPECIAL REQUESTS     
  GRAM STAIN 0 TO 13 WBC'S SEEN PER OIF  
   NO DEFINITE ORGANISM SEEN Culture result: NO GROWTH 2 DAYS Current Meds: 
Current Facility-Administered Medications Medication Dose Route Frequency  perflutren lipid microspheres (DEFINITY) in NS bolus IV  1 mL IntraVENous PRN  
 albuterol (PROVENTIL VENTOLIN) nebulizer solution 2.5 mg  2.5 mg Nebulization Q6H PRN  
 midodrine (PROAMATINE) tablet 10 mg  10 mg Oral TID WITH MEALS  morphine injection 1 mg  1 mg IntraVENous Q4H PRN  
 furosemide (LASIX) tablet 20 mg  20 mg Oral DAILY  fluticasone propionate (FLONASE) 50 mcg/actuation nasal spray 2 Spray  2 Spray Both Nostrils DAILY  sodium chloride (NS) flush 5-40 mL  5-40 mL IntraVENous Q8H  
 sodium chloride (NS) flush 5-40 mL  5-40 mL IntraVENous PRN  
 acetaminophen (TYLENOL) tablet 650 mg  650 mg Oral Q6H PRN Or  
 acetaminophen (TYLENOL) suppository 650 mg  650 mg Rectal Q6H PRN  polyethylene glycol (MIRALAX) packet 17 g  17 g Oral DAILY PRN  
 enoxaparin (LOVENOX) injection 40 mg  40 mg SubCUTAneous DAILY  ondansetron (ZOFRAN ODT) tablet 4 mg  4 mg Oral Q8H PRN Or  
 ondansetron (ZOFRAN) injection 4 mg  4 mg IntraVENous Q6H PRN  
 aspirin delayed-release tablet 81 mg  81 mg Oral DAILY  fludrocortisone (FLORINEF) tablet 0.1 mg  0.1 mg Oral DAILY  levothyroxine (SYNTHROID) tablet 75 mcg  75 mcg Oral ACB  montelukast (SINGULAIR) tablet 10 mg  10 mg Oral DAILY  pantoprazole (PROTONIX) tablet 40 mg  40 mg Oral ACB  atorvastatin (LIPITOR) tablet 10 mg  10 mg Oral DAILY  budesonide-formoteroL (SYMBICORT) 160-4.5 mcg/actuation HFA inhaler 2 Puff  2 Puff Inhalation BID RT Other Studies (last 24 hours): No results found. Assessment and Plan: Hospital Problems as of 10/12/2020 Date Reviewed: 10/9/2020 Codes Class Noted - Resolved POA Bilateral pleural effusion ICD-10-CM: J90 ICD-9-CM: 511.9  10/7/2020 - Present Unknown Hypokalemia ICD-10-CM: E87.6 ICD-9-CM: 276.8  10/6/2020 - Present Unknown Acute systolic heart failure (HCC) ICD-10-CM: I50.21 ICD-9-CM: 428.21  10/6/2020 - Present Unknown * (Principal) PNA (pneumonia) ICD-10-CM: J18.9 ICD-9-CM: 629  10/1/2020 - Present Unknown Mass of lingula of lung ICD-10-CM: R91.8 ICD-9-CM: 786.6  10/16/2019 - Present Yes Hypercholesterolemia (Chronic) ICD-10-CM: E78.00 ICD-9-CM: 272.0  6/30/2015 - Present Yes Mild persistent asthma without complication (Chronic) N-31-SW: J45.30 ICD-9-CM: 493.90  10/17/2013 - Present Yes Acquired hypothyroidism ICD-10-CM: E03.9 ICD-9-CM: 244.9  10/17/2013 - Present Yes GERD (gastroesophageal reflux disease) (Chronic) ICD-10-CM: K21.9 ICD-9-CM: 530.81  10/17/2013 - Present Yes PLAN:   
 
 
 
Hypokalemia-resolved Episodes of low blood pressure-Florinef and Midrin at home, Midrin dose was increased to 10 mg 3 times daily during the stay. CT chest 9/21/2020-right-sided aortic arch with relatively 
extensive atherosclerotic change at the arch where there appears to be a small 
focal dissection. CTA neck done in June 2019 showed subclavian steal syndrome. 
 
- vascular surgery no intervention at this point. 1.  Hypoxic respiratory failure-on 2.5 L of oxygen nasal cannula. Finished a course of antibiotics. - Pulm following-10/3/2020 and 10/8/2020 thoracentesis 
  
2. EKG changes with sinus tachycardia and ST depressions and T wave inversions on V5 and V6 likely from demand ischemia. HFrEF. 
- Tropnin trended down - Telemetry - Continue aspirin 
- TTE EF 35-40% - D/c coreg due to hypotension - Cardiology following 
  
3.  Left lung mass status post needle biopsy on November 2019 demonstrating harmatoma / Chronic left pulmonary artery occlusion. Pulm recs.  Continue aspirin. 
  
4.  For her asthma continue on home inhalers and Singulair. Currently not on exacerbation. 
  
5.  For her hypotension continue on fludrocortisone and midodrine. close f/u 
  
6.  For her hyperlipidemia continue on atorvastatin. 
  
7.  For her GERD continue on PPI. 
  
8.  For her hypothyroidism continue on levothyroxine. 
  
Difficult disposition secondary to dyspnea, pleural effusion,epsiodes of low bp, chf. 
 
DVT prophylaxis with Lovenox Signed: 
Sherly Schaefer MD

## 2020-10-12 NOTE — PROGRESS NOTES
[facility] CARDIOLOGY PROGRESS NOTE 
      
 
10/12/2020 4:31 PM 
 
Admit Date: 10/1/2020 Subjective:  
Patient with continued orthopnea. BP low in left arm but 128/81 in left leg. Has known severe vascular disease of subclavian/carotid vessels. No orthostatic symptoms. ROS: 
Cardiovascular:  As noted above Objective:  
  
Vitals:  
 10/12/20 0807 10/12/20 1136 10/12/20 1232 10/12/20 1350 BP: (!) 88/58 (!) 86/56  128/81 Pulse: (!) 103 99 Resp: 24 20 Temp: 97.4 °F (36.3 °C) 98 °F (36.7 °C) SpO2: 93% 95% 96% Weight:      
Height:      
 
 
Physical Exam: 
General-No Acute Distress Neck- supple, no JVD 
CV- regular rate and rhythm + MALVIN Lung- clear bilaterally Abd- soft, nontender, nondistended Ext- trivial edema bilaterally. Skin- warm and dry Data Review:  
Recent Labs 10/12/20 
5815 10/11/20 
0455 10/10/20 
8202  140 139  
K 3.5 3.8 4.1 BUN 13 11 11 CREA 0.63 0.49* 0.68 GLU 89 77 93 WBC  --  9.6 10.7 HGB  --  11.0* 12.7 HCT  --  33.6* 39.1 PLT  --  373 446 No results found for: KSENIA Marino Assessment/Plan:  
 
Principal Problem: 
  PNA (pneumonia) (10/1/2020) Defer management to primary team.   
 
 
Active Problems: 
  Mild persistent asthma without complication (64/27/3264) Defer management to primary team.   
 
  Hypercholesterolemia (6/30/2015) Conitnue Lipitor. Acute systolic heart failure ([address] (10/6/2020) Severe LV dysfunction. Needs diuresis. BP incorrect in upper extremities due to subclavian stenosis. All BP should be done in lower extremities. Stop midodrine and florinef. StartIV lasix and low dose Toprol. Will need C at some point given vascular disease and LV dysfunction but need clarity of if biopsy needed for lung nodules. No urgent indication as no chest pain. Bilateral pleural effusion (10/7/2020) IV lasix. Multiple lung nodules on CT (10/12/2020) See above. Peripheral vascular disease ([facility] (10/12/2020) See above. All BP in lower extremities.   
 
 
 
 
 
 
 
 
Vivi Mccormick MD 
10/12/2020 4:31 PM

## 2020-10-12 NOTE — PROGRESS NOTES
Physical Therapy Note: 
 
Physical therapy evaluation orders received and chart reviewed. Attempted to see patient this AM to initiate assessment. Patient getting ECHO at bedside. Will follow and re-attempt at a later time/date as schedule permits/patient available. Thank you, Regan Tamayo, PT, DPT

## 2020-10-12 NOTE — PROGRESS NOTES
Natacha Johnson Admission Date: 10/1/2020 Daily Progress Note: 10/12/2020 The patient's chart is reviewed and the patient is discussed with the staff. Patient is a 69 y.o.  female seen and evaluated at the request of Dr. Alejo Brenner. 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 Pt has recently had issues with hypotension which is being managed by her PCP. She was treated with LVQ x 7 days for CAP. She had a virtual visit with KE Alcala on 9/30/2020 and pt mentioned that her O2 sats had been borderline low and that she had been tachycardic. An echocardiogram was obtained to evaluate for PAH that same day, but the TV jet was inadequate for estimation of RVSP. Pt felt worse and presented to the ER and her CXR was concerning for PNA. Pt was admitted and started on Zosyn. Pt is being ruled out for COVID. We were consulted to assist with workup and treatment. CT was performed which reveals multiple new nodules, and bilateral pleural effusions. Pt had a L thoracentesis with 1050mL removed and R thoracentesis with 800mL removed on 10/3/2020. Subjective:  
 
Patient resting in bed, on 2.5L O2 NC Still having mild hypotension No complaints Current Facility-Administered Medications Medication Dose Route Frequency  albuterol (PROVENTIL VENTOLIN) nebulizer solution 2.5 mg  2.5 mg Nebulization Q6H PRN  
 midodrine (PROAMATINE) tablet 10 mg  10 mg Oral TID WITH MEALS  morphine injection 1 mg  1 mg IntraVENous Q4H PRN  
 furosemide (LASIX) tablet 20 mg  20 mg Oral DAILY  fluticasone propionate (FLONASE) 50 mcg/actuation nasal spray 2 Spray  2 Spray Both Nostrils DAILY  sodium chloride (NS) flush 5-40 mL  5-40 mL IntraVENous Q8H  
  sodium chloride (NS) flush 5-40 mL  5-40 mL IntraVENous PRN  
 acetaminophen (TYLENOL) tablet 650 mg  650 mg Oral Q6H PRN Or  
 acetaminophen (TYLENOL) suppository 650 mg  650 mg Rectal Q6H PRN  polyethylene glycol (MIRALAX) packet 17 g  17 g Oral DAILY PRN  
 enoxaparin (LOVENOX) injection 40 mg  40 mg SubCUTAneous DAILY  ondansetron (ZOFRAN ODT) tablet 4 mg  4 mg Oral Q8H PRN Or  
 ondansetron (ZOFRAN) injection 4 mg  4 mg IntraVENous Q6H PRN  
 aspirin delayed-release tablet 81 mg  81 mg Oral DAILY  fludrocortisone (FLORINEF) tablet 0.1 mg  0.1 mg Oral DAILY  levothyroxine (SYNTHROID) tablet 75 mcg  75 mcg Oral ACB  montelukast (SINGULAIR) tablet 10 mg  10 mg Oral DAILY  pantoprazole (PROTONIX) tablet 40 mg  40 mg Oral ACB  atorvastatin (LIPITOR) tablet 10 mg  10 mg Oral DAILY  budesonide-formoteroL (SYMBICORT) 160-4.5 mcg/actuation HFA inhaler 2 Puff  2 Puff Inhalation BID RT Review of Systems 
+cough 
+dyspnea on exertion Constitutional: negative for fever, chills, sweats Cardiovascular: negative for chest pain, palpitations, syncope, edema Gastrointestinal:  negative for dysphagia, reflux, vomiting, diarrhea, abdominal pain, or melena Neurologic:  negative for focal weakness, numbness, headache Objective:  
 
Vitals:  
 10/12/20 0528 10/12/20 0535 10/12/20 0807 10/12/20 1136 BP:   (!) 88/58 (!) 86/56 Pulse:   (!) 103 99 Resp:   24 20 Temp:   97.4 °F (36.3 °C) 98 °F (36.7 °C) SpO2:  93% 93% 95% Weight: 123 lb 3.8 oz (55.9 kg) Height:      
 
 
 
Intake/Output Summary (Last 24 hours) at 10/12/2020 1140 Last data filed at 10/12/2020 1021 Gross per 24 hour Intake 240 ml Output 2125 ml Net -1885 ml Physical Exam:  
Constitution:  the patient is well developed and in no acute distress EENMT:  Sclera clear, pupils equal, oral mucosa moist 
Respiratory: Crackles, on 2.5 LPM NC Cardiovascular:  RRR, III/IV systolic murmur Gastrointestinal: soft and non-tender; with positive bowel sounds. Musculoskeletal: warm without cyanosis. There is no lower extremity edema. Skin:  no jaundice or rashes Neurologic: no gross neuro deficits Psychiatric:  alert and oriented x 3 CXR:  
10/6/2020 
 
 
10/4/2020 - Single view FINDINGS: Bilateral lung edema or infiltrates have improved, with mild residual. 
There are also decreased small bilateral pleural effusions. No pneumothorax is 
identified. IMPRESSION: Improving bilateral lung edema or infiltrates and small pleural 
Effusions. CXR 10/3/2020 
 
  
 
 
  
 
  
9/21/20 
 
 
 
  
6/1/2020 Echo:  
-  Left ventricle: Moderate global hypokinesis. Systolic function was 
moderately reduced. Ejection fraction was estimated in the range of 35 % to  
40%. -  Mitral valve: There was mild to moderate regurgitation. -  Other Measurements: High velocity overlapping flow noted during Suprasternal imaging (descending aorta appears with normal flow velocities and Superimposed high flow velocities from alternative source of uncertain BEUAFRNO-~1.2M/J). Consider further imaging as clinically indicated. 
  
LAB No results for input(s): GLUCPOC in the last 72 hours. No lab exists for component: [place] Recent Labs 10/11/20 
1041 10/10/20 
8250 WBC 9.6 10.7 HGB 11.0* 12.7 HCT 33.6* 39.1  446 Recent Labs 10/12/20 
5564 10/11/20 
0455 10/10/20 
2356  140 139  
K 3.5 3.8 4.1 CL 99 105 103 CO2 34* 29 32 GLU 89 77 93 BUN 13 11 11 CREA 0.63 0.49* 0.68  
CA 9.3 9.2 9.8 No results for input(s): PH, PCO2, PO2, HCO3, PHI, PCO2I, PO2I, HCO3I in the last 72 hours. No results for input(s): LCAD, LAC in the last 72 hours. Assessment:  (Medical Decision Making) Hospital Problems  Date Reviewed: 10/12/2020 Codes Class Noted POA * (Principal) PNA (pneumonia) ICD-10-CM: J18.9 ICD-9-CM: 112  10/1/2020 Unknown On zosyn Mass of lingula of lung ICD-10-CM: R91.8 ICD-9-CM: 786.6  10/16/2019 Yes Needs Bx Hypercholesterolemia (Chronic) ICD-10-CM: E78.00 ICD-9-CM: 272.0  6/30/2015 Yes Mild persistent asthma without complication (Chronic) UFX-14-AY: J45.30 ICD-9-CM: 493.90  10/17/2013 Yes Acquired hypothyroidism ICD-10-CM: E03.9 ICD-9-CM: 244.9  10/17/2013 Yes GERD (gastroesophageal reflux disease) (Chronic) ICD-10-CM: K21.9 ICD-9-CM: 530.81  10/17/2013 Yes Plan:  (Medical Decision Making) --Continue O2 at 2.5L O2, wean as tolerate --Still having hypotension, on midodrine 
--Continue Symbicort inhaler and albuterol nebulizers 
--Continue Singulair 
--Continue morphine 1mg q4h prn 
--Zosyn completed, procalcitonin normal 
--C. Diff stool pending, ?sent  
--Will need PET-CT and consideration for CT-guided biopsy of new nodule. --if unable to wean off of O2, will need 6 min walk test prior to d/c home --Appreciate Cardiology and Vascular following 
--PFTs normal 
--Full code More than 50% of the time documented was spent in face-to-face contact with the patient and in the care of the patient on the floor/unit where the patient is located. Dorys Connelly NP Lungs: decreased breath sounds Heart:  RRR with no Murmur/Rubs/Gallops Additional Comments: Will need outpt pet, spoke with Dr. Joshua Barney, needs r and L ht cath at some point I have spoken with and examined the patient. I agree with the above assessment and plan as documented.  
 
Pj Resendez MD

## 2020-10-12 NOTE — PROGRESS NOTES
Dr. Nadja Fields informed of patient's low BP  88/58  via perfect serve. Pt denies chest pain and dizziness. Will continue to monitor pt.

## 2020-10-13 NOTE — PROGRESS NOTES
[facility] CARDIOLOGY PROGRESS NOTE 
      
 
10/13/2020 4:31 PM 
 
Admit Date: 10/1/2020 Subjective:  
Patient with continued dyspnea and orthopnea. No orthostatic symptoms. Notes good urine output since receiving Lasix yesterday PM.  
 
ROS: 
Cardiovascular:  As noted above Pulm:  Continued dyspnea. Objective:  
  
Vitals:  
 10/12/20 1919 10/12/20 2116 10/12/20 2352 10/13/20 5554 BP: (!) 102/51  131/71 (!) 155/87 Pulse: 91  100 100 Resp: 18  18 18 Temp: 98.2 °F (36.8 °C)  97.7 °F (36.5 °C) 98.8 °F (37.1 °C) SpO2: 98% 97% 96% 96% Weight:      
Height:      
 
 
Physical Exam: 
General-No Acute Distress Neck- supple, + JVD 
CV- regular rate and rhythm + MALVIN Lung- clear bilaterally Abd- soft, nontender, nondistended Ext- trivial edema bilaterally. Skin- warm and dry Data Review:  
Recent Labs 10/13/20 
5661 10/12/20 
1730 10/11/20 
0455  138 140  
K 3.8 3.5 3.8 BUN 12 13 11 CREA 0.57* 0.63 0.49* GLU 90 89 77 WBC 9.5  --  9.6 HGB 11.2*  --  11.0*  
HCT 33.9*  --  33.6*   --  373 No results found for: KSENIA Dewitt Assessment/Plan:  
 
Principal Problem: 
  PNA (pneumonia) (10/1/2020) Defer management to primary team.   
 
 
Active Problems: 
  Mild persistent asthma without complication (62/95/9730) Defer management to primary team.   
 
  Hypercholesterolemia (6/30/2015) Conitnue Lipitor. Acute systolic heart failure ([facility] (10/6/2020) Severe LV dysfunction. Needs diuresis. BP incorrect in upper extremities due to subclavian stenosis. All BP should be done in lower extremities. Stopped midodrine and florinef (started due to hypotension noted in BP in upper extremities). Continue IV lasix and low dose Toprol. Will need LHC at some point given vascular disease and LV dysfunction but need clarity of if biopsy needed for lung nodules. No urgent indication as no chest pain. Bilateral pleural effusion (10/7/2020) IV lasix. Multiple lung nodules on CT (10/12/2020) See above. Peripheral vascular disease ([address] (10/12/2020) See above. All BP in lower extremities.   
 
 
 
 
 
 
 
 
Laith Orozco MD 
10/13/2020 4:31 PM

## 2020-10-13 NOTE — PROGRESS NOTES
Hospitalist Progress Note Admit Date:  10/1/2020  8:19 AM  
Name:  Sheridan Hoffman Age:  69 y.o. 
:  1950 MRN:  [id number] PCP:  Lisa Cuba MD 
Treatment Team: Attending Provider: Radha Grider MD; Primary Nurse: Nola Gama; Consulting Provider: Audrey Scanlon MD; Consulting Provider: Sharmaine Lord MD; Consulting Provider: Mar Thomas MD; Utilization Review: Flavia Wadsworth RN; Care Manager: Charu Andino; Consulting Provider: Jessica Bridges DO; Utilization Review: Jose Wolf RN; Physical Therapist: Jessica Enciso; Occupational Therapist: EDILMA Ceja/DAYRON; Occupational Therapist: Jeni Jain Subjective:  
69-year-old female with a past medical history of asthma, GERD, treatment lymphoma, hyperlipidemia, hypothyroidism, hypotension, chronic left pulmonary artery occlusion, left lung mass status post needle biopsy on 2019 demonstrating harmatoma, that presents in the setting of worsening shortness of breath.  The patient states that for the past few weeks she has been developing worsening shortness of breath especially on exertion, and associated with some dry cough. Patient seen and examined at bedside. This morning still presenting with SOB. Denies chest pain, no abdominal pain, nausea or vomiting 10/6/2020 C/o mild shortness of breath , on 2 lit/min oxygen 10/7/2020 Says does get short of breath on walking short distance Episode of low bp last night 
 
10/8/2020 C/o shortness of breath Thoracentesis held secondary to low bp yesterday. Had one episode of low bp improved on its own. 10/9/2020 
 had thoracentesis 10/8/2020 Says breathing better Mild low this am- got midodrine 10/10/2020 Complaining of shortness of breath. Patient asking if her of the blood vessel?   Is because of her shortness of breath(looking back patient did have a CTA neck in 2019 which showed subclavian steal syndrome, she recently had a CT chest on 9/21/2020 which showed left pulmonary artery occlusion. right-sided aortic arch with relatively extensive atherosclerotic change at the arch where there appears to be a small 
focal dissection. ) 
 
10/11/2020 C/o sob Asking if cardiology will see her today 10/12/2020 Complains of mild shortness of breath. On 2.5 L oxygen nasal cannula. Mild low blood pressure this morning. Course during the stay Admitted for exertional dyspnea. History of left lung mass previous biopsy in 2019 demonstrated hamartoma, chronic left pulmonary artery occlusion. History of low blood pressures on the Florinef and midodrine at home. Patient was continued on oxygen, pulmonary consulted. Patient had Thoracentesis on 10/3/7025-4529 mL of clear yellow appearing fluid removed from the left pleural effusion. Thoracentesis on 10/9/2020-600 cc of clear yellow fluid aspirated from the left pleural effusion. Patient still complaining of shortness of breath on walking a short distance. Presently has bedside commode. Patient Midodrin was increased from 5 mg 3 times daily to 10 mg 3 times daily during the stay. Cardiology was initially consulted for mild elevated troponin. Start the patient on a small dose of Coreg. Secondary to low blood pressure not a candidate for ACE or ARB. Continue Lasix. Ischemic work-up as an outpatient. Cardiology signed off. Coreg was again discontinued secondary to low blood pressure. later on during the stay as the patient was still having shortness of breath recurrent pleural effusion, pulmonary felt probably more cardiac related. Echo with a EF of 35 to 40%. Cardiology was reconsulted, advised to continue Lasix as long as blood pressure tolerates. Vascular surgery was consulted as patient was concerned that her previous visual blockage could be the reason why she is having shortness of breath. Reviewed her previous CT results- 
CT chest 9/21/2020-right-sided aortic arch with relatively 
extensive atherosclerotic change at the arch where there appears to be a small 
focal dissection. CTA neck done in June 2019 showed subclavian steal syndrome. 
-Vascular surgery felt no intervention required at this point. Presently patient still has some shortness of breath more on exertion, on 2.5 L of oxygen nasal cannula, followed by cardiology and pulmonary, vascular signed off her felt no intervention required at this point. Patient still has on and off episodes of low blood pressures secondary to which at times patient Lasix had to be held. Difficult discharge. 10/13 patient reports shortness of breath. She cannot sleep well last night because of constant interruptions from breathing treatments. No fever no chills. Given her peripheral vascular disease, blood pressures to be checked on a lower extremities per cardiology. No dizziness no lightheadedness. Currently on 2 L oxygen by nasal cannula. Objective:  
 
Patient Vitals for the past 24 hrs: 
 Temp Pulse Resp BP SpO2  
10/13/20 1144 98.3 °F (36.8 °C) 96 20 (!) 143/84 97 % 10/13/20 0858     97 % 10/13/20 0746 98.4 °F (36.9 °C) 99 20 119/78 96 % 10/13/20 0326 98.8 °F (37.1 °C) 100 18 (!) 155/87 96 % 10/12/20 2352 97.7 °F (36.5 °C) 100 18 131/71 96 % 10/12/20 2116     97 % 10/12/20 1919 98.2 °F (36.8 °C) 91 18 (!) 102/51 98 % 10/12/20 1640 98.4 °F (36.9 °C) 96 20 130/74 98 % Oxygen Therapy O2 Sat (%): 97 % (10/13/20 1144) Pulse via Oximetry: 99 beats per minute (10/13/20 0858) O2 Device: Nasal cannula (10/13/20 0858) O2 Flow Rate (L/min): 2 l/min (10/13/20 0858) Intake/Output Summary (Last 24 hours) at 10/13/2020 1450 Last data filed at 10/13/2020 1321 Gross per 24 hour Intake 600 ml Output 2100 ml Net -1500 ml General:    Well nourished. Alert. On 2L NC.  
BRADEN- SUSANNAH, NC, DIANA RHOADES, CV: RRR. No murmur, rub, or gallop. Lungs:   Coarse breath sounds, improved air exchange, Abdomen:   Soft, nontender, nondistended. Active bowel sounds, no organomegaly, Cns- no focal neurological deficit Extremities: Warm and dry. No cyanosis or edema. Skin:     No rashes or jaundice. Data Review: 
I have reviewed all labs, meds, telemetry events, and studies from the last 24 hours. Recent Results (from the past 24 hour(s)) CBC WITH AUTOMATED DIFF Collection Time: 10/13/20  6:40 AM  
Result Value Ref Range WBC 9.5 4.3 - 11.1 K/uL  
 RBC 3.59 (L) 4.05 - 5.2 M/uL  
 HGB 11.2 (L) 11.7 - 15.4 g/dL HCT 33.9 (L) 35.8 - 46.3 % MCV 94.4 79.6 - 97.8 FL  
 MCH 31.2 26.1 - 32.9 PG  
 MCHC 33.0 31.4 - 35.0 g/dL  
 RDW 13.8 11.9 - 14.6 % PLATELET 630 383 - 001 K/uL MPV 10.0 9.4 - 12.3 FL ABSOLUTE NRBC 0.00 0.0 - 0.2 K/uL  
 DF AUTOMATED NEUTROPHILS 56 43 - 78 % LYMPHOCYTES 22 13 - 44 % MONOCYTES 15 (H) 4.0 - 12.0 % EOSINOPHILS 6 0.5 - 7.8 % BASOPHILS 1 0.0 - 2.0 % IMMATURE GRANULOCYTES 0 0.0 - 5.0 %  
 ABS. NEUTROPHILS 5.3 1.7 - 8.2 K/UL  
 ABS. LYMPHOCYTES 2.1 0.5 - 4.6 K/UL  
 ABS. MONOCYTES 1.4 (H) 0.1 - 1.3 K/UL  
 ABS. EOSINOPHILS 0.6 0.0 - 0.8 K/UL  
 ABS. BASOPHILS 0.1 0.0 - 0.2 K/UL  
 ABS. IMM. GRANS. 0.0 0.0 - 0.5 K/UL METABOLIC PANEL, BASIC Collection Time: 10/13/20  6:40 AM  
Result Value Ref Range Sodium 138 136 - 145 mmol/L Potassium 3.8 3.5 - 5.1 mmol/L Chloride 101 98 - 107 mmol/L  
 CO2 34 (H) 21 - 32 mmol/L Anion gap 3 (L) 7 - 16 mmol/L Glucose 90 65 - 100 mg/dL BUN 12 8 - 23 MG/DL Creatinine 0.57 (L) 0.6 - 1.0 MG/DL  
 GFR est AA >60 >60 ml/min/1.73m2 GFR est non-AA >60 >60 ml/min/1.73m2 Calcium 9.4 8.3 - 10.4 MG/DL All Micro Results Procedure Component Value Units Date/Time FUNGUS CULTURE AND SMEAR [[id number]] Collected:  10/03/20 1342 Order Status:  Completed Specimen:  Miscellaneous sample Updated:  10/08/20 1236 Source PLEURAL FLUID Comment: L1 Fungus stain Direct Inoculation Fungus (Mycology) Culture Other source received Comment: (NOTE) Performed At: [address] [id number] Samina Burris MD [id number] 
  
  
 C. DIFFICILE AG & TOXIN A/B [[id number]] Collected:  10/06/20 1938 Order Status:  Canceled Specimen:  Stool AFB CULTURE + SMEAR W/RFLX ID FROM CULTURE [[id number]] Collected:  10/03/20 1348 Order Status:  Completed Specimen:  Miscellaneous sample Updated:  10/06/20 1538 Source PLEURAL FLUID Comment: L1  
  
  AFB Specimen processing Concentration Acid Fast Smear Negative Comment: (NOTE) Performed At: [address] [id number] Samina Burris MD [id number] Acid Fast Culture PENDING  
 CULTURE, BLOOD [[id number]] Collected:  10/01/20 1135 Order Status:  Completed Specimen:  Blood Updated:  10/06/20 9345 Special Requests: --     
  RIGHT 
FOREARM Culture result: NO GROWTH 5 DAYS     
 CULTURE, BLOOD [[id number]] Collected:  10/01/20 1135 Order Status:  Completed Specimen:  Blood Updated:  10/06/20 1437 Special Requests: --     
  LEFT 
FOREARM Culture result: NO GROWTH 5 DAYS     
 CULTURE, BODY FLUID W Denece Iron [[id number]] Collected:  10/03/20 1348 Order Status:  Completed Specimen:  Pleural Fluid Updated:  10/06/20 0926 Special Requests: NO SPECIAL REQUESTS     
  GRAM STAIN 0 TO 13 WBC'S SEEN PER OIF  
   NO DEFINITE ORGANISM SEEN Culture result: NO GROWTH 2 DAYS Current Meds: 
Current Facility-Administered Medications Medication Dose Route Frequency  albuterol (PROVENTIL VENTOLIN) nebulizer solution 2.5 mg  2.5 mg Nebulization Q6H RT  
 furosemide (LASIX) injection 40 mg  40 mg IntraVENous Q12H  metoprolol succinate (TOPROL-XL) XL tablet 25 mg  25 mg Oral DAILY  albuterol (PROVENTIL VENTOLIN) nebulizer solution 2.5 mg  2.5 mg Nebulization Q6H PRN  
 morphine injection 1 mg  1 mg IntraVENous Q4H PRN  
 fluticasone propionate (FLONASE) 50 mcg/actuation nasal spray 2 Spray  2 Spray Both Nostrils DAILY  sodium chloride (NS) flush 5-40 mL  5-40 mL IntraVENous Q8H  
 sodium chloride (NS) flush 5-40 mL  5-40 mL IntraVENous PRN  
 acetaminophen (TYLENOL) tablet 650 mg  650 mg Oral Q6H PRN Or  
 acetaminophen (TYLENOL) suppository 650 mg  650 mg Rectal Q6H PRN  polyethylene glycol (MIRALAX) packet 17 g  17 g Oral DAILY PRN  
 enoxaparin (LOVENOX) injection 40 mg  40 mg SubCUTAneous DAILY  ondansetron (ZOFRAN ODT) tablet 4 mg  4 mg Oral Q8H PRN Or  
 ondansetron (ZOFRAN) injection 4 mg  4 mg IntraVENous Q6H PRN  
 aspirin delayed-release tablet 81 mg  81 mg Oral DAILY  levothyroxine (SYNTHROID) tablet 75 mcg  75 mcg Oral ACB  montelukast (SINGULAIR) tablet 10 mg  10 mg Oral DAILY  pantoprazole (PROTONIX) tablet 40 mg  40 mg Oral ACB  atorvastatin (LIPITOR) tablet 10 mg  10 mg Oral DAILY  budesonide-formoteroL (SYMBICORT) 160-4.5 mcg/actuation HFA inhaler 2 Puff  2 Puff Inhalation BID RT Other Studies (last 24 hours): No results found. Assessment and Plan:  
 
Hospital Problems as of 10/13/2020 Date Reviewed: 10/13/2020 Codes Class Noted - Resolved POA Multiple lung nodules on CT (Chronic) ICD-10-CM: R91.8 ICD-9-CM: 793.19  10/12/2020 - Present Unknown Overview Addendum 10/12/2020  2:33 PM by Mirna Pruett NP  
  -PET scan 10/2019: PET scan fortunately did not have any abnormal activity in her mass in the left lung.  This may mean that we can just follow this radiographically, but we will talk about her at tumor board and come back to her with the group's recommendation. 
-10/30/2019: Patient is discussed at thoracic conference today lead by  Catalino Marrufo. Patient is a 79 yo never smoker. CT guided biopsy recommended. -CT guided Biopsy 11/2020: lung biopsy showed signs of this nodule being a hamartoma, which is a benign tumor that we can simply follow. repeat CT in 6 months 
-Chest CT June 2020: CT scan is stable 
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 Vincent Vannesa are several new pulmonary nodules noted which may suggest some metastatic disease.  This require further follow-up CT of the chest in 2 months or doing PET scan. Hamartoma (HCC) (Chronic) ICD-10-CM: Q85.9 ICD-9-CM: 759.6  10/12/2020 - Present Yes Peripheral vascular disease ([address] ICD-10-CM: I73.9 ICD-9-CM: 443.9  10/12/2020 - Present Unknown Bilateral pleural effusion ICD-10-CM: J90 ICD-9-CM: 511.9  10/7/2020 - Present Yes Overview Signed 10/13/2020  1:25 PM by DENI Mendoza  
  10/3/2020: L thoracentesis 1050mL removed R thoracentesis 800mL removed 10/9/2020: L thoracentesis: 1000mL removed R thoracentesis: 600mL removed Acute systolic heart failure (HCC) ICD-10-CM: I50.21 ICD-9-CM: 428.21  10/6/2020 - Present Yes * (Principal) PNA (pneumonia) ICD-10-CM: J18.9 ICD-9-CM: 174  10/1/2020 - Present Unknown Mass of lingula of lung ICD-10-CM: R91.8 ICD-9-CM: 786.6  10/16/2019 - Present Yes Hypercholesterolemia (Chronic) ICD-10-CM: E78.00 ICD-9-CM: 272.0  6/30/2015 - Present Yes Mild persistent asthma without complication (Chronic) QVT-50-XZ: J45.30 ICD-9-CM: 493.90  10/17/2013 - Present Yes Acquired hypothyroidism (Chronic) ICD-10-CM: E03.9 ICD-9-CM: 244.9  10/17/2013 - Present Yes GERD (gastroesophageal reflux disease) (Chronic) ICD-10-CM: K21.9 ICD-9-CM: 530.81  10/17/2013 - Present Yes RESOLVED: Hypokalemia ICD-10-CM: E87.6 ICD-9-CM: 276.8  10/6/2020 - 10/12/2020 Yes PLAN:   
 
 This is a 76-year-old female with: 
 
1.  Acute hypoxemic respiratory failure-on 2 L of oxygen nasal cannula. Pt completed course of antibiotics. Left pleural effusion s/p thoracentesis 10/3 ad 10/8. CXR this afternoon with bilateral consolidation with right pleural effusion.  
- Pulmonary on board. Appreciate recs. Check D dimer.  
  
2. Acute Systolic CHF exacerbation POA Elevated troponin likely from demand ischemia. IV lasix 40 mg bid. - Telemetry - Continue aspirin 
- TTE EF 35-40% - Metoprolol-  
Cardiology on board. Appreciate recs. May need Left heart cath at some point,   
  
3. Left lung mass status post needle biopsy on November 2019 demonstrating harmatoma / Chronic left pulmonary artery occlusion. Pulm recs.  Continue aspirin. Lung nodules- out-pt PET CT, 
  
4.  For her asthma, continue on home inhalers and Singulair. Currently not in exacerbation. 
  
5.  Hypotension: 
Due to diffuse peripheral vascular disease, falsely low BP readings in upper extremities. Midodrine and Florinef have been discontinued. 
  
6. Hyperlipidemia  
continue on atorvastatin. 
  
7.  GERD  
continue on PPI. 
  
8. Hypothyroidism  
continue on levothyroxine. Hypokalemia-resolved CT chest 9/21/2020-right-sided aortic arch with relatively 
extensive atherosclerotic change at the arch where there appears to be a small 
focal dissection. CTA neck done in June 2019 showed subclavian steal syndrome. Vascular surgery recs no intervention at this point. 
 
 DISPO: Anticipate inpatient hospital stay for at least 24 hours. PT/OT eval 
 
DVT prophylaxis with Lovenox Signed: 
Casey Mckenzie MD

## 2020-10-13 NOTE — ADT AUTH CERT NOTES
Patient Demographics Patient Name Paul Hannon  
[id number]  Sex Female    
1950  Address [address] [place] [address]  Phone [phone] (Home) [phone] (Mobile) *Preferred*   
CSN:   
[id number] Admit Date:  Admit Time  Room  Bed Oct 1, 2020   8:19 AM  834 [[id number]]  01 [977] Attending Providers Provider  Pager  From  To   
Yessenia Sanchez MD   10/01/20  10/01/20 Federico Godinez MD   10/01/20  10/06/20 Fransisco Rapp MD   10/06/20  10/13/20 Jordyn Duran MD   10/13/20 Emergency Contact(s) Name  Relation  Home  Work  Mobile [phone] [facility]   [phone] [facility] [address]  [phone] Utilization Reviews  
 
   
Pneumonia - Care Day 11 (10/11/2020) by William Mejía RN  
 
   
Review Entered  Review Status 10/12/2020 15:03  Completed   
   
Criteria Review Care Day: 11 Care Date: 10/11/2020 Level of Care:   
Guideline Day 2 Level Of Care (X) Floor Clinical Status   
(X) * No CO2 retention or acidosis 10/12/2020 15:03:08 EDT by Katy Lawrence   
  NO ABG   
(X) * No requirement for mechanical ventilation ( ) * Hypotension absent 10/12/2020 15:03:08 EDT by Katy Lawrence   
  BP 86/62   
(X) * Afebrile or fever improved 10/12/2020 15:03:08 EDT by Katy Lawrence   
  TEMP 98.2   
( ) * No hypoxia on room air or oxygenation improved 10/12/2020 15:03:08 EDT by Katy Lawrence   
  REMAINS ON 2.5L NC   
(X) * Mental status improved or at baseline 10/12/2020 15:03:08 EDT by Digna Gannon A&O Activity   
(X) * Increased activity 10/12/2020 15:03:08 EDT by Digna Gannon UP AD BOYD Routes   
(X) Oral hydration, medications   
(X) Usual diet 10/12/2020 15:03:08 EDT by Katy Lawrence   
  REG DIET Interventions (X) Pulse oximetry (X) Possible oxygen Medications ( ) IV or oral antibiotics 10/12/2020 15:03:08 EDT by Maki Lew   
  NA   
* Milestone Additional Notes IM NOTE   
10/11/2020 C/o sob Asking if cardiology will see her today General:          Well nourished.  Alert.  On 2.5 lit/min. Mild resp distress HEENT- normal   
CV:                  RRR.  No murmur, rub, or gallop. Lungs:             Coarse breath sounds, improved air entry Abdomen:        Soft, nontender, nondistended. Cns- no focal neurological deficit Extremities:     Warm and dry.  No cyanosis or edema. Skin:                No rashes or jaundice.   
    
PLAN:     
    
    
    
Hypokalemia-resolved   
    
Episodes of low blood pressure-Florinef and Midrin at home, Midrin dose was increased to 10 mg 3 times daily during the stay.   
    
CT chest 9/21/2020-right-sided aortic arch with relatively   
extensive atherosclerotic change at the arch where there appears to be a small   
focal dissection.     
CTA neck done in June 2019 showed subclavian steal syndrome.   
    
- vascular surgery no intervention at this point.   
    
    
1.  Hypoxic respiratory failure-on 2.5 L of oxygen nasal cannula.  Finished a course of antibiotics. - Pulm following-10/3/2020 and 10/8/2020 thoracentesis   
    
2. EKG changes with sinus tachycardia and ST depressions and T wave inversions on V5 and V6 likely from demand ischemia. HFrEF.   
- Tropnin trended down - Telemetry - Continue aspirin   
- TTE EF 35-40% - D/c coreg due to hypotension - Cardiology evaluated pt again today   
    
3. Left lung mass status post needle biopsy on November 2019 demonstrating harmatoma / Chronic left pulmonary artery occlusion. Pulm recs.  Continue aspirin.   
    
4.  For her asthma continue on home inhalers and Singulair. Currently not on exacerbation.   
    
5.  For her hypotension continue on fludrocortisone and midodrine. close f/u   
    
6.  For her hyperlipidemia continue on atorvastatin.   
    
 7.  For her GERD continue on PPI.   
    
8.  For her hypothyroidism continue on levothyroxine.   
    
Difficult disposition secondary to dyspnea, pleural effusion,epsiodes of low bp, chf.   
  
  
  
CARD NOTE Patient with continued dyspnea with exertion despite thoracentesis with fluid removal. Worse with supine positioning. No other complaints at this time.   
 Principal Problem:   
  PNA (pneumonia) (10/1/2020)   
  - on ABX, pulm following , no plan for thora   
  - COVID negative    
  - s/p bilateral thoracentesis , minimal improvement in symptoms   
    
Active Problems:   
  Newly diagnosed cardiomyopathy   
  - unclear etiology at this time   
  - continue ASA, Atorvastatin for risk factor modification   
  - no evidence of ACS, no emergent indication for coronary angiography   
  - aortic findings of possible dissection complicate coronary angiography, extensive atherosclerosis in aorta , seen by vascular no plans for intervention   
  - carefullly monitor for hypotension, although her BP may be falsely low due to exensive vascular disease   
  - if patient were to become hypotensive could consider addition of dopamine , no evidence of shock at this time   
  - would attempt IV diuretic dose once now, given pleural effusions in an effort to improve volume status   - will check limited echo to see if we can better assess valve gradients   
    
  Mild persistent asthma without complication (10/58/2969)   
  - nebs     
  Acquired hypothyroidism (10/17/2013)   
  - on synthroid    
    
  GERD (gastroesophageal reflux disease) (10/17/2013)   
  - ppi    
    
  Hypercholesterolemia (6/30/2015)   
  - continue statin    
    
  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   
  - per pulmonary, possible CT/PET in future for staging prognosis, possible bx    
    
  Bilateral pleural effusion (10/7/2020)   
  - post thoracentesis with pulmonology   
    
  
  
PULM NOTE Patient resting in bed, increased slightly to 2.5 LPM, reports increased SOB with activity Thoracentesis done Friday with 600 ml yellow fluid removed from R and 100 ml of yellow fluid removed from L Still having mild hypotension   
    
 --Continue O2 at 2.5L O2, wean as tolerate --Still having hypotension. On midodrine   
--Continue Symbicort and albuterol nebulizers   
--Continue Singulair --continue morphine 1mg q4h prn   
--Zosyn completed, procalcitonin normal   
--C. Diff stool pending --Will need PET-CT and consideration for CT-guided biopsy of new nodule.     
--if unable to wean off of O2, will need 6 min walk test prior to d/c home   
--asked cards to see again>>awaiting recommendations>>?heart cath? EF is now 35-40% on echo, MVR noted on echo>>she continues to develop effusions and is not having improvement with symptoms like she has in the past   
--PFTs normal   
     
Lungs:  Crackles Heart:  RRR with no Murmur/Rubs/Gallops   
    
Additional Comments:  Continue diuresis, cardioogy to see   
    
  
  
VASC SURG CONSULT Plan: Chronic significant atherosclerotic back disease of her arch and brachiocephalic vessels which is been previously demonstrated. Phillip More is having no acute issues at this time.  CT suggested a potential for a small focal dissection in the midst of the severe atherosclerotic disease affecting her aortic arch.  I suspect this is chronic in nature.  If she will develop symptoms she will require evaluation by cardiothoracic surgery given the location of the abnormality in the arch aorta.  At this point I do not believe it requires any form of treatment.   
    
  
  
BP 86/62 TEMP 98.2  RR 18   
O2 94 ON 2.5L NC   
  
  
NO PERTINENT LABS   
  
NO IMAGING   
  
UP AD BOYD, REG DIET, CARD MONITORING, STRICT I/O, DAILY WGT   
  
PROAMATINE 10 MG TID PO, LASIX 40 MG IV X 1, LASIX 20 MG QD PO   
  
  
  
    
   
PA RECOMMENDATION by Nasra Fuentes RN  
    
Review Entered  Review Status 10/6/2020 10:37  In Primary   
   
Criteria Review I agree with INPATIENT admission status. Name: Odean Goldberg : 1950 MRN: [id number] [facility]# [id number] Insurance: [employer] [place] [employer] Date of admission: 10/1/2020 Clinical summary pneumonia Vitals Persistent hypotension BP 84/54; hypoxia Labs and Imaging bilateral infiltrates on CXR Inpatient status appropriate for this high-risk patient with pneumonia and 
pleural effusion with persistent hemodynamic instability, requiring IV 
antibiotics, thoracentesis, further evaluation, close monitoring, and ongoing 
hospital-based care. The case meets [facility] [place] criteria. The hospitalization status decision depends on the attending physician's 
judgment. Patient's chart was reviewed at 4:54 PM 10/5/2020 Bernadette López MD, PANCHO, [facility], [facility] Physician Advisor [facility] Cell: [phone]

## 2020-10-13 NOTE — PROGRESS NOTES
initial visit. Met with patient at bedside, offered support, assurance of spiritual care staff's prayers. Shivam GALLAGHER

## 2020-10-13 NOTE — PROGRESS NOTES
Problem: Mobility Impaired (Adult and Pediatric) Goal: *Acute Goals and Plan of Care (Insert Text) Description: LTG: 
(1.)Ms. Ilda Ross will move from supine to sit and sit to supine , scoot up and down, and roll side to side in bed with INDEPENDENT within 7 treatment day(s). (2.)Ms. Ilda Ross will transfer from bed to chair and chair to bed with INDEPENDENT using the least restrictive device within 7 treatment day(s). (3.)Ms. Ilda Ross will ambulate with INDEPENDENT for 500 feet with the least restrictive device within 7 treatment day(s). (4.)Ms. Ilda Ross will tolerate at least 10 min of dynamic standing activity to assist standing ADLs with the least restrictive device within 7 treatment days. (5.)Ms. Ilda Ross will climb at least 1 steps with MODIFIED INDEPENDENCE with the least restrictive device within 7 treatment days. ________________________________________________________________________________________________ PHYSICAL THERAPY: Initial Assessment, Daily Note, and AM 10/13/2020 INPATIENT: PT Visit Days : 1 Payor: Dory Marshall / Plan: [facility] [employer] MEDICARE CHOICE PPO/PFFS / Product Type: Managed Care Medicare /   
  
NAME/AGE/GENDER: Jozef Lord is a 69 y.o. female PRIMARY DIAGNOSIS: PNA (pneumonia) [J18.9] PNA (pneumonia) PNA (pneumonia) Procedure(s) (LRB): ULTRASOUND (Bilateral) THORACENTESIS (Bilateral) 5 Days Post-Op ICD-10: Treatment Diagnosis:  
 Generalized Muscle Weakness (M62.81) Other lack of cordination (R27.8) Difficulty in walking, Not elsewhere classified (R26.2) Unspecified Lack of Coordination (R27.9) Precaution/Allergies: 
Compazine [prochlorperazine edisylate] ASSESSMENT:  
 
Ms. Ilda Ross is a 69year old F who presents for pneumonia. Prior to hospital admission pt lives with her  in 1 story home with 1no step(s) to enter. Pt endorses no falls in past 6 months. Prior to admission Ms. Ilda Ross uses nothing for mobility. Upon entering, pt supine, agreeable to PT evaluation. she reports no pain  at rest. BLE assessment indicates sensation to light touch intact, AROM WNL, and strength WFL. Pt performed supine > sit with SBA, sitting EOB with good static sitting balance control. Sit > stand with SBA-CGA using nothing. Gait 3x 25 ft with CGA, cues for posture and step clearance. In between each lap, pnt required seated rest breaks and multi-modal cues for pursed lip breathing. At end of session pt up in bedside with all needs within reach, alarm activated for safety, RN notified. Pt presents as functioning below her baseline, with deficits in mobility including transfers, gait, balance, and activity tolerance. Pt will benefit from skilled therapy services to address stated deficits to promote return to highest level of function, independence, and safety. Will continue to follow. This section established at most recent assessment PROBLEM LIST (Impairments causing functional limitations): 
Decreased Strength Decreased ADL/Functional Activities Decreased Transfer Abilities Decreased Balance Increased Pain Decreased Activity Tolerance Decreased Pacing Skills Decreased Work Simplification/Energy Conservation Techniques Increased Fatigue Increased Shortness of Breath INTERVENTIONS PLANNED: (Benefits and precautions of physical therapy have been discussed with the patient.) Balance Exercise Bed Mobility Family Education Gait Training Manual Therapy Neuromuscular Re-education/Strengthening Range of Motion (ROM) Therapeutic Activites Therapeutic Exercise/Strengthening Transfer Training TREATMENT PLAN: Frequency/Duration: 3 times a week for duration of hospital stay Rehabilitation Potential For Stated Goals: Excellent REHAB RECOMMENDATIONS (at time of discharge pending progress):   
Placement:  
It is my opinion, based on this patient's performance to date, that Ms. Leland Iqbal may benefit from [address] after discharge due to the functional deficits listed above that are likely to improve with skilled rehabilitation because he/she has multiple medical issues that affect his/her functional mobility in the community. Equipment:  
None at this time HISTORY:  
History of Present Injury/Illness (Reason for Referral): 
See H&P Past Medical History/Comorbidities: Ms. Leland Iqbal  has a past medical history of Asthma, Bite of nonvenomous arthropod (???), Cough, Esophageal stricture (2002), GERD (gastroesophageal reflux disease), History of rectal bleeding (???), Hodgkin's lymphoma ([facility] [address] (1980), Hypercholesterolemia, Menopause, Positive RAST testing (2007), and Thyroid disease. She also has no past medical history of Chronic kidney disease or St[facility] Ms. Leland Iqbal  has a past surgical history that includes hx tonsillectomy (1954); hx splenectomy (1973); hx appendectomy (1973); hx cataract removal (Bilateral, 2007, 2009); hx colonoscopy; hx other surgical; and hx breast biopsy (Left, 05/21/2020). Social History/Living Environment:  
Home Environment: Private residence # Steps to Enter: 0 One/Two Story Residence: One story Living Alone: No 
Support Systems: Spouse/Significant Other/Partner Patient Expects to be Discharged to[de-identified] Private residence Current DME Used/Available at Home: None Prior Level of Function/Work/Activity: 
Independent at baseline Number of Personal Factors/Comorbidities that affect the Plan of Care: 1-2: MODERATE COMPLEXITY EXAMINATION:  
Most Recent Physical Functioning:  
Gross Assessment: 
  
         
  
Posture: 
  
Balance: 
Sitting: Intact Standing: Impaired Standing - Static: Good;Fair 
Standing - Dynamic : Fair Bed Mobility: 
Supine to Sit: Stand-by assistance Sit to Supine: Stand-by assistance Wheelchair Mobility: 
  
Transfers: 
Sit to Stand: Contact guard assistance Stand to Sit: Contact guard assistance Gait: 
  
 Base of Support: Widened Speed/Joyce: Shuffled Stance: Time Gait Abnormalities: Decreased step clearance;Trunk sway increased Distance (ft): 75 Feet (ft) Ambulation - Level of Assistance: Contact guard assistance Interventions: Manual cues; Safety awareness training; Tactile cues; Verbal cues; Visual/Demos Duration: 10 Minutes Body Structures Involved: 
Bones Joints Muscles Body Functions Affected: 
Neuromusculoskeletal 
Movement Related Skin Related Activities and Participation Affected: 
Learning and Applying Knowledge General Tasks and Demands Mobility Interpersonal Interactions and Relationships Community, Social and [place] Number of elements that affect the Plan of Care: 3: MODERATE COMPLEXITY CLINICAL PRESENTATION:  
Presentation: Stable and uncomplicated: LOW COMPLEXITY CLINICAL DECISION MAKIN[address] [place] [id number] AM-PAC 6 Clicks Basic Mobility Inpatient Short Form How much difficulty does the patient currently have. .. Unable A Lot A Little None 1. Turning over in bed (including adjusting bedclothes, sheets and blankets)? [] 1   [] 2   [] 3   [x] 4  
2. Sitting down on and standing up from a chair with arms ( e.g., wheelchair, bedside commode, etc.)   [] 1   [] 2   [x] 3   [] 4  
3. Moving from lying on back to sitting on the side of the bed? [] 1   [] 2   [x] 3   [] 4 How much help from another person does the patient currently need. .. Total A Lot A Little None 4. Moving to and from a bed to a chair (including a wheelchair)? [] 1   [] 2   [x] 3   [] 4  
5. Need to walk in hospital room? [] 1   [] 2   [x] 3   [] 4  
6. Climbing 3-5 steps with a railing? [] 1   [] 2   [x] 3   [] 4  
© , Trustees of [address] [place] [address] [address] [address], under license to [employer]. All rights reserved Score:  Initial: 19 Most Recent: X (Date: -- ) Interpretation of Tool:  Represents activities that are increasingly more difficult (i.e. Bed mobility, Transfers, Gait). Medical Necessity:    
Skilled intervention continues to be required due to decreased balance and activity tolerance. Reason for Services/Other Comments: 
  
Use of outcome tool(s) and clinical judgement create a POC that gives a: Clear prediction of patient's progress: LOW COMPLEXITY  
  
 
 
 
TREATMENT:  
(In addition to Assessment/Re-Assessment sessions the following treatments were rendered) Pre-treatment Symptoms/Complaints:  \"I get so winded when I try to do anything\" Pain: Initial:  
Pain Intensity 1: 0  Post Session:  0 Gait Training (10 Minutes):  Gait training to improve and/or restore physical functioning as related to mobility, strength, balance, and coordination. Ambulated 75 Feet (ft) with Contact guard assistance    and moderate Manual cues; Safety awareness training; Tactile cues; Verbal cues; Visual/Demos related to their stance phase, push off, and ankle position and motion to promote proper body alignment, promote proper body posture, and promote proper body mechanics. Instruction in performance of posture and step clearance to correct stance phase, stride length, ankle position and motion, and hip position and motion. Braces/Orthotics/Lines/Etc:  
O2 Device: Nasal cannula Treatment/Session Assessment:   
Response to Treatment:  mild to moderate SOB, moderate fatigue Interdisciplinary Collaboration:  
Physical Therapist 
Registered Nurse After treatment position/precautions:  
Up in chair Bed/Chair-wheels locked Bed in low position Call light within reach RN notified Compliance with Program/Exercises: Will assess as treatment progresses Recommendations/Intent for next treatment session: \"Next visit will focus on advancements to more challenging activities\". Total Treatment Duration: PT Patient Time In/Time Out Time In: 7188 Time Out: 1117 Marco [address]owers

## 2020-10-13 NOTE — PROGRESS NOTES
Nim[facility] Manley Admission Date: 10/1/2020 Daily Progress Note: 10/13/2020 The patient's chart is reviewed and the patient is discussed with the staff. Patient is P 18 y.o.  female seen and evaluated at the request of Dr. Kiersten Huerta. 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 Pt has recently had issues with hypotension which is being managed by her PCP. She was treated with LVQ x 7 days for CAP. She had a virtual visit with KE Wells on 9/30/2020 and pt mentioned that her O2 sats had been borderline low and that she had been tachycardic. An echocardiogram was obtained to evaluate for PAH that same day, but the TV jet was inadequate for estimation of RVSP. Pt felt worse and presented to the ER and her CXR was concerning for PNA. Pt was admitted and started on Zosyn. Pt is being ruled out for COVID. We were consulted to assist with workup and treatment. CT was performed which reveals multiple new nodules, and bilateral pleural effusions. Pt had a L thoracentesis with 1050mL removed and R thoracentesis with 800mL removed on 10/3/2020. Cardiology was consulted on 10/9, pt had L thoracentesis with 1000mL removed and R thoracentesis with 600mL removed. Cardiology was reconsulted secondary to persistent shortness of breath and recommendations for left and right heart catheterization once PET scan and need for lung biopsies have been completed. 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 Vascular surgery felt that no intervention was necessary at this time. Subjective: Pt walking around her room on 2L O2.  Pt complains of shortness of breath last night which caused her to be awake all night long. She now feels very tired. She is coughing but her cough is dry. She attributes this cough to postnasal drainage. Pt reports frequent urination with lasix. Current Facility-Administered Medications Medication Dose Route Frequency  furosemide (LASIX) injection 40 mg  40 mg IntraVENous Q12H  
 metoprolol succinate (TOPROL-XL) XL tablet 25 mg  25 mg Oral DAILY  albuterol (PROVENTIL VENTOLIN) nebulizer solution 2.5 mg  2.5 mg Nebulization Q6H PRN  
 morphine injection 1 mg  1 mg IntraVENous Q4H PRN  
 fluticasone propionate (FLONASE) 50 mcg/actuation nasal spray 2 Spray  2 Spray Both Nostrils DAILY  sodium chloride (NS) flush 5-40 mL  5-40 mL IntraVENous Q8H  
 sodium chloride (NS) flush 5-40 mL  5-40 mL IntraVENous PRN  
 acetaminophen (TYLENOL) tablet 650 mg  650 mg Oral Q6H PRN Or  
 acetaminophen (TYLENOL) suppository 650 mg  650 mg Rectal Q6H PRN  polyethylene glycol (MIRALAX) packet 17 g  17 g Oral DAILY PRN  
 enoxaparin (LOVENOX) injection 40 mg  40 mg SubCUTAneous DAILY  ondansetron (ZOFRAN ODT) tablet 4 mg  4 mg Oral Q8H PRN Or  
 ondansetron (ZOFRAN) injection 4 mg  4 mg IntraVENous Q6H PRN  
 aspirin delayed-release tablet 81 mg  81 mg Oral DAILY  levothyroxine (SYNTHROID) tablet 75 mcg  75 mcg Oral ACB  montelukast (SINGULAIR) tablet 10 mg  10 mg Oral DAILY  pantoprazole (PROTONIX) tablet 40 mg  40 mg Oral ACB  atorvastatin (LIPITOR) tablet 10 mg  10 mg Oral DAILY  budesonide-formoteroL (SYMBICORT) 160-4.5 mcg/actuation HFA inhaler 2 Puff  2 Puff Inhalation BID RT Review of Systems 
+cough 
+shortness of breath Constitutional: negative for fever, chills, sweats Cardiovascular: negative for chest pain, palpitations, syncope, edema Gastrointestinal:  negative for dysphagia, reflux, vomiting, diarrhea, abdominal pain, or melena Neurologic:  negative for focal weakness, numbness, headache Objective:  
 
Vitals:  
 10/13/20 0326 10/13/20 0746 10/13/20 0858 10/13/20 1144 BP: (!) 155/87 119/78  (!) 143/84 Pulse: 100 99  96 Resp: 18 20 20 Temp: 98.8 °F (37.1 °C) 98.4 °F (36.9 °C)  98.3 °F (36.8 °C) SpO2: 96% 96% 97% 97% Weight:      
Height:      
 
 
 
Intake/Output Summary (Last 24 hours) at 10/13/2020 1359 Last data filed at 10/13/2020 1321 Gross per 24 hour Intake 600 ml Output 2100 ml Net -1500 ml Physical Exam:  
Constitution:  the patient is well developed and in no acute distress, on 2L O2 
EENMT:  Sclera clear, pupils equal, oral mucosa moist 
Respiratory: diminished at bases bilaterally Cardiovascular:  RRR without M,G,R 
Gastrointestinal: soft and non-tender; with positive bowel sounds. Musculoskeletal: warm without cyanosis. There is no lower extremity edema. Skin:  no jaundice or rashes Neurologic: no gross neuro deficits Psychiatric:  alert and oriented x 3 CXR:  
 
 
 
 
 
 
LAB No results for input(s): GLUCPOC in the last 72 hours. No lab exists for component: [place] Recent Labs 10/13/20 
6592 10/11/20 
0455 WBC 9.5 9.6 HGB 11.2* 11.0*  
HCT 33.9* 33.6*  373 Recent Labs 10/13/20 
9940 10/12/20 
4724 10/11/20 
0455  138 140  
K 3.8 3.5 3.8  99 105 CO2 34* 34* 29  
GLU 90 89 77 BUN 12 13 11 CREA 0.57* 0.63 0.49* CA 9.4 9.3 9.2 No results for input(s): PH, PCO2, PO2, HCO3, PHI, PCO2I, PO2I, HCO3I in the last 72 hours. No results for input(s): LCAD, LAC in the last 72 hours. Assessment:  (Medical Decision Making) Hospital Problems  Date Reviewed: 10/13/2020 Codes Class Noted POA Multiple lung nodules on CT (Chronic) ICD-10-CM: R91.8 ICD-9-CM: 793.19  10/12/2020 Unknown Overview Addendum 10/12/2020  2:33 PM by Dru Rodríguez, LLOY[place]  
  -PET scan 10/2019: PET scan fortunately did not have any abnormal activity in her mass in the left lung.  This may mean that we can just follow this radiographically, but we will talk about her at tumor board and come back to her with the group's recommendation. 
-10/30/2019: Patient is discussed at thoracic conference today lead by Dr Nadia Awad. Patient is a 79 yo never smoker. CT guided biopsy recommended. -CT guided Biopsy 11/2020: lung biopsy showed signs of this nodule being a hamartoma, which is a benign tumor that we can simply follow. repeat CT in 6 months 
-Chest CT June 2020: CT scan is stable 
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 Bethena Lighter are several new pulmonary nodules noted which may suggest some metastatic disease.  This require further follow-up CT of the chest in 2 months or doing PET scan. Awaiting outpt PET CT scan Hamartoma (HCC) (Chronic) ICD-10-CM: Q85.9 ICD-9-CM: 759.6  10/12/2020 Yes Chronic Peripheral vascular disease ([facility] [address] ICD-10-CM: I73.9 ICD-9-CM: 443.9  10/12/2020 Unknown Chronic Bilateral pleural effusion ICD-10-CM: J90 ICD-9-CM: 511.9  10/7/2020 Yes Overview Signed 10/13/2020  1:25 PM by DENI Ley  
  10/3/2020: L thoracentesis 1050mL removed R thoracentesis 800mL removed 10/9/2020: L thoracentesis: 1000mL removed R thoracentesis: 600mL removed Repeat CXR Acute systolic heart failure (HCC) ICD-10-CM: I50.21 ICD-9-CM: 428.21  10/6/2020 Yes On lasix 40mg IV BID * (Principal) PNA (pneumonia) ICD-10-CM: J18.9 ICD-9-CM: 027  10/1/2020 Unknown Completed zosyn Mass of lingula of lung ICD-10-CM: R91.8 ICD-9-CM: 786.6  10/16/2019 Yes Hypercholesterolemia (Chronic) ICD-10-CM: E78.00 ICD-9-CM: 272.0  6/30/2015 Yes Mild persistent asthma without complication (Chronic) VRA-95-ZO: J45.30 ICD-9-CM: 493.90  10/17/2013 Yes Continue nebs, will change to scheduled Acquired hypothyroidism (Chronic) ICD-10-CM: E03.9 ICD-9-CM: 244.9  10/17/2013 Yes GERD (gastroesophageal reflux disease) (Chronic) ICD-10-CM: K21.9 ICD-9-CM: 530.81  10/17/2013 Yes Plan:  (Medical Decision Making)  
 
--wean O2 as tolerated 
--continue IV lasix 40mg IV BID 
--change nebs to scheduled q6* 
--check CXR now 
--continue symbicort 
--needs right and left heart cath once lung nodule work up is complete 
--awaiting outpt PET CT 
--pt's PVD felt to be severe and causing falsely decreased BPs, cardiology recommends obtaining BP in lower ext More than 50% of the time documented was spent in face-to-face contact with the patient and in the care of the patient on the floor/unit where the patient is located. Adam Acosta, PA Lungs:  Mildly decreased BS at bases. Heart:  RRR with + SM posteriorly and anteriorly Additional Comments:  Await CXR. Significant dyspnea overnight. Says she only really felt better after the first thoracentesis. Will check D-dimer. I have spoken with and examined the patient. I agree with the above assessment and plan as documented.  
 
Josefina Webber MD

## 2020-10-13 NOTE — PROGRESS NOTES
Problem: Patient Education: Go to Patient Education Activity Goal: Patient/Family Education Outcome: Resolved/Met Note: 1. Patient will complete toileting with modified independence. 2. Patient will verbalize 2 energy conservation techniques with no cues from therapist to increase safety and independence with ADLs. 3. Patient will tolerate 10 minutes of OT treatment with self-incorporated rest breaks to increase activity tolerance for ADLs. 4. Patient will manage O2 NC with modified independence during OT session. 5. Patient will complete functional mobility for ADLs with supervision and adaptive equipment as needed. Timeframe: 1 visit GOALS MET 10/13/2020 OCCUPATIONAL THERAPY: Initial Assessment, Daily Note, Discharge, and PM 10/13/2020 INPATIENT: OT Visit Days: 1 Payor: Jp Zacarias / Plan: [employer] MEDICARE CHOICE PPO/PFFS / Product Type: Managed Care Medicare /  
  
NAME/AGE/GENDER: Joanna Young is a 69 y.o. female PRIMARY DIAGNOSIS:  PNA (pneumonia) [J18.9] PNA (pneumonia) PNA (pneumonia) Procedure(s) (LRB): ULTRASOUND (Bilateral) THORACENTESIS (Bilateral) 5 Days Post-Op ICD-10: Treatment Diagnosis:  
 Dizziness and Giddiness (R42) Precautions/Allergies: 
   Compazine [prochlorperazine edisylate] ASSESSMENT:  
 
Ms. Loren Lambert is a  69 y.o. female admitted with pna. Hx lung mass, lung nodules, Hodgkin's lymphoma. At baseline pt lives with spouse and reports independence with ADLs, IADLs, ambulation, driving. No hx falls. No supplemental O2 use. Upon arrival pt is alert and agreeable to OT evaluation, on 2.5L O2 NC. Pt educated on energy conservation, pacing, breathing technique, adaptive equipment use, and adaptive techniques to preserve energy/reduce SOB during I/ADLs. Pt verbalized understanding. Practiced lower body dressing with adaptive technique/modified independence.  Functional transfers with supervision progressing to modified independent. Ambulation for ADLs with supervision, progressing to independence. Pt toileted with Mod I, practiced grooming in standing with modified independence/min cues for pacing. Demonstrates good O2 NC management. Fair to good standing balance without AD, educated on potential need for rollator walker for rest breaks pending respiratory status. Pt met all goals this date. No indication for further acute OT at this time, recommend Home Health OT for home assessment to promote energy conservation. Will d/c from acute OT caseload. This section established at most recent assessment PROBLEM LIST (Impairments causing functional limitations): 
Decreased Strength Decreased ADL/Functional Activities Decreased Ambulation Ability/Technique Decreased Balance Decreased Activity Tolerance Decreased Pacing Skills Decreased Work Simplification/Energy Conservation Techniques Increased Fatigue Increased Shortness of Breath INTERVENTIONS PLANNED: (Benefits and precautions of occupational therapy have been discussed with the patient.) Activities of daily living training Adaptive equipment training Balance training Clothing management Donning&doffing training Hygiene training Re-evaluation Therapeutic activity Therapeutic exercise TREATMENT PLAN: Frequency/Duration: Follow patient x1 visit to address above goals. Rehabilitation Potential For Stated Goals: Good REHAB RECOMMENDATIONS (at time of discharge pending progress):   
Placement: It is my opinion, based on this patient's performance to date, that Ms. Shon Nolan may benefit from [address] after discharge due to the functional deficits listed above that are likely to improve with skilled rehabilitation for home assessment to promote energy conservation/safety for I/ADLs. May benefit from [address] Equipment:  
May benefit from rollator walker pending respiratory status OCCUPATIONAL PROFILE AND HISTORY:  
History of Present Injury/Illness (Reason for Referral): \"Patient is a 69 y.o.  female seen and evaluated at the request of Dr. Michael Izaguirre. 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 Pt has recently had issues with hypotension which is being managed by her PCP. She was treated with LVQ x 7 days for CAP. She had a virtual visit with KE Haddad on 9/30/2020 and pt mentioned that her O2 sats had been borderline low and that she had been tachycardic. An echocardiogram was obtained to evaluate for PAH that same day, but the TV jet was inadequate for estimation of RVSP. Pt felt worse and presented to the ER and her CXR was concerning for PNA. Pt was admitted and started on Zosyn. Pt is being ruled out for COVID. We were consulted to assist with workup and treatment. CT was performed which reveals multiple new nodules, bilateral effusions. \" 
Past Medical History/Comorbidities: Ms. Marcial Stapleton  has a past medical history of Asthma, Bite of nonvenomous arthropod (???), Cough, Esophageal stricture (2002), GERD (gastroesophageal reflux disease), History of rectal bleeding (???), Hodgkin's lymphoma ([facility] [address] (1980), Hypercholesterolemia, Menopause, Positive RAST testing (2007), and Thyroid disease. She also has no past medical history of Chronic kidney disease or Stroke ([facility] [address] Ms. Marcial Stapleton  has a past surgical history that includes hx tonsillectomy (1954); hx splenectomy (1973); hx appendectomy (1973); hx cataract removal (Bilateral, 2007, 2009); hx colonoscopy; hx other surgical; and hx breast biopsy (Left, 05/21/2020). Social History/Living Environment:  
Home Environment: Private residence # Steps to Enter: 1 One/Two Story Residence: One story Living Alone: No 
 Support Systems: Spouse/Significant Other/Partner Patient Expects to be Discharged to[de-identified] Private residence Current DME Used/Available at Home: Commode, bedside Tub or Shower Type: Tub/Shower combination Prior Level of Function/Work/Activity: Hx lung mass, lung nodules, Hodgkin's lymphoma. At baseline pt lives with spouse and reports independence with ADLs, IADLs, ambulation, driving. No hx falls. No supplemental O2 use. Personal Factors:   
      Sex:  female Age:  69 y.o. Other factors that influence how disability is experienced by the patient:  Multiple co-morbidities Number of Personal Factors/Comorbidities that affect the Plan of Care: Expanded review of therapy/medical records (1-2):  MODERATE COMPLEXITY ASSESSMENT OF OCCUPATIONAL PERFORMANCE[de-identified]  
Activities of Daily Living:  
Basic ADLs (From Assessment) Complex ADLs (From Assessment) Feeding: Modified independent Oral Facial Hygiene/Grooming: Modified Independent Bathing: Supervision Upper Body Dressing: Modified independent Lower Body Dressing: Modified independent Toileting: Modified independent Grooming/Bathing/Dressing Activities of Daily Living Grooming Washing Hands: Supervision;Modified independent Cues: Verbal cues provided Cognitive Retraining Safety/Judgement: Awareness of environment; Fall prevention Toileting Bladder Hygiene: Independent Bowel Hygiene: Independent Clothing Management: Independent Functional Transfers Bathroom Mobility: Independent Toilet Transfer : Modified independent Bed/Mat Mobility Supine to Sit: Stand-by assistance Sit to Supine: Stand-by assistance Sit to Stand: Supervision; Independent Stand to Sit: Supervision; Independent Bed to Chair: Supervision Scooting: Independent Most Recent Physical Functioning:  
Gross Assessment: 
AROM: Within functional limits(BUEs) Strength: Generally decreased, functional(BUEs) Coordination: Within functional limits(BUEs) Posture: 
  
Balance: 
Sitting: Intact Standing: Impaired Standing - Static: Good Standing - Dynamic : Fair;Good; Unsupported Bed Mobility: 
Supine to Sit: Stand-by assistance Sit to Supine: Stand-by assistance Scooting: Independent Wheelchair Mobility: 
  
Transfers: 
Sit to Stand: Supervision; Independent Stand to Sit: Supervision; Independent Bed to Chair: Supervision Patient Vitals for the past 6 hrs: 
 BP BP Patient Position SpO2 O2 Flow Rate (L/min) Pulse 10/13/20 1144 (!) 143/84 Sitting 97 %  96  
10/13/20 1514 (!) 140/86 At rest 100 %  (!) 105  
10/13/20 1535    2.5 l/min Mental Status Neurologic State: Alert Orientation Level: Oriented X4 Cognition: Appropriate decision making, Appropriate for age attention/concentration, Follows commands Perception: Appears intact Perseveration: No perseveration noted Safety/Judgement: Awareness of environment, Fall prevention Physical Skills Involved: 
Balance Strength Activity Tolerance Cognitive Skills Affected (resulting in the inability to perform in a timely and safe manner): 
None Psychosocial Skills Affected: 
Habits/Routines Environmental Adaptation Social Interaction Emotional Regulation Self-Awareness Awareness of Others Social Roles Number of elements that affect the Plan of Care: 5+:  HIGH COMPLEXITY CLINICAL DECISION MAKIN [facility] [address] [id number] AM-PAC 6 Clicks Daily Activity Inpatient Short Form How much help from another person does the patient currently need. .. Total A Lot A Little None 1. Putting on and taking off regular lower body clothing? [] 1   [] 2   [] 3   [x] 4  
2. Bathing (including washing, rinsing, drying)? [] 1   [] 2   [] 3   [x] 4  
3. Toileting, which includes using toilet, bedpan or urinal?   [] 1   [] 2   [] 3   [x] 4  
4. Putting on and taking off regular upper body clothing?    [] 1   [] 2 [] 3   [x] 4  
5. Taking care of personal grooming such as brushing teeth? [] 1   [] 2   [] 3   [x] 4  
6. Eating meals? [] 1   [] 2   [] 3   [x] 4  
© 2007, Trustees of [address] [place] [address] [address] [id number], under license to [employer]. All rights reserved Score:  Initial: 24 10/13/2020 Most Recent: X (Date: -- ) Interpretation of Tool:  Represents activities that are increasingly more difficult (i.e. Bed mobility, Transfers, Gait). Medical Necessity:    
Patient demonstrates  
good 
 rehab potential due to higher previous functional level. Reason for Services/Other Comments: 
Patient required skilled intervention due to Impaired respiratory status impacting I/ADLs Othelia Jeffrey Use of outcome tool(s) and clinical judgement create a POC that gives a: LOW COMPLEXITY  
 
 
 
TREATMENT:  
(In addition to Assessment/Re-Assessment sessions the following treatments were rendered) Pre-treatment Symptoms/Complaints:   
Pain: Initial:  
Pain Intensity 1: 0  Post Session:  same Self Care: (10 minutes): Procedure(s) (per grid) utilized to improve and/or restore self-care/home management as related to dressing, bathing, toileting, grooming, and energy conservation/pacing . Required minimal visual and verbal cueing to facilitate activities of daily living skills and compensatory activities. Braces/Orthotics/Lines/Etc:  
O2 Device: Nasal cannula Treatment/Session Assessment:   
Response to Treatment:  Tolerated well Interdisciplinary Collaboration: Occupational Therapist 
Registered Nurse After treatment position/precautions:  
Up in chair Bed/Chair-wheels locked Bed in low position Family at bedside Compliance with Program/Exercises:  NA . Recommendations/Intent for next treatment session: \"Next visit will focus on  NA \". Total Treatment Duration: OT Patient Time In/Time Out Time In: 7268 Time Out: 6836 Vivi Ceja OTR/L

## 2020-10-13 NOTE — PROGRESS NOTES
Problem: Breathing Pattern - Ineffective Goal: *Absence of hypoxia Outcome: Progressing Towards Goal 
Goal: *Use of effective breathing techniques Outcome: Progressing Towards Goal 
Goal: *PALLIATIVE CARE:  Alleviation of Dyspnea Outcome: Progressing Towards Goal 
  
Problem: Falls - Risk of 
Goal: *Absence of Falls Description: Document Awa Nguyen Fall Risk and appropriate interventions in the flowsheet. Outcome: Progressing Towards Goal 
Note: Fall Risk Interventions: 
  
 
  
 
Medication Interventions: Teach patient to arise slowly Elimination Interventions: Call light in reach

## 2020-10-13 NOTE — PROGRESS NOTES
Shift assessment completed. Refer to flow sheet for details. A/O x 4. Pt denies c/o pain and other needs at this time. No acute distress noted. Call light within and pt is instructed to call if needed.

## 2020-10-14 NOTE — PROGRESS NOTES
Problem: Risk for Spread of Infection Goal: Prevent transmission of infectious organism to others Description: Prevent the transmission of infectious organisms to other patients, staff members, and visitors. Outcome: Progressing Towards Goal 
  
Problem: Breathing Pattern - Ineffective Goal: *Absence of hypoxia Outcome: Progressing Towards Goal 
  
Problem: Falls - Risk of 
Goal: *Absence of Falls Description: Document Lemon Baljinder Fall Risk and appropriate interventions in the flowsheet. Outcome: Progressing Towards Goal 
Note: Fall Risk Interventions: 
  
 
  
 
Medication Interventions: Teach patient to arise slowly Elimination Interventions: Call light in reach

## 2020-10-14 NOTE — PROGRESS NOTES
Problem: Mobility Impaired (Adult and Pediatric) Goal: *Acute Goals and Plan of Care (Insert Text) Description: LTG: 
(1.)Ms. Inder Marroquin will move from supine to sit and sit to supine , scoot up and down, and roll side to side in bed with INDEPENDENT within 7 treatment day(s). (2.)Ms. Inder Marroquin will transfer from bed to chair and chair to bed with INDEPENDENT using the least restrictive device within 7 treatment day(s). (3.)Ms. Inder Marroquin will ambulate with INDEPENDENT for 500 feet with the least restrictive device within 7 treatment day(s). (4.)Ms. Inder Marroquin will tolerate at least 10 min of dynamic standing activity to assist standing ADLs with the least restrictive device within 7 treatment days. (5.)Ms. Inder Marroquin will climb at least 1 steps with MODIFIED INDEPENDENCE with the least restrictive device within 7 treatment days. ________________________________________________________________________________________________ PHYSICAL THERAPY: Daily Note and PM 10/14/2020 INPATIENT: PT Visit Days : 2 Payor: Trell [employer] / Plan: [facility] HUMANA MEDICARE CHOICE PPO/PFFS / Product Type: Managed Care Medicare /   
  
NAME/AGE/GENDER: Yumiko Mcleod is a 69 y.o. female PRIMARY DIAGNOSIS: PNA (pneumonia) [J18.9] PNA (pneumonia) PNA (pneumonia) Procedure(s) (LRB): ULTRASOUND (Bilateral) THORACENTESIS (Bilateral) 6 Days Post-Op ICD-10: Treatment Diagnosis:  
 · Generalized Muscle Weakness (M62.81) · Other lack of cordination (R27.8) · Difficulty in walking, Not elsewhere classified (R26.2) · Unspecified Lack of Coordination (R27.9) Precaution/Allergies: 
Compazine [prochlorperazine edisylate] ASSESSMENT:  
 
Ms. Inder Marroquin is a 69year old F who presents for pneumonia. Prior to hospital admission pt lives with her  in 1 story home with 1no step(s) to enter. Pt endorses no falls in past 6 months. Prior to admission Ms. Inder Marroquin uses nothing for mobility. Upon entering, Pnt is up in bedside chair and agreeable to PT treatment. she reports no pain at rest. Pnt performed  Sit > stand with supervision using nothing. Gait 3 x 30 ft with SBA, cues for posture and step clearance. Gait is noted to be faster and steadier. Stand > sit with supervision, followed by  Seated therex and positioning for comfort. At end of session pt up in bedside chair with all needs within reach, alarm activated for safety, RN notified. Overall, great progress today as pnt improved in ambulation distance and activity tolerance; pnt is noted to have much less SOB today. Pnt continues to present as functioning below her baseline, with deficits in mobility including transfers, gait, balance, and activity tolerance. Pt will continue to benefit from skilled therapy services to address stated deficits to promote return to highest level of function, independence, and safety. Will continue to follow. This section established at most recent assessment PROBLEM LIST (Impairments causing functional limitations): 1. Decreased Strength 2. Decreased ADL/Functional Activities 3. Decreased Transfer Abilities 4. Decreased Balance 5. Increased Pain 6. Decreased Activity Tolerance 7. Decreased Pacing Skills 8. Decreased Work Simplification/Energy Conservation Techniques 9. Increased Fatigue 10. Increased Shortness of Breath INTERVENTIONS PLANNED: (Benefits and precautions of physical therapy have been discussed with the patient.) 1. Balance Exercise 2. Bed Mobility 3. Family Education 4. Gait Training 5. Manual Therapy 6. Neuromuscular Re-education/Strengthening 7. Range of Motion (ROM) 8. Therapeutic Activites 9. Therapeutic Exercise/Strengthening 10. Transfer Training TREATMENT PLAN: Frequency/Duration: 3 times a week for duration of hospital stay Rehabilitation Potential For Stated Goals: Excellent REHAB RECOMMENDATIONS (at time of discharge pending progress):   
 Placement: It is my opinion, based on this patient's performance to date, that Ms. Ilda Ross may benefit from [address] after discharge due to the functional deficits listed above that are likely to improve with skilled rehabilitation because he/she has multiple medical issues that affect his/her functional mobility in the community. Equipment:  
? None at this time HISTORY:  
History of Present Injury/Illness (Reason for Referral): 
See H&P Past Medical History/Comorbidities: Ms. Ilda Ross  has a past medical history of Asthma, Bite of nonvenomous arthropod (???), Cough, Esophageal stricture (2002), GERD (gastroesophageal reflux disease), History of rectal bleeding (???), Hodgkin's lymphoma ([facility] [address] (1980), Hypercholesterolemia, Menopause, Positive RAST testing (2007), and Thyroid disease. She also has no past medical history of Chronic kidney disease or Stroke ([facility] [address] Ms. Ilda Ross  has a past surgical history that includes hx tonsillectomy (1954); hx splenectomy (1973); hx appendectomy (1973); hx cataract removal (Bilateral, 2007, 2009); hx colonoscopy; hx other surgical; and hx breast biopsy (Left, 05/21/2020). Social History/Living Environment:  
Home Environment: Private residence # Steps to Enter: 1 One/Two Story Residence: One story Living Alone: No 
Support Systems: Spouse/Significant Other/Partner Patient Expects to be Discharged to[de-identified] Private residence Current DME Used/Available at Home: Commode, bedside Tub or Shower Type: Tub/Shower combination Prior Level of Function/Work/Activity: 
Independent at baseline Number of Personal Factors/Comorbidities that affect the Plan of Care: 1-2: MODERATE COMPLEXITY EXAMINATION:  
Most Recent Physical Functioning:  
Gross Assessment: 
  
         
  
Posture: 
  
Balance: 
Sitting: Intact Standing: Impaired Standing - Static: Good Standing - Dynamic : Fair Bed Mobility: 
  
Wheelchair Mobility: 
  
Transfers: 
Sit to Stand: Supervision Stand to Sit: Supervision;Modified independent Gait: 
  
Base of Support: Widened Speed/Joyce: Shuffled Stance: Time Gait Abnormalities: Decreased step clearance Distance (ft): 90 Feet (ft) Ambulation - Level of Assistance: Stand-by assistance Interventions: Manual cues; Safety awareness training; Tactile cues; Verbal cues; Visual/Demos Duration: 13 Minutes Body Structures Involved: 1. Bones 2. Joints 3. Muscles Body Functions Affected: 1. Neuromusculoskeletal 
2. Movement Related 3. Skin Related Activities and Participation Affected: 1. Learning and Applying Knowledge 2. General Tasks and Demands 3. Mobility 4. Interpersonal Interactions and Relationships 5. Community, Social and [facility] threadsy  Number of elements that affect the Plan of Care: 3: MODERATE COMPLEXITY CLINICAL PRESENTATION:  
Presentation: Stable and uncomplicated: LOW COMPLEXITY CLINICAL DECISION MAKIN[address] [place] [id number] AM-PAC 6 Clicks Basic Mobility Inpatient Short Form How much difficulty does the patient currently have. .. Unable A Lot A Little None 1. Turning over in bed (including adjusting bedclothes, sheets and blankets)? [] 1   [] 2   [] 3   [x] 4  
2. Sitting down on and standing up from a chair with arms ( e.g., wheelchair, bedside commode, etc.)   [] 1   [] 2   [x] 3   [] 4  
3. Moving from lying on back to sitting on the side of the bed? [] 1   [] 2   [x] 3   [] 4 How much help from another person does the patient currently need. .. Total A Lot A Little None 4. Moving to and from a bed to a chair (including a wheelchair)? [] 1   [] 2   [x] 3   [] 4  
5. Need to walk in hospital room? [] 1   [] 2   [x] 3   [] 4  
6. Climbing 3-5 steps with a railing? [] 1   [] 2   [x] 3   [] 4  
© , Trustees of [address] [place] [address], under license to [employer]. All rights reserved Score:  Initial: 19 Most Recent: X (Date: -- ) Interpretation of Tool:  Represents activities that are increasingly more difficult (i.e. Bed mobility, Transfers, Gait). Medical Necessity:    
· Skilled intervention continues to be required due to decreased balance and activity tolerance. Reason for Services/Other Comments: · Use of outcome tool(s) and clinical judgement create a POC that gives a: Clear prediction of patient's progress: LOW COMPLEXITY  
  
 
 
 
TREATMENT:  
(In addition to Assessment/Re-Assessment sessions the following treatments were rendered) Pre-treatment Symptoms/Complaints:  \"it's a lot easier to breathe today\" Pain: Initial:  
Pain Intensity 1: 0  Post Session:  0 Gait Training (13 Minutes):  Gait training to improve and/or restore physical functioning as related to mobility, strength, balance, and coordination. Ambulated 90 Feet (ft) with Stand-by assistance    and moderate Manual cues; Safety awareness training; Tactile cues; Verbal cues; Visual/Demos related to their stance phase, push off, and ankle position and motion to promote proper body alignment, promote proper body posture, and promote proper body mechanics. Instruction in performance of posture and step clearance to correct stance phase, stride length, ankle position and motion, and hip position and motion. Therapeutic Exercise: (13 Minutes):  Exercises per grid below to improve mobility, strength and coordination. Required minimal visual, verbal and tactile cues to promote proper body alignment and promote proper body posture. Progressed repetitions as indicated. Date: 
10/14 Date: 
 Date: Activity/Exercise Parameters Parameters Parameters LAQs 3x10 Ankle Pumps 3x10 Seated Marches 3x10 Braces/Orthotics/Lines/Etc:  
· O2 Device: Nasal cannula Treatment/Session Assessment:   
· Response to Treatment:  mild to moderate SOB, moderate fatigue · Interdisciplinary Collaboration:  
o Physical Therapist 
 o Registered Nurse · After treatment position/precautions:  
o Up in chair 
o Bed/Chair-wheels locked 
o Bed in low position 
o Call light within reach 
o RN notified · Compliance with Program/Exercises: Will assess as treatment progresses · Recommendations/Intent for next treatment session: \"Next visit will focus on advancements to more challenging activities\". Total Treatment Duration: PT Patient Time In/Time Out Time In: 3064 Time Out: 1124 Lan [place]

## 2020-10-14 NOTE — PROGRESS NOTES
Hospitalist Progress Note Admit Date:  10/1/2020  8:19 AM  
Name:  Anneliese Torres Age:  69 y.o. 
:  1950 MRN:  [id number] PCP:  Muriel Soto MD 
Treatment Team: Attending Provider: Frances Cramer MD; Primary Nurse: Maureen Mcnair; Consulting Provider: Karthik Higgins MD; Consulting Provider: Cristian Almaraz MD; Consulting Provider: Yolis Hebert MD; Utilization Review: Nargis Hoover RN; Care Manager: Vincenzo Bowman; Consulting Provider: Cintia Ndiaye DO; Utilization Review: Jinny Pearl RN Subjective:  
69-year-old female with a past medical history of asthma, GERD, treatment lymphoma, hyperlipidemia, hypothyroidism, hypotension, chronic left pulmonary artery occlusion, left lung mass status post needle biopsy on 2019 demonstrating harmatoma, that presents in the setting of worsening shortness of breath.  The patient states that for the past few weeks she has been developing worsening shortness of breath especially on exertion, and associated with some dry cough. Patient seen and examined at bedside. This morning still presenting with SOB. Denies chest pain, no abdominal pain, nausea or vomiting Course during the stay Admitted for exertional dyspnea. History of left lung mass previous biopsy in 2019 demonstrated hamartoma, chronic left pulmonary artery occlusion. History of low blood pressures on the Florinef and midodrine at home. Patient was continued on oxygen, pulmonary consulted. Patient had Thoracentesis on 10/3/9654-9693 mL of clear yellow appearing fluid removed from the left pleural effusion. Thoracentesis on 10/9/2020-600 cc of clear yellow fluid aspirated from the left pleural effusion. Patient still complaining of shortness of breath on walking a short distance. Presently has bedside commode. Patient Midodrin was increased from 5 mg 3 times daily to 10 mg 3 times daily during the stay. Cardiology was initially consulted for mild elevated troponin. Start the patient on a small dose of Coreg. Secondary to low blood pressure not a candidate for ACE or ARB. Continue Lasix. Ischemic work-up as an outpatient. Cardiology signed off. Coreg was again discontinued secondary to low blood pressure. later on during the stay as the patient was still having shortness of breath recurrent pleural effusion, pulmonary felt probably more cardiac related. Echo with a EF of 35 to 40%. Cardiology was reconsulted, advised to continue Lasix as long as blood pressure tolerates. Vascular surgery was consulted as patient was concerned that her previous visual blockage could be the reason why she is having shortness of breath. Reviewed her previous CT results- 
CT chest 9/21/2020-right-sided aortic arch with relatively 
extensive atherosclerotic change at the arch where there appears to be a small 
focal dissection. CTA neck done in June 2019 showed subclavian steal syndrome. 
-Vascular surgery felt no intervention required at this point. Presently patient still has some shortness of breath more on exertion, on 2.5 L of oxygen nasal cannula, followed by cardiology and pulmonary, vascular signed off her felt no intervention required at this point. Patient still has on and off episodes of low blood pressures secondary to which at times patient Lasix had to be held. Difficult discharge. 10/13 patient reports shortness of breath. She cannot sleep well last night because of constant interruptions from breathing treatments. No fever no chills. Given her peripheral vascular disease, blood pressures to be checked on a lower extremities per cardiology. No dizziness no lightheadedness. Currently on 2 L oxygen by nasal cannula. 10/14 patient reports feeling better this morning. No fever no chills. Currently on 2.5 L oxygen by nasal cannula. No chest pain no palpitations. Objective: Patient Vitals for the past 24 hrs: 
 Temp Pulse Resp BP SpO2  
10/14/20 1515 97.6 °F (36.4 °C) 93 18 (!) 135/96 100 % 10/14/20 1333     96 % 10/14/20 1135 98.2 °F (36.8 °C) 95 18 122/67 99 % 10/14/20 0740     98 % 10/14/20 0725 98.1 °F (36.7 °C) 95 18 (!) 113/56 98 % 10/14/20 0407 97.9 °F (36.6 °C) 100 18 (!) 104/57 99 % 10/14/20 0319     92 % 10/13/20 2320 98.2 °F (36.8 °C) 100 18 119/78 99 % 10/13/20 2127     98 % 10/13/20 2114  100  119/65   
10/13/20 1951 99.7 °F (37.6 °C) 100 18 115/72 99 % Oxygen Therapy O2 Sat (%): 100 % (10/14/20 1515) Pulse via Oximetry: 62 beats per minute (10/14/20 1333) O2 Device: Nasal cannula (10/14/20 1333) O2 Flow Rate (L/min): 2.5 l/min (10/14/20 1333) Intake/Output Summary (Last 24 hours) at 10/14/2020 1656 Last data filed at 10/14/2020 6499 Gross per 24 hour Intake  Output 2400 ml Net -2400 ml General:    Well nourished. Alert. On 2L NC.  
HEENT- AT, NC, PERRLA, EOMI,  
CV:   RRR. No murmur, rub, or gallop. Lungs:   Coarse breath sounds b/l, basilar crackles,, improved air exchange, Abdomen:   Soft, nontender, nondistended. Active bowel sounds, no organomegaly, Cns- no focal neurological deficit Extremities: Warm and dry. No cyanosis or edema. Skin:     No rashes or jaundice. Data Review: 
I have reviewed all labs, meds, telemetry events, and studies from the last 24 hours. Recent Results (from the past 24 hour(s)) CBC WITH AUTOMATED DIFF Collection Time: 10/14/20  8:02 AM  
Result Value Ref Range WBC 10.5 4.3 - 11.1 K/uL  
 RBC 3.56 (L) 4.05 - 5.2 M/uL  
 HGB 10.9 (L) 11.7 - 15.4 g/dL HCT 33.7 (L) 35.8 - 46.3 % MCV 94.7 79.6 - 97.8 FL  
 MCH 30.6 26.1 - 32.9 PG  
 MCHC 32.3 31.4 - 35.0 g/dL  
 RDW 13.5 11.9 - 14.6 % PLATELET 034 099 - 159 K/uL MPV 9.8 9.4 - 12.3 FL ABSOLUTE NRBC 0.00 0.0 - 0.2 K/uL  
 DF AUTOMATED NEUTROPHILS 57 43 - 78 % LYMPHOCYTES 22 13 - 44 % MONOCYTES 14 (H) 4.0 - 12.0 % EOSINOPHILS 6 0.5 - 7.8 % BASOPHILS 1 0.0 - 2.0 % IMMATURE GRANULOCYTES 0 0.0 - 5.0 %  
 ABS. NEUTROPHILS 6.0 1.7 - 8.2 K/UL  
 ABS. LYMPHOCYTES 2.3 0.5 - 4.6 K/UL  
 ABS. MONOCYTES 1.5 (H) 0.1 - 1.3 K/UL  
 ABS. EOSINOPHILS 0.7 0.0 - 0.8 K/UL  
 ABS. BASOPHILS 0.1 0.0 - 0.2 K/UL  
 ABS. IMM. GRANS. 0.0 0.0 - 0.5 K/UL METABOLIC PANEL, COMPREHENSIVE Collection Time: 10/14/20  8:02 AM  
Result Value Ref Range Sodium 139 136 - 145 mmol/L Potassium 3.6 3.5 - 5.1 mmol/L Chloride 98 98 - 107 mmol/L  
 CO2 33 (H) 21 - 32 mmol/L Anion gap 8 7 - 16 mmol/L Glucose 78 65 - 100 mg/dL BUN 15 8 - 23 MG/DL Creatinine 0.62 0.6 - 1.0 MG/DL  
 GFR est AA >60 >60 ml/min/1.73m2 GFR est non-AA >60 >60 ml/min/1.73m2 Calcium 9.5 8.3 - 10.4 MG/DL Bilirubin, total 0.4 0.2 - 1.1 MG/DL  
 ALT (SGPT) 33 12 - 65 U/L  
 AST (SGOT) 25 15 - 37 U/L Alk. phosphatase 120 50 - 136 U/L Protein, total 6.6 6.3 - 8.2 g/dL Albumin 2.9 (L) 3.2 - 4.6 g/dL Globulin 3.7 (H) 2.3 - 3.5 g/dL A-G Ratio 0.8 (L) 1.2 - 3.5 MAGNESIUM Collection Time: 10/14/20  8:02 AM  
Result Value Ref Range Magnesium 2.4 1.8 - 2.4 mg/dL PHOSPHORUS Collection Time: 10/14/20  8:02 AM  
Result Value Ref Range Phosphorus 4.0 (H) 2.3 - 3.7 MG/DL All Micro Results Procedure Component Value Units Date/Time FUNGUS CULTURE AND SMEAR [[id number]] Collected:  10/03/20 1348 Order Status:  Completed Specimen:  Miscellaneous sample Updated:  10/08/20 1236 Source PLEURAL FLUID Comment: L1 Fungus stain Direct Inoculation Fungus (Mycology) Culture Other source received Comment: (NOTE) Performed At: [address] [id number] Minnie Ross MD [id number] 
  
  
 C. DIFFICILE AG & TOXIN A/B [[id number]] Collected:  10/06/20 1938 Order Status:  Canceled Specimen:  Stool AFB CULTURE + SMEAR W/RFLX ID FROM CULTURE [[id number]] Collected:  10/03/20 1348 Order Status:  Completed Specimen:  Miscellaneous sample Updated:  10/06/20 1538 Source PLEURAL FLUID Comment: L1  
  
  AFB Specimen processing Concentration Acid Fast Smear Negative Comment: (NOTE) Performed At: [address] [id number] Tamiko Menard MD [id number] Acid Fast Culture PENDING  
 CULTURE, BLOOD [[id number]] Collected:  10/01/20 1135 Order Status:  Completed Specimen:  Blood Updated:  10/06/20 1483 Special Requests: --     
  RIGHT 
FOREARM Culture result: NO GROWTH 5 DAYS     
 CULTURE, BLOOD [[id number]] Collected:  10/01/20 1135 Order Status:  Completed Specimen:  Blood Updated:  10/06/20 5367 Special Requests: --     
  LEFT 
FOREARM Culture result: NO GROWTH 5 DAYS     
 CULTURE, BODY FLUID W Kalee Short [[id number]] Collected:  10/03/20 1348 Order Status:  Completed Specimen:  Pleural Fluid Updated:  10/06/20 2218 Special Requests: NO SPECIAL REQUESTS     
  GRAM STAIN 0 TO 13 WBC'S SEEN PER OIF  
   NO DEFINITE ORGANISM SEEN Culture result: NO GROWTH 2 DAYS Current Meds: 
Current Facility-Administered Medications Medication Dose Route Frequency  [START ON 10/15/2020] lisinopriL (PRINIVIL, ZESTRIL) tablet 2.5 mg  2.5 mg Oral DAILY  furosemide (LASIX) tablet 40 mg  40 mg Oral ACB&D  
 albuterol (PROVENTIL VENTOLIN) nebulizer solution 2.5 mg  2.5 mg Nebulization Q6H RT  
 metoprolol succinate (TOPROL-XL) XL tablet 25 mg  25 mg Oral DAILY  albuterol (PROVENTIL VENTOLIN) nebulizer solution 2.5 mg  2.5 mg Nebulization Q6H PRN  
 morphine injection 1 mg  1 mg IntraVENous Q4H PRN  
 fluticasone propionate (FLONASE) 50 mcg/actuation nasal spray 2 Spray  2 Spray Both Nostrils DAILY  sodium chloride (NS) flush 5-40 mL  5-40 mL IntraVENous Q8H  
  sodium chloride (NS) flush 5-40 mL  5-40 mL IntraVENous PRN  
 acetaminophen (TYLENOL) tablet 650 mg  650 mg Oral Q6H PRN Or  
 acetaminophen (TYLENOL) suppository 650 mg  650 mg Rectal Q6H PRN  polyethylene glycol (MIRALAX) packet 17 g  17 g Oral DAILY PRN  
 enoxaparin (LOVENOX) injection 40 mg  40 mg SubCUTAneous DAILY  ondansetron (ZOFRAN ODT) tablet 4 mg  4 mg Oral Q8H PRN Or  
 ondansetron (ZOFRAN) injection 4 mg  4 mg IntraVENous Q6H PRN  
 aspirin delayed-release tablet 81 mg  81 mg Oral DAILY  levothyroxine (SYNTHROID) tablet 75 mcg  75 mcg Oral ACB  montelukast (SINGULAIR) tablet 10 mg  10 mg Oral DAILY  pantoprazole (PROTONIX) tablet 40 mg  40 mg Oral ACB  atorvastatin (LIPITOR) tablet 10 mg  10 mg Oral DAILY  budesonide-formoteroL (SYMBICORT) 160-4.5 mcg/actuation HFA inhaler 2 Puff  2 Puff Inhalation BID RT Other Studies (last 24 hours): No results found. Assessment and Plan:  
 
Hospital Problems as of 10/14/2020 Date Reviewed: 10/13/2020 Codes Class Noted - Resolved POA Multiple lung nodules on CT (Chronic) ICD-10-CM: R91.8 ICD-9-CM: 793.19  10/12/2020 - Present Unknown Overview Addendum 10/12/2020  2:33 PM by Emma Spencer NP  
  -PET scan 10/2019: PET scan fortunately did not have any abnormal activity in her mass in the left lung. This may mean that we can just follow this radiographically, but we will talk about her at tumor board and come back to her with the group's recommendation. 
-10/30/2019: Patient is discussed at thoracic conference today lead by Dr Vikash Agrawal. Patient is a 79 yo never smoker. CT guided biopsy recommended. -CT guided Biopsy 11/2020: lung biopsy showed signs of this nodule being a hamartoma, which is a benign tumor that we can simply follow.  repeat CT in 6 months 
-Chest CT June 2020: CT scan is stable 
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 Channie Cluster are several new pulmonary nodules noted which may suggest some metastatic disease.  This require further follow-up CT of the chest in 2 months or doing PET scan. Hamartoma (HCC) (Chronic) ICD-10-CM: Q85.9 ICD-9-CM: 759.6  10/12/2020 - Present Yes Peripheral vascular disease ([address] ICD-10-CM: I73.9 ICD-9-CM: 443.9  10/12/2020 - Present Unknown Bilateral pleural effusion ICD-10-CM: J90 ICD-9-CM: 511.9  10/7/2020 - Present Yes Overview Signed 10/13/2020  1:25 PM by DENI Brooke  
  10/3/2020: L thoracentesis 1050mL removed R thoracentesis 800mL removed 10/9/2020: L thoracentesis: 1000mL removed R thoracentesis: 600mL removed Acute systolic heart failure (HCC) ICD-10-CM: I50.21 ICD-9-CM: 428.21  10/6/2020 - Present Yes * (Principal) PNA (pneumonia) ICD-10-CM: J18.9 ICD-9-CM: 889  10/1/2020 - Present Unknown Mass of lingula of lung ICD-10-CM: R91.8 ICD-9-CM: 786.6  10/16/2019 - Present Yes Hypercholesterolemia (Chronic) ICD-10-CM: E78.00 ICD-9-CM: 272.0  6/30/2015 - Present Yes Mild persistent asthma without complication (Chronic) HSO-91-DE: J45.30 ICD-9-CM: 493.90  10/17/2013 - Present Yes Acquired hypothyroidism (Chronic) ICD-10-CM: E03.9 ICD-9-CM: 244.9  10/17/2013 - Present Yes GERD (gastroesophageal reflux disease) (Chronic) ICD-10-CM: K21.9 ICD-9-CM: 530.81  10/17/2013 - Present Yes RESOLVED: Hypokalemia ICD-10-CM: E87.6 ICD-9-CM: 276.8  10/6/2020 - 10/12/2020 Yes PLAN:   
 
This is a 76-year-old female with: 
 
1.  Acute hypoxemic respiratory failure-on 2.5 L of oxygen nasal cannula. Pt completed course of antibiotics. Left pleural effusion s/p thoracentesis 10/3 ad 10/8. CXR yesterday afternoon shows bilateral consolidation with right pleural effusion.  
- Pulmonary on board. Appreciate recs. D dimer negative. -Plan for repeat thoracentesis in a.m.  
  
2. Acute Systolic CHF exacerbation POA Elevated troponin likely from demand ischemia. IV lasix 40 mg bid. - Telemetry - Continue aspirin 
- TTE EF 35-40% - Metoprolol, Added Lisinopril today. Cardiology on board. Appreciate recs. May need Left heart cath after work up for lung nodule.  
  
3. Left lung mass status post needle biopsy on November 2019 demonstrating harmatoma / Chronic left pulmonary artery occlusion. Continue aspirin. Lung nodules- out-pt PET CT, 
  
4.  For her asthma, continue on home inhalers and Singulair. Currently not in exacerbation. 
  
5.  Hypotension: 
Due to diffuse peripheral vascular disease, falsely low BP readings in upper extremities. Midodrine and Florinef have been discontinued 10/12 
  
6. Hyperlipidemia  
continue on atorvastatin. 
  
7.  GERD  
continue on PPI. 
  
8. Hypothyroidism  
continue on levothyroxine. Hypokalemia-resolved CT chest 9/21/2020-right-sided aortic arch with relatively 
extensive atherosclerotic change at the arch where there appears to be a small 
focal dissection. CTA neck done in June 2019 showed subclavian steal syndrome. Vascular surgery recs no intervention at this point. 
 
 DISPO: Anticipate inpatient hospital stay for at least 24 hours. PT/OT eval 
 
DVT prophylaxis with Lovenox Signed: 
Candido Dempsey MD

## 2020-10-14 NOTE — PROGRESS NOTES
Gila Smith Admission Date: 10/1/2020 Daily Progress Note: 10/14/2020 The patient's chart is reviewed and the patient is discussed with the staff. Patient is L 22 y.o.  female seen and evaluated at the request of Dr. Zana Pulido. 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 Pt has recently had issues with hypotension which is being managed by her PCP. She was treated with LVQ x 7 days for CAP. She had a virtual visit with KE Kaufman on 9/30/2020 and pt mentioned that her O2 sats had been borderline low and that she had been tachycardic. An echocardiogram was obtained to evaluate for PAH that same day, but the TV jet was inadequate for estimation of RVSP. Pt felt worse and presented to the ER and her CXR was concerning for PNA. Pt was admitted and started on Zosyn. Pt is being ruled out for COVID. We were consulted to assist with workup and treatment. CT was performed which reveals multiple new nodules, and bilateral pleural effusions. Pt had a L thoracentesis with 1050mL removed and R thoracentesis with 800mL removed on 10/3/2020. Cardiology was consulted on 10/9, pt had L thoracentesis with 1000mL removed and R thoracentesis with 600mL removed. Cardiology was reconsulted secondary to persistent shortness of breath and recommendations for left and right heart catheterization once PET scan and need for lung biopsies have been completed. 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 Vascular surgery felt that no intervention was necessary at this time. Subjective:  
 
Patient up to chair in room, feels \"much better\" Denies any coughing Current Facility-Administered Medications Medication Dose Route Frequency  furosemide (LASIX) injection 40 mg  40 mg IntraVENous BID  [START ON 10/15/2020] lisinopriL (PRINIVIL, ZESTRIL) tablet 2.5 mg  2.5 mg Oral DAILY  albuterol (PROVENTIL VENTOLIN) nebulizer solution 2.5 mg  2.5 mg Nebulization Q6H RT  
 metoprolol succinate (TOPROL-XL) XL tablet 25 mg  25 mg Oral DAILY  albuterol (PROVENTIL VENTOLIN) nebulizer solution 2.5 mg  2.5 mg Nebulization Q6H PRN  
 morphine injection 1 mg  1 mg IntraVENous Q4H PRN  
 fluticasone propionate (FLONASE) 50 mcg/actuation nasal spray 2 Spray  2 Spray Both Nostrils DAILY  sodium chloride (NS) flush 5-40 mL  5-40 mL IntraVENous Q8H  
 sodium chloride (NS) flush 5-40 mL  5-40 mL IntraVENous PRN  
 acetaminophen (TYLENOL) tablet 650 mg  650 mg Oral Q6H PRN Or  
 acetaminophen (TYLENOL) suppository 650 mg  650 mg Rectal Q6H PRN  polyethylene glycol (MIRALAX) packet 17 g  17 g Oral DAILY PRN  
 enoxaparin (LOVENOX) injection 40 mg  40 mg SubCUTAneous DAILY  ondansetron (ZOFRAN ODT) tablet 4 mg  4 mg Oral Q8H PRN Or  
 ondansetron (ZOFRAN) injection 4 mg  4 mg IntraVENous Q6H PRN  
 aspirin delayed-release tablet 81 mg  81 mg Oral DAILY  levothyroxine (SYNTHROID) tablet 75 mcg  75 mcg Oral ACB  montelukast (SINGULAIR) tablet 10 mg  10 mg Oral DAILY  pantoprazole (PROTONIX) tablet 40 mg  40 mg Oral ACB  atorvastatin (LIPITOR) tablet 10 mg  10 mg Oral DAILY  budesonide-formoteroL (SYMBICORT) 160-4.5 mcg/actuation HFA inhaler 2 Puff  2 Puff Inhalation BID RT Review of Systems 
+cough 
+shortness of breath Constitutional: negative for fever, chills, sweats Cardiovascular: negative for chest pain, palpitations, syncope, edema Gastrointestinal:  negative for dysphagia, reflux, vomiting, diarrhea, abdominal pain, or melena Neurologic:  negative for focal weakness, numbness, headache Objective:  
 
Vitals:  
 10/14/20 0725 10/14/20 0740 10/14/20 1135 10/14/20 1333 BP: (!) 113/56  122/67 Pulse: 95  95 Resp: 18  18 Temp: 98.1 °F (36.7 °C)  98.2 °F (36.8 °C) SpO2: 98% 98% 99% 96% Weight:      
Height:      
 
 
 
Intake/Output Summary (Last 24 hours) at 10/14/2020 1351 Last data filed at 10/14/2020 5881 Gross per 24 hour Intake  Output 2400 ml Net -2400 ml Physical Exam:  
Constitution:  the patient is well developed and in no acute distress EENMT:  Sclera clear, pupils equal, oral mucosa moist 
Respiratory:  Diminished at bases bilaterally, on 2.5L O2 Cardiovascular:  RRR without M,G,R 
Gastrointestinal: soft and non-tender; with positive bowel sounds. Musculoskeletal: warm without cyanosis. There is no lower extremity edema. Skin:  no jaundice or rashes Neurologic: no gross neuro deficits Psychiatric:  alert and oriented x 3 CXR:  
 
 
 
 
 
LAB No results for input(s): GLUCPOC in the last 72 hours. No lab exists for component: [place] Recent Labs 10/14/20 
0802 10/13/20 
6107 WBC 10.5 9.5 HGB 10.9* 11.2* HCT 33.7* 33.9*  
 415 Recent Labs 10/14/20 
0802 10/13/20 
6123 10/12/20 
9682 NA  --  138 138 K  --  3.8 3.5 CL  --  101 99 CO2  --  34* 34* GLU  --  90 89 BUN  --  12 13 CREA  --  0.57* 0.63  
MG 2.4  --   --   
CA  --  9.4 9.3 PHOS 4.0*  --   -- No results for input(s): PH, PCO2, PO2, HCO3, PHI, PCO2I, PO2I, HCO3I in the last 72 hours. No results for input(s): LCAD, LAC in the last 72 hours. Assessment:  (Medical Decision Making) Hospital Problems  Date Reviewed: 10/14/2020 Codes Class Noted POA Multiple lung nodules on CT (Chronic) ICD-10-CM: R91.8 ICD-9-CM: 793.19  10/12/2020 Unknown Overview Addendum 10/12/2020  2:33 PM by Josephine Churchill NP  
  -PET scan 10/2019: PET scan fortunately did not have any abnormal activity in her mass in the left lung.  This may mean that we can just follow this radiographically, but we will talk about her at tumor board and come back to her with the group's recommendation. 
-10/30/2019: Patient is discussed at thoracic conference today lead by Dr Jourdan Parker. Patient is a 79 yo never smoker. CT guided biopsy recommended. -CT guided Biopsy 11/2020: lung biopsy showed signs of this nodule being a hamartoma, which is a benign tumor that we can simply follow. repeat CT in 6 months 
-Chest CT June 2020: CT scan is stable 
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 Mindi Friday are several new pulmonary nodules noted which may suggest some metastatic disease.  This require further follow-up CT of the chest in 2 months or doing PET scan. Awaiting outpt PET CT scan Hamartoma (HCC) (Chronic) ICD-10-CM: Q85.9 ICD-9-CM: 759.6  10/12/2020 Yes Chronic Peripheral vascular disease ([facility] [address] ICD-10-CM: I73.9 ICD-9-CM: 443.9  10/12/2020 Unknown Chronic Bilateral pleural effusion ICD-10-CM: J90 ICD-9-CM: 511.9  10/7/2020 Yes Overview Signed 10/13/2020  1:25 PM by DENI Mariano  
  10/3/2020: L thoracentesis 1050mL removed R thoracentesis 800mL removed 10/9/2020: L thoracentesis: 1000mL removed R thoracentesis: 600mL removed Repeat CXR Acute systolic heart failure (HCC) ICD-10-CM: I50.21 ICD-9-CM: 428.21  10/6/2020 Yes On lasix 40mg IV BID * (Principal) PNA (pneumonia) ICD-10-CM: J18.9 ICD-9-CM: 227  10/1/2020 Unknown Completed zosyn Mass of lingula of lung ICD-10-CM: R91.8 ICD-9-CM: 786.6  10/16/2019 Yes Hypercholesterolemia (Chronic) ICD-10-CM: E78.00 ICD-9-CM: 272.0  6/30/2015 Yes Mild persistent asthma without complication (Chronic) YLO-04-KM: J45.30 ICD-9-CM: 493.90  10/17/2013 Yes Continue nebs, will change to scheduled Acquired hypothyroidism (Chronic) ICD-10-CM: E03.9 ICD-9-CM: 244.9  10/17/2013 Yes GERD (gastroesophageal reflux disease) (Chronic) ICD-10-CM: K21.9 ICD-9-CM: 530.81  10/17/2013 Yes Plan:  (Medical Decision Making) --Continue O2, wean O2 as tolerated, now on 2.5L  
--D dimer negative 
--Continue IV lasix 40mg IV BID 
--Continue albuterol Q6H 
--Continue Symbicort 
--Will need right and left heart cath once lung nodule work up is complete --Awaiting OP PET/CT 
--Due to PVD being severe and causing falsely decreased BPs, cardiology recommends obtaining BP in lower ext More than 50% of the time documented was spent in face-to-face contact with the patient and in the care of the patient on the floor/unit where the patient is located. Ricky Aguilar, [place] Lungs:   Crackles bilateral, decreased breath sounds Heart:  RRR with no Murmur/Rubs/Gallops Additional Comments: cxr is worse with increased R effusion- may need repeat thoracentesis-set up ultrasound tomorrow I have spoken with and examined the patient. I agree with the above assessment and plan as documented.  
 
Roger Henderson MD

## 2020-10-14 NOTE — PROGRESS NOTES
10/14/2020 3:28 PM 
 
Admit Date: 10/1/2020 Admit Diagnosis: PNA (pneumonia) [J18.9] Subjective: No cp or sob- no orthopnea- no dizziness Objective:  
  
Visit Vitals /67 (BP 1 Location: Left leg, BP Patient Position: At rest;Sitting) Pulse 95 Temp 98.2 °F (36.8 °C) Resp 18 Ht 5' 1.34\" (1.558 m) Wt 121 lb 12.8 oz (55.2 kg) SpO2 96% BMI 22.76 kg/m² Physical Exam: 
[facility], Well Nourished, No Acute Distress, Alert & Oriented x 3, appropriate mood. Neck- supple, no JVD 
CV- regular rate and rhythm no MRG Lung- clear bilaterally Abd- soft, nontender, nondistended Ext- no edema bilaterally. Skin- warm and dry Data Review:  
Recent Labs 10/14/20 
0802   
K 3.6 BUN 15  
CREA 0.62 WBC 10.5 HGB 10.9* HCT 33.7*  
 Assessment/Plan:  
 
Principal Problem: 
  PNA (pneumonia) (10/1/2020) Active Problems: 
  Mild persistent asthma without complication (54/13/3938) Acquired hypothyroidism (10/17/2013) GERD (gastroesophageal reflux disease) (10/17/2013) Hypercholesterolemia (6/30/2015) Mass of lingula of lung (10/16/2019)- assess cancer eval before determining cath versus medical therapy Acute systolic heart failure ([address] (10/6/2020)Improved with current therapy. Will continue medications 
 add ace-0 bp measurements on in the leg Bilateral pleural effusion (10/7/2020) Overview: 10/3/2020: L thoracentesis 1050mL removed R thoracentesis 800mL removed 10/9/2020: L thoracentesis: 1000mL removed R thoracentesis: 600mL removed Multiple lung nodules on CT (10/12/2020) Overview: -PET scan 10/2019: PET scan fortunately did not have any abnormal activity  
    in her mass in the left lung. This may mean that we can just follow this  
    radiographically, but we will talk about her at tumor board and come back  
    to her with the group's recommendation. -10/30/2019: Patient is discussed at thoracic conference today lead by Dr Rafat Talley. Patient is a 79 yo never smoker. CT guided biopsy recommended. -CT guided Biopsy 11/2020: lung biopsy showed signs of this nodule being a  
    hamartoma, which is a benign tumor that we can simply follow. repeat CT in  
    6 months 
    -Chest CT June 2020: CT scan is stable 
    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  
    secondary to her left lung mass. Stephen Sos are several new pulmonary nodules  
    noted which may suggest some metastatic disease.  This require further  
    follow-up CT of the chest in 2 months or doing PET scan. Hamartoma ([address] (10/12/2020) Peripheral vascular disease ([address] (10/12/2020)

## 2020-10-15 NOTE — PROGRESS NOTES
Hospitalist Progress Note Admit Date:  10/1/2020  8:19 AM  
Name:  Benjamin Ibarra Age:  69 y.o. 
:  1950 MRN:  [id number] PCP:  Makeda Juarez MD 
Treatment Team: Attending Provider: Alejandro Galeana MD; Primary Nurse: Jomarie Dakin; Consulting Provider: Becky Varma MD; Consulting Provider: Werner Rutledge MD; Consulting Provider: Moraima Jeronimo MD; Utilization Review: Demetri Myers RN; Care Manager: Nai Romano.; Consulting Provider: Jigna Ervin DO; Utilization Review: Niraj Villeda RN; Physical Therapist: Paola Bernal Subjective:  
74-year-old female with a past medical history of asthma, GERD, treatment lymphoma, hyperlipidemia, hypothyroidism, hypotension, chronic left pulmonary artery occlusion, left lung mass status post needle biopsy on 2019 demonstrating harmatoma, that presents in the setting of worsening shortness of breath.  The patient states that for the past few weeks she has been developing worsening shortness of breath especially on exertion, and associated with some dry cough. Patient seen and examined at bedside. This morning still presenting with SOB. Denies chest pain, no abdominal pain, nausea or vomiting 
  
Course during the stay Admitted for exertional dyspnea. History of left lung mass previous biopsy in 2019 demonstrated hamartoma, chronic left pulmonary artery occlusion. History of low blood pressures on the Florinef and midodrine at home. 
  
Patient was continued on oxygen, pulmonary consulted. Patient had Thoracentesis on 10/3/5948-1224 mL of clear yellow appearing fluid removed from the left pleural effusion. Thoracentesis on 10/9/2020-600 cc of clear yellow fluid aspirated from the left pleural effusion. Patient still complaining of shortness of breath on walking a short distance.   Presently has bedside commode.  
  
Patient Midodrin was increased from 5 mg 3 times daily to 10 mg 3 times daily during the stay. 
  
Cardiology was initially consulted for mild elevated troponin. Start the patient on a small dose of Coreg. Secondary to low blood pressure not a candidate for ACE or ARB. Continue Lasix. Ischemic work-up as an outpatient. Cardiology signed off. Coreg was again discontinued secondary to low blood pressure. later on during the stay as the patient was still having shortness of breath recurrent pleural effusion, pulmonary felt probably more cardiac related. Echo with a EF of 35 to 40%. Cardiology was reconsulted, advised to continue Lasix as long as blood pressure tolerates. 
  
Vascular surgery was consulted as patient was concerned that her previous visual blockage could be the reason why she is having shortness of breath. Reviewed her previous CT results- 
CT chest 9/21/2020-right-sided aortic arch with relatively 
extensive atherosclerotic change at the arch where there appears to be a small 
focal dissection. CTA neck done in June 2019 showed subclavian steal syndrome. 
-Vascular surgery felt no intervention required at this point. 
  
  
Presently patient still has some shortness of breath more on exertion, on 2.5 L of oxygen nasal cannula, followed by cardiology and pulmonary, vascular signed off her felt no intervention required at this point. Patient still has on and off episodes of low blood pressures secondary to which at times patient Lasix had to be held. Difficult discharge. 
  
10/13 patient reports shortness of breath. She cannot sleep well last night because of constant interruptions from breathing treatments. No fever no chills. Given her peripheral vascular disease, blood pressures to be checked on a lower extremities per cardiology. No dizziness no lightheadedness. Currently on 2 L oxygen by nasal cannula. 
  
10/14 patient reports feeling better this morning. No fever no chills. Currently on 2.5 L oxygen by nasal cannula.   No chest pain no palpitations. 10/15 Says breathing mostly better, says mild worse at times on laying flat Objective:  
 
Patient Vitals for the past 24 hrs: 
 Temp Pulse Resp BP SpO2  
10/15/20 1157  (!) 109 22 (!) 151/104 99 % 10/15/20 1135  (!) 104 20 (!) 155/104 99 % 10/15/20 1040 98.1 °F (36.7 °C) (!) 104 20 (!) 103/51 97 % 10/15/20 1008     100 % 10/15/20 0713  100  132/73   
10/15/20 0642 98.2 °F (36.8 °C) 96 18 (!) 92/51 95 % 10/15/20 0331 97.9 °F (36.6 °C) 100 18 (!) 99/45 99 % 10/15/20 0317     96 % 10/15/20 0004 98.1 °F (36.7 °C) 100 18 (!) 150/63 100 % 10/14/20 2112     96 % 10/14/20 1917 97.9 °F (36.6 °C) 95 18 111/62 98 % 10/14/20 1515 97.6 °F (36.4 °C) 93 18 (!) 135/96 100 % Oxygen Therapy O2 Sat (%): 99 % (10/15/20 1157) Pulse via Oximetry: 99 beats per minute (10/15/20 1008) O2 Device: Nasal cannula (10/15/20 1135) O2 Flow Rate (L/min): 1 l/min (10/15/20 1157) Intake/Output Summary (Last 24 hours) at 10/15/2020 1459 Last data filed at 10/15/2020 1051 Gross per 24 hour Intake  Output 2100 ml Net -2100 ml General:    Well nourished. Alert. On 2.5 lit/min 
heent - normal  
CV:   RRR. No murmur, rub, or gallop. Lungs:   Coarse breath sounds, decreased air entry right lung base Abdomen:   Soft, nontender, nondistended. Cns- no focal neurological deficits Extremities: Warm and dry. No cyanosis or edema. Skin:     No rashes or jaundice. Data Review: 
I have reviewed all labs, meds, telemetry events, and studies from the last 24 hours. Recent Results (from the past 24 hour(s)) METABOLIC PANEL, BASIC Collection Time: 10/15/20  5:54 AM  
Result Value Ref Range Sodium 138 136 - 145 mmol/L Potassium 4.1 3.5 - 5.1 mmol/L Chloride 97 (L) 98 - 107 mmol/L  
 CO2 37 (H) 21 - 32 mmol/L Anion gap 4 (L) 7 - 16 mmol/L Glucose 88 65 - 100 mg/dL BUN 16 8 - 23 MG/DL  Creatinine 0.69 0.6 - 1.0 MG/DL  
 GFR est AA >60 >60 ml/min/1.73m2 GFR est non-AA >60 >60 ml/min/1.73m2 Calcium 9.5 8.3 - 10.4 MG/DL MAGNESIUM Collection Time: 10/15/20  5:54 AM  
Result Value Ref Range Magnesium 2.3 1.8 - 2.4 mg/dL CELL COUNT, BODY FLUID Collection Time: 10/15/20 11:45 AM  
Result Value Ref Range BODY FLUID TYPE PLEURAL FLUID FLUID COLOR YELLOW    
 FLUID APPEARANCE HAZY FLUID RBC CT. 1,000 /cu mm FLUID WBC COUNT 1,200 /cu mm GLUCOSE, FLUID Collection Time: 10/15/20 11:45 AM  
Result Value Ref Range Fluid Type: PLEURAL FLUID Glucose, body fld. 99 MG/DL  
LDH, BODY FLUID Collection Time: 10/15/20 11:45 AM  
Result Value Ref Range Fluid Type: PLEURAL FLUID    
 LD, body fld. 85 U/L  
PROTEIN TOTAL, FLUID Collection Time: 10/15/20 11:45 AM  
Result Value Ref Range Fluid Type: PLEURAL FLUID Protein total, body fld. 2.6 g/dL CELL COUNT, BODY FLUID Collection Time: 10/15/20 11:45 AM  
Result Value Ref Range BODY FLUID TYPE PLEURAL FLUID FLUID COLOR YELLOW    
 FLUID APPEARANCE HAZY FLUID RBC CT. 1,000 /cu mm FLUID WBC COUNT 503 /cu mm GLUCOSE, FLUID Collection Time: 10/15/20 11:45 AM  
Result Value Ref Range Fluid Type: PLEURAL FLUID Glucose, body fld. 97 MG/DL  
LDH, BODY FLUID Collection Time: 10/15/20 11:45 AM  
Result Value Ref Range Fluid Type: PLEURAL FLUID    
 LD, body fld. 71 U/L  
PROTEIN TOTAL, FLUID Collection Time: 10/15/20 11:45 AM  
Result Value Ref Range Fluid Type: PLEURAL FLUID Protein total, body fld. 2.2 g/dL All Micro Results Procedure Component Value Units Date/Time CULTURE, BODY FLUID Nick Wylie [[id number]] Collected:  10/15/20 1145 Order Status:  Completed Updated:  10/15/20 1412 CULTURE, BODY FLUID Clemon Saranff [[id number]] Collected:  10/15/20 1145 Order Status:  Completed Updated:  10/15/20 1412 FUNGUS CULTURE AND SMEAR [[id number]] Collected:  10/03/20 1341 Order Status:  Completed Specimen:  Miscellaneous sample Updated:  10/08/20 1236 Source PLEURAL FLUID Comment: L1 Fungus stain Direct Inoculation Fungus (Mycology) Culture Other source received Comment: (NOTE) Performed At: [address] [id number] Tamiko Menard MD [id number] 
  
  
 C. DIFFICILE AG & TOXIN A/B [[id number]] Collected:  10/06/20 1938 Order Status:  Canceled Specimen:  Stool AFB CULTURE + SMEAR W/RFLX ID FROM CULTURE [[id number]] Collected:  10/03/20 1348 Order Status:  Completed Specimen:  Miscellaneous sample Updated:  10/06/20 1538 Source PLEURAL FLUID Comment: L1  
  
  AFB Specimen processing Concentration Acid Fast Smear Negative Comment: (NOTE) Performed At: [address] [id number] Tamiko Menard MD [id number] Acid Fast Culture PENDING  
 CULTURE, BLOOD [[id number]] Collected:  10/01/20 1135 Order Status:  Completed Specimen:  Blood Updated:  10/06/20 8486 Special Requests: --     
  RIGHT 
FOREARM Culture result: NO GROWTH 5 DAYS     
 CULTURE, BLOOD [[id number]] Collected:  10/01/20 1135 Order Status:  Completed Specimen:  Blood Updated:  10/06/20 1891 Special Requests: --     
  LEFT 
FOREARM Culture result: NO GROWTH 5 DAYS     
 CULTURE, BODY FLUID W Kalee Short [[id number]] Collected:  10/03/20 1348 Order Status:  Completed Specimen:  Pleural Fluid Updated:  10/06/20 0088 Special Requests: NO SPECIAL REQUESTS     
  GRAM STAIN 0 TO 13 WBC'S SEEN PER OIF  
   NO DEFINITE ORGANISM SEEN Culture result: NO GROWTH 2 DAYS Current Meds: 
Current Facility-Administered Medications Medication Dose Route Frequency  albuterol (PROVENTIL VENTOLIN) nebulizer solution 2.5 mg  2.5 mg Nebulization BID RT  
 furosemide (LASIX) injection 40 mg  40 mg IntraVENous BID  
  lisinopriL (PRINIVIL, ZESTRIL) tablet 2.5 mg  2.5 mg Oral DAILY  metoprolol succinate (TOPROL-XL) XL tablet 25 mg  25 mg Oral DAILY  albuterol (PROVENTIL VENTOLIN) nebulizer solution 2.5 mg  2.5 mg Nebulization Q6H PRN  
 morphine injection 1 mg  1 mg IntraVENous Q4H PRN  
 fluticasone propionate (FLONASE) 50 mcg/actuation nasal spray 2 Spray  2 Spray Both Nostrils DAILY  sodium chloride (NS) flush 5-40 mL  5-40 mL IntraVENous Q8H  
 sodium chloride (NS) flush 5-40 mL  5-40 mL IntraVENous PRN  
 acetaminophen (TYLENOL) tablet 650 mg  650 mg Oral Q6H PRN Or  
 acetaminophen (TYLENOL) suppository 650 mg  650 mg Rectal Q6H PRN  polyethylene glycol (MIRALAX) packet 17 g  17 g Oral DAILY PRN  
 enoxaparin (LOVENOX) injection 40 mg  40 mg SubCUTAneous DAILY  ondansetron (ZOFRAN ODT) tablet 4 mg  4 mg Oral Q8H PRN Or  
 ondansetron (ZOFRAN) injection 4 mg  4 mg IntraVENous Q6H PRN  
 aspirin delayed-release tablet 81 mg  81 mg Oral DAILY  levothyroxine (SYNTHROID) tablet 75 mcg  75 mcg Oral ACB  montelukast (SINGULAIR) tablet 10 mg  10 mg Oral DAILY  pantoprazole (PROTONIX) tablet 40 mg  40 mg Oral ACB  atorvastatin (LIPITOR) tablet 10 mg  10 mg Oral DAILY  budesonide-formoteroL (SYMBICORT) 160-4.5 mcg/actuation HFA inhaler 2 Puff  2 Puff Inhalation BID RT Other Studies (last 24 hours): No results found. Assessment and Plan:  
 
Hospital Problems as of 10/15/2020 Date Reviewed: 10/13/2020 Codes Class Noted - Resolved POA Multiple lung nodules on CT (Chronic) ICD-10-CM: R91.8 ICD-9-CM: 793.19  10/12/2020 - Present Unknown Overview Addendum 10/12/2020  2:33 PM by Ti Porter NP  
  -PET scan 10/2019: PET scan fortunately did not have any abnormal activity in her mass in the left lung. This may mean that we can just follow this radiographically, but we will talk about her at tumor board and come back to her with the group's recommendation. -10/30/2019: Patient is discussed at thoracic conference today lead by Dr Bertha Burns. Patient is a 79 yo never smoker. CT guided biopsy recommended. -CT guided Biopsy 11/2020: lung biopsy showed signs of this nodule being a hamartoma, which is a benign tumor that we can simply follow. repeat CT in 6 months 
-Chest CT June 2020: CT scan is stable 
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 Vonzell Nguyen are several new pulmonary nodules noted which may suggest some metastatic disease.  This require further follow-up CT of the chest in 2 months or doing PET scan. Hamartoma (HCC) (Chronic) ICD-10-CM: Q85.9 ICD-9-CM: 759.6  10/12/2020 - Present Yes Peripheral vascular disease ([address] ICD-10-CM: I73.9 ICD-9-CM: 443.9  10/12/2020 - Present Unknown Bilateral pleural effusion ICD-10-CM: J90 ICD-9-CM: 511.9  10/7/2020 - Present Yes Overview Signed 10/13/2020  1:25 PM by DENI Hendricks  
  10/3/2020: L thoracentesis 1050mL removed R thoracentesis 800mL removed 10/9/2020: L thoracentesis: 1000mL removed R thoracentesis: 600mL removed Acute systolic heart failure (HCC) ICD-10-CM: I50.21 ICD-9-CM: 428.21  10/6/2020 - Present Yes * (Principal) PNA (pneumonia) ICD-10-CM: J18.9 ICD-9-CM: 525  10/1/2020 - Present Unknown Mass of lingula of lung ICD-10-CM: R91.8 ICD-9-CM: 786.6  10/16/2019 - Present Yes Hypercholesterolemia (Chronic) ICD-10-CM: E78.00 ICD-9-CM: 272.0  6/30/2015 - Present Yes Mild persistent asthma without complication (Chronic) EIJ-17-LZ: J45.30 ICD-9-CM: 493.90  10/17/2013 - Present Yes Acquired hypothyroidism (Chronic) ICD-10-CM: E03.9 ICD-9-CM: 244.9  10/17/2013 - Present Yes GERD (gastroesophageal reflux disease) (Chronic) ICD-10-CM: K21.9 ICD-9-CM: 530.81  10/17/2013 - Present Yes RESOLVED: Hypokalemia ICD-10-CM: E87.6 ICD-9-CM: 276.8  10/6/2020 - 10/12/2020 Yes PLAN:   
1.  Acute hypoxemic respiratory failure-on 2.5 L of oxygen nasal cannula. Pt completed course of antibiotics. Left pleural effusion s/p thoracentesis 10/3 ad 10/8. CXR yesterday afternoon shows bilateral consolidation with right pleural effusion.  
- Pulmonary on board. Appreciate recs. D dimer negative. -Repeat thoracentesis 10/15/2020-total of 690 cc was removed from the Left chest. 
  
2. Acute Systolic CHF exacerbation POA Elevated troponin likely from demand ischemia. IV lasix 40 mg bid. - Telemetry - Continue aspirin 
- TTE EF 35-40% - Metoprolol, Added Lisinopril today. Cardiology on board. Appreciate recs. May need Left heart cath after work up for lung nodule.  
  
3. Left lung mass status post needle biopsy on November 2019 demonstrating harmatoma / Chronic left pulmonary artery occlusion. Continue aspirin. Lung nodules- out-pt PET CT, 
  
4.  For her asthma, continue on home inhalers and Singulair. Currently not in exacerbation. 
  
5.  Hypotension: 
Due to diffuse peripheral vascular disease, falsely low BP readings in upper extremities. Midodrine and Florinef have been discontinued 10/12. BP always to be done in lower extremities. 
  
6. Hyperlipidemia  
continue on atorvastatin. 
  
7.  GERD  
continue on PPI. 
  
8. Hypothyroidism  
continue on levothyroxine. 
  
Hypokalemia-resolved 
  
CT chest 9/21/2020-right-sided aortic arch with relatively 
extensive atherosclerotic change at the arch where there appears to be a small 
focal dissection. CTA neck done in June 2019 showed subclavian steal syndrome. Vascular surgery recs no intervention at this point. 
  
 DISPO: depending on respiratory status. 
  
DVT prophylaxis with Lovenox Signed: 
Stacy Adamson MD

## 2020-10-15 NOTE — PROGRESS NOTES
Problem: Mobility Impaired (Adult and Pediatric) Goal: *Acute Goals and Plan of Care (Insert Text) Description: LTG: 
(1.)Ms. Lesly Gilliland will move from supine to sit and sit to supine , scoot up and down, and roll side to side in bed with INDEPENDENT within 7 treatment day(s). (2.)Ms. Lesly Gilliland will transfer from bed to chair and chair to bed with INDEPENDENT using the least restrictive device within 7 treatment day(s). (3.)Ms. Lesly Gilliland will ambulate with INDEPENDENT for 500 feet with the least restrictive device within 7 treatment day(s). (4.)Ms. Lesly Gilliland will tolerate at least 10 min of dynamic standing activity to assist standing ADLs with the least restrictive device within 7 treatment days. (5.)Ms. Lesly Gilliland will climb at least 1 steps with MODIFIED INDEPENDENCE with the least restrictive device within 7 treatment days. ________________________________________________________________________________________________ PHYSICAL THERAPY: Daily Note and PM 10/15/2020 INPATIENT: PT Visit Days : 3 Payor: [employer] / Plan: [facility] [employer] MEDICARE CHOICE PPO/PFFS / Product Type: Managed Care Medicare /   
  
NAME/AGE/GENDER: Nimco Manley is a 69 y.o. female PRIMARY DIAGNOSIS: PNA (pneumonia) [J18.9] PNA (pneumonia) PNA (pneumonia) Procedure(s) (LRB): ULTRASOUND (Bilateral) THORACENTESIS (Bilateral) Day of Surgery ICD-10: Treatment Diagnosis:  
 · Generalized Muscle Weakness (M62.81) · Other lack of cordination (R27.8) · Difficulty in walking, Not elsewhere classified (R26.2) · Unspecified Lack of Coordination (R27.9) Precaution/Allergies: 
Compazine [prochlorperazine edisylate] ASSESSMENT:  
 
Ms. Lesly Gilliland is a 69year old F who presents for pneumonia. Prior to hospital admission pt lives with her  in 1 story home with 1no step(s) to enter. Pt endorses no falls in past 6 months. Prior to admission Ms. Lesly Gilliland uses nothing for mobility. Upon entering, Pnt is supine in bed and agreeable to PT treatment. she reports no pain at rest. Pnt performed  Sit > stand with supervision using nothing. Gait 4 x 30 ft with SBA-supervision, cues for posture and step clearance. Gait is noted to be faster today than yesterday. Stand > sit with supervision, followed by Seated therex and positioning for comfort. At end of session pt supine in bed with all needs within reach, alarm activated for safety, RN notified. Overall, continued great progress today as pnt improved in ambulation distance again by 30 ft. Pnt continues to present as functioning below her baseline, with deficits in mobility including transfers, gait, balance, and activity tolerance. Pt will continue to benefit from skilled therapy services to address stated deficits to promote return to highest level of function, independence, and safety. Will continue to follow. This section established at most recent assessment PROBLEM LIST (Impairments causing functional limitations): 1. Decreased Strength 2. Decreased ADL/Functional Activities 3. Decreased Transfer Abilities 4. Decreased Balance 5. Increased Pain 6. Decreased Activity Tolerance 7. Decreased Pacing Skills 8. Decreased Work Simplification/Energy Conservation Techniques 9. Increased Fatigue 10. Increased Shortness of Breath INTERVENTIONS PLANNED: (Benefits and precautions of physical therapy have been discussed with the patient.) 1. Balance Exercise 2. Bed Mobility 3. Family Education 4. Gait Training 5. Manual Therapy 6. Neuromuscular Re-education/Strengthening 7. Range of Motion (ROM) 8. Therapeutic Activites 9. Therapeutic Exercise/Strengthening 10. Transfer Training TREATMENT PLAN: Frequency/Duration: 3 times a week for duration of hospital stay Rehabilitation Potential For Stated Goals: Excellent REHAB RECOMMENDATIONS (at time of discharge pending progress):   
Placement: It is my opinion, based on this patient's performance to date, that Ms. Ilda Ross may benefit from [address] after discharge due to the functional deficits listed above that are likely to improve with skilled rehabilitation because he/she has multiple medical issues that affect his/her functional mobility in the community. Equipment:  
? None at this time HISTORY:  
History of Present Injury/Illness (Reason for Referral): 
See H&P Past Medical History/Comorbidities: Ms. Ilda Ross  has a past medical history of Asthma, Bite of nonvenomous arthropod (???), Cough, Esophageal stricture (2002), GERD (gastroesophageal reflux disease), History of rectal bleeding (???), Hodgkin's lymphoma ([facility] [address] (1980), Hypercholesterolemia, Menopause, Positive RAST testing (2007), and Thyroid disease. She also has no past medical history of Chronic kidney disease or Stroke ([facility] [address] Ms. Ilda Ross  has a past surgical history that includes hx tonsillectomy (1954); hx splenectomy (1973); hx appendectomy (1973); hx cataract removal (Bilateral, 2007, 2009); hx colonoscopy; hx other surgical; and hx breast biopsy (Left, 05/21/2020). Social History/Living Environment:  
Home Environment: Private residence # Steps to Enter: 1 One/Two Story Residence: One story Living Alone: No 
Support Systems: Spouse/Significant Other/Partner Patient Expects to be Discharged to[de-identified] Private residence Current DME Used/Available at Home: Commode, bedside Tub or Shower Type: Tub/Shower combination Prior Level of Function/Work/Activity: 
Independent at baseline Number of Personal Factors/Comorbidities that affect the Plan of Care: 1-2: MODERATE COMPLEXITY EXAMINATION:  
Most Recent Physical Functioning:  
Gross Assessment: 
  
         
  
Posture: 
  
Balance: 
Sitting: Intact Standing: Impaired Standing - Static: Good Standing - Dynamic : Fair Bed Mobility: 
Supine to Sit: Supervision Sit to Supine: Supervision Scooting: Supervision Wheelchair Mobility: 
  
Transfers: 
Sit to Stand: Stand-by assistance;Supervision Stand to Sit: Stand-by assistance;Supervision Gait: 
  
Base of Support: Widened Speed/Joyce: Shuffled Gait Abnormalities: Decreased step clearance;Shuffling gait Distance (ft): 120 Feet (ft) Ambulation - Level of Assistance: Stand-by assistance;Supervision Interventions: Manual cues; Safety awareness training; Tactile cues; Verbal cues; Visual/Demos Duration: 15 Minutes Body Structures Involved: 1. Bones 2. Joints 3. Muscles Body Functions Affected: 1. Neuromusculoskeletal 
2. Movement Related 3. Skin Related Activities and Participation Affected: 1. Learning and Applying Knowledge 2. General Tasks and Demands 3. Mobility 4. Interpersonal Interactions and Relationships 5. Community, Social and [place] Number of elements that affect the Plan of Care: 3: MODERATE COMPLEXITY CLINICAL PRESENTATION:  
Presentation: Stable and uncomplicated: LOW COMPLEXITY CLINICAL DECISION MAKING:  
[facility] AM-PAC 6 Clicks Basic Mobility Inpatient Short Form How much difficulty does the patient currently have. .. Unable A Lot A Little None 1. Turning over in bed (including adjusting bedclothes, sheets and blankets)? [] 1   [] 2   [] 3   [x] 4  
2. Sitting down on and standing up from a chair with arms ( e.g., wheelchair, bedside commode, etc.)   [] 1   [] 2   [x] 3   [] 4  
3. Moving from lying on back to sitting on the side of the bed? [] 1   [] 2   [x] 3   [] 4 How much help from another person does the patient currently need. .. Total A Lot A Little None 4. Moving to and from a bed to a chair (including a wheelchair)? [] 1   [] 2   [x] 3   [] 4  
5. Need to walk in hospital room? [] 1   [] 2   [x] 3   [] 4  
6. Climbing 3-5 steps with a railing? [] 1   [] 2   [x] 3   [] 4  
© 2007, Trustees of [facility], under license to [employer]. All rights reserved Score:  Initial: 19 Most Recent: X (Date: -- ) Interpretation of Tool:  Represents activities that are increasingly more difficult (i.e. Bed mobility, Transfers, Gait). Medical Necessity:    
· Skilled intervention continues to be required due to decreased balance and activity tolerance. Reason for Services/Other Comments: · Use of outcome tool(s) and clinical judgement create a POC that gives a: Clear prediction of patient's progress: LOW COMPLEXITY  
  
 
 
 
TREATMENT:  
(In addition to Assessment/Re-Assessment sessions the following treatments were rendered) Pre-treatment Symptoms/Complaints: Matt Bean took so much fluid off of me\" Pain: Initial:  
Pain Intensity 1: 0  Post Session:  0 Gait Training (15 Minutes):  Gait training to improve and/or restore physical functioning as related to mobility, strength, balance, and coordination. Ambulated 120 Feet (ft) with Stand-by assistance;Supervision    and moderate Manual cues; Safety awareness training; Tactile cues; Verbal cues; Visual/Demos related to their stance phase, push off, and ankle position and motion to promote proper body alignment, promote proper body posture, and promote proper body mechanics. Instruction in performance of posture and step clearance to correct stance phase, stride length, ankle position and motion, and hip position and motion. Therapeutic Exercise: (10 Minutes):  Exercises per grid below to improve mobility, strength and coordination. Required minimal visual, verbal and tactile cues to promote proper body alignment and promote proper body posture. Progressed repetitions as indicated. Date: 
10/14 Date: 
10/15 Date: Activity/Exercise Parameters Parameters Parameters LAQs 3x10 4x10 Ankle Pumps 3x10 4x10 Seated Marches 3x10 3x10 Braces/Orthotics/Lines/Etc:  
· O2 Device: Nasal cannula Treatment/Session Assessment: · Response to Treatment:  mild to moderate SOB, moderate fatigue · Interdisciplinary Collaboration:  
o Physical Therapist 
o Registered Nurse · After treatment position/precautions:  
o Supine in bed 
o Bed/Chair-wheels locked 
o Bed in low position 
o Call light within reach 
o RN notified · Compliance with Program/Exercises: Will assess as treatment progresses · Recommendations/Intent for next treatment session: \"Next visit will focus on advancements to more challenging activities\". Total Treatment Duration: PT Patient Time In/Time Out Time In: 6467 Time Out: 9904 Shari Simpson

## 2020-10-15 NOTE — PROGRESS NOTES
PM assessment complete. Resting quietly, no distress noted. Band-aids x 2 to back, s/p thoracentesis, clean, dry and intact, no drainage noted. Denies needs or pain. Aware to call should needs arise. Will continue to monitor through hourly rounding.

## 2020-10-15 NOTE — PROGRESS NOTES
RN at bedside to assist  with ultrasound guided thoracentesis. Consent obtained. Patient sat up on the side of the bed. Procedure explained to patient by MD and patient verbalized understanding. Timeout completed with patient identification. Ultrasound completed bilaterally. ~690cc of fluid drained from left lung and ~710cc of fluid drained from right lung. Post-thoracentesis ultrasound performed and draingage complete. Pictures obtained. Specimens sent to lab as ordered by MD. Vital Signs remain stable through out procedure. See flow sheet. Report given to primary nurse.

## 2020-10-15 NOTE — PROGRESS NOTES
[facility] CARDIOLOGY PROGRESS NOTE 
      
 
10/15/2020 1:14 PM 
 
Admit Date: 10/1/2020 Subjective: Breathing with modest improvement. Thora today, transudative fluid. ROS: 
Cardiovascular:  As noted above Objective:  
  
Vitals:  
 10/15/20 1008 10/15/20 1040 10/15/20 1135 10/15/20 1157 BP:  (!) 103/51 (!) 155/104 (!) 151/104 Pulse:  (!) 104 (!) 104 (!) 109 Resp:  20 20 22 Temp:  98.1 °F (36.7 °C) SpO2: 100% 97% 99% 99% Weight:      
Height:      
 
 
 
Physical Exam: 
General: Well Developed, Well Nourished, No Acute Distress, Alert & Oriented x 3, Appropriate mood Neck: supple, no JVD Heart: S1S2 with RRR without murmurs or gallops Lungs: Clear throughout auscultation bilaterally without adventitious sounds Abd: soft, nontender, nondistended, with good bowel sounds Ext: no edema bilaterally Skin: warm and dry Data Review:  
Recent Labs 10/15/20 
2748 10/14/20 
0802 10/13/20 
6432  139 138  
K 4.1 3.6 3.8 MG 2.3 2.4  --   
BUN 16 15 12 CREA 0.69 0.62 0.57* GLU 88 78 90 WBC  --  10.5 9.5 HGB  --  10.9* 11.2* HCT  --  33.7* 33.9*  
PLT  --  409 415 No results for input(s): Priscilla Wen in the last 72 hours. Assessment/Plan:  
 
Principal Problem: 
  PNA (pneumonia) (10/1/2020) Active Problems: 
  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  
    in her mass in the left lung. This may mean that we can just follow this  
    radiographically, but we will talk about her at tumor board and come back  
    to her with the group's recommendation. 
    -10/30/2019: Patient is discussed at thoracic conference today lead by Dr Singh Mccain. Patient is a 77 yo never smoker. CT guided biopsy recommended. -CT guided Biopsy 11/2020: lung biopsy showed signs of this nodule being a  
    hamartoma, which is a benign tumor that we can simply follow. repeat CT in  
    6 months 
    -Chest CT June 2020: CT scan is stable 
    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  
    secondary to her left lung mass. [place] are several new pulmonary nodules  
    noted which may suggest some metastatic disease.  This require further  
    follow-up CT of the chest in 2 months or doing PET scan. Hamartoma ([address] (10/12/2020) Peripheral vascular disease ([address] (10/12/2020) A/P 
1) Lung mass - outpt PET scan planned. 2) PAD- reduced BP when measured in UE. 3) sCHF - undoubtedly some element of ischemic disease, will plan on ischemic eval once the out pt PET is completed. For now continue IV diuresis with lasix 40 mg iv BID and daily BMPs. Had poor output with oral lasix yesterday. 4) Effusions - per zoraida Ny MD 
10/15/2020 1:14 PM

## 2020-10-15 NOTE — PROGRESS NOTES
Rowdy Savage Admission Date: 10/1/2020 Daily Progress Note: 10/15/2020 The patient's chart is reviewed and the patient is discussed with the staff. Patient is X 70 y.o.  female seen and evaluated at the request of Dr. Carson Doherty. 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 Pt has recently had issues with hypotension which is being managed by her PCP. She was treated with LVQ x 7 days for CAP. She had a virtual visit with KE Wren on 9/30/2020 and pt mentioned that her O2 sats had been borderline low and that she had been tachycardic. An echocardiogram was obtained to evaluate for PAH that same day, but the TV jet was inadequate for estimation of RVSP. Pt felt worse and presented to the ER and her CXR was concerning for PNA. Pt was admitted and started on Zosyn. Pt is being ruled out for COVID. We were consulted to assist with workup and treatment. CT was performed which reveals multiple new nodules, and bilateral pleural effusions. Pt had a L thoracentesis with 1050mL removed and R thoracentesis with 800mL removed on 10/3/2020. Cardiology was consulted on 10/9, pt had L thoracentesis with 1000mL removed and R thoracentesis with 600mL removed. Cardiology was reconsulted secondary to persistent shortness of breath and recommendations for left and right heart catheterization once PET scan and need for lung biopsies have been completed. 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 Vascular surgery felt that no intervention was necessary at this time. Subjective:  
 
Patient up to chair in room, feels \"much better\" Denies any coughing Current Facility-Administered Medications Medication Dose Route Frequency  albuterol (PROVENTIL VENTOLIN) nebulizer solution 2.5 mg  2.5 mg Nebulization BID RT  
 lisinopriL (PRINIVIL, ZESTRIL) tablet 2.5 mg  2.5 mg Oral DAILY  furosemide (LASIX) tablet 40 mg  40 mg Oral ACB&D  
 metoprolol succinate (TOPROL-XL) XL tablet 25 mg  25 mg Oral DAILY  albuterol (PROVENTIL VENTOLIN) nebulizer solution 2.5 mg  2.5 mg Nebulization Q6H PRN  
 morphine injection 1 mg  1 mg IntraVENous Q4H PRN  
 fluticasone propionate (FLONASE) 50 mcg/actuation nasal spray 2 Spray  2 Spray Both Nostrils DAILY  sodium chloride (NS) flush 5-40 mL  5-40 mL IntraVENous Q8H  
 sodium chloride (NS) flush 5-40 mL  5-40 mL IntraVENous PRN  
 acetaminophen (TYLENOL) tablet 650 mg  650 mg Oral Q6H PRN Or  
 acetaminophen (TYLENOL) suppository 650 mg  650 mg Rectal Q6H PRN  polyethylene glycol (MIRALAX) packet 17 g  17 g Oral DAILY PRN  
 enoxaparin (LOVENOX) injection 40 mg  40 mg SubCUTAneous DAILY  ondansetron (ZOFRAN ODT) tablet 4 mg  4 mg Oral Q8H PRN Or  
 ondansetron (ZOFRAN) injection 4 mg  4 mg IntraVENous Q6H PRN  
 aspirin delayed-release tablet 81 mg  81 mg Oral DAILY  levothyroxine (SYNTHROID) tablet 75 mcg  75 mcg Oral ACB  montelukast (SINGULAIR) tablet 10 mg  10 mg Oral DAILY  pantoprazole (PROTONIX) tablet 40 mg  40 mg Oral ACB  atorvastatin (LIPITOR) tablet 10 mg  10 mg Oral DAILY  budesonide-formoteroL (SYMBICORT) 160-4.5 mcg/actuation HFA inhaler 2 Puff  2 Puff Inhalation BID RT Review of Systems 
+cough 
+shortness of breath Constitutional: negative for fever, chills, sweats Cardiovascular: negative for chest pain, palpitations, syncope, edema Gastrointestinal:  negative for dysphagia, reflux, vomiting, diarrhea, abdominal pain, or melena Neurologic:  negative for focal weakness, numbness, headache Objective:  
 
Vitals:  
 10/15/20 0642 10/15/20 0713 10/15/20 1008 10/15/20 1040 BP: (!) 92/51 132/73  (!) 103/51 Pulse: 96 100  (!) 104 Resp: 18   20 Temp: 98.2 °F (36.8 °C)   98.1 °F (36.7 °C) SpO2: 95%  100% 97% Weight:      
Height:      
 
 
 
Intake/Output Summary (Last 24 hours) at 10/15/2020 1135 Last data filed at 10/15/2020 1051 Gross per 24 hour Intake  Output 2100 ml Net -2100 ml Physical Exam:  
Constitution:  the patient is well developed and in no acute distress EENMT:  Sclera clear, pupils equal, oral mucosa moist 
Respiratory:  Diminished at bases bilaterally, on 2.5L O2 Cardiovascular:  RRR without M,G,R 
Gastrointestinal: soft and non-tender; with positive bowel sounds. Musculoskeletal: warm without cyanosis. There is no lower extremity edema. Skin:  no jaundice or rashes Neurologic: no gross neuro deficits Psychiatric:  alert and oriented x 3 CXR:  
 
 
 
 
 
LAB No results for input(s): GLUCPOC in the last 72 hours. No lab exists for component: [place] Recent Labs 10/14/20 
0802 10/13/20 
4064 WBC 10.5 9.5 HGB 10.9* 11.2* HCT 33.7* 33.9*  
 415 Recent Labs 10/15/20 
6975 10/14/20 
0802 10/13/20 
1729  139 138  
K 4.1 3.6 3.8 CL 97* 98 101 CO2 37* 33* 34* GLU 88 78 90 BUN 16 15 12 CREA 0.69 0.62 0.57* MG 2.3 2.4  --   
CA 9.5 9.5 9.4 PHOS  --  4.0*  --   
ALB  --  2.9*  --   
TBILI  --  0.4  --   
ALT  --  33  -- No results for input(s): PH, PCO2, PO2, HCO3, PHI, PCO2I, PO2I, HCO3I in the last 72 hours. No results for input(s): LCAD, LAC in the last 72 hours. Assessment:  (Medical Decision Making) Hospital Problems  Date Reviewed: 10/14/2020 Codes Class Noted POA Multiple lung nodules on CT (Chronic) ICD-10-CM: R91.8 ICD-9-CM: 793.19  10/12/2020 Unknown Overview Addendum 10/12/2020  2:33 PM by Alberto Lind NP  
  -PET scan 10/2019: PET scan fortunately did not have any abnormal activity in her mass in the left lung.  This may mean that we can just follow this radiographically, but we will talk about her at tumor board and come back to her with the group's recommendation. 
-10/30/2019: Patient is discussed at thoracic conference today lead by Dr Heron Samson. Patient is a 79 yo never smoker. CT guided biopsy recommended. -CT guided Biopsy 11/2020: lung biopsy showed signs of this nodule being a hamartoma, which is a benign tumor that we can simply follow. repeat CT in 6 months 
-Chest CT June 2020: CT scan is stable 
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 [place] are several new pulmonary nodules noted which may suggest some metastatic disease.  This require further follow-up CT of the chest in 2 months or doing PET scan. Awaiting outpt PET CT scan Hamartoma (HCC) (Chronic) ICD-10-CM: Q85.9 ICD-9-CM: 759.6  10/12/2020 Yes Chronic Peripheral vascular disease ([facility] [address] ICD-10-CM: I73.9 ICD-9-CM: 443.9  10/12/2020 Unknown Chronic Bilateral pleural effusion ICD-10-CM: J90 ICD-9-CM: 511.9  10/7/2020 Yes Overview Signed 10/13/2020  1:25 PM by DENI Rodríguez  
  10/3/2020: L thoracentesis 1050mL removed R thoracentesis 800mL removed 10/9/2020: L thoracentesis: 1000mL removed R thoracentesis: 600mL removed Repeat CXR Acute systolic heart failure (HCC) ICD-10-CM: I50.21 ICD-9-CM: 428.21  10/6/2020 Yes On lasix 40mg IV BID * (Principal) PNA (pneumonia) ICD-10-CM: J18.9 ICD-9-CM: 061  10/1/2020 Unknown Completed zosyn Mass of lingula of lung ICD-10-CM: R91.8 ICD-9-CM: 786.6  10/16/2019 Yes Hypercholesterolemia (Chronic) ICD-10-CM: E78.00 ICD-9-CM: 272.0  6/30/2015 Yes Mild persistent asthma without complication (Chronic) QFN-75-ZI: J45.30 ICD-9-CM: 493.90  10/17/2013 Yes Continue nebs, will change to scheduled Acquired hypothyroidism (Chronic) ICD-10-CM: E03.9 ICD-9-CM: 244.9  10/17/2013 Yes GERD (gastroesophageal reflux disease) (Chronic) ICD-10-CM: K21.9 ICD-9-CM: 530.81  10/17/2013 Yes Plan:  (Medical Decision Making) --Continue O2, wean O2 as tolerated,  
--D dimer negative 
--Continue IV lasix 40mg IV BID- pleural fluid is transudative with negative cytology 
--Continue albuterol Q6H 
--Continue Symbicort 
--Will need right and left heart cath once lung nodule work up is complete --Awaiting OP PET/CT 
--Due to PVD being severe and causing falsely decreased BPs, cardiology recommends obtaining BP in lower ext Proceed with thoracentesis. More than 50% of the time documented was spent in face-to-face contact with the patient and in the care of the patient on the floor/unit where the patient is located.  
 
 
Ian Galarza MD

## 2020-10-15 NOTE — ROUTINE PROCESS
Respiratory Care Services Policy Number: -[id number] Title: Patient Care Assessment Protocol Effective Date: 01/1999 Revised Date: 05/2014, 04/2018, 12/2018, 07/2019 Reviewed Date: 06/2013/ 03/2015, 03/2016, 06/2017 Overview In an effort to improve quality and reduce costs of respiratory care at [facility], the Respiratory Department has developed a number of Patient Care Protocols. These protocols have been developed according to Omid 3 and are utilized for those patients who are ordered respiratory therapy using therapeutic indications and standardized approaches for accomplishing objectives. Patient Care Protocols are intended to improve care by: ? Defining the indications and standards of care agreed upon by the Pulmonary Medicine and [address] [place] [address] of [facility]. ? Training respiratory care practitioners to apply those criteria to individual patients and modify therapy as indicated by the protocols. ? Documenting the indication and care plan as part of the initial ordering process. ? Tapering or discontinuing treatments once the indication for therapy changes. The Patient Care Protocols shall be universally applied throughout the hospital as determined by  
the Pulmonary Medicine and [address] [place] [address]. Rationale for Patient Care Assessment Protocols: 
? Continuous Quality Improvement ? Cost containment ? Standardization of care 
? Enhanced continuity of care ? Utilization review ? Timely intervention The following patient care assessment protocols have been developed: ? Aerosolized Medication Protocol http://spXiangya Groupb/[facility] HospitalTuan800s/roxana/juan/policies/Respiratory%20Care%20Services%20Policies/-[id number]. doc ? Bronchial Hygiene Protocol http://[email or web address] ApSl/stfrancis/policies/Respiratory%20Care%20Services%20Policies/-[id number]. doc 
 ? Oxygen Protocolhttp://[facility]/stfrancis/policies/Respiratory Care Services Policies/-[id number]. doc http://[facility]/stfrancis/policies/Respiratory%20Care%20Services%20Policies/-[id number]. doc 
? CVRU Fast Track Weaning Protocol http://[facility]/stfrancis/policies/Respiratory%20Care%20Services%20Policies/-[id number]. doc ? Asthma Treatment Protocol ERhttp://[facility]/stfrancis/policies/Respiratory Care Services Policies/-[id number]. doc http://[facility]/stfrancis/policies/Respiratory%20Care%20Services%20Policies/-[id number]. doc ? Pediatric Asthma Treatment Protocol ERhttp://[facility]/stfrancis/policies/Respiratory Care Services Policies/-[id number]. doc http://[facility]/stfrancis/policies/Respiratory%20Care%20Services%20Policies/-[id number]. doc ? Alpha-1 Antitrypsin Deficiency Protocolhttp://[facility]/stfrancis/policies/Respiratory Care Services Policies/-[id number]. doc http://[facility]/stfrancis/policies/Respiratory%20Care%20Services%20Policies/-[id number]. doc ? Prone Positioning Protocol http://[facility]/stfrancis/policies/Respiratory%20Care%20Services%20Policies/-[id number]. doc 
? COPD Protocol http://[facility]/stfrancis/policies/Respiratory%20Care%20Services%20Policies/-[id number]. doc 
? Home Oxygen Assessment Protocolhttp://nettieK[facility] Park Psych[facility] Center/[facility]/juan/policies/Respiratory Care Services Policies/-[id number]. doc http://[facility]/stancis/policies/Respiratory%20Care%20Services%20Policies/-[id number]. doc 
? Ventilator Weaning Protocol http://nettieK[facility] Park Psychiatric Center/[facility]/stancis/policies/Respiratory%20Care%20Services%20Policies/-[id number]. doc ? Lung Volume Expansion Protocolhttp://nettieKings Park Psychiatric Center/[facility]/stancis/policies/Respiratory Care Services Policies/-[id number]. doc http://[facility]/gvl/stfrancis/policies/Respiratory%20Care%20Services%20Policies/-[id number]. docm The Director of [address] [place] [address] oversees the Patient Care Assessment Program. The Respiratory Educator is responsible for protocol development and training. The Supervisor is responsible for implementation and  activities. Each patient with an order for respiratory treatments will receive an evaluation. Respiratory Care Practitioners (RCP's) will perform the evaluations. The same evaluation tool will be utilized for initial and follow-up assessments. If the patient does not meet criteria for ordered therapy, the therapy will be discontinued. If the patient demonstrates an adverse response to initially ordered therapy, the therapy will be discontinued and the physician will be contacted. Specific physician's orders that deviate from protocols and are deemed \"inappropriate\" or \"unsafe\" will be addressed with ordering physician and/or medical director as required. Respiratory Patient Care Assessment Protocols I. Policy: In an effort to provide quality patient care and effective utilization of services, physicians who order respiratory therapy will have their patients treated via the protocols established (see attached) Respiratory Care Practitioners (RCP's) will complete the initial assessment which will indicate patient needs,  the care plan developed and will performed within 24 hours of admission. Frequency of the therapy will be set according to the results of the respiratory therapy evaluation and frequency guidelines policy. Reassessment will be continued every 48 hours and more frequently as needed for the individual patient. II. Purpose: A.  To provide a process that will allow for ongoing assessment and care plan modification for patients receiving respiratory services based on both objective and or subjective patient responses to interventions. This process of protocol utilization will assist in patient care progression while eliminating the need for the physician to continually update respiratory therapy orders. B. To assure continuity of respiratory care that meets [facility] Clinical Practice Guidelines. III. Initiation:  Implement Respiratory Care Protocols for patients who are ordered by physician  
       to receive respiratory therapy procedures or for ventilator management. IV. Protocol: A. Upon receiving an order for therapy the RCP will review the patient's EMR (electronic medical record) for all pertinent information includin. Physician's order for therapy 2. Patient history and physical examination 3. Physician progress notes 4. Diagnostic. X-rays, PFT's, arterial blood gases etc. 
B. The RCP will perform a respiratory assessment in the following manner: 1. General observations: color, pattern and effort of breathing, chest expansion, (symmetrical and bilateral), level of consciousness and the ability to ambulate. 2. The RCP will assess patient's cough ability and determine if bronchial hygiene is needed. If patient is unable to produce sputum, at that time, the RCP should question the patient with regard to their sputum: production, color consistency, frequency and amount. 3. Auscultation: Using a stethoscope, the RCP will listen and note quality of breath sounds and presence or absence of adventitious breath sounds in all lung fields, both anteriorly and posteriorly. 4. Upon completing the EMR review and physical assessment, the RCP will document findings in the RT Assessment section of the EMR. The score level will be provided and will be used to determine the frequency of therapy. V. Indications: A. Bronchial Hygiene Protocol indications: 1. Potential for or presence of atelectasis. 1. Need for hydration and removal of retained secretions. 1. Need for improvement of cough effectiveness. 1. Presence of conditions associated with disorder of pulmonary clearance: 
a. Cystic fibrosis b. Bronchiectasis 
c. Neuromuscular disease 
d. Obstructive lung diseases 
e. Restrictive lung diseases Aerosolized Medication(s) Protocol indications:Treatment of bronchospasm/wheezing 2. Improvement of mucociliary clearance 3. Treatment of stridor 4. History of Bronchiectasis, Asthma or COPD 
C. Oxygen Therapy Protocol indications: 1. Documented hypoxemia 2. Severe trauma 3. Acute myocardial infarction 4. Short-term therapy (e.g. post anesthesia recovery) D. [facility] Ventilator Weaning Protocol indications: 1. All mechanically ventilated surgical patients unless they have a no wean order. E. Asthma Treatment Protocol ER indications: 1. Patients 15years of age and older that have been triaged or diagnosed with   asthma exacerbation shall be indicated for the ER Asthma Treatment Protocol. A physician order will be required to initiate the protocol. F. Pediatric Asthma protocol in the ER indications: 1. Patients less than 15years old that have been triaged or diagnosed with asthma exacerbation shall be indicated for the Pediatric Asthma protocol. A physician order will be required to initiate the protocol. G. Alpha-1 Antitrypsin Deficiency Testing protocol indications: 1. Patients admitted and diagnosed with COPD. H. Prone Positioning Protocol indications: 
       1. Acute lung injury 2. Acute respiratory distress syndrome (ARDS) I. Respiratory Care COPD Protocol indications: 1. History of COPD in patient's records 2. Smoking history J. Home Oxygen Assessment Protocol indications: 1. Chronic lung disease 2. Cor pulmonale 3. Unable to wean to room air 48 hours prior to anticipated discharge. K.  Ventilator Weaning Protocol indications: 1. Patient's mechanically ventilated 2. Managed by intensivist 
LAndi Lung Volume Expansion Protocol indications: 
      1. Any patient at risk for pulmonary complications. VI. Maintenance: A. Timely patient assessment is an integral part of this protocol therefore the following will be applied: 1. All non- critical care patients will be evaluated upon receiving initial respiratory care orders within 24 hours and re-evaluated within 48 hours (or more as needed). 2. Orders requesting a Respiratory Consult will be responded to in the following manner: 
a. In patient emergency situations, the RCP assigned to the floor will respond immediately to the patient, provide an initial respiratory assessment, and contact the patient's physician as necessary for appropriate orders. b. In non-emergent situations, the RCP assigned to the floor will respond to the patient within 90 minutes and provide an initial respiratory assessment and contact patient's physician as necessary for appropriate orders. c. An RCP will provide a comprehensive assessment as soon as possible. 3. Upon completion of an evaluation, the RCP will complete documentation in the patient's EMR in the RT Assessment section. 4. The RCP who completes the assessment will document orders for therapy in the orders section of the patient's EMR selecting new order. Next, per protocol should be selected indicating it is a protocol order and sign orders should be selected to complete the process. The applicable protocol must be added to the progress note per Joint Commission guidelines. 5. The Pharmacy and Therapeutics (P&T) Committee has mandated that the medication Xopenex may be changed to unit dose albuterol without an order, except for those patients receiving Xopenex due to cardiac arrhythmias. 1. The dosage for these patients should be 0.63 mg. and may be changed from 1.25 mg. to 0.63 mg per P & T Committee by the RCP completing the assessment. 6. Patients who are not experiencing cardiac arrhythmias, and are ordered Xopenex and Atrovent may be changed to Duoneb. VII. Safety: A. The following safety issues shall be monitored: 1. The RCP will perform hand hygiene per hospital policy utilizing Standard Precautions for all patients and following transmission-based isolation as indicated per hospital policy. 2. The RCP must exercise professional judgment in classifying the patient for frequency of therapy. 3. Appropriate classification of the patient will require an evaluation utilizing the Therapy Assessment Protocol Guidelines. 4. The RCP will confer with the physician concerning the care of the patient at any time questions or problems arise. 5. If during therapy, the patient exhibits no improvement, or deterioration in clinical status the RCP will notify the physician and the patient's nurse. VIII. Interventions: A. The patient's nurse is responsible concerning all items related to his/her care. Ongoing communication with nursing is essential to successful protocol management. B. The RCP recognizes the value of the team approach in meeting the patient's needs. Nursing input regarding the patient's pulmonary condition will be sought as needed. IX. Reportable conditions: The RCP will inform the physician if: 1. There are acute changes in patient's respiratory status. 2. The therapist is unable to determine appropriate care plan upon assessment. 3. The patient fails to reach therapeutic objective. 4. A change or additional medication is needed. X.  Patient Education: A. Patient will receive instruction on the followin. The treatment modality, including objectives and proper technique of therapy 2. Respiratory medications B. Documentation shall occur in the patient education section of the patient's EMR. XI. Documentation: Record all findings as described above in the patient's EMR. Related Protocols: A. Aerosolized Medication Protocol B. Bronchial Hygiene C. Oxygen Protocol D. [facility] Fast Track Weaning Protocol E. Asthma Treatment protocol ER 
F. Alpha-1 antitrypsin Deficiency Protocol G. Prone Positioning Protocol H. Respiratory Care COPD Protocol I. Home Oxygen assessment Protocol J. Ventilator Weaning Protocols K. Volume Expansion protocol Indications      Frequency          Level A. Aerosol therapy 1.  q4h     Severe SOB, wheezing, unable to sleep 1 2.  qid, q4 wa or q6h   Moderate SOB, wheezing   2 3.  tid      Hx of asthma, or COPD mild wheezing,  
      or facilitate secretion removal              3 
 4.  bid      Asthma, or COPD, Intermittent wheezing 4 5. PRN, i.e. tid PRN, qid PRN Asthma, or COPD, occasional wheezing 5 B. Bronchopulmonary Hygiene 1. q4h             Copious secretions, SOB, unable to sleep 1 2. qid & PRN            Moderate amounts of secretions   2 3. tid           Small amounts of secretions and poor cough,   
           history of secretions    3 4. PRN, i.e. tid PRN, qid PRN     Breathing exercises, encourage cough only 4  
  
C. Oxygen Therapy     Follow hospital approved Oxygen Protocol Note: 
qid treatments are due 0800, 1200, 1600, and 2000. tid treatments are due 0800, 1400, and 2000 Q6h treatments are due 0800, 1400, 2000, 0200 Q4 wa teatments are due 0800, 1200, 1600, and 2000. Q4h treatments are due  0800, 1200, 1600, 2000, 0000, and 0400. The Level 1-5 rating system is only to be used as criteria for determining appropriate frequency of therapy. References:  
[address] [place] [address] Standard L    Respiratory Care Department Policy, Procedure and Protocol Guideline Manual, 1995, HIREN Sultana. L  Therapist Driven Respiratory Care Protocols  A Practitioner's Guide for Criteria-Based Respiratory Care by Cate Barahona M.D., and HIREN Gleason, GABY. L  The rationale for therapist-driven protocols: an update. Respiratory Care 1998; 46:275-092 L Therapist Driven Respiratory Care Protocols  A Practitioner's Guide for Criteria-Based Respiratory Care by Jolanta Burroughs M.D., and CADY Bridges The rationale for therapist-driven protocols: an update. Respiratory Care 1998; [id number]. N   [facility] Clinical Practice Guidelines. A. The RCP will perform a respiratory assessment in the following manner: 1. Perform hand hygiene per hospital policy utilizing Standard Precautions for all patients and following transmission-based isolation as indicated per policy. 2. Identify patient via ID bracelet verifying patient name and birth date. 3. General observations: color, pattern and effort of breathing, chest expansion, (symmetrical and bilateral), level of consciousness and the ability to ambulate. 4. The RCP will assess patients cough ability and determine if Nasotracheal suctioning is needed. If patient is unable to produce sputum, at that time, the RCP should question the patient with regard to their sputum: production, color consistency, frequency and amount. 5. Auscultation: Using a stethoscope, the RCP will listen and note quality of breath sounds and presence or absence of adventitious breath sounds in all lung fields, both anteriorly and posteriorly. 6. Upon completing the EMR review and physical assessment, the RCP will document findings in the RT Assessment section of the EMR. The score level will be provided and will be used to determine the frequency of therapy. V. Indications: A. Indications for Bronchial Hygiene Protocol will include: 1. Potential for or presence of atelectasis. 2. Need for hydration and removal of retained secretions. 3. Need for improvement of cough effectiveness. 4. Presence of conditions associated with disorder of pulmonary clearance: 
a. Cystic fibrosis b. Bronchiectasis B.  Indications for Aerosolized Medication(s) Protocol should include: 1. Treatment of bronchospasm/wheezing 2. Improvement of mucociliary clearance 3. Treatment of stridor 4. History of Asthma or COPD 
           C.  Indications for Oxygen Therapy Protocol should include: 1. Documented hypoxemia 2. Severe trauma 3. Acute myocardial infarction 4. Short-term therapy (e.g. post anesthesia recovery) VI. Maintenance: A. Timely patient assessment is an integral part of this protocol therefore the following will be applied: 1. All non- critical care patients will be evaluated upon receiving initial respiratory care orders within 24 hours and re-evaluated within 48 hours (or more as needed). 2. Orders requesting a Respiratory Consult will be responded to in the following manner: 
a. In patient emergency situations, the RCP assigned to the floor will respond immediately to the patient, provide an initial respiratory assessment, and contact the patients physician as necessary for appropriate orders. b. In non-emergent situations, the RCP assigned to the floor will respond to the patient within 90 minutes and provide an initial respiratory assessment and contact patients physician as necessary for appropriate orders. c. An RCP will provide a comprehensive assessment as soon as possible. 3. Upon completion of an evaluation, the RCP will complete documentation in the patients EMR in the RT Assessment section. 4. The RCP who completes the assessment will document orders for therapy in the orders section of the patients EMR selecting new order. Next, per protocol should be selected indicating it is a protocol order and sign orders should be selected to complete the process. 5. The Pharmacy and Therapeutics (P&T) Committee has mandated that the medication Xopenex may be changed to unit dose albuterol without an order, except for those patients receiving Xopenex due to cardiac arrhythmias. The dosage for these patients should be 0.63 mg. and may be changed from 1.25 mg. to 0.63 mg per P & T Committee by the RCP completing the assessment. 6. Patients who are not experiencing cardiac arrhythmias, and are ordered Xopenex and Atrovent may be changed to Duoneb. VII. Safety: A. The following safety issues shall be monitored: 1. The RCP will perform hand hygiene per hospital policy utilizing Standard Precautions for all patients and following transmission-based isolation as indicated per hospital policy. 2. The RCP must exercise professional judgment in classifying the patient for frequency of therapy. 3. Appropriate classification of the patient will require an evaluation utilizing the Therapy Assessment Protocol Guidelines. 4. The RCP will confer with the physician concerning the care of the patient at any time questions or problems arise. 5. If during therapy, the patient exhibits no improvement or deterioration in clinical status the RCP will notify the physician and the patients nurse. VIII. Interventions: A. The patients nurse is responsible concerning all items related to his/her care. Ongoing communication with nursing is essential to successful protocol management. B. The RCP recognizes the value of the team approach in meeting the patients needs. Nursing input regarding the patients pulmonary condition will be sought as needed. IX. Reportable conditions: The RCP will inform the physician if: 1. There are acute changes in patients respiratory status. 2. The therapist is unable to determine appropriate care plan upon assessment. 3. The patient fails to reach therapeutic objective. 4. A change or additional medication is needed. X.  Patient Education: A. Patient will receive instruction on the followin. The treatment modality, including objectives and proper technique of therapy 2. Respiratory medications B. Documentation shall occur in the patient education section of the patients EMR. XI. Documentation: Record all findings as described above in the patients EMR. Related Protocols: A. Aerosolized Medication Protocol B. Bronchial Hygiene C. Oxygen Protocol D. Volume Expansion/Secretion Clearance E. Ventilator Weaning Protocols References: 
[address] Standard L    Respiratory Care Department Policy, Procedure and Protocol Guideline Manual, 1995, HIREN PADGETT  Therapist Driven Respiratory Care Protocols  A Practitioners Guide for Criteria-Based Respiratory Care by Jolanta Burroughs M.D., and HIREN Fleming RRT. L  The rationale for therapist-driven protocols: an update. Respiratory Care 1998; 24:492-283 N   [facility] Clinical Practice Guidelines. Respiratory Care Services Policy Number: -[id number] Title: Aerosolized Medication Protocol Effective Date: 10/1998 Revised Date: 06/13, 03/16, 11/17, 07/19 Reviewed Date: 05/14/ 03/15 , 06/17, 5/18 I. Policy: The Aerosolized Medication Protocol shall by implemented by Respiratory Care Practitioners (RCP) for patients with orders to receive aerosol therapy with medication. II. Purpose: To open and maintain obstructed airways, the RCP, will utilize the following  
protocol to select the indicated aerosolized medication(s) and determine the most effective method of delivery to the patient. III. Patient Type: All patients who are determined to meet aerosolized medication criteria as  
       outlined in this protocol. IV. Responsibility: Director, [address], registered Respiratory Care Practitioners (RCP's) with documented competency in the performance of respiratory therapeutic techniques. V. Equipment needed: C. Stethoscope D. Pulse oximeter E. AeroEclipse nebulizer F. Dry Powder Inhaler (DPI) VI. Protocol: C. The following conditions are accepted indications for aerosolized medication therapy. 5. Bronchospasm/wheezing 6. Impaired mucociliary clearance 7. Tracheobronchial mucosal congestion/and laryngeal stridor 8. Diseases which commonly require aerosolized medication therapy include, but are not limited to: 
f. Asthma/reactive airway disease 
g. Bronchitis/emphysema (COPD) h. Cystic fibrosis 
i. Severe laryngitis/tracheitis 
j. Bronchiectasis 
k. Smoke inhalation or chemical trauma to the lung or upper airway 
l. Physical trauma to the upper airway 
m. Laryngotracheobronchitis 
n. Bronchiolitis 
o. Non-specific wheezing B. Indications for bronchodilator medications will include: 
d. Bronchospasm/ wheezing 
e. Asthma/reactive airway disease 
f. Chronic obstructive pulmonary disease 
g. Obstructive defect on pulmonary function testing C. Administration of medications 5. If a bronchodilator or any other type of respiratory medication is needed, a physician order must be indicated in the medication section in the patient's EMR. 6. When the physician specifies the medication and dosage at the time of request, the ordered medication will be used as part of the care plan. D. The following guidelines will be utilized in the evaluation and selection of the appropriate delivery device for indicated medication(s): 
1. Unassisted aerosol (UA) is the preferred method of aerosol delivery and indicated if 
c. Ventilation is inadequate 
d. Patient demonstrates wheezing  
e. Routine treatments shall be given via the AeroEclipse nebulizer. f. The Aerogen nebulizer shall be used in the following circumstances: 
i. ER patients and they will continue with this nebulizer if admitted to 8th floor or ICU. 
ii. Patients in ICU  
iii. Patients on 8th floor with severe wheezing (at the RCP's discretion) 2. Dry PowderInhaler (DPI)  
d. Patient should be alert/cooperative 
e. Able to perform 3 second breath hold. f. Patient has used DPI therapy previously, either at home or in the hospital. 
g. Note: The only approved inhaler on formulary is Spiriva. VII. Guidelines:  
Monitor patient's vital signs and evaluate patient's clinical status. The need to change medication and/or modality may be indicated by: 5. A pulse greater than 120 bpm, or if a pulse increase of 20 bpm occurs with bronchodilator medications. 6. Significant worsening of dyspnea or wheezing occurring during or within 30 minutes of discontinuing therapy. 7. Worsening of patient's sensorium (e.g. patient becomes confused or obtunded, and unable to follow directions). 8. Worsening of patient's chest x-ray. 9. Change in sputum (e.g. increased pulmonary infiltrate, which might indicate need for volume expansion therapy). 10. Patient has difficulty coughing up secretions, which might indicate need for acetylcysteine and/or bronchial hygiene therapy. 11. Call physician immediately if dyspnea worsens and is not responsive to modifications allowed by protocol. VIII. Clinical Responsibility: 
2. The therapy assessment guidelines will be used to evaluate all patients receiving aerosolized medications with the exception of critical care areas. 5. RCP's will perform changes in therapy per protocol. 6. It will be the responsibility of RCP to provide instruction regarding respiratory medications, possible side effects, aerosol therapy and proper DPI technique, as well as, spacer usage to patients ordered DPI therapy. 7. Current therapy that is part of a patient's home regimen will not be discontinued. a. Provide spacer and educate patient on proper inhaler technique if needed. IX. Documentation A. Document assessment findings in the respiratory assessment section of the patient's EMR. B. Document changes in therapy per protocol in the respiratory orders section and in the care plan section of the patient's EMR. C. Document patient education in the patient education section of the patient's EMR. X. Outcome Criteria: 
B. Relief of wheezes and obstruction C. Improved cough and sputum color and consistency D. Improved chest x-ray E. Improved arterial oxygen tension and or SaO2 
F. Improved Peak Flow on asthmatic patients XI. Related Protocols: 
B. Respiratory Patient Care Protocols C. Bronchial Hygiene Therapy D. Oxygen Protocol Reference: L - Respiratory Care Department Policy, Procedure and Protocol Guideline Manual, 1995, HIREN Sultana. L -  Therapist Driven Respiratory Care Protocols  A Practitioner's Guide for Criteria-Based Respiratory Care by Danilo Kern M.D., and HIREN Sequeira, GABY. L - The rationale for therapist-driven protocols: an update. Respiratory Care 1998; [id number]. N -[facility] Clinical Practice Guidelines.

## 2020-10-15 NOTE — PROGRESS NOTES
Shift assessment complete. A&OX4. Lungs diminished on auscultation. Respirations present. Even and unlabored. No s/s of distress. Zero c/o pain at this time. Call light within reach. Encouraged patient to call for assistance. Patient verbalizes understanding. See Doc Flowsheet for assessment details. Patient resting in bed. Will continue to monitor.

## 2020-10-15 NOTE — PROCEDURES
PROCEDURE: 
 
DIAGNOSTIC/THERAPEUTIC THORACENTESIS. PRE-OP DIAGNOSIS: 
 
L PLEURAL EFFUSION POST-OP DIAGNOSIS: 
 
L PLEURAL EFFUSION 
 
ASSISTANT: 
 
Ghanshyam Berry RT 
 
ANESTHESIA: 
 
LOCAL ANESTHESIA WITH 1% LIDOCAINE 10 CC TOTAL. CHEST ULTRASOUND FINDINGS: 
 
A Turbo-M, Sonosite ultrasound with a 5-16 mHz probe was used to image the chest and localize the pleural effusion on the Left and Right chest. 
 
A lmoderatel anechoic space was seen on the Left consistent with an uncomplicated pleural effusion. DESCRIPTION OF PROCEDURE: 
 
After obtaining informed consent and localizing the safest location for thoracentesis, the  9th intercostal space was marked with a blunt, plastic needle cap in the mid scapular line. An Devver AK-0100 Pleral-Seal thoracentesis kit was used to perform the procedure. The skin was  cleansed with the supplied  chlorhexididne swab and then draped in the usual fasion. Using the previously marked location as a giude, a 22 G 1.5 inch needle was used to inject 10 cc of 1% lidocaine into the skin and subcutaneous tissue, as well as onto the underlying rib and inter-costal muscles, pleural fluid was aspirated to assure proper location, prior to removing the anesthesia needle. A 3mm  incision was then made, with the supplied scalpel in the usual fashion to facilitate the insertiopn of the thoracentesis needle. The needle with an 8French thoracentesis catheter was then introduced into the chest through the previously made incision in the usual fashion, the rib localized with the needle, and the catheter then marched over the rib into the pleural space. After aspirating fluid, the thoracentesis catheter was then placed into the chest using the needle itself as a trocar. The needle was then removed and the catheter was attached to the supplied tubing without complication. 12 cc of Clear straw colored fluid, was aspirated and sent for analysis. Following this a total of 690 cc was removed from the Left chest, without complication, with the patient experiencing coughing during the procedure. Fluid was sent for the following tests: 
 
Cell count with differential 
LDH Glucose Total protein Cytology Routine culture and Gram stain Post procedure US confirmed complete drainage of the effusion. EBL:  
 
1 drop COMPLICATIONS: 
none Stef Pickett MD.

## 2020-10-15 NOTE — PROGRESS NOTES
Comprehensive Nutrition Assessment Type and Reason for Visit: Nolvia Mejia Nutrition Recommendations/Plan: 
 
Continue with current diet and ONS Nutrition Assessment:  Pt admitted due to PNA. PMH notable for lung mass, asthma, GERD, HLD, HTN, lymphoma, and hypothyroidism. Newly diagnosed with CHF (EF 35-40%) on 10/10. Pt has had x2 thoracentesis since initial assessment. Pt reports consuming most of meals and drinking Ensure Enlive. Pt reports eating oatmeal at evening meals still, but continues to drink Ensure Enlive. Estimated Daily Nutrient Needs: 
Energy (kcal):  1395-1674kcal(25-30kcal/kg (CBW: 55.8kg)) Protein (g):  56-67gm(1-1.2gm/kg (CBW: 55.8kg)) Current Nutrition Therapies: DIET REGULAR 
DIET NUTRITIONAL SUPPLEMENTS Dinner; Ensure [employer] ( ) Anthropometric Measures: 
· Height:  5' 1.34\" (155.8 cm) · Current Body Wt:  55.2 kg (121 lb 11.1 oz)(not specified; 10/13) · Ideal Body Wt:  107 lbs:  113.7 % · BMI Category:  Normal weight (BMI 22.0-24.9) age over 72 Nutrition Diagnosis:  
· Inadequate oral intake related to (poor appetite) as evidenced by (difficulty breathing) Nutrition Interventions:  
Food and/or Nutrient Delivery: Continue current diet, Continue oral nutrition supplement Goals: Will meet >75% estimated nutrition needs within 7 days Nutrition Monitoring and Evaluation:  
Food/Nutrient Intake Outcomes: Food and nutrient intake, Supplement intake Discharge Planning: Too soon to determine Electronically signed by Ruma Smith MS, RDN, LD 10/15/2020 at 2:30 PM 
Contact: [phone]

## 2020-10-15 NOTE — PROCEDURES
PROCEDURE: 
 
DIAGNOSTIC/THERAPEUTIC THORACENTESIS. PRE-OP DIAGNOSIS: 
 
R PLEURAL EFFUSION POST-OP DIAGNOSIS: 
 
R PLEURAL EFFUSION 
 
ASSISTANT: 
 
None ANESTHESIA: 
 
LOCAL ANESTHESIA WITH 1% LIDOCAINE 10 CC TOTAL. CHEST ULTRASOUND FINDINGS: 
 
A Turbo-M, Sonosite ultrasound with a 5-16 mHz probe was used to image the chest and localize the pleural effusion on the Left and Right chest. 
 
A lmoderatel anechoic space was seen on the Right consistent with an uncomplicated pleural effusion. DESCRIPTION OF PROCEDURE: 
 
After obtaining informed consent and localizing the safest location for thoracentesis, the  9th intercostal space was marked with a blunt, plastic needle cap in the mid scapular line. An nVoq AK-0100 Pleral-Seal thoracentesis kit was used to perform the procedure. The skin was  cleansed with the supplied  chlorhexididne swab and then draped in the usual fasion. Using the previously marked location as a giude, a 22 G 1.5 inch needle was used to inject 10 cc of 1% lidocaine into the skin and subcutaneous tissue, as well as onto the underlying rib and inter-costal muscles, pleural fluid was aspirated to assure proper location, prior to removing the anesthesia needle. A 3mm  incision was then made, with the supplied scalpel in the usual fashion to facilitate the insertiopn of the thoracentesis needle. The needle with an 8French thoracentesis catheter was then introduced into the chest through the previously made incision in the usual fashion, the rib localized with the needle, and the catheter then marched over the rib into the pleural space. After aspirating fluid, the thoracentesis catheter was then placed into the chest using the needle itself as a trocar. The needle was then removed and the catheter was attached to the supplied tubing without complication. 12 cc of Clear straw colored fluid, was aspirated and sent for analysis. Following this a total of 710 cc was removed from the Right chest, without complication, with the patient experiencing coughing during the procedure. Fluid was sent for the following tests: 
 
Cell count with differential 
LDH Glucose Total protein Cytology Routine culture and Gram stain Post procedure US confirmed complete drainage of the effusion. EBL:  
 
1 drop COMPLICATIONS: 
none Nessa Cunningham MD.

## 2020-10-16 NOTE — PROGRESS NOTES
Problem: Risk for Spread of Infection Goal: Prevent transmission of infectious organism to others Description: Prevent the transmission of infectious organisms to other patients, staff members, and visitors. Outcome: Progressing Towards Goal 
  
Problem: Breathing Pattern - Ineffective Goal: *Absence of hypoxia Outcome: Progressing Towards Goal 
Goal: *Use of effective breathing techniques Outcome: Progressing Towards Goal 
  
Problem: Falls - Risk of 
Goal: *Absence of Falls Description: Document Eric Apo Fall Risk and appropriate interventions in the flowsheet. Outcome: Progressing Towards Goal 
Note: Fall Risk Interventions: 
  
 
  
 
Medication Interventions: Teach patient to arise slowly Elimination Interventions: Call light in reach

## 2020-10-16 NOTE — PROGRESS NOTES
Problem: Risk for Spread of Infection Goal: Prevent transmission of infectious organism to others Description: Prevent the transmission of infectious organisms to other patients, staff members, and visitors. Outcome: Resolved/Not Met Problem: Patient Education:  Go to Education Activity Goal: Patient/Family Education Outcome: Resolved/Not Met Problem: Breathing Pattern - Ineffective Goal: *Absence of hypoxia Outcome: Resolved/Not Met 
Goal: *Use of effective breathing techniques Outcome: Resolved/Not Met

## 2020-10-16 NOTE — PROGRESS NOTES
Progress Note Patient: Nahum Hebert MRN: [id number]  SSN: [id number] YOB: 1950  Age: 69 y.o. Sex: female Admit Date: 10/1/2020 LOS: 15 days Subjective:  
 
Patient with past medical history of asthma, GERD, lymphoma, hyperlipidemia, hypothyroidism, hypotension, chronic left pulmonary artery occlusion, left lung mass status post needle biopsy on November 2019 demonstrating harmatoma. Presented with worsening shortness of breath. Hospital stay is significant for Thoracentesis by pulmonary service. Pleural fluid is transudate. Echo is low. Cardiology is consulted. Lasix is given as long as BP can tolerate. 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 
extensive atherosclerotic change at the arch where there appears to be a small focal dissection.  no intervention is needed. June 2019, CTA neck showed subclavian steal syndrome. Patient is feeling stronger. Less shortness of breath. Still working with physical therapy to feel stronger. Objective:  
 
Vitals:  
 10/16/20 0258 10/16/20 0723 10/16/20 0740 10/16/20 1057 BP: (!) 102/52 (!) 149/69  118/74 Pulse: 91 94  100 Resp: 16 16  16 Temp: 98 °F (36.7 °C) 98 °F (36.7 °C)  98.7 °F (37.1 °C) SpO2: 99% 97% 95% 98% Weight:      
Height:      
  
 
Intake and Output: 
Current Shift: No intake/output data recorded. Last three shifts: 10/14 1901 - 10/16 0700 In: -  
Out: 1900 [[id number]] Physical Exam:  
 
General:                    The patient is an elderly female in no acute respiratory distress. On oxygen cannula. Head:                                   Normocephalic/atraumatic. Eyes:                                   No palpebral pallor or scleral icterus. ENT:                                    External auricular and nasal exam within normal limits.   
                                            Mucous membranes are moist. 
 Neck:                                   Supple, non-tender, no JVD. Lungs:                       slightly diminished to auscultation bilaterally without wheezes or crackles. No respiratory distress or accessory muscle use. Heart:                                  Regular rate and rhythm, without murmurs, rubs, or gallops. Abdomen:                  Soft, non-tender, non-distended with normoactive bowel sounds. Genitourinary:           No tenderness over the bladder or bilateral CVAs. Extremities:               Without clubbing, cyanosis, or edema. Skin:                                    Normal color, texture, and turgor. No rashes, lesions, or jaundice. Pulses:                      Radial and dorsalis pedis pulses present 2+ bilaterally. Capillary refill <2s. Neurologic:                CN II-XII grossly intact and symmetrical.  
                                            Moving all four extremities well with no focal deficits. Psychiatric:                Pleasant demeanor, appropriate affect. Alert and oriented x 3 Lab/Data Review: 
 
Recent Results (from the past 24 hour(s)) METABOLIC PANEL, BASIC Collection Time: 10/16/20  5:51 AM  
Result Value Ref Range Sodium 137 136 - 145 mmol/L Potassium 3.7 3.5 - 5.1 mmol/L Chloride 98 98 - 107 mmol/L  
 CO2 35 (H) 21 - 32 mmol/L Anion gap 4 (L) 7 - 16 mmol/L Glucose 89 65 - 100 mg/dL BUN 20 8 - 23 MG/DL Creatinine 0.75 0.6 - 1.0 MG/DL  
 GFR est AA >60 >60 ml/min/1.73m2 GFR est non-AA >60 >60 ml/min/1.73m2 Calcium 9.2 8.3 - 10.4 MG/DL  
CBC WITH AUTOMATED DIFF Collection Time: 10/16/20  5:51 AM  
Result Value Ref Range WBC 9.3 4.3 - 11.1 K/uL  
 RBC 3.51 (L) 4.05 - 5.2 M/uL  
 HGB 10.9 (L) 11.7 - 15.4 g/dL HCT 33.0 (L) 35.8 - 46.3 % MCV 94.0 79.6 - 97.8 FL  
 MCH 31.1 26.1 - 32.9 PG  
 MCHC 33.0 31.4 - 35.0 g/dL RDW 13.3 11.9 - 14.6 % PLATELET 371 043 - 455 K/uL MPV 9.8 9.4 - 12.3 FL ABSOLUTE NRBC 0.00 0.0 - 0.2 K/uL  
 DF AUTOMATED NEUTROPHILS 58 43 - 78 % LYMPHOCYTES 23 13 - 44 % MONOCYTES 11 4.0 - 12.0 % EOSINOPHILS 7 0.5 - 7.8 % BASOPHILS 1 0.0 - 2.0 % IMMATURE GRANULOCYTES 0 0.0 - 5.0 %  
 ABS. NEUTROPHILS 5.4 1.7 - 8.2 K/UL  
 ABS. LYMPHOCYTES 2.1 0.5 - 4.6 K/UL  
 ABS. MONOCYTES 1.0 0.1 - 1.3 K/UL  
 ABS. EOSINOPHILS 0.7 0.0 - 0.8 K/UL  
 ABS. BASOPHILS 0.1 0.0 - 0.2 K/UL  
 ABS. IMM. GRANS. 0.0 0.0 - 0.5 K/UL  
 
XR chest  
10- IMPRESSION: Bilateral consolidation with right pleural effusion. Current Facility-Administered Medications:  
  albuterol (PROVENTIL VENTOLIN) nebulizer solution 2.5 mg, 2.5 mg, Nebulization, TID RT, Frank Hensley MD 
  furosemide (LASIX) injection 40 mg, 40 mg, IntraVENous, BID, Osvaldo Garza MD, 40 mg at 10/16/20 3330   lisinopriL (PRINIVIL, ZESTRIL) tablet 2.5 mg, 2.5 mg, Oral, DAILY, Eliza Chavez MD, 2.5 mg at 10/16/20 2424 
  metoprolol succinate (TOPROL-XL) XL tablet 25 mg, 25 mg, Oral, DAILY, Wei Pineda MD, 25 mg at 10/16/20 2010   albuterol (PROVENTIL VENTOLIN) nebulizer solution 2.5 mg, 2.5 mg, Nebulization, Q6H PRN, Dee Mccabe MD, 2.5 mg at 10/13/20 3221   morphine injection 1 mg, 1 mg, IntraVENous, Q4H PRN, Romana Poll, PA, 1 mg at 10/10/20 2213   fluticasone propionate (FLONASE) 50 mcg/actuation nasal spray 2 Spray, 2 Spray, Both Nostrils, DAILY, Dipti Suazo MD, 2 Spray at 10/16/20 5052   sodium chloride (NS) flush 5-40 mL, 5-40 mL, IntraVENous, Q8H, José Davis MD, Stopped at 10/16/20 0600 
  sodium chloride (NS) flush 5-40 mL, 5-40 mL, IntraVENous, PRN, La Mejias MD 
  acetaminophen (TYLENOL) tablet 650 mg, 650 mg, Oral, Q6H PRN, 650 mg at 10/16/20 1237 **OR** acetaminophen (TYLENOL) suppository 650 mg, 650 mg, Rectal, Q6H PRN, Dean Pinto MD 
   polyethylene glycol (MIRALAX) packet 17 g, 17 g, Oral, DAILY PRN, Hipolito Dixon MD 
  enoxaparin (LOVENOX) injection 40 mg, 40 mg, SubCUTAneous, DAILY, Maday Sanz MD, Stopped at 10/16/20 5337   ondansetron (ZOFRAN ODT) tablet 4 mg, 4 mg, Oral, Q8H PRN **OR** ondansetron (ZOFRAN) injection 4 mg, 4 mg, IntraVENous, Q6H PRN, Carloz Dixon MD 
  aspirin delayed-release tablet 81 mg, 81 mg, Oral, DAILY, Carloz Dixon MD, 81 mg at 10/16/20 0121   levothyroxine (SYNTHROID) tablet 75 mcg, 75 mcg, Oral, ACB, José Brown MD, 75 mcg at 10/16/20 0726 
  montelukast (SINGULAIR) tablet 10 mg, 10 mg, Oral, DAILY, Carloz Dixon MD, 10 mg at 10/16/20 8766   pantoprazole (PROTONIX) tablet 40 mg, 40 mg, Oral, ACB, José Brown MD, 40 mg at 10/16/20 0726 
  atorvastatin (LIPITOR) tablet 10 mg, 10 mg, Oral, DAILY, Carloz Dixon MD, 10 mg at 10/16/20 4677   budesonide-formoteroL (SYMBICORT) 160-4.5 mcg/actuation HFA inhaler 2 Puff, 2 Puff, Inhalation, BID RT, Maday Sanz MD, 2 Puff at 10/16/20 8909 Assessment:  
 
Principal Problem: 
  PNA (pneumonia) (10/1/2020) Active Problems: 
  Mild persistent asthma without complication (73/52/1620) Acquired hypothyroidism (10/17/2013) GERD (gastroesophageal reflux disease) (10/17/2013) Hypercholesterolemia (6/30/2015) Mass of lingula of lung (10/16/2019) Acute systolic heart failure ([address] (10/6/2020) Bilateral pleural effusion (10/7/2020) Overview: 10/3/2020: L thoracentesis 1050mL removed R thoracentesis 800mL removed 10/9/2020: L thoracentesis: 1000mL removed R thoracentesis: 600mL removed Multiple lung nodules on CT (10/12/2020) Overview: -PET scan 10/2019: PET scan fortunately did not have any abnormal activity  
    in her mass in the left lung. This may mean that we can just follow this radiographically, but we will talk about her at tumor board and come back  
    to her with the group's recommendation. 
    -10/30/2019: Patient is discussed at thoracic conference today lead by Dr Ramon Taylor. Patient is a 79 yo never smoker. CT guided biopsy recommended. -CT guided Biopsy 11/2020: lung biopsy showed signs of this nodule being a  
    hamartoma, which is a benign tumor that we can simply follow. repeat CT in  
    6 months 
    -Chest CT June 2020: CT scan is stable 
    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  
    secondary to her left lung mass. Levada Ortez are several new pulmonary nodules  
    noted which may suggest some metastatic disease.  This require further  
    follow-up CT of the chest in 2 months or doing PET scan. Hamartoma ([address] (10/12/2020) Peripheral vascular disease ([address] (10/12/2020) Plan:  
 
Acute respiratory failure with hypoxia On 2.5 LPM of oxygen cannula. Left pleural effusion s/p thoracentesis on 10/3 and 10/8. Recent left thoracentesis on 10/15/2020 with 690 mL of fluid removed. Acute systolic CHF Continue Lasix. Continue current medications. May need left heart catheterization. Left lung mass Chronic left pulmonary artery occlusion. Lung nodules Need outpatient follow up with PET CT History of asthma Nebulizer Dyslipidemia Continue home medications. Hypothyroidism Continue home medications. I have discussed the plan of care with patient. DVT prophylaxis : Lovenox SC Signed By: Burt Epley, MD   
 October 16, 2020

## 2020-10-16 NOTE — PROGRESS NOTES
10/16/2020 3:47 PM 
 
Admit Date: 10/1/2020 Admit Diagnosis: PNA (pneumonia) [J18.9] Subjective: No cp and sob better but still on O2 Objective:  
  
Visit Vitals /74 (BP 1 Location: Left leg, BP Patient Position: At rest) Pulse 100 Temp 98.7 °F (37.1 °C) Resp 16 Ht 5' 1.34\" (1.558 m) Wt 121 lb 12.8 oz (55.2 kg) SpO2 97% Breastfeeding No  
BMI 22.76 kg/m² Physical Exam: 
Cherl Lizet, Well Nourished, No Acute Distress, Alert & Oriented x 3, appropriate mood. Neck- supple, no JVD 
CV- regular rate and rhythm no MRG Lung- clear bilaterally Abd- soft, nontender, nondistended Ext- no edema bilaterally. Skin- warm and dry Data Review:  
Recent Labs 10/16/20 
3513   
K 3.7 BUN 20  
CREA 0.75 WBC 9.3 HGB 10.9* HCT 33.0*  
 Assessment/Plan:  
 
Principal Problem: 
  PNA (pneumonia) (10/1/2020) Active Problems: 
  Mild persistent asthma without complication (26/35/6893) Acquired hypothyroidism (10/17/2013) GERD (gastroesophageal reflux disease) (10/17/2013) Hypercholesterolemia (6/30/2015) Mass of lingula of lung (10/16/2019) Acute systolic heart failure ([address] (10/6/2020)Improved with current therapy. Will continue medications- medical therapy- no angina- medical therapy for cm- no need for cath at this time with no angina and possible lung ca- likely another day or two iv lasix- bmp ordered for am 
 
  Bilateral pleural effusion (10/7/2020) Overview: 10/3/2020: L thoracentesis 1050mL removed R thoracentesis 800mL removed 10/9/2020: L thoracentesis: 1000mL removed R thoracentesis: 600mL removed Multiple lung nodules on CT (10/12/2020)- needs OP PET scan Peripheral vascular disease ([address] (10/12/2020)

## 2020-10-16 NOTE — PROGRESS NOTES
Assessment complete. Pt sitting up in bed, A&Ox3. States she feels \"a lot better since I got my lungs cleaned out\". Pt voices no needs at this time, call light in reach.

## 2020-10-16 NOTE — PROGRESS NOTES
Odean Goldberg Admission Date: 10/1/2020 Daily Progress Note: 10/16/2020 The patient's chart is reviewed and the patient is discussed with the staff. Patient is B 82 y.o.  female seen and evaluated at the request of Dr. Shirley Berry. 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 Pt has recently had issues with hypotension which is being managed by her PCP. She was treated with LVQ x 7 days for CAP. She had a virtual visit with KE Rowland on 9/30/2020 and pt mentioned that her O2 sats had been borderline low and that she had been tachycardic. An echocardiogram was obtained to evaluate for PAH that same day, but the TV jet was inadequate for estimation of RVSP. Pt felt worse and presented to the ER and her CXR was concerning for PNA. Pt was admitted and started on Zosyn. Pt is being ruled out for COVID. We were consulted to assist with workup and treatment. CT was performed which reveals multiple new nodules, and bilateral pleural effusions. Pt had a L thoracentesis with 1050mL removed and R thoracentesis with 800mL removed on 10/3/2020. Cardiology was consulted on 10/9, pt had L thoracentesis with 1000mL removed and R thoracentesis with 600mL removed. Cardiology was reconsulted secondary to persistent shortness of breath and recommendations for left and right heart catheterization once PET scan and need for lung biopsies have been completed. 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 Vascular surgery felt that no intervention was necessary at this time.   
 
Subjective:  
 
Pt had B taps yesterday with about 1400 ml removed in total. Remains on BID IV lasix. Doesn't feel like she is diuresing as much. Current Facility-Administered Medications Medication Dose Route Frequency  albuterol (PROVENTIL VENTOLIN) nebulizer solution 2.5 mg  2.5 mg Nebulization BID RT  
 furosemide (LASIX) injection 40 mg  40 mg IntraVENous BID  lisinopriL (PRINIVIL, ZESTRIL) tablet 2.5 mg  2.5 mg Oral DAILY  metoprolol succinate (TOPROL-XL) XL tablet 25 mg  25 mg Oral DAILY  albuterol (PROVENTIL VENTOLIN) nebulizer solution 2.5 mg  2.5 mg Nebulization Q6H PRN  
 morphine injection 1 mg  1 mg IntraVENous Q4H PRN  
 fluticasone propionate (FLONASE) 50 mcg/actuation nasal spray 2 Spray  2 Spray Both Nostrils DAILY  sodium chloride (NS) flush 5-40 mL  5-40 mL IntraVENous Q8H  
 sodium chloride (NS) flush 5-40 mL  5-40 mL IntraVENous PRN  
 acetaminophen (TYLENOL) tablet 650 mg  650 mg Oral Q6H PRN Or  
 acetaminophen (TYLENOL) suppository 650 mg  650 mg Rectal Q6H PRN  polyethylene glycol (MIRALAX) packet 17 g  17 g Oral DAILY PRN  
 enoxaparin (LOVENOX) injection 40 mg  40 mg SubCUTAneous DAILY  ondansetron (ZOFRAN ODT) tablet 4 mg  4 mg Oral Q8H PRN Or  
 ondansetron (ZOFRAN) injection 4 mg  4 mg IntraVENous Q6H PRN  
 aspirin delayed-release tablet 81 mg  81 mg Oral DAILY  levothyroxine (SYNTHROID) tablet 75 mcg  75 mcg Oral ACB  montelukast (SINGULAIR) tablet 10 mg  10 mg Oral DAILY  pantoprazole (PROTONIX) tablet 40 mg  40 mg Oral ACB  atorvastatin (LIPITOR) tablet 10 mg  10 mg Oral DAILY  budesonide-formoteroL (SYMBICORT) 160-4.5 mcg/actuation HFA inhaler 2 Puff  2 Puff Inhalation BID RT Review of Systems 
+cough 
+shortness of breath Constitutional: negative for fever, chills, sweats Cardiovascular: negative for chest pain, palpitations, syncope, edema Gastrointestinal:  negative for dysphagia, reflux, vomiting, diarrhea, abdominal pain, or melena Neurologic:  negative for focal weakness, numbness, headache Objective:  
 
Vitals:  
 10/16/20 0258 10/16/20 0723 10/16/20 0740 10/16/20 1057 BP: (!) 102/52 (!) 149/69  118/74 Pulse: 91 94  100 Resp: 16 16 16 Temp: 98 °F (36.7 °C) 98 °F (36.7 °C)  98.7 °F (37.1 °C) SpO2: 99% 97% 95% 98% Weight:      
Height:      
 
 
 
Intake/Output Summary (Last 24 hours) at 10/16/2020 1210 Last data filed at 10/16/2020 0301 Gross per 24 hour Intake  Output 700 ml Net -700 ml Physical Exam:  
Constitution:  the patient is well developed and in no acute distress EENMT:  Sclera clear, pupils equal, oral mucosa moist 
Respiratory:  Diminished at bases bilaterally, on 1L O2 Cardiovascular:  RRR without M,G,R 
Gastrointestinal: soft and non-tender; with positive bowel sounds. Musculoskeletal: warm without cyanosis. There is no lower extremity edema. Skin:  no jaundice or rashes Neurologic: no gross neuro deficits Psychiatric:  alert and oriented x 3 CXR:  
 
 
 
 
 
LAB No results for input(s): GLUCPOC in the last 72 hours. No lab exists for component: [place] Recent Labs 10/16/20 
1206 10/14/20 
0802 WBC 9.3 10.5 HGB 10.9* 10.9* HCT 33.0* 33.7*  
 409 Recent Labs 10/16/20 
3616 10/15/20 
9976 10/14/20 
5944  138 139  
K 3.7 4.1 3.6 CL 98 97* 98  
CO2 35* 37* 33* GLU 89 88 78 BUN 20 16 15 CREA 0.75 0.69 0.62  
MG  --  2.3 2.4 CA 9.2 9.5 9.5 PHOS  --   --  4.0* ALB  --   --  2.9* TBILI  --   --  0.4 ALT  --   --  33 No results for input(s): PH, PCO2, PO2, HCO3, PHI, PCO2I, PO2I, HCO3I in the last 72 hours. No results for input(s): LCAD, LAC in the last 72 hours. Assessment:  (Medical Decision Making) Hospital Problems  Date Reviewed: 10/14/2020 Codes Class Noted POA Multiple lung nodules on CT (Chronic) ICD-10-CM: R91.8 ICD-9-CM: 793.19  10/12/2020 Unknown  Overview Addendum 10/12/2020  2:33 PM by Joao Dodge NP  
 -PET scan 10/2019: PET scan fortunately did not have any abnormal activity in her mass in the left lung. This may mean that we can just follow this radiographically, but we will talk about her at tumor board and come back to her with the group's recommendation. 
-10/30/2019: Patient is discussed at thoracic conference today lead by Dr Mike Estrada. Patient is a 77 yo never smoker. CT guided biopsy recommended. -CT guided Biopsy 11/2020: lung biopsy showed signs of this nodule being a hamartoma, which is a benign tumor that we can simply follow. repeat CT in 6 months 
-Chest CT June 2020: CT scan is stable 
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 Bethena Lighter are several new pulmonary nodules noted which may suggest some metastatic disease.  This require further follow-up CT of the chest in 2 months or doing PET scan. Awaiting outpt PET CT scan Hamartoma (HCC) (Chronic) ICD-10-CM: Q85.9 ICD-9-CM: 759.6  10/12/2020 Yes Chronic Peripheral vascular disease ([facility] [address] ICD-10-CM: I73.9 ICD-9-CM: 443.9  10/12/2020 Unknown Chronic Bilateral pleural effusion ICD-10-CM: J90 ICD-9-CM: 511.9  10/7/2020 Yes Overview Signed 10/13/2020  1:25 PM by DENI Ley  
  10/3/2020: L thoracentesis 1050mL removed R thoracentesis 800mL removed 10/9/2020: L thoracentesis: 1000mL removed R thoracentesis: 600mL removed 10/15/20 R and L thora for about 700 ml each Tapped yesterday Acute systolic heart failure (HCC) ICD-10-CM: I50.21 ICD-9-CM: 428.21  10/6/2020 Yes On lasix 40mg IV BID * (Principal) PNA (pneumonia) ICD-10-CM: J18.9 ICD-9-CM: 276  10/1/2020 Unknown Completed zosyn Mass of lingula of lung ICD-10-CM: R91.8 ICD-9-CM: 786.6  10/16/2019 Yes Hypercholesterolemia (Chronic) ICD-10-CM: E78.00 ICD-9-CM: 272.0  6/30/2015 Yes Mild persistent asthma without complication (Chronic) AFN-29-CM: J45.30 ICD-9-CM: 493.90  10/17/2013 Yes Continue nebs, will change to scheduled Acquired hypothyroidism (Chronic) ICD-10-CM: E03.9 ICD-9-CM: 244.9  10/17/2013 Yes GERD (gastroesophageal reflux disease) (Chronic) ICD-10-CM: K21.9 ICD-9-CM: 530.81  10/17/2013 Yes Plan:  (Medical Decision Making) --Continue O2, wean O2 as tolerated,  
--Continue IV lasix 40mg IV BID- pleural fluid is transudative with negative cytology 
--Continue albuterol TID; add EZ PAP 
--Continue Symbicort 
--Will need right and left heart cath once lung nodule work up is complete --Awaiting OP PET/CT 
--Will plan to follow up on Monday unless called. More than 50% of the time documented was spent in face-to-face contact with the patient and in the care of the patient on the floor/unit where the patient is located.  
 
 
Alphonso Johnson MD

## 2020-10-17 NOTE — ADVANCED PRACTICE NURSE
Assessment complete. Pt has diminished breath sounds. Pt voices no needs at this time. Pt has low BP, hospitalist and cardiologist informed. SEE MAR. Call light in reach.

## 2020-10-17 NOTE — PROGRESS NOTES
Problem: Risk for Spread of Infection Goal: Prevent transmission of infectious organism to others Description: Prevent the transmission of infectious organisms to other patients, staff members, and visitors. Outcome: Progressing Towards Goal 
  
Problem: Patient Education:  Go to Education Activity Goal: Patient/Family Education Outcome: Progressing Towards Goal 
  
Problem: Breathing Pattern - Ineffective Goal: *Absence of hypoxia Outcome: Progressing Towards Goal 
Goal: *Use of effective breathing techniques Outcome: Progressing Towards Goal 
Goal: *PALLIATIVE CARE:  Alleviation of Dyspnea Outcome: Progressing Towards Goal 
  
Problem: Patient Education: Go to Patient Education Activity Goal: Patient/Family Education Outcome: Progressing Towards Goal 
  
Problem: Falls - Risk of 
Goal: *Absence of Falls Description: Document Helena Rebolledo Fall Risk and appropriate interventions in the flowsheet. Outcome: Progressing Towards Goal 
Note: Fall Risk Interventions: 
  
 
  
 
Medication Interventions: Bed/chair exit alarm, Evaluate medications/consider consulting pharmacy, Patient to call before getting OOB, Teach patient to arise slowly Elimination Interventions: Call light in reach, Patient to call for help with toileting needs, Toilet paper/wipes in reach, Toileting schedule/hourly rounds Problem: Patient Education: Go to Patient Education Activity Goal: Patient/Family Education Outcome: Progressing Towards Goal 
  
Problem: Nutrition Deficit Goal: *Optimize nutritional status Outcome: Progressing Towards Goal 
  
Problem: Patient Education: Go to Patient Education Activity Goal: Patient/Family Education Outcome: Progressing Towards Goal

## 2020-10-17 NOTE — PROGRESS NOTES
Hospitalist Note Admit Date:  10/1/2020  8:19 AM  
Name:  José Nash Age:  69 y.o. 
:  1950 MRN:  [id number] PCP:  Deion Chong MD 
Treatment Team: Attending Provider: Jerson Hooper MD; Primary Nurse: Annie Hull; Consulting Provider: Eli Fabian MD; Consulting Provider: Tito Cardenas MD; Consulting Provider: Tommie Alejandra MD; Utilization Review: Nilay Sanchez RN; Care Manager: Sunita Kerns.; Consulting Provider: Jose Paul DO; Utilization Review: Lucy Cardoso, MARAL; Primary Nurse: Cathey Denver, RN 
 
HPI/Subjective:  
 
74-year-old female with a past medical history of asthma, GERD, treatment lymphoma, hyperlipidemia, hypothyroidism, hypotension, chronic left pulmonary artery occlusion, left lung mass status post needle biopsy on 2019 demonstrating harmatoma, that presents in the setting of worsening shortness of breath.  The patient states that for the past few weeks she has been developing worsening shortness of breath especially on exertion, and associated with some dry cough. Patient seen and examined at bedside. This morning still presenting with SOB. Denies chest pain, no abdominal pain, nausea or vomiting 
  
Course during the stay:  
Admitted for exertional dyspnea. History of left lung mass previous biopsy in 2019 demonstrated hamartoma, chronic left pulmonary artery occlusion. History of low blood pressures on the Florinef and midodrine at home. 
  
Patient was continued on oxygen, pulmonary consulted. Patient had Thoracentesis on 10/3/7588-3410 mL of clear yellow appearing fluid removed from the left pleural effusion. Thoracentesis on 10/9/2020-600 cc of clear yellow fluid aspirated from the left pleural effusion. Patient still complaining of shortness of breath on walking a short distance. Presently has bedside commode.  
  
Patient Midodrin was increased from 5 mg 3 times daily to 10 mg 3 times daily during the stay.   
Cardiology was initially consulted for mild elevated troponin. Start the patient on a small dose of Coreg. Secondary to low blood pressure not a candidate for ACE or ARB. Continue Lasix. Ischemic work-up as an outpatient. Cardiology signed off. Coreg was again discontinued secondary to low blood pressure. later on during the stay as the patient was still having shortness of breath recurrent pleural effusion, pulmonary felt probably more cardiac related. Echo with a EF of 35 to 40%. Cardiology was reconsulted, advised to continue Lasix as long as blood pressure tolerates. 
  
Vascular surgery was consulted as patient was concerned that her previous visual blockage could be the reason why she is having shortness of breath. Reviewed her previous CT results- 
CT chest 9/21/2020-right-sided aortic arch with relatively 
extensive atherosclerotic change at the arch where there appears to be a small 
focal dissection. CTA neck done in June 2019 showed subclavian steal syndrome. 
-Vascular surgery felt no intervention required at this point. 
  
  
Presently patient still has some shortness of breath more on exertion, on 2.5 L of oxygen nasal cannula, followed by cardiology and pulmonary, vascular signed off her felt no intervention required at this point. Patient still has on and off episodes of low blood pressures secondary to which at times patient Lasix had to be held. 10/17 patient's blood pressure reported to be low this morning 87/55. No dizziness no lightheadedness. No chest pain. Objective:  
 
Patient Vitals for the past 24 hrs: 
 Temp Pulse Resp BP SpO2  
10/17/20 0815 97.4 °F (36.3 °C) (!) 104 16 (!) 87/56 95 % 10/17/20 0324 98.1 °F (36.7 °C) 94 16 112/60 98 % 10/16/20 2240 97.9 °F (36.6 °C) (!) 101 16 (!) 108/52 99 % 10/16/20 2102     96 % 10/16/20 1923 98 °F (36.7 °C) 100 16 123/67 100 % 10/16/20 1621 97.3 °F (36.3 °C) 92 14 102/63 98 % 10/16/20 1507     97 % Oxygen Therapy O2 Sat (%): 95 % (10/17/20 0815) Pulse via Oximetry: 96 beats per minute (10/16/20 2102) O2 Device: Nasal cannula (10/17/20 0932) O2 Flow Rate (L/min): 1 l/min (10/17/20 0932) Intake/Output Summary (Last 24 hours) at 10/17/2020 1207 Last data filed at 10/17/2020 2261 Gross per 24 hour Intake  Output 1700 ml Net -1700 ml *Note that automatically entered I/Os may not be accurate; dependent on patient compliance with collection and accurate  by techs. General:    Chronically ill-appearing, CV:   RRR. Grade 4 systolic murmur heard throughout, no rubs, or gallops. Lungs:   Decreased breath sounds bilateral lung bases, no wheezing, rhonchi, or rales. Abdomen:   Soft, nontender, nondistended. Extremities: Warm and dry. No cyanosis or edema. Skin:     No rashes or jaundice. Neuro:  No gross focal deficits Data Review: 
I have reviewed all labs, meds, and studies from the last 24 hours: 
 
Recent Results (from the past 24 hour(s)) METABOLIC PANEL, BASIC Collection Time: 10/17/20  5:52 AM  
Result Value Ref Range Sodium 135 (L) 136 - 145 mmol/L Potassium 4.1 3.5 - 5.1 mmol/L Chloride 96 (L) 98 - 107 mmol/L  
 CO2 33 (H) 21 - 32 mmol/L Anion gap 6 (L) 7 - 16 mmol/L Glucose 83 65 - 100 mg/dL BUN 26 (H) 8 - 23 MG/DL Creatinine 0.81 0.6 - 1.0 MG/DL  
 GFR est AA >60 >60 ml/min/1.73m2 GFR est non-AA >60 >60 ml/min/1.73m2 Calcium 9.6 8.3 - 10.4 MG/DL MAGNESIUM Collection Time: 10/17/20  5:52 AM  
Result Value Ref Range Magnesium 2.5 (H) 1.8 - 2.4 mg/dL All Micro Results Procedure Component Value Units Date/Time CULTURE, BODY FLUID Martha Morgan [[id number]] Collected:  10/15/20 1145 Order Status:  Completed Specimen:  Pleural Fluid Updated:  10/17/20 1001   Special Requests: NO SPECIAL REQUESTS     
  GRAM STAIN 10 TO 25 WBCS SEEN PER OIF  
   NO DEFINITE ORGANISM SEEN     
 Culture result: NO GROWTH 2 DAYS     
 CULTURE, BODY FLUID W Denia White [[id number]] Collected:  10/15/20 1145 Order Status:  Completed Specimen:  Pleural Fluid Updated:  10/17/20 1000 Special Requests: NO SPECIAL REQUESTS     
  GRAM STAIN 3 TO 10 WBCS SEEN PER OIF  
   NO DEFINITE ORGANISM SEEN Culture result: NO GROWTH 2 DAYS FUNGUS CULTURE AND SMEAR [[id number]] Collected:  10/03/20 1348 Order Status:  Completed Specimen:  Miscellaneous sample Updated:  10/08/20 1236 Source PLEURAL FLUID Comment: L1 Fungus stain Direct Inoculation Fungus (Mycology) Culture Other source received Comment: (NOTE) Performed At: [address] [id number] Elayne Antoine MD [id number] 
  
  
 C. DIFFICILE AG & TOXIN A/B [[id number]] Collected:  10/06/20 1938 Order Status:  Canceled Specimen:  Stool AFB CULTURE + SMEAR W/RFLX ID FROM CULTURE [[id number]] Collected:  10/03/20 1348 Order Status:  Completed Specimen:  Miscellaneous sample Updated:  10/06/20 1538 Source PLEURAL FLUID Comment: L1  
  
  AFB Specimen processing Concentration Acid Fast Smear Negative Comment: (NOTE) Performed At: [address] [id number] Elayne Antoine MD [id number] Acid Fast Culture PENDING  
 CULTURE, BLOOD [[id number]] Collected:  10/01/20 1135 Order Status:  Completed Specimen:  Blood Updated:  10/06/20 7894 Special Requests: --     
  RIGHT 
FOREARM Culture result: NO GROWTH 5 DAYS     
 CULTURE, BLOOD [[id number]] Collected:  10/01/20 1135 Order Status:  Completed Specimen:  Blood Updated:  10/06/20 1453 Special Requests: --     
  LEFT 
FOREARM Culture result: NO GROWTH 5 DAYS     
 CULTURE, BODY FLUID W Denia White [[id number]] Collected:  10/03/20 1348 Order Status:  Completed Specimen:  Pleural Fluid Updated:  10/06/20 1808 Special Requests: NO SPECIAL REQUESTS     
  GRAM STAIN 0 TO 13 WBC'S SEEN PER OIF  
   NO DEFINITE ORGANISM SEEN Culture result: NO GROWTH 2 DAYS Results for orders placed or performed during the hospital encounter of 10/01/20  
2D ECHO LIMTED ADULT W OR WO CONTR Narrative [place] [facility] [address] [place], [address] [facility] 
[phone] Transthoracic Echocardiogram 
2D, M-mode, Doppler, and Color Doppler Patient: Scott Judge 
MR #: [id number] : 1950 Age: 69 years Gender: Female Study date: 12-Oct-2020 Account #: [de-identified] Height: 60.6 in 
Weight: 121.7 lb 
BSA: 1.52 mï¾² Status:Routine Location: [facility] BP: 88/ 58 Allergies: PROCHLORPERAZINE EDISYLATE Sonographer:  Sagrario Flynn RDCS Group:  [facility] Referring Physician:  DR. Cinthia Vo DO Reading Physician:  Humberto Jhaveri. MD Miriam [facility] INDICATIONS: Cardiomyopathy, Check AV Gradients. PROCEDURE: This was a routine study. A transthoracic echocardiogram was 
performed. The study included limited 2D imaging, M-mode, limited spectral 
Doppler, and color Doppler. Intravenous contrast (Definity) was administered. Image quality was adequate. LEFT VENTRICLE: Size was normal. Systolic function was moderately to markedly 
reduced. Ejection fraction was estimated in the range of 30 % to 35 %. There 
was moderate diffuse hypokinesis. Wall thickness was normal. 
 
AORTIC VALVE: The valve was trileaflet. Leaflets exhibited normal cuspal 
separation and mild sclerosis. The aortic valve area by the continuity  
equation 
was 2.07 cm2. The peak velocity was 1.36 m/s. The mean pressure gradient was 5 
mmHg. The peak pressure gradient was 7 mmHg. The Di=0.66. The SVi=36.8. SUMMARY: 
 
-  Left ventricle: Systolic function was moderately to markedly reduced. Ejection fraction was estimated in the range of 30 % to 35 %. There was moderate diffuse hypokinesis. -  Aortic valve: The aortic valve area by the continuity equation was 2.07  
cm2. ADDITIONAL MEASUREMENTS High velocity overlapping flow noted during suprasternal imaging (descending 
aorta appears with normal flow velocities and superimposed high flow  
velocities 
from alternative source of uncertain [id number]~3.7X/A). This was also notedon 
previous echo from 9/30/20. SYSTEM MEASUREMENT TABLES 
 
2D mode AoR Diam (2D): 2.9 cm IVS/LVPW (2D): 1.2 IVSd (2D): 1.1 cm 
LVIDd (2D): 4.4 cm LVIDs (2D): 3.7 cm 
LVOT Area (2D): 3.1 cm2 LVPWd (2D): 0.9 cm Unspecified Scan Mode Peak Grad; Mean; Antegrade Flow: 7 mm[Hg] Vmax; Antegrade Flow: 136 cm/s LVOT Diam: 2 cm Prepared and signed by 
 
Jyothi Tomlin. MD Jorge Pineda Signed 12-Oct-2020 13:13:33 Current Meds: 
Current Facility-Administered Medications Medication Dose Route Frequency  cyclobenzaprine (FLEXERIL) tablet 5 mg  5 mg Oral TID PRN  
 albuterol (PROVENTIL VENTOLIN) nebulizer solution 2.5 mg  2.5 mg Nebulization TID RT  
 calcium carbonate (TUMS) chewable tablet 200 mg [elemental]  200 mg Oral QID PRN  
 ibuprofen (MOTRIN) tablet 400 mg  400 mg Oral Q6H PRN  
 [Held by provider] furosemide (LASIX) injection 40 mg  40 mg IntraVENous BID  [Held by provider] lisinopriL (PRINIVIL, ZESTRIL) tablet 2.5 mg  2.5 mg Oral DAILY  [Held by provider] metoprolol succinate (TOPROL-XL) XL tablet 25 mg  25 mg Oral DAILY  albuterol (PROVENTIL VENTOLIN) nebulizer solution 2.5 mg  2.5 mg Nebulization Q6H PRN  
 morphine injection 1 mg  1 mg IntraVENous Q4H PRN  
 fluticasone propionate (FLONASE) 50 mcg/actuation nasal spray 2 Spray  2 Spray Both Nostrils DAILY  sodium chloride (NS) flush 5-40 mL  5-40 mL IntraVENous Q8H  
 sodium chloride (NS) flush 5-40 mL  5-40 mL IntraVENous PRN  
 acetaminophen (TYLENOL) tablet 650 mg  650 mg Oral Q6H PRN  Or  
  acetaminophen (TYLENOL) suppository 650 mg  650 mg Rectal Q6H PRN  polyethylene glycol (MIRALAX) packet 17 g  17 g Oral DAILY PRN  
 enoxaparin (LOVENOX) injection 40 mg  40 mg SubCUTAneous DAILY  ondansetron (ZOFRAN ODT) tablet 4 mg  4 mg Oral Q8H PRN Or  
 ondansetron (ZOFRAN) injection 4 mg  4 mg IntraVENous Q6H PRN  
 aspirin delayed-release tablet 81 mg  81 mg Oral DAILY  levothyroxine (SYNTHROID) tablet 75 mcg  75 mcg Oral ACB  montelukast (SINGULAIR) tablet 10 mg  10 mg Oral DAILY  pantoprazole (PROTONIX) tablet 40 mg  40 mg Oral ACB  atorvastatin (LIPITOR) tablet 10 mg  10 mg Oral DAILY  budesonide-formoteroL (SYMBICORT) 160-4.5 mcg/actuation HFA inhaler 2 Puff  2 Puff Inhalation BID RT Other Studies (last 24 hours): No results found. Assessment and Plan:  
 
Hospital Problems as of 10/17/2020 Date Reviewed: 10/13/2020 Codes Class Noted - Resolved POA Multiple lung nodules on CT (Chronic) ICD-10-CM: R91.8 ICD-9-CM: 793.19  10/12/2020 - Present Unknown Overview Addendum 10/12/2020  2:33 PM by Ivett Samayoa NP  
  -PET scan 10/2019: PET scan fortunately did not have any abnormal activity in her mass in the left lung. This may mean that we can just follow this radiographically, but we will talk about her at tumor board and come back to her with the group's recommendation. 
-10/30/2019: Patient is discussed at thoracic conference today lead by Dr Rafat Talley. Patient is a 77 yo never smoker. CT guided biopsy recommended. -CT guided Biopsy 11/2020: lung biopsy showed signs of this nodule being a hamartoma, which is a benign tumor that we can simply follow.  repeat CT in 6 months 
-Chest CT June 2020: CT scan is stable 
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 Stephen Sos are several new pulmonary nodules noted which may suggest some metastatic disease.  This require further follow-up CT of the chest in 2 months or doing PET scan. Hamartoma (HCC) (Chronic) ICD-10-CM: Q85.9 ICD-9-CM: 759.6  10/12/2020 - Present Yes Peripheral vascular disease ([address] ICD-10-CM: I73.9 ICD-9-CM: 443.9  10/12/2020 - Present Unknown Bilateral pleural effusion ICD-10-CM: J90 ICD-9-CM: 511.9  10/7/2020 - Present Yes Overview Signed 10/13/2020  1:25 PM by DENI Estevez  
  10/3/2020: L thoracentesis 1050mL removed R thoracentesis 800mL removed 10/9/2020: L thoracentesis: 1000mL removed R thoracentesis: 600mL removed Acute systolic heart failure (HCC) ICD-10-CM: I50.21 ICD-9-CM: 428.21  10/6/2020 - Present Yes * (Principal) PNA (pneumonia) ICD-10-CM: J18.9 ICD-9-CM: 098  10/1/2020 - Present Unknown Mass of lingula of lung ICD-10-CM: R91.8 ICD-9-CM: 786.6  10/16/2019 - Present Yes Hypercholesterolemia (Chronic) ICD-10-CM: E78.00 ICD-9-CM: 272.0  6/30/2015 - Present Yes Mild persistent asthma without complication (Chronic) [id number]: J45.30 ICD-9-CM: 493.90  10/17/2013 - Present Yes Acquired hypothyroidism (Chronic) ICD-10-CM: E03.9 ICD-9-CM: 244.9  10/17/2013 - Present Yes GERD (gastroesophageal reflux disease) (Chronic) ICD-10-CM: K21.9 ICD-9-CM: 530.81  10/17/2013 - Present Yes RESOLVED: Hypokalemia ICD-10-CM: E87.6 ICD-9-CM: 276.8  10/6/2020 - 10/12/2020 Yes Plan: This is a 78-year-old female with: 
  
1.  Acute hypoxemic respiratory failure-on 1 L oxygen nasal cannula. Pt completed course of antibiotics. Left pleural effusion s/p thoracentesis 10/3 ad 10/8 and 10/15 (710 cc from right lung and 690 cc from left lung removed) fluid transudative. - Pulmonary on board. Appreciate recs. D dimer negative.  
  
2. Acute Systolic CHF exacerbation POA Elevated troponin likely from demand ischemia. IV lasix 40 mg bid. (held given hypotension) - Telemetry - Continue aspirin 
- TTE EF 35-40% - Metoprolol,Lisinopril held due to hypotension. Cardiology on board. Appreciate recs. May need Left heart cath after work up for lung nodule.  
  
3. Left lung mass status post needle biopsy on November 2019 demonstrating harmatoma / Chronic left pulmonary artery occlusion. Continue aspirin. Lung nodules- out-pt PET CT, 
  
4.  Asthma -intermittent POA 
 continue on home inhalers and Singulair. Currently not in exacerbation. 
  
5.  Hypotension:  
Hold antihypertensive meds. Due to diffuse peripheral vascular disease, falsely low BP readings in upper extremities. Midodrine and Florinef have been discontinued 10/12 
  
6. Hyperlipidemia  
continue on atorvastatin. 
  
7.  GERD  
continue on PPI. 
  
8. Hypothyroidism  
continue on levothyroxine. 
  
Hypokalemia-resolved 
  
CT chest 9/21/2020-right-sided aortic arch with relatively 
extensive atherosclerotic change at the arch where there appears to be a small 
focal dissection. CTA neck done in June 2019 showed subclavian steal syndrome. Vascular surgery recs no intervention at this point. DC planning/Dispo: Anticipate inpatient hospital stay for at least 24 hours. PT/OT eval. Recs home health. Diet:  DIET REGULAR 
DIET NUTRITIONAL SUPPLEMENTS 
DVT ppx: Lovenox Signed: 
Michelle Kerr MD

## 2020-10-17 NOTE — PROGRESS NOTES
[facility] CARDIOLOGY PROGRESS NOTE 
      
 
10/17/2020 11:51 AM 
 
Admit Date: 10/1/2020 Subjective:  
 medications held due to low blood pressures also holding IV Lasix. Patient with no symptoms today Review of Systems Cardiovascular: Negative for chest pain. Skin: Negative for rash. Neurological: Negative for dizziness. Objective:  
  
Vitals:  
 10/16/20 2102 10/16/20 2240 10/17/20 3070 10/17/20 0585 BP:  (!) 108/52 112/60 (!) 87/56 Pulse:  (!) 101 94 (!) 104 Resp:  16 16 16 Temp:  97.9 °F (36.6 °C) 98.1 °F (36.7 °C) 97.4 °F (36.3 °C) SpO2: 96% 99% 98% 95% Weight:      
Height:      
 
 
 
Physical Exam  
Constitutional: She is oriented to person, place, and time. She has a sickly appearance. HENT:  
Head: Normocephalic. Eyes: Pupils are equal, round, and reactive to light. Neck: Neck supple. JVD present. Cardiovascular: Exam reveals no friction rub. Pulmonary/Chest: No respiratory distress. She has decreased breath sounds in the left lower field. Abdominal: Bowel sounds are normal.  
Neurological: She is oriented to person, place, and time. Skin: Skin is dry. Psychiatric: She has a normal mood and affect. Her behavior is normal.  
 
 
Data Review:  
Recent Labs 10/17/20 
3113 10/16/20 
7573 10/15/20 
6967 * 137 138  
K 4.1 3.7 4.1 MG 2.5*  --  2.3 BUN 26* 20 16 CREA 0.81 0.75 0.69 GLU 83 89 88 WBC  --  9.3  --   
HGB  --  10.9*  --   
HCT  --  33.0*  --   
PLT  --  378  -- Intake/Output Summary (Last 24 hours) at 10/17/2020 1151 Last data filed at 10/17/2020 0799 Gross per 24 hour Intake  Output 1700 ml Net -1700 ml Current Facility-Administered Medications Medication Dose Route Frequency  cyclobenzaprine (FLEXERIL) tablet 5 mg  5 mg Oral TID PRN  
 albuterol (PROVENTIL VENTOLIN) nebulizer solution 2.5 mg  2.5 mg Nebulization TID RT  
  calcium carbonate (TUMS) chewable tablet 200 mg [elemental]  200 mg Oral QID PRN  
 ibuprofen (MOTRIN) tablet 400 mg  400 mg Oral Q6H PRN  
 [Held by provider] furosemide (LASIX) injection 40 mg  40 mg IntraVENous BID  [Held by provider] lisinopriL (PRINIVIL, ZESTRIL) tablet 2.5 mg  2.5 mg Oral DAILY  [Held by provider] metoprolol succinate (TOPROL-XL) XL tablet 25 mg  25 mg Oral DAILY  albuterol (PROVENTIL VENTOLIN) nebulizer solution 2.5 mg  2.5 mg Nebulization Q6H PRN  
 morphine injection 1 mg  1 mg IntraVENous Q4H PRN  
 fluticasone propionate (FLONASE) 50 mcg/actuation nasal spray 2 Spray  2 Spray Both Nostrils DAILY  sodium chloride (NS) flush 5-40 mL  5-40 mL IntraVENous Q8H  
 sodium chloride (NS) flush 5-40 mL  5-40 mL IntraVENous PRN  
 acetaminophen (TYLENOL) tablet 650 mg  650 mg Oral Q6H PRN Or  
 acetaminophen (TYLENOL) suppository 650 mg  650 mg Rectal Q6H PRN  polyethylene glycol (MIRALAX) packet 17 g  17 g Oral DAILY PRN  
 enoxaparin (LOVENOX) injection 40 mg  40 mg SubCUTAneous DAILY  ondansetron (ZOFRAN ODT) tablet 4 mg  4 mg Oral Q8H PRN Or  
 ondansetron (ZOFRAN) injection 4 mg  4 mg IntraVENous Q6H PRN  
 aspirin delayed-release tablet 81 mg  81 mg Oral DAILY  levothyroxine (SYNTHROID) tablet 75 mcg  75 mcg Oral ACB  montelukast (SINGULAIR) tablet 10 mg  10 mg Oral DAILY  pantoprazole (PROTONIX) tablet 40 mg  40 mg Oral ACB  atorvastatin (LIPITOR) tablet 10 mg  10 mg Oral DAILY  budesonide-formoteroL (SYMBICORT) 160-4.5 mcg/actuation HFA inhaler 2 Puff  2 Puff Inhalation BID RT Assessment/Plan:  
 
Patient with acute systolic heart failure last EF 30 to 35% bilateral pleural effusions as well as left lung mass occlusion of left pulmonary artery attention 1. Acute on chronic systolic heart failure · New diagnosis · No angina · Appropriate medical therapies initiated hold today due to hypotension · Low blood pressures thought to be somewhat spurious due to peripheral vascular disease 2. Symptoms controlled 3. Lung mass · Work-up ongoing · Pleural fluid analysis performed previously with mesothelial cells noted 4. PET scan planned in the future per zoraida Collins MD 
10/17/2020 11:51 AM

## 2020-10-17 NOTE — PROGRESS NOTES
Shift assessment complete. A&OX4. Lungs diminished in the bases on auscultation. Respirations present. Even and unlabored. No s/s of distress. Zero c/o pain at this time. Call light within reach. Encouraged patient to call for assistance. Patient verbalizes understanding. See Doc Flowsheet for assessment details. Patient resting in bed. Will continue to monitor.

## 2020-10-18 NOTE — PROGRESS NOTES
Assessment complete. Pt in bed resting. Left lower lungs sounds coarse, diminished in bases. Pt on 1L NC. Pt c/o of back pain, request motrin. Call light in reach.

## 2020-10-18 NOTE — PROGRESS NOTES
Hospitalist Note Admit Date:  10/1/2020  8:19 AM  
Name:  Grace Joe Age:  69 y.o. 
:  1950 MRN:  [id number] PCP:  Heena Wheeler MD 
Treatment Team: Attending Provider: Jennifer Ewing MD; Primary Nurse: Tania Quinn; Consulting Provider: Craig Patel MD; Consulting Provider: Melony Barker MD; Consulting Provider: Nito Godinez MD; Utilization Review: Michael Llamas RN; Care Manager: Charles Corral.; Consulting Provider: Beryle Marrow, DO; Utilization Review: Priscilla Ventura RN; Primary Nurse: Jefe Menon RN 
 
HPI/Subjective:  
 
78-year-old female with a past medical history of asthma, GERD, treatment lymphoma, hyperlipidemia, hypothyroidism, hypotension, chronic left pulmonary artery occlusion, left lung mass status post needle biopsy on 2019 demonstrating harmatoma, that presents in the setting of worsening shortness of breath.  The patient states that for the past few weeks she has been developing worsening shortness of breath especially on exertion, and associated with some dry cough. Patient seen and examined at bedside. This morning still presenting with SOB. Denies chest pain, no abdominal pain, nausea or vomiting 
  
Course during the stay:  
Admitted for exertional dyspnea. History of left lung mass previous biopsy in 2019 demonstrated hamartoma, chronic left pulmonary artery occlusion. History of low blood pressures on the Florinef and midodrine at home. 
  
Patient was continued on oxygen, pulmonary consulted. Patient had Thoracentesis on 10/3/3096-6863 mL of clear yellow appearing fluid removed from the left pleural effusion. Thoracentesis on 10/9/2020-600 cc of clear yellow fluid aspirated from the left pleural effusion. Patient still complaining of shortness of breath on walking a short distance. Presently has bedside commode.  
  
Patient Midodrin was increased from 5 mg 3 times daily to 10 mg 3 times daily during the stay.   
Cardiology was initially consulted for mild elevated troponin. Start the patient on a small dose of Coreg. Secondary to low blood pressure not a candidate for ACE or ARB. Continue Lasix. Ischemic work-up as an outpatient. Cardiology signed off. Coreg was again discontinued secondary to low blood pressure. later on during the stay as the patient was still having shortness of breath recurrent pleural effusion, pulmonary felt probably more cardiac related. Echo with a EF of 35 to 40%. Cardiology was reconsulted, advised to continue Lasix as long as blood pressure tolerates. 
  
Vascular surgery was consulted as patient was concerned that her previous visual blockage could be the reason why she is having shortness of breath. Reviewed her previous CT results- 
CT chest 9/21/2020-right-sided aortic arch with relatively 
extensive atherosclerotic change at the arch where there appears to be a small 
focal dissection. CTA neck done in June 2019 showed subclavian steal syndrome. 
-Vascular surgery felt no intervention required at this point. 
  
  
Presently patient still has some shortness of breath more on exertion, on 2.5 L of oxygen nasal cannula, followed by cardiology and pulmonary, vascular signed off her felt no intervention required at this point. Patient still has on and off episodes of low blood pressures secondary to which at times patient Lasix had to be held. 10/17 patient's blood pressure reported to be low this morning 87/55. No dizziness no lightheadedness. No chest pain. 10/18 patient is sitting in chair this morning. She denies any chest pain palpitations. She is ambulating in the hallways and not having much shortness of breath. No fever. Blood pressure improved this morning. Restarted Lasix and Lisinopril, Metoprolol. Objective:  
 
Patient Vitals for the past 24 hrs: 
 Temp Pulse Resp BP SpO2  
10/18/20 1147 98.3 °F (36.8 °C) 97 18 (!) 96/51 98 % 10/18/20 0733 98 °F (36.7 °C) 72 18 117/61 93 % 10/18/20 0407 97.8 °F (36.6 °C) (!) 105 18 (!) 112/58 98 % 10/17/20 2111     95 % 10/17/20 2016 98.1 °F (36.7 °C) (!) 109 18 (!) 116/57 98 % 10/17/20 1615 98 °F (36.7 °C) (!) 105 16 102/69 100 % 10/17/20 1450     99 % Oxygen Therapy O2 Sat (%): 98 % (10/18/20 1147) Pulse via Oximetry: 107 beats per minute (10/17/20 2111) O2 Device: Nasal cannula (10/18/20 0817) O2 Flow Rate (L/min): 1 l/min (10/18/20 0817) Intake/Output Summary (Last 24 hours) at 10/18/2020 1209 Last data filed at 10/18/2020 1153 Gross per 24 hour Intake  Output 300 ml Net -300 ml *Note that automatically entered I/Os may not be accurate; dependent on patient compliance with collection and accurate  by techs. General:    Chronically ill-appearing, CV:   RRR. Grade 4 systolic murmur heard throughout, no rubs, or gallops. Lungs:   Decreased breath sounds bilateral lung bases, no wheezing, rhonchi, or rales. Abdomen:   Soft, nontender, nondistended. Extremities: Warm and dry. No cyanosis or edema. Skin:     No rashes or jaundice. Neuro:  No gross focal deficits Data Review: 
I have reviewed all labs, meds, and studies from the last 24 hours: 
 
Recent Results (from the past 24 hour(s)) CBC W/O DIFF Collection Time: 10/18/20  6:14 AM  
Result Value Ref Range WBC 9.6 4.3 - 11.1 K/uL  
 RBC 3.68 (L) 4.05 - 5.2 M/uL  
 HGB 11.3 (L) 11.7 - 15.4 g/dL HCT 34.4 (L) 35.8 - 46.3 % MCV 93.5 79.6 - 97.8 FL  
 MCH 30.7 26.1 - 32.9 PG  
 MCHC 32.8 31.4 - 35.0 g/dL  
 RDW 13.2 11.9 - 14.6 % PLATELET 618 499 - 656 K/uL MPV 9.7 9.4 - 12.3 FL ABSOLUTE NRBC 0.00 0.0 - 0.2 K/uL METABOLIC PANEL, BASIC Collection Time: 10/18/20  6:14 AM  
Result Value Ref Range Sodium 135 (L) 136 - 145 mmol/L Potassium 4.1 3.5 - 5.1 mmol/L Chloride 97 (L) 98 - 107 mmol/L  
 CO2 34 (H) 21 - 32 mmol/L  Anion gap 4 (L) 7 - 16 mmol/L  
 Glucose 87 65 - 100 mg/dL BUN 22 8 - 23 MG/DL Creatinine 0.64 0.6 - 1.0 MG/DL  
 GFR est AA >60 >60 ml/min/1.73m2 GFR est non-AA >60 >60 ml/min/1.73m2 Calcium 9.5 8.3 - 10.4 MG/DL All Micro Results Procedure Component Value Units Date/Time CULTURE, BODY FLUID Berna Whittaker [[id number]] Collected:  10/15/20 1145 Order Status:  Completed Specimen:  Pleural Fluid Updated:  10/17/20 1001 Special Requests: NO SPECIAL REQUESTS     
  GRAM STAIN 10 TO 25 WBCS SEEN PER OIF  
   NO DEFINITE ORGANISM SEEN Culture result: NO GROWTH 2 DAYS     
 CULTURE, BODY FLUID W Palak Roof [[id number]] Collected:  10/15/20 1145 Order Status:  Completed Specimen:  Pleural Fluid Updated:  10/17/20 1000 Special Requests: NO SPECIAL REQUESTS     
  GRAM STAIN 3 TO 10 WBCS SEEN PER OIF  
   NO DEFINITE ORGANISM SEEN Culture result: NO GROWTH 2 DAYS FUNGUS CULTURE AND SMEAR [[id number]] Collected:  10/03/20 1348 Order Status:  Completed Specimen:  Miscellaneous sample Updated:  10/08/20 1236 Source PLEURAL FLUID Comment: L1 Fungus stain Direct Inoculation Fungus (Mycology) Culture Other source received Comment: (NOTE) Performed At: [address] [id number] Moshe Ozuna MD [id number] 
  
  
 C. DIFFICILE AG & TOXIN A/B [[id number]] Collected:  10/06/20 1938 Order Status:  Canceled Specimen:  Stool AFB CULTURE + SMEAR W/RFLX ID FROM CULTURE [[id number]] Collected:  10/03/20 1348 Order Status:  Completed Specimen:  Miscellaneous sample Updated:  10/06/20 1538 Source PLEURAL FLUID Comment: L1  
  
  AFB Specimen processing Concentration Acid Fast Smear Negative Comment: (NOTE) Performed At: [address] [id number] Moshe Ozuna MD [id number] Acid Fast Culture PENDING  
 CULTURE, BLOOD [[id number]] Collected:  10/01/20 1135 Order Status:  Completed Specimen:  Blood Updated:  10/06/20 5182 Special Requests: --     
  RIGHT 
FOREARM Culture result: NO GROWTH 5 DAYS     
 CULTURE, BLOOD [[id number]] Collected:  10/01/20 1135 Order Status:  Completed Specimen:  Blood Updated:  10/06/20 1684 Special Requests: --     
  LEFT 
FOREARM Culture result: NO GROWTH 5 DAYS     
 CULTURE, BODY FLUID W Frieda Pickett [[id number]] Collected:  10/03/20 1348 Order Status:  Completed Specimen:  Pleural Fluid Updated:  10/06/20 4155 Special Requests: NO SPECIAL REQUESTS     
  GRAM STAIN 0 TO 13 WBC'S SEEN PER OIF  
   NO DEFINITE ORGANISM SEEN Culture result: NO GROWTH 2 DAYS Results for orders placed or performed during the hospital encounter of 10/01/20  
2D ECHO LIMTED ADULT W OR WO CONTR Narrative [place] [facility] [address] [place], [address] [facility] 
[phone] Transthoracic Echocardiogram 
2D, M-mode, Doppler, and Color Doppler Patient: Issa Lane 
MR #: [id number] : 1950 Age: 69 years Gender: Female Study date: 12-Oct-2020 Account #: [de-identified] Height: 60.6 in 
Weight: 121.7 lb 
BSA: 1.52 mï¾² Status:Routine Location: 834 BP: 88/ 58 Allergies: PROCHLORPERAZINE EDISYLATE Sonographer:  Fabiola Wolfe [facility] Group:  [facility] Referring Physician:   25-10 [address] [place] [address] Reading Physician:  Juan Carlos Pineda MD [facility] INDICATIONS: Cardiomyopathy, Check AV Gradients. PROCEDURE: This was a routine study. A transthoracic echocardiogram was 
performed. The study included limited 2D imaging, M-mode, limited spectral 
Doppler, and color Doppler. Intravenous contrast (Definity) was administered. Image quality was adequate. LEFT VENTRICLE: Size was normal. Systolic function was moderately to markedly 
reduced. Ejection fraction was estimated in the range of 30 % to 35 %. There was moderate diffuse hypokinesis. Wall thickness was normal. 
 
AORTIC VALVE: The valve was trileaflet. Leaflets exhibited normal cuspal 
separation and mild sclerosis. The aortic valve area by the continuity  
equation 
was 2.07 cm2. The peak velocity was 1.36 m/s. The mean pressure gradient was 5 
mmHg. The peak pressure gradient was 7 mmHg. The Di=0.66. The SVi=36.8. SUMMARY: 
 
-  Left ventricle: Systolic function was moderately to markedly reduced. Ejection fraction was estimated in the range of 30 % to 35 %. There was 
moderate diffuse hypokinesis. -  Aortic valve: The aortic valve area by the continuity equation was 2.07  
cm2. ADDITIONAL MEASUREMENTS High velocity overlapping flow noted during suprasternal imaging (descending 
aorta appears with normal flow velocities and superimposed high flow  
velocities 
from alternative source of uncertain [id number]-~3.7C/Y). This was also notedon 
previous echo from 9/30/20. SYSTEM MEASUREMENT TABLES 
 
2D mode AoR Diam (2D): 2.9 cm IVS/LVPW (2D): 1.2 IVSd (2D): 1.1 cm 
LVIDd (2D): 4.4 cm LVIDs (2D): 3.7 cm 
LVOT Area (2D): 3.1 cm2 LVPWd (2D): 0.9 cm Unspecified Scan Mode Peak Grad; Mean; Antegrade Flow: 7 mm[Hg] Vmax; Antegrade Flow: 136 cm/s LVOT Diam: 2 cm Prepared and signed by 
 
Vanice Cheadle. Nessmith, MD [facility] Signed 12-Oct-2020 13:13:33 Current Meds: 
Current Facility-Administered Medications Medication Dose Route Frequency  cyclobenzaprine (FLEXERIL) tablet 5 mg  5 mg Oral TID PRN  
 albuterol (PROVENTIL VENTOLIN) nebulizer solution 2.5 mg  2.5 mg Nebulization TID RT  
 calcium carbonate (TUMS) chewable tablet 200 mg [elemental]  200 mg Oral QID PRN  
 furosemide (LASIX) injection 40 mg  40 mg IntraVENous BID  lisinopriL (PRINIVIL, ZESTRIL) tablet 2.5 mg  2.5 mg Oral DAILY  metoprolol succinate (TOPROL-XL) XL tablet 25 mg  25 mg Oral DAILY  albuterol (PROVENTIL VENTOLIN) nebulizer solution 2.5 mg  2.5 mg Nebulization Q6H PRN  
 morphine injection 1 mg  1 mg IntraVENous Q4H PRN  
 fluticasone propionate (FLONASE) 50 mcg/actuation nasal spray 2 Spray  2 Spray Both Nostrils DAILY  sodium chloride (NS) flush 5-40 mL  5-40 mL IntraVENous Q8H  
 sodium chloride (NS) flush 5-40 mL  5-40 mL IntraVENous PRN  
 acetaminophen (TYLENOL) tablet 650 mg  650 mg Oral Q6H PRN Or  
 acetaminophen (TYLENOL) suppository 650 mg  650 mg Rectal Q6H PRN  polyethylene glycol (MIRALAX) packet 17 g  17 g Oral DAILY PRN  
 enoxaparin (LOVENOX) injection 40 mg  40 mg SubCUTAneous DAILY  ondansetron (ZOFRAN ODT) tablet 4 mg  4 mg Oral Q8H PRN Or  
 ondansetron (ZOFRAN) injection 4 mg  4 mg IntraVENous Q6H PRN  
 aspirin delayed-release tablet 81 mg  81 mg Oral DAILY  levothyroxine (SYNTHROID) tablet 75 mcg  75 mcg Oral ACB  montelukast (SINGULAIR) tablet 10 mg  10 mg Oral DAILY  pantoprazole (PROTONIX) tablet 40 mg  40 mg Oral ACB  atorvastatin (LIPITOR) tablet 10 mg  10 mg Oral DAILY  budesonide-formoteroL (SYMBICORT) 160-4.5 mcg/actuation HFA inhaler 2 Puff  2 Puff Inhalation BID RT Other Studies (last 24 hours): No results found. Assessment and Plan:  
 
Hospital Problems as of 10/18/2020 Date Reviewed: 10/13/2020 Codes Class Noted - Resolved POA Multiple lung nodules on CT (Chronic) ICD-10-CM: R91.8 ICD-9-CM: 793.19  10/12/2020 - Present Unknown Overview Addendum 10/12/2020  2:33 PM by Joao Dodge NP  
  -PET scan 10/2019: PET scan fortunately did not have any abnormal activity in her mass in the left lung. This may mean that we can just follow this radiographically, but we will talk about her at tumor board and come back to her with the group's recommendation. 
-10/30/2019: Patient is discussed at thoracic conference today lead by Dr Manasa No. Patient is a 79 yo never smoker. CT guided biopsy recommended. -CT guided Biopsy 11/2020: lung biopsy showed signs of this nodule being a hamartoma, which is a benign tumor that we can simply follow. repeat CT in 6 months 
-Chest CT June 2020: CT scan is stable 
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 Lerma Tazewell are several new pulmonary nodules noted which may suggest some metastatic disease.  This require further follow-up CT of the chest in 2 months or doing PET scan. Hamartoma (HCC) (Chronic) ICD-10-CM: Q85.9 ICD-9-CM: 759.6  10/12/2020 - Present Yes Peripheral vascular disease ([address] ICD-10-CM: I73.9 ICD-9-CM: 443.9  10/12/2020 - Present Unknown Bilateral pleural effusion ICD-10-CM: J90 ICD-9-CM: 511.9  10/7/2020 - Present Yes Overview Signed 10/13/2020  1:25 PM by Romana Poll, PA  
  10/3/2020: L thoracentesis 1050mL removed R thoracentesis 800mL removed 10/9/2020: L thoracentesis: 1000mL removed R thoracentesis: 600mL removed Acute systolic heart failure (HCC) ICD-10-CM: I50.21 ICD-9-CM: 428.21  10/6/2020 - Present Yes * (Principal) PNA (pneumonia) ICD-10-CM: J18.9 ICD-9-CM: 664  10/1/2020 - Present Unknown Mass of lingula of lung ICD-10-CM: R91.8 ICD-9-CM: 786.6  10/16/2019 - Present Yes Hypercholesterolemia (Chronic) ICD-10-CM: E78.00 ICD-9-CM: 272.0  6/30/2015 - Present Yes Mild persistent asthma without complication (Chronic) AAD-76-TI: J45.30 ICD-9-CM: 493.90  10/17/2013 - Present Yes Acquired hypothyroidism (Chronic) ICD-10-CM: E03.9 ICD-9-CM: 244.9  10/17/2013 - Present Yes GERD (gastroesophageal reflux disease) (Chronic) ICD-10-CM: K21.9 ICD-9-CM: 530.81  10/17/2013 - Present Yes RESOLVED: Hypokalemia ICD-10-CM: E87.6 ICD-9-CM: 276.8  10/6/2020 - 10/12/2020 Yes Plan: This is a 69-year-old female with: 
  
 1.  Acute hypoxemic respiratory failure-on 1 L oxygen nasal cannula. Pt completed course of antibiotics. Left pleural effusion s/p thoracentesis 10/3 ad 10/8 and 10/15 (710 cc from right lung and 690 cc from left lung removed) fluid transudative. - Pulmonary on board. Appreciate recs. 
  
2. Acute Systolic CHF exacerbation POA Elevated troponin likely from demand ischemia. IV lasix 40 mg bid. restarted this am after blood pressure improved. - Telemetry - Continue aspirin 
- TTE EF 35-40% - Metoprolol,Lisinopril restarted this am after BP[ improved. Cardiology on board. Appreciate recs. May need Left heart cath after work up for lung nodule.  
  
3. Left lung mass status post needle biopsy on November 2019 demonstrating harmatoma / Chronic left pulmonary artery occlusion. Continue aspirin. Lung nodules- out-pt PET CT, 
  
4.  Asthma -intermittent POA 
 continue on home inhalers and Singulair. Currently not in exacerbation. 
  
5.  Hypotension: improved BP Due to diffuse peripheral vascular disease, falsely low BP readings in upper extremities. Midodrine and Florinef have been discontinued 10/12 
  
6. Hyperlipidemia  
continue on atorvastatin. 
  
7.  GERD  
continue on PPI. 
  
8. Hypothyroidism  
continue on levothyroxine. 
  
Hypokalemia-resolved 
  
CT chest 9/21/2020-right-sided aortic arch with relatively 
extensive atherosclerotic change at the arch where there appears to be a small 
focal dissection. CTA neck done in June 2019 showed subclavian steal syndrome. Vascular surgery recs no intervention at this point. DC planning/Dispo: Anticipate inpatient hospital stay for at least 24 hours. PT/OT carlos. Recs home health. Diet:  DIET REGULAR 
DIET NUTRITIONAL SUPPLEMENTS 
DVT ppx: Lovenox Signed: 
Mely Solo MD

## 2020-10-18 NOTE — PROGRESS NOTES
[facility] CARDIOLOGY PROGRESS NOTE 
      
 
10/18/2020 11:51 AM 
 
Admit Date: 10/1/2020 Subjective:  
 medications held yesterday  due to low blood pressures also holding IV Lasix. Patient with no symptoms today Review of Systems Cardiovascular: Negative for chest pain. Skin: Negative for rash. Neurological: Negative for dizziness. Objective:  
  
Vitals:  
 10/17/20 2016 10/17/20 2111 10/18/20 0407 10/18/20 8727 BP: (!) 116/57  (!) 112/58 117/61 Pulse: (!) 109  (!) 105 72 Resp: 18 18 18 Temp: 98.1 °F (36.7 °C)  97.8 °F (36.6 °C) 98 °F (36.7 °C) SpO2: 98% 95% 98% 93% Weight:      
Height:      
 
 
 
Physical Exam  
Constitutional: She is oriented to person, place, and time. She has a sickly appearance. HENT:  
Head: Normocephalic. Eyes: Pupils are equal, round, and reactive to light. Neck: Neck supple. JVD present. Cardiovascular: Exam reveals no friction rub. Pulmonary/Chest: No respiratory distress. She has decreased breath sounds in the left lower field. Abdominal: Bowel sounds are normal.  
Neurological: She is oriented to person, place, and time. Skin: Skin is dry. Psychiatric: She has a normal mood and affect. Her behavior is normal.  
 
 
Data Review:  
Recent Labs 10/18/20 
5625 10/17/20 
1468 10/16/20 
1361 * 135* 137  
K 4.1 4.1 3.7 MG  --  2.5*  --   
BUN 22 26* 20  
CREA 0.64 0.81 0.75 GLU 87 83 89 WBC 9.6  --  9.3 HGB 11.3*  --  10.9* HCT 34.4*  --  33.0*  
  --  378 No intake or output data in the 24 hours ending 10/18/20 1007 Current Facility-Administered Medications Medication Dose Route Frequency  cyclobenzaprine (FLEXERIL) tablet 5 mg  5 mg Oral TID PRN  
 albuterol (PROVENTIL VENTOLIN) nebulizer solution 2.5 mg  2.5 mg Nebulization TID RT  
 calcium carbonate (TUMS) chewable tablet 200 mg [elemental]  200 mg Oral QID PRN  
 furosemide (LASIX) injection 40 mg  40 mg IntraVENous BID  
  lisinopriL (PRINIVIL, ZESTRIL) tablet 2.5 mg  2.5 mg Oral DAILY  metoprolol succinate (TOPROL-XL) XL tablet 25 mg  25 mg Oral DAILY  albuterol (PROVENTIL VENTOLIN) nebulizer solution 2.5 mg  2.5 mg Nebulization Q6H PRN  
 morphine injection 1 mg  1 mg IntraVENous Q4H PRN  
 fluticasone propionate (FLONASE) 50 mcg/actuation nasal spray 2 Spray  2 Spray Both Nostrils DAILY  sodium chloride (NS) flush 5-40 mL  5-40 mL IntraVENous Q8H  
 sodium chloride (NS) flush 5-40 mL  5-40 mL IntraVENous PRN  
 acetaminophen (TYLENOL) tablet 650 mg  650 mg Oral Q6H PRN Or  
 acetaminophen (TYLENOL) suppository 650 mg  650 mg Rectal Q6H PRN  polyethylene glycol (MIRALAX) packet 17 g  17 g Oral DAILY PRN  
 enoxaparin (LOVENOX) injection 40 mg  40 mg SubCUTAneous DAILY  ondansetron (ZOFRAN ODT) tablet 4 mg  4 mg Oral Q8H PRN Or  
 ondansetron (ZOFRAN) injection 4 mg  4 mg IntraVENous Q6H PRN  
 aspirin delayed-release tablet 81 mg  81 mg Oral DAILY  levothyroxine (SYNTHROID) tablet 75 mcg  75 mcg Oral ACB  montelukast (SINGULAIR) tablet 10 mg  10 mg Oral DAILY  pantoprazole (PROTONIX) tablet 40 mg  40 mg Oral ACB  atorvastatin (LIPITOR) tablet 10 mg  10 mg Oral DAILY  budesonide-formoteroL (SYMBICORT) 160-4.5 mcg/actuation HFA inhaler 2 Puff  2 Puff Inhalation BID RT Assessment/Plan:  
 
Patient with acute systolic heart failure last EF 30 to 35% bilateral pleural effusions as well as left lung mass occlusion of left pulmonary artery attention. 1. Acute on chronic systolic heart failure · New diagnosis · No angina · Appropriate medical therapies initiated held 10/18 due to hypotension now impriving. · Low blood pressures thought to be somewhat spurious due to peripheral vascular disease · Improved 2. Symptoms controlled 3. Lung mass · Work-up ongoing · Pleural fluid analysis performed previously with mesothelial cells noted 4. PET scan planned in the future per pulm l notes Patient with new cardiomyopathy deferring cardiac catheterization due to multiple acute issues at this time no symptoms of angina. Patient with left lower lung mass and interestingly chronic total occlusion of left pulmonary artery. Echocardiogram 9/30/2020 described normal dimensions of right ventricle PA pressure not obtained.   
 
Bassem Villagomez MD 
10/18/2020 11:51 AM

## 2020-10-19 NOTE — PROGRESS NOTES
Hospitalist Note Admit Date:  10/1/2020  8:19 AM  
Name:  Benjamin Ibarra Age:  69 y.o. 
:  1950 MRN:  [id number] PCP:  Makeda Juarez MD 
Treatment Team: Attending Provider: Javed Castellon MD; Primary Nurse: Jomarie Dakin; Consulting Provider: Becky Varma MD; Consulting Provider: Werner Rutledge MD; Consulting Provider: Moraima Jeronimo MD; Utilization Review: Demetri Myers RN; Care Manager: Nai Romano.; Consulting Provider: Jigna Ervin DO; Utilization Review: Niraj Villeda RN 
 
HPI/Subjective:  
 
65-year-old female with a past medical history of asthma, GERD, treatment lymphoma, hyperlipidemia, hypothyroidism, hypotension, chronic left pulmonary artery occlusion, left lung mass status post needle biopsy on 2019 demonstrating harmatoma, that presents in the setting of worsening shortness of breath.  The patient states that for the past few weeks she has been developing worsening shortness of breath especially on exertion, and associated with some dry cough. Patient seen and examined at bedside. This morning still presenting with SOB. Denies chest pain, no abdominal pain, nausea or vomiting 
  
Course during the stay:  
Admitted for exertional dyspnea. History of left lung mass previous biopsy in 2019 demonstrated hamartoma, chronic left pulmonary artery occlusion. History of low blood pressures on the Florinef and midodrine at home. 
  
Patient was continued on oxygen, pulmonary consulted. Patient had Thoracentesis on 10/3/2436-0402 mL of clear yellow appearing fluid removed from the left pleural effusion. Thoracentesis on 10/9/2020-600 cc of clear yellow fluid aspirated from the left pleural effusion. Patient still complaining of shortness of breath on walking a short distance.   Presently has bedside commode.  
  
Patient Midodrin was increased from 5 mg 3 times daily to 10 mg 3 times daily during the stay. 
  
 Cardiology was initially consulted for mild elevated troponin. Start the patient on a small dose of Coreg. Secondary to low blood pressure not a candidate for ACE or ARB. Continue Lasix. Ischemic work-up as an outpatient. Cardiology signed off. Coreg was again discontinued secondary to low blood pressure. later on during the stay as the patient was still having shortness of breath recurrent pleural effusion, pulmonary felt probably more cardiac related. Echo with a EF of 35 to 40%. Cardiology was reconsulted, advised to continue Lasix as long as blood pressure tolerates. 
  
Vascular surgery was consulted as patient was concerned that her previous visual blockage could be the reason why she is having shortness of breath. Reviewed her previous CT results- 
CT chest 9/21/2020-right-sided aortic arch with relatively 
extensive atherosclerotic change at the arch where there appears to be a small 
focal dissection. CTA neck done in June 2019 showed subclavian steal syndrome. 
-Vascular surgery felt no intervention required at this point. 
  
  
Presently patient still has some shortness of breath more on exertion, on 2.5 L of oxygen nasal cannula, followed by cardiology and pulmonary, vascular signed off her felt no intervention required at this point. Patient still has on and off episodes of low blood pressures secondary to which at times patient Lasix had to be held. 10/17 patient's blood pressure reported to be low this morning 87/55. No dizziness no lightheadedness. No chest pain. 10/18 patient is sitting in chair this morning. She denies any chest pain palpitations. She is ambulating in the hallways and not having much shortness of breath. No fever. Blood pressure improved this morning. Restarted Lasix and Lisinopril, Metoprolol. 10/19 patient is doing well this morning. No chest pain or palpitations. Shortness of breath improved. Currently on room air to 1 L nasal cannula. IV Lasix dose not given yesterday evening due to drop in blood pressure. This am, BP is 100/56. Objective:  
 
Patient Vitals for the past 24 hrs: 
 Temp Pulse Resp BP SpO2  
10/19/20 0913     98 % 10/19/20 0759 97.7 °F (36.5 °C) (!) 102 20 (!) 100/56 98 % 10/19/20 0433 98.2 °F (36.8 °C) 99 18 (!) 113/59 95 % 10/19/20 0138 98 °F (36.7 °C) 97 18 (!) 96/55 97 % 10/18/20 2157     97 % 10/18/20 1923 98 °F (36.7 °C) 98 18 (!) 91/53 95 % 10/18/20 1714 98.8 °F (37.1 °C) 88 18 (!) 97/44 94 % 10/18/20 1408     94 % 10/18/20 1147 98.3 °F (36.8 °C) 97 18 (!) 96/51 98 % Oxygen Therapy O2 Sat (%): 98 % (10/19/20 0913) Pulse via Oximetry: 92 beats per minute (10/19/20 0913) O2 Device: Room air (10/19/20 0913) O2 Flow Rate (L/min): (Decreased from 1L) (10/19/20 0913) Intake/Output Summary (Last 24 hours) at 10/19/2020 1136 Last data filed at 10/19/2020 2334 Gross per 24 hour Intake  Output 1100 ml Net -1100 ml *Note that automatically entered I/Os may not be accurate; dependent on patient compliance with collection and accurate  by techs. General:    Chronically ill-appearing, CV:   RRR. Grade 4 systolic murmur heard throughout, no rubs, or gallops. Lungs:    clear to auscultation bilaterally,  no wheezing, rhonchi, or rales. Abdomen:   Soft, nontender, nondistended. Extremities: Warm and dry. No cyanosis or edema. Skin:     No rashes or jaundice. Neuro:  No gross focal deficits Data Review: 
I have reviewed all labs, meds, and studies from the last 24 hours: 
 
Recent Results (from the past 24 hour(s)) CBC WITH AUTOMATED DIFF Collection Time: 10/19/20  9:00 AM  
Result Value Ref Range WBC 9.5 4.3 - 11.1 K/uL  
 RBC 3.65 (L) 4.05 - 5.2 M/uL  
 HGB 11.2 (L) 11.7 - 15.4 g/dL HCT 34.8 (L) 35.8 - 46.3 % MCV 95.3 79.6 - 97.8 FL  
 MCH 30.7 26.1 - 32.9 PG  
 MCHC 32.2 31.4 - 35.0 g/dL  
 RDW 13.2 11.9 - 14.6 % PLATELET 484 472 - 213 K/uL MPV 10.2 9.4 - 12.3 FL ABSOLUTE NRBC 0.00 0.0 - 0.2 K/uL  
 DF AUTOMATED NEUTROPHILS 64 43 - 78 % LYMPHOCYTES 20 13 - 44 % MONOCYTES 10 4.0 - 12.0 % EOSINOPHILS 5 0.5 - 7.8 % BASOPHILS 0 0.0 - 2.0 % IMMATURE GRANULOCYTES 0 0.0 - 5.0 %  
 ABS. NEUTROPHILS 6.1 1.7 - 8.2 K/UL  
 ABS. LYMPHOCYTES 1.9 0.5 - 4.6 K/UL  
 ABS. MONOCYTES 0.9 0.1 - 1.3 K/UL  
 ABS. EOSINOPHILS 0.5 0.0 - 0.8 K/UL  
 ABS. BASOPHILS 0.0 0.0 - 0.2 K/UL  
 ABS. IMM. GRANS. 0.0 0.0 - 0.5 K/UL All Micro Results Procedure Component Value Units Date/Time CULTURE, BODY FLUID Nick Wyile [[id number]] Collected:  10/15/20 1145 Order Status:  Completed Specimen:  Pleural Fluid Updated:  10/17/20 1001 Special Requests: NO SPECIAL REQUESTS     
  GRAM STAIN 10 TO 25 WBCS SEEN PER OIF  
   NO DEFINITE ORGANISM SEEN Culture result: NO GROWTH 2 DAYS     
 CULTURE, BODY FLUID W Cheryl Nadegeomar 115 [[id number]] Collected:  10/15/20 1145 Order Status:  Completed Specimen:  Pleural Fluid Updated:  10/17/20 1000 Special Requests: NO SPECIAL REQUESTS     
  GRAM STAIN 3 TO 10 WBCS SEEN PER OIF  
   NO DEFINITE ORGANISM SEEN Culture result: NO GROWTH 2 DAYS FUNGUS CULTURE AND SMEAR [[id number]] Collected:  10/03/20 1348 Order Status:  Completed Specimen:  Miscellaneous sample Updated:  10/08/20 1236 Source PLEURAL FLUID Comment: L1 Fungus stain Direct Inoculation Fungus (Mycology) Culture Other source received Comment: (NOTE) Performed At: [address] [id number] Sam Ruggiero MD MH:[id number] 
  
  
 C. DIFFICILE AG & TOXIN A/B [[id number]] Collected:  10/06/20 1938 Order Status:  Canceled Specimen:  Stool AFB CULTURE + SMEAR W/RFLX ID FROM CULTURE [[id number]] Collected:  10/03/20 1348 Order Status:  Completed Specimen:  Miscellaneous sample Updated:  10/06/20 1538   Source PLEURAL FLUID     
 Comment: L1  
  
  AFB Specimen processing Concentration Acid Fast Smear Negative Comment: (NOTE) Performed At: [address] [id number] Lalo Bay MD [id number] Acid Fast Culture PENDING  
 CULTURE, BLOOD [[id number]] Collected:  10/01/20 1135 Order Status:  Completed Specimen:  Blood Updated:  10/06/20 3661 Special Requests: --     
  RIGHT 
FOREARM Culture result: NO GROWTH 5 DAYS     
 CULTURE, BLOOD [[id number]] Collected:  10/01/20 113 Order Status:  Completed Specimen:  Blood Updated:  10/06/20 3425 Special Requests: --     
  LEFT 
FOREARM Culture result: NO GROWTH 5 DAYS     
 CULTURE, BODY FLUID W Rubén Romreo [[id number]] Collected:  10/03/20 1348 Order Status:  Completed Specimen:  Pleural Fluid Updated:  10/06/20 9219 Special Requests: NO SPECIAL REQUESTS     
  GRAM STAIN 0 TO 13 WBC'S SEEN PER OIF  
   NO DEFINITE ORGANISM SEEN Culture result: NO GROWTH 2 DAYS Results for orders placed or performed during the hospital encounter of 10/01/20  
2D ECHO LIMTED ADULT W OR WO CONTR Narrative [address] [place], [address] [facility] 
[phone] Transthoracic Echocardiogram 
2D, M-mode, Doppler, and Color Doppler Patient: Garrett Navarro 
MR #: [id number] : 1950 Age: 69 years Gender: Female Study date: 12-Oct-2020 Account #: [de-identified] Height: 60.6 in 
Weight: 121.7 lb 
BSA: 1.52 mï¾² Status:Routine Location: 834 BP: 88/ 58 Allergies: PROCHLORPERAZINE EDISYLATE Sonographer:  Jeniffer Sol RDCS Group:  [id number] Cardiology Referring Physician:  DR. Go Butt, DO Reading Physician:  Rosendo Pineda MD [facility] INDICATIONS: Cardiomyopathy, Check AV Gradients. PROCEDURE: This was a routine study. A transthoracic echocardiogram was performed. The study included limited 2D imaging, M-mode, limited spectral 
Doppler, and color Doppler. Intravenous contrast (Definity) was administered. Image quality was adequate. LEFT VENTRICLE: Size was normal. Systolic function was moderately to markedly 
reduced. Ejection fraction was estimated in the range of 30 % to 35 %. There 
was moderate diffuse hypokinesis. Wall thickness was normal. 
 
AORTIC VALVE: The valve was trileaflet. Leaflets exhibited normal cuspal 
separation and mild sclerosis. The aortic valve area by the continuity  
equation 
was 2.07 cm2. The peak velocity was 1.36 m/s. The mean pressure gradient was 5 
mmHg. The peak pressure gradient was 7 mmHg. The Di=0.66. The SVi=36.8. SUMMARY: 
 
-  Left ventricle: Systolic function was moderately to markedly reduced. Ejection fraction was estimated in the range of 30 % to 35 %. There was 
moderate diffuse hypokinesis. -  Aortic valve: The aortic valve area by the continuity equation was 2.07  
cm2. ADDITIONAL MEASUREMENTS High velocity overlapping flow noted during suprasternal imaging (descending 
aorta appears with normal flow velocities and superimposed high flow  
velocities 
from alternative source of uncertain SVEDXMVD-~5.2B/U). This was also notedon 
previous echo from 9/30/20. SYSTEM MEASUREMENT TABLES 
 
2D mode AoR Diam (2D): 2.9 cm IVS/LVPW (2D): 1.2 IVSd (2D): 1.1 cm 
LVIDd (2D): 4.4 cm LVIDs (2D): 3.7 cm 
LVOT Area (2D): 3.1 cm2 LVPWd (2D): 0.9 cm Unspecified Scan Mode Peak Grad; Mean; Antegrade Flow: 7 mm[Hg] Vmax; Antegrade Flow: 136 cm/s LVOT Diam: 2 cm Prepared and signed by 
 
Michelle Domingo. MD Miriam [facility] Signed 12-Oct-2020 13:13:33 Current Meds: 
Current Facility-Administered Medications Medication Dose Route Frequency  cyclobenzaprine (FLEXERIL) tablet 5 mg  5 mg Oral TID PRN  
 albuterol (PROVENTIL VENTOLIN) nebulizer solution 2.5 mg  2.5 mg Nebulization TID RT  
  calcium carbonate (TUMS) chewable tablet 200 mg [elemental]  200 mg Oral QID PRN  
 furosemide (LASIX) injection 40 mg  40 mg IntraVENous BID  lisinopriL (PRINIVIL, ZESTRIL) tablet 2.5 mg  2.5 mg Oral DAILY  metoprolol succinate (TOPROL-XL) XL tablet 25 mg  25 mg Oral DAILY  albuterol (PROVENTIL VENTOLIN) nebulizer solution 2.5 mg  2.5 mg Nebulization Q6H PRN  
 morphine injection 1 mg  1 mg IntraVENous Q4H PRN  
 fluticasone propionate (FLONASE) 50 mcg/actuation nasal spray 2 Spray  2 Spray Both Nostrils DAILY  sodium chloride (NS) flush 5-40 mL  5-40 mL IntraVENous Q8H  
 sodium chloride (NS) flush 5-40 mL  5-40 mL IntraVENous PRN  
 acetaminophen (TYLENOL) tablet 650 mg  650 mg Oral Q6H PRN Or  
 acetaminophen (TYLENOL) suppository 650 mg  650 mg Rectal Q6H PRN  polyethylene glycol (MIRALAX) packet 17 g  17 g Oral DAILY PRN  
 enoxaparin (LOVENOX) injection 40 mg  40 mg SubCUTAneous DAILY  ondansetron (ZOFRAN ODT) tablet 4 mg  4 mg Oral Q8H PRN Or  
 ondansetron (ZOFRAN) injection 4 mg  4 mg IntraVENous Q6H PRN  
 aspirin delayed-release tablet 81 mg  81 mg Oral DAILY  levothyroxine (SYNTHROID) tablet 75 mcg  75 mcg Oral ACB  montelukast (SINGULAIR) tablet 10 mg  10 mg Oral DAILY  pantoprazole (PROTONIX) tablet 40 mg  40 mg Oral ACB  atorvastatin (LIPITOR) tablet 10 mg  10 mg Oral DAILY  budesonide-formoteroL (SYMBICORT) 160-4.5 mcg/actuation HFA inhaler 2 Puff  2 Puff Inhalation BID RT Other Studies (last 24 hours): No results found. Assessment and Plan:  
 
Hospital Problems as of 10/19/2020 Date Reviewed: 10/19/2020 Codes Class Noted - Resolved POA Multiple lung nodules on CT (Chronic) ICD-10-CM: R91.8 ICD-9-CM: 793.19  10/12/2020 - Present Unknown  Overview Addendum 10/12/2020  2:33 PM by Teresa Storm NP  
  -PET scan 10/2019: PET scan fortunately did not have any abnormal activity in her mass in the left lung. This may mean that we can just follow this radiographically, but we will talk about her at tumor board and come back to her with the group's recommendation. 
-10/30/2019: Patient is discussed at thoracic conference today lead by Dr Seferino Oates. Patient is a 77 yo never smoker. CT guided biopsy recommended. -CT guided Biopsy 11/2020: lung biopsy showed signs of this nodule being a hamartoma, which is a benign tumor that we can simply follow. repeat CT in 6 months 
-Chest CT June 2020: CT scan is stable 
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 Susu Burkett are several new pulmonary nodules noted which may suggest some metastatic disease.  This require further follow-up CT of the chest in 2 months or doing PET scan. Hamartoma (HCC) (Chronic) ICD-10-CM: Q85.9 ICD-9-CM: 759.6  10/12/2020 - Present Yes Peripheral vascular disease ([address] ICD-10-CM: I73.9 ICD-9-CM: 443.9  10/12/2020 - Present Unknown Bilateral pleural effusion ICD-10-CM: J90 ICD-9-CM: 511.9  10/7/2020 - Present Yes Overview Signed 10/13/2020  1:25 PM by DENI Christopher  
  10/3/2020: L thoracentesis 1050mL removed R thoracentesis 800mL removed 10/9/2020: L thoracentesis: 1000mL removed R thoracentesis: 600mL removed Acute systolic heart failure (HCC) ICD-10-CM: I50.21 ICD-9-CM: 428.21  10/6/2020 - Present Yes * (Principal) PNA (pneumonia) ICD-10-CM: J18.9 ICD-9-CM: 948  10/1/2020 - Present Unknown Mass of lingula of lung ICD-10-CM: R91.8 ICD-9-CM: 786.6  10/16/2019 - Present Yes Hypercholesterolemia (Chronic) ICD-10-CM: E78.00 ICD-9-CM: 272.0  6/30/2015 - Present Yes Mild persistent asthma without complication (Chronic) MUS-41-HG: J45.30 ICD-9-CM: 493.90  10/17/2013 - Present Yes Acquired hypothyroidism (Chronic) ICD-10-CM: E03.9 ICD-9-CM: 244.9  10/17/2013 - Present Yes GERD (gastroesophageal reflux disease) (Chronic) ICD-10-CM: K21.9 ICD-9-CM: 530.81  10/17/2013 - Present Yes RESOLVED: Hypokalemia ICD-10-CM: E87.6 ICD-9-CM: 276.8  10/6/2020 - 10/12/2020 Yes Plan: This is a 78-year-old female with: 
  
1.  Acute hypoxemic respiratory failure- on RA- 1 L oxygen nasal cannula. Pt completed course of antibiotics. Left pleural effusion s/p thoracentesis 10/3 ad 10/8 and 10/15 (710 cc from right lung and 690 cc from left lung removed) fluid transudative. - Pulmonary on board. Appreciate recs. 
  
2. Acute Systolic CHF exacerbation POA Elevated troponin likely from demand ischemia. IV lasix 40 mg bid. - Telemetry - Continue aspirin 
- TTE EF 35-40% - Metoprolol,Lisinopril. Cardiology on board. Appreciate recs. May need right and Left heart cath after work up for lung nodule as out-pt.  
  
3. Left lung mass status post needle biopsy on November 2019 demonstrating harmatoma / Chronic left pulmonary artery occlusion. Continue aspirin. Lung nodules- out-pt PET CT, 
  
4.  Asthma -intermittent POA 
 continue on home inhalers and Singulair. Currently not in exacerbation. 
  
5.  Hypotension: improved BP Due to diffuse peripheral vascular disease, falsely low BP readings in upper extremities. Midodrine and Florinef have been discontinued 10/12 
  
6. Hyperlipidemia  
continue on atorvastatin. 
  
7.  GERD  
continue on PPI. 
  
8. Hypothyroidism  
continue on levothyroxine. 
  
Hypokalemia-resolved 
  
CT chest 9/21/2020-right-sided aortic arch with relatively 
extensive atherosclerotic change at the arch where there appears to be a small 
focal dissection. CTA neck done in June 2019 showed subclavian steal syndrome. Vascular surgery recs no intervention at this point. DC planning/Dispo: Anticipate discharge home in 48 hours. PT/OT eval. Recs home health.   
 
Diet:  DIET REGULAR 
 DIET NUTRITIONAL SUPPLEMENTS 
DVT ppx: Lovenox Signed: 
Luis Harrison MD

## 2020-10-19 NOTE — PROGRESS NOTES
Brittni Monae Admission Date: 10/1/2020 Daily Progress Note: 10/19/2020 The patient's chart is reviewed and the patient is discussed with the staff. 70 y.o.  female seen and evaluated at the request of Dr. Ted Tobias. 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 Pt has recently had issues with hypotension which is being managed by her PCP. She was treated with LVQ x 7 days for CAP. She had a virtual visit with KE Schaffer on 9/30/2020 and pt mentioned that her O2 sats had been borderline low and that she had been tachycardic. An echocardiogram was obtained to evaluate for PAH that same day, but the TV jet was inadequate for estimation of RVSP. Pt felt worse and presented to the ER and her CXR was concerning for PNA. Pt was admitted and started on Zosyn. Pt is being ruled out for COVID. We were consulted to assist with workup and treatment. CT was performed which reveals multiple new nodules, and bilateral pleural effusions. Pt had a L thoracentesis with 1050mL removed and R thoracentesis with 800mL removed on 10/3/2020. Cardiology was consulted on 10/9, pt had L thoracentesis with 1000mL removed and R thoracentesis with 600mL removed. Cardiology was reconsulted secondary to persistent shortness of breath and recommendations for left and right heart catheterization once PET scan and need for lung biopsies have been completed. 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 Vascular surgery felt that no intervention was necessary at this time. Subjective:  
 
Patient up in room curling hair, on RA Thoracentesis Saturday with about 1400 ml removed Feels \"better\" today Current Facility-Administered Medications Medication Dose Route Frequency  cyclobenzaprine (FLEXERIL) tablet 5 mg  5 mg Oral TID PRN  
 albuterol (PROVENTIL VENTOLIN) nebulizer solution 2.5 mg  2.5 mg Nebulization TID RT  
 calcium carbonate (TUMS) chewable tablet 200 mg [elemental]  200 mg Oral QID PRN  
 furosemide (LASIX) injection 40 mg  40 mg IntraVENous BID  lisinopriL (PRINIVIL, ZESTRIL) tablet 2.5 mg  2.5 mg Oral DAILY  metoprolol succinate (TOPROL-XL) XL tablet 25 mg  25 mg Oral DAILY  albuterol (PROVENTIL VENTOLIN) nebulizer solution 2.5 mg  2.5 mg Nebulization Q6H PRN  
 morphine injection 1 mg  1 mg IntraVENous Q4H PRN  
 fluticasone propionate (FLONASE) 50 mcg/actuation nasal spray 2 Spray  2 Spray Both Nostrils DAILY  sodium chloride (NS) flush 5-40 mL  5-40 mL IntraVENous Q8H  
 sodium chloride (NS) flush 5-40 mL  5-40 mL IntraVENous PRN  
 acetaminophen (TYLENOL) tablet 650 mg  650 mg Oral Q6H PRN Or  
 acetaminophen (TYLENOL) suppository 650 mg  650 mg Rectal Q6H PRN  polyethylene glycol (MIRALAX) packet 17 g  17 g Oral DAILY PRN  
 enoxaparin (LOVENOX) injection 40 mg  40 mg SubCUTAneous DAILY  ondansetron (ZOFRAN ODT) tablet 4 mg  4 mg Oral Q8H PRN Or  
 ondansetron (ZOFRAN) injection 4 mg  4 mg IntraVENous Q6H PRN  
 aspirin delayed-release tablet 81 mg  81 mg Oral DAILY  levothyroxine (SYNTHROID) tablet 75 mcg  75 mcg Oral ACB  montelukast (SINGULAIR) tablet 10 mg  10 mg Oral DAILY  pantoprazole (PROTONIX) tablet 40 mg  40 mg Oral ACB  atorvastatin (LIPITOR) tablet 10 mg  10 mg Oral DAILY  budesonide-formoteroL (SYMBICORT) 160-4.5 mcg/actuation HFA inhaler 2 Puff  2 Puff Inhalation BID RT Review of Systems 
+shortness of breath Constitutional: negative for fever, chills, sweats Cardiovascular: negative for chest pain, palpitations, syncope, edema Gastrointestinal:  negative for dysphagia, reflux, vomiting, diarrhea, abdominal pain, or melena Neurologic:  negative for focal weakness, numbness, headache Objective:  
 
Vitals:  
 10/19/20 7188 10/19/20 0426 10/19/20 0759 10/19/20 2922 BP: (!) 96/55 (!) 113/59 (!) 100/56 Pulse: 97 99 (!) 102 Resp: 18 18 20 Temp: 98 °F (36.7 °C) 98.2 °F (36.8 °C) 97.7 °F (36.5 °C) SpO2: 97% 95% 98% 98% Weight:      
Height:      
 
 
 
Intake/Output Summary (Last 24 hours) at 10/19/2020 1046 Last data filed at 10/19/2020 0157 Gross per 24 hour Intake  Output 1100 ml Net -1100 ml Physical Exam:  
Constitution:  the patient is well developed and in no acute distress EENMT:  Sclera clear, pupils equal, oral mucosa moist 
Respiratory:  Diminished bases, on RA Cardiovascular:  RRR without M,G,R 
Gastrointestinal: soft and non-tender; with positive bowel sounds. Musculoskeletal: warm without cyanosis. There is no lower extremity edema. Skin:  no jaundice or rashes Neurologic: no gross neuro deficits Psychiatric:  alert and oriented x 3, pleasant CXR:  
10/13/2020 
 
 
10/6/2020 
 
 
9/21/2020 ECHOCARDIOGRAM:  10/12/2020 LEFT VENTRICLE: Size was normal. Systolic function was moderately to markedly 
reduced. Ejection fraction was estimated in the range of 30 % to 35 %. There 
was moderate diffuse hypokinesis. Wall thickness was normal. 
AORTIC VALVE: The valve was trileaflet. Leaflets exhibited normal cuspal 
separation and mild sclerosis. The aortic valve area by the continuity  
equation was 2.07 cm2. The peak velocity was 1.36 m/s. The mean pressure gradient was 5 mmHg. The peak pressure gradient was 7 mmHg. The Di=0.66. The SVi=36.8. SUMMARY: 
-  Left ventricle: Systolic function was moderately to markedly reduced. Ejection fraction was estimated in the range of 30 % to 35 %. There was 
moderate diffuse hypokinesis. -  Aortic valve: The aortic valve area by the continuity equation was 2.07 cm2.  
ADDITIONAL MEASUREMENTS 
 High velocity overlapping flow noted during suprasternal imaging (descending 
aorta appears with normal flow velocities and superimposed high flow Velocities from alternative source of uncertain [id number]~8.9R/U). This was also noted on previous echo from 9/30/20. LAB No results for input(s): GLUCPOC in the last 72 hours. No lab exists for component: [place] Recent Labs 10/19/20 
0900 10/18/20 
3342 WBC 9.5 9.6 HGB 11.2* 11.3* HCT 34.8* 34.4*  
 425 Recent Labs 10/18/20 
0377 10/17/20 
1699 * 135* K 4.1 4.1 CL 97* 96* CO2 34* 33* GLU 87 83 BUN 22 26* CREA 0.64 0.81  
MG  --  2.5*  
CA 9.5 9.6 No results for input(s): PH, PCO2, PO2, HCO3, PHI, PCO2I, PO2I, HCO3I in the last 72 hours. No results for input(s): LCAD, LAC in the last 72 hours. Assessment:  (Medical Decision Making) Hospital Problems  Date Reviewed: 10/19/2020 Codes Class Noted POA Multiple lung nodules on CT (Chronic) ICD-10-CM: R91.8 ICD-9-CM: 793.19  10/12/2020 Unknown Overview Addendum 10/12/2020  2:33 PM by Alberto Lind NP  
  -PET scan 10/2019: PET scan fortunately did not have any abnormal activity in her mass in the left lung. This may mean that we can just follow this radiographically, but we will talk about her at tumor board and come back to her with the group's recommendation. 
-10/30/2019: Patient is discussed at thoracic conference today lead by Dr Kevin Maya. Patient is a 79 yo never smoker. CT guided biopsy recommended. -CT guided Biopsy 11/2020: lung biopsy showed signs of this nodule being a hamartoma, which is a benign tumor that we can simply follow.  repeat CT in 6 months 
-Chest CT June 2020: CT scan is stable 
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 [place] are several new pulmonary nodules noted which may suggest some metastatic disease.  This require further follow-up CT of the chest in 2 months or doing PET scan. Awaiting outpt PET CT scan Hamartoma (HCC) (Chronic) ICD-10-CM: Q85.9 ICD-9-CM: 759.6  10/12/2020 Yes Chronic Peripheral vascular disease ([facility] [address] ICD-10-CM: I73.9 ICD-9-CM: 443.9  10/12/2020 Unknown Chronic Bilateral pleural effusion ICD-10-CM: J90 ICD-9-CM: 511.9  10/7/2020 Yes Overview Signed 10/13/2020  1:25 PM by DENI Mir  
  10/3/2020: L thoracentesis 1050mL removed R thoracentesis 800mL removed 10/9/2020: L thoracentesis: 1000mL removed R thoracentesis: 600mL removed 10/15/20 R and L thora for about 700 ml each Tapped yesterday Acute systolic heart failure (HCC) ICD-10-CM: I50.21 ICD-9-CM: 428.21  10/6/2020 Yes On lasix 40mg IV BID * (Principal) PNA (pneumonia) ICD-10-CM: J18.9 ICD-9-CM: 971  10/1/2020 Unknown Completed zosyn Mass of lingula of lung ICD-10-CM: R91.8 ICD-9-CM: 786.6  10/16/2019 Yes Hypercholesterolemia (Chronic) ICD-10-CM: E78.00 ICD-9-CM: 272.0  6/30/2015 Yes Mild persistent asthma without complication (Chronic) QES-32-CU: J45.30 ICD-9-CM: 493.90  10/17/2013 Yes Continue nebs, will change to scheduled Acquired hypothyroidism (Chronic) ICD-10-CM: E03.9 ICD-9-CM: 244.9  10/17/2013 Yes GERD (gastroesophageal reflux disease) (Chronic) ICD-10-CM: K21.9 ICD-9-CM: 530.81  10/17/2013 Yes Plan:  (Medical Decision Making) --Patient now on RA  
--Continue IV lasix 40mg IV BID- pleural fluid is transudative with negative cytology 
--Continue albuterol TID, continue EZ PAP 
--Continue Symbicort BID 
--Will need right and left heart cath once lung nodule work up is complete --Awaiting OP PET/CT 
--GI/DVT prophylaxis 
--Full Code More than 50% of the time documented was spent in face-to-face contact with the patient and in the care of the patient on the floor/unit where the patient is located. Carol Meyer NP Lungs:  clear Heart:  RRR with no Murmur/Rubs/Gallops Additional Comments:  Patient going home ,discussed with Chapis Oakley to set up fot PET, she will call and leave the note once its ordered I have spoken with and examined the patient. I agree with the above assessment and plan as documented.  
 
Dorthy Osgood, MD

## 2020-10-19 NOTE — PROGRESS NOTES
Dr. Michael Howard notified of pt's current BP (93/47). Orders to hold tonight's 1630 dose of Lasix.

## 2020-10-19 NOTE — PROGRESS NOTES
Problem: Breathing Pattern - Ineffective Goal: *Absence of hypoxia Outcome: Progressing Towards Goal 
Goal: *Use of effective breathing techniques Outcome: Progressing Towards Goal 
  
Problem: Falls - Risk of 
Goal: *Absence of Falls Description: Document Lemon Baljinder Fall Risk and appropriate interventions in the flowsheet. Outcome: Progressing Towards Goal 
Note: Fall Risk Interventions: 
  
 
  
 
Medication Interventions: Patient to call before getting OOB Elimination Interventions: Call light in reach Problem: Nutrition Deficit Goal: *Optimize nutritional status Outcome: Progressing Towards Goal

## 2020-10-19 NOTE — PROGRESS NOTES
10/19/2020 12:29 PM 
 
Admit Date: 10/1/2020 Admit Diagnosis: PNA (pneumonia) [J18.9] Subjective: No cp or sob Objective:  
  
Visit Vitals BP (!) 100/56 Pulse (!) 102 Temp 97.7 °F (36.5 °C) Resp 20 Ht 5' 1.34\" (1.558 m) Wt 121 lb 12.8 oz (55.2 kg) SpO2 98% Breastfeeding No  
BMI 22.76 kg/m² Physical Exam: 
Lucy Draft, Well Nourished, No Acute Distress, Alert & Oriented x 3, appropriate mood. Neck- supple, no JVD 
CV- regular rate and rhythm no MRG Lung- clear bilaterally Abd- soft, nontender, nondistended Ext- no edema bilaterally. Skin- warm and dry Data Review:  
Recent Labs 10/19/20 
0900 * K 3.5 BUN 26* CREA 0.66 WBC 9.5 HGB 11.2* HCT 34.8*  
 Assessment/Plan:  
 
Principal Problem: 
  PNA (pneumonia) (10/1/2020) Active Problems: 
  Mild persistent asthma without complication (28/55/0388) Acquired hypothyroidism (10/17/2013) GERD (gastroesophageal reflux disease) (10/17/2013) Hypercholesterolemia (6/30/2015)Stable. Continue current medical therapy. Mass of lingula of lung (10/16/2019) Acute systolic heart failure ([address] (10/6/2020)Improved with current therapy. Will continue medications Change to po lasix Bilateral pleural effusion (10/7/2020) Overview: 10/3/2020: L thoracentesis 1050mL removed R thoracentesis 800mL removed 10/9/2020: L thoracentesis: 1000mL removed R thoracentesis: 600mL removed Multiple lung nodules on CT (10/12/2020) Hamartoma ([address] (10/12/2020) Peripheral vascular disease ([address] (10/12/2020)

## 2020-10-20 NOTE — PROGRESS NOTES
10/20/2020 1:30 PM 
 
Admit Date: 10/1/2020 Admit Diagnosis: PNA (pneumonia) [J18.9] Subjective: More sob- feels fatigued- no cp Objective:  
  
Visit Vitals /73 (BP 1 Location: Left leg, BP Patient Position: At rest) Pulse (!) 103 Temp 97.6 °F (36.4 °C) Resp 18 Ht 5' 1.34\" (1.558 m) Wt 117 lb 11.2 oz (53.4 kg) SpO2 98% Breastfeeding No  
BMI 21.99 kg/m² Physical Exam: 
Bertell Raspberry, Well Nourished, No Acute Distress, Alert & Oriented x 3, appropriate mood. Neck- supple, no JVD 
CV- regular rate and rhythm no MRG Lung- clear bilaterally Abd- soft, nontender, nondistended Ext- no edema bilaterally. Skin- warm and dry Data Review:  
Recent Labs 10/20/20 
0532 10/19/20 
0900 * 133*  
K 5.2* 3.5 BUN 27* 26* CREA 0.73 0.66 WBC  --  9.5 HGB  --  11.2* HCT  --  34.8* PLT  --  428 Assessment/Plan:  
 
Principal Problem: 
  PNA (pneumonia) (10/1/2020) Active Problems: 
  Mild persistent asthma without complication (39/17/7924) Acquired hypothyroidism (10/17/2013) GERD (gastroesophageal reflux disease) (10/17/2013) Hypercholesterolemia (6/30/2015) Mass of lingula of lung (10/16/2019) Acute systolic heart failure ([address] (10/6/2020)Improved with current therapy. Will continue medications Hold lasix and dc lisinopril with low bp- try to add ace back in the future Bilateral pleural effusion (10/7/2020) Overview: 10/3/2020: L thoracentesis 1050mL removed R thoracentesis 800mL removed 10/9/2020: L thoracentesis: 1000mL removed R thoracentesis: 600mL removed Multiple lung nodules on CT (10/12/2020) Overview: -PET scan 10/2019: PET scan fortunately did not have any abnormal activity  
    in her mass in the left lung. This may mean that we can just follow this  
    radiographically, but we will talk about her at tumor board and come back to her with the group's recommendation. 
    -10/30/2019: Patient is discussed at thoracic conference today lead by Dr Mike Estrada. Patient is a 79 yo never smoker. CT guided biopsy recommended. -CT guided Biopsy 11/2020: lung biopsy showed signs of this nodule being a  
    hamartoma, which is a benign tumor that we can simply follow. repeat CT in  
    6 months 
    -Chest CT June 2020: CT scan is stable 
    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  
    secondary to her left lung mass. [place] are several new pulmonary nodules  
    noted which may suggest some metastatic disease.  This require further  
    follow-up CT of the chest in 2 months or doing PET scan. Hamartoma ([address] (10/12/2020) Peripheral vascular disease ([address] (10/12/2020)

## 2020-10-20 NOTE — PROGRESS NOTES
Oxygen Qualifier Room air: SpO2 with O2 and liter flow Resting SpO2  95% 95% on room air Ambulating SpO2  96% 96% on room air At rest the patient is on room air with an oxygen saturation of 95%. Completed by: Michael Oneal

## 2020-10-20 NOTE — PROGRESS NOTES
Patient is still requesting the 0.5 liter for comfort. She has been told what her room air saturation. States that because she is short of breath she would feel better with the oxygen.

## 2020-10-20 NOTE — PROGRESS NOTES
Hospitalist Note Admit Date:  10/1/2020  8:19 AM  
Name:  Kain Marie Age:  69 y.o. 
:  1950 MRN:  [id number] PCP:  Margarita Correa MD 
Treatment Team: Attending Provider: Madison Adams MD; Primary Nurse: Geovani Louis; Consulting Provider: Paco Branch MD; Utilization Review: Jimmy Bernal RN; Care Manager: Artis Ornelas; Consulting Provider: Keith Quiñones DO; Utilization Review: Laci Alvarado RN; Physical Therapist: Natalie Mckenzie 
 
HPI/Subjective:  
 
42-year-old female with a past medical history of asthma, GERD, treatment lymphoma, hyperlipidemia, hypothyroidism, hypotension, chronic left pulmonary artery occlusion, left lung mass status post needle biopsy on 2019 demonstrating harmatoma, that presents in the setting of worsening shortness of breath.  The patient states that for the past few weeks she has been developing worsening shortness of breath especially on exertion, and associated with some dry cough. Patient seen and examined at bedside. This morning still presenting with SOB. Denies chest pain, no abdominal pain, nausea or vomiting 
  
Course during the stay:  
Admitted for exertional dyspnea. History of left lung mass previous biopsy in 2019 demonstrated hamartoma, chronic left pulmonary artery occlusion. History of low blood pressures on the Florinef and midodrine at home. 
  
Patient was continued on oxygen, pulmonary consulted. Patient had Thoracentesis on 10/3/5564-9776 mL of clear yellow appearing fluid removed from the left pleural effusion. Thoracentesis on 10/9/2020-600 cc of clear yellow fluid aspirated from the left pleural effusion. Patient still complaining of shortness of breath on walking a short distance. Presently has bedside commode.  
  
Patient Midodrin was increased from 5 mg 3 times daily to 10 mg 3 times daily during the stay. 
  
Cardiology was initially consulted for mild elevated troponin.   Start the patient on a small dose of Coreg. Secondary to low blood pressure not a candidate for ACE or ARB. Continue Lasix. Ischemic work-up as an outpatient. Cardiology signed off. Coreg was again discontinued secondary to low blood pressure. later on during the stay as the patient was still having shortness of breath recurrent pleural effusion, pulmonary felt probably more cardiac related. Echo with a EF of 35 to 40%. Cardiology was reconsulted, advised to continue Lasix as long as blood pressure tolerates. 
  
Vascular surgery was consulted as patient was concerned that her previous visual blockage could be the reason why she is having shortness of breath. Reviewed her previous CT results- 
CT chest 9/21/2020-right-sided aortic arch with relatively 
extensive atherosclerotic change at the arch where there appears to be a small 
focal dissection. CTA neck done in June 2019 showed subclavian steal syndrome. 
-Vascular surgery felt no intervention required at this point. 
  
  
Presently patient still has some shortness of breath more on exertion, on 2.5 L of oxygen nasal cannula, followed by cardiology and pulmonary, vascular signed off her felt no intervention required at this point. Patient still has on and off episodes of low blood pressures secondary to which at times patient Lasix had to be held. 10/17 patient's blood pressure reported to be low this morning 87/55. No dizziness no lightheadedness. No chest pain. 10/18 patient is sitting in chair this morning. She denies any chest pain palpitations. She is ambulating in the hallways and not having much shortness of breath. No fever. Blood pressure improved this morning. Restarted Lasix and Lisinopril, Metoprolol. 10/19 patient is doing well this morning. No chest pain or palpitations. Shortness of breath improved. Currently on room air to 1 L nasal cannula. IV Lasix dose not given yesterday evening due to drop in blood pressure. This am, BP is 100/56.  
 
10/20 patient was complaining of shortness of breath this morning. Afebrile. No chest pain. Oxygen saturation greater than 95% on room air. Patient had home oxygen screen done today and did not qualify for any oxygen. Objective:  
 
Patient Vitals for the past 24 hrs: 
 Temp Pulse Resp BP SpO2  
10/20/20 1430     95 % 10/20/20 1147 97.6 °F (36.4 °C) (!) 103 18 121/73 98 % 10/20/20 1023     93 % 10/20/20 0850  (!) 101  (!) 98/41   
10/20/20 0804 98.1 °F (36.7 °C)  16  98 % 10/20/20 0335 97.9 °F (36.6 °C) (!) 101 18 90/60 98 % 10/20/20 0139  (!) 105  117/77   
10/19/20 2317 97.8 °F (36.6 °C) (!) 106 18 (!) 91/48 97 % 10/19/20 2159     96 % 10/19/20 1910 97.8 °F (36.6 °C) (!) 102 20 (!) 101/57 96 % 10/19/20 1801 98.1 °F (36.7 °C) 98 20 (!) 98/54 96 % 10/19/20 1615  (!) 101  (!) 93/47 97 % Oxygen Therapy O2 Sat (%): 95 % (10/20/20 1430) Pulse via Oximetry: 81 beats per minute (10/20/20 1430) O2 Device: Nasal cannula (10/20/20 1430) O2 Flow Rate (L/min): 2 l/min (10/20/20 1430) Intake/Output Summary (Last 24 hours) at 10/20/2020 1546 Last data filed at 10/20/2020 1209 Gross per 24 hour Intake  Output 800 ml Net -800 ml *Note that automatically entered I/Os may not be accurate; dependent on patient compliance with collection and accurate  by techs. General:    Chronically ill-appearing, CV:   RRR. Grade 4 systolic murmur heard throughout, no rubs, or gallops. Lungs:   Diminished breath sounds bilateral lung bases, no wheezing, rhonchi, or rales. Abdomen:   Soft, nontender, nondistended. Extremities: Warm and dry. No cyanosis or edema. Skin:     No rashes or jaundice. Neuro:  No gross focal deficits Data Review: 
I have reviewed all labs, meds, and studies from the last 24 hours: 
 
Recent Results (from the past 24 hour(s)) METABOLIC PANEL, BASIC  Collection Time: 10/20/20  5:32 AM  
 Result Value Ref Range Sodium 133 (L) 136 - 145 mmol/L Potassium 5.2 (H) 3.5 - 5.1 mmol/L Chloride 98 98 - 107 mmol/L  
 CO2 31 21 - 32 mmol/L Anion gap 4 (L) 7 - 16 mmol/L Glucose 93 65 - 100 mg/dL BUN 27 (H) 8 - 23 MG/DL Creatinine 0.73 0.6 - 1.0 MG/DL  
 GFR est AA >60 >60 ml/min/1.73m2 GFR est non-AA >60 >60 ml/min/1.73m2 Calcium 9.3 8.3 - 10.4 MG/DL All Micro Results Procedure Component Value Units Date/Time CULTURE, BODY FLUID Beverley Mezaapp [[id number]] Collected:  10/15/20 1145 Order Status:  Completed Specimen:  Pleural Fluid Updated:  10/17/20 1001 Special Requests: NO SPECIAL REQUESTS     
  GRAM STAIN 10 TO 25 WBCS SEEN PER OIF  
   NO DEFINITE ORGANISM SEEN Culture result: NO GROWTH 2 DAYS     
 CULTURE, BODY FLUID W Rex Christian [[id number]] Collected:  10/15/20 1145 Order Status:  Completed Specimen:  Pleural Fluid Updated:  10/17/20 1000 Special Requests: NO SPECIAL REQUESTS     
  GRAM STAIN 3 TO 10 WBCS SEEN PER OIF  
   NO DEFINITE ORGANISM SEEN Culture result: NO GROWTH 2 DAYS FUNGUS CULTURE AND SMEAR [[id number]] Collected:  10/03/20 1348 Order Status:  Completed Specimen:  Miscellaneous sample Updated:  10/08/20 1236 Source PLEURAL FLUID Comment: L1 Fungus stain Direct Inoculation Fungus (Mycology) Culture Other source received Comment: (NOTE) Performed At: [address] [id number] Michael Handley MD IS:[id number] 
  
  
 C. DIFFICILE AG & TOXIN A/B [[id number]] Collected:  10/06/20 1938 Order Status:  Canceled Specimen:  Stool AFB CULTURE + SMEAR W/RFLX ID FROM CULTURE [[id number]] Collected:  10/03/20 1348 Order Status:  Completed Specimen:  Miscellaneous sample Updated:  10/06/20 1538 Source PLEURAL FLUID Comment: L1  
  
  AFB Specimen processing Concentration Acid Fast Smear Negative Comment: (NOTE) Performed At: [facility] [address] [place], [place] [id number] Jaime Santos MD ZB:[id number] Acid Fast Culture PENDING  
 CULTURE, BLOOD [[id number]] Collected:  10/01/20 1135 Order Status:  Completed Specimen:  Blood Updated:  10/06/20 0720 Special Requests: --     
  RIGHT 
FOREARM Culture result: NO GROWTH 5 DAYS     
 CULTURE, BLOOD [[id number]] Collected:  10/01/20 1135 Order Status:  Completed Specimen:  Blood Updated:  10/06/20 2641 Special Requests: --     
  LEFT 
FOREARM Culture result: NO GROWTH 5 DAYS     
 CULTURE, BODY FLUID W Cuauhtemoc Griffin [[id number]] Collected:  10/03/20 1349 Order Status:  Completed Specimen:  Pleural Fluid Updated:  10/06/20 4272 Special Requests: NO SPECIAL REQUESTS     
  GRAM STAIN 0 TO 13 WBC'S SEEN PER OIF  
   NO DEFINITE ORGANISM SEEN Culture result: NO GROWTH 2 DAYS Results for orders placed or performed during the hospital encounter of 10/01/20  
2D ECHO LIMTED ADULT W OR WO CONTR Narrative [address] [place], [address] [facility] 
[phone] Transthoracic Echocardiogram 
2D, M-mode, Doppler, and Color Doppler Patient: Betsey Georges 
MR #: [id number] : 1950 Age: 69 years Gender: Female Study date: 12-Oct-2020 Account #: [de-identified] Height: 60.6 in 
Weight: 121.7 lb 
BSA: 1.52 mï¾² Status:Routine Location: 834 BP: 88/ 58 Allergies: PROCHLORPERAZINE EDISYLATE Sonographer:  Kofi Hoyos RD Group:  [address] Cardiology Referring Physician:  DR. Christy Arechiga DO Reading Physician:  Jessica Jarquin. MD Miriam [facility] [place] INDICATIONS: Cardiomyopathy, Check AV Gradients. PROCEDURE: This was a routine study. A transthoracic echocardiogram was 
performed. The study included limited 2D imaging, M-mode, limited spectral 
Doppler, and color Doppler. Intravenous contrast (Definity) was administered. Image quality was adequate. LEFT VENTRICLE: Size was normal. Systolic function was moderately to markedly 
reduced. Ejection fraction was estimated in the range of 30 % to 35 %. There 
was moderate diffuse hypokinesis. Wall thickness was normal. 
 
AORTIC VALVE: The valve was trileaflet. Leaflets exhibited normal cuspal 
separation and mild sclerosis. The aortic valve area by the continuity  
equation 
was 2.07 cm2. The peak velocity was 1.36 m/s. The mean pressure gradient was 5 
mmHg. The peak pressure gradient was 7 mmHg. The Di=0.66. The SVi=36.8. SUMMARY: 
 
-  Left ventricle: Systolic function was moderately to markedly reduced. Ejection fraction was estimated in the range of 30 % to 35 %. There was 
moderate diffuse hypokinesis. -  Aortic valve: The aortic valve area by the continuity equation was 2.07  
cm2. ADDITIONAL MEASUREMENTS High velocity overlapping flow noted during suprasternal imaging (descending 
aorta appears with normal flow velocities and superimposed high flow  
velocities 
from alternative source of uncertain [id number]-~8.8A/H). This was also notedon 
previous echo from 9/30/20. SYSTEM MEASUREMENT TABLES 
 
2D mode AoR Diam (2D): 2.9 cm IVS/LVPW (2D): 1.2 IVSd (2D): 1.1 cm 
LVIDd (2D): 4.4 cm LVIDs (2D): 3.7 cm 
LVOT Area (2D): 3.1 cm2 LVPWd (2D): 0.9 cm Unspecified Scan Mode Peak Grad; Mean; Antegrade Flow: 7 mm[Hg] Vmax; Antegrade Flow: 136 cm/s LVOT Diam: 2 cm Prepared and signed by 
 
Ramon Oliva. MD Miriam [facility] Signed 12-Oct-2020 13:13:33 Current Meds: 
Current Facility-Administered Medications Medication Dose Route Frequency  cyclobenzaprine (FLEXERIL) tablet 5 mg  5 mg Oral TID PRN  
 albuterol (PROVENTIL VENTOLIN) nebulizer solution 2.5 mg  2.5 mg Nebulization TID RT  
 calcium carbonate (TUMS) chewable tablet 200 mg [elemental]  200 mg Oral QID PRN  
 [Held by provider] lisinopriL (PRINIVIL, ZESTRIL) tablet 2.5 mg  2.5 mg Oral DAILY  metoprolol succinate (TOPROL-XL) XL tablet 25 mg  25 mg Oral DAILY  albuterol (PROVENTIL VENTOLIN) nebulizer solution 2.5 mg  2.5 mg Nebulization Q6H PRN  
 morphine injection 1 mg  1 mg IntraVENous Q4H PRN  
 fluticasone propionate (FLONASE) 50 mcg/actuation nasal spray 2 Spray  2 Spray Both Nostrils DAILY  sodium chloride (NS) flush 5-40 mL  5-40 mL IntraVENous Q8H  
 sodium chloride (NS) flush 5-40 mL  5-40 mL IntraVENous PRN  
 acetaminophen (TYLENOL) tablet 650 mg  650 mg Oral Q6H PRN Or  
 acetaminophen (TYLENOL) suppository 650 mg  650 mg Rectal Q6H PRN  polyethylene glycol (MIRALAX) packet 17 g  17 g Oral DAILY PRN  
 enoxaparin (LOVENOX) injection 40 mg  40 mg SubCUTAneous DAILY  ondansetron (ZOFRAN ODT) tablet 4 mg  4 mg Oral Q8H PRN Or  
 ondansetron (ZOFRAN) injection 4 mg  4 mg IntraVENous Q6H PRN  
 aspirin delayed-release tablet 81 mg  81 mg Oral DAILY  levothyroxine (SYNTHROID) tablet 75 mcg  75 mcg Oral ACB  montelukast (SINGULAIR) tablet 10 mg  10 mg Oral DAILY  pantoprazole (PROTONIX) tablet 40 mg  40 mg Oral ACB  atorvastatin (LIPITOR) tablet 10 mg  10 mg Oral DAILY  budesonide-formoteroL (SYMBICORT) 160-4.5 mcg/actuation HFA inhaler 2 Puff  2 Puff Inhalation BID RT Other Studies (last 24 hours): Xr Chest Sngl V Result Date: 10/20/2020 CHEST X-RAY, one view. HISTORY:  Shortness of breath TECHNIQUE:  AP upright portable view COMPARISON: October 2020 FINDINGS: Lungs: Bibasilar densities and obscuration of both hemidiaphragms persists. . Costophrenic angles: Blunted. Heart size: Probably normal. Pulmonary vasculature: is unremarkable. Aorta: Obscured. Included portion of the upper abdomen: Surgical clips near the GE junction. Bones: No gross bony lesions. Other: None. IMPRESSION:  Bibasilar densities and pleural effusion remain without significant change. .  
 
 
Assessment and Plan: Hospital Problems as of 10/20/2020 Date Reviewed: 10/19/2020 Codes Class Noted - Resolved POA Multiple lung nodules on CT (Chronic) ICD-10-CM: R91.8 ICD-9-CM: 793.19  10/12/2020 - Present Unknown Overview Addendum 10/12/2020  2:33 PM by Lindsey Goodson NP  
  -PET scan 10/2019: PET scan fortunately did not have any abnormal activity in her mass in the left lung. This may mean that we can just follow this radiographically, but we will talk about her at tumor board and come back to her with the group's recommendation. 
-10/30/2019: Patient is discussed at thoracic conference today lead by Dr Lamine Garcia. Patient is a 79 yo never smoker. CT guided biopsy recommended. -CT guided Biopsy 11/2020: lung biopsy showed signs of this nodule being a hamartoma, which is a benign tumor that we can simply follow. repeat CT in 6 months 
-Chest CT June 2020: CT scan is stable 
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 Lakisha Ean are several new pulmonary nodules noted which may suggest some metastatic disease.  This require further follow-up CT of the chest in 2 months or doing PET scan. Hamartoma (HCC) (Chronic) ICD-10-CM: Q85.9 ICD-9-CM: 759.6  10/12/2020 - Present Yes Peripheral vascular disease ([facility] [address] ICD-10-CM: I73.9 ICD-9-CM: 443.9  10/12/2020 - Present Unknown Bilateral pleural effusion ICD-10-CM: J90 ICD-9-CM: 511.9  10/7/2020 - Present Yes Overview Signed 10/13/2020  1:25 PM by DENI Grant  
  10/3/2020: L thoracentesis 1050mL removed R thoracentesis 800mL removed 10/9/2020: L thoracentesis: 1000mL removed R thoracentesis: 600mL removed Acute systolic heart failure (HCC) ICD-10-CM: I50.21 ICD-9-CM: 428.21  10/6/2020 - Present Yes * (Principal) PNA (pneumonia) ICD-10-CM: J18.9 ICD-9-CM: 933  10/1/2020 - Present Unknown Mass of lingula of lung ICD-10-CM: R91.8 ICD-9-CM: 786.6  10/16/2019 - Present Yes Hypercholesterolemia (Chronic) ICD-10-CM: E78.00 ICD-9-CM: 272.0  6/30/2015 - Present Yes Mild persistent asthma without complication (Chronic) HDB-77-AT: J45.30 ICD-9-CM: 493.90  10/17/2013 - Present Yes Acquired hypothyroidism (Chronic) ICD-10-CM: E03.9 ICD-9-CM: 244.9  10/17/2013 - Present Yes GERD (gastroesophageal reflux disease) (Chronic) ICD-10-CM: K21.9 ICD-9-CM: 530.81  10/17/2013 - Present Yes RESOLVED: Hypokalemia ICD-10-CM: E87.6 ICD-9-CM: 276.8  10/6/2020 - 10/12/2020 Yes Plan: This is a 79-year-old female with: 
  
1.  Acute hypoxemic respiratory failure- resolved Pt completed course of antibiotics. Left pleural effusion s/p thoracentesis 10/3 ad 10/8 and 10/15 (710 cc from right lung and 690 cc from left lung removed) fluid transudative. - Pulmonary on board. Appreciate recs. 
  
2. Acute Systolic CHF exacerbation POA Elevated troponin likely from demand ischemia. IV lasix dced yesterday and switched to PO Lasix. - Telemetry - Continue aspirin 
- TTE EF 35-40% 
- Continue Metoprolol. Lisinopril held due to hypotension. Cardiology on board. Appreciate recs. May need right and Left heart cath after work up for lung nodule as out-pt.  
  
3. Left lung mass status post needle biopsy on November 2019 demonstrating harmatoma / Chronic left pulmonary artery occlusion. Continue aspirin. Lung nodules- out-pt PET CT, 
  
4.  Asthma -intermittent POA 
 continue on home inhalers and Singulair. Currently not in exacerbation. 
  
5.  Hypotension: 
Due to diffuse peripheral vascular disease, falsely low BP readings in upper extremities. Midodrine and Florinef have been discontinued 10/12 
  
6. Hyperlipidemia  
continue on atorvastatin. 
  
7.  GERD  
continue on PPI. 
  
8. Hypothyroidism  
continue on levothyroxine. 
  
Hypokalemia-resolved 
  
CT chest 9/21/2020-right-sided aortic arch with relatively extensive atherosclerotic change at the arch where there appears to be a small 
focal dissection. CTA neck done in June 2019 showed subclavian steal syndrome. Vascular surgery recs no intervention at this point. DC planning/Dispo: Anticipate discharge home with home health in 24 hours. Diet:  DIET NUTRITIONAL SUPPLEMENTS 
DIET REGULAR 
DVT ppx: Lovenox Signed: 
Keturah Wetahers MD

## 2020-10-20 NOTE — PROGRESS NOTES
Physician Progress Note Marshall Scanlon 
[facility] #:                  [id number] :                       1950 ADMIT DATE:       10/1/2020 8:19 AM 
DISCH DATE: 
RESPONDING 
PROVIDER #:        Herrera Lee MD, ALA, MD 
 
 
 
 
QUERY TEXT: 
 
Patient admitted with PNA. Noted documentation of acute respiratory failure in progress note on 10-13-20. Please indicate one of the following and document in the medical record: The medical record reflects the following: 
Risk Factors: PNA Clinical Indicators: respirations 18-20, \"Lungs:  Coarse breath sounds, improved air exchange\", no ABG's obtained Treatment: 02 @ 2L N/C Acute Respiratory Failure Clinical Indicators per 3M MS-DRG Training Guide and Quick Reference Guide: 
pO2 < 60 mmHg or SpO2 (pulse oximetry) < 91% breathing room air 
pCO2 > 50 and pH < 7.35 
P/F ratio (pO2 / FIO2) < 300 
pO2 decrease or pCO2 increase by 10 mmHg from baseline (if known) Supplemental oxygen of 40% or more Presence of respiratory distress, tachypnea, dyspnea, shortness of breath, wheezing Unable to speak in complete sentences Use of accessory muscles to breathe Extreme anxiety and feeling of impending doom Tripod position Confusion/altered mental status/obtunded Options provided: 
-- Acute Respiratory Failure currently as evidenced by, Please document evidence. -- Acute Respiratory Failure ruled out after study -- Other - I will add my own diagnosis -- Disagree - Not applicable / Not valid -- Disagree - Clinically unable to determine / Unknown 
-- Refer to Clinical Documentation Reviewer PROVIDER RESPONSE TEXT: 
 
This patient is in acute respiratory failure as evidenced by tachypnea, increased work of breathing, unable to speak in full sentences, Query created by: Chris Scott on 10/20/2020 2:15 PM 
 
 
Electronically signed by:  Herrera Lee MD, ALA, MD 10/20/2020 3:11 PM

## 2020-10-20 NOTE — PROGRESS NOTES
Patient was weaned from her cannula to room air this morning. Her oxygen saturation was 90% to 91%. She later requested her oxygen be turned back on. She is now on 0.5 liters as a comfort measure.

## 2020-10-20 NOTE — PROGRESS NOTES
Problem: Risk for Spread of Infection Goal: Prevent transmission of infectious organism to others Description: Prevent the transmission of infectious organisms to other patients, staff members, and visitors. Outcome: Progressing Towards Goal 
  
Problem: Patient Education:  Go to Education Activity Goal: Patient/Family Education Outcome: Progressing Towards Goal 
  
Problem: Breathing Pattern - Ineffective Goal: *Absence of hypoxia Outcome: Progressing Towards Goal 
Goal: *Use of effective breathing techniques Outcome: Progressing Towards Goal 
Goal: *PALLIATIVE CARE:  Alleviation of Dyspnea Outcome: Progressing Towards Goal 
  
Problem: Patient Education: Go to Patient Education Activity Goal: Patient/Family Education Outcome: Progressing Towards Goal 
  
Problem: Falls - Risk of 
Goal: *Absence of Falls Description: Document Bing Arellano Fall Risk and appropriate interventions in the flowsheet. Outcome: Progressing Towards Goal 
Note: Fall Risk Interventions: 
  
 
  
 
Medication Interventions: Patient to call before getting OOB, Teach patient to arise slowly Elimination Interventions: Call light in reach Patient alert and oriented x 4; up ad olga to bathroom. Patient states she is starting to feel worse again. She states she felt better last night but tonight feels more SOB walking to bathroom. VS stable on 1L NC. At 0130 patient did c/o difficulty sleeping /SOB; due to BP coming back up gave one dose of morphine to ease WOB. Bed in low locked position, call light, personal items within reach. Will continue to monitor. Patient reported significant relief in WOB with Morphine and was able to rest comfortably

## 2020-10-20 NOTE — PROGRESS NOTES
Problem: Mobility Impaired (Adult and Pediatric) Goal: *Acute Goals and Plan of Care (Insert Text) Description: LTG: 
(1.)Ms. Oliver Jesus will move from supine to sit and sit to supine , scoot up and down, and roll side to side in bed with INDEPENDENT within 7 treatment day(s). (2.)Ms. Oliver Jesus will transfer from bed to chair and chair to bed with INDEPENDENT using the least restrictive device within 7 treatment day(s). (3.)Ms. Oliver Jesus will ambulate with INDEPENDENT for 500 feet with the least restrictive device within 7 treatment day(s). (4.)Ms. Oliver Jesus will tolerate at least 10 min of dynamic standing activity to assist standing ADLs with the least restrictive device within 7 treatment days. (5.)Ms. Oliver Jesus will climb at least 1 steps with MODIFIED INDEPENDENCE with the least restrictive device within 7 treatment days. ________________________________________________________________________________________________ PHYSICAL THERAPY: Daily Note and AM 10/20/2020 INPATIENT: PT Visit Days : 4 Payor: Davian Miranda / [facility] / Product Type: Managed Care Medicare /   
  
NAME/AGE/GENDER: Madison Overton is a 69 y.o. female PRIMARY DIAGNOSIS: PNA (pneumonia) [J18.9] PNA (pneumonia) PNA (pneumonia) Procedure(s) (LRB): ULTRASOUND (Bilateral) THORACENTESIS (Bilateral) 5 Days Post-Op ICD-10: Treatment Diagnosis:  
 · Generalized Muscle Weakness (M62.81) · Other lack of cordination (R27.8) · Difficulty in walking, Not elsewhere classified (R26.2) · Unspecified Lack of Coordination (R27.9) Precaution/Allergies: 
Compazine [prochlorperazine edisylate] ASSESSMENT:  
 
Ms. Oliver Jesus is a 69year old F who presents for pneumonia. Prior to hospital admission pt lives with her  in 1 story home with 1no step(s) to enter. Pt endorses no falls in past 6 months. Prior to admission Ms. Oliver Jesus uses nothing for mobility. Upon entering, Pnt is supine in bed and agreeable to PT treatment. she reports no pain at rest, but reports much heavier fatigue today. Pnt performed  Bed mobility and Sit > stand with supervision using nothing, with extra time. Gait 3 x 30 ft with SBA-supervision, cues for posture and step clearance. Gait is noted to be much slower than last treatment. Stand > sit with supervision, followed by Seated therex and positioning for comfort. At end of session pt supine in bed with all needs within reach, alarm activated for safety, RN notified. Overall, slow progress today as pnt exhibited less activity tolerancet. Pnt continues to present as functioning below her baseline, with deficits in mobility including transfers, gait, balance, and activity tolerance. Pt will continue to benefit from skilled therapy services to address stated deficits to promote return to highest level of function, independence, and safety. Will continue to follow. This section established at most recent assessment PROBLEM LIST (Impairments causing functional limitations): 1. Decreased Strength 2. Decreased ADL/Functional Activities 3. Decreased Transfer Abilities 4. Decreased Balance 5. Increased Pain 6. Decreased Activity Tolerance 7. Decreased Pacing Skills 8. Decreased Work Simplification/Energy Conservation Techniques 9. Increased Fatigue 10. Increased Shortness of Breath INTERVENTIONS PLANNED: (Benefits and precautions of physical therapy have been discussed with the patient.) 1. Balance Exercise 2. Bed Mobility 3. Family Education 4. Gait Training 5. Manual Therapy 6. Neuromuscular Re-education/Strengthening 7. Range of Motion (ROM) 8. Therapeutic Activites 9. Therapeutic Exercise/Strengthening 10. Transfer Training TREATMENT PLAN: Frequency/Duration: 3 times a week for duration of hospital stay Rehabilitation Potential For Stated Goals: Excellent REHAB RECOMMENDATIONS (at time of discharge pending progress):   
Placement: It is my opinion, based on this patient's performance to date, that Ms. Marcelina Dickens may benefit from [address] after discharge due to the functional deficits listed above that are likely to improve with skilled rehabilitation because he/she has multiple medical issues that affect his/her functional mobility in the community. Equipment:  
? None at this time HISTORY:  
History of Present Injury/Illness (Reason for Referral): 
See H&P Past Medical History/Comorbidities: Ms. Marcelina Dickens  has a past medical history of Asthma, Bite of nonvenomous arthropod (???), Cough, Esophageal stricture (2002), GERD (gastroesophageal reflux disease), History of rectal bleeding (???), Hodgkin's lymphoma ([facility] [address] (1980), Hypercholesterolemia, Menopause, Positive RAST testing (2007), and Thyroid disease. She also has no past medical history of Chronic kidney disease or Stroke ([facility] [address] Ms. Marcelina Dickens  has a past surgical history that includes hx tonsillectomy (1954); hx splenectomy (1973); hx appendectomy (1973); hx cataract removal (Bilateral, 2007, 2009); hx colonoscopy; hx other surgical; and hx breast biopsy (Left, 05/21/2020). Social History/Living Environment:  
Home Environment: Private residence # Steps to Enter: 1 One/Two Story Residence: One story Living Alone: No 
Support Systems: Spouse/Significant Other/Partner Patient Expects to be Discharged to[de-identified] Private residence Current DME Used/Available at Home: Commode, bedside Tub or Shower Type: Tub/Shower combination Prior Level of Function/Work/Activity: 
Independent at baseline Number of Personal Factors/Comorbidities that affect the Plan of Care: 1-2: MODERATE COMPLEXITY EXAMINATION:  
Most Recent Physical Functioning:  
Gross Assessment: 
  
         
  
Posture: 
  
Balance: 
Sitting: Intact Standing: Impaired Standing - Static: Good Standing - Dynamic : Fair Bed Mobility: Supine to Sit: Supervision Sit to Supine: Supervision Scooting: Supervision Wheelchair Mobility: 
  
Transfers: 
Sit to Stand: Stand-by assistance Stand to Sit: Stand-by assistance Gait: 
  
Base of Support: Widened Speed/Joyce: Shuffled Stance: Time Gait Abnormalities: Decreased step clearance Distance (ft): 90 Feet (ft) Ambulation - Level of Assistance: Stand-by assistance Interventions: Manual cues; Safety awareness training; Tactile cues; Verbal cues Duration: 16 Minutes Body Structures Involved: 1. Bones 2. Joints 3. Muscles Body Functions Affected: 1. Neuromusculoskeletal 
2. Movement Related 3. Skin Related Activities and Participation Affected: 1. Learning and Applying Knowledge 2. General Tasks and Demands 3. Mobility 4. Interpersonal Interactions and Relationships 5. [facility], Social and [address] [place] [address] Number of elements that affect the Plan of Care: 3: MODERATE COMPLEXITY CLINICAL PRESENTATION:  
Presentation: Stable and uncomplicated: LOW COMPLEXITY CLINICAL DECISION MAKIN[address] [place] [id number] AM-PAC 6 Clicks Basic Mobility Inpatient Short Form How much difficulty does the patient currently have. .. Unable A Lot A Little None 1. Turning over in bed (including adjusting bedclothes, sheets and blankets)? [] 1   [] 2   [] 3   [x] 4  
2. Sitting down on and standing up from a chair with arms ( e.g., wheelchair, bedside commode, etc.)   [] 1   [] 2   [x] 3   [] 4  
3. Moving from lying on back to sitting on the side of the bed? [] 1   [] 2   [x] 3   [] 4 How much help from another person does the patient currently need. .. Total A Lot A Little None 4. Moving to and from a bed to a chair (including a wheelchair)? [] 1   [] 2   [x] 3   [] 4  
5. Need to walk in hospital room? [] 1   [] 2   [x] 3   [] 4  
6. Climbing 3-5 steps with a railing? [] 1   [] 2   [x] 3   [] 4  
© , Trustees of [address] [place] [id number], under license to [employer].  All rights reserved Score:  Initial: 19 Most Recent: X (Date: -- ) Interpretation of Tool:  Represents activities that are increasingly more difficult (i.e. Bed mobility, Transfers, Gait). Medical Necessity:    
· Skilled intervention continues to be required due to decreased balance and activity tolerance. Reason for Services/Other Comments: · Use of outcome tool(s) and clinical judgement create a POC that gives a: Clear prediction of patient's progress: LOW COMPLEXITY  
  
 
 
 
TREATMENT:  
(In addition to Assessment/Re-Assessment sessions the following treatments were rendered) Pre-treatment Symptoms/Complaints:  \"I'm beat\" Pain: Initial:  
Pain Intensity 1: 0  Post Session:  0 Gait Training (16 Minutes):  Gait training to improve and/or restore physical functioning as related to mobility, strength, balance, and coordination. Ambulated 90 Feet (ft) with Stand-by assistance    and moderate Manual cues; Safety awareness training; Tactile cues; Verbal cues related to their stance phase, push off, and ankle position and motion to promote proper body alignment, promote proper body posture, and promote proper body mechanics. Instruction in performance of posture and step clearance to correct stance phase, stride length, ankle position and motion, and hip position and motion. Therapeutic Exercise: (16 Minutes):  Exercises per grid below to improve mobility, strength and coordination. Required minimal visual, verbal and tactile cues to promote proper body alignment and promote proper body posture. Progressed repetitions as indicated. Date: 
10/14 Date: 
10/15 Date: 
10/10 Activity/Exercise Parameters Parameters Parameters LAQs 3x10 4x10 3x10 Ankle Pumps 3x10 4x10 3x10 Seated Marches 3x10 3x10 3x10 Braces/Orthotics/Lines/Etc:  
· O2 Device: Nasal cannula Treatment/Session Assessment:   
· Response to Treatment:  mild to moderate SOB, moderate fatigue · Interdisciplinary Collaboration:  
o Physical Therapist 
o Registered Nurse · After treatment position/precautions:  
o Supine in bed 
o Bed/Chair-wheels locked 
o Bed in low position 
o Call light within reach 
o RN notified · Compliance with Program/Exercises: Will assess as treatment progresses · Recommendations/Intent for next treatment session: \"Next visit will focus on advancements to more challenging activities\". Total Treatment Duration: PT Patient Time In/Time Out Time In: 1377 Time Out: [address]

## 2020-10-20 NOTE — PROGRESS NOTES
Dr. Anny Short notified of BP 98/41. Says OK to give Metoprolol and Lasix this AM as ordered. Also notified of patient c/o not feeling well this morning, reporting increased SOB even at rest, not ready to go home today.

## 2020-10-21 NOTE — PROGRESS NOTES
Problem: Risk for Spread of Infection Goal: Prevent transmission of infectious organism to others Description: Prevent the transmission of infectious organisms to other patients, staff members, and visitors. Outcome: Progressing Towards Goal 
  
Problem: Patient Education:  Go to Education Activity Goal: Patient/Family Education Outcome: Progressing Towards Goal 
  
Problem: Breathing Pattern - Ineffective Goal: *Absence of hypoxia Outcome: Progressing Towards Goal 
Goal: *Use of effective breathing techniques Outcome: Progressing Towards Goal 
Goal: *PALLIATIVE CARE:  Alleviation of Dyspnea Outcome: Progressing Towards Goal 
  
Problem: Patient Education: Go to Patient Education Activity Goal: Patient/Family Education Outcome: Progressing Towards Goal 
  
Problem: Patient Education: Go to Patient Education Activity Goal: Patient/Family Education Outcome: Progressing Towards Goal 
  
Problem: Nutrition Deficit Goal: *Optimize nutritional status Outcome: Progressing Towards Goal 
  
Problem: Patient Education: Go to Patient Education Activity Goal: Patient/Family Education Outcome: Progressing Towards Goal 
 Patient alert and oriented x4; VS stable (baseline low BP) on 0.5 L NC; up ad olga to bathroom. Patient states she feels \"better\" today than she did yesterday. No further needs at this time. Bed in low locked position. Call light, personal items within reach. Will continue to monitor.

## 2020-10-21 NOTE — PROGRESS NOTES
10/21/2020 11:32 AM 
 
Admit Date: 10/1/2020 Admit Diagnosis: PNA (pneumonia) [J18.9] Subjective: No cp or sob Objective:  
  
Visit Vitals BP (!) 115/46 (BP 1 Location: Left leg, BP Patient Position: At rest;Sitting) Pulse (!) 111 Temp 97.6 °F (36.4 °C) Resp 19 Ht 5' 1.34\" (1.558 m) Wt 116 lb 14.4 oz (53 kg) SpO2 99% Breastfeeding No  
BMI 21.84 kg/m² Physical Exam: 
Ijeoma Dudley, Well Nourished, No Acute Distress, Alert & Oriented x 3, appropriate mood. Neck- supple, no JVD 
CV- regular rate and rhythm no MRG Lung- clear bilaterally Abd- soft, nontender, nondistended Ext- no edema bilaterally. Skin- warm and dry Data Review:  
Recent Labs 10/21/20 
0714  10/19/20 
0900 *   < > 133* K 4.0   < > 3.5 BUN 23   < > 26* CREA 0.67   < > 0.66 WBC  --   --  9.5 HGB  --   --  11.2* HCT  --   --  34.8* PLT  --   --  428  
 < > = values in this interval not displayed. Assessment/Plan:  
 
Principal Problem: 
  PNA (pneumonia) (10/1/2020) Active Problems: 
  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 Will continue medications 
 restart po lasix Bilateral pleural effusion (10/7/2020) Overview: 10/3/2020: L thoracentesis 1050mL removed R thoracentesis 800mL removed 10/9/2020: L thoracentesis: 1000mL removed R thoracentesis: 600mL removed Tachycardia- new- ekg Multiple lung nodules on CT (10/12/2020)- per pulm [place] ([address] (10/12/2020) Peripheral vascular disease ([address] (10/12/2020)

## 2020-10-21 NOTE — DISCHARGE SUMMARY
Hospitalist Discharge Summary Admit Date:  10/1/2020  8:19 AM  
Name:  Brittni Monae Age:  69 y.o. 
:  1950 MRN:  [id number] PCP:  Jenniffer Tong MD 
Treatment Team: Attending Provider: Nabila Blackmon MD; Primary Nurse: Fabio Meredith; Consulting Provider: Saida Dyson MD; Utilization Review: Harry Bhatia, RN; Care Manager: Muriel Lazo; Consulting Provider: Tyler Abarca DO; Utilization Review: Jyothi Sahu RN; Primary Nurse: Hal Pennington RN; Physical Therapist: Rachel Chen Problem List for this Hospitalization: 
Hospital Problems as of 10/21/2020 Date Reviewed: 10/19/2020 Codes Class Noted - Resolved POA Multiple lung nodules on CT (Chronic) ICD-10-CM: R91.8 ICD-9-CM: 793.19  10/12/2020 - Present Unknown Overview Addendum 10/12/2020  2:33 PM by Ruther Closs, NP  
  -PET scan 10/2019: PET scan fortunately did not have any abnormal activity in her mass in the left lung. This may mean that we can just follow this radiographically, but we will talk about her at tumor board and come back to her with the group's recommendation. 
-10/30/2019: Patient is discussed at thoracic conference today lead by Dr Bobby Cage. Patient is a 77 yo never smoker. CT guided biopsy recommended. -CT guided Biopsy 2020: lung biopsy showed signs of this nodule being a hamartoma, which is a benign tumor that we can simply follow. repeat CT in 6 months 
-Chest CT 2020: CT scan is stable 
-Chest CT 2020: She has a chronic occlusion of her left pulmonary artery secondary to her left lung mass. [place] are several new pulmonary nodules noted which may suggest some metastatic disease.  This require further follow-up CT of the chest in 2 months or doing PET scan. Hamartoma (HCC) (Chronic) ICD-10-CM: Q85.9 ICD-9-CM: 759.6  10/12/2020 - Present Yes Peripheral vascular disease ([facility] [address] ICD-10-CM: I73.9 ICD-9-CM: 443.9  10/12/2020 - Present Unknown Bilateral pleural effusion ICD-10-CM: J90 ICD-9-CM: 511.9  10/7/2020 - Present Yes Overview Signed 10/13/2020  1:25 PM by DENI Christopher  
  10/3/2020: L thoracentesis 1050mL removed R thoracentesis 800mL removed 10/9/2020: L thoracentesis: 1000mL removed R thoracentesis: 600mL removed Acute systolic heart failure (HCC) ICD-10-CM: I50.21 ICD-9-CM: 428.21  10/6/2020 - Present Yes * (Principal) PNA (pneumonia) ICD-10-CM: J18.9 ICD-9-CM: 102  10/1/2020 - Present Unknown Mass of lingula of lung ICD-10-CM: R91.8 ICD-9-CM: 786.6  10/16/2019 - Present Yes Hypercholesterolemia (Chronic) ICD-10-CM: E78.00 ICD-9-CM: 272.0  6/30/2015 - Present Yes Mild persistent asthma without complication (Chronic) VLX-53-RQ: J45.30 ICD-9-CM: 493.90  10/17/2013 - Present Yes Acquired hypothyroidism (Chronic) ICD-10-CM: E03.9 ICD-9-CM: 244.9  10/17/2013 - Present Yes GERD (gastroesophageal reflux disease) (Chronic) ICD-10-CM: K21.9 ICD-9-CM: 530.81  10/17/2013 - Present Yes RESOLVED: Hypokalemia ICD-10-CM: E87.6 ICD-9-CM: 276.8  10/6/2020 - 10/12/2020 Yes Admission HPI from 10/1/2020:   
 
27-year-old female with a past medical history of asthma, GERD, treatment lymphoma, hyperlipidemia, hypothyroidism, hypotension, chronic left pulmonary artery occlusion, left lung mass status post needle biopsy on November 2019 demonstrating harmatoma, that presents in the setting of worsening shortness of breath. The patient states that for the past few weeks she has been developing worsening shortness of breath especially on exertion, and associated with some dry cough. She was recently treated with levofloxacin for 7 days for community-acquired pneumonia.  She was seen by pulmonary yesterday but she feels that her symptoms significantly got worse today so she decided to come to the emergency department. She denies any fever or chills, no sore throat, no chest pain, no nausea vomiting or diarrhea. In the emergency department patient was found to be tachycardic with radiologic findings concerning of pneumonia. She was given 1 dose of ceftriaxone and 1 dose of azithromycin. She will be admitted to the medical floors for further management. Hospital Course:  
  
The following problems were addressed during this hospitalization: 1.  Acute hypoxemic respiratory failure- resolved Pt completed course of antibiotics. Left pleural effusion s/p thoracentesis 10/3 ad 10/8 and 10/15 (710 cc from right lung and 690 cc from left lung removed) fluid transudative. - Pulmonary on board. Appreciate recs. Home oxygen screen done on 10/20 and patient did not qualify for any oxygen. 
  
2. Acute Systolic CHF exacerbation POA Elevated troponin likely from demand ischemia. IV lasix dced yesterday and switched to PO Lasix. - Telemetry - Continue aspirin 
- TTE EF 35-40% 
- Continue Metoprolol, lisinopril. Pt may need right and Left heart cath after work up for lung nodule as out-pt.  
  
3. Left lung mass status post needle biopsy on November 2019 demonstrating harmatoma / Chronic left pulmonary artery occlusion. Continue aspirin. Lung nodules- out-pt PET CT, 
  
4.  Asthma -intermittent POA 
 continue on home inhalers and Singulair. Currently not in exacerbation. 
  
5.  Hypotension: 
Due to diffuse peripheral vascular disease, falsely low BP readings in upper extremities.  Midodrine and Florinef have been discontinued 10/12 
  
6.  Hyperlipidemia  
continue on atorvastatin. 
  
7.  GERD  
continue on PPI. 
  
8. Hypothyroidism  
continue on levothyroxine. 
  
Hypokalemia-resolved 
  
CT chest 9/21/2020-right-sided aortic arch with relatively 
extensive atherosclerotic change at the arch where there appears to be a small 
focal dissection.  
  
 CTA neck done in 2019 showed subclavian steal syndrome. Vascular surgery recs no intervention at this point. 
  
DC planning/Dispo: Anticipate discharge Disposition: Home Health Care Svc Activity: as tolerated Diet: DIET NUTRITIONAL SUPPLEMENTS Dinner; Ensure [place] ( ) DIET REGULAR 2 GM NA (House Low NA); FR 1500ML; Low Potassium Follow up instructions, discharge meds at bottom of this note. Plan was discussed with the pt. All questions answered. Patient was stable at time of discharge. Patient will call a physician or return if any concerns. Diagnostic Imaging/Tests:  
Xr Chest [facility] Result Date: 10/1/2020 EXAM: CHEST X-RAY, 1 VIEW INDICATION: Shortness of breath. COMPARISON: Chest x-ray 2020 TECHNIQUE: Single AP view of the chest was obtained. FINDINGS: Worsening consolidation in the left mid and lower lung zones with suspected enlarging bilateral pleural effusions. The cardiac silhouette is obscured but appears enlarged. No pneumothorax is evident. Multilevel degenerative changes are present spine. IMPRESSION: 1. Worsening consolidation in the left mid and lower lung zones suspicious for pneumonia. 2.  Enlarging bilateral effusions. Echocardiogram results: 
Results for orders placed or performed during the hospital encounter of 10/01/20  
2D ECHO LIMTED ADULT W OR WO CONTR Narrative [address] [place], [address] [facility] 
[phone] Transthoracic Echocardiogram 
2D, M-mode, Doppler, and Color Doppler Patient: Lucy Tang 
MR #: [id number] : 1950 Age: 69 years Gender: Female Study date: 12-Oct-2020 Account #: [de-identified] Height: 60.6 in 
Weight: 121.7 lb 
BSA: 1.52 mï¾² Status:Routine Location: 834 BP: 88/ 58 Allergies: PROCHLORPERAZINE EDISYLATE Sonographer:  Alda Banuelos RDCS Group:  [id number] Cardiology Referring Physician:  DR. Destiny Esparza DO 
 Reading Physician:  Jacklyn Pineda MD [facility] INDICATIONS: Cardiomyopathy, Check AV Gradients. PROCEDURE: This was a routine study. A transthoracic echocardiogram was 
performed. The study included limited 2D imaging, M-mode, limited spectral 
Doppler, and color Doppler. Intravenous contrast (Definity) was administered. Image quality was adequate. LEFT VENTRICLE: Size was normal. Systolic function was moderately to markedly 
reduced. Ejection fraction was estimated in the range of 30 % to 35 %. There 
was moderate diffuse hypokinesis. Wall thickness was normal. 
 
AORTIC VALVE: The valve was trileaflet. Leaflets exhibited normal cuspal 
separation and mild sclerosis. The aortic valve area by the continuity  
equation 
was 2.07 cm2. The peak velocity was 1.36 m/s. The mean pressure gradient was 5 
mmHg. The peak pressure gradient was 7 mmHg. The Di=0.66. The SVi=36.8. SUMMARY: 
 
-  Left ventricle: Systolic function was moderately to markedly reduced. Ejection fraction was estimated in the range of 30 % to 35 %. There was 
moderate diffuse hypokinesis. -  Aortic valve: The aortic valve area by the continuity equation was 2.07  
cm2. ADDITIONAL MEASUREMENTS High velocity overlapping flow noted during suprasternal imaging (descending 
aorta appears with normal flow velocities and superimposed high flow  
velocities 
from alternative source of uncertain [id number]~9.1B/V). This was also notedon 
previous echo from 9/30/20. SYSTEM MEASUREMENT TABLES 
 
2D mode AoR Diam (2D): 2.9 cm IVS/LVPW (2D): 1.2 IVSd (2D): 1.1 cm 
LVIDd (2D): 4.4 cm LVIDs (2D): 3.7 cm 
LVOT Area (2D): 3.1 cm2 LVPWd (2D): 0.9 cm Unspecified Scan Mode Peak Grad; Mean; Antegrade Flow: 7 mm[Hg] Vmax; Antegrade Flow: 136 cm/s LVOT Diam: 2 cm Prepared and signed by 
 
Jacklyn Pineda MD [facility] Signed 12-Oct-2020 13:13:33 Procedures done this admission: 
Procedure(s): ULTRASOUND THORACENTESIS 
 
 All Micro Results Procedure Component Value Units Date/Time CULTURE, BODY FLUID Jacob Toussaint [[id number]] Collected:  10/15/20 1145 Order Status:  Completed Specimen:  Pleural Fluid Updated:  10/17/20 1001 Special Requests: NO SPECIAL REQUESTS     
  GRAM STAIN 10 TO 25 WBCS SEEN PER OIF  
   NO DEFINITE ORGANISM SEEN Culture result: NO GROWTH 2 DAYS     
 CULTURE[facility] BODY [facility]UID [facility] [[id number]] Collected:  10/15/20 1145 Order Status:  Completed Specimen:  Pleural Fluid Updated:  10/17/20 1000 Special Requests: NO SPECIAL REQUESTS     
  GRAM STAIN 3 TO 10 WBCS SEEN PER OIF  
   NO DEFINITE ORGANISM SEEN Culture result: NO GROWTH 2 DAYS FUNGUS CULTURE AND SMEAR [[id number]] Collected:  10/03/20 1348 Order Status:  Completed Specimen:  Miscellaneous sample Updated:  10/08/20 1236 Source PLEURAL FLUID Comment: L1 Fungus stain Direct Inoculation Fungus (Mycology) Culture Other source received Comment: (NOTE) Performed At: [address] [id number] Drew Ulloa MD [id number] 
  
  
 C. DIFFICILE AG & TOXIN A/B [[id number]] Collected:  10/06/20 1938 Order Status:  Canceled Specimen:  Stool AFB CULTURE + SMEAR W/RFLX ID FROM CULTURE [[id number]] Collected:  10/03/20 1348 Order Status:  Completed Specimen:  Miscellaneous sample Updated:  10/06/20 1538 Source PLEURAL FLUID Comment: L1  
  
  AFB Specimen processing Concentration Acid Fast Smear Negative Comment: (NOTE) Performed At: [address] [id number] Drew Ulloa MD [id number] Acid Fast Culture PENDING  
 CULTURE, BLOOD [[id number]] Collected:  10/01/20 1135 Order Status:  Completed Specimen:  Blood Updated:  10/06/20 9856 Special Requests: --     
  RIGHT 
FOREARM Culture result: NO GROWTH 5 DAYS CULTURE, BLOOD [[id number]] Collected:  10/01/20 1135 Order Status:  Completed Specimen:  Blood Updated:  10/06/20 0194 Special Requests: --     
  LEFT 
FOREARM Culture result: NO GROWTH 5 DAYS     
 CULTURE, BODY FLUID MARIA ALEJANDRA Cox [[id number]] Collected:  10/03/20 1348 Order Status:  Completed Specimen:  Pleural Fluid Updated:  10/06/20 0385 Special Requests: NO SPECIAL REQUESTS     
  GRAM STAIN 0 TO 13 WBC'S SEEN PER OIF  
   NO DEFINITE ORGANISM SEEN Culture result: NO GROWTH 2 DAYS Labs: Results:  
   
BMP, Mg, Phos Recent Labs 10/21/20 
0714 10/20/20 
0532 10/19/20 
0900 * 133* 133* K 4.0 5.2* 3.5 CL 96* 98 92* CO2 32 31 33* AGAP 5* 4* 8  
BUN 23 27* 26* CREA 0.67 0.73 0.66 CA 9.5 9.3 9.3 GLU 91 93 106* MG  --   --  2.6* CBC Recent Labs 10/19/20 
0900 WBC 9.5  
RBC 3.65* HGB 11.2* HCT 34.8*  
 GRANS 64 LYMPH 20 EOS 5 MONOS 10 BASOS 0 IG 0 ANEU 6.1 ABL 1.9 DEONNA 0.5 ABM 0.9 ABB 0.0 AIG 0.0  
  
LFT No results for input(s): ALT, TBIL, AP, TP, ALB, GLOB, AGRAT in the last 72 hours. No lab exists for component: SGOT, GPT Cardiac Testing No results found for: BNPP, BNP, CPK, RCK1, RCK2, RCK3, RCK4, CKMB, CKNDX, CKND1, TROPT, TROIQ Coagulation Tests Lab Results Component Value Date/Time Prothrombin time 11.4 (L) 11/04/2019 02:40 PM  
 INR 0.8 11/04/2019 02:40 PM  
 aPTT 28.4 11/04/2019 02:40 PM  
  
A1c Lab Results Component Value Date/Time Hemoglobin A1c 5.8 (H) 04/21/2017 09:03 AM  
  
Lipid Panel Lab Results Component Value Date/Time Cholesterol, total 253 (H) 05/29/2020 09:37 AM  
 HDL Cholesterol 89 05/29/2020 09:37 AM  
 LDL, calculated 136 (H) 05/29/2020 09:37 AM  
 VLDL, calculated 28 05/29/2020 09:37 AM  
 Triglyceride 142 05/29/2020 09:37 AM  
  
Thyroid Panel Lab Results Component Value Date/Time  TSH 8.570 (H) 05/29/2020 09:37 AM  
 TSH 5.640 (H) 09/17/2019 12:13 PM  
 T4, Free 1.24 09/17/2019 12:13 PM  
 T4, Free 1.29 05/28/2019 09:15 AM  
    
Most Recent UA Lab Results Component Value Date/Time Color Yellow 04/21/2017 09:03 AM  
 Appearance Clear 04/21/2017 09:03 AM  
 pH (UA) 7.0 04/21/2017 09:03 AM  
 Ketone Negative 04/21/2017 09:03 AM  
 Bilirubin Negative 04/21/2017 09:03 AM  
 Blood Negative 04/21/2017 09:03 AM  
 Nitrites Negative 04/21/2017 09:03 AM  
 Leukocyte Esterase Negative 04/21/2017 09:03 AM  
  
 
Allergies Allergen Reactions  Compazine [Prochlorperazine Edisylate] Swelling Immunization History Administered Date(s) Administered  Influenza High Dose Vaccine PF 09/29/2016, 10/09/2017, 09/01/2018, 08/21/2020  Influenza Vaccine 09/30/2015  Influenza Vaccine (Tri) Adjuvanted (>65 Yrs FLUAD TRI 81359) 09/12/2019  Pneumococcal Conjugate (PCV-13) 10/26/2015  Pneumococcal Polysaccharide (PPSV-23) 01/01/2007, 04/28/2017  Tdap 10/01/2014  Zoster Recombinant 08/21/2020  Zoster Vaccine, Live 10/16/2013 All Labs from Last 24 Hrs: 
Recent Results (from the past 24 hour(s)) METABOLIC PANEL, BASIC Collection Time: 10/21/20  7:14 AM  
Result Value Ref Range Sodium 133 (L) 136 - 145 mmol/L Potassium 4.0 3.5 - 5.1 mmol/L Chloride 96 (L) 98 - 107 mmol/L  
 CO2 32 21 - 32 mmol/L Anion gap 5 (L) 7 - 16 mmol/L Glucose 91 65 - 100 mg/dL BUN 23 8 - 23 MG/DL Creatinine 0.67 0.6 - 1.0 MG/DL  
 GFR est AA >60 >60 ml/min/1.73m2 GFR est non-AA >60 >60 ml/min/1.73m2 Calcium 9.5 8.3 - 10.4 MG/DL  
EKG, 12 LEAD, SUBSEQUENT Collection Time: 10/21/20 12:10 PM  
Result Value Ref Range Ventricular Rate 101 BPM  
 Atrial Rate 101 BPM  
 P-R Interval 158 ms QRS Duration 104 ms Q-T Interval 384 ms QTC Calculation (Bezet) 497 ms Calculated P Axis 69 degrees Calculated R Axis -19 degrees Calculated T Axis -174 degrees Diagnosis Sinus tachycardia T wave abnormality, consider inferolateral ischemia Left atrial enlargement Left ventricular hypertrophy When compared with ECG of 01-OCT-2020 20:47, 
QRS axis Shifted left T wave inversion less evident in Inferior leads T wave inversion less evident in Lateral leads Confirmed by GAYE VERDUZCO (), Mitch Eden ([id number]) on 10/21/2020 12:32:30 PM 
  
 
 
Current Med List in Hospital:  
Current Facility-Administered Medications Medication Dose Route Frequency  furosemide (LASIX) tablet 40 mg  40 mg Oral DAILY  cyclobenzaprine (FLEXERIL) tablet 5 mg  5 mg Oral TID PRN  
 albuterol (PROVENTIL VENTOLIN) nebulizer solution 2.5 mg  2.5 mg Nebulization TID RT  
 calcium carbonate (TUMS) chewable tablet 200 mg [elemental]  200 mg Oral QID PRN  
 lisinopriL (PRINIVIL, ZESTRIL) tablet 2.5 mg  2.5 mg Oral DAILY  metoprolol succinate (TOPROL-XL) XL tablet 25 mg  25 mg Oral DAILY  albuterol (PROVENTIL VENTOLIN) nebulizer solution 2.5 mg  2.5 mg Nebulization Q6H PRN  
 morphine injection 1 mg  1 mg IntraVENous Q4H PRN  
 fluticasone propionate (FLONASE) 50 mcg/actuation nasal spray 2 Spray  2 Spray Both Nostrils DAILY  sodium chloride (NS) flush 5-40 mL  5-40 mL IntraVENous Q8H  
 sodium chloride (NS) flush 5-40 mL  5-40 mL IntraVENous PRN  
 acetaminophen (TYLENOL) tablet 650 mg  650 mg Oral Q6H PRN Or  
 acetaminophen (TYLENOL) suppository 650 mg  650 mg Rectal Q6H PRN  polyethylene glycol (MIRALAX) packet 17 g  17 g Oral DAILY PRN  
 enoxaparin (LOVENOX) injection 40 mg  40 mg SubCUTAneous DAILY  ondansetron (ZOFRAN ODT) tablet 4 mg  4 mg Oral Q8H PRN Or  
 ondansetron (ZOFRAN) injection 4 mg  4 mg IntraVENous Q6H PRN  
 aspirin delayed-release tablet 81 mg  81 mg Oral DAILY  levothyroxine (SYNTHROID) tablet 75 mcg  75 mcg Oral ACB  montelukast (SINGULAIR) tablet 10 mg  10 mg Oral DAILY  pantoprazole (PROTONIX) tablet 40 mg  40 mg Oral ACB  atorvastatin (LIPITOR) tablet 10 mg  10 mg Oral DAILY  budesonide-formoteroL (SYMBICORT) 160-4.5 mcg/actuation HFA inhaler 2 Puff  2 Puff Inhalation BID RT Discharge Exam: 
Patient Vitals for the past 24 hrs: 
 Temp Pulse Resp BP SpO2  
10/21/20 1110 97.6 °F (36.4 °C) (!) 111 19 (!) 115/46 99 % 10/21/20 0930     96 % 10/21/20 0805 97.9 °F (36.6 °C) 96 17 128/73 97 % 10/21/20 0339 98 °F (36.7 °C) (!) 101 16 121/66 97 % 10/20/20 2338    112/71   
10/20/20 2334 98 °F (36.7 °C) (!) 103 18 (!) 97/47 97 % 10/20/20 2004     95 % 10/20/20 1948 97.8 °F (36.6 °C) 100 16 106/60 95 % 10/20/20 1628 98.1 °F (36.7 °C) (!) 107 20 (!) 92/58 96 % 10/20/20 1430     95 % Oxygen Therapy O2 Sat (%): 99 % (10/21/20 1110) Pulse via Oximetry: 88 beats per minute (10/21/20 0930) O2 Device: Room air (10/21/20 0930) O2 Flow Rate (L/min): 0.5 l/min(for comfort only) (10/21/20 0930) Intake/Output Summary (Last 24 hours) at 10/21/2020 1250 Last data filed at 10/20/2020 8780 Gross per 24 hour Intake 240 ml Output 600 ml Net -360 ml  
   
*Note that automatically entered I/Os may not be accurate; dependent on patient compliance with collection and accurate  by assistants. General:    Well nourished. Alert. Eyes:   Normal sclerae. Extraocular movements intact. ENT:  Normocephalic, atraumatic. Moist mucous membranes CV:   Regular rate and rhythm. No murmur, rub, or gallop. Lungs:  Clear to auscultation bilaterally. No wheezing, rhonchi, or rales. Abdomen: Soft, nontender, nondistended. Bowel sounds normal.  
Extremities: Warm and dry. No cyanosis or edema. Neurologic: CN II-XII grossly intact. Sensation intact. Skin:     No rashes or jaundice. Psych:  Normal mood and affect. Discharge Info:  
Current Discharge Medication List  
  
START taking these medications Details  
furosemide (LASIX) 40 mg tablet Take 1 Tab by mouth daily for 30 days. Qty: 30 Tab, Refills: 0  
  
lisinopriL (PRINIVIL, ZESTRIL) 2.5 mg tablet Take 1 Tab by mouth daily for 30 days. Qty: 30 Tab, Refills: 0  
  
metoprolol succinate (TOPROL-XL) 25 mg XL tablet Take 1 Tab by mouth daily for 30 days. Qty: 30 Tab, Refills: 0 CONTINUE these medications which have NOT CHANGED Details  
albuterol (Ventolin HFA) 90 mcg/actuation inhaler Take 2 Puffs by inhalation every four (4) hours as needed for Wheezing. For wheezing. Qty: 3 Inhaler, Refills: 3  
  
fluticasone furoate-vilanteroL (Breo Ellipta) 200-25 mcg/dose inhaler Take 1 Puff by inhalation daily. Qty: 3 Inhaler, Refills: 3  
  
fluticasone propionate (FLONASE) 50 mcg/actuation nasal spray 2 Sprays by Both Nostrils route daily. Qty: 3 Bottle, Refills: 3  
  
montelukast (SINGULAIR) 10 mg tablet Take 1 Tab by mouth daily. Qty: 90 Tab, Refills: 3  
  
baclofen (LIORESAL) 10 mg tablet Take 1 Tab by mouth three (3) times daily as needed for Muscle Spasm(s). Qty: 30 Tab, Refills: 5 Associated Diagnoses: Muscle spasm  
  
melatonin 5 mg tablet Take 5 mg by mouth nightly. naproxen sodium (NAPROSYN) 220 mg tablet Take 220 mg by mouth two (2) times daily (with meals). acetaminophen (Tylenol Arthritis Pain) 650 mg TbER Take 650 mg by mouth every eight (8) hours. levocetirizine (XYZAL) 5 mg tablet Take 1 Tab by mouth daily. Qty: 90 Tab, Refills: 3  
  
levothyroxine (SYNTHROID) 75 mcg tablet Take 1 Tab by mouth Daily (before breakfast). Except 1/2 on Monday 
Qty: 90 Tab, Refills: 4 Associated Diagnoses: Acquired hypothyroidism  
  
simvastatin (ZOCOR) 20 mg tablet Take 1 Tab by mouth nightly. Qty: 90 Tab, Refills: 4 Associated Diagnoses: Pure hypercholesterolemia Omeprazole delayed release (PRILOSEC D/R) 20 mg tablet Take 1 Tab by mouth daily. Qty: 90 Tab, Refills: 4 Associated Diagnoses: Gastroesophageal reflux disease without esophagitis aspirin delayed-release 81 mg tablet Take 81 mg by mouth daily. omega-3 fatty acids-vitamin e 1,000 mg cap Take 1 Cap by mouth.  
  
multivitamin (ONE A DAY) tablet Take 1 Tab by mouth daily. STOP taking these medications  
  
 levoFLOXacin (Levaquin) 750 mg tablet Comments:  
Reason for Stopping:   
   
 fludrocortisone (FLORINEF) 0.1 mg tablet Comments:  
Reason for Stopping:   
   
 melatonin 10 mg cap Comments:  
Reason for Stopping: Follow Up Orders: Follow-up Appointments Procedures  FOLLOW UP VISIT Appointment in: 3 - 5 Days F/u with PCP in 3-5 days F/u with Cardiology in 1 week. F/u with Pulmonology in 2 weeks. F/u with PCP in 3-5 days F/u with Cardiology in 1 week. F/u with Pulmonology in 2 weeks. Standing Status:   Standing Number of Occurrences:   1 Order Specific Question:   Appointment in Answer:   3 - 5 Days Follow-up Information Follow up With Specialties Details Why Contact Info Margo Chi MD Internal Medicine   [address]  [address] [facility] [address] 
[phone] Time spent in patient discharge planning and coordination 44 minutes.  
 
Signed: 
Freeman Thomas MD

## 2020-10-21 NOTE — PROGRESS NOTES
Assessment complete. Pt sitting up in bed watching TV. Pt has diminished breath sounds, coarse in the left. Pt states back pain is better. Call light in reach.

## 2020-10-21 NOTE — PROGRESS NOTES
Care Management Interventions PCP Verified by CM: Yes Mode of Transport at Discharge: Other (see comment)(family) Transition of Care Consult (CM Consult): Home Health [facility] - [facility]: Yes Discharge Durable Medical Equipment: No 
Physical Therapy Consult: Yes Occupational Therapy Consult: No 
Speech Therapy Consult: No 
Current Support Network: Lives with Spouse Confirm Follow Up Transport: Family The Plan for Transition of Care is Related to the Following Treatment Goals : [place] RN/PT The Patient and/or Patient Representative was Provided with a Choice of Provider and Agrees with the Discharge Plan?: Yes Name of the Patient Representative Who was Provided with a Choice of Provider and Agrees with the Discharge Plan: Tamika Hassan Freedom of Choice List was Provided with Basic Dialogue that Supports the Patient's Individualized Plan of Care/Goals, Treatment Preferences and Shares the Quality Data Associated with the Providers?: Yes [employer] Information Provided?: No 
Discharge Location Discharge Placement: Home with home health Patient is discharging home with [facility] Clayton Archer RN/PT.

## 2020-10-21 NOTE — DISCHARGE INSTRUCTIONS
DISCHARGE SUMMARY from Nurse    PATIENT INSTRUCTIONS:    After general anesthesia or intravenous sedation, for 24 hours or while taking prescription Narcotics:  · Limit your activities  · Do not drive and operate hazardous machinery  · Do not make important personal or business decisions  · Do  not drink alcoholic beverages  · If you have not urinated within 8 hours after discharge, please contact your surgeon on call. What to do at Home:  Recommended activity: Activity as tolerated. If you experience any of the following symptoms temp > 101.5, unrelieved pain, nausea, or vomiting, shortness of breath or fatigue not relieved with rest, please follow up with MD.    *  Please give a list of your current medications to your Primary Care Provider. *  Please update this list whenever your medications are discontinued, doses are      changed, or new medications (including over-the-counter products) are added. *  Please carry medication information at all times in case of emergency situations. These are general instructions for a healthy lifestyle:    No smoking/ No tobacco products/ Avoid exposure to second hand smoke  Surgeon General's Warning:  Quitting smoking now greatly reduces serious risk to your health. Obesity, smoking, and sedentary lifestyle greatly increases your risk for illness    A healthy diet, regular physical exercise & weight monitoring are important for maintaining a healthy lifestyle    You may be retaining fluid if you have a history of heart failure or if you experience any of the following symptoms:  Weight gain of 3 pounds or more overnight or 5 pounds in a week, increased swelling in our hands or feet or shortness of breath while lying flat in bed. Please call your doctor as soon as you notice any of these symptoms; do not wait until your next office visit. The discharge information has been reviewed with the patient. The patient verbalized understanding.   Discharge medications reviewed with the patient and appropriate educational materials and side effects teaching were provided. Patient Education        Pneumonia: Care Instructions  Your Care Instructions     Pneumonia is an infection of the lungs. Most cases are caused by infections from bacteria or viruses. Pneumonia may be mild or very severe. If it is caused by bacteria, you will be treated with antibiotics. It may take a few weeks to a few months to recover fully from pneumonia, depending on how sick you were and whether your overall health is good. Follow-up care is a key part of your treatment and safety. Be sure to make and go to all appointments, and call your doctor if you are having problems. It's also a good idea to know your test results and keep a list of the medicines you take. How can you care for yourself at home? · Take your antibiotics exactly as directed. Do not stop taking the medicine just because you are feeling better. You need to take the full course of antibiotics. · Take your medicines exactly as prescribed. Call your doctor if you think you are having a problem with your medicine. · Get plenty of rest and sleep. You may feel weak and tired for a while, but your energy level will improve with time. · To prevent dehydration, drink plenty of fluids, enough so that your urine is light yellow or clear like water. Choose water and other caffeine-free clear liquids until you feel better. If you have kidney, heart, or liver disease and have to limit fluids, talk with your doctor before you increase the amount of fluids you drink. · Take care of your cough so you can rest. A cough that brings up mucus from your lungs is common with pneumonia. It is one way your body gets rid of the infection. But if coughing keeps you from resting or causes severe fatigue and chest-wall pain, talk to your doctor. He or she may suggest that you take a medicine to reduce the cough.   · Use a vaporizer or humidifier to add moisture to your bedroom. Follow the directions for cleaning the machine. · Do not smoke or allow others to smoke around you. Smoke will make your cough last longer. If you need help quitting, talk to your doctor about stop-smoking programs and medicines. These can increase your chances of quitting for good. · Take an over-the-counter pain medicine, such as acetaminophen (Tylenol), ibuprofen (Advil, Motrin), or naproxen (Aleve). Read and follow all instructions on the label. · Do not take two or more pain medicines at the same time unless the doctor told you to. Many pain medicines have acetaminophen, which is Tylenol. Too much acetaminophen (Tylenol) can be harmful. · If you were given a spirometer to measure how well your lungs are working, use it as instructed. This can help your doctor tell how your recovery is going. · To prevent pneumonia in the future, talk to your doctor about getting a flu vaccine (once a year) and a pneumococcal vaccine (one time only for most people). When should you call for help? Call 911 anytime you think you may need emergency care. For example, call if:    · You have severe trouble breathing. Call your doctor now or seek immediate medical care if:    · You cough up dark brown or bloody mucus (sputum).     · You have new or worse trouble breathing.     · You are dizzy or lightheaded, or you feel like you may faint. Watch closely for changes in your health, and be sure to contact your doctor if:    · You have a new or higher fever.     · You are coughing more deeply or more often.     · You are not getting better after 2 days (48 hours).     · You do not get better as expected. Where can you learn more? Go to http://www.[employer].com/  Enter D336 in the search box to learn more about \"Pneumonia: Care Instructions. \"  Current as of: February 24, 2020               Content Version: 12.6  © 5976-4665 [employer] ([employer] Incorporated.    Care instructions adapted under license by [employer] (which disclaims liability or warranty for this information). If you have questions about a medical condition or this instruction, always ask your healthcare professional. [address] any warranty or liability for your use of this information. _Patient Education        Learning About a Pleural Effusion  What is it? A pleural effusion (say \"PLER-liz hz-TBMX-wqgo\") is the buildup of fluid in the space between tissues lining the lungs and the chest wall. Because of the fluid buildup, the lungs may not be able to expand completely. This can make it hard to breathe. A pleural empyema (say \"gh-rz-SZ-muh\") is a problem that can happen with pleural effusion. Bacteria or other infections cause pus to form in the pleural fluid. But most pleural effusions don't become infected. What causes it? A pleural effusion has many causes. They include pneumonia, cancer, inflammation of the tissues around the lungs, and heart failure. What are the symptoms? Symptoms of a pleural effusion may include:  · Trouble breathing. · Shortness of breath. · Chest pain. · Fever. · A cough. A minor pleural effusion may not cause any symptoms. How is it diagnosed? A pleural effusion is usually diagnosed with an X-ray and a physical exam. The doctor listens to the airflow in your lungs. How is it treated? A pleural effusion can be treated by removing fluid from the space between the tissues around the lungs. This is done with a needle that's put into the chest (thoracentesis). A small amount of the fluid may be sent to a lab to find out what is causing the buildup of fluid. Removing the fluid may help to relieve symptoms, such as shortness of breath and chest pain. It can help the lungs to expand more fully. If the pleural effusion doesn't get better, a catheter may be placed in the chest. This is a flexible tube that allows fluid to drain from the lungs.  The catheter stays in the chest until the doctor removes it. Some people may get a treatment that removes the fluid and then puts a medicine into the chest cavity. This helps to prevent too much fluid from building up again. A minor pleural effusion often goes away on its own. Doctors may need to treat the condition that is causing the pleural effusion. For example, you may get medicines to treat pneumonia or congestive heart failure. When the condition is treated, the effusion usually goes away. For a pleural empyema, the pus needs to be drained. It may drain from a flexible tube placed in the chest. Or you may have surgery to drain it. You also will get antibiotics. Follow-up care is a key part of your treatment and safety. Be sure to make and go to all appointments, and call your doctor if you are having problems. It's also a good idea to know your test results and keep a list of the medicines you take. Where can you learn more? Go to http://www.gray.com/  Enter A920 in the search box to learn more about \"Learning About a Pleural Effusion. \"  Current as of: February 24, 2020               Content Version: 12.6  © 6726-3244 [employer]. Care instructions adapted under license by [employer] (which disclaims liability or warranty for this information). If you have questions about a medical condition or this instruction, always ask your healthcare professional. [place] [address] any warranty or liability for your use of this information. _Patient Education        Heart Failure: Care Instructions  Your Care Instructions     Heart failure occurs when your heart does not pump as much blood as the body needs. Failure does not mean that the heart has stopped pumping but rather that it is not pumping as well as it should. Over time, this causes fluid buildup in your lungs and other parts of your body.  Fluid buildup can cause shortness of breath, fatigue, swollen ankles, and other problems. By taking medicines regularly, reducing sodium (salt) in your diet, checking your weight every day, and making lifestyle changes, you can feel better and live longer. Follow-up care is a key part of your treatment and safety. Be sure to make and go to all appointments, and call your doctor if you are having problems. It's also a good idea to know your test results and keep a list of the medicines you take. How can you care for yourself at home? Medicines    · Be safe with medicines. Take your medicines exactly as prescribed. Call your doctor if you think you are having a problem with your medicine.     · Do not take any vitamins, over-the-counter medicine, or herbal products without talking to your doctor first. Marrianne Sprain not take ibuprofen (Advil or Motrin) and naproxen (Aleve) without talking to your doctor first. They could make your heart failure worse.     · You may take some of the following medicine. ? Angiotensin-converting enzyme inhibitors (ACEIs) or angiotensin II receptor blockers (ARBs) reduce the heart's workload, lower blood pressure, and reduce swelling. Taking an ACEI or ARB may lower your chance of needing to be hospitalized. ? Beta-blockers can slow heart rate, decrease blood pressure, and improve your condition. Taking a beta-blocker may lower your chance of needing to be hospitalized. ? Diuretics, also called water pills, reduce swelling. You will get more details on the specific medicines your doctor prescribes. Diet    · Your doctor may suggest that you limit sodium. Your doctor can tell you how much sodium is right for you. An example is less than 3,000 mg a day. This includes all the salt you eat in cooking or in packaged foods. People get most of their sodium from processed foods. Fast food and restaurant meals also tend to be very high in sodium.     · Ask your doctor how much liquid you can drink each day. You may have to limit liquids. Weight    · Weigh yourself without clothing at the same time each day. Record your weight. Call your doctor if you have a sudden weight gain, such as more than 2 to 3 pounds in a day or 5 pounds in a week. (Your doctor may suggest a different range of weight gain.) A sudden weight gain may mean that your heart failure is getting worse. Activity level    · Start light exercise (if your doctor says it is okay). Even if you can only do a small amount, exercise will help you get stronger, have more energy, and manage your weight and your stress. Walking is an easy way to get exercise. Start out by walking a little more than you did before. Bit by bit, increase the amount you walk.     · When you exercise, watch for signs that your heart is working too hard. You are pushing yourself too hard if you cannot talk while you are exercising. If you become short of breath or dizzy or have chest pain, stop, sit down, and rest.     · If you feel \"wiped out\" the day after you exercise, walk slower or for a shorter distance until you can work up to a better pace.     · Get enough rest at night. Sleeping with 1 or 2 pillows under your upper body and head may help you breathe easier. Lifestyle changes    · Do not smoke. Smoking can make a heart condition worse. If you need help quitting, talk to your doctor about stop-smoking programs and medicines. These can increase your chances of quitting for good. Quitting smoking may be the most important step you can take to protect your heart.     · Limit alcohol to 2 drinks a day for men and 1 drink a day for women. Too much alcohol can cause health problems.     · Avoid getting sick from colds and the flu. Get a pneumococcal vaccine shot. If you have had one before, ask your doctor whether you need another dose. Get a flu shot each year. If you must be around people with colds or the flu, wash your hands often. When should you call for help?    Call 911 if you have symptoms of sudden heart failure such as:    · You have severe trouble breathing.     · You cough up pink, foamy mucus.     · You have a new irregular or rapid heartbeat. Call your doctor now or seek immediate medical care if:    · You have new or increased shortness of breath.     · You are dizzy or lightheaded, or you feel like you may faint.     · You have sudden weight gain, such as more than 2 to 3 pounds in a day or 5 pounds in a week. (Your doctor may suggest a different range of weight gain.)     · You have increased swelling in your legs, ankles, or feet.     · You are suddenly so tired or weak that you cannot do your usual activities. Watch closely for changes in your health, and be sure to contact your doctor if you develop new symptoms. Where can you learn more? Go to http://www.gray.com/  Enter I007 in the search box to learn more about \"Heart Failure: Care Instructions. \"  Current as of: December 16, 2019               Content Version: 12.6  © 3045-7305 [employer], Incorporated. Care instructions adapted under license by [employer] (which disclaims liability or warranty for this information). If you have questions about a medical condition or this instruction, always ask your healthcare professional. [address] any warranty or liability for your use of this information.        _________________________________________________________________________________________________________________________________

## 2020-10-21 NOTE — PROGRESS NOTES
Discharge instructions, follow up information, medication list,  and prescriptions provided and explained to the pt. IV removed from right wrist, no remote telemetry on. Opportunity for questions provided. Patient states spouse is coming to transport patient home. Instructed to call once ready to leave.

## 2020-10-22 NOTE — PROGRESS NOTES
Transition [facility] Discharge Follow-Up Date/Time:  10/22/2020 9:56 AM 
 
Patient was admitted to [facility] on 10/1/2020and discharged on 10/21/2020 for Pneumonia. The physician discharge summary was available at the time of outreach. Patient was contacted within 7 business days of discharge. Inpatient RUR score: 5 Was this a readmission? no  
Patient stated reason for the readmission: none Patients top risk factors for readmission: Pneumonia LPN Care Coordinator contacted the patient by telephone to perform post hospital discharge assessment. Verified name and  with patient as identifiers. Provided introduction to self, and explanation of the Care Coordinator role. Patient received hospital discharge instructions. LPN reviewed discharge instructions and red flags with patient who verbalized understanding. Patient given an opportunity to ask questions and does not have any further questions or concerns at this time. The patient agrees to contact the PCP office for questions related to their healthcare. LPN provided contact information for future reference. Home Health orders at discharge: [facility]EIGN Home Health company: [facility] Date of initial or scheduled visit: 10/22/2020 
(Assist with coordination of services if necessary.) Durable Medical Equipment ordered at discharge: none [facility]:  
Durable Medical Equipment received:  
(Assist patient in obtaining DME orders &/or equipment if necessary.) Medication(s):  
Medication review was performed with patient, who verbalizes understanding of administration of home medications. There were no barriers to obtaining medications identified at this time. Current Outpatient Medications Medication Sig  
 lisinopriL (PRINIVIL, ZESTRIL) 2.5 mg tablet Take 1 Tab by mouth daily for 30 days.  metoprolol succinate (TOPROL-XL) 25 mg XL tablet Take 1 Tab by mouth daily for 30 days.  furosemide (LASIX) 40 mg tablet Take 1 Tab by mouth daily for 30 days.  albuterol (Ventolin HFA) 90 mcg/actuation inhaler Take 2 Puffs by inhalation every four (4) hours as needed for Wheezing. For wheezing.  fluticasone furoate-vilanteroL (Breo Ellipta) 200-25 mcg/dose inhaler Take 1 Puff by inhalation daily.  fluticasone propionate (FLONASE) 50 mcg/actuation nasal spray 2 Sprays by Both Nostrils route daily.  montelukast (SINGULAIR) 10 mg tablet Take 1 Tab by mouth daily.  baclofen (LIORESAL) 10 mg tablet Take 1 Tab by mouth three (3) times daily as needed for Muscle Spasm(s).  melatonin 5 mg tablet Take 5 mg by mouth nightly.  naproxen sodium (NAPROSYN) 220 mg tablet Take 220 mg by mouth two (2) times daily (with meals).  acetaminophen (Tylenol Arthritis Pain) 650 mg TbER Take 650 mg by mouth every eight (8) hours.  levocetirizine (XYZAL) 5 mg tablet Take 1 Tab by mouth daily.  levothyroxine (SYNTHROID) 75 mcg tablet Take 1 Tab by mouth Daily (before breakfast). Except 1/2 on Monday  simvastatin (ZOCOR) 20 mg tablet Take 1 Tab by mouth nightly.  Omeprazole delayed release (PRILOSEC D/R) 20 mg tablet Take 1 Tab by mouth daily.  aspirin delayed-release 81 mg tablet Take 81 mg by mouth daily.  omega-3 fatty acids-vitamin e 1,000 mg cap Take 1 Cap by mouth.  multivitamin (ONE A DAY) tablet Take 1 Tab by mouth daily. No current facility-administered medications for this visit. There are no discontinued medications. ADL assessment:  
(If patient is unable to perform ADLs  what is the limiting factor(s)? Do they have a support system that can assist? If no support system is present, discuss possible assistance that they may be able to obtain. Escalate for SW if ongoing issues are verbalized by pt or anticipated) [facility] [place] [facility] follow [facility](s):  
Future Appointments Date Time Provider Javier Pinto 10[facility]22[facility]2020 10:30 [facility]  
10/22/2020  1:00 PM Sabina Anaya, PT [facility] [place] [facility]\""  
10/26/2020  1:15 PM Rian Boyer MD [facility]  
10/27/2020  4:45 PM [facility] NM PET/CT INJ RM GCCRMPETG GVL [facility]  
10/29/2020  9:30 AM Natty Snider MD [facility] UCDG UCD  
11/12/2020  8:30 AM Mayur Serrato NP [facility] PP PP  
11/23/2020  2:56 AM SFE CT 16 SLICE UNIT 1 SFERCT SFE  
11/27/2020  3:20 PM Isis Zeng MD [facility] PP PP  
12/9/2020 10:30 AM VSA ULTRASOUND 2 SSA BSVS VSA  
12/15/2020 10:00 AM Kai Duncan NP [facility] BSVS VSA  
5/31/2021  9:00 AM Boy Penaloza MD [facility] Non-[facility] follow up appointment(s):  
7 Day follow up with PCP or Specialist: Dr. Lindsey Lee 10/26/2020 Transportation arranged: none Covid Risk Education Patient has following risk factors of: pneumonia. Education provided regarding infection prevention, and signs and symptoms of COVID-19 and when to seek medical attention with patient who verbalized understanding. Discussed exposure protocols and quarantine From CDC: Are you at higher risk for severe illness?  and given an opportunity for questions and concerns. The patient agrees to contact the COVID-19 hotline [phone] or PCP office for questions related to COVID-19. For more information on steps you can take to protect yourself, see CDC's How to Protect Yourself Patient/family/caregiver given information for [employer] and agrees to enroll no Patient's preferred e-mail: declines Patient's preferred phone number: declines Any other questions or concerns expressed by patient? Patient voices concerns about anxiety. Patient states has spoken to the PCP nurse and they are going to call in some medication. Scheduled next follow up call or referral to CTN/ ACM: 
Next follow up call:within 14 days Goals Addressed This Visit's Progress   Maintain medical stability (ie. monitor respiratory rate, temperature, heart rate, blood pressure, and O2 levels, mental status, ability to eat and drink)  Takes Medications as prescribed

## 2020-10-25 PROBLEM — R53.1 ACUTE RIGHT-SIDED WEAKNESS: Status: ACTIVE | Noted: 2020-01-01

## 2020-10-25 PROBLEM — G93.89 FRONTAL MASS OF BRAIN: Status: ACTIVE | Noted: 2020-01-01

## 2020-10-25 PROBLEM — R93.0 MULTIPLE LESIONS ON CT OF BRAIN AND SPINE: Status: ACTIVE | Noted: 2020-01-01

## 2020-10-25 PROBLEM — R47.01 EXPRESSIVE APHASIA: Status: ACTIVE | Noted: 2020-01-01

## 2020-10-25 PROBLEM — R93.7 MULTIPLE LESIONS ON CT OF BRAIN AND SPINE: Status: ACTIVE | Noted: 2020-01-01

## 2020-10-25 PROBLEM — E87.6 HYPOKALEMIA: Status: ACTIVE | Noted: 2020-01-01

## 2020-10-25 PROBLEM — I63.9 ACUTE CVA (CEREBROVASCULAR ACCIDENT) (HCC): Status: ACTIVE | Noted: 2020-01-01

## 2020-10-25 PROBLEM — G93.40 ACUTE ENCEPHALOPATHY: Status: ACTIVE | Noted: 2020-01-01

## 2020-10-25 NOTE — ED NOTES
TRANSFER - OUT REPORT: 
 
Verbal report given to Chaya(name) on [address]  being transferred to [facility](unit) for routine progression of care Report consisted of patients Situation, Background, Assessment and  
Recommendations(SBAR). Information from the following report(s) SBAR, Kardex, ED Summary and MAR was reviewed with the receiving nurse. Lines:  
Peripheral IV 10/25/20 Left Antecubital (Active) Site Assessment Clean, dry, & intact 10/25/20 7947 Phlebitis Assessment 0 10/25/20 0713 Infiltration Assessment 0 10/25/20 0713 Dressing Status Clean, dry, & intact 10/25/20 0472 Peripheral IV 10/25/20 Left Forearm (Active) Site Assessment Clean, dry, & intact 10/25/20 6640 Phlebitis Assessment 0 10/25/20 0713 Infiltration Assessment 0 10/25/20 0713 Dressing Status Clean, dry, & intact 10/25/20 3203 Opportunity for questions and clarification was provided. Patient transported with: 
 Monitor

## 2020-10-25 NOTE — PROGRESS NOTES
I have talked with pt's brother Gordon Banegas, via phone. I have informed him about pt's MRI brain results and clinical condition. He is in [place] currently, and is planning to drive down to [facility]. He understands that pt is at high risk of decompensation and may rapidly deteriorate in next 24 hours. I have informed him that Neurosurgical and Radiation oncology evaluation is pending, and given underlying pt's chronic medical issues including systolic CHF, bilateral pleural effusion and pulmonary congestion, any kind of surgical intervention whethert diagnotic or therapeutic will be associated with [place] risk of complications. I have also discussed about pt's code status, for now she stays full code. Pt's brother would prefer everything to be done including chest compression and intubation should she need it until he is able to come and see her.

## 2020-10-25 NOTE — CONSULTS
Neurosurgery Consult Subjective:  
  
Derrick Butler is a 69 y.o. female Pt nonverbal so following history was gathered from HIM admission note. Patient was recently discharged from [facility] on 10/21 after being treated for PNA and was doing okay until today. Per , pt was walking, doing ADL's  Until yesterday but this morning a drastic change in her mentation and speech was noted by him. Again, pt's  is also not a great historian. No reported fever, nausea/vomiting, fall, diarrhea, abdominal pain, headaches. Upon my encounter w/ patient, she was alert, following commands on left, has dense right facial and body paresis, including loss of sensation. She was able to shake/nod to questions, denies pain. Smiled appropriately. Past Medical History:  
Diagnosis Date  Acute CVA (cerebrovascular accident) ([facility] [address] 10/25/2020  Asthma  Bite of nonvenomous arthropod ? ??  
 Cough  Esophageal stricture 2002  
 dilated 2002  GERD (gastroesophageal reflux disease)  History of rectal bleeding ???  
 Hodgkin's lymphoma ([facility] [address] 1980 Radiation therapy  Hypercholesterolemia  Menopause  Positive RAST testing 2007 IgE level of 1,391  Thyroid disease Past Surgical History:  
Procedure Laterality Date [address]  HX BREAST BIOPSY Left 05/21/2020  
 calcs at 2:00  
 HX CATARACT REMOVAL Bilateral 2007, 2009  HX COLONOSCOPY    
 HX OTHER SURGICAL    
 dental x3  
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 [address] Family History Problem Relation Age of Onset  Cancer Mother Kidney cancer  Cancer Father Prostate cnaceer  Heart Surgery Brother  Breast Cancer Neg Hx Social History Socioeconomic History  Marital status:  Spouse name: Not on file  Number of children: Not on file  Years of education: Not on file  Highest education level: Not on file Occupational History  Occupation:  Tobacco Use  Smoking status: Never Smoker  Smokeless tobacco: Never Used Substance and Sexual Activity  Alcohol use: Yes Alcohol/week: 21.0 standard drinks Types: 7 Shots of liquor, 14 Standard drinks or equivalent per week  Drug use: No  
Social History Narrative There is no significant environmental or industrial exposure. She has no known exposure to TB. She has worked as an . Current Facility-Administered Medications Medication Dose Route Frequency Provider Last Rate Last Dose  budesonide-formoteroL (SYMBICORT) 160-4.5 mcg/actuation HFA inhaler 2 Puff  2 Puff Inhalation BID RT Ryan Gómez MD      
Floydene Ada [START ON 10/26/2020] fluticasone propionate (FLONASE) 50 mcg/actuation nasal spray 2 Spray  2 Spray Both Nostrils DAILY MD Antwan Pelaez ON 10/26/2020] levothyroxine (SYNTHROID) tablet 75 mcg  75 mcg Oral ACB Ryan Gómez MD      
 sodium chloride (NS) flush 5-40 mL  5-40 mL IntraVENous Q8H Ryan Gómez MD   10 mL at 10/25/20 1357  sodium chloride (NS) flush 5-40 mL  5-40 mL IntraVENous PRN Ryan Gómez MD      
 ondansetron [facility]) injection 4 mg  4 mg IntraVENous Q6H PRN Ryan Gómez MD      
 acetaminophen (TYLENOL) suppository 650 mg  650 mg Rectal Q4H PRN Ryan Gómez MD      
 famotidine (PF) (PEPCID) 20 mg in 0.9% sodium chloride 10 mL injection  20 mg IntraVENous Q12H Ryan Gómez MD      
 LORazepam (ATIVAN) injection 2 mg  2 mg IntraVENous Q2H PRN Ryan Gómez MD      
 albuterol-ipratropium (DUO-NEB) 2.5 MG-0.5 MG/3 ML  3 mL Nebulization Q4H PRN Ryan Gómez MD      
 dextrose 5% - LR with KCl 20 mEq/L infusion   IntraVENous CONTINUOUS Ryan Gómez MD 50 mL/hr at 10/25/20 1356  carvediloL (COREG) tablet 3.125 mg  3.125 mg Oral BID WITH MEALS Ryan Gómez MD      
  levETIRAcetam (KEPPRA) 500 mg in 0.9% sodium chloride (MBP/ADV) 100 mL  500 mg IntraVENous Q12H Chaya Zayas NP      
 dexamethasone (DECADRON) 10 mg/mL injection 6 mg  6 mg IntraVENous Q6H Tano Lara DO   6 mg at 10/25/20 1900 Allergies Allergen Reactions  Compazine [Prochlorperazine Edisylate] Swelling Review of Systems: 
Review of systems not obtained due to patient factors. Objective:  
 
  
Patient Vitals for the past 8 hrs: 
 BP Temp Pulse Resp SpO2  
10/25/20 1828 (!) 115/58  (!) 109 22 94 % 10/25/20 1813 118/60  (!) 110 20 94 % 10/25/20 1759 (!) 113/55  (!) 111 24 95 % 10/25/20 1743 (!) 115/58  (!) 109 20 95 % 10/25/20 1728 130/60  (!) 109 25 95 % 10/25/20 1714 (!) 97/52  (!) 102 19 94 % 10/25/20 1659 (!) 111/57  (!) 107 20 93 % 10/25/20 1643 (!) 121/58  (!) 110 22 93 % 10/25/20 1628 (!) 131/59  (!) 105 16 95 % 10/25/20 1614 (!) 87/42  100 22 94 % 10/25/20 1559 (!) 112/55  (!) 102 19 95 % 10/25/20 1543 (!) 113/57  (!) 106 21 94 % 10/25/20 1528 (!) 105/53  99 18 94 % 10/25/20 1514 (!) 117/55  (!) 101 21 93 % 10/25/20 1459 130/62 97.7 °F (36.5 °C) (!) 104 23 96 % 10/25/20 1443 (!) 103/54  98 19 94 % 10/25/20 1429 (!) 106/47  (!) 101 21 95 % 10/25/20 1414 131/63  (!) 108 23 94 % 10/25/20 1400 (!) 139/59  (!) 108 23 94 % 10/25/20 1343 (!) 148/80  (!) 109 24 94 % 10/25/20 1328 (!) 156/82  (!) 111 25 94 % 10/25/20 1314 (!) 158/85  (!) 111 24 94 % 10/25/20 1259 (!) 148/86  (!) 109 22 95 % 10/25/20 1243 139/83  (!) 109 22 94 % 10/25/20 1230 138/71  (!) 113 23 94 % 10/25/20 1213 (!) 111/51 98.1 °F (36.7 °C) (!) 102 21 94 % 10/25/20 1146 (!) 133/98  (!) 105  94 % Temp (24hrs), Av.8 °F (36.6 °C), Min:97.7 °F (36.5 °C), Max:98.1 °F (36.7 °C) Physical Exam: 
GENERAL: alert, cooperative, no distress, EYE: negative, THROAT & NECK: normal and no erythema or exudates noted. , ABDOMEN: soft, non-tender. Bowel sounds normal. No masses,  no organomegaly, EXTREMITIES:  extremities normal, atraumatic, no cyanosis or edema, SKIN: Normal., NEUROLOGIC: positive findings: muscular weakness on Right arm and leg, loss of sensation as well, right facial paresis, Bilateral pupil reactivity 4mm, EOMI. Left arm/leg intact. Assessment:  
 
Hospital Problems  Date Reviewed: 10/19/2020 Codes Class Noted POA Expressive aphasia ICD-10-CM: R47.01 
ICD-9-CM: 784.3  10/25/2020 Unknown * (Principal) Acute right-sided weakness ICD-10-CM: R53.1 ICD-9-CM: 728.87  10/25/2020 Unknown Frontal mass of brain ICD-10-CM: G93.89 ICD-9-CM: 348.89  10/25/2020 Unknown Acute encephalopathy ICD-10-CM: G93.40 ICD-9-CM: 348.30  10/25/2020 Unknown Multiple lesions on CT of brain and spine ICD-10-CM: R93.0, R93.7 ICD-9-CM: 793.0, 793.7  10/25/2020 Unknown Hypokalemia ICD-10-CM: E87.6 ICD-9-CM: 276.8  10/25/2020 Unknown Acute CVA (cerebrovascular accident) ([facility] [address] ICD-10-CM: I63.9 ICD-9-CM: 434.91  10/25/2020 Unknown Multiple lung nodules on CT (Chronic) ICD-10-CM: R91.8 ICD-9-CM: 793.19  10/12/2020 Yes Overview Addendum 10/12/2020  2:33 PM by Bernie Dillard NP  
  -PET scan 10/2019: PET scan fortunately did not have any abnormal activity in her mass in the left lung. This may mean that we can just follow this radiographically, but we will talk about her at tumor board and come back to her with the group's recommendation. 
-10/30/2019: Patient is discussed at thoracic conference today lead by Dr Corwin Gregorio. Patient is a 79 yo never smoker. CT guided biopsy recommended. -CT guided Biopsy 11/2020: lung biopsy showed signs of this nodule being a hamartoma, which is a benign tumor that we can simply follow. repeat CT in 6 months 
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 Cruz Lucien are several new pulmonary nodules noted which may suggest some metastatic disease.  This require further follow-up CT of the chest in 2 months or doing PET scan. Hamartoma (HCC) (Chronic) ICD-10-CM: Q85.9 ICD-9-CM: 759.6  10/12/2020 Yes Peripheral vascular disease ([address] ICD-10-CM: I73.9 ICD-9-CM: 443.9  10/12/2020 Yes Bilateral pleural effusion ICD-10-CM: J90 ICD-9-CM: 511.9  10/7/2020 Yes Overview Signed 10/13/2020  1:25 PM by DENI Taylor  
  10/3/2020: L thoracentesis 1050mL removed R thoracentesis 800mL removed 10/9/2020: L thoracentesis: 1000mL removed R thoracentesis: 600mL removed Hypercholesterolemia (Chronic) ICD-10-CM: E78.00 ICD-9-CM: 272.0  6/30/2015 Yes Dysphagia (Chronic) ICD-10-CM: R13.10 ICD-9-CM: 787.20  6/30/2015 Yes Acquired hypothyroidism (Chronic) ICD-10-CM: E03.9 ICD-9-CM: 244.9  10/17/2013 Yes GERD (gastroesophageal reflux disease) (Chronic) ICD-10-CM: K21.9 ICD-9-CM: 530.81  10/17/2013 Yes MRI Brain 10/25 IMPRESSION IMPRESSION: 
1. Several enhancing masses with the largest within the left frontal region 
which may have an extra axial component with significant surrounding edema. 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 There 
are also enhancing right frontal bone lesions presumably representing part of 
the same process. 2. Small acute right occipital and parietal lobe infarcts. Additional infarcts 
are suspected within the left corona radiata/external capsule. Plan:  
A/P: 
 
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  Patient clinically with Right Hemiparesis/plegia from Right brainstem lesion which isn't operable. - Recc increasing Decadron to 6q6, if lymphoma, edema should respond but not curative - Acute Neurosurgical intervention not advised with lack of significant midline shift or hydro, nonetheless - Change neuro checks to q1 hr overnight - Will need close monitoring since brainstem lesion is showing surrounding edema which could acutely affect patients ability to protect airway, may need intubation if declines and permissible per any available advanced directives, would recommend clarifying these to honor patients desire to pursue or avoid aggressive med/surg intervention. 
- Consult Oncology for any acute radiation options - Left posterior frontal/temporal lesion accessible for biopsy but will d/w primary neurosurgery team in AM and hinges heavily on oncology need for biopsy as surgery is not curative Please contact with any interval questions/concerns. Ella Aponte DO, MS, FACOS Neurosurgery (covering for Nilson Taylor and Darby Dyson) [phone] 
-

## 2020-10-25 NOTE — CONSULTS
Consult Patient: Elida Granado MRN: [id number] YOB: 1950  Age: 69 y.o. Sex: female Subjective:  
  
lEida Granado is a 69 y.o. female who is being seen for left frontal lobe mass on CT. PMH significant for HTN, Hodgkin's Lymphoma s/p radiation therapy, dyslipidemia, CHF with EF 35-50%, lung hamartomas, hypothryoidism. Unable to obtain HPI due to aphasia and no family at bedside. HPI obtained from chart: 
 
Patient presented to ED this AM as Code S. She woke up this morning about 0500 with right hemiplegia and expressive aphasia. Per charting it was reported that she was thrashing around in the bed. LKW was last night around 2030. She was evaluated by teleneurology. Initial NIH 19. CT of head showed Low attenuation within the left frontal lobe may represent an acute or subacute infarct however the possibility of this representing vasogenic edema from an underlying mass with a  
2 mm of left-to-right midline shift. CTA of head/neck showed left frontal lobe mass measureing 2.2 cm with vasogenic edema with 2 additional masses present within the tracy and right occipital lobe; short segment occlusion of the left middle cerebral artery, chronic right common carotid artery occlusion, bilateral pleural effusions. CTP showed there are changes suggesting small core infarct and penumbra within the left frontal lobe the findings are felt to correspond to a mass with vasogenic edema on CT. She was not a tPA candidate due to being outside 4.5 hours and mass noted on CT/CTA/CTP. She was subsequently started on Decadron 4mg IV every 6 hours and dosed with Keppra 1000 mg IV x1, then keppra 500 mg IV every 12 hours. She is somnolent. Arouses to voice. Aphasic with right hemiplegia. Past Medical History:  
Diagnosis Date  Acute CVA (cerebrovascular accident) ([facility] [address] 10/25/2020  Asthma  Bite of nonvenomous arthropod ? ??  
 Cough  Esophageal stricture 2002 dilated 2002  GERD (gastroesophageal reflux disease)  History of rectal bleeding ???  
 Hodgkin's lymphoma ([facility] [address] 1980 Radiation therapy  Hypercholesterolemia  Menopause  Positive RAST testing 2007 IgE level of 1,391  Thyroid disease Past Surgical History:  
Procedure Laterality Date [place]  HX BREAST BIOPSY Left 05/21/2020  
 calcs at 2:00  
 HX CATARACT REMOVAL Bilateral 2007, 2009  HX COLONOSCOPY    
 HX OTHER SURGICAL    
 dental x3  
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 [id number] Family History Problem Relation Age of Onset  Cancer Mother Kidney cancer  Cancer Father Prostate cnaceer  Heart Surgery Brother  Breast Cancer Neg Hx Social History Tobacco Use  Smoking status: Never Smoker  Smokeless tobacco: Never Used Substance Use Topics  Alcohol use: Yes Alcohol/week: 21.0 standard drinks Types: 7 Shots of liquor, 14 Standard drinks or equivalent per week Current Facility-Administered Medications Medication Dose Route Frequency Provider Last Rate Last Dose  levETIRAcetam (KEPPRA) 1,000 mg in 0.9% sodium chloride 100 mL IVPB  1,000 mg IntraVENous Q12H James Santana MD   1,000 mg at 10/25/20 1422  budesonide-formoteroL (SYMBICORT) 160-4.5 mcg/actuation HFA inhaler 2 Puff  2 Puff Inhalation BID RT MD Darrell Anthony Favorite [START ON 10/26/2020] fluticasone propionate (FLONASE) 50 mcg/actuation nasal spray 2 Spray  2 Spray Both Nostrils DAILY MD Antwan Anthony ON 10/26/2020] levothyroxine (SYNTHROID) tablet 75 mcg  75 mcg Oral ACB Excell Mom, MD      
 sodium chloride (NS) flush 5-40 mL  5-40 mL IntraVENous Q8H Timmy Flores MD   10 mL at 10/25/20 1357  sodium chloride (NS) flush 5-40 mL  5-40 mL IntraVENous PRN Timmy Flores MD      
 ondansetron [facility]) injection 4 mg  4 mg IntraVENous Q6H PRN Timmy Flores MD      
  acetaminophen (TYLENOL) suppository 650 mg  650 mg Rectal Q4H PRN Leida Mckeon MD      
 famotidine (PF) (PEPCID) 20 mg in 0.9% sodium chloride 10 mL injection  20 mg IntraVENous Q12H Leida Mckeon MD      
 dexamethasone (DECADRON) 4 mg/mL injection 4 mg  4 mg IntraVENous Q6H Leida Mckeon MD   4 mg at 10/25/20 1217  LORazepam (ATIVAN) injection 2 mg  2 mg IntraVENous Q2H PRN Leida Mckeon MD      
 potassium chloride 20 mEq in 100 ml IVPB  20 mEq IntraVENous Monalisa Gonzalez MD 50 mL/hr at 10/25/20 1217 20 mEq at 10/25/20 1217  
 albuterol-ipratropium (DUO-NEB) 2.5 MG-0.5 MG/3 ML  3 mL Nebulization Q4H PRN Leida Mckeon MD      
 dextrose 5% - LR with KCl 20 mEq/L infusion   IntraVENous CONTINUOUS Leida Mckeon MD 50 mL/hr at 10/25/20 1356  carvediloL (COREG) tablet 3.125 mg  3.125 mg Oral BID WITH MEALS Leida Mckeon MD      
  
 
Allergies Allergen Reactions  Compazine [Prochlorperazine Edisylate] Swelling Review of Systems: 
Unable to assess due to aphasia. Objective:  
 
Vitals:  
 10/25/20 1443 10/25/20 1459 10/25/20 1514 10/25/20 1528 BP: (!) 103/54 130/62 (!) 117/55 (!) 105/53 Pulse: 98 (!) 104 (!) 101 99 Resp: 19 23 21 18 Temp:  97.7 °F (36.5 °C) SpO2: 94% 96% 93% 94% Physical Exam: 
General - Elderly, frail, in no apparent distress. HEENT - Normocephalic, atraumatic. Conjunctiva, tympanic membranes, and oropharynx are clear. Neck - Supple without masses, no bruits Cardiovascular - Regular rate and rhythm. Normal S1, S2 without murmurs, rubs, or gallops. Lungs - Clear to auscultation. Abdomen - Soft, nontender with normal bowel sounds. Extremities - Peripheral pulses intact. No edema and no rashes. Neurological examination - Comprehension, attention, memory and reasoning are impaired. Language and speech are nonverbal, aphasic.   
On cranial nerve examination, (II, III, IV, VI) pupils are equal, round, and reactive to light. Visual acuity is adequate. Visual fields are full to finger confrontation. Extraocular motility is normal. (V, VII) right facial droop (VIII) Hearing is intact. (IX, X) Palate and uvula elevate symmetrically. Nonverbal (XI) Shoulder shrug is strong, L>R (XII)Tongue is midline. Motor examination - There is normal muscle tone and decreased bulk. Power is 5/5 LUE/LE, 0/5 RUE/LE. Muscle stretch reflexes are normoactive and there are no pathological reflexes present. Plantar response is upgoing bilaterally. Unable to assess sensation, cerebellar examination, or gait. NIHSS  
NIHSS Score: 16 
1a-Level of Consciousness 1 
1b-What is Month/Age 2 
1c-Open/Close Eyes&Hand 1 
2 -Best Gaze 0 
3 -Visual Fields 0 
4 -Facial Palsy 2 
5a-Motor-Left Arm 0 
5b-Motor-Right Arm 4 
6a-Motor-Left Leg 0 
6b-Motor-Right Leg 4 
7 -Limb Ataxia 0 
8 -Sensory 0 
9 -Best Language 3 
10-Dysarthria 2 
11-Extinction/Inattention 0 Lab Results Component Value Date/Time Cholesterol, total 253 (H) 05/29/2020 09:37 AM  
 HDL Cholesterol 89 05/29/2020 09:37 AM  
 LDL, calculated 136 (H) 05/29/2020 09:37 AM  
 VLDL, calculated 28 05/29/2020 09:37 AM  
 Triglyceride 142 05/29/2020 09:37 AM  
  
 
Lab Results Component Value Date/Time Hemoglobin A1c 5.8 (H) 04/21/2017 09:03 AM  
  
 
CT Results (most recent): CT head without contrast 
  
History: Acute right-sided weakness, speech disturbance. 
  
Technique: 5mm axial images were obtained from the skull base to the vertex 
without intravenous contrast.  Radiation dose reduction techniques were used for 
this study:  Our CT scanners use one or all of the following: Automated exposure 
control, adjustment of the mA and/or kVp according to patient's size, iterative 
reconstruction. 
  
Comparison: None 
  
Findings: The ventricles and sulci are normal in size and configuration. There 
are no extra-axial fluid collections. There is a region of decreased attenuation within the left frontal lobe which involves primarily white matter but also a 
component of the cortex. There is a 1 cm cystic region also in close association 
with this. There is 2 mm of left-to-right midline shift There is no evidence of 
intraparenchymal hemorrhage. The bony calvarium is intact. The visualized 
mastoid air cells and paranasal sinuses are well pneumatized and aerated. 
  
IMPRESSION Impression: Low attenuation within the left frontal lobe may represent an acute 
or subacute infarct however the possibility of this representing vasogenic edema 
from an underlying mass is not excluded. Postcontrast imaging would be 
recommended for further evaluation Results for orders placed or performed during the hospital encounter of 10/25/20 EKG, 12 LEAD, INITIAL Result Value Ref Range Ventricular Rate 102 BPM  
 Atrial Rate 102 BPM  
 P-R Interval 156 ms QRS Duration 104 ms Q-T Interval 368 ms QTC Calculation (Bezet) 479 ms Calculated P Axis 100 degrees Calculated R Axis 114 degrees Calculated T Axis -82 degrees Diagnosis    
  !!! Suspect arm lead reversal, interpretation assumes no reversal 
!! AGE AND GENDER SPECIFIC ECG ANALYSIS !! Sinus tachycardia Right axis deviation Left ventricular hypertrophy with repolarization abnormality Abnormal ECG When compared with ECG of 21-OCT-2020 12:10, 
Significant changes have occurred Confirmed by ST DAVION MCDANIEL MD (), CYNDI FREED ([id number]) on 10/25/2020 8:36:13 AM 
  
  
 
MRI Results (most recent):  
Results from [facility] [place] on 10/25/20 MRI BRAIN W WO CONT Narrative MRI brain with and without contrast 
 
History: Abnormal CT. History of lymphoma. Right hemiparesis Imaging sequences: Sagittal short TR/short TE, axial short TR/short TE, long TR/long TE, FLAIR, gradient recall, diffusion weighted images and ADC mapping. Axial and coronal short TR/short TE postcontrast images. Imaging was performed on a 1.5 Vicky magnet, utilizing the uneventful administration of 10 mL of 
intravenous Dotarem and order to better evaluate for intracranial pathology. Comparison: None. Correlation is made to the CT scan of the brain performed 
earlier on the same day. Findings: There is an enhancing mass within the left frontal region measuring 
approximately 2.3 x 2.2 x 2.3 cm in AP, transverse and craniocaudal dimensions, 
recently. This is a broad-based dural attachment. There is significant 
surrounding edema. This appears at least in part to be extra-axial although a 
more cystic component cannot be stated as such. There are 2 enhancing 
parenchymal lesions within the tracy with the larger measuring 1.0 cm with 
associated edema. There are 2 right occipital lobe mass with the larger 
measuring up to 1.3 cm, also with surrounding edema. There are enhancing right 
frontal bone lesions measuring up to 2.1 cm in size. The ventricles and sulci are normal in size and configuration. There are no 
extra-axial fluid collections. Normal flow voids are present within all of the 
major intracranial vessels. There is no evidence of intraparenchymal hemorrhage. There are subcentimeter foci of restricted diffusion within the right occipital 
and parietal lobes raising concern for small foci of acute infarctions. Additional subtle restricted diffusion is present medial to the edema within the 
left frontal region at the left corona radiata and extending into the external 
capsule. Scattered foci of T2 hyperintensity within the supratentorial white matter most 
likely represent the sequela of chronic small vessel ischemic change, 
unremarkable for age. The visualized mastoid air cells and paranasal sinuses are 
well pneumatized and aerated. Impression IMPRESSION: 
1. Several enhancing masses with the largest within the left frontal region 
which may have an extra axial component with significant surrounding edema. 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 There 
are also enhancing right frontal bone lesions presumably representing part of 
the same process. 2. Small acute right occipital and parietal lobe infarcts. Additional infarcts 
are suspected within the left corona radiata/external capsule. Most recent CTA: 
Results from [place] encounter on 10/25/20 CTA CODE NEURO HEAD AND NECK W CONT Narrative History: Right-sided weakness and difficulty with speech. FINDINGS: 
 
CT angiography was performed of the neck and head with contrast and 
three-dimensional CT angiography reconstruction and reformat was performed. NASCET criteria as needed. CT dose reduction was achieved through use of a 
standardized protocol tailored for this examination and automatic exposure 
control for dose modulation. Bilateral pleural effusion. Parenchymal nodularity in the left upper lobe. There is extensive atherosclerotic ectasia of the imaged segments of the 
thoracic aorta. The ascending aorta measures 2.8 cm. There is a mild stenosis of 
the proximal descending thoracic aorta related to concentric noncalcified 
atheroma. There is a right-sided aortic arch with an occluded right common 
carotid artery and occluded right subclavian artery. The right vertebral artery 
reconstitutes via collaterals. Reversal of flow is not excluded by CT angiogram. 
 
The innominate artery is patent. The left common carotid artery is patent. The 
left internal carotid artery is patent. There is atherosclerosis at the left 
carotid bulb without hemodynamically significant stenosis. There is a moderate 
stenosis in the supraclinoid segment of the left internal carotid artery. The right internal carotid artery reconstitutes at the right carotid bulb. The 
right middle cerebral artery is patent. There is short segment occlusion in the M1 segment of the left middle cerebral 
artery. The posterior cerebral arteries are patent. Dural venous sinuses are patent. Small left transverse and sigmoid sinus. Multiple enhancing lesions within the brainstem and cerebrum. The largest is in 
the left middle cerebral artery territory. Impression IMPRESSION: 
 
Short segment occlusion of the left middle cerebral artery. Finding reported to 
the emergency room bedside physician at the time of this report. Multiple enhancing lesions in the brainstem and cerebrum. Bilateral pleural effusion. Extensive atherosclerosis of the aorta with occlusion of the right subclavian 
and the right common carotid arteries. This is chronic dating back to June 2019. Most recent MRA: 
 
 
Most recent Echo: 
No results found for this visit on 10/25/20. Assessment:  
 
69year old female who is being seen for hx of Hodgkin lymphoma who presented as Code S this morning with expressive aphasia and right hemiplegia. She was evaluated by tele neurology. Initial NIH 19. CT of head showed Low attenuation within the left frontal lobe may represent an acute or subacute infarct however the possibility of this representing vasogenic edema from an underlying mass with a 2 mm of left-to-right midline shift. CTA of head/neck showed left frontal lobe mass measureing 2.2 cm with vasogenic edema with 2 additional masses present within the tracy and right occipital lobe; short segment occlusion of the left middle cerebral artery, chronic right common carotid artery occlusion, bilateral pleural effusions. CTP showed there are changes suggesting small core infarct and penumbra within the left frontal lobe the findings are felt to correspond to a mass with vasogenic edema on CT.  MRI of brain showed enhancing mass within the left frontal region measuring approximately 2.3 x 2.2 x 2.3 cm with significant vasogenic edema; 2 enhancing parenchymal lesions within the tracy with the larger measuring 1.0 cm with associated edema. There are 2 right occipital lobe mass with the larger measuring up to 1.3 cm, also with surrounding edema. There are enhancing right frontal bone lesions measuring up to 2.1 cm in size; acute right occipital and parietal lobe infarcts. Additional infarcts are suspected within the left corona radiata/external capsule. Plan:  
 
Admit to ICU with [facility] neurological checks Continue Decadron 4 mg IV every 6 hours Continue Keppra 500 mg IV every 12 hours Continue to correct metabolic abnormalities STAT CT head for clinical worsening Head of the bed at 30 degrees with neck in the neutral position Maintain normoglycemia and normothermia Dysphagia screen prior to PO intake Neurosurgery consult pending Radiology Oncology consult pending Agree with palliative care consult for plan of care goals. Signed By: Tarik Terry NP October 25, 2020

## 2020-10-25 NOTE — PROGRESS NOTES
Problem: Falls - Risk of 
Goal: *Absence of Falls Description: Document Bina Led Fall Risk and appropriate interventions in the flowsheet. Outcome: Progressing Towards Goal 
Note: Fall Risk Interventions: 
  
 
Mentation Interventions: Bed/chair exit alarm Medication Interventions: Bed/chair exit alarm, Evaluate medications/consider consulting pharmacy Elimination Interventions: Bed/chair exit alarm, Toileting schedule/hourly rounds Problem: Patient Education: Go to Patient Education Activity Goal: Patient/Family Education Outcome: Progressing Towards Goal 
  
Problem: Pressure Injury - Risk of 
Goal: *Prevention of pressure injury Description: Document Bhupendra Scale and appropriate interventions in the flowsheet. Outcome: Progressing Towards Goal 
Note: Pressure Injury Interventions: 
Sensory Interventions: Assess changes in LOC Moisture Interventions: Internal/External urinary devices, Check for incontinence Q2 hours and as needed Activity Interventions: Assess need for specialty bed Mobility Interventions: Assess need for specialty bed, HOB 30 degrees or less, Turn and reposition approx. every two hours(pillow and wedges) Nutrition Interventions: Discuss nutritional consult with provider Friction and Shear Interventions: Apply protective barrier, creams and emollients, HOB 30 degrees or less Problem: Patient Education: Go to Patient Education Activity Goal: Patient/Family Education Outcome: Progressing Towards Goal 
  
Problem: TIA/CVA Stroke: 0-24 hours Goal: Off Pathway (Use only if patient is Off Pathway) Outcome: Progressing Towards Goal 
Goal: Activity/Safety Outcome: Progressing Towards Goal 
Goal: Consults, if ordered Outcome: Progressing Towards Goal 
Goal: Diagnostic Test/Procedures Outcome: Progressing Towards Goal 
Goal: Nutrition/Diet Outcome: Progressing Towards Goal 
Goal: Discharge Planning Outcome: Progressing Towards Goal 
 Goal: Medications Outcome: Progressing Towards Goal 
Goal: Respiratory Outcome: Progressing Towards Goal 
Goal: Treatments/Interventions/Procedures Outcome: Progressing Towards Goal 
Goal: Minimize risk of bleeding post-thrombolytic infusion Outcome: Progressing Towards Goal 
Goal: Monitor for complications post-thrombolytic infusion Outcome: Progressing Towards Goal 
Goal: Psychosocial 
Outcome: Progressing Towards Goal 
Goal: *Hemodynamically stable Outcome: Progressing Towards Goal 
Goal: *Neurologically stable Description: Absence of additional neurological deficits Outcome: Progressing Towards Goal 
Goal: *Verbalizes anxiety and depression are reduced or absent Outcome: Progressing Towards Goal 
Goal: *Absence of Signs of Aspiration on Current Diet Outcome: Progressing Towards Goal 
Goal: *Absence of deep venous thrombosis signs and symptoms(Stroke Metric) Outcome: Progressing Towards Goal 
Goal: *Ability to perform ADLs and demonstrates progressive mobility and function Outcome: Progressing Towards Goal 
Goal: *Stroke education started(Stroke Metric) Outcome: Progressing Towards Goal 
Goal: *Dysphagia screen performed(Stroke Metric) Outcome: Progressing Towards Goal 
Goal: *Rehab consulted(Stroke Metric) Outcome: Progressing Towards Goal

## 2020-10-25 NOTE — PROGRESS NOTES
Bedside and Verbal shift change report given to Jazmyn Barton RN (oncoming nurse) by Ivan Gómez RN (offgoing nurse). Report included the following information SBAR, Kardex, ED Summary, Intake/Output, Med Rec Status and Dual Neuro Assessment.

## 2020-10-25 NOTE — H&P
HOSPITALIST H&P/CONSULT 
NAME:  Maranda Lane Age:  69 y.o. 
:   1950 MRN:   [id number] PCP: Heriberto Hinojosa MD 
Consulting MD: Treatment Team: Attending Provider: Timmy Flores MD; Primary Nurse: Anand Ordoñez RN 
HPI:  
Patient is a 69years old female with hx of HTN, GERD, Hodgkin's lymphoma s/p Radiation Rx, dyslipidemia, hypothyroidism, asthma, systolic CHF EF 79-20% recently diagnosed lung hamartomas presented to ER with acute onset of right sided weakness, aphasia and confusion that began this AM. Pt was recently discharged from [facility] on 10/21 after being treated for PNA and was doing okay until today. Pt is not able to provide much history, she has sort of blank stare but can nod to yes or no questions. Per , pt was walking, doing ADL's  Until yesterday but this morning a drastic change in her mentation and speech was noted by him. Again, pt's  is also not a great historian. No reported fever, nausea/vomiting, fall, diarrhea, abdominal pain, headaches. ER work up showed CT evidence of enhancing 2.2 cm left frontal lobe mass with vasogenic edema with 2 mm left to right midline shift with 2 additional small masses in tracy and right occipital lobe. CTA head/neck short segment occlusion of left MCA with some changes suggestive of small core infarct and penumbra within the left frontal lobe, no evidence of intraparenchymal hemorrhage. CXR with interval worsening of pulmonary edema and bilateral pleural effusion. Complete ROS done and is as stated in HPI or otherwise negative Past Medical History:  
Diagnosis Date  Asthma  Bite of nonvenomous arthropod ? ??  
 Cough  Esophageal stricture 2002  
 dilated   GERD (gastroesophageal reflux disease)  History of rectal bleeding ???  
 Hodgkin's lymphoma ([facility] [address] 1980 Radiation therapy  Hypercholesterolemia  Menopause  Positive RAST testing  IgE level of 1,391  Thyroid disease Past Surgical History:  
Procedure Laterality Date [place]  HX BREAST BIOPSY Left 2020  
 calcs at 2:00  
 HX CATARACT REMOVAL Bilateral ,   HX COLONOSCOPY    
 HX OTHER SURGICAL    
 dental x3  
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 Prior to Admission Medications Prescriptions Last Dose Informant Patient Reported? Taking? LORazepam (ATIVAN) 0.5 mg tablet   No No  
Sig: Take 1 Tab by mouth two (2) times daily as needed for Anxiety. Max Daily Amount: 1 mg. Omeprazole delayed release (PRILOSEC D/R) 20 mg tablet   No No  
Sig: Take 1 Tab by mouth daily. acetaminophen (Tylenol Arthritis Pain) 650 mg TbER   Yes No  
Sig: Take 650 mg by mouth every eight (8) hours. albuterol (Ventolin HFA) 90 mcg/actuation inhaler   No No  
Sig: Take 2 Puffs by inhalation every four (4) hours as needed for Wheezing. For wheezing. aspirin delayed-release 81 mg tablet   Yes No  
Sig: Take 81 mg by mouth daily. baclofen (LIORESAL) 10 mg tablet   No No  
Sig: Take 1 Tab by mouth three (3) times daily as needed for Muscle Spasm(s). fluticasone furoate-vilanteroL (Breo Ellipta) 200-25 mcg/dose inhaler   No No  
Sig: Take 1 Puff by inhalation daily. fluticasone propionate (FLONASE) 50 mcg/actuation nasal spray   No No  
Si Sprays by Both Nostrils route daily. furosemide (LASIX) 40 mg tablet   No No  
Sig: Take 1 Tab by mouth daily for 30 days. levocetirizine (XYZAL) 5 mg tablet   No No  
Sig: Take 1 Tab by mouth daily. levothyroxine (SYNTHROID) 75 mcg tablet   No No  
Sig: Take 1 Tab by mouth Daily (before breakfast). Except 1/2 on Monday  
lisinopriL (PRINIVIL, ZESTRIL) 2.5 mg tablet   No No  
Sig: Take 1 Tab by mouth daily for 30 days. melatonin 5 mg tablet   Yes No  
Sig: Take 5 mg by mouth nightly. metoprolol succinate (TOPROL-XL) 25 mg XL tablet   No No  
Sig: Take 1 Tab by mouth daily for 30 days.   
montelukast (SINGULAIR) 10 mg tablet   No No  
 Sig: Take 1 Tab by mouth daily. multivitamin (ONE A DAY) tablet   Yes No  
Sig: Take 1 Tab by mouth daily. naproxen sodium (NAPROSYN) 220 mg tablet   Yes No  
Sig: Take 220 mg by mouth two (2) times daily (with meals). simvastatin (ZOCOR) 20 mg tablet   No No  
Sig: Take 1 Tab by mouth nightly. Facility-Administered Medications: None Allergies Allergen Reactions  Compazine [Prochlorperazine Edisylate] Swelling Social History Tobacco Use  Smoking status: Never Smoker  Smokeless tobacco: Never Used Substance Use Topics  Alcohol use: Yes Alcohol/week: 21.0 standard drinks Types: 7 Shots of liquor, 14 Standard drinks or equivalent per week Family History Problem Relation Age of Onset  Cancer Mother Kidney cancer  Cancer Father Prostate cnaceer  Heart Surgery Brother  Breast Cancer Neg Hx Objective:  
 
Visit Vitals /62 Pulse 96 Temp 97.7 °F (36.5 °C) Resp 19 SpO2 92% Temp (24hrs), Av.7 °F (36.5 °C), Min:97.7 °F (36.5 °C), Max:97.7 °F (36.5 °C) Oxygen Therapy O2 Sat (%): 92 % (10/25/20 0859) Pulse via Oximetry: 98 beats per minute (10/25/20 0859) O2 Device: Room air (10/25/20 0746) Physical Exam: 
General:    Frail, elderly female, awake/alert, remarkable expressive aphasia, slightly confused, NAD, noisy breathing appreciated Head:   Normocephalic, without obvious abnormality, atraumatic. Nose:  Nares normal. No drainage or sinus tenderness. Lungs:   Diminished in bases with bibasilar crackles Heart:   Regular rate and rhythm,  no murmur, rub or gallop. Abdomen:   Soft, non-tender. Not distended. Bowel sounds normal.  
Extremities: No cyanosis. Trace edema in ankles. No clubbing Skin:     Texture, turgor normal. No rashes or lesions. Not Jaundiced Neurologic: Pt is not following commands, marked expressive aphasia, flaccid in RUE/RLE, no drift noted in LUE/LLE 
 Psych: Flat affect Data Review:  
Recent Results (from the past 24 hour(s)) GLUCOSE, POC Collection Time: 10/25/20  7:04 AM  
Result Value Ref Range Glucose (POC) 95 65 - 100 mg/dL POC PT/INR Collection Time: 10/25/20  7:05 AM  
Result Value Ref Range Prothrombin time (POC) 12.5 (H) 9.6 - 11.6 SECS  
 INR (POC) 1.0 0.9 - 1.2    
CBC WITH AUTOMATED DIFF Collection Time: 10/25/20  7:07 AM  
Result Value Ref Range WBC 10.8 4.3 - 11.1 K/uL  
 RBC 3.84 (L) 4.05 - 5.2 M/uL  
 HGB 11.9 11.7 - 15.4 g/dL HCT 35.2 (L) 35.8 - 46.3 % MCV 91.7 79.6 - 97.8 FL  
 MCH 31.0 26.1 - 32.9 PG  
 MCHC 33.8 31.4 - 35.0 g/dL  
 RDW 13.2 11.9 - 14.6 % PLATELET 328 (H) 747 - 450 K/uL MPV 9.5 9.4 - 12.3 FL ABSOLUTE NRBC 0.00 0.0 - 0.2 K/uL  
 DF AUTOMATED NEUTROPHILS 59 43 - 78 % LYMPHOCYTES 22 13 - 44 % MONOCYTES 12 4.0 - 12.0 % EOSINOPHILS 6 0.5 - 7.8 % BASOPHILS 1 0.0 - 2.0 % IMMATURE GRANULOCYTES 0 0.0 - 5.0 %  
 ABS. NEUTROPHILS 6.4 1.7 - 8.2 K/UL  
 ABS. LYMPHOCYTES 2.4 0.5 - 4.6 K/UL  
 ABS. MONOCYTES 1.3 0.1 - 1.3 K/UL  
 ABS. EOSINOPHILS 0.7 0.0 - 0.8 K/UL  
 ABS. BASOPHILS 0.1 0.0 - 0.2 K/UL  
 ABS. IMM. GRANS. 0.0 0.0 - 0.5 K/UL METABOLIC PANEL, BASIC Collection Time: 10/25/20  7:07 AM  
Result Value Ref Range Sodium 137 136 - 145 mmol/L Potassium 3.2 (L) 3.5 - 5.1 mmol/L Chloride 101 98 - 107 mmol/L  
 CO2 30 21 - 32 mmol/L Anion gap 6 (L) 7 - 16 mmol/L Glucose 98 65 - 100 mg/dL BUN 26 (H) 8 - 23 MG/DL Creatinine 0.77 0.6 - 1.0 MG/DL  
 GFR est AA >60 >60 ml/min/1.73m2 GFR est non-AA >60 >60 ml/min/1.73m2 Calcium 8.9 8.3 - 10.4 MG/DL PROTHROMBIN TIME + INR Collection Time: 10/25/20  7:07 AM  
Result Value Ref Range Prothrombin time 12.4 (L) 12.5 - 14.7 sec INR 0.9    
TROPONIN-HIGH SENSITIVITY Collection Time: 10/25/20  7:07 AM  
Result Value Ref Range Troponin-High Sensitivity 112.3 (HH) 0 - 14 pg/mL EKG, 12 LEAD, INITIAL Collection Time: 10/25/20  7:23 AM  
Result Value Ref Range Ventricular Rate 102 BPM  
 Atrial Rate 102 BPM  
 P-R Interval 156 ms QRS Duration 104 ms Q-T Interval 368 ms QTC Calculation (Bezet) 479 ms Calculated P Axis 100 degrees Calculated R Axis 114 degrees Calculated T Axis -82 degrees Diagnosis    
  !!! Suspect arm lead reversal, interpretation assumes no reversal 
!! AGE AND GENDER SPECIFIC ECG ANALYSIS !! Sinus tachycardia Right axis deviation Left ventricular hypertrophy with repolarization abnormality Abnormal ECG When compared with ECG of 21-OCT-2020 12:10, 
Significant changes have occurred Confirmed by ST DAVION MCDANIEL MD (), CYNDI FREED ([id number]) on 10/25/2020 8:36:13 AM 
  
 
Imaging Meg Zhao [employer] All diagnostic imaging personally reviewed by me. EXAM: CHEST X-RAY, 1 VIEW 
  
INDICATION: stroke. 
  
COMPARISON: Chest x-ray 10/20/2020 
  
TECHNIQUE: Single AP view of the chest was obtained. 
  
FINDINGS: Interval worsening of ill-defined perihilar markings throughout both 
lungs. Bilateral pleural effusions also appear to be enlarging. The cardiac 
silhouette is partially obscured. No pneumothorax. The bones are unchanged. 
  
  
IMPRESSION IMPRESSION: 
Interval worsening of pulmonary edema and enlargement of bilateral effusions. Coexisting infection difficult to fully exclude by x-ray. CTA head/neck: 
Bilateral pleural effusion. Parenchymal nodularity in the left upper lobe. 
  
There is extensive atherosclerotic ectasia of the imaged segments of the 
thoracic aorta. The ascending aorta measures 2.8 cm. There is a mild stenosis of 
the proximal descending thoracic aorta related to concentric noncalcified 
atheroma. There is a right-sided aortic arch with an occluded right common 
carotid artery and occluded right subclavian artery. The right vertebral artery 
reconstitutes via collaterals.  Reversal of flow is not excluded by CT angiogram. 
  
The innominate artery is patent. The left common carotid artery is patent. The 
left internal carotid artery is patent. There is atherosclerosis at the left 
carotid bulb without hemodynamically significant stenosis. There is a moderate 
stenosis in the supraclinoid segment of the left internal carotid artery. 
  
The right internal carotid artery reconstitutes at the right carotid bulb. The 
right middle cerebral artery is patent. 
  
There is short segment occlusion in the M1 segment of the left middle cerebral 
artery. 
  
The posterior cerebral arteries are patent. 
  
Dural venous sinuses are patent. Small left transverse and sigmoid sinus. 
  
Multiple enhancing lesions within the brainstem and cerebrum. The largest is in 
the left middle cerebral artery territory. 
  
IMPRESSION IMPRESSION: 
  
Short segment occlusion of the left middle cerebral artery. Finding reported to 
the emergency room bedside physician at the time of this report. 
  
Multiple enhancing lesions in the brainstem and cerebrum. 
  
Bilateral pleural effusion. 
  
Extensive atherosclerosis of the aorta with occlusion of the right subclavian 
and the right common carotid arteries. This is chronic dating back to June 2019. CT Perfusion Imaging 
  INDICATION:  Acute neuro changes. Right-sided weakness. 
  
CT perfusion imaging of the brain was performed after the administration of 
intravenous contrast.  Perfusion maps and perfusion analysis output were 
generated using the [employer] perfusion processing software algorithm. Radiation 
dose reduction techniques were used for this study: All CT scans performed at 
this facility use one or all of the following: Automated exposure control, 
adjustment of the mA and/or kVp according to patient's size, iterative 
reconstruction. 
  
COMPARISON: None.  Correlation is made to the CTA and CT brain performed on the 
same day. 
  
Fast[employer]ai Output Values:  
  
 CBF < 30% volume:  3 ml   (core infarction volume greater than 50 cc associated 
with poor outcomes) Tmax > 6 seconds: 41 ml Tmax/CBF Mismatch Volume: 38 ml Tmax/CBF Mismatch Ratio: 13.7 Hypoperfusion Intensity Ratio (Tmax > 10 seconds/Tmax > 6 seconds): 0.3   
(values greater than 0.5 associated with poor outcome) Tmax > 10 seconds Volume: 11 ml   (volume greater than 100 mL is associated with 
poor outcome) 
  IMPRESSION IMPRESSION: Although there are changes suggesting small core infarct and 
penumbra within the left frontal lobe the findings are felt to correspond to a 
mass with vasogenic edema on CT scanning. Other intracranial masses are present 
as well. CT head: 
Impression: Low attenuation within the left frontal lobe may represent an acute 
or subacute infarct however the possibility of this representing vasogenic edema 
from an underlying mass is not excluded. Postcontrast imaging would be 
recommended for further evaluation. Assessment and Plan: Active Hospital Problems Diagnosis Date Noted  Acute right-sided weakness 10/25/2020 Priority: 1 - One  
 Frontal mass of brain 10/25/2020 Priority: 2 - Two  Expressive aphasia 10/25/2020 Priority: 3 - Three  Multiple lesions on CT of brain and spine 10/25/2020 Priority: 4 - Four  Acute encephalopathy 10/25/2020 Priority: 5 - Five  Hypokalemia 10/25/2020  Acute CVA (cerebrovascular accident) ([address] 10/25/2020  Hamartoma ([address] 10/12/2020  Multiple lung nodules on CT 10/12/2020  
  -PET scan 10/2019: PET scan fortunately did not have any abnormal activity in her mass in the left lung. This may mean that we can just follow this radiographically, but we will talk about her at tumor board and come back to her with the group's recommendation. 
-10/30/2019: Patient is discussed at thoracic conference today lead by Dr Mike Estrada. Patient is a 77 yo never smoker. CT guided biopsy recommended. -CT guided Biopsy 11/2020: lung biopsy showed signs of this nodule being a hamartoma, which is a benign tumor that we can simply follow. repeat CT in 6 months 
-Chest CT June 2020: CT scan is stable 
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 Juris Maisha are several new pulmonary nodules noted which may suggest some metastatic disease.  This require further follow-up CT of the chest in 2 months or doing PET scan.  Peripheral vascular disease ([address] 10/12/2020  Bilateral pleural effusion 10/07/2020  
  10/3/2020: L thoracentesis 1050mL removed R thoracentesis 800mL removed 10/9/2020: L thoracentesis: 1000mL removed R thoracentesis: 600mL removed  Dysphagia 06/30/2015  Hypercholesterolemia 06/30/2015  Acquired hypothyroidism 10/17/2013  GERD (gastroesophageal reflux disease) 10/17/2013 PLAN 
· Admit to inpatient in view of newly diagnosed left frontoparietal enhancing lesion 2.2 cm with mild 2 mm left to right midline shift resulting in sudden right sided weakness and aphasia · Given multiple enhancing lesion seen on CT, metastatic disease is a possibility along with brain abscesses. F/u with MRI brain w and w/o contrast. 
· Neurosurgery and Neurology consulted. · Code S called in ER, pt is not TPA candidate · High risk of seizure for which pt was loaded with IV keppra and started on decadron in view of vasogenic edema. · Discussed with pt's  about ongoing medical issues, but I feel he did not completely understand the seriousness of the pt's situation. He would like pt to be a full code and wants me to discuss with pt's brother, Cecilia Yadav . I will give him a call once MRI results are back. · Pt is at high risk of decompensation including status epilepticus, respiratory compromise from aspiration and ongoing pulmonary edema with bilateral effusion. Will be best to monitor her in ICU setting. · neurochecks as per ICU protocol · Aspiration precaution · Continue decadron · Hold ASA and chemical DVT prophylaxis until evaluated by Neurosurgery. · Given IV potassium for hypokalemia of 3.2, recheck in AM 
· Radiation oncology is consulted as well given pt's prior hx of Hodgkin's lymphoma, recurrence is possible. · NPO for now until speech eval is done · Prn ativan for agitation, seizure, sedation · Gentle IV fluids given underlying sCHF to prevent worsening of pulmonary edema and fluid overload resulting in respiratory distress. · Continue other home meds and restart if pt passes swallow eval 
· PT/OT eval 
· DVT prophylaxis with SCD Code Status: full High risk Anticipated discharge: >2MN Signed By: Yamileth Bowen MD   
 October 25, 2020

## 2020-10-25 NOTE — ED TRIAGE NOTES
Pt arrived via [facility] from home c/o right sided weakness in the arms and legs, not able to communicate via speech. Last known well was 2030 last night. NIH 19 at time of arrival. RACE score of 6 with EMS. Code S called.

## 2020-10-25 NOTE — PROGRESS NOTES
MRI brain w and w/o contrast: 
Findings: There is an enhancing mass within the left frontal region measuring 
approximately 2.3 x 2.2 x 2.3 cm in AP, transverse and craniocaudal dimensions, 
recently. This is a broad-based dural attachment. There is significant 
surrounding edema. This appears at least in part to be extra-axial although a 
more cystic component cannot be stated as such. There are 2 enhancing 
parenchymal lesions within the tracy with the larger measuring 1.0 cm with 
associated edema. There are 2 right occipital lobe mass with the larger 
measuring up to 1.3 cm, also with surrounding edema. There are enhancing right 
frontal bone lesions measuring up to 2.1 cm in size. 
  
The ventricles and sulci are normal in size and configuration. There are no 
extra-axial fluid collections. Normal flow voids are present within all of the 
major intracranial vessels. There is no evidence of intraparenchymal hemorrhage. There are subcentimeter foci of restricted diffusion within the right occipital 
and parietal lobes raising concern for small foci of acute infarctions. Additional subtle restricted diffusion is present medial to the edema within the 
left frontal region at the left corona radiata and extending into the external 
capsule.   
  
Scattered foci of T2 hyperintensity within the supratentorial white matter most 
likely represent the sequela of chronic small vessel ischemic change, 
unremarkable for age. The visualized mastoid air cells and paranasal sinuses are 
well pneumatized and aerated. 
  
IMPRESSION IMPRESSION: 
1. Several enhancing masses with the largest within the left frontal region 
which may have an extra axial component with significant surrounding edema. 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 There 
are also enhancing right frontal bone lesions presumably representing part of 
the same process. 2. Small acute right occipital and parietal lobe infarcts. Additional infarcts 
are suspected within the left corona radiata/external capsule. I have tried reaching pt's brother @[phone], I was only able to leave a voicemail. I have discussed with pt's nurse in ICU about pt's guarded prognosis with high risk of decompensation. Pt's  is at bedside. I have talked with him in ER and tried my best to explain what is going with his wife, but I believe he does not completely comprehend it and that's why I will consult palliative team as well to discuss about goals of care with him.

## 2020-10-25 NOTE — PROGRESS NOTES
Called ER an requested RN completion of MRI screening and consent so that STAT MRI may be done as soon as possible.

## 2020-10-25 NOTE — ED PROVIDER NOTES
77-year-old female brought in by EMS right-sided hemiplegia last known normal 830 last night. Significant past medical history include hypercholesterolemia Hodgkin's lymphoma asthma thyroid disease. Patient's nonverbal. 
 
 
Facial Droop This is a new problem. The current episode started 6 to 12 hours ago. The problem has not changed since onset. There was right facial, right upper extremity and right lower extremity focality noted. Primary symptoms include speech difficulty. Pertinent negatives include no focal weakness and no mental status change. There has been no fever. Pertinent negatives include no altered mental status. Associated medical issues do not include trauma or CVA. Past Medical History:  
Diagnosis Date  Asthma  Bite of nonvenomous arthropod ? ??  
 Cough  Esophageal stricture 2002  
 dilated 2002  GERD (gastroesophageal reflux disease)  History of rectal bleeding ???  
 Hodgkin's lymphoma ([facility] [address] 1980 Radiation therapy  Hypercholesterolemia  Menopause  Positive RAST testing 2007 IgE level of 1,391  Thyroid disease Past Surgical History:  
Procedure Laterality Date [address]  HX BREAST BIOPSY Left 05/21/2020  
 calcs at 2:00  
 HX CATARACT REMOVAL Bilateral 2007, 2009  HX COLONOSCOPY    
 HX OTHER SURGICAL    
 dental x3  
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 [address] [facility] Family History:  
Problem Relation Age of Onset  Cancer Mother Kidney cancer  Cancer Father Prostate cnaceer  Heart Surgery Brother  Breast Cancer Neg Hx Social History Socioeconomic History  Marital status:  Spouse name: Not on file  Number of children: Not on file  Years of education: Not on file  Highest education level: Not on file Occupational History  Occupation:  Social Needs  Financial resource strain: Not on file  Food insecurity Worry: Not on file Inability: Not on file  Transportation needs Medical: Not on file Non-medical: Not on file Tobacco Use  Smoking status: Never Smoker  Smokeless tobacco: Never Used Substance and Sexual Activity  Alcohol use: Yes Alcohol/week: 21.0 standard drinks Types: 7 Shots of liquor, 14 Standard drinks or equivalent per week  Drug use: No  
 Sexual activity: Not on file Lifestyle  Physical activity Days per week: Not on file Minutes per session: Not on file  Stress: Not on file Relationships  Social connections Talks on phone: Not on file Gets together: Not on file Attends Evangelical service: Not on file Active member of club or organization: Not on file Attends meetings of clubs or organizations: Not on file Relationship status: Not on file  Intimate partner violence Fear of current or ex partner: Not on file Emotionally abused: Not on file Physically abused: Not on file Forced sexual activity: Not on file Other Topics Concern  Not on file Social History Narrative There is no significant environmental or industrial exposure. She has no known exposure to TB. She has worked as an . ALLERGIES: Compazine [prochlorperazine edisylate] Review of Systems Constitutional: Negative. Negative for activity change. HENT: Negative. Eyes: Negative. Respiratory: Negative. Cardiovascular: Negative. Gastrointestinal: Negative. Genitourinary: Negative. Musculoskeletal: Negative. Skin: Negative. Neurological: Positive for speech difficulty. Negative for focal weakness. Psychiatric/Behavioral: Negative. All other systems reviewed and are negative. There were no vitals filed for this visit. Physical Exam 
Constitutional:   
   General: She is not in acute distress. Appearance: She is well-developed.   
HENT:  
 Head: Normocephalic and atraumatic. Right Ear: External ear normal.  
   Left Ear: External ear normal.  
   Nose: Nose normal.  
Eyes:  
   General: No scleral icterus. Right eye: No discharge. Left eye: No discharge. Conjunctiva/sclera: Conjunctivae normal.  
   Pupils: Pupils are equal, round, and reactive to light. Neck: Musculoskeletal: Normal range of motion. Cardiovascular:  
   Rate and Rhythm: Regular rhythm. Pulmonary:  
   Effort: Pulmonary effort is normal. No respiratory distress. Breath sounds: Normal breath sounds. No stridor. No wheezing or rales. Abdominal:  
   General: Bowel sounds are normal. There is no distension. Palpations: Abdomen is soft. Tenderness: There is no abdominal tenderness. Musculoskeletal: Normal range of motion. Skin: 
   General: Skin is warm and dry. Findings: No rash. Neurological:  
   Mental Status: She is alert and oriented to person, place, and time. GCS: GCS eye subscore is 4. GCS verbal subscore is 1. GCS motor subscore is 6. Cranial Nerves: Cranial nerve deficit, dysarthria and facial asymmetry present. Motor: Weakness and pronator drift present. No abnormal muscle tone. Coordination: Coordination normal. Finger-Nose-Finger Test abnormal.  
   Gait: Gait abnormal.  
Psychiatric:     
   Behavior: Behavior normal.  
 
  
 
MDM Number of Diagnoses or Management Options Diagnosis management comments: Patient is approximately 11 hours out from last seen normal patient is stroke protocol Amount and/or Complexity of Data Reviewed Clinical lab tests: ordered and reviewed Tests in the radiology section of CPT®: reviewed and ordered Tests in the medicine section of CPT®: ordered and reviewed Risk of Complications, Morbidity, and/or Mortality Presenting problems: high Diagnostic procedures: high Management options: high Procedures

## 2020-10-26 NOTE — CONSULTS
Palliative Care Patient: Fuad Westfall MRN: [id number]  SSN: [id number] YOB: 1950  Age: 69 y.o. Sex: female Date of Request: 10/25/2020 Date of Consult:  10/26/2020 Reason for Consult:  goals of care Requesting Physician: Dr. Heck Payment Assessment/Plan:  
 
Principal Diagnosis:   
Altered Mental Status R41.82 Additional Diagnoses: · Debility, Unspecified  R53.81 · Dysphagia  R13.10 · Frailty  R54 · Counseling, Encounter for Medical Advice  Z71.9 
· Encounter for Palliative Care  Z51.5 Palliative Performance Scale (PPS): 
  
 
Medical Decision Making:  
Reviewed and summarized since admission Discussed case with appropriate providers  Venus Nelson RN Reviewed laboratory and x-ray data since admission. Pt sitting up in the chair, in no acute distress. Pt able to answer simple yes and no questions. When asked more complex questions, pt is aphasic or will answer with garbled speech- she seems frustrated by this. Assured her of our ongoing care. Pt was made DNR by Dr. Maury Padron this AM. Will attempt to speak with  when he arrives. [facility] with patient's  at bedside. Introduced role of PC and reviewed events.  realizes that things are \"not good\" but did not elaborate. Discussed possibility of biopsy to confirm malignancy- he voiced understanding. Oncology consult is pending. Will continue to follow. Will discuss findings with members of the interdisciplinary team.   
 
Thank you for this referral.    
 
  
. 
 
Subjective:  
 
History obtained from:  Chart Chief Complaint: Right-sided weakness, aphasia History of Present Illness:  Pt is a 69year old  female with a PMH of hypercholesterolemia, HTN, Hodgkin's lymphoma, asthma, systolic CHF EF 98-53%, lung hamartomas and hypothyroidism that presented to the ED on 10/25 with right-sided hemiplegia.  CT head revealed 2.2cm left frontal lobe mass with vasogenic edema, 2mm left to right midline shift. CTA head/neck short segment occlusion of the left MCA, CXR with interval worsening of pulmonary edema and bilateral pleural effusion. MRI of the brain - several enhancing masses, largest in the left frontal region CNS lymphoma versus metastatic disease, small acute right occipital and parietal lobe infarcts, suspected infarcts in the left corona radiata/external capsule, intrinsic brainstem lesion and arcuate fasciculus lesion. Per neurosurgery pt has diffuse intracranial metastatic disease that is not amendable to surgical resection, recommending biopsy of non-intracranial site if possible. Advance Directive: No      
Code Status:  DNR Health Care Power of : No - Patient does not have a [facility]. Past Medical History:  
Diagnosis Date  Acute CVA (cerebrovascular accident) ([facility] [address] 10/25/2020  Asthma  Bite of nonvenomous arthropod ? ??  
 Cough  Esophageal stricture 2002  
 dilated 2002  GERD (gastroesophageal reflux disease)  History of rectal bleeding ???  
 Hodgkin's lymphoma ([facility] [address] 1980 Radiation therapy  Hypercholesterolemia  Menopause  Positive RAST testing 2007 IgE level of 1,391  Thyroid disease Past Surgical History:  
Procedure Laterality Date [place]  HX BREAST BIOPSY Left 05/21/2020  
 calcs at 2:00  
 HX CATARACT REMOVAL Bilateral 2007, 2009  HX COLONOSCOPY    
 HX OTHER SURGICAL    
 dental x3  
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 Family History Problem Relation Age of Onset  Cancer Mother Kidney cancer  Cancer Father Prostate cnaceer  Heart Surgery Brother  Breast Cancer Neg Hx Social History Tobacco Use  Smoking status: Never Smoker  Smokeless tobacco: Never Used Substance Use Topics  Alcohol use: Yes Alcohol/week: 21.0 standard drinks Types: 7 Shots of liquor, 14 Standard drinks or equivalent per week Prior to Admission medications Medication Sig Start Date End Date Taking? Authorizing Provider LORazepam (ATIVAN) 0.5 mg tablet Take 1 Tab by mouth two (2) times daily as needed for Anxiety. Max Daily Amount: 1 mg. 10/22/20   Yolis Ramirez MD  
lisinopriL (PRINIVIL, ZESTRIL) 2.5 mg tablet Take 1 Tab by mouth daily for 30 days. 10/21/20 11/20/20  Timothy Knight MD  
metoprolol succinate (TOPROL-XL) 25 mg XL tablet Take 1 Tab by mouth daily for 30 days. 10/22/20 11/21/20  Timothy Knight MD  
furosemide (LASIX) 40 mg tablet Take 1 Tab by mouth daily for 30 days. 10/21/20 11/20/20  Timothy Knight MD  
albuterol (Ventolin HFA) 90 mcg/actuation inhaler Take 2 Puffs by inhalation every four (4) hours as needed for Wheezing. For wheezing. 9/17/20   Cristela Herr NP  
fluticasone furoate-vilanteroL (Breo Ellipta) 200-25 mcg/dose inhaler Take 1 Puff by inhalation daily. 9/17/20   Cristela Herr NP  
fluticasone propionate (FLONASE) 50 mcg/actuation nasal spray 2 Sprays by Both Nostrils route daily. 9/17/20   Cristela Herr NP  
montelukast (SINGULAIR) 10 mg tablet Take 1 Tab by mouth daily. 9/17/20   Cristela Herr NP  
baclofen (LIORESAL) 10 mg tablet Take 1 Tab by mouth three (3) times daily as needed for Muscle Spasm(s). 9/14/20   Yolis Ramirez MD  
melatonin 5 mg tablet Take 5 mg by mouth nightly. Provider, Historical  
naproxen sodium (NAPROSYN) 220 mg tablet Take 220 mg by mouth two (2) times daily (with meals). Provider, Historical  
acetaminophen (Tylenol Arthritis Pain) 650 mg TbER Take 650 mg by mouth every eight (8) hours. Provider, Historical  
levocetirizine (XYZAL) 5 mg tablet Take 1 Tab by mouth daily. 7/16/20   Cristela Herr NP  
levothyroxine (SYNTHROID) 75 mcg tablet Take 1 Tab by mouth Daily (before breakfast).  Except 1/2 on Monday 6/2/20   Yolis Ramirez MD  
 simvastatin (ZOCOR) 20 mg tablet Take 1 Tab by mouth nightly. 5/29/20   Yobani Pablo MD  
Omeprazole delayed release (PRILOSEC D/R) 20 mg tablet Take 1 Tab by mouth daily. 5/29/20   Yobani Pablo MD  
aspirin delayed-release 81 mg tablet Take 81 mg by mouth daily. Provider, Historical  
multivitamin (ONE A DAY) tablet Take 1 Tab by mouth daily. Provider, Historical  
 
 
Allergies Allergen Reactions  Compazine [Prochlorperazine Edisylate] Swelling Review of Systems: 
Review of systems not obtained due to patient factors - aphasia Objective:  
 
Visit Vitals BP (!) 126/59 Pulse (!) 124 Temp 97.5 °F (36.4 °C) Resp 24 SpO2 94% Breastfeeding No  
  
 
Physical Exam: 
 
General:  Alert. Debilitated. No acute distress. Eyes:  Conjunctivae/corneas clear Nose: Nares normal. Septum midline. Neck: Supple, symmetrical, trachea midline Lungs:   Clear to auscultation bilaterally, unlabored, on RA Heart:  Regular rate and rhythm Abdomen:   Soft, non-tender, non-distended Extremities: Normal, atraumatic, no cyanosis or edema Skin: Skin color, texture, turgor normal.  
Neurologic: Aphasic. Right sided weakness Psych: Alert Assessment:  
 
Hospital Problems  Date Reviewed: 10/19/2020 Codes Class Noted POA Expressive aphasia ICD-10-CM: R47.01 
ICD-9-CM: 784.3  10/25/2020 Unknown * (Principal) Acute right-sided weakness ICD-10-CM: R53.1 ICD-9-CM: 728.87  10/25/2020 Unknown Frontal mass of brain ICD-10-CM: G93.89 ICD-9-CM: 348.89  10/25/2020 Unknown Acute encephalopathy ICD-10-CM: G93.40 ICD-9-CM: 348.30  10/25/2020 Unknown Multiple lesions on CT of brain and spine ICD-10-CM: R93.0, R93.7 ICD-9-CM: 793.0, 793.7  10/25/2020 Unknown Hypokalemia ICD-10-CM: E87.6 ICD-9-CM: 276.8  10/25/2020 Unknown Acute CVA (cerebrovascular accident) ([facility] [address] ICD-10-CM: I63.9 ICD-9-CM: 434.91  10/25/2020 Unknown Multiple lung nodules on CT (Chronic) ICD-10-CM: R91.8 ICD-9-CM: 793.19  10/12/2020 Yes Overview Addendum 10/12/2020  2:33 PM by Iesha Coreas NP  
  -PET scan 10/2019: PET scan fortunately did not have any abnormal activity in her mass in the left lung. This may mean that we can just follow this radiographically, but we will talk about her at tumor board and come back to her with the group's recommendation. 
-10/30/2019: Patient is discussed at thoracic conference today lead by Dr Ean Jones. Patient is a 79 yo never smoker. CT guided biopsy recommended. -CT guided Biopsy 11/2020: lung biopsy showed signs of this nodule being a hamartoma, which is a benign tumor that we can simply follow. repeat CT in 6 months 
-Chest CT June 2020: CT scan is stable 
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 Yu Jordin are several new pulmonary nodules noted which may suggest some metastatic disease.  This require further follow-up CT of the chest in 2 months or doing PET scan. Hamartoma (HCC) (Chronic) ICD-10-CM: Q85.9 ICD-9-CM: 759.6  10/12/2020 Yes Peripheral vascular disease ([facility] [address] ICD-10-CM: I73.9 ICD-9-CM: 443.9  10/12/2020 Yes Bilateral pleural effusion ICD-10-CM: J90 ICD-9-CM: 511.9  10/7/2020 Yes Overview Signed 10/13/2020  1:25 PM by DENI Leavitt  
  10/3/2020: L thoracentesis 1050mL removed R thoracentesis 800mL removed 10/9/2020: L thoracentesis: 1000mL removed R thoracentesis: 600mL removed Hypercholesterolemia (Chronic) ICD-10-CM: E78.00 ICD-9-CM: 272.0  6/30/2015 Yes Dysphagia (Chronic) ICD-10-CM: R13.10 ICD-9-CM: 787.20  6/30/2015 Yes Acquired hypothyroidism (Chronic) ICD-10-CM: E03.9 ICD-9-CM: 244.9  10/17/2013 Yes GERD (gastroesophageal reflux disease) (Chronic) ICD-10-CM: K21.9 ICD-9-CM: 530.81  10/17/2013 Yes Signed By: Cinthia Anthony NP October 26, 2020

## 2020-10-26 NOTE — PROGRESS NOTES
Problem: Patient Education: Go to Patient Education Activity Goal: Patient/Family Education Description: 1. Patient will complete lower body bathing and dressing with SBA and adaptive equipment as needed. 2. Patient will complete toileting with SBA. 3. Patient will tolerate 25 minutes of OT treatment with 1-2 rest breaks to increase activity tolerance for ADLs. 4. Patient will complete functional transfers with CGA and adaptive equipment as needed. 5. Patient will tolerate 15 minutes unsupported sitting balance with SBA and adaptive equipment as needed. 6. Patient will complete functional activity while seated edge of bed unsupported with SBA and adaptive equipment as needed. Timeframe: 7 visits Outcome: Progressing Towards Goal 
 
OCCUPATIONAL THERAPY: Initial Assessment, Daily Note, and AM 10/26/2020 INPATIENT: OT Visit Days: 1 Payor: Arya Sahu / Plan: [employer] MEDICARE CHOICE PPO/PFFS / Product Type: Managed Care Medicare /  
  
NAME/AGE/GENDER: Mansoor Ariza is a 69 y.o. female PRIMARY DIAGNOSIS:  Frontal mass of brain [G93.89] Acute right-sided weakness [R53.1] Expressive aphasia [R47.01] Acute encephalopathy [G93.40] Multiple lesions on CT of brain and spine [R93.0, R93.7] Acute right-sided weakness Acute right-sided weakness ICD-10: Treatment Diagnosis:  
 · Generalized Muscle Weakness (M62.81) · Other lack of cordination (R27.8) · Difficulty in walking, Not elsewhere classified (R26.2) Precautions/Allergies: 
  Fall precautions Compazine [prochlorperazine edisylate] ASSESSMENT:  
 
Ms. Lata Martínez presents in ICU for the above diagnoses. Upon arrival, pt supine in bed and agreeable to OT evaluation. Pt is alert however presents with moderate expressive aphasia therefore limited questioning completed during today's evaluation; however pt able to nod head appropriately to simple yes/no questions.  Pt states she lives in a 1-story home with spouse. At baseline, pt notes independence with ADLs and functional mobility. Today, pt presents with deficits in overall strength, activity tolerance, ADL performance, and functional mobility. RUE AROM and strength are grossly limited this session; sensation and coordination impaired. No noted visual neglect or deficits noted during session. Pt transitioned to sitting edge of bed with min A. Static sitting balance is fair (-) with close CGA provided. Max A to niyah socks. Pt able to wash face with LUE with set-up in supported sitting. Pt then stood with min A x 2 and completed SPT transfer to bedside chair with min A x 2 with mod verbal cues for facilitation. Pt placed sitting upright in bedside chair and left with needs met, call light within reach, posey alarm on, and RN at bedside. At this time, Severa Jing is functioning below baseline for ADLs and functional mobility. Pt would benefit from skilled OT services to address OT goals and plan of care. This section established at most recent assessment PROBLEM LIST (Impairments causing functional limitations): 1. Decreased Strength 2. Decreased ADL/Functional Activities 3. Decreased Transfer Abilities 4. Decreased Ambulation Ability/Technique 5. Decreased Balance 6. Decreased Activity Tolerance 7. Decreased Cognition INTERVENTIONS PLANNED: (Benefits and precautions of occupational therapy have been discussed with the patient.) 1. Activities of daily living training 2. Adaptive equipment training 3. Balance training 4. Clothing management 5. Community reintergration 6. Donning&doffing training 7. Neuromuscular re-eduation 8. Re-evaluation 9. Therapeutic activity 10. Therapeutic exercise TREATMENT PLAN: Frequency/Duration: Follow patient 3x/week to address above goals. Rehabilitation Potential For Stated Goals: Good REHAB RECOMMENDATIONS (at time of discharge pending progress):   
Placement: It is my opinion, based on this patient's performance to date, that Ms. Alma Delia Gaffney may benefit from intensive therapy at an [address] [place] [address] after discharge due to a probable need for multiple therapy disciplines and potential to make ongoing and sustainable functional improvement that is of practical value. Mike Murphy Equipment: ? TBD  
    
 
 
 
OCCUPATIONAL PROFILE AND HISTORY:  
History of Present Injury/Illness (Reason for Referral): 
See H&P Past Medical History/Comorbidities: Ms. Alma Delia Gaffney  has a past medical history of Acute CVA (cerebrovascular accident) ([facility] [address] (10/25/2020), Asthma, Bite of nonvenomous arthropod (???), Cough, Esophageal stricture (2002), GERD (gastroesophageal reflux disease), History of rectal bleeding (???), Hodgkin's lymphoma ([facility] [address] (1980), Hypercholesterolemia, Menopause, Positive RAST testing (2007), and Thyroid disease. She also has no past medical history of Chronic kidney disease. Ms. Alma Delia Gaffney  has a past surgical history that includes hx tonsillectomy (1954); hx splenectomy (1973); hx appendectomy (1973); hx cataract removal (Bilateral, 2007, 2009); hx colonoscopy; hx other surgical; and hx breast biopsy (Left, 05/21/2020). Social History/Living Environment:  
Home Environment: Private residence One/Two Story Residence: One story Living Alone: No 
Support Systems: Spouse/Significant Other/Partner Patient Expects to be Discharged to[de-identified] Rehabilitation facility Current DME Used/Available at Home: None Prior Level of Function/Work/Activity: 
Independent with ADLs and functional mobility. Personal Factors:   
      Sex:  female Age:  69 y.o. Other factors that influence how disability is experienced by the patient:  Multiple co-morbidities Number of Personal Factors/Comorbidities that affect the Plan of Care: Brief history (0):  LOW COMPLEXITY ASSESSMENT OF OCCUPATIONAL PERFORMANCE[de-identified]  
Activities of Daily Living: Basic ADLs (From Assessment) Complex ADLs (From Assessment) Feeding: Minimum assistance Oral Facial Hygiene/Grooming: Minimum assistance Bathing: Maximum assistance Upper Body Dressing: Maximum assistance Lower Body Dressing: Maximum assistance Toileting: Maximum assistance Instrumental ADL Meal Preparation: Total assistance Homemaking: Total assistance Grooming/Bathing/Dressing Activities of Daily Living Cognitive Retraining Safety/Judgement: Fall prevention Bed/Mat Mobility Rolling: Minimum assistance Supine to Sit: Minimum assistance Sit to Stand: Minimum assistance;Assist x2 Stand to Sit: Minimum assistance;Assist x2 Bed to Chair: Minimum assistance;Assist x2 Scooting: Minimum assistance Most Recent Physical Functioning:  
Gross Assessment: 
AROM: Generally decreased, functional(RUE) PROM: Generally decreased, functional 
Strength: Generally decreased, functional(RUE) Coordination: Generally decreased, functional(RUE) Tone: Normal 
Sensation: Impaired Posture: 
  
Balance: 
Sitting: Impaired Sitting - Static: Fair (occasional) Sitting - Dynamic: Fair (occasional); Poor (constant support) Standing: Impaired Standing - Static: Fair;Poor Standing - Dynamic : Poor;Constant support Bed Mobility: 
Rolling: Minimum assistance Supine to Sit: Minimum assistance Scooting: Minimum assistance Wheelchair Mobility: 
  
Transfers: 
Sit to Stand: Minimum assistance;Assist x2 Stand to Sit: Minimum assistance;Assist x2 Bed to Chair: Minimum assistance;Assist x2 Patient Vitals for the past 6 hrs: 
 BP BP Patient Position SpO2 Pulse 10/26/20 0714 133/67  94 % (!) 122  
10/26/20 0728 138/67  94 % (!) 124  
10/26/20 0743 (!) 147/68  94 % (!) 125  
10/26/20 0759 (!) 156/81 At rest 94 % (!) 126  
10/26/20 0814 (!) 154/67  94 % (!) 133  
10/26/20 0819   94 %   
10/26/20 0828 (!) 173/70  93 % (!) 130  
10/26/20 0843 (!) 160/71  93 % (!) 128  
 10/26/20 0859 (!) 173/71  94 % (!) 125  
10/26/20 0914 120/71  93 % (!) 124  
10/26/20 0939 (!) 157/74  93 % (!) 125  
10/26/20 0943 127/65  93 % (!) 122  
10/26/20 0959 136/73  93 % (!) 122  
10/26/20 1013 131/69  94 % (!) 119  
10/26/20 1028 139/74  100 % (!) 118  
10/26/20 1043 (!) 142/65  95 % (!) 127  
10/26/20 1059 (!) 141/65  95 % (!) 126  
10/26/20 1113 (!) 147/67  94 % (!) 124  
10/26/20 1128 (!) 149/65  95 % (!) 122  
10/26/20 1143 (!) 126/59  94 % (!) 124 Mental Status Neurologic State: Alert Orientation Level: Unable to verbalize Cognition: Follows commands Perception: Cues to maintain midline in sitting Perseveration: No perseveration noted Safety/Judgement: Fall prevention Physical Skills Involved: 1. Range of Motion 2. Balance 3. Strength 4. Activity Tolerance 5. Sensation 6. Fine Motor Control 7. Gross Motor Control Cognitive Skills Affected (resulting in the inability to perform in a timely and safe manner): 1. Perception 2. Executive Function 3. Comprehension 4. Expression Psychosocial Skills Affected: 1. Habits/Routines 2. Environmental Adaptation Number of elements that affect the Plan of Care: 5+:  HIGH COMPLEXITY CLINICAL DECISION MAKIN [facility] [address] [id number] AM-PAC 6 Clicks Daily Activity Inpatient Short Form How much help from another person does the patient currently need. .. Total A Lot A Little None 1. Putting on and taking off regular lower body clothing? [] 1   [x] 2   [] 3   [] 4  
2. Bathing (including washing, rinsing, drying)? [] 1   [x] 2   [] 3   [] 4  
3. Toileting, which includes using toilet, bedpan or urinal?   [] 1   [x] 2   [] 3   [] 4  
4. Putting on and taking off regular upper body clothing? [] 1   [x] 2   [] 3   [] 4  
5. Taking care of personal grooming such as brushing teeth? [] 1   [] 2   [x] 3   [] 4  
6. Eating meals?    [] 1   [] 2   [x] 3   [] 4  
 © 2007, Trustees of [address] [place] [address] [address] [address], under license to [employer]. All rights reserved Score:  Initial: 14 Most Recent: X (Date: -- ) Interpretation of Tool:  Represents activities that are increasingly more difficult (i.e. Bed mobility, Transfers, Gait). Medical Necessity:    
· Patient demonstrates good rehab potential due to higher previous functional level. Reason for Services/Other Comments: 
· Patient continues to require skilled intervention due to medical complications and patient unable to attend/participate in therapy as expected. Use of outcome tool(s) and clinical judgement create a POC that gives a: LOW COMPLEXITY  
 
 
 
TREATMENT:  
(In addition to Assessment/Re-Assessment sessions the following treatments were rendered) Pre-treatment Symptoms/Complaints:   
Pain: Initial:  
Pain Intensity 1: 0 /10 Post Session:  same Self Care: (8 minutes): Procedure(s) (per grid) utilized to improve and/or restore self-care/home management as related to dressing and grooming. Required maximal verbal, tactile and   cueing to facilitate activities of daily living skills. Neuromuscular Re-education: ( 15 minutes):  Exercise/activities per grid below to improve balance, coordination and posture. Required moderate verbal and tactile cues to promote static and dynamic balance in sitting and standing. Braces/Orthotics/Lines/Etc:  
· IV 
· ICU monitors · O2 Device: Room air Treatment/Session Assessment:   
· Response to Treatment:  Tolerated well with no issues noted. · Interdisciplinary Collaboration:  
o Occupational Therapist 
o Registered Nurse · After treatment position/precautions:  
o Up in chair 
o Bed alarm/tab alert on 
o Bed/Chair-wheels locked 
o Call light within reach 
o RN notified 
o RN at bedside. · Compliance with Program/Exercises: Will assess as treatment progresses. · Recommendations/Intent for next treatment session:   \"Next visit will focus on advancements to more challenging activities and reduction in assistance provided\". Total Treatment Duration: OT Patient Time In/Time Out Time In: 3527 Time Out: 7005 Fransisca Sanches OT

## 2020-10-26 NOTE — PROGRESS NOTES
Progress Note 
 
Patient: Bhakti Roland MRN: [id number]  SSN: [id number]   
YOB: 1950  Age: 70 y.o.  Sex: female   
 
Admit Date: 10/25/2020  
 LOS: 1 day  
 
Subjective:  
F/U brain masses 
 
\"70 years old female with hx of HTN, GERD, Hodgkin's lymphoma s/p Radiation Rx, dyslipidemia, hypothyroidism, asthma, systolic CHF EF 35-40% recently diagnosed lung hamartomas presented to ER with acute onset of right sided weakness, aphasia and confusion that began this AM. Pt was recently discharged from [facility] on 10/21 after being treated for PNA and was doing okay until today. Pt is not able to provide much history, she has sort of blank stare but can nod to yes or no questions. Per , pt was walking, doing ADL's  Until yesterday but this morning a drastic change in her mentation and speech was noted by him. ER work up showed CT evidence of enhancing 2.2 cm left frontal lobe mass with vasogenic edema with 2 mm left to right midline shift with 2 additional small masses in tracy and right occipital lobe. CTA head/neck short segment occlusion of left MCA with some changes suggestive of small core infarct and penumbra within the left frontal lobe, no evidence of intraparenchymal hemorrhage. CXR with interval worsening of pulmonary edema and bilateral pleural effusion.\" 
 
Decadron and Keppra started. Nursing staff note labored breathing this AM but satting well on RA.    
Current Facility-Administered Medications  
Medication Dose Route Frequency  
• budesonide (PULMICORT) 500 mcg/2 ml nebulizer suspension  500 mcg Nebulization BID RT  
• albuterol (PROVENTIL VENTOLIN) nebulizer solution 2.5 mg  2.5 mg Nebulization Q6H RT  
• fluticasone propionate (FLONASE) 50 mcg/actuation nasal spray 2 Spray  2 Spray Both Nostrils DAILY  
• levothyroxine (SYNTHROID) tablet 75 mcg  75 mcg Oral ACB  
• sodium chloride (NS) flush 5-40 mL  5-40 mL IntraVENous Q8H  
  sodium chloride (NS) flush 5-40 mL  5-40 mL IntraVENous PRN  
 ondansetron (ZOFRAN) injection 4 mg  4 mg IntraVENous Q6H PRN  
 acetaminophen (TYLENOL) suppository 650 mg  650 mg Rectal Q4H PRN  
 famotidine (PF) (PEPCID) 20 mg in 0.9% sodium chloride 10 mL injection  20 mg IntraVENous Q12H  
 LORazepam (ATIVAN) injection 2 mg  2 mg IntraVENous Q2H PRN  
 albuterol-ipratropium (DUO-NEB) 2.5 MG-0.5 MG/3 ML  3 mL Nebulization Q4H PRN  
 dextrose 5% - LR with KCl 20 mEq/L infusion   IntraVENous CONTINUOUS  
 carvediloL (COREG) tablet 3.125 mg  3.125 mg Oral BID WITH MEALS  
 levETIRAcetam (KEPPRA) 500 mg in 0.9% sodium chloride (MBP/ADV) 100 mL  500 mg IntraVENous Q12H  
 dexamethasone (DECADRON) 10 mg/mL injection 6 mg  6 mg IntraVENous Q6H  
 levalbuterol (XOPENEX) nebulizer soln 1.25 mg/3 mL  1.25 mg Nebulization Q6H PRN Objective:  
 
Vitals:  
 10/26/20 4939 10/26/20 0759 10/26/20 8388 10/26/20 8304 BP: (!) 147/68 (!) 156/81 Pulse: (!) 125 (!) 126 Resp: 23 25 Temp:  97.4 °F (36.3 °C) (!) 41 °F (5 °C) SpO2: 94% 94%  94% Intake and Output: 
Current Shift: No intake/output data recorded. Last three shifts: 10/24 1901 - 10/26 0700 In: 322 [I.V.:322] Out: 450 [Urine:450] Physical Exam:  
General:  Alert, cooperative, right sided facial droop Eyes:  Conjunctivae/corneas clear. Ears:  Normal TMs and external ear canals both ears. Nose: Nares normal. Septum midline. Mouth/Throat: Right sided facial droop. Garbled speech. Neck:  no JVD. Back:   deferred. Lungs:   Limited breathe sounds with poor inspiratory effort. Accessory muscles being used. Heart:  Sinus tachycardia Abdomen:   Soft, non-tender. Bowel sounds normal.   
Extremities: Limited ROM of extremities. Not moving her right arm. Limited motion of left hand. Pulses: 2+ and symmetric all extremities. Skin: Skin color, texture, turgor normal. No rashes or lesions Lymph nodes: Cervical, supraclavicular, and axillary nodes normal.  
Neurologic: Able to shake head yes or no to questions. Lab/Data Review: 
 
Recent Results (from the past 24 hour(s)) LIPID PANEL Collection Time: 10/26/20  3:29 AM  
Result Value Ref Range LIPID PROFILE Cholesterol, total 243 (H) <200 MG/DL Triglyceride 106 35 - 150 MG/DL  
 HDL Cholesterol 60 40 - 60 MG/DL  
 LDL, calculated 161.8 (H) <100 MG/DL VLDL, calculated 21.2 6.0 - 23.0 MG/DL  
 CHOL/HDL Ratio 4.1 HEMOGLOBIN A1C WITH EAG Collection Time: 10/26/20  3:29 AM  
Result Value Ref Range Hemoglobin A1c 5.6 4.8 - 6.0 % Est. average glucose 114 mg/dL CBC W/O DIFF Collection Time: 10/26/20  3:29 AM  
Result Value Ref Range WBC 13.8 (H) 4.3 - 11.1 K/uL  
 RBC 3.82 (L) 4.05 - 5.2 M/uL  
 HGB 11.8 11.7 - 15.4 g/dL HCT 34.7 (L) 35.8 - 46.3 % MCV 90.8 79.6 - 97.8 FL  
 MCH 30.9 26.1 - 32.9 PG  
 MCHC 34.0 31.4 - 35.0 g/dL  
 RDW 13.2 11.9 - 14.6 % PLATELET 900 (H) 119 - 450 K/uL MPV 9.6 9.4 - 12.3 FL ABSOLUTE NRBC 0.00 0.0 - 0.2 K/uL PROCALCITONIN Collection Time: 10/26/20  3:29 AM  
Result Value Ref Range Procalcitonin <0.05 ng/mL Assessment/ Plan:  
 
Principal Problem: 
  Acute right-sided weakness (10/25/2020) Active Problems: 
  Acquired hypothyroidism (10/17/2013) GERD (gastroesophageal reflux disease) (10/17/2013) Hypercholesterolemia (6/30/2015) Dysphagia (6/30/2015) Bilateral pleural effusion (10/7/2020) Overview: 10/3/2020: L thoracentesis 1050mL removed R thoracentesis 800mL removed 10/9/2020: L thoracentesis: 1000mL removed R thoracentesis: 600mL removed Multiple lung nodules on CT (10/12/2020) Overview: -PET scan 10/2019: PET scan fortunately did not have any abnormal activity  
    in her mass in the left lung. This may mean that we can just follow this radiographically, but we will talk about her at tumor board and come back  
    to her with the group's recommendation. 
    -10/30/2019: Patient is discussed at thoracic conference today lead by Dr Shay Perez. Patient is a 79 yo never smoker. CT guided biopsy recommended. -CT guided Biopsy 11/2020: lung biopsy showed signs of this nodule being a  
    hamartoma, which is a benign tumor that we can simply follow. repeat CT in  
    6 months 
    -Chest CT June 2020: CT scan is stable 
    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  
    secondary to her left lung mass. Dennis Luke are several new pulmonary nodules  
    noted which may suggest some metastatic disease.  This require further  
    follow-up CT of the chest in 2 months or doing PET scan. 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 Neurology and neurosurgery following. No surgical intervention. dexmethasone 6mg IV q 6 hr. Keppra 500mg BID. Increased respiratory distress - Possible from brain mets but also with pulmonary edema. Start IV lasix. Albuterol q 6hr. Pulmicort. sCHF EF 35%- possible exacerbation. Ordering BNP. Lasix 40mg IV daily. Strict Is and Os. HTN- Coreg, ACE BMP, proBNP pending for today. Insert NG tube. Wife wanted for me to discuss code status with her  and to make a decision. Long discussion on phone with  about poor prognosis if patient would continue to decline that include status epilepticus, respiratory arrest, and aspiraton. He has agreed to DNR status.  He understands severity of her condition. [facility] DVT prophylaxis - SCDs Signed By: Katie Rachel DO October 26, 2020

## 2020-10-26 NOTE — PROGRESS NOTES
Physician Progress Note Adam Romero 
CSN #:                  [id number] :                       1950 ADMIT DATE:       10/25/2020 6:56 AM 
DISCH DATE: 
RESPONDING 
PROVIDER #:        Werner BENNETT DO 
 
 
 
QUERY TEXT: 
 
Type of Encephalopathy: Please provide further specificity, if known. Options provided: 
-- Anoxic/hypoxic encephalopathy 
-- Metabolic encephalopathy 
-- Toxic encephalopathy 
-- Hepatic encephalopathy -- Hypertensive encephalopathy PROVIDER RESPONSE TEXT: 
 
The patient has metabolic encephalopathy. QUERY TEXT: 
 
Pt admitted with acute right side weakness, lesions on brain, acute CVA and has  hx of sCHF documented. If possible, please document in progress notes and discharge summary further specificity regarding the acuity of CHF: 
 
The medical record reflects the following: 
Risk Factors: Hx of CHF, CVA, Brain mets Clinical Indicators: pro BNP 33,040, CXR with worsening pulmonary edema and bilateral pleural effusions Treatment: Lasix 40 mg IV daily, Strict I/Os, Daily weights Options provided: 
-- Acute on Chronic Systolic CHF/HFrEF 
-- Acute Systolic CHF/HFrEF 
-- Chronic Systolic CHF/HFrEF 
-- Other - I will add my own diagnosis -- Disagree - Not applicable / Not valid -- Disagree - Clinically unable to determine / Unknown 
-- Refer to Clinical Documentation Reviewer PROVIDER RESPONSE TEXT: 
 
This patient is in acute on chronic systolic CHF/HFrEF.  
 
Query created by: Linette Gilman on 10/26/2020 12:54 PM 
 
 
Electronically signed by:  Jenniffer Jacinto DO 10/26/2020 2:48 PM

## 2020-10-26 NOTE — PROGRESS NOTES
Problem: Falls - Risk of 
Goal: *Absence of Falls Description: Document Green Salvia Fall Risk and appropriate interventions in the flowsheet. Outcome: Progressing Towards Goal 
Note: Fall Risk Interventions: 
Mobility Interventions: PT Consult for mobility concerns, OT consult for ADLs Mentation Interventions: Bed/chair exit alarm, Adequate sleep, hydration, pain control Medication Interventions: Assess postural VS orthostatic hypotension, Bed/chair exit alarm Elimination Interventions: Bed/chair exit alarm, Toileting schedule/hourly rounds Problem: Patient Education: Go to Patient Education Activity Goal: Patient/Family Education Outcome: Progressing Towards Goal 
  
Problem: Pressure Injury - Risk of 
Goal: *Prevention of pressure injury Description: Document Bhupendra Scale and appropriate interventions in the flowsheet. Outcome: Progressing Towards Goal 
Note: Pressure Injury Interventions: 
Sensory Interventions: Assess changes in LOC Moisture Interventions: Absorbent underpads, Internal/External urinary devices Activity Interventions: Assess need for specialty bed Mobility Interventions: Assess need for specialty bed, HOB 30 degrees or less Nutrition Interventions: Discuss nutritional consult with provider Friction and Shear Interventions: HOB 30 degrees or less, Apply protective barrier, creams and emollients Problem: Patient Education: Go to Patient Education Activity Goal: Patient/Family Education Outcome: Progressing Towards Goal 
  
Problem: TIA/CVA Stroke: 0-24 hours Goal: Off Pathway (Use only if patient is Off Pathway) Outcome: Progressing Towards Goal 
Goal: Activity/Safety Outcome: Progressing Towards Goal 
Goal: Consults, if ordered Outcome: Progressing Towards Goal 
Goal: Diagnostic Test/Procedures Outcome: Progressing Towards Goal 
Goal: Nutrition/Diet Outcome: Progressing Towards Goal 
Goal: Discharge Planning Outcome: Progressing Towards Goal 
Goal: Medications Outcome: Progressing Towards Goal 
Goal: Respiratory Outcome: Progressing Towards Goal 
Goal: Treatments/Interventions/Procedures Outcome: Progressing Towards Goal 
Goal: Minimize risk of bleeding post-thrombolytic infusion Outcome: Progressing Towards Goal 
Goal: Monitor for complications post-thrombolytic infusion Outcome: Progressing Towards Goal 
Goal: Psychosocial 
Outcome: Progressing Towards Goal 
Goal: *Hemodynamically stable Outcome: Progressing Towards Goal 
Goal: *Neurologically stable Description: Absence of additional neurological deficits Outcome: Progressing Towards Goal 
Goal: *Verbalizes anxiety and depression are reduced or absent Outcome: Progressing Towards Goal 
Goal: *Absence of Signs of Aspiration on Current Diet Outcome: Progressing Towards Goal 
Goal: *Absence of deep venous thrombosis signs and symptoms(Stroke Metric) Outcome: Progressing Towards Goal 
Goal: *Ability to perform ADLs and demonstrates progressive mobility and function Outcome: Progressing Towards Goal 
Goal: *Stroke education started(Stroke Metric) Outcome: Progressing Towards Goal 
Goal: *Dysphagia screen performed(Stroke Metric) Outcome: Progressing Towards Goal 
Goal: *Rehab consulted(Stroke Metric) Outcome: Progressing Towards Goal 
  
Problem: Dysphagia (Adult) Goal: *Speech Goal: (INSERT TEXT) Outcome: Progressing Towards Goal 
  
Problem: Patient Education: Go to Patient Education Activity Goal: Patient/Family Education Outcome: Progressing Towards Goal 
  
Problem: Patient Education: Go to Patient Education Activity Goal: Patient/Family Education Outcome: Progressing Towards Goal 
  
Problem: Patient Education: Go to Patient Education Activity Goal: Patient/Family Education Description: 1. Patient will complete lower body bathing and dressing with SBA and adaptive equipment as needed. 2. Patient will complete toileting with SBA. 3. Patient will tolerate 25 minutes of OT treatment with 1-2 rest breaks to increase activity tolerance for ADLs. 4. Patient will complete functional transfers with CGA and adaptive equipment as needed. 5. Patient will tolerate 15 minutes unsupported sitting balance with SBA and adaptive equipment as needed. 6. Patient will complete functional activity while seated edge of bed unsupported with SBA and adaptive equipment as needed. Timeframe: 7 visits Outcome: Progressing Towards Goal 
  
Problem: Patient Education: Go to Patient Education Activity Goal: Patient/Family Education Outcome: Progressing Towards Goal 
  
Problem: Patient Education: Go to Patient Education Activity Goal: Patient/Family Education Outcome: Progressing Towards Goal

## 2020-10-26 NOTE — PROGRESS NOTES
PT Note:  PT orders received and chart review initiated. Attempted evaluation, however, RN working with patient. Will attempt another time/day as schedule permits.   A Connie Mcgregor, PT, DPT

## 2020-10-26 NOTE — PROGRESS NOTES
LTG: Patient will tolerate least restrictive diet without overt signs or symptoms of airway compromise. STG: Patient will participate in modified barium swallow study to objectively assess swallow function. LTG: Patient will increase receptive/expressive language skills demonstrated by the ability to communicate basic wants/needs across environments STG: Patient will answer yes/no questions with 75% accuracy given mod cueing STG: Patient will follow 1 step commands with 75% accuracy given moderate cueing STG: Patient will identify item in field of 2 with 60% accuracy given moderate cueing STG: Patient will identify body parts presented verbally with 50% accuracy given moderate cueing STG: Patient will complete automatic naming tasks with 50%  accuracy given moderate cueing SPEECH LANGUAGE PATHOLOGY: DYSPHAGIA AND SPEECH-LANGUAGE/COGNITION: Initial Assessment NAME/AGE/GENDER: Kelby Bustamante is a 69 y.o. female DATE: 10/26/2020 PRIMARY DIAGNOSIS: Frontal mass of brain [G93.89] Acute right-sided weakness [R53.1] Expressive aphasia [R47.01] Acute encephalopathy [G93.40] Multiple lesions on CT of brain and spine [R93.0, R93.7] ICD-10: Treatment Diagnosis: R13.12 Dysphagia, Oropharyngeal Phase 
F80.2 Mixed Receptive-Expressive Language Disorder 
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 2 Apraxia RECOMMENDATIONS  
DIET:  
? NPO 
 
MEDICATIONS: Non-oral 
  
ASPIRATION PRECAUTIONS · Slow rate of intake · Small bites/sips · Upright at 90 degrees during meal 
  
COMPENSATORY STRATEGIES/MODIFICATIONS · None EDUCATION: 
· Recommendations discussed with Medical Sub-Specialist 
· Nursing · Patient CONTINUATION OF SKILLED SERVICES/MEDICAL NECESSITY: 
? Patient is expected to demonstrate progress in  swallow strength, swallow timeliness, swallow function and swallow safety in order to  improve swallow safety, work toward diet advancement and decrease aspiration risk. ? Patient is expected to demonstrate progress in expressive communication and receptive ability to decrease assistance required communication, increase independence with activities of daily living and increase communication with family/caregivers. ? Patient continues to require skilled intervention due to dysphagia, aphasia, ? apraxia. RECOMMENDATIONS for CONTINUED SPEECH THERAPY: YES: Anticipate need for ongoing speech therapy during this hospitalization and at next level of care. ASSESSMENT Patient presents with high risk of airway compromise. Baseline wheezing prior to po intake, but wheezing more pronounced after po trials. Impaired oral acceptance due to right weakness with reduced oral clearing. Intermittent double swallow with thins and puree, concerning for impaired pharyngeal clearance. Given current respiratory status and brainstem involvement, orville risk of dysphagia and aspiration. Recommend NPO with non-oral medications. Plan for modified barium swallow study on 10/27/20 to objectively assess swallow function. Expressive/receptive aphasia. Relative receptive language strengths for basic comprehension tasks, but deficits noted with moderate level yes/no questions. She is essentially non-verbal with only 1 attempt at verbal communication during session. Speech was unintelligible. ? Apraxia as she exhibited difficulty with 1 step oral motor movements despite visual and tactile cues. Unfortunately, she appears to have fair awareness of deficits and expresses frustration with inability to communicate. Recommend ongoing speech therapy during inpatient stay and at next level of care to address dysphagia and functional communication abilities. REHABILITATION POTENTIAL FOR STATED GOALS: Fair PLAN   
FREQUENCY/DURATION: Continue to follow patient 3 times a week for duration of hospital stay to address above goals. - Recommendations for next treatment session: Next treatment will address swallow via modified barium swallow study SUBJECTIVE Upright in chair. IV pump beeping. She pointed to pump to request assistance. RN at bedside to correct. Alert, but essentially non-verbal. Only one verbal communication attempt. History of Present Injury/Illness: Ms. Jovana Roy  has a past medical history of Acute CVA (cerebrovascular accident) ([facility] [address] (10/25/2020), Asthma, Bite of nonvenomous arthropod (???), Cough, Esophageal stricture (2002), GERD (gastroesophageal reflux disease), History of rectal bleeding (???), Hodgkin's lymphoma ([facility] [address] (1980), Hypercholesterolemia, Menopause, Positive RAST testing (2007), and Thyroid disease. She also has no past medical history of Chronic kidney disease. . She also  has a past surgical history that includes hx tonsillectomy (1954); hx splenectomy (1973); hx appendectomy (1973); hx cataract removal (Bilateral, 2007, 2009); hx colonoscopy; hx other surgical; and hx breast biopsy (Left, 05/21/2020). Problem List:  (Impairments causing functional limitations): 1. Oropharyngeal dysphagia 2. Expressive aphasia 3. Receptive aphasia 4. ? apraxia Previous Dysphagia: NONE REPORTED Diet Prior to Evaluation: NPO Orientation:  
Person Place Via yes/no questions Pain: Pain Scale 1: Numeric (0 - 10) Pain Intensity 1: 0 OBJECTIVE Oral Motor:  
· Labial: Decreased rate, Decreased seal and Right droop · Dentition: Intact · Oral Hygiene: Adequate Lingual: Decreased rate and Decreased strength Difficulty following commands for oral motor assessment despite demonstration by clinician. Movements appeared to be consistent with apraxia given inconsistent errors and inability to follow clinician model. Swallow evaluation: 
 Patient consumed trials of ice chips, thin via tsp/cup, and puree. Wheezing prior to po trials. Impaired oral acceptance due to right droop. She required left oral placement, but spillage noted on R and L sides. Slightly delayed swallow upon palpation with double swallows on 2/5 trials. Throat clear after first tsp of thin liquids only. No additional coughing or throat clearing with additional trials; however, wheezing did become more pronounced. Poor oral clearance with puree as portion of bolus remained on lingual surface and minimal right pocketing. Double swallow palpated on 2/4 trials. Concern for poor pharyngeal clearance and increased risk of silent aspiration given brainstem involvement. No additional trials presented. Cognitive Linguistic Assessment : 
Initial language evaluation completed: - One attempt at verbal communication despite repeated trials and encouragement from clinician. Speech was unintelligible. - Automatic speech tasks attempted, but no patient response. - Pointing to request assistance with IV pump, but no additional communication attempts. - Basic yes/no questions re: orientation 100% - Moderate level yes/no 30% accuracy with preference for yes response - 1 step commands 30% accuracy. Suspect apraxia contributing given inconsistent errors. Ex. Opening mouth when told to smile, puffing cheeks when asked to stick out tongue. Waving hand when asked to give thumbs up. - Object ID from field of 2 objects: 0% accuracy- ? Ability to grasp concept. INTERDISCIPLINARY COLLABORATION: Registered Nurse and Palliative Care PRECAUTIONS/ALLERGIES: Compazine [prochlorperazine edisylate] Tool Used: Dysphagia Outcome and Severity Scale (MONICA) Score Comments Normal Diet  [] 7 With no strategies or extra time needed Functional Swallow  [] 6 May have mild oral or pharyngeal delay Mild Dysphagia  [] 5 Which may require one diet consistency restricted Mild-Moderate Dysphagia  [] 4 With 1-2 diet consistencies restricted Moderate Dysphagia  [] 3 With 2 or more diet consistencies restricted Moderate-Severe Dysphagia  [] 2 With partial PO strategies (trials with ST only) Severe Dysphagia  [] 1 With inability to tolerate any PO safely Score:  Initial: 2 Most Recent: x (Date 10/26/20 ) Interpretation of Tool: The Dysphagia Outcome and Severity Scale (MONICA) is a simple, easy-to-use, 7-point scale developed to systematically rate the functional severity of dysphagia based on objective assessment and make recommendations for diet level, independence level, and type of nutrition. Tool Used: MODIFIED CHELSEA SCALE (mRS) Score No Symptoms  [] 0 No significant disability despite symptoms; able to carry out all usual duties and activities  [] 1 Slight disability; unable to carry out all previous activities but able to look after own affairs without assistance. [] 2 Moderate disability; requiring some help but able to walk without assistance  [] 3 Moderately severe disability; unable to walk without assistance and unable to attend to own bodily needs without assistance  [] 4 Severe disability; bedridden, incontinent, and requiring constant nursing care and attention  [] 5 Score:  Initial: 4 Interpretation of Tool: The Modified Chelsea Scale is a 7-point scaled used to quantify level of disability as it relates to a patient's functional abilities. Current Medications: No current facility-administered medications on file prior to encounter. Current Outpatient Medications on File Prior to Encounter Medication Sig Dispense Refill  LORazepam (ATIVAN) 0.5 mg tablet Take 1 Tab by mouth two (2) times daily as needed for Anxiety. Max Daily Amount: 1 mg. 60 Tab 1  
 lisinopriL (PRINIVIL, ZESTRIL) 2.5 mg tablet Take 1 Tab by mouth daily for 30 days. 30 Tab 0  
 metoprolol succinate (TOPROL-XL) 25 mg XL tablet Take 1 Tab by mouth daily for 30 days. 30 Tab 0  
 furosemide (LASIX) 40 mg tablet Take 1 Tab by mouth daily for 30 days.  30 Tab 0  
  albuterol (Ventolin HFA) 90 mcg/actuation inhaler Take 2 Puffs by inhalation every four (4) hours as needed for Wheezing. For wheezing. 3 Inhaler 3  
 fluticasone furoate-vilanteroL (Breo Ellipta) 200-25 mcg/dose inhaler Take 1 Puff by inhalation daily. 3 Inhaler 3  
 fluticasone propionate (FLONASE) 50 mcg/actuation nasal spray 2 Sprays by Both Nostrils route daily. 3 Bottle 3  
 montelukast (SINGULAIR) 10 mg tablet Take 1 Tab by mouth daily. 90 Tab 3  
 baclofen (LIORESAL) 10 mg tablet Take 1 Tab by mouth three (3) times daily as needed for Muscle Spasm(s). 30 Tab 5  
 melatonin 5 mg tablet Take 5 mg by mouth nightly.  naproxen sodium (NAPROSYN) 220 mg tablet Take 220 mg by mouth two (2) times daily (with meals).  acetaminophen (Tylenol Arthritis Pain) 650 mg TbER Take 650 mg by mouth every eight (8) hours.  levocetirizine (XYZAL) 5 mg tablet Take 1 Tab by mouth daily. 90 Tab 3  
 levothyroxine (SYNTHROID) 75 mcg tablet Take 1 Tab by mouth Daily (before breakfast). Except 1/2 on Monday 90 Tab 4  
 simvastatin (ZOCOR) 20 mg tablet Take 1 Tab by mouth nightly. 90 Tab 4  
 Omeprazole delayed release (PRILOSEC D/R) 20 mg tablet Take 1 Tab by mouth daily. 90 Tab 4  
 aspirin delayed-release 81 mg tablet Take 81 mg by mouth daily.  multivitamin (ONE A DAY) tablet Take 1 Tab by mouth daily. SAFETY: 
After treatment position/precautions: · Up in chair · Rn notified · Palliative Care at bedside Total Treatment Duration:  
Time In: 0863 Time Out: [address] CCC-SLP

## 2020-10-26 NOTE — PROGRESS NOTES
NEUROSURGERY PLAN OF CARE NOTE:  
 
Chart and Imaging Reviewed:  
Susu Pandey 69 y.o. female presented to [facility] following altered mental status. She has a Hodgkin's lymphoma with a history of multiple medical co-morbidities. I have independently reviewed and interpreted the MRI Brain WWO contrast which demonstrates finding consistent with diffuse metastatic disease with multiple enhancing lesions with least two right calvarial lesions, a left frontal dural based lesion measuring 2.2 x 2.3 cm with surrounding vasogenic edema, two pontine lesions including a mid-line ventral pontine lesion and a right eccentric pontine lesion, a right occipital paramedian dural based lesion and a right dorsal occipital lesion that is smaller. Agree with oncology consult and radiation oncology consult. The patient has diffuse intracranial metastatic disease that is not amenable to surgical resection. Surgical resection in this case would not be curative nor would it significantly change the overall outcome. Recommend biopsy at a non-intracranial site if possible. If no other lesions to biopsy and a biopsy must be performed then would possible consider stereotactic biopsy. No acute neurosurgical intervention necessary at this time. Continue decadron for vasogenic edema. Please call with questions or concerns. Micheal King. Lorrie Ortiz [facility] [address] [facility]

## 2020-10-26 NOTE — CONSULTS
[address] [facility] Neurology [facility] 
[address] [facility] [address] [place], [address] [facility] Chief Complaint Patient presents with  Stroke Laya Staton is a 69 y.o. female who presents on referral from the critical care service for evaluation of a new onset hemiplegia and aphasia. Patient ascertained to have intracranial mass lesions in both the anterior and posterior cranial fossa with an intrinsic brainstem lesion as well is a lesion in the region of the arcuate fasciculus. History of a known pulmonary mass under the care of Dr. Carmichael Notice and remote history of Hodgkin's disease Past Medical History:  
Diagnosis Date  Acute CVA (cerebrovascular accident) ([facility] [address] 10/25/2020  Asthma  Bite of nonvenomous arthropod ? ??  
 Cough  Esophageal stricture 2002  
 dilated 2002  GERD (gastroesophageal reflux disease)  History of rectal bleeding ???  
 Hodgkin's lymphoma ([facility] [address] 1980 Radiation therapy  Hypercholesterolemia  Menopause  Positive RAST testing 2007 IgE level of 1,391  Thyroid disease Past Surgical History:  
Procedure Laterality Date [place]  HX BREAST BIOPSY Left 05/21/2020  
 calcs at 2:00  
 HX CATARACT REMOVAL Bilateral 2007, 2009  HX COLONOSCOPY    
 HX OTHER SURGICAL    
 dental x3  
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 [address] Family History Problem Relation Age of Onset  Cancer Mother Kidney cancer  Cancer Father Prostate cnaceer  Heart Surgery Brother  Breast Cancer Neg Hx Social History Socioeconomic History  Marital status:  Spouse name: Not on file  Number of children: Not on file  Years of education: Not on file  Highest education level: Not on file Occupational History  Occupation:  Tobacco Use  Smoking status: Never Smoker  Smokeless tobacco: Never Used Substance and Sexual Activity  Alcohol use: Yes Alcohol/week: 21.0 standard drinks Types: 7 Shots of liquor, 14 Standard drinks or equivalent per week  Drug use: No  
Social History Narrative There is no significant environmental or industrial exposure. She has no known exposure to TB. She has worked as an . Current Facility-Administered Medications:  
  budesonide (PULMICORT) 500 mcg/2 ml nebulizer suspension, 500 mcg, Nebulization, BID RT, Renata Gaspar, , 500 mcg at 10/26/20 1020 
  albuterol (PROVENTIL VENTOLIN) nebulizer solution 2.5 mg, 2.5 mg, Nebulization, Q6H RT, Renata Gaspar, , 2.5 mg at 10/26/20 1020   [START ON 10/27/2020] furosemide (LASIX) injection 40 mg, 40 mg, IntraVENous, DAILY, Renata Gaspar DO 
  LORazepam (ATIVAN) tablet 0.5 mg, 0.5 mg, Oral, BID PRN, Carlos Alberto Cee DO 
  montelukast (SINGULAIR) tablet 10 mg, 10 mg, Oral, DAILY, Renata Gaspar DO 
  carvediloL (COREG) tablet 3.125 mg, 3.125 mg, Per NG tube, BID WITH MEALS, Carlos Alberto Cee DO 
  [START ON 10/27/2020] levothyroxine (SYNTHROID) tablet 75 mcg, 75 mcg, Per NG tube, ACB, Renata Gaspar DO 
  lisinopriL (PRINIVIL, ZESTRIL) tablet 2.5 mg, 2.5 mg, Per NG tube, DAILY, Renata Gaspar DO 
  fluticasone propionate (FLONASE) 50 mcg/actuation nasal spray 2 Spray, 2 Spray, Both Nostrils, DAILY, Baltazar Maier MD, 2 [place] at 10/26/20 3582   sodium chloride (NS) flush 5-40 mL, 5-40 mL, IntraVENous, Q8H, Baltazar Maier MD, 10 mL at 10/26/20 0600 
  sodium chloride (NS) flush 5-40 mL, 5-40 mL, IntraVENous, PRN, Baltazar Maier MD 
  [facility]) injection 4 mg, 4 mg, IntraVENous, Q6H PRN, Baltazar Maier MD 
  acetaminophen (TYLENOL) suppository 650 mg, 650 mg, Rectal, Q4H PRN, Baltazar Maier MD 
  famotidine (PF) (PEPCID) 20 mg in 0.9% sodium chloride 10 mL injection, 20 mg, IntraVENous, Q12H, Baltazar Maier MD, 20 mg at 10/26/20 0831   LORazepam (ATIVAN) injection 2 mg, 2 mg, IntraVENous, Q2H PRN, Nichole Tong MD 
  albuterol-ipratropium (DUO-NEB) 2.5 MG-0.5 MG/3 ML, 3 mL, Nebulization, Q4H PRN, Nichole Tong MD, 3 mL at 10/26/20 7456   dextrose 5% - LR with KCl 20 mEq/L infusion, , IntraVENous, CONTINUOUS, Tate Chen, DO, Last Rate: 25 mL/hr at 10/26/20 1595   levETIRAcetam (KEPPRA) 500 mg in 0.9% sodium chloride (MBP/ADV) 100 mL, 500 mg, IntraVENous, Q12H, Chaya Zayas NP, 500 mg at 10/26/20 2632   dexamethasone (DECADRON) 10 mg/mL injection 6 mg, 6 mg, IntraVENous, Q6H, Tano Lara DO, 6 mg at 10/26/20 0600   levalbuterol (XOPENEX) nebulizer soln 1.25 mg/3 mL, 1.25 mg, Nebulization, Q6H PRN, Nichole Tong MD 
 
Allergies Allergen Reactions  Compazine [Prochlorperazine Edisylate] Swelling Review of Systems The patient is aphasic no review of systems is feasible Visit Vitals /74 Pulse (!) 118 Temp (!) 41 °F (5 °C) Resp 28 SpO2 100% Breastfeeding No  
 
 
Neurologic Exam 
The patient is alert cooperative she does follow directions well and does not have evidence of a receptive dysphasia. Patient looks well-hydrated. Does not appear chronically ill. Cranial nerve examination normal visual fields. Normal random eye movements. No ptosis. No lid lag Face upper motor neuron left-sided facial weakness Speech is largely absent with staccato output of occasional words Motor control of the upper extremities left arm 5/5  plegic right arm satisfactory both lower extremities with functional movement but a right hemiparesis Casual gait is normal with no evidence of ataxia Fine motor coordination is normal 
 
Peripheral sensation light touch normal 
 
Most recent MRI Results [place] [facility] [place]20 MRI BRAIN W WO CONT Narrative MRI brain with and without contrast 
 
History: Abnormal CT. History of lymphoma. Right hemiparesis Imaging sequences: Sagittal short TR/short TE, axial short TR/short TE, long TR/long TE, FLAIR, gradient recall, diffusion weighted images and ADC mapping. Axial and coronal short TR/short TE postcontrast images. Imaging was performed 
on a 1.5 Vicky magnet, utilizing the uneventful administration of 10 mL of 
intravenous Dotarem and order to better evaluate for intracranial pathology. Comparison: None. Correlation is made to the CT scan of the brain performed 
earlier on the same day. Findings: There is an enhancing mass within the left frontal region measuring 
approximately 2.3 x 2.2 x 2.3 cm in AP, transverse and craniocaudal dimensions, 
recently. This is a broad-based dural attachment. There is significant 
surrounding edema. This appears at least in part to be extra-axial although a 
more cystic component cannot be stated as such. There are 2 enhancing 
parenchymal lesions within the tracy with the larger measuring 1.0 cm with 
associated edema. There are 2 right occipital lobe mass with the larger 
measuring up to 1.3 cm, also with surrounding edema. There are enhancing right 
frontal bone lesions measuring up to 2.1 cm in size. The ventricles and sulci are normal in size and configuration. There are no 
extra-axial fluid collections. Normal flow voids are present within all of the 
major intracranial vessels. There is no evidence of intraparenchymal hemorrhage. There are subcentimeter foci of restricted diffusion within the right occipital 
and parietal lobes raising concern for small foci of acute infarctions. Additional subtle restricted diffusion is present medial to the edema within the 
left frontal region at the left corona radiata and extending into the external 
capsule.    
 
Scattered foci of T2 hyperintensity within the supratentorial white matter most 
likely represent the sequela of chronic small vessel ischemic change, 
 unremarkable for age. The visualized mastoid air cells and paranasal sinuses are 
well pneumatized and aerated. Impression IMPRESSION: 
1. Several enhancing masses with the largest within the left frontal region 
which may have an extra axial component with significant surrounding edema. 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 There 
are also enhancing right frontal bone lesions presumably representing part of 
the same process. 2. Small acute right occipital and parietal lobe infarcts. Additional infarcts 
are suspected within the left corona radiata/external capsule. Most recent MRA [place] [facility] [place] on [place]25/20 MRI BRAIN W WO CONT Narrative MRI brain with and without contrast 
 
History: Abnormal CT. History of lymphoma. Right hemiparesis Imaging sequences: Sagittal short TR/short TE, axial short TR/short TE, long TR/long TE, FLAIR, gradient recall, diffusion weighted images and ADC mapping. Axial and coronal short TR/short TE postcontrast images. Imaging was performed 
on a 1.5 Vicky magnet, utilizing the uneventful administration of 10 mL of 
intravenous Dotarem and order to better evaluate for intracranial pathology. Comparison: None. Correlation is made to the CT scan of the brain performed 
earlier on the same day. Findings: There is an enhancing mass within the left frontal region measuring 
approximately 2.3 x 2.2 x 2.3 cm in AP, transverse and craniocaudal dimensions, 
recently. This is a broad-based dural attachment. There is significant 
surrounding edema. This appears at least in part to be extra-axial although a 
more cystic component cannot be stated as such. There are 2 enhancing 
parenchymal lesions within the tracy with the larger measuring 1.0 cm with 
associated edema. There are 2 right occipital lobe mass with the larger 
measuring up to 1.3 cm, also with surrounding edema. There are enhancing right frontal bone lesions measuring up to 2.1 cm in size. The ventricles and sulci are normal in size and configuration. There are no 
extra-axial fluid collections. Normal flow voids are present within all of the 
major intracranial vessels. There is no evidence of intraparenchymal hemorrhage. There are subcentimeter foci of restricted diffusion within the right occipital 
and parietal lobes raising concern for small foci of acute infarctions. Additional subtle restricted diffusion is present medial to the edema within the 
left frontal region at the left corona radiata and extending into the external 
capsule. Scattered foci of T2 hyperintensity within the supratentorial white matter most 
likely represent the sequela of chronic small vessel ischemic change, 
unremarkable for age. The visualized mastoid air cells and paranasal sinuses are 
well pneumatized and aerated. Impression IMPRESSION: 
1. Several enhancing masses with the largest within the left frontal region 
which may have an extra axial component with significant surrounding edema. 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 There 
are also enhancing right frontal bone lesions presumably representing part of 
the same process. 2. Small acute right occipital and parietal lobe infarcts. Additional infarcts 
are suspected within the left corona radiata/external capsule. Most [place] [facility] [place] CTA CODE NEURO HEAD AND NECK W CONT Narrative History: Right-sided weakness and difficulty with speech. FINDINGS: 
 
CT angiography was performed of the neck and head with contrast and 
three-dimensional CT angiography reconstruction and reformat was performed. NASCET criteria as needed. CT dose reduction was achieved through use of a 
standardized protocol tailored for this examination and automatic exposure 
control for dose modulation. Bilateral pleural effusion. Parenchymal nodularity in the left upper lobe. There is extensive atherosclerotic ectasia of the imaged segments of the 
thoracic aorta. The ascending aorta measures 2.8 cm. There is a mild stenosis of 
the proximal descending thoracic aorta related to concentric noncalcified 
atheroma. There is a right-sided aortic arch with an occluded right common 
carotid artery and occluded right subclavian artery. The right vertebral artery 
reconstitutes via collaterals. Reversal of flow is not excluded by CT angiogram. 
 
The innominate artery is patent. The left common carotid artery is patent. The 
left internal carotid artery is patent. There is atherosclerosis at the left 
carotid bulb without hemodynamically significant stenosis. There is a moderate 
stenosis in the supraclinoid segment of the left internal carotid artery. The right internal carotid artery reconstitutes at the right carotid bulb. The 
right middle cerebral artery is patent. There is short segment occlusion in the M1 segment of the left middle cerebral 
artery. The posterior cerebral arteries are patent. Dural venous sinuses are patent. Small left transverse and sigmoid sinus. Multiple enhancing lesions within the brainstem and cerebrum. The largest is in 
the left middle cerebral artery territory. Impression IMPRESSION: 
 
Short segment occlusion of the left middle cerebral artery. Finding reported to 
the emergency room bedside physician at the time of this report. Multiple enhancing lesions in the brainstem and cerebrum. Bilateral pleural effusion. Extensive atherosclerosis of the aorta with occlusion of the right subclavian 
and the right common carotid arteries. This is chronic dating back to June 2019. Most recent Echo No results found for this visit on 10/25/20. Most recent lipid panels Lab Results Component Value Date/Time Cholesterol, total 243 (H) 10/26/2020 03:29 AM  
 HDL Cholesterol 60 10/26/2020 03:29 AM  
 LDL, calculated 161.8 (H) 10/26/2020 03:29 AM  
 VLDL, calculated 21.2 10/26/2020 03:29 AM  
 Triglyceride 106 10/26/2020 03:29 AM  
 CHOL/HDL Ratio 4.1 10/26/2020 03:29 AM  
 
 
Most recent Hgb A1C Lab Results Component Value Date/Time Hemoglobin A1c 5.6 10/26/2020 03:29 AM  
 
 
 
 
 
 
 
Diagnoses and all orders for this visit: 
 
1. Acute right-sided weakness 2. Expressive aphasia 3. Multiple lesions on CT of brain and spine 4. Bilateral pleural effusion 5. Acute CVA (cerebrovascular accident) ([address] 6. Hamartoma ([facility] [address] 7. Dysphagia, unspecified type 8. Hodgkin lymphoma, unspecified Hodgkin lymphoma type, unspecified body region ([facility] [address] 9. Seizure ([facility] [address] Other orders -     IP CONSULT TO TELE-NEUROLOGY; Standing 
-     CT CODE NEURO HEAD WO CONTRAST; Standing 
-     NURSING-MISCELLANEOUS:; Standing 
-     NIH STROKE SCALE ASSESSMENT; Standing 
-     EKG, 12 LEAD, INITIAL; Standing -     INITIATE NURSING DYSPHAGIA SCREENING; Standing 
-     CBC WITH AUTOMATED DIFF; Standing -     METABOLIC PANEL, BASIC; Standing 
-     POC PT/INR; Standing 
-     POC GLUCOSE; Standing 
-     PROTHROMBIN TIME + INR; Standing 
-     CT PERF W CBF; Standing -     CTA CODE NEURO HEAD AND NECK W CONT; Standing 
-     NURSING-MISCELLANEOUS:; Standing 
-     POC PT/INR; Standing -     GLUCOSE, POC; Standing -     MRI BRAIN W WO CONT; Standing 
-     XR CHEST PORT; Standing 
-     TROPONIN-HIGH SENSITIVITY; Standing 
-     dexamethasone (DECADRON) 10 mg/mL injection 10 mg 
-     gadoterate meglumine (DOTAREM) 0.5 mmol/mL (376.9 mg/mL) contrast solution 10 mL 
-     saline peripheral flush soln 10 mL 
-     fluticasone propionate (FLONASE) 50 mcg/actuation nasal spray 2 Spray 
-     NURSING-MISCELLANEOUS:; Standing 
-     VITAL SIGNS PER UNIT ROUTINE; Standing 
-     NOTIFY PROVIDER: VITAL SIGNS CHANGES; Standing -     Roxie Torresudier; Standing -     CARDIAC MONITORING; Standing 
-     INTAKE AND OUTPUT; Standing -     MEASURE HEIGHT; Standing -     WEIGH PATIENT; Standing 
-     FALL PRECAUTIONS; Standing 
-     NURSING-MISCELLANEOUS:; Standing 
-     NURSING-MISCELLANEOUS:; Standing 
-     sodium chloride (NS) flush 5-40 mL 
-     sodium chloride (NS) flush 5-40 mL 
-     LIPID PANEL; Standing 
-     HEMOGLOBIN A1C WITH EAG; Standing -     IP CONSULT TO STROKE COORDINATOR; Standing -     IP CONSULT TO PHYSIATRIST(REHAB MEDICINE); Standing -     IP CONSULT TO CASE MANAGEMENT; Standing -     SLP--EVAL, DEVISE PLAN OF CARE AND TREAT; Standing 
-     PT--EVAL, DEVISE PLAN OF CARE AND TREAT; Standing 
-     OT--EVAL, DEVISE PLAN OF CARE AND TREAT; Standing -     INITIAL PHYSICIAN ORDER: INPATIENT; Standing 
-     RT--OXIMETRY, SPOT CHECK; Standing 
-     RT--OXYGEN CANNULA; Standing 
-     DIET NPO; Standing -     ADVANCE DIET AS TOLERATED; Standing 
-     ondansetron (ZOFRAN) injection 4 mg 
-     acetaminophen (TYLENOL) suppository 650 mg 
-     famotidine (PF) (PEPCID) 20 mg in 0.9% sodium chloride 10 mL injection -     APPLY/MAINTAIN SEQUENTIAL COMPRESSION DEVICE; Standing 
-     CBC W/O DIFF; Standing -     IP CONSULT TO NEUROSURGERY; Standing -     IP CONSULT TO NEUROLOGY; Standing -     IP CONSULT TO RADIATION ONCOLOGY; Standing -     ASPIRATION PRECAUTIONS; Standing -     LORazepam (ATIVAN) injection 2 mg -     potassium chloride 20 mEq in 100 ml IVPB 
-     albuterol-ipratropium (DUO-NEB) 2.5 MG-0.5 MG/3 ML 
-     dextrose 5% - LR with KCl 20 mEq/L infusion -     IP CONSULT TO PALLIATIVE CARE - PROVIDER; Standing -     levETIRAcetam (KEPPRA) 500 mg in 0.9% sodium chloride (MBP/ADV) 100 mL 
-     dexamethasone (DECADRON) 10 mg/mL injection 6 mg 
-     levalbuterol (XOPENEX) nebulizer soln 1.25 mg/3 mL 
-     PROCALCITONIN; Standing 
-     furosemide (LASIX) injection 40 mg 
 -     furosemide (LASIX) 10 mg/mL injection 
-     budesonide (PULMICORT) 500 mcg/2 ml nebulizer suspension 
-     albuterol (PROVENTIL VENTOLIN) nebulizer solution 2.5 mg 
-     METABOLIC PANEL, BASIC; Standing 
-     furosemide (LASIX) injection 40 mg 
-     NG TUBE, INSERTION; Standing -     LORazepam (ATIVAN) tablet 0.5 mg 
-     montelukast (SINGULAIR) tablet 10 mg 
-     DO NOT RESUSCITATE; Standing 
-     carvediloL (COREG) tablet 3.125 mg 
-     levothyroxine (SYNTHROID) tablet 75 mcg 
-     lisinopriL (PRINIVIL, ZESTRIL) tablet 2.5 mg 
-     METABOLIC PANEL, BASIC; Standing 
-     STRICT I & O; Standing 
-     NT-PRO BNP; Standing 
-     XR SWALLOW FUNC VIDEO; Standing -     SLP--MODIFIED BARIUM SWALLOW; Standing Acute presentation yesterday with the presence of intracranial mass lesions MRI with evidence of probable acute ischemia and certainly presentation that have left middle cerebral distribution infarction. In terms of underlying nature of tumor would be I would think statistically unlikely for this to be recurrent Hodgkin's as a timeframe of 30 to 40 years but individuals with lymphoma long-term survival statistically have increased risk of other neoplastic disease In terms of ischemic cerebrovascular disease certainly has elevated LDLGiven the likely coagulopathy that would exist in the presence of a malignancy would be prudent to initiate aspirin. May wish to delay on this however in the light of the necessity to perform either extracranial or intracranial biopsy and in the light of the findings on the recent CT chest biopsy of lung nodule would appear most rational 
 
Will Follow Segundo Lara MD

## 2020-10-26 NOTE — PROGRESS NOTES
Chart reviewed and pt seen in ICU s/p admission acute right-sided weakness and now per MD notes muliptle intracranial mass lesions. Spoke with spouse at bedside. Confirms demographics. States pt lives with him. Does not use anything to ambulate with. Spouse provides transportation. No current HH or rehab. Brother, [phone], on his way from [place]. Staff concerned that spouse may have some early dementia, as has cordless home phone in room with him. Spouse aware CM following for assist and d/c POC. Care Management Interventions PCP Verified by CM: Yes([facility]) Mode of Transport at Discharge: Other (see comment) Transition of Care Consult (CM Consult): Discharge Planning Current Support Network: Lives with Spouse Confirm Follow Up Transport: Family Freedom of Choice List was Provided with Basic Dialogue that Supports the Patient's Individualized Plan of Care/Goals, Treatment Preferences and Shares the Quality Data Associated with the Providers?: Yes [employer] Information Provided?: [employer]) Discharge Location Discharge Placement: Unable to determine at this time

## 2020-10-26 NOTE — ACP (ADVANCE CARE PLANNING)
ACP: No LW/HCPOA on file currently. Legal NOK, spouse, Lelo Druant -[phone] if pt can not make decisions for herself unless document presented stating otherwise. Per staff, some concern for dementia with spouse. Please see Dr. Gaming Hams note with speaking with pt's brother.

## 2020-10-26 NOTE — PROGRESS NOTES
Problem: Mobility Impaired (Adult and Pediatric) Goal: *Acute Goals and Plan of Care (Insert Text) Description: LTG: 
(1.)Ms. Fadi Odonnell will move from supine to sit and sit to supine , scoot up and down, and roll side to side in bed with MINIMAL ASSIST within 7 treatment day(s). (2.)Ms. Fadi Odonnell will transfer from bed to chair and chair to bed with MINIMAL ASSIST using the least restrictive device within 7 treatment day(s). (3.)Ms. Fadi Odonnell will ambulate with MINIMAL ASSIST for 30+ feet with the least restrictive device within 7 treatment day(s). 4.  Ms. Fadi Odonnell will sit EOB with good balance x10' while performing LE ex's within 7 treatment days. ________________________________________________________________________________________________ Outcome: Progressing Towards Goal 
  
PHYSICAL THERAPY: Initial Assessment and Daily Note 10/26/2020 INPATIENT: PT Visit Days : 1 Payor: Elysia Pelletier / Plan: [facility] MEDICARE CHOICE PPO/PFFS / Product Type: [employer] Medicare /   
  
NAME/AGE/GENDER: Mel Mendoza is a 69 y.o. female PRIMARY DIAGNOSIS: Frontal mass of brain [G93.89] Acute right-sided weakness [R53.1] Expressive aphasia [R47.01] Acute encephalopathy [G93.40] Multiple lesions on CT of brain and spine [R93.0, R93.7] Acute right-sided weakness Acute right-sided weakness ICD-10: Treatment Diagnosis:  
 Other abnormalities of gait and mobility (R26.89) Precaution/Allergies: 
Compazine [prochlorperazine edisylate] ASSESSMENT:  
 
Ms. Fadi Odonnell presents supine in bed in ICU with  at bedside. At baseline she is independent. She speaks very little, mainly responds with head nods/shakes, but does attempt to say a few words. Patient with R hemibody weakness, R LE strength nonfunctional.  When instructed to use L UE to lift R UE off pillow, she overshoots and grabs pillow case. Patient transitioned to sit with mod A.   Sits with trunk lean to right and tends to neglect R UE, letting it slide off EOB. With cueing patient able to assume midline but continues to require close guarding. Patient stood with mod Ax2. R knee buckling and patient unable to extend. Patient unable to take steps. Back to supine with mod A. Initiated treatment with LE exercises in supine. Monitored VS throughout treatment, HR ~110. Patient appeared very fatigued at end of treatment. Ms. Olvin Cai would benefit from skilled physical therapy (medically necessary) to address her deficits and maximize her function. This section established at most recent assessment PROBLEM LIST (Impairments causing functional limitations): 
Decreased ADL/Functional Activities Decreased Transfer Abilities Decreased Ambulation Ability/Technique Decreased Balance Increased Pain Decreased Activity Tolerance Decreased Knowledge of Precautions Decreased [place] with Home Exercise Program 
Decreased Cognition INTERVENTIONS PLANNED: (Benefits and precautions of physical therapy have been discussed with the patient.) Balance Exercise Bed Mobility Gait Training Therapeutic Activites Therapeutic Exercise/Strengthening Transfer Training 
education TREATMENT PLAN: Frequency/Duration: 3 times a week for duration of hospital stay Rehabilitation Potential For Stated Goals: Good REHAB RECOMMENDATIONS (at time of discharge pending progress):   
Placement: It is my opinion, based on this patient's performance to date, that Ms. Olvin Cai may benefit from intensive therapy at a [address] [place] [address] after discharge due to the functional deficits listed above that are likely to improve with skilled rehabilitation and concerns that he/she may be unsafe to be unsupervised at home due to significant decline in functional mobility . Equipment:  
None at this time HISTORY:  
History of Present Injury/Illness (Reason for Referral): 
 Per MD note, \"Patient is a 69years old female with hx of HTN, GERD, Hodgkin's lymphoma s/p Radiation Rx, dyslipidemia, hypothyroidism, asthma, systolic CHF EF 21-43% recently diagnosed lung hamartomas presented to ER with acute onset of right sided weakness, aphasia and confusion that began this AM. Pt was recently discharged from [facility] on 10/21 after being treated for PNA and was doing okay until today. Pt is not able to provide much history, she has sort of blank stare but can nod to yes or no questions. Per , pt was walking, doing ADL's  Until yesterday but this morning a drastic change in her mentation and speech was noted by him. Again, pt's  is also not a great historian. No reported fever, nausea/vomiting, fall, diarrhea, abdominal pain, headaches. ER work up showed CT evidence of enhancing 2.2 cm left frontal lobe mass with vasogenic edema with 2 mm left to right midline shift with 2 additional small masses in tracy and right occipital lobe. CTA head/neck short segment occlusion of left MCA with some changes suggestive of small core infarct and penumbra within the left frontal lobe, no evidence of intraparenchymal hemorrhage. CXR with interval worsening of pulmonary edema and bilateral pleural effusion. \" 
Past Medical History/Comorbidities: Ms. Lynn Payne  has a past medical history of Acute CVA (cerebrovascular accident) ([address] (10/25/2020), Asthma, Bite of nonvenomous arthropod (???), Cough, Esophageal stricture (2002), GERD (gastroesophageal reflux disease), History of rectal bleeding (???), Hodgkin's lymphoma ([address] (1980), Hypercholesterolemia, Menopause, Positive RAST testing (2007), and Thyroid disease. She also has no past medical history of Chronic kidney disease.   Ms. Lynn Payne  has a past surgical history that includes hx tonsillectomy (1954); hx splenectomy (1973); hx appendectomy (1973); hx cataract removal (Bilateral, 2007, 2009); hx colonoscopy; hx other surgical; and hx breast biopsy (Left, 2020). Social History/Living Environment:  
Home Environment: Private residence One/Two Story Residence: One story Living Alone: No 
Support Systems: Spouse/Significant Other/Partner Patient Expects to be Discharged to[de-identified] Rehabilitation facility Current DME Used/Available at Home: None Prior Level of Function/Work/Activity: At baseline she is independent. Number of Personal Factors/Comorbidities that affect the Plan of Care: 3+: HIGH COMPLEXITY EXAMINATION:  
Most Recent Physical Functioning:  
Gross Assessment: 
AROM: Generally decreased, functional(L LE) PROM: Generally decreased, functional(R LE) Strength: Grossly decreased, non-functional(R LE) Tone: Normal 
Sensation: (difficult to assess due to patient's aphasia) Posture: 
  
Balance: 
Sitting: Impaired Sitting - Static: Poor (constant support) Sitting - Dynamic: Poor (constant support) Standing: Impaired Standing - Static: Constant support;Poor Standing - Dynamic : Constant support;Poor Bed Mobility: 
Supine to Sit: Moderate assistance Sit to Supine: Moderate assistance Scooting: Minimum assistance Wheelchair Mobility: 
  
Transfers: 
Sit to Stand: Moderate assistance;Assist x2 Stand to Sit: Moderate assistance;Assist x2 Gait: 
  
   
  
Body Structures Involved: 
Voice/Speech Muscles Body Functions Affected: 
Sensory/Pain Voice and Speech Neuromusculoskeletal 
Movement Related Activities and Participation Affected: Mobility Self Care Domestic Life Number of elements that affect the Plan of Care: 4+: HIGH COMPLEXITY CLINICAL PRESENTATION:  
Presentation: Evolving clinical presentation with changing clinical characteristics: MODERATE COMPLEXITY CLINICAL DECISION MAKIN [facility] [address] [id number] AM-PAC 6 Clicks Basic Mobility Inpatient Short Form How much difficulty does the patient currently have. .. Unable A Lot A Little None 1.  Turning over in bed (including adjusting bedclothes, sheets and blankets)? [] 1   [x] 2   [] 3   [] 4  
2. Sitting down on and standing up from a chair with arms ( e.g., wheelchair, bedside commode, etc.)   [] 1   [x] 2   [] 3   [] 4  
3. Moving from lying on back to sitting on the side of the bed? [] 1   [x] 2   [] 3   [] 4 How much help from another person does the patient currently need. .. Total A Lot A Little None 4. Moving to and from a bed to a chair (including a wheelchair)? [] 1   [x] 2   [] 3   [] 4  
5. Need to walk in hospital room? [x] 1   [] 2   [] 3   [] 4  
6. Climbing 3-5 steps with a railing? [x] 1   [] 2   [] 3   [] 4  
© 2007, Trustees of [facility], under license to [employer] All rights reserved Score:  Initial: 10 Most Recent: X (Date: -- ) Interpretation of Tool:  Represents activities that are increasingly more difficult (i.e. Bed mobility, Transfers, Gait). Medical Necessity:    
Patient is expected to demonstrate progress in  
balance and functional technique 
 to  
decrease assistance required with all functional mobility Lux Uribe Reason for Services/Other Comments: 
Patient continues to require skilled intervention due to  
medical complications and patient unable to attend/participate in therapy as expected Lux Uribe Use of outcome tool(s) and clinical judgement create a POC that gives a: Questionable prediction of patient's progress: MODERATE COMPLEXITY  
  
 
 
 
TREATMENT:  
(In addition to Assessment/Re-Assessment sessions the following treatments were rendered) Pre-treatment Symptoms/Complaints:  none. Pain: Initial:  
Pain Intensity 1: 0  Post Session:  0 Therapeutic Exercise: ( 10):  Exercises per grid below to improve mobility, strength, and coordination. Required maximal visual and verbal cues to promote proper body alignment. Progressed complexity of movement as indicated. DATE: 10/26/20 Straight leg raise Hip abduct/ adduct X10 AAR Heel slides  X10 AAR Hip external/ internal rotation Ankle dorsiflexion/ plantarflexion 2x10 AB Key:  A=active, AA=active assisted, P=passive, B=bilaterally, R=right, L=left Braces/Orthotics/Lines/Etc:  
IV 
bustamante catheter O2 Device: Room air Treatment/Session Assessment:   
Response to Treatment:  pleasant and cooperative. Interdisciplinary Collaboration:  
Physical Therapist 
Registered Nurse After treatment position/precautions:  
Supine in bed Bed/Chair-wheels locked Bed in low position Call light within reach RN notified Family at bedside Compliance with Program/Exercises: Compliant all of the time, Will assess as treatment progresses Recommendations/Intent for next treatment session: \"Next visit will focus on advancements to more challenging activities and reduction in assistance provided\". Total Treatment Duration: PT Patient Time In/Time Out Time In: 1077 Time Out: 1615 JORGE Trejo, PT, DPT

## 2020-10-26 NOTE — CONSULTS
[address] [facility] Hematology & Oncology Inpatient Hematology / Oncology Consult Note Reason for Consult:  Frontal mass of brain [G93.89] Acute right-sided weakness [R53.1] Expressive aphasia [R47.01] Acute encephalopathy [G93.40] Multiple lesions on CT of brain and spine [R93.0, R93.7] Referring Physician:  Jair Irving MD 
 
History of Present Illness: Ms. Figueroa Manning is a 69 y.o. female admitted on 10/25/2020 with a primary diagnosis of brain masses. Her PMH includes HTN, GERD, Hodgkin's lymphoma, hypothyroidism, sCHF, and recently dx lung hamartomas. CT chest from 9/21/2020 with L lung mass and sclerotic focus at T6. Lung mass has been followed by pulm since 2019. Had bx of L lung mass on 11/11/2019 with path +hamartoma. She presented to ED with c/o acute onset R-sided weakness, aphasia, and confusion. She was recently discharged on 10/21 after being treated for pneumonia. MRI brain with several enhancing masses with largest in L frontal region, enhancing R frontal bone lesions; and small acute R occipital and parietal lobe infarcts. CTA head/neck with short segment occlusion of MCA. CXR with interval worsening of pulm edema and b/l pleural effusion. Neuro/neurosurgery following. Brain masses not amenable to surgical resection. On Dex 6mg q 6 hours and Keppra. Rad Onc consult pending. We were consulted for recommendations. Review of Systems: 
Constitutional  see HPI HEENT Denies trauma, blurry vision, hearing loss, ear pain, nosebleeds, sore throat, neck pain and ear discharge. Skin Denies lesions or rashes. Lungs  recent increase shortness of breath Cardiovascular Denies chest pain, palpitations, or lower extremity edema. Gastrointestinal Denies nausea, vomiting, changes in bowel habits, bloody or black stools, abdominal pain.  Denies dysuria, frequency or hesitancy of urination. Neuro +R-sided weakness, aphasia, confusion Hematology Denies easy bruising or bleeding, denies gingival bleeding or epistaxis. Endo +hypothyroidism MSK Denies back pain, arthralgias, myalgias or frequent falls. Psychiatric/Behavioral Denies depression and substance abuse. The patient is not nervous/anxious. Allergies Allergen Reactions  Compazine [Prochlorperazine Edisylate] Swelling Past Medical History:  
Diagnosis Date  Acute CVA (cerebrovascular accident) ([facility] [address] 10/25/2020  Asthma  Bite of nonvenomous arthropod ? ??  
 Cough  Esophageal stricture 2002  
 dilated 2002  GERD (gastroesophageal reflux disease)  History of rectal bleeding ???  
 Hodgkin's lymphoma ([facility] [address] 1980 Radiation therapy  Hypercholesterolemia  Menopause  Positive RAST testing 2007 IgE level of 1,391  Thyroid disease Past Surgical History:  
Procedure Laterality Date [place]  HX BREAST BIOPSY Left 05/21/2020  
 calcs at 2:00  
 HX CATARACT REMOVAL Bilateral 2007, 2009  HX COLONOSCOPY    
 HX OTHER SURGICAL    
 dental x3  
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 [address] [facility] [address] Family History Problem Relation Age of Onset  Cancer Mother Kidney cancer  Cancer Father Prostate cnaceer  Heart Surgery Brother  Breast Cancer Neg Hx Social History Socioeconomic History  Marital status:  Spouse name: Not on file  Number of children: Not on file  Years of education: Not on file  Highest education level: Not on file Occupational History  Occupation:  Social Needs  Financial resource strain: Not on file  Food insecurity Worry: Not on file Inability: Not on file  Transportation needs Medical: Not on file Non-medical: Not on file Tobacco Use  Smoking status: Never Smoker  Smokeless tobacco: Never Used Substance and Sexual Activity  Alcohol use:  Yes  
   Alcohol/week: 21.0 standard drinks  
  Types: 7 Shots of liquor, 14 Standard drinks or equivalent per week  
• Drug use: No  
• Sexual activity: Not on file  
Lifestyle  
• Physical activity  
  Days per week: Not on file  
  Minutes per session: Not on file  
• Stress: Not on file  
Relationships  
• Social connections  
  Talks on phone: Not on file  
  Gets together: Not on file  
  Attends [employer]ars[employer] service: Not on file  
  Active member of club or organization: Not on file  
  Attends meetings of clubs or organizations: Not on file  
  Relationship status: Not on file  
• Intimate partner violence  
  Fear of current or ex partner: Not on file  
  Emotionally abused: Not on file  
  Physically abused: Not on file  
  Forced sexual activity: Not on file  
Other Topics Concern  
• Not on file  
Social History Narrative  
 There is no significant environmental or industrial exposure.  She has no known exposure to TB.  She has worked as an .  
 
Current Facility-Administered Medications  
Medication Dose Route Frequency Provider Last Rate Last Dose  
• budesonide (PULMICORT) 500 mcg/2 ml nebulizer suspension  500 mcg Nebulization BID RT Renata Gaspar DO   500 mcg at 10/26/20 2033  
• albuterol (PROVENTIL VENTOLIN) nebulizer solution 2.5 mg  2.5 mg Nebulization Q6H RT Renata Gaspar DO   Stopped at 10/27/20 0104  
• furosemide (LASIX) injection 40 mg  40 mg IntraVENous DAILY Renata Gaspar DO      
• LORazepam (ATIVAN) tablet 0.5 mg  0.5 mg Oral BID PRN Renata Gaspar DO      
• montelukast (SINGULAIR) tablet 10 mg  10 mg Oral DAILY Renata Gaspar DO   10 mg at 10/26/20 1645  
• carvediloL (COREG) tablet 3.125 mg  3.125 mg Per NG tube BID WITH MEALS Renata Gaspar DO   3.125 mg at 10/26/20 1646  
• levothyroxine (SYNTHROID) tablet 75 mcg  75 mcg Per NG tube ACB Renata Gaspar DO   75 mcg at 10/27/20 0632  
  lisinopriL (PRINIVIL, ZESTRIL) tablet 2.5 mg  2.5 mg Per NG tube DAILY Clinton Offer, DO   2.5 mg at 10/26/20 1645  famotidine (PF) (PEPCID) 20 mg in 0.9% sodium chloride 10 mL injection  20 mg IntraVENous Q24H Laura Offer, DO      
 metoprolol (LOPRESSOR) injection 5 mg  5 mg IntraVENous Q4H PRN Laura Offer, DO   5 mg at 10/26/20 1525  
 NUTRITIONAL SUPPORT ELECTROLYTE PRN ORDERS   Does Not Apply PRN Clinton Offer, DO      
 fluticasone propionate (FLONASE) 50 mcg/actuation nasal spray 2 Spray  2 Spray Both Nostrils DAILY Ayush Calzada MD   2 [place] at 10/26/20 6051  sodium chloride (NS) flush 5-40 mL  5-40 mL IntraVENous Q8H Ayush Calzada MD   10 mL at 10/27/20 5194  sodium chloride (NS) flush 5-40 mL  5-40 mL IntraVENous PRN Ayush Calzada MD      
 ondansetron [facility]) injection 4 mg  4 mg IntraVENous Q6H PRN Ayush Calzada MD      
 acetaminophen (TYLENOL) suppository 650 mg  650 mg Rectal Q4H PRN Ayush Calzada MD      
 LORazepam (ATIVAN) injection 2 mg  2 mg IntraVENous Q2H PRN Ayush Calzada MD      
 albuterol-ipratropium (DUO-NEB) 2.5 MG-0.5 MG/3 ML  3 mL Nebulization Q4H PRN Ayush Calzada MD   3 mL at 10/26/20 9323  dextrose 5% - LR with KCl 20 mEq/L infusion   IntraVENous CONTINUOUS Clinton Offer, DO 25 mL/hr at 10/26/20 5149  levETIRAcetam (KEPPRA) 500 mg in 0.9% sodium chloride (MBP/ADV) 100 mL  500 mg IntraVENous Q12H Chaya Zayas NP   500 mg at 10/26/20 2147  
 dexamethasone (DECADRON) 10 mg/mL injection 6 mg  6 mg IntraVENous Q6H June Hams Fernandez, DO   6 mg at 10/27/20 5832  levalbuterol (XOPENEX) nebulizer soln 1.25 mg/3 mL  1.25 mg Nebulization Q6H PRN Ayush Calzada MD      
 
 
OBJECTIVE: 
Patient Vitals for the past 8 hrs: 
 BP Pulse Resp SpO2  
10/27/20 0700 (!) 148/68 (!) 108 22 93 % 10/27/20 0559 (!) 160/77 (!) 111 23 93 % 10/27/20 0544 133/72 (!) 102 21 93 % 10/27/20 0528 (!) 164/82 (!) 109 20 93 % 10/27/20 0513 (!) 160/63 (!) 114 (!) 32 92 % 10/27/20 0459 (!) 140/66 (!) 107 19 94 % 10/27/20 0444 138/66 (!) 105 20 92 % 10/27/20 0428 (!) 156/70 (!) 110 22 92 % 10/27/20 0413 (!) 167/72 (!) 112 24 92 % 10/27/20 0400 (!) 144/68 (!) 112 22 92 % 10/27/20 0344 (!) 153/72 (!) 113 25 93 % 10/27/20 0328 (!) 109/54 (!) 118 25 93 % 10/27/20 0313 (!) 100/54 (!) 104 20 91 % 10/27/20 0259 120/60 (!) 106 20 92 % 10/27/20 0244 (!) 143/71 (!) 109 21 91 % 10/27/20 0228 (!) 144/74 (!) 109 21 92 % 10/27/20 0213 (!) 141/70 (!) 107 23 93 % 10/27/20 0159 (!) 142/69 (!) 104 22 93 % 10/27/20 0144 (!) 148/71 (!) 108 23 92 % 10/27/20 0128 (!) 145/74 (!) 108 23 93 % 10/27/20 0113 137/68 (!) 106 21 92 % 10/27/20 0059 139/68 (!) 110 23 93 % 10/27/20 0044 (!) 142/65 (!) 104 22 94 % 10/27/20 0028 130/60 (!) 104 22 91 % Temp (24hrs), Av.6 °F (36.4 °C), Min:97.5 °F (36.4 °C), Max:97.8 °F (36.6 °C) No intake/output data recorded. Physical Exam: 
Constitutional: Well developed, well nourished female in no acute distress, awake and alert sitting in a chair HEENT: Normocephalic and atraumatic. Oropharynx is clear, mucous membranes are moist. Extraocular muscles are intact. Sclerae anicteric. Neck supple without JVD. No thyromegaly present. Lymph node No palpable submandibular, cervical, supraclavicular, axillary or inguinal lymph nodes. Skin Warm and dry. No bruising and no rash noted. No erythema. No pallor. Respiratory  decreased breath sounds left side CVS Normal rate, regular rhythm and normal S1 and S2. No murmurs, gallops, or rubs. Abdomen Soft, nontender and nondistended, normoactive bowel sounds. No palpable mass. No hepatosplenomegaly. Neuro  expressive aphasia. The patient seems to understand conversation and will attempt to respond. Profound right-sided weakness right arm greater than right leg. MSK Normal range of motion in general.  No edema and no tenderness. Psych  difficult to determine Labs:   
Recent Results (from the past 24 hour(s)) METABOLIC PANEL, BASIC Collection Time: 10/26/20 10:04 AM  
Result Value Ref Range Sodium 137 136 - 145 mmol/L Potassium 4.3 3.5 - 5.1 mmol/L Chloride 104 98 - 107 mmol/L  
 CO2 23 21 - 32 mmol/L Anion gap 10 7 - 16 mmol/L Glucose 182 (H) 65 - 100 mg/dL BUN 25 (H) 8 - 23 MG/DL Creatinine 0.99 0.6 - 1.0 MG/DL  
 GFR est AA >60 >60 ml/min/1.73m2 GFR est non-AA 59 (L) >60 ml/min/1.73m2 Calcium 9.2 8.3 - 10.4 MG/DL  
NT-PRO BNP Collection Time: 10/26/20 10:04 AM  
Result Value Ref Range NT pro-BNP 33,040 (H) 5 - 965 PG/ML  
METABOLIC PANEL, BASIC Collection Time: 10/27/20  3:34 AM  
Result Value Ref Range Sodium 137 136 - 145 mmol/L Potassium 5.3 (H) 3.5 - 5.1 mmol/L Chloride 107 98 - 107 mmol/L  
 CO2 22 21 - 32 mmol/L Anion gap 8 7 - 16 mmol/L Glucose 158 (H) 65 - 100 mg/dL BUN 37 (H) 8 - 23 MG/DL Creatinine 1.05 (H) 0.6 - 1.0 MG/DL  
 GFR est AA >60 >60 ml/min/1.73m2 GFR est non-AA 55 (L) >60 ml/min/1.73m2 Calcium 9.6 8.3 - 10.4 MG/DL  
CBC W/O DIFF Collection Time: 10/27/20  5:54 AM  
Result Value Ref Range WBC 22.3 (H) 4.3 - 11.1 K/uL  
 RBC 3.77 (L) 4.05 - 5.2 M/uL  
 HGB 11.7 11.7 - 15.4 g/dL HCT 34.8 (L) 35.8 - 46.3 % MCV 92.3 79.6 - 97.8 FL  
 MCH 31.0 26.1 - 32.9 PG  
 MCHC 33.6 31.4 - 35.0 g/dL  
 RDW 13.9 11.9 - 14.6 % PLATELET 407 780 - 982 K/uL MPV 9.5 9.4 - 12.3 FL ABSOLUTE NRBC 0.00 0.0 - 0.2 K/uL Imaging: XR ABD (KUB) [[id number]]  Collected: 10/26/20 1308 Order Status: Completed  Updated: 10/26/20 1312 Narrative:     
Abdomen for NG tube placement, one view, 10/26/2020 at 1216 hours A single view upper and left abdomen obtained. There is an NG tube.  It is kinked  
at the gastroesophageal junction with the tip directed superiorly overlying the  
 distal esophagus. This will probably require removing the tube and reinserting  
for proper positioning. Basilar infiltrates noted XR BARIUM SWALLOW [facility] VIDEO [[id number]] Order Status: No result MRI BRAIN W WO CONT [[id number]]  Collected: 10/25/20 1158 Order Status: Completed  Updated: 10/25/20 1209 Narrative:     
MRI brain with and without contrast  
 
History: Abnormal CT. History of lymphoma.  Right hemiparesis Imaging sequences: Sagittal short TR/short TE, axial short TR/short TE, long TR/long TE, FLAIR, gradient recall, diffusion weighted images and ADC mapping. Axial and coronal short TR/short TE postcontrast images. Imaging was performed  
on a 1.5 Vicky magnet, utilizing the uneventful administration of 10 mL of  
intravenous [place] and order to better evaluate for intracranial pathology. Comparison: None. Correlation is made to the CT scan of the brain performed  
earlier on the same day. Findings: There is an enhancing mass within the left frontal region measuring  
approximately 2.3 x 2.2 x 2.3 cm in AP, transverse and craniocaudal dimensions,  
recently. This is a broad-based dural attachment. There is significant  
surrounding edema. This appears at least in part to be extra-axial although a  
more cystic component cannot be stated as such. There are 2 enhancing  
parenchymal lesions within the tracy with the larger measuring 1.0 cm with  
associated edema. There are 2 right occipital lobe mass with the larger  
measuring up to 1.3 cm, also with surrounding edema. There are enhancing right  
frontal bone lesions measuring up to 2.1 cm in size. The ventricles and sulci are normal in size and configuration.  There are no  
extra-axial fluid collections.  Normal flow voids are present within all of the  
major intracranial vessels. There is no evidence of intraparenchymal hemorrhage.   
There are subcentimeter foci of restricted diffusion within the right occipital  
and parietal lobes raising concern for small foci of acute infarctions. Additional subtle restricted diffusion is present medial to the edema within the  
left frontal region at the left corona radiata and extending into the external  
capsule.    
 
Scattered foci of T2 hyperintensity within the supratentorial white matter most  
likely represent the sequela of chronic small vessel ischemic change,  
unremarkable for age. The visualized mastoid air cells and paranasal sinuses are  
well pneumatized and aerated. Impression:     
IMPRESSION:  
1. Several enhancing masses with the largest within the left frontal region  
which may have an extra axial component with significant surrounding edema. 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 There  
are also enhancing right frontal bone lesions presumably representing part of  
the same process. 2. Small acute right occipital and parietal lobe infarcts. Additional infarcts  
are suspected within the left corona radiata/external capsule. XR CHEST PORT [[id number]]  Collected: 10/25/20 5038 Order Status: Completed  Updated: 10/25/20 8213 Narrative:     
EXAM: CHEST X-RAY, 1 VIEW INDICATION: stroke. COMPARISON: Chest x-ray 10/20/2020 TECHNIQUE: Single AP view of the chest was obtained. FINDINGS: Interval worsening of ill-defined perihilar markings throughout both  
lungs. Bilateral pleural effusions also appear to be enlarging. The cardiac  
silhouette is partially obscured. No pneumothorax. The bones are unchanged. Impression:     
IMPRESSION:  
Interval worsening of pulmonary edema and enlargement of bilateral effusions. Coexisting infection difficult to fully exclude by x-ray. CTA CODE NEURO HEAD AND NECK W CONT [[id number]]  Collected: 10/25/20 4307 Order Status: Completed  Updated: 10/25/20 1871 Narrative:     
History: Right-sided weakness and difficulty with speech.   
 
FINDINGS:  
 CT angiography was performed of the neck and head with contrast and  
three-dimensional CT angiography reconstruction and reformat was performed. NASCET criteria as needed. CT dose reduction was achieved through use of a  
standardized protocol tailored for this examination and automatic exposure  
control for dose modulation. Bilateral pleural effusion. Parenchymal nodularity in the left upper lobe. There is extensive atherosclerotic ectasia of the imaged segments of the  
thoracic aorta. The ascending aorta measures 2.8 cm. There is a mild stenosis of  
the proximal descending thoracic aorta related to concentric noncalcified  
atheroma. There is a right-sided aortic arch with an occluded right common  
carotid artery and occluded right subclavian artery. The right vertebral artery  
reconstitutes via collaterals. Reversal of flow is not excluded by CT angiogram.  
 
The innominate artery is patent. The left common carotid artery is patent. The  
left internal carotid artery is patent. There is atherosclerosis at the left  
carotid bulb without hemodynamically significant stenosis. There is a moderate  
stenosis in the supraclinoid segment of the left internal carotid artery. The right internal carotid artery reconstitutes at the right carotid bulb. The  
right middle cerebral artery is patent. There is short segment occlusion in the M1 segment of the left middle cerebral  
artery. The posterior cerebral arteries are patent. Dural venous sinuses are patent. Small left transverse and sigmoid sinus. Multiple enhancing lesions within the brainstem and cerebrum. The largest is in  
the left middle cerebral artery territory. Impression:     
IMPRESSION:  
 
Short segment occlusion of the left middle cerebral artery. Finding reported to  
the emergency room bedside physician at the time of this report. Multiple enhancing lesions in the brainstem and cerebrum. Bilateral pleural effusion. Extensive atherosclerosis of the aorta with occlusion of the right subclavian  
and the right common carotid arteries. This is chronic dating back to June 2019. CT Perfusion w/ Cerebral Blood Flow [[id number]]  Collected: 10/25/20 0725 Order Status: Completed  Updated: 10/25/20 0730 Narrative:     
CT Perfusion Imaging INDICATION:  Acute neuro changes. Right-sided weakness. CT perfusion imaging of the brain was performed after the administration of  
intravenous contrast.  Perfusion maps and perfusion analysis output were  
generated using the [employer] perfusion processing software algorithm.   Radiation  
dose reduction techniques were used for this study:  All CT scans performed at  
this facility use one or all of the following: Automated exposure control,  
adjustment of the mA and/or kVp according to patient's size, iterative  
reconstruction. COMPARISON: None. Correlation is made to the CTA and CT brain performed on the  
same day. [facility] DC Output Values: CBF < 30% volume:  3 ml   (core infarction volume greater than 50 cc associated  
with poor outcomes) Tmax > 6 seconds: 41 ml Tmax/CBF Mismatch Volume: 38 ml Tmax/CBF Mismatch Ratio: 13.7 Hypoperfusion Intensity Ratio (Tmax > 10 seconds/Tmax > 6 seconds): 0.3    
(values greater than 0.5 associated with poor outcome) Tmax > 10 seconds Volume: 11 ml   (volume greater than 100 mL is associated with  
poor outcome) Impression:     
IMPRESSION: Although there are changes suggesting small core infarct and  
penumbra within the left frontal lobe the findings are felt to correspond to a  
mass with vasogenic edema on CT scanning. Other intracranial masses are present  
as well. Please note that the determination of patient treatment is not based solely upon  
imaging factors or calculation values.  Management of ischemia is at the  
 discretion of the primary physician and is based upon a combination of clinical  
and imaging data, along other factors. CT HEAD W CONT [[id number]] Order Status: Canceled CT CODE NEURO HEAD WO CONTRAST [[id number]]  Collected: 10/25/20 6858 Order Status: Completed  Updated: 10/25/20 4000 Narrative:     
CT head without contrast  
 
History: Acute right-sided weakness, speech disturbance. Technique: 5mm axial images were obtained from the skull base to the vertex  
without intravenous contrast.  Radiation dose reduction techniques were used for  
this study:  Our CT scanners use one or all of the following: Automated exposure  
control, adjustment of the mA and/or kVp according to patient's size, iterative  
reconstruction. Comparison: None Findings: The ventricles and sulci are normal in size and configuration. There  
are no extra-axial fluid collections. There is a region of decreased attenuation  
within the left frontal lobe which involves primarily white matter but also a  
component of the cortex. There is a 1 cm cystic region also in close association  
with this. There is 2 mm of left-to-right midline shift There is no evidence of  
intraparenchymal hemorrhage. The bony calvarium is intact. The visualized  
mastoid air cells and paranasal sinuses are well pneumatized and aerated. Impression:     
Impression: Low attenuation within the left frontal lobe may represent an acute  
or subacute infarct however the possibility of this representing vasogenic edema  
from an underlying mass is not excluded. Postcontrast imaging would be  
recommended for further evaluation. ASSESSMENT: 
Problem List  Date Reviewed: 10/19/2020 Codes Class Noted Expressive aphasia ICD-10-CM: R47.01 
ICD-9-CM: 784.3  10/25/2020 * (Principal) Acute right-sided weakness ICD-10-CM: R53.1 ICD-9-CM: 728.87  10/25/2020  Frontal mass of brain ICD-10-CM: G93.89 
 ICD-9-CM: 348.89  10/25/2020 Acute encephalopathy ICD-10-CM: G93.40 ICD-9-CM: 348.30  10/25/2020 Multiple lesions on CT of brain and spine ICD-10-CM: R93.0, R93.7 ICD-9-CM: 793.0, 793.7  10/25/2020 Hypokalemia ICD-10-CM: E87.6 ICD-9-CM: 276.8  10/25/2020 Acute CVA (cerebrovascular accident) ([facility] [address] ICD-10-CM: I63.9 ICD-9-CM: 434.91  10/25/2020 Multiple lung nodules on CT (Chronic) ICD-10-CM: R91.8 ICD-9-CM: 793.19  10/12/2020 Overview Addendum 10/12/2020  2:33 PM by My Parrish NP  
  -PET scan 10/2019: PET scan fortunately did not have any abnormal activity in her mass in the left lung. This may mean that we can just follow this radiographically, but we will talk about her at tumor board and come back to her with the group's recommendation. 
-10/30/2019: Patient is discussed at thoracic conference today lead by  [employer]. Patient is a 79 yo never smoker. CT guided biopsy recommended. -CT guided Biopsy 11/2020: lung biopsy showed signs of this nodule being a hamartoma, which is a benign tumor that we can simply follow. repeat CT in 6 months 
-Chest CT June 2020: CT scan is stable 
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 Jovan Spatz are several new pulmonary nodules noted which may suggest some metastatic disease.  This require further follow-up CT of the chest in 2 months or doing PET scan. Hamartoma (HCC) (Chronic) ICD-10-CM: Q85.9 ICD-9-CM: 759.6  10/12/2020 Peripheral vascular disease ([facility] [address] ICD-10-CM: I73.9 ICD-9-CM: 443.9  10/12/2020 Bilateral pleural effusion ICD-10-CM: J90 ICD-9-CM: 511.9  10/7/2020 Overview Signed 10/13/2020  1:25 PM by DENI Joseph  
  10/3/2020: L thoracentesis 1050mL removed R thoracentesis 800mL removed 10/9/2020: L thoracentesis: 1000mL removed R thoracentesis: 600mL removed Acute systolic heart failure (HCC) ICD-10-CM: I50.21 ICD-9-CM: 428.21  10/6/2020 PNA (pneumonia) ICD-10-CM: J18.9 ICD-9-CM: 647  10/1/2020 Mass of lingula of lung ICD-10-CM: R91.8 ICD-9-CM: 786.6  10/16/2019 Right carotid artery occlusion ICD-10-CM: Q11.45 ICD-9-CM: 433.10  10/11/2019 Subclavian artery stenosis (HCC) ICD-10-CM: I77.1 ICD-9-CM: 447.1  10/11/2019 Left carotid stenosis ICD-10-CM: P22.09 
ICD-9-CM: 433.10  10/12/2018 Hypercholesterolemia (Chronic) ICD-10-CM: E78.00 ICD-9-CM: 272.0  6/30/2015 Dysphagia (Chronic) ICD-10-CM: R13.10 ICD-9-CM: 787.20  6/30/2015 Mild persistent asthma without complication (Chronic) DIANE-83-BA: J45.30 ICD-9-CM: 493.90  10/17/2013 Acquired hypothyroidism (Chronic) ICD-10-CM: E03.9 ICD-9-CM: 244.9  10/17/2013 Allergic rhinitis (Chronic) ICD-10-CM: J30.9 ICD-9-CM: 477.9  10/17/2013 GERD (gastroesophageal reflux disease) (Chronic) ICD-10-CM: K21.9 ICD-9-CM: 530.81  10/17/2013 RECOMMENDATIONS: 
Hx Hodgkin's Lymphoma 
-  
 
Brain Masses / CVA 
- MRI brain with several enhancing masses with largest in L frontal region, enhancing R frontal bone lesions; and small acute R occipital and parietal lobe infarcts 
- Neuro/neurosurgery following - brain masses not amenable to surgical resection - On Dex 6mg q 6 hours and Keppra - Rad Onc consult pending - Check CT AP for staging L lung mass/hamartoma - Pt had CT chest on 9/21/20 with L lung mass with multiple pulm nodules and sclerotic focus at T6. Lung mass has been followed by pulm since 2019. Had bx of L lung mass on 11/11/2019 with path +hamartoma. 
- Dr. Mi Crow to ask IR if bx of lung nodule possible. Will also check CT AP. Lab studies and imaging studies were personally reviewed. Thank you for allowing us to participate in the care of Ms. Burke Davis. Lisa Fernandez NP [facility] [address] [address] Office : [phone] Fax : [phone] Attending Addendum: 
I personally evaluated the patient and discussed with Sami Cisse N.P.,  and agree with the assessment, findings and plan as documented. Ms. Isiah Donahue has been followed for a left lower lobe lung mass pathologically proven to be a hamartoma. In the interval subsequent to that diagnosis, she has developed a new neurologic process secondary to brain metastases with the most prominent lesion falling in the left frontal area with edema likely associated with her aphasia and right-sided weakness. Etiology of this lesion and other lesions in her brain is unclear although there are also new findings in her lungs consisting of smaller additional nodules and a pleural effusion which has been tapped and negative for malignancy and quite frankly somewhat transudate of in nature in the context of pulmonary congestion. There is a question of a lesion at T6. In looking at the CT of her chest, there is a question in my mind of a upper outer quadrant left breast mass. I do not feel this on exam and the patient had previously undergone gone a stereotactic biopsy for calcifications which was negative. The patient does seem to have a metastatic malignancy although the origins of this malignancy are unclear. I have spoken with interventional radiology who feel that the lesions in the lung are probably too small and too difficult to get but they would be willing to give that a try if there was nothing else feasible. An alternative would certainly be an attempt to biopsy one of the brain lesions but this too is obviously suboptimal if other locations are amenable to sampling. She has not had any staging of her abdomen and pelvis and we have requested that CT scan to evaluate that possibility.   I will also discuss with pulmonary but I suspect an endobronchial procedure is not likely to provide information on the small pulmonary nodules. I agree with consulting radiation oncology although ideally I would like to have a diagnosis to better coordinate her care. However given the symptomatic nature of her brain metastases, the pragmatic approach may be to proceed with radiation therapy even before diagnosis of acceptable to them. We will continue to follow with you. In addition to the above I would consider sending her for an ultrasound and possible biopsy of the left breast if the presumptive mass is identified. Geri Shaw MD FACP Oncology and Hematology  [address] 
P [phone] F [phone] 
[email or web address] [facility]St[facility]

## 2020-10-26 NOTE — CONSULTS
Comprehensive Nutrition Assessment Type and Reason for Visit: Initial, Consult - TF Management for Hospitalists. Nutrition Recommendations/Plan: Start TF with Glucerna 1.5 @ 10 ml/hr with a 30 ml/hr water flush and advance as tolerated to the goal rate of 40 ml/hr - 1440 calories/day, 79 gm protein/day in 1450 ml water/day. Nutrition Assessment:  Ms. Miah Toscano is a 69 y.o. female admitted on 10/25/2020 with a primary diagnosis of brain masses. Her PMH includes HTN, GERD, Hodgkin's lymphoma, hypothyroidism, sCHF, and recently dx lung hamartomas. CT chest from 9/21/2020 with L lung mass and sclerotic focus at T6. She presented to ED with c/o acute onset R-sided weakness, aphasia, and confusion. She was recently discharged on 10/21 after being treated for pneumonia. MRI brain with several enhancing masses with largest in L frontal region, enhancing R frontal bone lesions; and small acute R occipital and parietal lobe infarcts. CTA head/neck with short segment occlusion of MCA. CXR with interval worsening of pulm edema and b/l pleural effusion. Neuro/neurosurgery following. Brain masses not amenable to surgical resection Estimated Daily Nutrient Needs: 
Energy (kcal):  5130-3417 (25-30 brandon/kg/day using 54 kg) Protein (g):  54-65 (1-1.2 gm pro/kg/day using 54 kg) Nutrition Related Findings:  10/26: Bedside swallow evaluation yielded NPO due to fear of aspiration. Abdomen: semi-soft with active bowel sounds. Last bowel movement: unknown. Labs: AM glucose: 182. Meds: Decadron, lasix. Current Nutrition Therapies: DIET NPO Anthropometric Measures: 
· Height:  5'6\" · Current Body Wt:  53.9 kg (118 lb 13.3 oz)(unknown weight source) · Ideal Body Wt: 59 kg/130 lbs. · BMI:  19.2 · BMI Category:  Underweight in a person>72years of age. Nutrition Diagnosis: · Swallowing difficulty related to cognitive or neurological impairment as evidenced by nutrition support-enteral nutrition, swallowing study results Nutrition Interventions:  
Food and/or Nutrient Delivery: Continue NPO, Start tube feeding Coordination of Nutrition Care: Continued inpatient monitoring, Interdisciplinary rounds Goals: 
Meet >75% of daily nutrition needs via TF. Nutrition Monitoring and Evaluation:  
Food/Nutrient Intake Outcomes: Enteral nutrition intake/tolerance Discharge Planning: Too soon to determine Electronically signed by Za Garcia RD, LD, [address]  on 10/26/2020 at 3:54 PM 
Contact: [phone]

## 2020-10-27 PROBLEM — I50.20 SYSTOLIC HEART FAILURE (HCC): Status: ACTIVE | Noted: 2020-01-01

## 2020-10-27 PROBLEM — E87.6 HYPOKALEMIA: Status: RESOLVED | Noted: 2020-01-01 | Resolved: 2020-01-01

## 2020-10-27 NOTE — PROGRESS NOTES
Problem: Dysphagia (Adult) Goal: *Speech Goal: (INSERT TEXT) Outcome: Progressing Towards Goal 
LTG: Patient will tolerate least restrictive diet without overt signs or symptoms of airway compromise. STG: Patient will participate in modified barium swallow study to objectively assess swallow function. Goal met 10/27/20 STG: Patient will consume chopped mechanical soft diet and thin liquids by cup without overt signs or symptoms of respiratory compromise. Goal added 10/27/20 SPEECH LANGUAGE PATHOLOGY: MODIFIED BARIUM SWALLOW STUDY Initial Assessment NAME/AGE/GENDER: Rick Santana is a 69 y.o. female DATE: 10/27/2020 PRIMARY DIAGNOSIS: Frontal mass of brain [G93.89] Acute right-sided weakness [R53.1] Expressive aphasia [R47.01] Acute encephalopathy [G93.40] Multiple lesions on CT of brain and spine [R93.0, R93.7] ICD-10: Treatment Diagnosis: Oropharyngeal dysphagia (R13.12) INTERDISCIPLINARY COLLABORATION: Radiologist, Dr. Lemon Mems PRECAUTIONS/ALLERGIES: Compazine [prochlorperazine edisylate] RECOMMENDATIONS/PLAN  
DIET:  
? PO diet texture:  Mechanical soft with chopped meat and vegetables, with extra sauces/gravies ? Liquids:  regular thin, by cup only MEDICATIONS: Crushed in puree COMPENSATORY STRATEGIES/MODIFICATIONS INCLUDING: 
· Fully awake/alert · Supervision with all PO 
· Small bites and sips · Cup/sip · No straws · Remain upright for 20-30 min after any PO · Slow rate of PO intake · Check mouth for pocketed PO 
· Liquid wash OTHER RECOMMENDATIONS (including follow up treatment recommendations): · Treatment to improve/facilitate oral/pharyngeal skills · Training in use of compensatory safe swallowing strategies/feeding guidelines · Patient/family education RECOMMENDATIONS for CONTINUED SPEECH THERAPY:  
YES: Anticipate need for ongoing speech therapy during this hospitalization and at next level of care. FREQUENCY/DURATION: Continue to follow patient 3 times a week for duration of hospital stay to address above goals. Recommendations for next treatment session: Next treatment will address diet tolerance and cognitive goals ASSESSMENT Ms. Sloane Barrientos presents with mild-moderate oral dysphagia as evidenced by right anterior oral leakage and inability to create enough seal to draw liquid through straw. A single instance of transient laryngeal penetration was observed prior to first swallow of thin liquid by cup that spontaneously cleared during the swallow and was not replicated throughout rest of the study. Minimal residue lining valleculae, pyriform sinuses and posterior pharyngeal wall along the NGT after swallow of mixed consistency was cleared completely with liquid rinse. Recommend chopped mechanical soft diet and thin liquids presented by cup. Crush medications. SUBJECTIVE History of Present Injury/Illness: Ms. Sloane Barrientos  has a past medical history of [facility]), Asthma, Bite of nonvenomous arthropod (???), Cough, Esophageal stricture (2002), GERD (gastroesophageal reflux disease), History of rectal bleeding (???), Hodgkin's [facility] ([facility]), Hypercholesterolemia, Menopause, Positive RAST testing (2007), and Thyroid disease. She also has no past medical history of Chronic kidney disease. . She also  has a past surgical history that includes hx tonsillectomy (1954); hx splenectomy (1973); hx appendectomy (1973); hx cataract removal (Bilateral, 2007, 2009); hx colonoscopy; hx other surgical; and hx breast biopsy (Left, 05/21/2020). Pain: 
Pain Intensity 1: 0 Current dietary status prior to evaluation today:  NPO Previous Modified Barium Swallow studies: none reported Current Medications: No current facility-administered medications on file prior to encounter. Current Outpatient Medications on File Prior to Encounter Medication Sig Dispense Refill  midodrine (PROAMATINE) 2.5 mg tablet Take 2.5 mg by mouth three (3) times daily (with meals).  fludrocortisone (FLORINEF) 0.1 mg tablet Take 0.1 mg by mouth daily.  LORazepam (ATIVAN) 0.5 mg tablet Take 1 Tab by mouth two (2) times daily as needed for Anxiety. Max Daily Amount: 1 mg. 60 Tab 1  
 lisinopriL (PRINIVIL, ZESTRIL) 2.5 mg tablet Take 1 Tab by mouth daily for 30 days. 30 Tab 0  
 metoprolol succinate (TOPROL-XL) 25 mg XL tablet Take 1 Tab by mouth daily for 30 days. 30 Tab 0  
 furosemide (LASIX) 40 mg tablet Take 1 Tab by mouth daily for 30 days. 30 Tab 0  
 fluticasone propionate (FLONASE) 50 mcg/actuation nasal spray 2 Sprays by Both Nostrils route daily. 3 Bottle 3  
 levothyroxine (SYNTHROID) 75 mcg tablet Take 1 Tab by mouth Daily (before breakfast). Except 1/2 on Monday 90 Tab 4  
 simvastatin (ZOCOR) 20 mg tablet Take 1 Tab by mouth nightly. 90 Tab 4  
 Omeprazole delayed release (PRILOSEC D/R) 20 mg tablet Take 1 Tab by mouth daily. 90 Tab 4  
 albuterol (Ventolin HFA) 90 mcg/actuation inhaler Take 2 Puffs by inhalation every four (4) hours as needed for Wheezing. For wheezing. 3 Inhaler 3  
 fluticasone furoate-vilanteroL (Breo Ellipta) 200-25 mcg/dose inhaler Take 1 Puff by inhalation daily. 3 Inhaler 3  
 montelukast (SINGULAIR) 10 mg tablet Take 1 Tab by mouth daily. 90 Tab 3  
 baclofen (LIORESAL) 10 mg tablet Take 1 Tab by mouth three (3) times daily as needed for Muscle Spasm(s). 30 Tab 5  
 melatonin 5 mg tablet Take 5 mg by mouth nightly.  naproxen sodium (NAPROSYN) 220 mg tablet Take 220 mg by mouth two (2) times daily (with meals).  acetaminophen (Tylenol Arthritis Pain) 650 mg TbER Take 650 mg by mouth every eight (8) hours.  levocetirizine (XYZAL) 5 mg tablet Take 1 Tab by mouth daily. 90 Tab 3  
 aspirin delayed-release 81 mg tablet Take 81 mg by mouth daily.  multivitamin (ONE A DAY) tablet Take 1 Tab by mouth daily. OBJECTIVE Orientation:  
? Patient essentialliy nonverbal, but did correctly acknowledge her name Oral Assessment:  Labial: Right droop Dentition: Natural and Intact Oral Hygiene: Oral care provided to remove thick mucous Lingual: Decreased rate and Impaired coordination Vocal Quality: No verbalizations Modified barium swallow study was performed in the radiology suite with Ms. Jovana Roy in the lateral plane seated upright 90° in the [address] [place] [address] chair. To evaluate her swallow function, barium coated liquid and food was administered in the form of thin liquids (by spoon, cup sip, cup gulp and straw sip), pudding and mixed consistency. Oral phase of swallow: ? Inadequate labial seal 
? inability to draw liquid from straw 
? anterior loss ? premature spillage to pharynx pre-swallow Pharyngeal phase of swallow: ? Swallows of thin liquids were triggered at valleculae. There was transient laryngeal penetration observed before the swallow from initial cup sip that was cleared spontaneously during the swallow. No aspiration was observed. No pharyngeal residue after swallow. ? Swallows of pudding were timely. No laryngeal penetration or aspiration was observed. No pharyngeal residue after swallow. ? Swallows of mixed consistencies were delayed and with pooling in valleculae and pyriform sinuses for 4 seconds prior to swallow initiation. No laryngeal penetration or aspiration was observed. Residue was observed after the swallow in valleculae, along posterior pharyngeal wall and in pyriform sinuses. Residue   was mild. Residue cleared with liquid rinse of thin consistency. Pharyngeal characteristics: 
? adequate retraction of base of tongue 
? adequate hyolaryngeal elevation/excursion 
? adequate epiglottic inversion 
? adequate constriction of posterior pharyngeal wall 
? adequate laryngeal closure Attempted strategies:  
? liquid rinse Effective strategies:  
? liquid rinse Aspiration/Penetration Scale: 2 (Penetration/no residue. Contrast enters the larynx, remains above the folds/cords, and is cleared.) Cervical esophageal phase of swallow:  
? signs of reduced bolus clearance through cervical esophagus along NGT 
**Distal esophagus not assessed due to limitations of MBS study. Assessment/Reassessment only, no treatment provided today. Tool Used: Dysphagia Outcome and Severity Scale (MONICA) Comments Normal Diet With no strategies or extra time needed Functional Swallow May have mild oral or pharyngeal delay Mild Dysphagia Which may require one diet consistency restricted Mild-Moderate Dysphagia With 1-2 diet consistencies restricted Moderate Dysphagia With 2 or more diet consistencies restricted Moderately Severe Dysphagia With partial PO strategies (trials with ST only) Severe Dysphagia With inability to tolerate any PO safely Score:  Initial: 2 Most Recent: 5 (Date:10/27/2020) Interpretation of Tool: The Dysphagia Outcome and Severity Scale (MONICA) is a simple, easy-to-use, 7-point scale developed to systematically rate the functional severity of dysphagia based on objective assessment and make recommendations for diet level, independence level, and type of nutrition. Payor: Sridevi Mes[place] / Plan: [facility] MEDICARE CHOICE PPO/PFFS / Product Type: Managed Care Medicare /  
 
Education: · Recommendations discussed with patient and RN. Safety: After treatment position/precautions: 
· RN available for transport back to room. Total Treatment Duration: 
Time In: 0314 Time Out: 1125 Henri Howell MA, CCC-SLP

## 2020-10-27 NOTE — PROGRESS NOTES
Hospitalist Note Admit Date:  10/25/2020  6:56 AM  
Name:  Sheri Mata Age:  69 y.o. 
:  1950 MRN:  [id number] PCP:  Wayne Ballard MD 
Treatment Team: Attending Provider: Mirna Pretty MD; Consulting Provider: Lynsey Johnson DO; Consulting Provider: Kam Castle NP; Care Manager: Laura Quiroga, RN; Utilization Review: Maria R Khalil RN; Consulting Provider: Erma Umaña MD; Consulting Provider: Kina Farnsworth NP; Primary Nurse: Cesar Elaine; Consulting Provider: Nola Decker MD; Speech Language Pathologist: Nunu Romano, LIAS 
 
HPI/Subjective: This is a 69years old female with hx of HTN, GERD, Hodgkin's lymphoma s/p Radiation Rx, dyslipidemia, hypothyroidism, asthma, systolic CHF EF 69-79% recently diagnosed lung hamartomas presented to ER with acute onset of right sided weakness, aphasia and confusion that began this AM. Pt was recently discharged from [facility] on 10/21 after being treated for PNA and was doing okay until today. Pt is not able to provide much history, she has sort of blank stare but can nod to yes or no questions. Per , pt was walking, doing ADL's  Until yesterday but this morning a drastic change in her mentation and speech was noted by him. ER work up showed CT evidence of enhancing 2.2 cm left frontal lobe mass with vasogenic edema with 2 mm left to right midline shift with 2 additional small masses in tracy and right occipital lobe. CTA head/neck short segment occlusion of left MCA with some changes suggestive of small core infarct and penumbra within the left frontal lobe, no evidence of intraparenchymal hemorrhage. CXR with interval worsening of pulmonary edema and bilateral pleural effusion. \" 
  
10/26 Decadron and Keppra started. Nursing staff note labored breathing this AM but satting well on RA.   
 
10/27 patient still has dysarthria.   Right upper and lower extremity weakness. Patient is alert awake and able to answer some questions with slurred speech. Afebrile. She was started on a diet today after speech therapy eval. 
 
Objective:  
 
Patient Vitals for the past 24 hrs: 
 Temp Pulse Resp BP SpO2  
10/27/20 1500  (!) 108 24 100/63 96 % 10/27/20 1400  100 19 (!) 88/55 94 % 10/27/20 1300  (!) 104 21 100/65 96 % 10/27/20 1200  (!) 107 27 99/67 96 % 10/27/20 1100 97.4 °F (36.3 °C) (!) 110 16 117/80 100 % 10/27/20 1007    (!) 91/56   
10/27/20 1000  (!) 106 25 (!) 81/53 94 % 10/27/20 0900  (!) 120 29 105/69 98 % 10/27/20 0819     95 % 10/27/20 0800 97.3 °F (36.3 °C) (!) 108 20 134/73 95 % 10/27/20 0700  (!) 108 22 (!) 148/68 93 % 10/27/20 0559  (!) 111 23 (!) 160/77 93 % 10/27/20 0544  (!) 102 21 133/72 93 % 10/27/20 0528  (!) 109 20 (!) 164/82 93 % 10/27/20 0513  (!) 114 (!) 32 (!) 160/63 92 % 10/27/20 0459  (!) 107 19 (!) 140/66 94 % 10/27/20 0444  (!) 105 20 138/66 92 % 10/27/20 0428  (!) 110 22 (!) 156/70 92 % 10/27/20 0413  (!) 112 24 (!) 167/72 92 % 10/27/20 0400  (!) 112 22 (!) 144/68 92 % 10/27/20 0344  (!) 113 25 (!) 153/72 93 % 10/27/20 0328  (!) 118 25 (!) 109/54 93 % 10/27/20 0313  (!) 104 20 (!) 100/54 91 % 10/27/20 0259  (!) 106 20 120/60 92 % 10/27/20 0244  (!) 109 21 (!) 143/71 91 % 10/27/20 0228  (!) 109 21 (!) 144/74 92 % 10/27/20 0213  (!) 107 23 (!) 141/70 93 % 10/27/20 0159  (!) 104 22 (!) 142/69 93 % 10/27/20 0144  (!) 108 23 (!) 148/71 92 % 10/27/20 0128  (!) 108 23 (!) 145/74 93 % 10/27/20 0113  (!) 106 21 137/68 92 % 10/27/20 0059  (!) 110 23 139/68 93 % 10/27/20 0044  (!) 104 22 (!) 142/65 94 % 10/27/20 0028  (!) 104 22 130/60 91 % 10/27/20 0013  (!) 107 21 (!) 142/66 94 % 10/26/20 2359  (!) 109 23 137/63 93 % 10/26/20 2344  (!) 110 (!) 32 (!) 147/72 93 % 10/26/20 2328  (!) 105 21 130/65 93 % 10/26/20 2313  (!) 108 23 (!) 143/69 95 % 10/26/20 2259 97.6 °F (36.4 °C) (!) 107 22 (!) 147/82 94 % 10/26/20 2244  (!) 108 24 (!) 150/70 93 % 10/26/20 2228  (!) 103 20 (!) 123/57 91 % 10/26/20 2213  (!) 104 19 130/60 93 % 10/26/20 2159  (!) 105 21 135/62 93 % 10/26/20 2144  (!) 111 26 (!) 140/65 94 % 10/26/20 2128  (!) 107 22 136/63 93 % 10/26/20 2113  (!) 109 22 139/66 93 % 10/26/20 2059  (!) 107 25 129/61 96 % 10/26/20 2044  (!) 105 25 134/63 99 % 10/26/20 2033     96 % 10/26/20 2028  (!) 104 22 (!) 122/59 93 % 10/26/20 2013  (!) 105 23 134/64 93 % 10/26/20 1959  (!) 109 24 135/65 93 % 10/26/20 1944  (!) 106 24 134/63 93 % 10/26/20 1928  (!) 104 24 121/60 91 % 10/26/20 1913  (!) 105 22 125/63 94 % 10/26/20 1859 97.8 °F (36.6 °C) (!) 105 22 123/63 93 % 10/26/20 1844  (!) 105 22 124/61 93 % 10/26/20 1828  (!) 104 24 123/60 93 % 10/26/20 1822  (!) 106 25 (!) 118/58 95 % 10/26/20 1759  (!) 107 (!) 32 (!) 119/55 94 % 10/26/20 1743  (!) 104 20 (!) 112/56 93 % 10/26/20 1728  (!) 106 22 (!) 121/59 93 % 10/26/20 1713  (!) 109 23 127/61 93 % 10/26/20 1659  (!) 108 25 128/61 93 % 10/26/20 1643  (!) 109 23 130/61 93 % 10/26/20 1628  (!) 109 24 125/62 93 % 10/26/20 1613  (!) 110 28 121/66 94 % 10/26/20 1605  (!) 111 30 (!) 142/80 94 % Oxygen Therapy O2 Sat (%): 96 % (10/27/20 1500) Pulse via Oximetry: 109 beats per minute (10/27/20 1500) O2 Device: Room air (10/27/20 0819) Intake/Output Summary (Last 24 hours) at 10/27/2020 1558 Last data filed at 10/27/2020 1400 Gross per 24 hour Intake 985 ml Output 1215 ml Net -230 ml  
   
*Note that automatically entered I/Os may not be accurate; dependent on patient compliance with collection and accurate  by techs. General:    Ill appearing, tired looking, alert,awake, HEENT: AT, NC,    
CV:   RRR. No murmur, rub, or gallop. Lungs:   CTAB. No wheezing, rhonchi, or rales. Abdomen:   Soft, nontender, nondistended. Extremities: Warm and dry. No cyanosis or edema. Skin:     No rashes or jaundice. Neuro:  Alert,awake, slurred speech, right upper and lower extremities 3/5, left upper and lower extremities 5/5, Data Review: 
I have reviewed all labs, meds, and studies from the last 24 hours: 
 
Recent Results (from the past 24 hour(s)) METABOLIC PANEL, BASIC Collection Time: 10/27/20  3:34 AM  
Result Value Ref Range Sodium 137 136 - 145 mmol/L Potassium 5.3 (H) 3.5 - 5.1 mmol/L Chloride 107 98 - 107 mmol/L  
 CO2 22 21 - 32 mmol/L Anion gap 8 7 - 16 mmol/L Glucose 158 (H) 65 - 100 mg/dL BUN 37 (H) 8 - 23 MG/DL Creatinine 1.05 (H) 0.6 - 1.0 MG/DL  
 GFR est AA >60 >60 ml/min/1.73m2 GFR est non-AA 55 (L) >60 ml/min/1.73m2 Calcium 9.6 8.3 - 10.4 MG/DL  
CBC W/O DIFF Collection Time: 10/27/20  5:54 AM  
Result Value Ref Range WBC 22.3 (H) 4.3 - 11.1 K/uL  
 RBC 3.77 (L) 4.05 - 5.2 M/uL  
 HGB 11.7 11.7 - 15.4 g/dL HCT 34.8 (L) 35.8 - 46.3 % MCV 92.3 79.6 - 97.8 FL  
 MCH 31.0 26.1 - 32.9 PG  
 MCHC 33.6 31.4 - 35.0 g/dL  
 RDW 13.9 11.9 - 14.6 % PLATELET 398 071 - 713 K/uL MPV 9.5 9.4 - 12.3 FL ABSOLUTE NRBC 0.00 0.0 - 0.2 K/uL GLUCOSE, POC Collection Time: 10/27/20 11:45 AM  
Result Value Ref Range Glucose (POC) 149 (H) 65 - 100 mg/dL All Micro Results None No results found for this visit on 10/25/20. Current Meds: 
Current Facility-Administered Medications Medication Dose Route Frequency  rosuvastatin (CRESTOR) tablet 40 mg  40 mg Per NG tube QHS  insulin lispro (HUMALOG) injection   SubCUTAneous Q6H  
 tuberculin injection 5 Units  5 Units IntraDERMal ONCE  
 barium sulfate (VARIBAR HONEY) 40 % (w/v) 29% (w/w) contrast suspension 15 mL  15 mL Oral RAD ONCE  
 barium sulfate (VARIBAR NECTAR) 40 % (w/v) contrast suspension 15 mL  15 mL Oral RAD ONCE  
  budesonide (PULMICORT) 500 mcg/2 ml nebulizer suspension  500 mcg Nebulization BID RT  
 albuterol (PROVENTIL VENTOLIN) nebulizer solution 2.5 mg  2.5 mg Nebulization Q6H RT  
 furosemide (LASIX) injection 40 mg  40 mg IntraVENous DAILY  LORazepam (ATIVAN) tablet 0.5 mg  0.5 mg Oral BID PRN  
 montelukast (SINGULAIR) tablet 10 mg  10 mg Oral DAILY  carvediloL (COREG) tablet 3.125 mg  3.125 mg Per NG tube BID WITH MEALS  
 levothyroxine (SYNTHROID) tablet 75 mcg  75 mcg Per NG tube ACB  [Held by provider] lisinopriL (PRINIVIL, ZESTRIL) tablet 2.5 mg  2.5 mg Per NG tube DAILY  famotidine (PF) (PEPCID) 20 mg in 0.9% sodium chloride 10 mL injection  20 mg IntraVENous Q24H  
 metoprolol (LOPRESSOR) injection 5 mg  5 mg IntraVENous Q4H PRN  
 NUTRITIONAL SUPPORT ELECTROLYTE PRN ORDERS   Does Not Apply PRN  
 fluticasone propionate (FLONASE) 50 mcg/actuation nasal spray 2 Spray  2 Spray Both Nostrils DAILY  sodium chloride (NS) flush 5-40 mL  5-40 mL IntraVENous Q8H  
 sodium chloride (NS) flush 5-40 mL  5-40 mL IntraVENous PRN  
 ondansetron (ZOFRAN) injection 4 mg  4 mg IntraVENous Q6H PRN  
 acetaminophen (TYLENOL) suppository 650 mg  650 mg Rectal Q4H PRN  
 LORazepam (ATIVAN) injection 2 mg  2 mg IntraVENous Q2H PRN  
 albuterol-ipratropium (DUO-NEB) 2.5 MG-0.5 MG/3 ML  3 mL Nebulization Q4H PRN  
 levETIRAcetam (KEPPRA) 500 mg in 0.9% sodium chloride (MBP/ADV) 100 mL  500 mg IntraVENous Q12H  
 dexamethasone (DECADRON) 10 mg/mL injection 6 mg  6 mg IntraVENous Q6H  
 levalbuterol (XOPENEX) nebulizer soln 1.25 mg/3 mL  1.25 mg Nebulization Q6H PRN Other Studies (last 24 hours): Xr Swallow Func Video Result Date: 10/27/2020 MODIFIED BARIUM SWALLOW 10/27/2020 HISTORY: Dysphagia with feeding difficulties and mismanagement. PROCEDURE: Patient was fluoroscopically observed while swallowing a variety of thicknesses and consistencies. Examination was performed in conjunction with the Speech Pathologist. Examination was videotaped. FINDINGS:  No aspiration IMPRESSION:  No aspiration Please refer to the Speech Pathology report for further detail. Fluoroscopic time: 1.7 Total fluoroscopic images: 1 Assessment and Plan:  
 
Hospital Problems as of 10/27/2020 Date Reviewed: 10/19/2020 Codes Class Noted - Resolved POA Systolic heart failure (HCC) ICD-10-CM: I50.20 ICD-9-CM: 428.20  10/27/2020 - Present Unknown Expressive aphasia ICD-10-CM: R47.01 
ICD-9-CM: 784.3  10/25/2020 - Present Yes * (Principal) Acute right-sided weakness ICD-10-CM: R53.1 ICD-9-CM: 728.87  10/25/2020 - Present Yes Frontal mass of brain ICD-10-CM: G93.89 ICD-9-CM: 348.89  10/25/2020 - Present Yes Acute encephalopathy ICD-10-CM: G93.40 ICD-9-CM: 348.30  10/25/2020 - Present Yes Multiple lesions on CT of brain and spine ICD-10-CM: R93.0, R93.7 ICD-9-CM: 793.0, 793.7  10/25/2020 - Present Unknown Acute CVA (cerebrovascular accident) ([facility] [address] ICD-10-CM: I63.9 ICD-9-CM: 434.91  10/25/2020 - Present Yes Multiple lung nodules on CT (Chronic) ICD-10-CM: R91.8 ICD-9-CM: 793.19  10/12/2020 - Present Yes Overview Addendum 10/12/2020  2:33 PM by Ministerio Gay NP  
  -PET scan 10/2019: PET scan fortunately did not have any abnormal activity in her mass in the left lung. This may mean that we can just follow this radiographically, but we will talk about her at tumor board and come back to her with the group's recommendation. 
-10/30/2019: Patient is discussed at thoracic conference today lead by Dr Margo Devries. Patient is a 77 yo never smoker. CT guided biopsy recommended. -CT guided Biopsy 11/2020: lung biopsy showed signs of this nodule being a hamartoma, which is a benign tumor that we can simply follow. repeat CT in 6 months 
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 Jovan Spatz are several new pulmonary nodules noted which may suggest some metastatic disease.  This require further follow-up CT of the chest in 2 months or doing PET scan. Hamartoma (HCC) (Chronic) ICD-10-CM: Q85.9 ICD-9-CM: 759.6  10/12/2020 - Present Yes Peripheral vascular disease ([address] ICD-10-CM: I73.9 ICD-9-CM: 443.9  10/12/2020 - Present Yes Bilateral pleural effusion ICD-10-CM: J90 ICD-9-CM: 511.9  10/7/2020 - Present Yes Overview Signed 10/13/2020  1:25 PM by DENI Joseph  
  10/3/2020: L thoracentesis 1050mL removed R thoracentesis 800mL removed 10/9/2020: L thoracentesis: 1000mL removed R thoracentesis: 600mL removed Hypercholesterolemia (Chronic) ICD-10-CM: E78.00 ICD-9-CM: 272.0  6/30/2015 - Present Yes Dysphagia (Chronic) ICD-10-CM: R13.10 ICD-9-CM: 787.20  6/30/2015 - Present Yes Acquired hypothyroidism (Chronic) ICD-10-CM: E03.9 ICD-9-CM: 244.9  10/17/2013 - Present Yes GERD (gastroesophageal reflux disease) (Chronic) ICD-10-CM: K21.9 ICD-9-CM: 530.81  10/17/2013 - Present Yes RESOLVED: Hypokalemia ICD-10-CM: E87.6 ICD-9-CM: 276.8  10/25/2020 - 10/27/2020 Yes Plan: This is a 71-year-old female with Acute stroke with metastatic 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 CNS lymphoma/metastatic disease small acute right occipital and parietal lobe infarcts. No acute neurosurgical intervention. Neurology on board. Recommend aspirin after biopsy. Statin. Plan to start Aspirin in 24 hours. Oncology consulted. Appreciate recommendations. Dexamethasone 6 mg IV every 6 hours. Keppra 500 twice daily. Speech therapy eval today and patient started on diet. Acute metabolic encephalopathy from stroke resolved Frontal brain mass likely metastasis Chronic systolic congestive heart failure with EF of 35% 
currently on IV Lasix. Multiple lung nodules on CT Oncology on board. Pulmonology consulted for lung nodule biopsy. . 
CT abdomen and pelvis ordered for metastatic disease and target for biopsy. Bilateral pleural effusions Pulmonary on board. Hypothyroidism Levothyroxine GERD Pepcid. Peripheral vascular disease Palliative care consulted. Appreciate recommendations. CODE STATUS DNR. Goals of care to be discussed with patient and her  and brother. DC planning/Dispo: Okay to transfer to medical floor with remote telemetry. Anticipate inpatient hospital stay for at least 48 hours. May need rehab facility placement. Diet:  DIET TUBE FEEDING 
DIET MECHANICAL SOFT 
DVT ppx:  SCDs Signed: 
Merlin Valdivia MD

## 2020-10-27 NOTE — PROGRESS NOTES
Problem: Patient Education: Go to Patient Education Activity Goal: Patient/Family Education Description: 1. Patient will complete lower body bathing and dressing with SBA and adaptive equipment as needed. 2. Patient will complete toileting with SBA. 3. Patient will tolerate 25 minutes of OT treatment with 1-2 rest breaks to increase activity tolerance for ADLs. 4. Patient will complete functional transfers with CGA and adaptive equipment as needed. 5. Patient will tolerate 15 minutes unsupported sitting balance with SBA and adaptive equipment as needed. 6. Patient will complete functional activity while seated edge of bed unsupported with SBA and adaptive equipment as needed. Timeframe: 7 visits Outcome: Progressing Towards Goal 
 
OCCUPATIONAL THERAPY: Daily Note and AM 10/27/2020 INPATIENT: OT Visit Days: 2 Payor: Sherley Borden / Plan: [employer] MEDICARE CHOICE PPO/PFFS / Product Type: Managed Care Medicare /  
  
NAME/AGE/GENDER: Fuad Westfall is a 69 y.o. female PRIMARY DIAGNOSIS:  Frontal mass of brain [G93.89] Acute right-sided weakness [R53.1] Expressive aphasia [R47.01] Acute encephalopathy [G93.40] Multiple lesions on CT of brain and spine [R93.0, R93.7] Acute right-sided weakness Acute right-sided weakness ICD-10: Treatment Diagnosis:  
 · Generalized Muscle Weakness (M62.81) · Other lack of cordination (R27.8) · Difficulty in walking, Not elsewhere classified (R26.2) Precautions/Allergies: 
  Fall precautions Compazine [prochlorperazine edisylate] ASSESSMENT:  
 
Ms. Lynn Payne presents in ICU for the above diagnoses. Upon arrival, pt supine in bed and agreeable to OT evaluation. Pt is alert however presents with moderate expressive aphasia therefore limited questioning completed during today's evaluation; however pt able to nod head appropriately to simple yes/no questions.  Pt states she lives in a 1-story home with spouse. At baseline, pt notes independence with ADLs and functional mobility. 10/27/2020 Today, pt presents in ICU, supine in bed and agreeable to OT treatment. Pt is alert and able to vocalize more today (less aphasic) compared to yesterday's session. Pt transitioned to sitting edge of bed with min A with verbal and tactile cues for task initiation and facilitation. Pt stood with min A and completed SPT transfer to bedside chair with min to mod A x 1. Poor dynamic standing balance d/t RLE weakness. Slight  noted in R hand today however RUE AROM and strength remains limited. Pt performed self-grooming tasks today. Pt able to wash face with LUE with supervision after set-up. Mod A required for combing hair with verbal cues for facilitation. Pt left sitting upright in bedside chair with needs met, call light within reach, posey alarm on, and RN notified. Pt is progressing towards goals. Will continue to address OT goals and plan of care as indiciated. This section established at most recent assessment PROBLEM LIST (Impairments causing functional limitations): 1. Decreased Strength 2. Decreased ADL/Functional Activities 3. Decreased Transfer Abilities 4. Decreased Ambulation Ability/Technique 5. Decreased Balance 6. Decreased Activity Tolerance 7. Decreased Cognition INTERVENTIONS PLANNED: (Benefits and precautions of occupational therapy have been discussed with the patient.) 1. Activities of daily living training 2. Adaptive equipment training 3. Balance training 4. Clothing management 5. Community reintergration 6. Donning&doffing training 7. Neuromuscular re-eduation 8. Re-evaluation 9. Therapeutic activity 10. Therapeutic exercise TREATMENT PLAN: Frequency/Duration: Follow patient 3x/week to address above goals. Rehabilitation Potential For Stated Goals: Good REHAB RECOMMENDATIONS (at time of discharge pending progress):   
Placement: It is my opinion, based on this patient's performance to date, that Ms. Loco Sharif may benefit from intensive therapy at an [address] [place] [address] after discharge due to a probable need for multiple therapy disciplines and potential to make ongoing and sustainable functional improvement that is of practical value. [facility] Equipment: ? TBD  
    
 
 
 
OCCUPATIONAL PROFILE AND HISTORY:  
History of Present Injury/Illness (Reason for Referral): 
See H&P Past Medical History/Comorbidities: Ms. Loco Sharif  has a past medical history of Acute CVA (cerebrovascular accident) ([address] (10/25/2020), Asthma, Bite of nonvenomous arthropod (???), Cough, Esophageal stricture (2002), GERD (gastroesophageal reflux disease), History of rectal bleeding (???), Hodgkin's lymphoma ([address] (1980), Hypercholesterolemia, Menopause, Positive RAST testing (2007), and Thyroid disease. She also has no past medical history of Chronic kidney disease. Ms. Loco Sharif  has a past surgical history that includes hx tonsillectomy (1954); hx splenectomy (1973); hx appendectomy (1973); hx cataract removal (Bilateral, 2007, 2009); hx colonoscopy; hx other surgical; and hx breast biopsy (Left, 05/21/2020). Social History/Living Environment:  
Home Environment: Private residence One/Two Story Residence: One story Living Alone: No 
Support Systems: Spouse/Significant Other/Partner Patient Expects to be Discharged to[de-identified] Rehabilitation facility Current DME Used/Available at Home: None Prior Level of Function/Work/Activity: 
Independent with ADLs and functional mobility. Personal Factors:   
      Sex:  female Age:  69 y.o. Other factors that influence how disability is experienced by the patient:  Multiple co-morbidities Number of Personal Factors/Comorbidities that affect the Plan of Care: Brief history (0):  LOW COMPLEXITY ASSESSMENT OF OCCUPATIONAL PERFORMANCE[de-identified]  
Activities of Daily Living: Basic ADLs (From Assessment) Complex ADLs (From Assessment) Feeding: Minimum assistance Oral Facial Hygiene/Grooming: Minimum assistance Bathing: Maximum assistance Upper Body Dressing: Maximum assistance Lower Body Dressing: Maximum assistance Toileting: Maximum assistance Instrumental ADL Meal Preparation: Total assistance Homemaking: Total assistance Grooming/Bathing/Dressing Activities of Daily Living Cognitive Retraining Safety/Judgement: Fall prevention Bed/Mat Mobility Rolling: Minimum assistance Supine to Sit: Minimum assistance Sit to Stand: Minimum assistance Stand to Sit: Minimum assistance Bed to Chair: Minimum assistance Scooting: Minimum assistance Most Recent Physical Functioning:  
Gross Assessment: 
AROM: Generally decreased, functional(RUE) PROM: Generally decreased, functional 
Strength: Generally decreased, functional(RUE) Coordination: Generally decreased, functional(RUE) Tone: Normal 
Sensation: Impaired Posture: 
  
Balance: 
Sitting: Impaired Sitting - Static: Fair (occasional) Sitting - Dynamic: Fair (occasional) Standing: Impaired Standing - Static: Poor;Constant support Standing - Dynamic : Poor;Constant support Bed Mobility: 
Rolling: Minimum assistance Supine to Sit: Minimum assistance Scooting: Minimum assistance Wheelchair Mobility: 
  
Transfers: 
Sit to Stand: Minimum assistance Stand to Sit: Minimum assistance Bed to Chair: Minimum assistance Patient Vitals for the past 6 hrs: 
 BP SpO2 Pulse 10/27/20 0559 (!) 160/77 93 % (!) 111  
10/27/20 0700 (!) 148/68 93 % (!) 108  
10/27/20 0800 134/73 95 % (!) 108  
10/27/20 0819  95 %   
10/27/20 0900 105/69 98 % (!) 120  
10/27/20 1000 (!) 81/53 94 % (!) 106  
10/27/20 1007 (!) 91/56    
10/27/20 1100 117/80 100 % (!) 110 Mental Status Neurologic State: Sleeping, Eyes open to voice Orientation Level: Unable to verbalize Cognition: Follows commands Perception: Appears intact Perseveration: No perseveration noted Safety/Judgement: Fall prevention Physical Skills Involved: 1. Range of Motion 2. Balance 3. Strength 4. Activity Tolerance 5. Sensation 6. Fine Motor Control 7. Gross Motor Control Cognitive Skills Affected (resulting in the inability to perform in a timely and safe manner): 1. Perception 2. Executive Function 3. Comprehension 4. Expression Psychosocial Skills Affected: 1. Habits/Routines 2. Environmental Adaptation Number of elements that affect the Plan of Care: 5+:  HIGH COMPLEXITY CLINICAL DECISION MAKIN[address] [place] [address] AM-PAC 6 Clicks Daily Activity Inpatient Short Form How much help from another person does the patient currently need. .. Total A Lot A Little None 1. Putting on and taking off regular lower body clothing? [] 1   [x] 2   [] 3   [] 4  
2. Bathing (including washing, rinsing, drying)? [] 1   [x] 2   [] 3   [] 4  
3. Toileting, which includes using toilet, bedpan or urinal?   [] 1   [x] 2   [] 3   [] 4  
4. Putting on and taking off regular upper body clothing? [] 1   [x] 2   [] 3   [] 4  
5. Taking care of personal grooming such as brushing teeth? [] 1   [] 2   [x] 3   [] 4  
6. Eating meals? [] 1   [] 2   [x] 3   [] 4  
© , Trustees of [address] [place] [address], under license to [employer]. All rights reserved Score:  Initial: 14 Most Recent: X (Date: -- ) Interpretation of Tool:  Represents activities that are increasingly more difficult (i.e. Bed mobility, Transfers, Gait). Medical Necessity:    
· Patient demonstrates good rehab potential due to higher previous functional level. Reason for Services/Other Comments: 
· Patient continues to require skilled intervention due to medical complications and patient unable to attend/participate in therapy as expected. Use of outcome tool(s) and clinical judgement create a POC that gives a: LOW COMPLEXITY  
 
 
 
TREATMENT:  
(In addition to Assessment/Re-Assessment sessions the following treatments were rendered) Pre-treatment Symptoms/Complaints:   
Pain: Initial:  
Pain Intensity 1: 0 /10 Post Session:  same Self Care: (10 minutes): Procedure(s) (per grid) utilized to improve and/or restore self-care/home management as related to dressing and grooming. Required minimal  verbal, tactile and   cueing to facilitate activities of daily living skills. Neuromuscular Re-education: ( 13 minutes):  Exercise/activities per grid below to improve balance, coordination and posture. Required moderate verbal and tactile cues to promote static and dynamic balance in sitting and standing. Braces/Orthotics/Lines/Etc:  
· IV 
· ICU monitors · O2 Device: Room air Treatment/Session Assessment:   
· Response to Treatment:  Tolerated well with no issues noted. · Interdisciplinary Collaboration:  
o Occupational Therapist 
o Registered Nurse · After treatment position/precautions:  
o Up in chair 
o Bed alarm/tab alert on 
o Bed/Chair-wheels locked 
o Call light within reach 
o RN notified · Compliance with Program/Exercises: Compliant all of the time, Will assess as treatment progresses. · Recommendations/Intent for next treatment session: \"Next visit will focus on advancements to more challenging activities and reduction in assistance provided\". Total Treatment Duration: OT Patient Time In/Time Out Time In: 0900 Time Out: 0989 Jony Le OT

## 2020-10-27 NOTE — CONSULTS
Jenniffer Frost MD 
Medical Director Kyler Mena [address], [address] [facility] Tel: [phone] Physical Medicine & Rehabilitation Consult Subjective:  
 
Date of Consultation:  October 27, 2020 Referring Physician: Hospitalist service / Flip Zuñiga MD 
 
Estefany Cardoso is a 69 y.o. white female who is being evaluated at the request of Hospitalist service for consideration for inpatient rehabilitation following strokelike symptoms, abnormal brain imaging. HPI: The patient is a right-hand dominant 76yo WF with PMH including sCHF (EF 35-40%), HTN, HL, GERD, remote Hodgkin's lymphoma (1973), hypothyroidism, asthma, LLL hamartoma, among others. She presented to the ED 10/25 with acute onset aphasia, right-sided weakness and confusion. Head CT with multiple enhancing lesions representing vasogenic edema +/-infarct, as well as abscesses / metastatic disease; CTA with short segment occlusion of left MCA; CT perfusion with changes suggesting left frontal lobe infarct. She was outside the window for tPA administration, Neurosurgery advised against surgical intervention, recommended extracranial target for biopsy. Neurology endorsed IV steroids, IV Keppra. She has mobility and self-care deficits identified by physical and occupational therapies. Today on exam, patient is alert, aphasic and alone in her room. She was able to nod yes and voice 2-3 words, had garbled speech and was frustrated by this; therefore history obtained from EMR, staff. Patient lives in a one-story private residence with her , reportedly was previously functionally independent with ADLs, IADLs and mobility. Principal Problem: 
  Acute right-sided weakness (10/25/2020) Active Problems: 
  Acquired hypothyroidism (10/17/2013) GERD (gastroesophageal reflux disease) (10/17/2013) Hypercholesterolemia (6/30/2015) Dysphagia (6/30/2015) Bilateral pleural effusion (10/7/2020) Overview: 10/3/2020: L thoracentesis 1050mL removed R thoracentesis 800mL removed 10/9/2020: L thoracentesis: 1000mL removed R thoracentesis: 600mL removed Multiple lung nodules on CT (10/12/2020) Overview: -PET scan 10/2019: PET scan fortunately did not have any abnormal activity  
    in her mass in the left lung. This may mean that we can just follow this  
    radiographically, but we will talk about her at tumor board and come back  
    to her with the group's recommendation. 
    -10/30/2019: Patient is discussed at thoracic conference today lead by Dr Corwin Gregorio. Patient is a 77 yo never smoker. CT guided biopsy recommended. -CT guided Biopsy 11/2020: lung biopsy showed signs of this nodule being a  
    hamartoma, which is a benign tumor that we can simply follow. repeat CT in  
    6 months 
    -Chest CT June 2020: CT scan is stable 
    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  
    secondary to her left lung mass. Claudia Collar are several new pulmonary nodules  
    noted which may suggest some metastatic disease.  This require further  
    follow-up CT of the chest in 2 months or doing PET scan. Hamartoma ([address] (10/12/2020) Peripheral vascular disease ([address] (10/12/2020) Expressive aphasia (10/25/2020) Frontal mass of brain (10/25/2020) Acute encephalopathy (10/25/2020) Multiple lesions on CT of brain and spine (10/25/2020) Hypokalemia (10/25/2020) Acute CVA (cerebrovascular accident) ([address] (10/25/2020) Past Medical History:  
Diagnosis Date  Acute CVA (cerebrovascular accident) ([address] 10/25/2020  Asthma  Bite of nonvenomous arthropod ? ??  
 Cough  Esophageal stricture 2002  
 dilated 2002  GERD (gastroesophageal reflux disease)  History of rectal bleeding ???  
 Hodgkin's lymphoma ([address] 1980 Radiation therapy  Hypercholesterolemia  Menopause  Positive RAST testing 2007 IgE level of 1,391  Thyroid disease Past Surgical History:  
Procedure Laterality Date [place]  HX BREAST BIOPSY Left 05/21/2020  
 calcs at 2:00  
 HX CATARACT REMOVAL Bilateral 2007, 2009  HX COLONOSCOPY    
 HX OTHER SURGICAL    
 dental x3  
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 Family History Problem Relation Age of Onset  Cancer Mother Kidney cancer  Cancer Father Prostate cnaceer  Heart Surgery Brother  Breast Cancer Neg Hx Social History Tobacco Use  Smoking status: Never Smoker  Smokeless tobacco: Never Used Substance Use Topics  Alcohol use: Yes Alcohol/week: 21.0 standard drinks Types: 7 Shots of liquor, 14 Standard drinks or equivalent per week Prior to Admission medications Medication Sig Start Date End Date Taking? Authorizing Provider  
midodrine (PROAMATINE) 2.5 mg tablet Take 2.5 mg by mouth three (3) times daily (with meals). Yes Provider, Historical  
fludrocortisone (FLORINEF) 0.1 mg tablet Take 0.1 mg by mouth daily. Yes Provider, Historical  
LORazepam (ATIVAN) 0.5 mg tablet Take 1 Tab by mouth two (2) times daily as needed for Anxiety. Max Daily Amount: 1 mg. 10/22/20  Yes Wayne Friday, MD  
lisinopriL (PRINIVIL, ZESTRIL) 2.5 mg tablet Take 1 Tab by mouth daily for 30 days. 10/21/20 11/20/20 Yes John Knight MD  
metoprolol succinate (TOPROL-XL) 25 mg XL tablet Take 1 Tab by mouth daily for 30 days. 10/22/20 11/21/20 Yes John Knight MD  
furosemide (LASIX) 40 mg tablet Take 1 Tab by mouth daily for 30 days. 10/21/20 11/20/20 Yes John Knight MD  
fluticasone propionate (FLONASE) 50 mcg/actuation nasal spray 2 Sprays by Both Nostrils route daily.  9/17/20  Yes Sonja Ram NP  
levothyroxine (SYNTHROID) 75 mcg tablet Take 1 Tab by mouth Daily (before breakfast). Except 1/2 on Monday 6/2/20  Yes Bony Tabares MD  
simvastatin (ZOCOR) 20 mg tablet Take 1 Tab by mouth nightly. 5/29/20  Yes Bony Tabares MD  
Omeprazole delayed release (PRILOSEC D/R) 20 mg tablet Take 1 Tab by mouth daily. 5/29/20  Yes Vonda Penaloza MD  
albuterol (Ventolin HFA) 90 mcg/actuation inhaler Take 2 Puffs by inhalation every four (4) hours as needed for Wheezing. For wheezing. 9/17/20   Domingo Garner NP  
fluticasone furoate-vilanteroL (Breo Ellipta) 200-25 mcg/dose inhaler Take 1 Puff by inhalation daily. 9/17/20   Domingo Garner NP  
montelukast (SINGULAIR) 10 mg tablet Take 1 Tab by mouth daily. 9/17/20   Domingo Garner NP  
baclofen (LIORESAL) 10 mg tablet Take 1 Tab by mouth three (3) times daily as needed for Muscle Spasm(s). 9/14/20   Bony Tabares MD  
melatonin 5 mg tablet Take 5 mg by mouth nightly. Provider, Historical  
naproxen sodium (NAPROSYN) 220 mg tablet Take 220 mg by mouth two (2) times daily (with meals). Provider, Historical  
acetaminophen (Tylenol Arthritis Pain) 650 mg TbER Take 650 mg by mouth every eight (8) hours. Provider, Historical  
levocetirizine (XYZAL) 5 mg tablet Take 1 Tab by mouth daily. 7/16/20   Domingo Garner NP  
aspirin delayed-release 81 mg tablet Take 81 mg by mouth daily. Provider, Historical  
multivitamin (ONE A DAY) tablet Take 1 Tab by mouth daily. Provider, Historical  
 
Allergies Allergen Reactions  Compazine [Prochlorperazine Edisylate] Swelling Review of Systems:  
Full ROS was compromised by patient's aphasia, but she was able to nod, point and communicate some thoughts. Constitutional: negative for fevers and chills Eyes: negative for visual disturbance Ears, nose, mouth, throat, and face: negative for hearing loss and sore throat Respiratory: positive for dyspnea on exertion, negative for cough Cardiovascular: positive for palpitations, negative for chest pain Gastrointestinal: negative for nausea, vomiting, change in bowel habits and abdominal pain Genitourinary: negative for urinary incontinence and hematuria Musculoskeletal: positive for muscle weakness, negative for neck pain and back pain Neurological: positive for speech problems, coordination problems, gait problems and weakness, negative for headaches and seizures Objective:  
 
Vitals: 
Blood pressure (!) 91/56, pulse (!) 106, temperature 97.3 °F (36.3 °C), resp. rate 25, weight 119 lb 7.8 oz (54.2 kg), SpO2 94 %, not currently breastfeeding. Temp (24hrs), Av.6 °F (36.4 °C), Min:97.3 °F (36.3 °C), Max:97.8 °F (36.6 °C) Intake and Output: 
10/25 1901 - 10/27 0700 In: 5249 [I.V.:1085] Out: 915 [SPDIF:313] Physical Exam: 
General: Alert, thin, cooperative WF in no acute distress, OOB in chair Skin:  Warm, moist with texture appropriate for age Eyes:  PERRL, sclera are clear, extraocular muscles intact without nystagmus HENT:  Normocephalic; nares patent, NG tube in place, oral mucosa moist, neck supple; trachea midline Respiratory: Lungs clear to auscultation bilaterally, breathing is non-labored Chest:  Symmetric throughout one respiratory excursion; no supraclavicular lymphadenopathy CV:  Tachycardic, regular underlying rhythm, no appreciable murmur Abdomen: Soft, nontender, not distended; bowel sounds normoactive Extremities: Flaccid hemiparesis of R UE, finger flexion and  strength 1+/5: L UE strength at biceps, triceps, finger flexion 4+/5; lifts L LE off foot rest against gravity, left hip flexion strength 4-/5; cannot lift R LE off foot rest; strength at right knee extension, ankle DF / PF 3+/5; no edema, cyanosis, clubbing Neurological: Unable to properly assess orientation due to aphasia; good attention and focus to examiner, delayed processing, very limited speech is speech dysarthric; lower right facial weakness, tongue protrudes to the right; EOM intact without nystagmus, visual fields are full to finger confrontation;  diminished right shoulder shrug; cannot lift R UE against gravity to assess pronator drift, finger-to-nose; able to read, identify common object and state its use Labs/Studies: 
Recent Results (from the past 72 hour(s)) GLUCOSE, POC Collection Time: 10/25/20  7:04 AM  
Result Value Ref Range Glucose (POC) 95 65 - 100 mg/dL POC PT/INR Collection Time: 10/25/20  7:05 AM  
Result Value Ref Range Prothrombin time (POC) 12.5 (H) 9.6 - 11.6 SECS  
 INR (POC) 1.0 0.9 - 1.2    
CBC WITH AUTOMATED DIFF Collection Time: 10/25/20  7:07 AM  
Result Value Ref Range WBC 10.8 4.3 - 11.1 K/uL  
 RBC 3.84 (L) 4.05 - 5.2 M/uL  
 HGB 11.9 11.7 - 15.4 g/dL HCT 35.2 (L) 35.8 - 46.3 % MCV 91.7 79.6 - 97.8 FL  
 MCH 31.0 26.1 - 32.9 PG  
 MCHC 33.8 31.4 - 35.0 g/dL  
 RDW 13.2 11.9 - 14.6 % PLATELET 372 (H) 094 - 450 K/uL MPV 9.5 9.4 - 12.3 FL ABSOLUTE NRBC 0.00 0.0 - 0.2 K/uL  
 DF AUTOMATED NEUTROPHILS 59 43 - 78 % LYMPHOCYTES 22 13 - 44 % MONOCYTES 12 4.0 - 12.0 % EOSINOPHILS 6 0.5 - 7.8 % BASOPHILS 1 0.0 - 2.0 % IMMATURE GRANULOCYTES 0 0.0 - 5.0 %  
 ABS. NEUTROPHILS 6.4 1.7 - 8.2 K/UL  
 ABS. LYMPHOCYTES 2.4 0.5 - 4.6 K/UL  
 ABS. MONOCYTES 1.3 0.1 - 1.3 K/UL  
 ABS. EOSINOPHILS 0.7 0.0 - 0.8 K/UL  
 ABS. BASOPHILS 0.1 0.0 - 0.2 K/UL  
 ABS. IMM. GRANS. 0.0 0.0 - 0.5 K/UL METABOLIC PANEL, BASIC Collection Time: 10/25/20  7:07 AM  
Result Value Ref Range Sodium 137 136 - 145 mmol/L Potassium 3.2 (L) 3.5 - 5.1 mmol/L Chloride 101 98 - 107 mmol/L  
 CO2 30 21 - 32 mmol/L Anion gap 6 (L) 7 - 16 mmol/L Glucose 98 65 - 100 mg/dL BUN 26 (H) 8 - 23 MG/DL Creatinine 0.77 0.6 - 1.0 MG/DL  
 GFR est AA >60 >60 ml/min/1.73m2 GFR est non-AA >60 >60 ml/min/1.73m2 Calcium 8.9 8.3 - 10.4 MG/DL PROTHROMBIN TIME + INR  Collection Time: 10/25/20  7:07 AM  
 Result Value Ref Range Prothrombin time 12.4 (L) 12.5 - 14.7 sec INR 0.9    
TROPONIN-HIGH SENSITIVITY Collection Time: 10/25/20  7:07 AM  
Result Value Ref Range Troponin-High Sensitivity 112.3 (HH) 0 - 14 pg/mL EKG, 12 LEAD, INITIAL Collection Time: 10/25/20  7:23 AM  
Result Value Ref Range Ventricular Rate 102 BPM  
 Atrial Rate 102 BPM  
 P-R Interval 156 ms QRS Duration 104 ms Q-T Interval 368 ms QTC Calculation (Bezet) 479 ms Calculated P Axis 100 degrees Calculated R Axis 114 degrees Calculated T Axis -82 degrees Diagnosis    
  !!! Suspect arm lead reversal, interpretation assumes no reversal 
!! AGE AND GENDER SPECIFIC ECG ANALYSIS !! Sinus tachycardia Right axis deviation Left ventricular hypertrophy with repolarization abnormality Abnormal ECG When compared with ECG of 21-OCT-2020 12:10, 
Significant changes have occurred Confirmed by ST DAVION MCDANIEL MD (), CYNDI FREED ([id number]) on 10/25/2020 8:36:13 AM 
  
LIPID PANEL Collection Time: 10/26/20  3:29 AM  
Result Value Ref Range LIPID PROFILE Cholesterol, total 243 (H) <200 MG/DL Triglyceride 106 35 - 150 MG/DL  
 HDL Cholesterol 60 40 - 60 MG/DL  
 LDL, calculated 161.8 (H) <100 MG/DL VLDL, calculated 21.2 6.0 - 23.0 MG/DL  
 CHOL/HDL Ratio 4.1 HEMOGLOBIN A1C WITH EAG Collection Time: 10/26/20  3:29 AM  
Result Value Ref Range Hemoglobin A1c 5.6 4.8 - 6.0 % Est. average glucose 114 mg/dL CBC W/O DIFF Collection Time: 10/26/20  3:29 AM  
Result Value Ref Range WBC 13.8 (H) 4.3 - 11.1 K/uL  
 RBC 3.82 (L) 4.05 - 5.2 M/uL  
 HGB 11.8 11.7 - 15.4 g/dL HCT 34.7 (L) 35.8 - 46.3 % MCV 90.8 79.6 - 97.8 FL  
 MCH 30.9 26.1 - 32.9 PG  
 MCHC 34.0 31.4 - 35.0 g/dL  
 RDW 13.2 11.9 - 14.6 % PLATELET 959 (H) 245 - 450 K/uL MPV 9.6 9.4 - 12.3 FL ABSOLUTE NRBC 0.00 0.0 - 0.2 K/uL PROCALCITONIN Collection Time: 10/26/20  3:29 AM  
Result Value Ref Range Procalcitonin <0.05 ng/mL METABOLIC PANEL, BASIC Collection Time: 10/26/20 10:04 AM  
Result Value Ref Range Sodium 137 136 - 145 mmol/L Potassium 4.3 3.5 - 5.1 mmol/L Chloride 104 98 - 107 mmol/L  
 CO2 23 21 - 32 mmol/L Anion gap 10 7 - 16 mmol/L Glucose 182 (H) 65 - 100 mg/dL BUN 25 (H) 8 - 23 MG/DL Creatinine 0.99 0.6 - 1.0 MG/DL  
 GFR est AA >60 >60 ml/min/1.73m2 GFR est non-AA 59 (L) >60 ml/min/1.73m2 Calcium 9.2 8.3 - 10.4 MG/DL  
NT-PRO BNP Collection Time: 10/26/20 10:04 AM  
Result Value Ref Range NT pro-BNP 33,040 (H) 5 - 210 PG/ML  
METABOLIC PANEL, BASIC Collection Time: 10/27/20  3:34 AM  
Result Value Ref Range Sodium 137 136 - 145 mmol/L Potassium 5.3 (H) 3.5 - 5.1 mmol/L Chloride 107 98 - 107 mmol/L  
 CO2 22 21 - 32 mmol/L Anion gap 8 7 - 16 mmol/L Glucose 158 (H) 65 - 100 mg/dL BUN 37 (H) 8 - 23 MG/DL Creatinine 1.05 (H) 0.6 - 1.0 MG/DL  
 GFR est AA >60 >60 ml/min/1.73m2 GFR est non-AA 55 (L) >60 ml/min/1.73m2 Calcium 9.6 8.3 - 10.4 MG/DL  
CBC W/O DIFF Collection Time: 10/27/20  5:54 AM  
Result Value Ref Range WBC 22.3 (H) 4.3 - 11.1 K/uL  
 RBC 3.77 (L) 4.05 - 5.2 M/uL  
 HGB 11.7 11.7 - 15.4 g/dL HCT 34.8 (L) 35.8 - 46.3 % MCV 92.3 79.6 - 97.8 FL  
 MCH 31.0 26.1 - 32.9 PG  
 MCHC 33.6 31.4 - 35.0 g/dL  
 RDW 13.9 11.9 - 14.6 % PLATELET 740 202 - 753 K/uL MPV 9.5 9.4 - 12.3 FL ABSOLUTE NRBC 0.00 0.0 - 0.2 K/uL Functional Exam:  
Per PT: did not ambulate; mod assist x 2 for STS transfers, poor static and dynamic standing balance requiring constant support Per OT: min assist for feeding, oral facial hygiene, max assist for bathing, toileting and upper and lower body dressing Functional Assessment: 
Functional Assessment Fall in Past 12 Months: No 
Decline in Gait/Transfer/Balance: Yes (comment)(new R hemiplegia) Decline in Capacity to Feed/Dress/Bathe: Yes (comment)(New R hemiplegia) Developmental Delay: No 
Chewing/Swallowing Problems: Yes (comment)(new aphasia) Difficulty with Secretions: No 
Speech Slurred/Thick/Garbled: Yes (comment)(new aphasia) Speech Assessment: 
Dysphagia Screening Vocal Quality/Secretions: (!) Abnormal 
History of Dysphagia: No 
O2 Saturation: Normal 
Alertness: Normal 
Pre-Swallow Assessment Score: 1 Ambulation: Activity and Safety Activity Level: Bed Rest 
Ambulate: No (Comment) Activity: In bed, Sleeping Activity Assistance: Complete care Mode of Transportation: Stretcher Repositioned: Up in chair Patient Turned: Supine Head of Bed Elevated: HOB 45 Activity Response: Fairly tolerated, Dyspnea on exertion Assistive Device: Fall prevention device Safety Measures: Bed/Chair alarm on, Bed/Chair-Wheels locked, Bed in low position, Call light within reach, Emergency bedside equipment, Fall prevention (comment)(bed alarm) Impression / Assessment:  
 
Principal Problem: 
  Acute right-sided weakness (10/25/2020) Active Problems: 
  Acquired hypothyroidism (10/17/2013) GERD (gastroesophageal reflux disease) (10/17/2013) Hypercholesterolemia (6/30/2015) Dysphagia (6/30/2015) Bilateral pleural effusion (10/7/2020) Overview: 10/3/2020: L thoracentesis 1050mL removed R thoracentesis 800mL removed 10/9/2020: L thoracentesis: 1000mL removed R thoracentesis: 600mL removed Multiple lung nodules on CT (10/12/2020) Overview: -PET scan 10/2019: PET scan fortunately did not have any abnormal activity  
    in her mass in the left lung. This may mean that we can just follow this  
    radiographically, but we will talk about her at tumor board and come back  
    to her with the group's recommendation. 
    -10/30/2019: Patient is discussed at thoracic conference today lead by Dr Annalee Bruce. Patient is a 77 yo never smoker. CT guided biopsy recommended. -CT guided Biopsy 11/2020: lung biopsy showed signs of this nodule being a  
    hamartoma, which is a benign tumor that we can simply follow. repeat CT in  
    6 months 
    -Chest CT June 2020: CT scan is stable 
    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  
    secondary to her left lung mass. Cherry Maxwell are several new pulmonary nodules  
    noted which may suggest some metastatic disease.  This require further  
    follow-up CT of the chest in 2 months or doing PET scan. Hamartoma ([address] (10/12/2020) Peripheral vascular disease ([address] (10/12/2020) Expressive aphasia (10/25/2020) Frontal mass of brain (10/25/2020) Acute encephalopathy (10/25/2020) Multiple lesions on CT of brain and spine (10/25/2020) Hypokalemia (10/25/2020) Acute CVA (cerebrovascular accident) [facility] (10/25/2020) Plan / Recommendations / Medical Decision Making:  
 
Recommendations:  
Continue Acute Rehab Program 
Coordination of rehab / medical care Counseling of PM&R care issues management Monitoring and management of medical conditions per plan of care / orders · Medically, patient continues in work-up for new brain lesions, lung nodules; numerous services involved include Neurology, Pulmonology, Neurosurgery, Oncology, Radiation Oncology, Palliative in addition to attending Hospitalists · Ultimately, patient's diagnosis will determine her plan of care and disposition · At this time with her current low level of function, patient would not tolerate 3h daily of intense therapies as prescribed by [facility] Discussion with Family / Caregiver / Staff Reviewed Chuck Schroeder / Baldemar Muniz / Elena Rankin / Records Thank you very much for this referral. We appreciate the opportunity to participate in this patient's care. Please notify us of any changes; otherwise, we will sign off.   
 
 
Nolan Agudelo PA-C 
 Physician Assistant with [facility] 
Jazzy Nevarez Junior, MD, Medical Director

## 2020-10-27 NOTE — PROGRESS NOTES
Palliative Care Progress Note Patient: Rohini Older MRN: [id number]  SSN: [id number] YOB: 1950  Age: 69 y.o. Sex: female Assessment/Plan: Chief Complaint/Interval History: pt alert attempting to speak. Seems comfortable Principal Diagnosis: · Altered Mental Status R41.82 Additional Diagnoses: · Debility, Unspecified  R53.81 
· Frailty  R54 · Counseling, Encounter for Medical Advice  Z71.9 
· Encounter for Palliative Care  Z51.5 Palliative Performance Scale (PPS) Medical Decision Making:  
Reviewed and summarized labs and imaging. No family present. Pt is alert, out of bed to chair. Attempted to discuss with her what her understanding of current medical condition is however pt with some expressive aphasia. Pt spouse decided on DNR prior to my visit. I attempted to reach out to him to discuss however was unable to reach. Will continue to follow Will discuss findings with members of the interdisciplinary team.   
 
More than 50% of this 25 minute visit was spent counseling and coordination of care as outlined above. Subjective:  
 
Review of Systems unable to obtain Objective:  
 
Visit Vitals /80 Pulse (!) 110 Temp 97.4 °F (36.3 °C) Resp 16 Wt 119 lb 7.8 oz (54.2 kg) SpO2 100% Breastfeeding No  
BMI 19.29 kg/m² Physical Exam: 
 
General:  Cooperative. No acute distress. Eyes:  Conjunctivae/corneas clear Nose: Nares normal. Septum midline. Neck: Supple, symmetrical, trachea midline, no JVD Lungs:   Clear to auscultation bilaterally, unlabored Heart:  Regular rate and rhythm, no murmur Abdomen:   Soft, non-tender, non-distended Extremities: Normal, atraumatic, no cyanosis or edema Skin: Skin color, texture, turgor normal. No rash or lesions. Neurologic: Moves all extremities spontaneously Psych: Alert, calm Signed By: Joselyn Payne MD   
 October 27, 2020

## 2020-10-27 NOTE — PROGRESS NOTES
Initial visit made to patient and a prayer was provided. A  card was left. The patient was sitting in her recliner and was alert. Jorie Saint, MDIV Chaplain

## 2020-10-27 NOTE — PROGRESS NOTES
Neurology Daily Progress Note Assessment:  
 
69year old female with likely metastatic brain disease. She also has multiple small infarcts bilaterally. Neurosurgery is following and she will need biopsy. An extracranial site is preferred. She has a history of lung mass. Will consult pulmonary for further recommendations. Plan:  
 
Continue Keppra Continue decadron Pulmonology consult CT chest/abdomen/pelvis Subjective: Interval history: 
 
Patient has expressive aphasia. Able to name some objects. Right hemiplegia. No seizures. History: 
 
Neela Frances is a 69 y.o. female who is being seen for brain mets. Unable to obtain ROS due to aphasia. Objective:  
 
Vitals:  
 10/27/20 0819 10/27/20 0900 10/27/20 1000 10/27/20 1007 BP:  105/69 (!) 81/53 (!) 91/56 Pulse:  (!) 120 (!) 106 Resp:  29 25 Temp:      
SpO2: 95% 98% 94% Weight:      
  
 
 
Current Facility-Administered Medications:  
  rosuvastatin (CRESTOR) tablet 40 mg, 40 mg, Per NG tube, QHS, Edu Knight MD 
  insulin lispro (HUMALOG) injection, , SubCUTAneous, Q6H, Edu Knight MD 
  tuberculin injection 5 Units, 5 Units, IntraDERMal, ONCE, Edu Knight MD 
  barium sulfate (VARIBAR HONEY) 40 % (w/v) 29% (w/w) contrast suspension 15 mL, 15 mL, Oral, RAD ONCE, Edu Knight MD 
  barium sulfate (VARIBAR NECTAR) 40 % (w/v) contrast suspension 15 mL, 15 mL, Oral, RAD ONCE, Edu Knight MD 
  budesonide (PULMICORT) 500 mcg/2 ml nebulizer suspension, 500 mcg, Nebulization, BID RT, Bindu Gaspar, DO, 500 mcg at 10/27/20 0819 
  albuterol (PROVENTIL VENTOLIN) nebulizer solution 2.5 mg, 2.5 mg, Nebulization, Q6H RT, Shelia Yang, DO, 2.5 mg at 10/27/20 6547   furosemide (LASIX) injection 40 mg, 40 mg, IntraVENous, DAILY, Renata Gaspar, DO, 40 mg at 10/27/20 9423   LORazepam (ATIVAN) tablet 0.5 mg, 0.5 mg, Oral, BID PRN, Shelia Yang,  
   montelukast (SINGULAIR) tablet 10 mg, 10 mg, Oral, DAILY, Renata Gaspar DO, 10 mg at 10/27/20 8800   carvediloL (COREG) tablet 3.125 mg, 3.125 mg, Per NG tube, BID WITH MEALS, Renata Gaspar DO, 3.125 mg at 10/27/20 7955   levothyroxine (SYNTHROID) tablet 75 mcg, 75 mcg, Per NG tube, ACB, Renata Gaspar DO, 75 mcg at 10/27/20 0724   [Held by provider] lisinopriL (PRINIVIL, ZESTRIL) tablet 2.5 mg, 2.5 mg, Per NG tube, DAILY, Renata Gaspar DO, 2.5 mg at 10/26/20 1645   famotidine (PF) (PEPCID) 20 mg in 0.9% sodium chloride 10 mL injection, 20 mg, IntraVENous, Q24H, Renata Gaspar DO, 20 mg at 10/27/20 0825 
  metoprolol (LOPRESSOR) injection 5 mg, 5 mg, IntraVENous, Q4H PRN, Marielle Pilon, DO, 5 mg at 10/26/20 1525 
  NUTRITIONAL SUPPORT ELECTROLYTE PRN ORDERS, , Does Not Apply, PRN, Marielle Pilon, DO 
  fluticasone propionate (FLONASE) 50 mcg/actuation nasal spray 2 Spray, 2 Spray, Both Nostrils, DAILY, Joyce Alba MD, 2 Spray at 10/27/20 2959   sodium chloride (NS) flush 5-40 mL, 5-40 mL, IntraVENous, Q8H, Joyce Alba MD, 10 mL at 10/27/20 8044   sodium chloride (NS) flush 5-40 mL, 5-40 mL, IntraVENous, PRN, Joyce Alba MD 
  ondansetron [facility]) injection 4 mg, 4 mg, IntraVENous, Q6H PRN, Joyce Alba MD 
  acetaminophen (TYLENOL) suppository 650 mg, 650 mg, Rectal, Q4H PRN, Joyce Alba MD 
  LORazepam (ATIVAN) injection 2 mg, 2 mg, IntraVENous, Q2H PRN, Joyce Alba MD 
  albuterol-ipratropium (DUO-NEB) 2.5 MG-0.5 MG/3 ML, 3 mL, Nebulization, Q4H PRN, Joyce Alba MD, 3 mL at 10/26/20 9325   levETIRAcetam (KEPPRA) 500 mg in 0.9% sodium chloride (MBP/ADV) 100 mL, 500 mg, IntraVENous, Q12H, Chaya Zayas NP, 500 mg at 10/27/20 0824 
  dexamethasone (DECADRON) 10 mg/mL injection 6 mg, 6 mg, IntraVENous, Q6H, Tano Lara DO, 6 mg at 10/27/20 4236   levalbuterol (XOPENEX) nebulizer soln 1.25 mg/3 mL, 1.25 mg, Nebulization, Q6H PRN, Toni Bach MD 
 
Recent Results (from the past 12 hour(s)) METABOLIC PANEL, BASIC Collection Time: 10/27/20  3:34 AM  
Result Value Ref Range Sodium 137 136 - 145 mmol/L Potassium 5.3 (H) 3.5 - 5.1 mmol/L Chloride 107 98 - 107 mmol/L  
 CO2 22 21 - 32 mmol/L Anion gap 8 7 - 16 mmol/L Glucose 158 (H) 65 - 100 mg/dL BUN 37 (H) 8 - 23 MG/DL Creatinine 1.05 (H) 0.6 - 1.0 MG/DL  
 GFR est AA >60 >60 ml/min/1.73m2 GFR est non-AA 55 (L) >60 ml/min/1.73m2 Calcium 9.6 8.3 - 10.4 MG/DL  
CBC W/O DIFF Collection Time: 10/27/20  5:54 AM  
Result Value Ref Range WBC 22.3 (H) 4.3 - 11.1 K/uL  
 RBC 3.77 (L) 4.05 - 5.2 M/uL  
 HGB 11.7 11.7 - 15.4 g/dL HCT 34.8 (L) 35.8 - 46.3 % MCV 92.3 79.6 - 97.8 FL  
 MCH 31.0 26.1 - 32.9 PG  
 MCHC 33.6 31.4 - 35.0 g/dL  
 RDW 13.9 11.9 - 14.6 % PLATELET 140 146 - 918 K/uL MPV 9.5 9.4 - 12.3 FL ABSOLUTE NRBC 0.00 0.0 - 0.2 K/uL Physical Exam: 
General - Well developed, well nourished, in no apparent distress. Aphasic. HEENT - Normocephalic, atraumatic. Conjunctiva are clear. Neck - Supple without masses Extremities - Peripheral pulses intact. No edema and no rashes. Neurological examination - Awake, alert, able to follow commands. The patient has expressive aphasia. On cranial nerve examination, (II, III, IV, VI) pupils are equal, round, and reactive to light. Visual acuity is adequate. Visual fields are full to finger confrontation. Extraocular motility is normal. (V, VII) Right facial droop (VIII) Hearing is intact. Motor examination - There is normal muscle tone and bulk in LUE, LLE. Decreased muscle tone on the right. Strength is 5/5 in LUE, LLE, 0/5 in RUE, RLE. Muscle stretch reflexes are normoactive and there are no pathological reflexes present. Signed By: Dmitry Raines NP October 27, 2020 Neuro attending Patient seen and discussed with midlevel additionally discussed with nursing staff and discussed with the pulmonary service Key issue here is need for tissue diagnosis in terms of optimally planning therapy. Clearly is a candidate for palliative radiation therapy for brain however particularly with her history of Hodgkin's in the remote past identification of pathology is important to guide management which may well at this point be palliative but definitive information would clearly be very helpful

## 2020-10-27 NOTE — CONSULTS
PULMONARY/CCM CONSULT :  10/27/2020 Date of Admission:  10/25/2020 The patient's chart has been reviewed and the chart has been discussed with nursing staff. Subjective: This patient has been seen and evaluated at the request of Dr. Maria Teresa Morfin for biopsy recommendations. Patient is a 69 y.o.  female presents with AMS and MRI showing several enhancing masses with metastatic disease. 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 History is as follows: She had a PET scan 10/2019: PET scan fortunately did not have any abnormal activity in her mass in the left lung. 10/30/2019: Patient is discussed at thoracic conference lead by Dr Mike Estrada. Patient is a 77 yo never smoker. CT guided biopsy recommended. CT guided Biopsy 11/2019: lung biopsy showed signs of this nodule being a hamartoma, which is a benign tumor that we can simply follow. Repeat Chest CT June 2020: CT scan was stable. Chest CT 9/2020: She has a chronic absence of L pulmonary artery on review with radiology. Margorocael Troy are several new pulmonary nodules noted which may suggest some metastatic disease.  October 2020: she presented here with infiltrates, COvid 19 ruled out and treated with antibiotics. CT  Showed pleural effusions and S/P thoracenteses:L thoracentesis with 1050mL removed and R thoracentesis with 800mL removed on 10/3/2020. Cardiology was consulted on 10/9. Repeat L thoracentesis with 1000mL removed and R thoracentesis with 600mL removed. Cytology negative. Flow cytometry was negative. RHC and LHC was discussed by cardiology once PET scan and need for biopsies completed. 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  Vascular surgery felt that no intervention was necessary at this time. She was discharged on 10/2/2020 with the plan to have PET scan planned for today. Unfortunately, she presented here again with new onset hemiplegia and aphasia. She was seen by Neurology and MRI shows several enhancing lesions. We were asked to see her for recommendations regarding biopsy. CT Abd/pelvis is pending to determine safest target for biopsy. 
  
 
Past Surgical History:  
Procedure Laterality Date [place]  HX BREAST BIOPSY Left 05/21/2020  
 calcs at 2:00  
 HX CATARACT REMOVAL Bilateral 2007, 2009  HX COLONOSCOPY    
 HX OTHER SURGICAL    
 dental x3  
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 Social History Tobacco Use  Smoking status: Never Smoker  Smokeless tobacco: Never Used Substance Use Topics  Alcohol use: Yes Alcohol/week: 21.0 standard drinks Types: 7 Shots of liquor, 14 Standard drinks or equivalent per week Family History Problem Relation Age of Onset  Cancer Mother Kidney cancer  Cancer Father Prostate cnaceer  Heart Surgery Brother  Breast Cancer Neg Hx Allergies Allergen Reactions  Compazine [Prochlorperazine Edisylate] Swelling Prior to Admission Medications Prescriptions Last Dose Informant Patient Reported? Taking? LORazepam (ATIVAN) 0.5 mg tablet   No Yes Sig: Take 1 Tab by mouth two (2) times daily as needed for Anxiety. Max Daily Amount: 1 mg. Omeprazole delayed release (PRILOSEC D/R) 20 mg tablet   No Yes Sig: Take 1 Tab by mouth daily. acetaminophen (Tylenol Arthritis Pain) 650 mg TbER   Yes No  
Sig: Take 650 mg by mouth every eight (8) hours. albuterol (Ventolin HFA) 90 mcg/actuation inhaler   No No  
Sig: Take 2 Puffs by inhalation every four (4) hours as needed for Wheezing. For wheezing. aspirin delayed-release 81 mg tablet   Yes No  
Sig: Take 81 mg by mouth daily. baclofen (LIORESAL) 10 mg tablet   No No  
Sig: Take 1 Tab by mouth three (3) times daily as needed for Muscle Spasm(s). fludrocortisone (FLORINEF) 0.1 mg tablet   Yes Yes Sig: Take 0.1 mg by mouth daily. fluticasone furoate-vilanteroL (Breo Ellipta) 200-25 mcg/dose inhaler   No No  
Sig: Take 1 Puff by inhalation daily. fluticasone propionate (FLONASE) 50 mcg/actuation nasal spray   No Yes Si Sprays by Both Nostrils route daily. furosemide (LASIX) 40 mg tablet   No Yes Sig: Take 1 Tab by mouth daily for 30 days. levocetirizine (XYZAL) 5 mg tablet   No No  
Sig: Take 1 Tab by mouth daily. levothyroxine (SYNTHROID) 75 mcg tablet   No Yes Sig: Take 1 Tab by mouth Daily (before breakfast). Except  on Monday  
lisinopriL (PRINIVIL, ZESTRIL) 2.5 mg tablet   No Yes Sig: Take 1 Tab by mouth daily for 30 days. melatonin 5 mg tablet   Yes No  
Sig: Take 5 mg by mouth nightly. metoprolol succinate (TOPROL-XL) 25 mg XL tablet   No Yes Sig: Take 1 Tab by mouth daily for 30 days. midodrine (PROAMATINE) 2.5 mg tablet   Yes Yes Sig: Take 2.5 mg by mouth three (3) times daily (with meals). montelukast (SINGULAIR) 10 mg tablet   No No  
Sig: Take 1 Tab by mouth daily. multivitamin (ONE A DAY) tablet   Yes No  
Sig: Take 1 Tab by mouth daily. naproxen sodium (NAPROSYN) 220 mg tablet   Yes No  
Sig: Take 220 mg by mouth two (2) times daily (with meals). simvastatin (ZOCOR) 20 mg tablet   No Yes Sig: Take 1 Tab by mouth nightly. Facility-Administered Medications: None MEDS SCHEDULED:   
Current Facility-Administered Medications Medication Dose Route Frequency  rosuvastatin (CRESTOR) tablet 40 mg  40 mg Per NG tube QHS  insulin lispro (HUMALOG) injection   SubCUTAneous Q6H  
 budesonide (PULMICORT) 500 mcg/2 ml nebulizer suspension  500 mcg Nebulization BID RT  
 albuterol (PROVENTIL VENTOLIN) nebulizer solution 2.5 mg  2.5 mg Nebulization Q6H RT  
  furosemide (LASIX) injection 40 mg  40 mg IntraVENous DAILY  LORazepam (ATIVAN) tablet 0.5 mg  0.5 mg Oral BID PRN  
 montelukast (SINGULAIR) tablet 10 mg  10 mg Oral DAILY  carvediloL (COREG) tablet 3.125 mg  3.125 mg Per NG tube BID WITH MEALS  
 levothyroxine (SYNTHROID) tablet 75 mcg  75 mcg Per NG tube ACB  [Held by provider] lisinopriL (PRINIVIL, ZESTRIL) tablet 2.5 mg  2.5 mg Per NG tube DAILY  famotidine (PF) (PEPCID) 20 mg in 0.9% sodium chloride 10 mL injection  20 mg IntraVENous Q24H  
 metoprolol (LOPRESSOR) injection 5 mg  5 mg IntraVENous Q4H PRN  
 NUTRITIONAL SUPPORT ELECTROLYTE PRN ORDERS   Does Not Apply PRN  
 fluticasone propionate (FLONASE) 50 mcg/actuation nasal spray 2 Spray  2 Spray Both Nostrils DAILY  sodium chloride (NS) flush 5-40 mL  5-40 mL IntraVENous Q8H  
 sodium chloride (NS) flush 5-40 mL  5-40 mL IntraVENous PRN  
 ondansetron (ZOFRAN) injection 4 mg  4 mg IntraVENous Q6H PRN  
 acetaminophen (TYLENOL) suppository 650 mg  650 mg Rectal Q4H PRN  
 LORazepam (ATIVAN) injection 2 mg  2 mg IntraVENous Q2H PRN  
 albuterol-ipratropium (DUO-NEB) 2.5 MG-0.5 MG/3 ML  3 mL Nebulization Q4H PRN  
 levETIRAcetam (KEPPRA) 500 mg in 0.9% sodium chloride (MBP/ADV) 100 mL  500 mg IntraVENous Q12H  
 dexamethasone (DECADRON) 10 mg/mL injection 6 mg  6 mg IntraVENous Q6H  
 levalbuterol (XOPENEX) nebulizer soln 1.25 mg/3 mL  1.25 mg Nebulization Q6H PRN Review of Systems Constitutional: negative for fevers, chills and sweats Respiratory: positive for weak cough, oral secretions Cardiovascular: positive for none Neurological: positive for speech problems, coordination problems and weak cough Objective:  
 
Vitals:  
 10/27/20 0819 10/27/20 0900 10/27/20 1000 10/27/20 1007 BP:  105/69 (!) 81/53 (!) 91/56 Pulse:  (!) 120 (!) 106 Resp:  29 25 Temp:      
SpO2: 95% 98% 94% Weight:      
 
No intake/output data recorded. 10/25 1901 - 10/27 0700 In: 6477 [I.V.:1085] Out: 915 [MJEO] PHYSICAL EXAM  
 
Physical Exam:  
General:  Alert, cooperative, no acute distress, appears stated age. Eyes:  Conjunctivae/corneas clear. Nose: Nares patent and moist.   
Mouth/Throat: Thick secretions Neck: Supple, symmetrical.  
Respiratory:   Decreased bases to auscultation bilaterally on NC Cardiovascular:  Regular rate and rhythm, S1, S2, no murmur, click, rub or gallop. Telemetry monitor:NSR  
GI:   Abdomen soft, non-tender. Bowel sounds active X 4 Q. Musculoskeletal: Extremities symmetrical, atraumatic, no cyanosis, no edema. Skin: Skin color, texture, turgor normal. No rashes or lesions Neurologic:  Alert and mumbles speech, weak cough with oral secretions Activity: limited, with assistance Nutrition: TF 
  
CHEST X-RAYS: 
 
 
CULTURES:none LABS Recent Labs 10/27/20 
4190 10/26/20 
8152 10/25/20 
2947 WBC 22.3* 13.8* 10.8 HGB 11.7 11.8 11.9 HCT 34.8* 34.7* 35.2*  
 476* 502* Recent Labs 10/27/20 
7381 10/26/20 
1004 10/25/20 
0707 10/25/20 
1765  137 137  --   
K 5.3* 4.3 3.2*  --   
 104 101  --   
* 182* 98  --   
CO2 22 23 30  --   
BUN 37* 25* 26*  --   
CREA 1.05* 0.99 0.77  --   
INR  --   --  0.9 1.0 Echocardiogram 
LEFT VENTRICLE: Size was normal. Systolic function was moderately to markedly 
reduced. Ejection fraction was estimated in the range of 30 % to 35 %. There 
was moderate diffuse hypokinesis. Wall thickness was normal. 
  
AORTIC VALVE: The valve was trileaflet. Leaflets exhibited normal cuspal 
separation and mild sclerosis. The aortic valve area by the continuity  
equation 
was 2.07 cm2. The peak velocity was 1.36 m/s. The mean pressure gradient was 5 
mmHg. The peak pressure gradient was 7 mmHg. The Di=0.66. The SVi=36.8. 
  
SUMMARY: 
  
-  Left ventricle: Systolic function was moderately to markedly reduced. Ejection fraction was estimated in the range of 30 % to 35 %. There was 
moderate diffuse hypokinesis.   
-  Aortic valve: The aortic valve area by the continuity equation was 2.07  
cm2. 
  
ADDITIONAL MEASUREMENTS High velocity overlapping flow noted during suprasternal imaging (descending 
aorta appears with normal flow velocities and superimposed high flow Velocities from alternative source of uncertain [id number]~9.6V/T). This was also notedon 
previous echo from 9/30/20. 
  
 
 
Assessment:  
 
Hospital Problems  Date Reviewed: 10/19/2020 Codes Class Noted POA Expressive aphasia ICD-10-CM: R47.01 
ICD-9-CM: 784.3  10/25/2020 Yes * (Principal) Acute right-sided weakness ICD-10-CM: R53.1 ICD-9-CM: 728.87  10/25/2020 Yes Frontal mass of brain ICD-10-CM: G93.89 ICD-9-CM: 348.89  10/25/2020 Yes Acute encephalopathy ICD-10-CM: G93.40 ICD-9-CM: 348.30  10/25/2020 Yes Multiple lesions on CT of brain and spine ICD-10-CM: R93.0, R93.7 ICD-9-CM: 793.0, 793.7  10/25/2020 Unknown Acute CVA (cerebrovascular accident) ([facility] [address] ICD-10-CM: I63.9 ICD-9-CM: 434.91  10/25/2020 Yes Multiple lung nodules on CT (Chronic) ICD-10-CM: R91.8 ICD-9-CM: 793.19  10/12/2020 Yes Overview Addendum 10/12/2020  2:33 PM by Ministerio Gay NP  
  -PET scan 10/2019: PET scan fortunately did not have any abnormal activity in her mass in the left lung. This may mean that we can just follow this radiographically, but we will talk about her at tumor board and come back to her with the group's recommendation. 
-10/30/2019: Patient is discussed at thoracic conference today lead by Dr Margo Devries. Patient is a 79 yo never smoker. CT guided biopsy recommended. -CT guided Biopsy 11/2020: lung biopsy showed signs of this nodule being a hamartoma, which is a benign tumor that we can simply follow. repeat CT in 6 months 
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 Dennis Luke are several new pulmonary nodules noted which may suggest some metastatic disease.  This require further follow-up CT of the chest in 2 months or doing PET scan. Hamartoma (HCC) (Chronic) ICD-10-CM: Q85.9 ICD-9-CM: 759.6  10/12/2020 Yes Peripheral vascular disease ([address] ICD-10-CM: I73.9 ICD-9-CM: 443.9  10/12/2020 Yes Bilateral pleural effusion ICD-10-CM: J90 ICD-9-CM: 511.9  10/7/2020 Yes Overview Signed 10/13/2020  1:25 PM by DENI Gamino  
  10/3/2020: L thoracentesis 1050mL removed R thoracentesis 800mL removed 10/9/2020: L thoracentesis: 1000mL removed R thoracentesis: 600mL removed Hypercholesterolemia (Chronic) ICD-10-CM: E78.00 ICD-9-CM: 272.0  6/30/2015 Yes Dysphagia (Chronic) ICD-10-CM: R13.10 ICD-9-CM: 787.20  6/30/2015 Yes Acquired hypothyroidism (Chronic) ICD-10-CM: E03.9 ICD-9-CM: 244.9  10/17/2013 Yes GERD (gastroesophageal reflux disease) (Chronic) ICD-10-CM: K21.9 ICD-9-CM: 530.81  10/17/2013 Yes Plan:  
 
Hem/onc consulted, here with lung nodules, brain lesions. Would get CT abdomen/pelvis to see if there is an accessible target On decadron Speech following,suction, weak cough May need to repeat T/C. Continue lasix for now, watch I/O KE Gomez I have spoken with and examined the patient. I agree with the above assessment and plan as documented. Gen: awake, pleasant, dysarthric Lungs:  CTAB Heart:  RRR with no Murmur/Rubs/Gallops Abd: NTND Ext:R sided hemiplegia 
 
--will f/u CT Abd/Pelvis to help participate in discussion about optimal biopsy target site. --can transfer to floor from my perspective.  
 
Cam Lamer, MD 
 
More than 50% of time documented was spent in face-to-face contact with the patient and in the care of the patient on the floor/unit where the patient is located.

## 2020-10-27 NOTE — PROGRESS NOTES
During Neuro check for pt. Right eye has changed form being 4mm and reactive to 4mm and very sluggish. No other new deficits noted. Notified MD. No new orders. Will continue to monitor.

## 2020-10-28 NOTE — PROGRESS NOTES
A follow up visit was made to the patient. Emotional support, spiritual presence and  
prayer were provided for the patient. Her verbal communication was much better today than a couple of days ago. The  met the patient's  and brother and had conversations with them. Lb Quinn MDIV

## 2020-10-28 NOTE — PROGRESS NOTES
Chart reviewed for d/c POC. Pt doing better and will most likely need STR at SNF. Awaiting consult for possible bx. CM following .

## 2020-10-28 NOTE — PROGRESS NOTES
Hospitalist Note Admit Date:  10/25/2020  6:56 AM  
Name:  Stew Felipe Age:  69 y.o. 
:  1950 MRN:  [id number] PCP:  Marybel Eldridge MD 
Treatment Team: Attending Provider: Brent Rose MD; Consulting Provider: Kandace Cardoso DO; Consulting Provider: Yumiko Carrion NP; Care Manager: Anahi Padilla, RN; Utilization Review: Polly Smiley RN; Consulting Provider: Alisson Vergara MD; Consulting Provider: Lewis Stoddard NP; Primary Nurse: Gabriela Johnson; Consulting Provider: Leonela Frey MD; Primary Nurse: Marita Gupta, RN; Occupational Therapist: Cecy Rousseau OT; Physical Therapist: Giulia Alcala PT, DPT 
 
HPI/Subjective: This is a 69years old female with hx of HTN, GERD, Hodgkin's lymphoma s/p Radiation Rx, dyslipidemia, hypothyroidism, asthma, systolic CHF EF 83-94% recently diagnosed lung hamartomas presented to ER with acute onset of right sided weakness, aphasia and confusion that began this AM. Pt was recently discharged from [facility] on 10/21 after being treated for PNA and was doing okay until today. Pt is not able to provide much history, she has sort of blank stare but can nod to yes or no questions. Per , pt was walking, doing ADL's  Until yesterday but this morning a drastic change in her mentation and speech was noted by him. ER work up showed CT evidence of enhancing 2.2 cm left frontal lobe mass with vasogenic edema with 2 mm left to right midline shift with 2 additional small masses in tracy and right occipital lobe. CTA head/neck short segment occlusion of left MCA with some changes suggestive of small core infarct and penumbra within the left frontal lobe, no evidence of intraparenchymal hemorrhage. CXR with interval worsening of pulmonary edema and bilateral pleural effusion. \" 
  
10/26 Decadron and Keppra started. Nursing staff note labored breathing this AM but satting well on RA. 10/27 patient still has dysarthria. Right upper and lower extremity weakness. Patient is alert awake and able to answer some questions with slurred speech. Afebrile. She was started on a diet today after speech therapy eval. 
 
10/28 patient reports feeling better. Still with slurred speech, right upper and lower extremity weakness. Afebrile. Objective:  
 
Patient Vitals for the past 24 hrs: 
 Temp Pulse Resp BP SpO2  
10/28/20 1228  (!) 105 20 135/63 94 % 10/28/20 1158  (!) 112 26 (!) 142/67 93 % 10/28/20 1129  (!) 109 24 (!) 140/78 95 % 10/28/20 1058 97.3 °F (36.3 °C) (!) 111 27 (!) 145/86 94 % 10/28/20 1029  (!) 110 18 138/74 93 % 10/28/20 0958  (!) 113 26 139/75 94 % 10/28/20 0929  (!) 110 19 134/62 93 % 10/28/20 0858  (!) 113 19 133/72 93 % 10/28/20 0853  (!) 113  139/63   
10/28/20 0829  (!) 112 20 139/63 91 % 10/28/20 0758  (!) 116 21 (!) 141/78 93 % 10/28/20 0729  (!) 114 23 (!) 144/82 98 % 10/28/20 0728     97 % 10/28/20 0658 98.5 °F (36.9 °C) (!) 111 19 136/69 93 % 10/28/20 0629  (!) 115 22 (!) 147/82 94 % 10/28/20 0558  (!) 115 21 (!) 166/79 93 % 10/28/20 0529  (!) 109 19 (!) 144/69 95 % 10/28/20 0458  (!) 108 18 (!) 140/63 94 % 10/28/20 0429  (!) 109 20 (!) 140/82 94 % 10/28/20 0358  (!) 109 21 134/83 94 % 10/28/20 0329  (!) 112 23 (!) 145/63 94 % 10/28/20 0258 97.6 °F (36.4 °C) (!) 112 20 (!) 150/79 94 % 10/28/20 0229  (!) 109 18 (!) 145/67 94 % 10/28/20 0158  (!) 108 21 (!) 141/63 95 % 10/28/20 0129  (!) 107 18 (!) 141/64 94 % 10/28/20 0058  (!) 106 19 (!) 143/67 95 % 10/28/20 0029  (!) 101 17 (!) 101/53 94 % 10/28/20 0000  (!) 104     
10/27/20 2358  (!) 107 20 126/60 94 % 10/27/20 2329  (!) 108 19 (!) 152/68 94 % 10/27/20 2258  (!) 104 18 130/60 93 % 10/27/20 2229  (!) 101 18 (!) 103/51 93 % 10/27/20 2158  (!) 105 18 (!) 120/59 93 % 10/27/20 2129  (!) 107 21 135/63 94 % 10/27/20 2104     95 % 10/27/20 2058  (!) 104 21 136/67 94 % 10/27/20 2029  (!) 108 22 139/65 95 % 10/27/20 2000  (!) 108     
10/27/20 1958  (!) 106 22 (!) 151/76 95 % 10/27/20 1929  (!) 101 17 (!) 113/56 93 % 10/27/20 1900 97.8 °F (36.6 °C) (!) 109 19 (!) 160/76 93 % 10/27/20 1858  (!) 109 19 (!) 160/76 93 % 10/27/20 1800  (!) 104 20 (!) 144/67 94 % 10/27/20 1700  (!) 109 21 (!) 161/88 94 % 10/27/20 1600 97.6 °F (36.4 °C) (!) 105 18 98/60 95 % 10/27/20 1500  (!) 108 24 100/63 96 % 10/27/20 1400  100 19 (!) 88/55 94 % Oxygen Therapy O2 Sat (%): 94 % (10/28/20 1228) Pulse via Oximetry: 106 beats per minute (10/28/20 1228) O2 Device: Room air (10/28/20 1058) Intake/Output Summary (Last 24 hours) at 10/28/2020 1300 Last data filed at 10/28/2020 1058 Gross per 24 hour Intake 993 ml Output 2075 ml Net -1082 ml *Note that automatically entered I/Os may not be accurate; dependent on patient compliance with collection and accurate  by [employer]. General:    Ill appearing, tired looking, alert,awake, HEENT: AT, NC,    
CV:   RRR. No murmur, rub, or gallop. Lungs:   CTAB. No wheezing, rhonchi, or rales. Abdomen:   Soft, nontender, nondistended. Extremities: Warm and dry. No cyanosis or edema. Skin:     No rashes or jaundice. Neuro:  Alert,awake,oriented x3,  slurred speech, right upper and lower extremities 2/5, left upper and lower extremities 5/5, Data Review: 
I have reviewed all labs, meds, and studies from the last 24 hours: 
 
Recent Results (from the past 24 hour(s)) GLUCOSE, POC Collection Time: 10/27/20  4:56 PM  
Result Value Ref Range Glucose (POC) 139 (H) 65 - 100 mg/dL GLUCOSE, POC Collection Time: 10/27/20 11:17 PM  
Result Value Ref Range Glucose (POC) 154 (H) 65 - 100 mg/dL METABOLIC PANEL, BASIC Collection Time: 10/28/20  3:57 AM  
Result Value Ref Range  Sodium 139 136 - 145 mmol/L  
 Potassium 4.7 3.5 - 5.1 mmol/L Chloride 107 98 - 107 mmol/L  
 CO2 25 21 - 32 mmol/L Anion gap 7 7 - 16 mmol/L Glucose 143 (H) 65 - 100 mg/dL BUN 48 (H) 8 - 23 MG/DL Creatinine 0.96 0.6 - 1.0 MG/DL  
 GFR est AA >60 >60 ml/min/1.73m2 GFR est non-AA >60 >60 ml/min/1.73m2 Calcium 9.3 8.3 - 10.4 MG/DL  
CBC W/O DIFF Collection Time: 10/28/20  3:57 AM  
Result Value Ref Range WBC 20.0 (H) 4.3 - 11.1 K/uL  
 RBC 3.79 (L) 4.05 - 5.2 M/uL  
 HGB 11.7 11.7 - 15.4 g/dL HCT 35.4 (L) 35.8 - 46.3 % MCV 93.4 79.6 - 97.8 FL  
 MCH 30.9 26.1 - 32.9 PG  
 MCHC 33.1 31.4 - 35.0 g/dL  
 RDW 13.9 11.9 - 14.6 % PLATELET 363 114 - 875 K/uL MPV 9.7 9.4 - 12.3 FL ABSOLUTE NRBC 0.00 0.0 - 0.2 K/uL GLUCOSE, POC Collection Time: 10/28/20  6:26 AM  
Result Value Ref Range Glucose (POC) 159 (H) 65 - 100 mg/dL GLUCOSE, POC Collection Time: 10/28/20 11:24 AM  
Result Value Ref Range Glucose (POC) 171 (H) 65 - 100 mg/dL All Micro Results None No results found for this visit on 10/25/20. Current Meds: 
Current Facility-Administered Medications Medication Dose Route Frequency  carvediloL (COREG) tablet 6.25 mg  6.25 mg Per NG tube BID WITH MEALS  
 [START ON 10/29/2020] montelukast (SINGULAIR) tablet 10 mg  10 mg Per G Tube DAILY  [START ON 10/29/2020] lansoprazole (PREVACID) 3 mg/mL oral suspension 30 mg  30 mg Per NG tube ACB  rosuvastatin (CRESTOR) tablet 40 mg  40 mg Per NG tube QHS  insulin lispro (HUMALOG) injection   SubCUTAneous Q6H  
 tuberculin injection 5 Units  5 Units IntraDERMal ONCE  
 budesonide (PULMICORT) 500 mcg/2 ml nebulizer suspension  500 mcg Nebulization BID RT  
 albuterol (PROVENTIL VENTOLIN) nebulizer solution 2.5 mg  2.5 mg Nebulization Q6H RT  
 furosemide (LASIX) injection 40 mg  40 mg IntraVENous DAILY  LORazepam (ATIVAN) tablet 0.5 mg  0.5 mg Oral BID PRN  
  levothyroxine (SYNTHROID) tablet 75 mcg  75 mcg Per NG tube ACB  [Held by provider] lisinopriL (PRINIVIL, ZESTRIL) tablet 2.5 mg  2.5 mg Per NG tube DAILY  metoprolol (LOPRESSOR) injection 5 mg  5 mg IntraVENous Q4H PRN  
 NUTRITIONAL SUPPORT ELECTROLYTE PRN ORDERS   Does Not Apply PRN  
 fluticasone propionate (FLONASE) 50 mcg/actuation nasal spray 2 Spray  2 Spray Both Nostrils DAILY  sodium chloride (NS) flush 5-40 mL  5-40 mL IntraVENous Q8H  
 sodium chloride (NS) flush 5-40 mL  5-40 mL IntraVENous PRN  
 ondansetron (ZOFRAN) injection 4 mg  4 mg IntraVENous Q6H PRN  
 acetaminophen (TYLENOL) suppository 650 mg  650 mg Rectal Q4H PRN  
 LORazepam (ATIVAN) injection 2 mg  2 mg IntraVENous Q2H PRN  
 albuterol-ipratropium (DUO-NEB) 2.5 MG-0.5 MG/3 ML  3 mL Nebulization Q4H PRN  
 levETIRAcetam (KEPPRA) 500 mg in 0.9% sodium chloride (MBP/ADV) 100 mL  500 mg IntraVENous Q12H  
 dexamethasone (DECADRON) 10 mg/mL injection 6 mg  6 mg IntraVENous Q6H  
 levalbuterol (XOPENEX) nebulizer soln 1.25 mg/3 mL  1.25 mg Nebulization Q6H PRN Other Studies (last 24 hours): Xr Abd (kub) Result Date: 10/28/2020 KUB CLINICAL INDICATION:  Follow-up barium passage, feeding difficulties, dysphagia, abdominal discomfort, planning for CT COMPARISON: Radiography 10/26/2020 TECHNIQUE: AP view of the abdomen 7:42 AM supine FINDINGS:  Feeding tube tip again projects over gastric body. Small to moderate amount of oral contrast within stomach and multiple loops of large bowel. Multiple surgical clips again project over abdomen. Possible small amount contrast remaining within some loops of small bowel as well. Note bowel obstruction evident. No free air, pneumatosis or pneumobilia within limits of portable supine radiography. Low bone density. Partial visualization of small bilateral pleural effusions and possible minimal remaining bibasilar lung infiltrates. IMPRESSION:  1. Oral contrast within stomach and bowel. 2. No obstruction. Assessment and Plan:  
 
Hospital Problems as of 10/28/2020 Date Reviewed: 10/19/2020 Codes Class Noted - Resolved POA Systolic heart failure (HCC) ICD-10-CM: I50.20 ICD-9-CM: 428.20  10/27/2020 - Present Yes Expressive aphasia ICD-10-CM: R47.01 
ICD-9-CM: 784.3  10/25/2020 - Present Yes * (Principal) Acute right-sided weakness ICD-10-CM: R53.1 ICD-9-CM: 728.87  10/25/2020 - Present Yes Frontal mass of brain ICD-10-CM: G93.89 ICD-9-CM: 348.89  10/25/2020 - Present Yes Acute encephalopathy ICD-10-CM: G93.40 ICD-9-CM: 348.30  10/25/2020 - Present Yes Multiple lesions on CT of brain and spine ICD-10-CM: R93.0, R93.7 ICD-9-CM: 793.0, 793.7  10/25/2020 - Present Yes Acute CVA (cerebrovascular accident) ([facility] [address] ICD-10-CM: I63.9 ICD-9-CM: 434.91  10/25/2020 - Present Yes Multiple lung nodules on CT (Chronic) ICD-10-CM: R91.8 ICD-9-CM: 793.19  10/12/2020 - Present Yes Overview Addendum 10/12/2020  2:33 PM by Mariaa Del Rio NP  
  -PET scan 10/2019: PET scan fortunately did not have any abnormal activity in her mass in the left lung. This may mean that we can just follow this radiographically, but we will talk about her at tumor board and come back to her with the group's recommendation. 
-10/30/2019: Patient is discussed at thoracic conference today lead by Dr Vinicius Caballero. Patient is a 77 yo never smoker. CT guided biopsy recommended. -CT guided Biopsy 11/2020: lung biopsy showed signs of this nodule being a hamartoma, which is a benign tumor that we can simply follow.  repeat CT in 6 months 
-Chest CT June 2020: CT scan is stable 
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 Jamilah Knuckles are several new pulmonary nodules noted which may suggest some metastatic disease.  This require further follow-up CT of the chest in 2 months or doing PET scan. Hamartoma (HCC) (Chronic) ICD-10-CM: Q85.9 ICD-9-CM: 759.6  10/12/2020 - Present Yes Peripheral vascular disease ([address] ICD-10-CM: I73.9 ICD-9-CM: 443.9  10/12/2020 - Present Yes Bilateral pleural effusion ICD-10-CM: J90 ICD-9-CM: 511.9  10/7/2020 - Present Yes Overview Signed 10/13/2020  1:25 PM by DENI Taylor  
  10/3/2020: L thoracentesis 1050mL removed R thoracentesis 800mL removed 10/9/2020: L thoracentesis: 1000mL removed R thoracentesis: 600mL removed Hypercholesterolemia (Chronic) ICD-10-CM: E78.00 ICD-9-CM: 272.0  6/30/2015 - Present Yes Dysphagia (Chronic) ICD-10-CM: R13.10 ICD-9-CM: 787.20  6/30/2015 - Present Yes Acquired hypothyroidism (Chronic) ICD-10-CM: E03.9 ICD-9-CM: 244.9  10/17/2013 - Present Yes GERD (gastroesophageal reflux disease) (Chronic) ICD-10-CM: K21.9 ICD-9-CM: 530.81  10/17/2013 - Present Yes RESOLVED: Hypokalemia ICD-10-CM: E87.6 ICD-9-CM: 276.8  10/25/2020 - 10/27/2020 Yes Plan: This is a 76-year-old female with Acute stroke with metastatic disease in the brain POA MRI brain with several enhancing masses with the largest in the left frontal region with significant surrounding edema. CNS lymphoma/metastatic disease small acute right occipital and parietal lobe infarcts. No acute neurosurgical intervention. Neurology on board. Recommend aspirin after biopsy. Statin. Plan to start Aspirin in 24 hours after biopsy. Oncology consulted. Appreciate recommendations. Dexamethasone 6 mg IV every 6 hours. Keppra 500 twice daily. Speech therapy eval today and patient started on diet. Acute metabolic encephalopathy from stroke resolved Frontal brain mass likely metastasis Chronic systolic congestive heart failure with EF of 35% 
currently on IV Lasix.   
 
Multiple lung nodules on CT 
 Oncology on board. Pulmonology consulted for lung nodule biopsy. . 
CT chest/ abdomen and pelvis ordered for metastatic disease and target for biopsy. Bilateral pleural effusions Pulmonary on board. Hypothyroidism Levothyroxine GERD Pepcid. Peripheral vascular disease Palliative care consulted. Appreciate recommendations. CODE STATUS DNR. Goals of care discussed with patient and her  and brother at bedside today. DC planning/Dispo: Okay to transfer to medical floor with remote telemetry. Anticipate inpatient hospital stay for at least 48 hours. May need rehab facility placement. Diet:  DIET TUBE FEEDING 
DIET MECHANICAL SOFT 
DVT ppx:  SCDs Signed: 
Ariella Brooks MD

## 2020-10-28 NOTE — PROGRESS NOTES
Bedside, Verbal and Written shift change report given to Joanna Dubose RN (oncoming nurse) by Jessi Carpio RN (offgoing nurse). Report included the following information SBAR, Kardex, ED Summary, Intake/Output, MAR, Recent Results, Cardiac Rhythm ST and Dual Neuro Assessment.

## 2020-10-28 NOTE — PROGRESS NOTES
[facility] Admission Date: 10/25/2020 Daily Progress Note: 10/28/2020 This patient has been seen and evaluated at the request of Dr. Ruby Lopez for biopsy recommendations.  
  
Patient is a 69 y.o.  female presents with AMS and MRI showing several enhancing masses with metastatic disease. 
  
  
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  
  
History is as follows: She had a PET scan 10/2019: PET scan fortunately did not have any abnormal activity in her mass in the left lung. 10/30/2019: Patient is discussed at thoracic conference lead by Dr Jeanie Platt. Patient is a 77 yo never smoker.  CT guided biopsy recommended. CT guided Biopsy 11/2019: lung biopsy showed signs of this nodule being a hamartoma, which is a benign tumor that we can simply follow. Repeat Chest CT June 2020: CT scan was stable. Chest CT 9/2020: She has a chronic absence of L pulmonary artery on review with radiology. Juris Maisha are several new pulmonary nodules noted which may suggest some metastatic disease.  October 2020: she presented here with infiltrates, COvid 19 ruled out and treated with antibiotics. CT  Showed pleural effusions and S/P thoracenteses:L thoracentesis with 1050mL removed and R thoracentesis with 800mL removed on 10/3/2020. Cardiology was consulted on 10/9. Repeat L thoracentesis with 1000mL removed and R thoracentesis with 600mL removed. Cytology negative. Flow cytometry was negative. RHC and LHC was discussed by cardiology once PET scan and need for biopsies completed. 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  Vascular surgery felt that no intervention was necessary at this time.  
  
 She was discharged on 10/2/2020 with the plan to have PET scan planned for today. Unfortunately, she presented here again with new onset hemiplegia and aphasia. She was seen by Neurology and MRI shows several enhancing lesions. We were asked to see her for recommendations regarding biopsy. Subjective:  
 
CT Abd/pelvis is pending removal of barium from abdomen to determine safest target for biopsy. Awaiting transfer to the floor. Review of Systems Constitutional: negative for fevers, chills, sweats and fatigue Respiratory: positive for none Cardiovascular: negative for chest pain, chest pressure/discomfort, dyspnea, palpitations Current Facility-Administered Medications Medication Dose Route Frequency  carvediloL (COREG) tablet 6.25 mg  6.25 mg Oral BID WITH MEALS  rosuvastatin (CRESTOR) tablet 40 mg  40 mg Per NG tube QHS  insulin lispro (HUMALOG) injection   SubCUTAneous Q6H  
 tuberculin injection 5 Units  5 Units IntraDERMal ONCE  
 budesonide (PULMICORT) 500 mcg/2 ml nebulizer suspension  500 mcg Nebulization BID RT  
 albuterol (PROVENTIL VENTOLIN) nebulizer solution 2.5 mg  2.5 mg Nebulization Q6H RT  
 furosemide (LASIX) injection 40 mg  40 mg IntraVENous DAILY  LORazepam (ATIVAN) tablet 0.5 mg  0.5 mg Oral BID PRN  
 montelukast (SINGULAIR) tablet 10 mg  10 mg Oral DAILY  levothyroxine (SYNTHROID) tablet 75 mcg  75 mcg Per NG tube ACB  [Held by provider] lisinopriL (PRINIVIL, ZESTRIL) tablet 2.5 mg  2.5 mg Per NG tube DAILY  famotidine (PF) (PEPCID) 20 mg in 0.9% sodium chloride 10 mL injection  20 mg IntraVENous Q24H  
 metoprolol (LOPRESSOR) injection 5 mg  5 mg IntraVENous Q4H PRN  
 NUTRITIONAL SUPPORT ELECTROLYTE PRN ORDERS   Does Not Apply PRN  
 fluticasone propionate (FLONASE) 50 mcg/actuation nasal spray 2 Spray  2 Spray Both Nostrils DAILY  sodium chloride (NS) flush 5-40 mL  5-40 mL IntraVENous Q8H  
  sodium chloride (NS) flush 5-40 mL  5-40 mL IntraVENous PRN  
 ondansetron (ZOFRAN) injection 4 mg  4 mg IntraVENous Q6H PRN  
 acetaminophen (TYLENOL) suppository 650 mg  650 mg Rectal Q4H PRN  
 LORazepam (ATIVAN) injection 2 mg  2 mg IntraVENous Q2H PRN  
 albuterol-ipratropium (DUO-NEB) 2.5 MG-0.5 MG/3 ML  3 mL Nebulization Q4H PRN  
 levETIRAcetam (KEPPRA) 500 mg in 0.9% sodium chloride (MBP/ADV) 100 mL  500 mg IntraVENous Q12H  
 dexamethasone (DECADRON) 10 mg/mL injection 6 mg  6 mg IntraVENous Q6H  
 levalbuterol (XOPENEX) nebulizer soln 1.25 mg/3 mL  1.25 mg Nebulization Q6H PRN Objective:  
 
Vitals:  
 10/28/20 6216 10/28/20 3642 10/28/20 4695 10/28/20 6172 BP: (!) 141/78 139/63 139/63 133/72 Pulse: (!) 116 (!) 112 (!) 113 (!) 113 Resp: 21 20 19 Temp:      
SpO2: 93% 91%  93% Weight:      
 
Intake and Output:  
10/26 1901 - 10/28 0700 In: 417 [P.O.:50; I.V.:100] Out: 1500 [Urine:1500] 10/28 0701 - 10/28 1900 In: 100 [I.V.:100] Out: - Intake/Output Summary (Last 24 hours) at 10/28/2020 7210 Last data filed at 10/28/2020 6783 Gross per 24 hour Intake 993 ml Output 1300 ml Net -307 ml Physical Exam:         
Constitutional: the patient is well develop HEENT: Sclera clear, pupils equal, oral mucosa moist 
Lungs: CTA on RA Cardiovascular: RRR without M,G,R 
Abd/GI: soft and non-tender; with positive bowel sounds. Ext: warm without cyanosis. There is no lower leg edema. Musculoskeletal: moves all four extremities with equal strength Skin: no jaundice or rashes, no wounds Neuro: slurred speech, appropriate Lines/Drains:  PIV, bustamante Nutrition: mechanical soft diet CHEST X-RAYS: 
 
  
 
LAB Recent Labs 10/28/20 
8389 10/27/20 
4676 10/26/20 
5966 WBC 20.0* 22.3* 13.8* HGB 11.7 11.7 11.8 HCT 35.4* 34.8* 34.7*  441 476* Recent Labs 10/28/20 
0357 10/27/20 
0334 10/26/20 
1004  137 137  
K 4.7 5.3* 4.3  107 104 CO2 25 22 23 * 158* 182* BUN 48* 37* 25* CREA 0.96 1.05* 0.99 Echocardiogram 
LEFT VENTRICLE: Size was normal. Systolic function was moderately to markedly 
reduced. Ejection fraction was estimated in the range of 30 % to 35 %. There 
was moderate diffuse hypokinesis. Wall thickness was normal. 
  
AORTIC VALVE: The valve was trileaflet. Leaflets exhibited normal cuspal 
separation and mild sclerosis. The aortic valve area by the continuity  
equation 
was 2.07 cm2. The peak velocity was 1.36 m/s. The mean pressure gradient was 5 
mmHg. The peak pressure gradient was 7 mmHg. The Di=0.66. The SVi=36.8. 
  
SUMMARY: 
  
-  Left ventricle: Systolic function was moderately to markedly reduced. Ejection fraction was estimated in the range of 30 % to 35 %. There was 
moderate diffuse hypokinesis.   
-  Aortic valve: The aortic valve area by the continuity equation was 2.07  
cm2. 
  
ADDITIONAL MEASUREMENTS High velocity overlapping flow noted during suprasternal imaging (descending 
aorta appears with normal flow velocities and superimposed high flow Velocities from alternative source of uncertain [id number]~9.9Z/M). This was also notedon 
previous echo from 9/30/20. 
  
  
  
 
 
Assessment:  
 
Hospital Problems  Date Reviewed: 10/19/2020 Codes Class Noted POA Systolic heart failure (HCC) ICD-10-CM: I50.20 ICD-9-CM: 428.20  10/27/2020 Yes Expressive aphasia ICD-10-CM: R47.01 
ICD-9-CM: 784.3  10/25/2020 Yes * (Principal) Acute right-sided weakness ICD-10-CM: R53.1 ICD-9-CM: 728.87  10/25/2020 Yes Frontal mass of brain ICD-10-CM: G93.89 ICD-9-CM: 348.89  10/25/2020 Yes Acute encephalopathy ICD-10-CM: G93.40 ICD-9-CM: 348.30  10/25/2020 Yes Multiple lesions on CT of brain and spine ICD-10-CM: R93.0, R93.7 ICD-9-CM: 793.0, 793.7  10/25/2020 Yes Acute CVA (cerebrovascular accident) ([address] ICD-10-CM: I63.9 ICD-9-CM: 434.91  10/25/2020 Yes Multiple lung nodules on CT (Chronic) ICD-10-CM: R91.8 ICD-9-CM: 793.19  10/12/2020 Yes Overview Addendum 10/12/2020  2:33 PM by Ming Martino NP  
  -PET scan 10/2019: PET scan fortunately did not have any abnormal activity in her mass in the left lung. This may mean that we can just follow this radiographically, but we will talk about her at tumor board and come back to her with the group's recommendation. 
-10/30/2019: Patient is discussed at thoracic conference today lead by Dr Sarah Dubose. Patient is a 79 yo never smoker. CT guided biopsy recommended. -CT guided Biopsy 11/2020: lung biopsy showed signs of this nodule being a hamartoma, which is a benign tumor that we can simply follow. repeat CT in 6 months 
-Chest CT June 2020: CT scan is stable 
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 Kennth Crossett are several new pulmonary nodules noted which may suggest some metastatic disease.  This require further follow-up CT of the chest in 2 months or doing PET scan. Hamartoma (HCC) (Chronic) ICD-10-CM: Q85.9 ICD-9-CM: 759.6  10/12/2020 Yes Peripheral vascular disease ([facility] [address] ICD-10-CM: I73.9 ICD-9-CM: 443.9  10/12/2020 Yes Bilateral pleural effusion ICD-10-CM: J90 ICD-9-CM: 511.9  10/7/2020 Yes Overview Signed 10/13/2020  1:25 PM by DENI Negro  
  10/3/2020: L thoracentesis 1050mL removed R thoracentesis 800mL removed 10/9/2020: L thoracentesis: 1000mL removed R thoracentesis: 600mL removed Hypercholesterolemia (Chronic) ICD-10-CM: E78.00 ICD-9-CM: 272.0  6/30/2015 Yes Dysphagia (Chronic) ICD-10-CM: R13.10 ICD-9-CM: 787.20  6/30/2015 Yes Acquired hypothyroidism (Chronic) ICD-10-CM: E03.9 ICD-9-CM: 244.9  10/17/2013 Yes GERD (gastroesophageal reflux disease) (Chronic) ICD-10-CM: K21.9 ICD-9-CM: 530.81  10/17/2013 Yes Here with aphasia and found have brain lesions. Her recent Chest CT showed new lung nodules. Concern for metastatic cancer. Hem/onc consulted and pulm consulted for biopsy recommendations. Ct abd/pelvis ordered. PLAN:  
 
Hem/onc consulted, here with lung nodules, brain lesions. Awaiting CT abdomen/pelvis to see if there is an accessible target On decadron per neuro Speech following,suction, weak cough. Diet advanced. ? Stop tube feedings May need to repeat T/C. Continue lasix for now, watch I/O. Jd Chapman NP-C More than 50% of time documented was spent in face-to-face contact with the patient and in the care of the patient on the floor/unit where the patient is located. I have spoken with and examined the patient. I agree with the above assessment and plan as documented. Gen: pleasant, dysarthric/aphasic Lungs:  CTA, decreased in bases Heart:  RRR with no Murmur/Rubs/Gallops Abd: NTND Ext: no edema 
 
--await CT abd/pelvis. Will add Chest to address possibility of biopsy targets there. Prior CT in Sept didn't suggest nodules particularly amenable to navigational bronchoscopy, and it's likely that CT-guided biopsy would be recommended based on that imaging. 
--will follow along with you.  
Bonifacio Henry MD

## 2020-10-28 NOTE — PROGRESS NOTES
Neurology Daily Progress Note Assessment:  
 
69year old female with likely metastatic brain disease. She also has multiple small infarcts bilaterally. Neurosurgery and oncology following. Awaiting further workup for primary malignancy and possible biopsy site. Plan:  
 
Continue Keppra Continue decadron The patient can follow up with Dr. Bhakti Armijo in clinic Subjective: Interval history: 
 
Some improvement in language and right sided weakness. Awaiting CT scan. History: 
 
Olayinka Morse is a 69 y.o. female who is being seen for brain mets. Unable to obtain ROS due to aphasia. Objective:  
 
Vitals:  
 10/28/20 2745 10/28/20 5804 10/28/20 0958 10/28/20 1029 BP: 133/72 134/62 139/75 138/74 Pulse: (!) 113 (!) 110 (!) 113 (!) 110 Resp: 19 19 26 18 Temp:      
SpO2: 93% 93% 94% 93% Weight:      
  
 
 
Current Facility-Administered Medications:  
  carvediloL (COREG) tablet 6.25 mg, 6.25 mg, Per NG tube, BID WITH MEALS, Citlalli Knight MD 
[facility]  [START ON 10/29/2020] montelukast (SINGULAIR) tablet 10 mg, 10 mg, Per G Tube, DAILY, Citlalli Knight MD 
  rosuvastatin (CRESTOR) tablet 40 mg, 40 mg, Per NG tube, QHS, Citlalli Knight MD, 40 mg at 10/27/20 2148   insulin lispro (HUMALOG) injection, , SubCUTAneous, Q6H, Citlalli Knight MD, 2 Units at 10/28/20 3259   tuberculin injection 5 Units, 5 Units, IntraDERMal, ONCE, Citlalli Knight MD, 5 Units at 10/27/20 1431   budesonide (PULMICORT) 500 mcg/2 ml nebulizer suspension, 500 mcg, Nebulization, BID RT, Rc Perez DO, 500 mcg at 10/28/20 6170   albuterol (PROVENTIL VENTOLIN) nebulizer solution 2.5 mg, 2.5 mg, Nebulization, Q6H RT, Renata Gaspar DO, 2.5 mg at 10/28/20 1831   furosemide (LASIX) injection 40 mg, 40 mg, IntraVENous, DAILY, Renata Gaspar DO, 40 mg at 10/28/20 3721   LORazepam (ATIVAN) tablet 0.5 mg, 0.5 mg, Oral, BID PRN, Rc Perez DO 
   levothyroxine (SYNTHROID) tablet 75 mcg, 75 mcg, Per NG tube, ACB, Renata Gaspar DO, 75 mcg at 10/28/20 9476   [Held by provider] lisinopriL (PRINIVIL, ZESTRIL) tablet 2.5 mg, 2.5 mg, Per NG tube, DAILY, Renata Gaspar DO, 2.5 mg at 10/26/20 1645   famotidine (PF) (PEPCID) 20 mg in 0.9% sodium chloride 10 mL injection, 20 mg, IntraVENous, Q24H, Renata Gaspar DO, 20 mg at 10/28/20 4007   metoprolol (LOPRESSOR) injection 5 mg, 5 mg, IntraVENous, Q4H PRN, Rc Perez DO, 5 mg at 10/26/20 1525 
  NUTRITIONAL SUPPORT ELECTROLYTE PRN ORDERS, , Does Not Apply, PRN, Rc Perez DO 
  fluticasone propionate (FLONASE) 50 mcg/actuation nasal spray 2 Spray, 2 Spray, Both Nostrils, DAILY, Silverio Rivera MD, 2 [place] at 10/28/20 7857   sodium chloride (NS) flush 5-40 mL, 5-40 mL, IntraVENous, Q8H, Silverio Rivera MD, 10 mL at 10/28/20 5788   sodium chloride (NS) flush 5-40 mL, 5-40 mL, IntraVENous, PRN, Silverio Rivera MD 
  ondansetron [facility]) injection 4 mg, 4 mg, IntraVENous, Q6H PRN, Silverio Rivera MD 
  acetaminophen (TYLENOL) suppository 650 mg, 650 mg, Rectal, Q4H PRN, Silverio Rivera MD 
  LORazepam (ATIVAN) injection 2 mg, 2 mg, IntraVENous, Q2H PRN, Silverio Rivera MD 
  albuterol-ipratropium (DUO-NEB) 2.5 MG-0.5 MG/3 ML, 3 mL, Nebulization, Q4H PRN, Silverio Rivera MD, 3 mL at 10/26/20 7575   levETIRAcetam (KEPPRA) 500 mg in 0.9% sodium chloride (MBP/ADV) 100 mL, 500 mg, IntraVENous, Q12H, Chaya Zayas NP, 500 mg at 10/28/20 0992   dexamethasone (DECADRON) 10 mg/mL injection 6 mg, 6 mg, IntraVENous, Q6H, Tano Lara DO, 6 mg at 10/28/20 2887   levalbuterol (XOPENEX) nebulizer soln 1.25 mg/3 mL, 1.25 mg, Nebulization, Q6H PRN, Silverio Rivera MD 
 
Recent Results (from the past 12 hour(s)) GLUCOSE, POC Collection Time: 10/27/20 11:17 PM  
Result Value Ref Range Glucose (POC) 154 (H) 65 - 100 mg/dL METABOLIC PANEL, BASIC  
 Collection Time: 10/28/20  3:57 AM  
Result Value Ref Range Sodium 139 136 - 145 mmol/L Potassium 4.7 3.5 - 5.1 mmol/L Chloride 107 98 - 107 mmol/L  
 CO2 25 21 - 32 mmol/L Anion gap 7 7 - 16 mmol/L Glucose 143 (H) 65 - 100 mg/dL BUN 48 (H) 8 - 23 MG/DL Creatinine 0.96 0.6 - 1.0 MG/DL  
 GFR est AA >60 >60 ml/min/1.73m2 GFR est non-AA >60 >60 ml/min/1.73m2 Calcium 9.3 8.3 - 10.4 MG/DL  
CBC W/O DIFF Collection Time: 10/28/20  3:57 AM  
Result Value Ref Range WBC 20.0 (H) 4.3 - 11.1 K/uL  
 RBC 3.79 (L) 4.05 - 5.2 M/uL  
 HGB 11.7 11.7 - 15.4 g/dL HCT 35.4 (L) 35.8 - 46.3 % MCV 93.4 79.6 - 97.8 FL  
 MCH 30.9 26.1 - 32.9 PG  
 MCHC 33.1 31.4 - 35.0 g/dL  
 RDW 13.9 11.9 - 14.6 % PLATELET 500 956 - 480 K/uL MPV 9.7 9.4 - 12.3 FL ABSOLUTE NRBC 0.00 0.0 - 0.2 K/uL GLUCOSE, POC Collection Time: 10/28/20  6:26 AM  
Result Value Ref Range Glucose (POC) 159 (H) 65 - 100 mg/dL Physical Exam: 
General - Well developed, well nourished, in no apparent distress. Aphasic. HEENT - Normocephalic, atraumatic. Conjunctiva are clear. Neck - Supple without masses Extremities - Peripheral pulses intact. No edema and no rashes. Neurological examination - Awake, alert, able to follow commands. The patient has expressive aphasia. On cranial nerve examination, (II, III, IV, VI) pupils are equal, round, and reactive to light. Visual acuity is adequate. Visual fields are full to finger confrontation. Extraocular motility is normal. (V, VII) Right facial droop (VIII) Hearing is intact. Motor examination - There is normal muscle tone and bulk in LUE, LLE. Decreased muscle tone on the right. Strength is 5/5 in LUE, LLE, 1/5 in RUE, 3/5 RLE. Muscle stretch reflexes are normoactive and there are no pathological reflexes present. Signed By: Sam Encinas NP October 28, 2020 Neurology attending Patient seen and examined. There has been perhaps some minor improvement in the patient's verbal output but she remains aphasic. No overall functional strength in the right upper extremity Suggest approach with palliative RT and consideration of biopsy as addressed in Dr. Zara Cunningham note We will be available if needed for further input

## 2020-10-28 NOTE — PROGRESS NOTES
Bedside, Verbal and Written shift change report given to Connie Mehta RN (oncoming nurse) by Thao Troy RN (offgoing nurse). Report included the following information SBAR, Kardex, ED Summary, Intake/Output, Recent Results, Cardiac Rhythm NSR/ST and Dual Neuro Assessment.

## 2020-10-28 NOTE — PROGRESS NOTES
Problem: Mobility Impaired (Adult and Pediatric) Goal: *Acute Goals and Plan of Care (Insert Text) Description: LTG: 
(1.)Ms. Eber Coburn will move from supine to sit and sit to supine , scoot up and down, and roll side to side in bed with MINIMAL ASSIST within 7 treatment day(s). (2.)Ms. Eber Coburn will transfer from bed to chair and chair to bed with MINIMAL ASSIST using the least restrictive device within 7 treatment day(s). (3.)Ms. Eber Coburn will ambulate with MINIMAL ASSIST for 30+ feet with the least restrictive device within 7 treatment day(s). 4.  Ms. Eber Coburn will sit EOB with good balance x10' while performing LE ex's within 7 treatment days. ________________________________________________________________________________________________ Outcome: Progressing Towards Goal 
  
PHYSICAL THERAPY: Daily Note 10/28/2020 INPATIENT: PT Visit Days : 2 Payor: Navjot Barker / Plan: [facility] MEDICARE CHOICE PPO/PFFS / Product Type: [employer] Medicare /   
  
NAME/AGE/GENDER: Marco Nguyen is a 69 y.o. female PRIMARY DIAGNOSIS: Frontal mass of brain [G93.89] Acute right-sided weakness [R53.1] Expressive aphasia [R47.01] Acute encephalopathy [G93.40] Multiple lesions on CT of brain and spine [R93.0, R93.7] Acute right-sided weakness Acute right-sided weakness ICD-10: Treatment Diagnosis:  
 · Other abnormalities of gait and mobility (R26.89) Precaution/Allergies: 
Compazine [prochlorperazine edisylate] ASSESSMENT:  
 
Ms. Eber Coburn presents supine in bed in ICU with  and brother at bedside. At baseline she is independent. Patient speaking more today. Performed ex's in supine with cueing and assist.  Patient transitioned to sit with mod A. Static sitting balance fair today. Patient cued to protect R UE and with cueing she pulled it back  Up on EOB. Patient stood with mod A.  R knee buckling and patient unable to extend.   Transferred to recliner with mod A. Patient appeared very fatigued at end of treatment. Demonstrated progress with sit balance and transfer today. Ms. Eber Coburn would benefit from skilled physical therapy (medically necessary) to address her deficits and maximize her function. This section established at most recent assessment PROBLEM LIST (Impairments causing functional limitations): 1. Decreased ADL/Functional Activities 2. Decreased Transfer Abilities 3. Decreased Ambulation Ability/Technique 4. Decreased Balance 5. Increased Pain 6. Decreased Activity Tolerance 7. Decreased Knowledge of Precautions 8. Decreased [place] with Home Exercise Program 
9. Decreased Cognition INTERVENTIONS PLANNED: (Benefits and precautions of physical therapy have been discussed with the patient.) 1. Balance Exercise 2. Bed Mobility 3. Gait Training 4. Therapeutic Activites 5. Therapeutic Exercise/Strengthening 6. Transfer Training 7. education TREATMENT PLAN: Frequency/Duration: 3 times a week for duration of hospital stay Rehabilitation Potential For Stated Goals: Good REHAB RECOMMENDATIONS (at time of discharge pending progress):   
Placement: It is my opinion, based on this patient's performance to date, that Ms. Eber Coburn may benefit from intensive therapy at a [address] [place] [address] after discharge due to the functional deficits listed above that are likely to improve with skilled rehabilitation and concerns that he/she may be unsafe to be unsupervised at home due to significant decline in functional mobility . Equipment:  
? None at this time HISTORY:  
History of Present Injury/Illness (Reason for Referral): 
Per MD note, \"Patient is a 69years old female with hx of HTN, GERD, Hodgkin's lymphoma s/p Radiation Rx, dyslipidemia, hypothyroidism, asthma, systolic CHF EF 93-72% recently diagnosed lung hamartomas presented to ER with acute onset of right sided weakness, aphasia and confusion that began this AM. Pt was recently discharged from [facility] on 10/21 after being treated for PNA and was doing okay until today. Pt is not able to provide much history, she has sort of blank stare but can nod to yes or no questions. Per , pt was walking, doing ADL's  Until yesterday but this morning a drastic change in her mentation and speech was noted by him. Again, pt's  is also not a great historian. No reported fever, nausea/vomiting, fall, diarrhea, abdominal pain, headaches. ER work up showed CT evidence of enhancing 2.2 cm left frontal lobe mass with vasogenic edema with 2 mm left to right midline shift with 2 additional small masses in tracy and right occipital lobe. CTA head/neck short segment occlusion of left MCA with some changes suggestive of small core infarct and penumbra within the left frontal lobe, no evidence of intraparenchymal hemorrhage. CXR with interval worsening of pulmonary edema and bilateral pleural effusion. \" 
Past Medical History/Comorbidities: Ms. Luan Espinosa  has a past medical history of Acute CVA (cerebrovascular accident) ([facility] [address] (10/25/2020), Asthma, Bite of nonvenomous arthropod (???), Cough, Esophageal stricture (2002), GERD (gastroesophageal reflux disease), History of rectal bleeding (???), Hodgkin's lymphoma ([facility] [address] (1980), Hypercholesterolemia, Menopause, Positive RAST testing (2007), and Thyroid disease. She also has no past medical history of Chronic kidney disease. Ms. Luan Espinosa  has a past surgical history that includes hx tonsillectomy (1954); hx splenectomy (1973); hx appendectomy (1973); hx cataract removal (Bilateral, 2007, 2009); hx colonoscopy; hx other surgical; and hx breast biopsy (Left, 05/21/2020). Social History/Living Environment:  
Home Environment: Private residence One/Two Story Residence: One story Living Alone: No 
Support Systems: Spouse/Significant Other/Partner Patient Expects to be Discharged to[de-identified] Rehabilitation facility Current DME Used/Available at Home: None Prior Level of Function/Work/Activity: At baseline she is independent. Number of Personal Factors/Comorbidities that affect the Plan of Care: 3+: HIGH COMPLEXITY EXAMINATION:  
Most Recent Physical Functioning:  
Gross Assessment: 
AROM: Generally decreased, functional(L LE) PROM: Generally decreased, functional(R LE) Strength: Grossly decreased, non-functional(R LE) Tone: Normal 
Sensation: (difficult to assess due to patient's aphasia) Posture: 
  
Balance: 
Sitting: Impaired Sitting - Static: Fair (occasional) Sitting - Dynamic: Poor (constant support) Standing: Impaired Standing - Static: Constant support;Poor Standing - Dynamic : Constant support;Poor Bed Mobility: 
Supine to Sit: Moderate assistance Scooting: Minimum assistance; Moderate assistance Wheelchair Mobility: 
  
Transfers: 
Sit to Stand: Moderate assistance Stand to Sit: Moderate assistance Bed to Chair: Moderate assistance Gait: 
  
   
  
Body Structures Involved: 1. Voice/Speech 2. Muscles Body Functions Affected: 1. Sensory/Pain 2. Voice and Speech 3. Neuromusculoskeletal 
4. Movement Related Activities and Participation Affected: 1. Mobility 2. Self Care 3. Domestic Life Number of elements that affect the Plan of Care: 4+: HIGH COMPLEXITY CLINICAL PRESENTATION:  
Presentation: Evolving clinical presentation with changing clinical characteristics: MODERATE COMPLEXITY CLINICAL DECISION MAKIN [facility] [address] [address] AM-PAC 6 Clicks Basic Mobility Inpatient Short Form How much difficulty does the patient currently have. .. Unable A Lot A Little None 1. Turning over in bed (including adjusting bedclothes, sheets and blankets)? [] 1   [x] 2   [] 3   [] 4  
2.   Sitting down on and standing up from a chair with arms ( e.g., wheelchair, bedside commode, etc.)   [] 1   [x] 2   [] 3   [] 4  
 3. Moving from lying on back to sitting on the side of the bed? [] 1   [x] 2   [] 3   [] 4 How much help from another person does the patient currently need. .. Total A Lot A Little None 4. Moving to and from a bed to a chair (including a wheelchair)? [] 1   [x] 2   [] 3   [] 4  
5. Need to walk in hospital room? [x] 1   [] 2   [] 3   [] 4  
6. Climbing 3-5 steps with a railing? [x] 1   [] 2   [] 3   [] 4  
© 2007, Trustees of [address] [place] [address] [address] [id number], under license to [employer]. All rights reserved Score:  Initial: 10 Most Recent: X (Date: -- ) Interpretation of Tool:  Represents activities that are increasingly more difficult (i.e. Bed mobility, Transfers, Gait). Medical Necessity:    
· Patient is expected to demonstrate progress in  
· balance and functional technique ·  to  
· decrease assistance required with all functional mobility · . Reason for Services/Other Comments: 
· Patient continues to require skilled intervention due to · medical complications and patient unable to attend/participate in therapy as expected · . Use of outcome tool(s) and clinical judgement create a POC that gives a: Questionable prediction of patient's progress: MODERATE COMPLEXITY  
  
 
 
 
TREATMENT:  
(In addition to Assessment/Re-Assessment sessions the following treatments were rendered) Pre-treatment Symptoms/Complaints:  none. Pain: Initial:  
Pain Intensity 1: 3 Pain Location 1: Throat  Post Session: 3 Therapeutic Exercise: ( 0):  Exercises per grid below to improve mobility, strength, and coordination. Required maximal visual and verbal cues to promote proper body alignment. Progressed complexity of movement as indicated. DATE: 10/26/20 10/28/20 Straight leg raise Hip abduct/ adduct X10 AAR Heel slides  X10 AAR 2x10 AL, AAR Hip external/ internal rotation Ankle dorsiflexion/ plantarflexion 2x10 AB 2x10 AB      
        
        
        
 
 Key:  A=active, AA=active assisted, P=passive, B=bilaterally, R=right, L=left Therapeutic Activity: (    26): Therapeutic activities including Bed transfers, Chair transfers and sitting balance on EOB and LE exercises to improve mobility, strength and balance. Required maximal cueing   to promote static and dynamic balance in standing and to attend to R side. Braces/Orthotics/Lines/Etc:  
· bustamante catheter · O2 Device: Room air Treatment/Session Assessment:   
· Response to Treatment:  pleasant and cooperative. · Interdisciplinary Collaboration:  
o Physical Therapist 
o Registered Nurse · After treatment position/precautions:  
o Up in chair 
o Bed/Chair-wheels locked 
o Bed in low position 
o Call light within reach 
o RN notified 
o Family at bedside 
o Nurse at bedside · Compliance with Program/Exercises: Compliant all of the time, Will assess as treatment progresses · Recommendations/Intent for next treatment session: \"Next visit will focus on advancements to more challenging activities and reduction in assistance provided\". Total Treatment Duration: PT Patient Time In/Time Out Time In: 5224 Time Out: 1200 A Radha Cleveland, PT, DPT

## 2020-10-28 NOTE — PROGRESS NOTES
Problem: Dysphagia (Adult) Goal: *Speech Goal: (INSERT TEXT) Outcome: Progressing Towards Goal 
LTG: Patient will tolerate least restrictive diet without overt signs or symptoms of airway compromise. STG: Patient will participate in modified barium swallow study to objectively assess swallow function. Goal met 10/27/20 STG: Patient will consume chopped mechanical soft diet and thin liquids by cup without overt signs or symptoms of respiratory compromise. Goal added 10/27/20 SPEECH LANGUAGE PATHOLOGY: DYSPHAGIA- Daily Note 1 NAME/AGE/GENDER: Michelle Albert is a 69 y.o. female DATE: 10/28/2020 PRIMARY DIAGNOSIS: Frontal mass of brain [G93.89] Acute right-sided weakness [R53.1] Expressive aphasia [R47.01] Acute encephalopathy [G93.40] Multiple lesions on CT of brain and spine [R93.0, R93.7] ICD-10: Treatment Diagnosis: R13.12 Dysphagia, Oropharyngeal Phase RECOMMENDATIONS/PLAN  
DIET:  
? PO diet texture:  Mechanical soft with chopped meat and vegetables, with extra sauces/gravies ? Liquids:  regular thin, by cup only MEDICATIONS: Crushed in puree COMPENSATORY STRATEGIES/MODIFICATIONS INCLUDING: 
· Fully awake/alert · Supervision with all PO 
· Small bites and sips · Cup/sip · No straws · Remain upright for 20-30 min after any PO · Slow rate of PO intake · Check mouth for pocketed PO 
· Liquid wash OTHER RECOMMENDATIONS (including follow up treatment recommendations): · Treatment to improve/facilitate oral/pharyngeal skills · Training in use of compensatory safe swallowing strategies/feeding guidelines · Patient/family education RECOMMENDATIONS for CONTINUED SPEECH THERAPY:  
YES: Anticipate need for ongoing speech therapy during this hospitalization and at next level of care. FREQUENCY/DURATION: Continue to follow patient 3 times a week for duration of hospital stay to address above goals.  
 
Recommendations for next treatment session: Next treatment will address diet tolerance and cognitive goals ASSESSMENT Patient presents with good tolerance of mechanical soft diet/thin liquids via cup sips. No s/sx of airway compromise observed during 2 ounces of thin liquids, 2 ounces of puree, and 2 ounces of mixed consistency. Slowed oral prep with chewable as well as mild oral residue which she was able to independently clear with lingual sweep and liquid wash. Some discomfort with swallow, likely related to NG tube placement. Recommend continue with mechanical soft diet/thin liquids via cup sips only. Medications crushed in puree. Educated patient on importance of increasing po intake in order to have NG tube removed. May want to consider nutritional supplements and holding tube feeds during day in attempt to increase nutritional intake. Speech to continue following for dysphagia and language evaluation. COMPLIANCE WITH PROGRAM/EXERCISES: Will assess as treatment progresses REHABILITATION POTENTIAL FOR STATED GOALS: Good PLAN   
FREQUENCY/DURATION: Continue to follow patient 3 times a week for duration of hospital stay to address above goals. - Recommendations for next treatment session: Next treatment will address diet tolerance, language evaluation. SUBJECTIVE Alert in bed. Tube feeds running. RN reports coughing with thin liquids this morning and pocketing pills provided whole in puree. Problem List:  (Impairments causing functional limitations): 1. Oropharyngeal dysphagia 2. Orientation:  
Person Place Time 
 
Pain: Pain Scale 1: Numeric (0 - 10) Pain Intensity 1: 0 OBJECTIVE Patient seen for diet tolerance. Modified barium swallow study completed on 10/27/20 with the following results: Ms. Alma Delia Gaffney presents with mild-moderate oral dysphagia as evidenced by right anterior oral leakage and inability to create enough seal to draw liquid through straw.  A single instance of transient laryngeal penetration was observed prior to first swallow of thin liquid by cup that spontaneously cleared during the swallow and was not replicated throughout rest of the study. Minimal residue lining valleculae, pyriform sinuses and posterior pharyngeal wall along the NGT after swallow of mixed consistency was cleared completely with liquid rinse. Recommend chopped mechanical soft diet and thin liquids presented by cup. Crush medications. Cough on first tsp trial only. She was able to cough up thick secretions which she independently suctioned from oral cavity. She was able to independently take small sips of water from cup. Vocal quality remained clear. No cough or throat clear noted with 2 ounces of thin liquids. Slowed oral prep and clearance with puree, but no oral residue observed. Prolonged mastication with soft chewable textures. She independently chewed bolus on left side (strong side) Mild oral residue that she has excellent awareness of. Liquid wash and lingual sweep effective in clearing residue. She endorses mild discomfort with swallowing which is likely impacting her willingness to eat during session. Tube are also running continually. Educated patient on importance of increased oral intake in order to demonstrate ability to meet nutritional needs orally and have NG tube removed. She verbalized understanding of information provided. INTERDISCIPLINARY COLLABORATION: Registered Nurse PRECAUTIONS/ALLERGIES: Compazine [prochlorperazine edisylate] Tool Used: Dysphagia Outcome and Severity Scale (MONICA) Score Comments Normal Diet  [] 7 With no strategies or extra time needed Functional Swallow  [] 6 May have mild oral or pharyngeal delay Mild Dysphagia  [] 5 Which may require one diet consistency restricted Mild-Moderate Dysphagia  [] 4 With 1-2 diet consistencies restricted Moderate Dysphagia  [] 3 With 2 or more diet consistencies restricted Moderate-Severe Dysphagia  [] 2 With partial PO strategies (trials with ST only) Severe Dysphagia  [] 1 With inability to tolerate any PO safely Score:  Initial: 2 Most Recent: 5 (Date 10/28/20 ) Interpretation of Tool: The Dysphagia Outcome and Severity Scale (MONICA) is a simple, easy-to-use, 7-point scale developed to systematically rate the functional severity of dysphagia based on objective assessment and make recommendations for diet level, independence level, and type of nutrition. After treatment position/precautions: · Upright in bed · RN notified · Call light within reach Total Treatment Duration:  
Time In: 0973 Time Out: 1059 Emilie Song, [facility], [facility]

## 2020-10-28 NOTE — PROGRESS NOTES
Bedside shift change report given to Eleanor Baumann RN (oncoming nurse) by nAni Nieves RN (offgoing nurse). Report included the following information SBAR, Kardex, ED Summary, Intake/Output, MAR, Recent Results, Cardiac Rhythm NSR/ST and Dual Neuro Assessment.

## 2020-10-28 NOTE — PROGRESS NOTES
Comprehensive Nutrition Assessment Type and Reason for Visit: Reassess, TF Management for [facility] Pulmonary. Nutrition Recommendations/Plan: 1) Change TF to nocturnal - Glucerna 1.5 @ 50 ml/hr with a 50 ml water bolus before and after feeding from 1800 through 0400 daily. 2) Add Ensure Enlive to all trays. Nutrition Assessment:  Ms. Everardo Lord is a 69 y.o. female admitted on 10/25/2020 with a primary diagnosis of brain masses. Her PMH includes HTN, GERD, Hodgkin's lymphoma, hypothyroidism, sCHF, and recently dx lung hamartomas. CT chest from 9/21/2020 with L lung mass and sclerotic focus at T6. She presented to ED with c/o acute onset R-sided weakness, aphasia, and confusion. She was recently discharged on 10/21 after being treated for pneumonia. MRI brain with several enhancing masses with largest in L frontal region, enhancing R frontal bone lesions; and small acute R occipital and parietal lobe infarcts. CTA head/neck with short segment occlusion of MCA. CXR with interval worsening of pulm edema and b/l pleural effusion. Neuro/neurosurgery following. Brain masses not amenable to surgical resection Estimated Daily Nutrient Needs: 
Energy (kcal):  5838-2207 (25-30 brandon/kg/day using 54 kg) Protein (g):  54-65 (1-1.2 gm pro/kg/day using 54 kg) Nutrition Related Findings:  10/28: TF was started 10/27 and was advanced to its goal rate without difficulty. Good GI tolerance is reported with low gastric residuals and last bowel movement reported on 10/27. Noted the results of her bedside ST evaluation 10/27 - mechanical soft diet with thin liquids. Minimal oral intake of dinner last evening reported. Could try converting to a nocturnal TF with oral supplements added to her trays to see if this change would improve her oral intake. Would start with holding TF from 0400 through 1800 daily and adding 1 bottle Ensure Enlive to each tray.  Infuse TF @ 50 ml/hr (2 cartons Glucerna 1.5) from 1800 through 0400 nightly. Abdomen: semi-soft with active bowel sounds. Last bowel movement: 10/27/2020. Labs: AM glucose 143; POC glucose (10/27) 087,529,764 and (10/28) 159,171. Meds: Decadron. Current Nutrition Therapies: DIET TUBE FEEDING Open order for details. Keep HOB elevated at least 30 degrees. Check gastric residual every 4 hours and hold TF for residual > than or = to 250 ml x 2 consecutive checks. DIET MECHANICAL SOFT Current Tube Feeding (TF) Orders: · Feeding Route: Nasogastric · Formula: Glucerna 1.5 · Schedule:Continuous · Regimen: 40 ml/hr · Additives/Modulars:   
· Water Flushes: 30 ml/hr · Current TF & Flush Orders Provides: Meets 100% of estimated daily calorie goal and >100% of daily protein goal. 
· Goal TF & Flush Orders Provides: 1440 calories/day, 79 gm protein/day in 1450 ml water/day. Anthropometric Measures: 
· Height:  5'6\" · Current Body Wt:  55.4 kg (122 lb 2.2 oz)(ICU bed 10/28/2020) Nutrition Diagnosis: · Swallowing difficulty related to cognitive or neurological impairment as evidenced by nutrition support-enteral nutrition, swallowing study results Nutrition Interventions:  
Food and/or Nutrient Delivery: Continue current diet, Start oral nutrition supplement, Modify tube feeding Coordination of Nutrition Care: Continued inpatient monitoring, Interdisciplinary rounds Goals: 
Meet >75% of daily nutrition needs via oral diet and TF. Nutrition Monitoring and Evaluation:  
Food/Nutrient Intake Outcomes: Food and nutrient intake, Supplement intake, Enteral nutrition intake/tolerance Physical Signs/Symptoms Outcomes: Biochemical data, GI status, Meal time behavior Discharge Planning: Too soon to determine Electronically signed by America Thapa RD, LD, [address]  on 10/28/2020 at 1:28 PM 
Contact: [phone]

## 2020-10-29 NOTE — PROGRESS NOTES
Hospitalist Note Admit Date:  10/25/2020  6:56 AM  
Name:  Susu Pandey Age:  69 y.o. 
:  1950 MRN:  [id number] PCP:  Walker Russell MD 
Treatment Team: Attending Provider: Hilda Arriaga MD; Consulting Provider: Ana Lizarraga DO; Consulting Provider: Nahum Hardin NP; Care Manager: Nikolay Nicole, RN; Utilization Review: Bobo Qureshi RN; Consulting Provider: Stef Olivas MD; Consulting Provider: Krystian Yanes NP; Primary Nurse: Khadra Chin; Primary Nurse: Shefali Mg; Occupational Therapist: Deisy Mayer OT 
 
HPI/Subjective: This is a 69years old female with hx of HTN, GERD, Hodgkin's lymphoma s/p Radiation Rx, dyslipidemia, hypothyroidism, asthma, systolic CHF EF 30-34% recently diagnosed lung hamartomas presented to ER with acute onset of right sided weakness, aphasia and confusion that began this AM. Pt was recently discharged from [facility] on 10/21 after being treated for PNA and was doing okay until today. Pt is not able to provide much history, she has sort of blank stare but can nod to yes or no questions. Per , pt was walking, doing ADL's  Until yesterday but this morning a drastic change in her mentation and speech was noted by him. ER work up showed CT evidence of enhancing 2.2 cm left frontal lobe mass with vasogenic edema with 2 mm left to right midline shift with 2 additional small masses in tracy and right occipital lobe. CTA head/neck short segment occlusion of left MCA with some changes suggestive of small core infarct and penumbra within the left frontal lobe, no evidence of intraparenchymal hemorrhage. CXR with interval worsening of pulmonary edema and bilateral pleural effusion. \" 
  
10/26 Decadron and Keppra started. Nursing staff note labored breathing this AM but satting well on RA.   
 
10/27 patient still has dysarthria. Right upper and lower extremity weakness.   Patient is alert awake and able to answer some questions with slurred speech. Afebrile. She was started on a diet today after speech therapy eval. 
 
10/28 patient reports feeling better. Still with slurred speech, right upper and lower extremity weakness. Afebrile. 10/29 patient is alert awake. No shortness of breath. No chest pain. Patient has persistent right upper and lower extremity weakness and slurred speech. Afebrile. Patient's brother and  at bedside. Updated regarding current plan of care today. Objective:  
 
Patient Vitals for the past 24 hrs: 
 Temp Pulse Resp BP SpO2  
10/29/20 1350     94 % 10/29/20 1158 98.5 °F (36.9 °C) 96 15 (!) 147/69 93 % 10/29/20 0900  (!) 101 28 (!) 153/77 94 % 10/29/20 0836  (!) 101  (!) 140/65   
10/29/20 0800 97.4 °F (36.3 °C) (!) 104 20 (!) 145/70 92 % 10/29/20 0731     94 % 10/29/20 0352     97 % 10/29/20 0300 97.8 °F (36.6 °C) (!) 102 17 (!) 159/67 95 % 10/29/20 0258  (!) 102 17 (!) 159/68 95 % 10/29/20 0000  99     
10/28/20 2300 97.4 °F (36.3 °C) 99 19 (!) 148/78 94 % 10/28/20 2258  99 19 (!) 148/78 94 % 10/28/20 2058  95 16 128/63 93 % 10/28/20 2029  96 16 137/68 94 % 10/28/20 2000  95   98 % 10/28/20 1958  100 18 (!) 152/80 98 % 10/28/20 1952  96 16 (!) 149/71 94 % 10/28/20 1928  99 15 (!) 149/71 94 % 10/28/20 1900 97.4 °F (36.3 °C) (!) 101 21 (!) 157/111 95 % 10/28/20 1859  (!) 101 21 (!) 157/111 95 % 10/28/20 1728  (!) 102 21 (!) 154/95 95 % 10/28/20 1658  100 19 (!) 148/70 94 % 10/28/20 1628  100 19 (!) 142/70 94 % 10/28/20 1558  100 23 138/76 95 % 10/28/20 1528  100 16 126/69 96 % 10/28/20 1500 97.6 °F (36.4 °C) (!) 101 21 131/83 95 % 10/28/20 [id number] [facility] Oxygen Therapy O2 Sat (%): 94 % (10/29/20 1350) Pulse via Oximetry: 84 beats per minute (10/29/20 1350) O2 Device: Room air (10/29/20 1350) Intake/Output Summary (Last 24 hours) at 10/29/2020 1448 Last data filed at 10/29/2020 1222 Gross per 24 hour Intake 1323 ml Output 1700 ml Net -377 ml *Note that automatically entered I/Os may not be accurate; dependent on patient compliance with collection and accurate  by techs. General:    Ill appearing, tired looking, alert,awake, HEENT: AT, NC,    
CV:   RRR. No murmur, rub, or gallop. Lungs:   CTAB. No wheezing, rhonchi, or rales. Abdomen:   Soft, nontender, nondistended. Extremities: Warm and dry. No cyanosis or edema. Skin:     No rashes or jaundice. Neuro:  Alert,awake,oriented x3,  slurred speech, right upper and lower extremities 2/5, left upper and lower extremities 5/5, Data Review: 
I have reviewed all labs, meds, and studies from the last 24 hours: 
 
Recent Results (from the past 24 hour(s)) PLEASE READ & DOCUMENT PPD TEST IN 24 HRS Collection Time: 10/28/20  3:43 PM  
Result Value Ref Range PPD Negative Negative  
 mm Induration 0 0 - 5 mm GLUCOSE, POC Collection Time: 10/28/20  6:21 PM  
Result Value Ref Range Glucose (POC) 178 (H) 65 - 100 mg/dL GLUCOSE, POC Collection Time: 10/28/20 11:10 PM  
Result Value Ref Range Glucose (POC) 157 (H) 65 - 100 mg/dL CBC W/O DIFF Collection Time: 10/29/20  3:08 AM  
Result Value Ref Range WBC 19.0 (H) 4.3 - 11.1 K/uL  
 RBC 3.82 (L) 4.05 - 5.2 M/uL  
 HGB 11.6 (L) 11.7 - 15.4 g/dL HCT 36.1 35.8 - 46.3 % MCV 94.5 79.6 - 97.8 FL  
 MCH 30.4 26.1 - 32.9 PG  
 MCHC 32.1 31.4 - 35.0 g/dL  
 RDW 13.8 11.9 - 14.6 % PLATELET 662 282 - 371 K/uL MPV 9.9 9.4 - 12.3 FL ABSOLUTE NRBC 0.02 0.0 - 0.2 K/uL METABOLIC PANEL, COMPREHENSIVE Collection Time: 10/29/20  3:08 AM  
Result Value Ref Range Sodium 139 136 - 145 mmol/L Potassium 4.6 3.5 - 5.1 mmol/L Chloride 104 98 - 107 mmol/L  
 CO2 29 21 - 32 mmol/L Anion gap 6 (L) 7 - 16 mmol/L Glucose 150 (H) 65 - 100 mg/dL BUN 56 (H) 8 - 23 MG/DL  Creatinine 0.90 0.6 - 1.0 MG/DL  
 GFR est AA >60 >60 ml/min/1.73m2 GFR est non-AA >60 >60 ml/min/1.73m2 Calcium 9.0 8.3 - 10.4 MG/DL Bilirubin, total 0.3 0.2 - 1.1 MG/DL  
 ALT (SGPT) 58 12 - 65 U/L  
 AST (SGOT) 49 (H) 15 - 37 U/L Alk. phosphatase 110 50 - 136 U/L Protein, total 6.8 6.3 - 8.2 g/dL Albumin 3.2 3.2 - 4.6 g/dL Globulin 3.6 (H) 2.3 - 3.5 g/dL A-G Ratio 0.9 (L) 1.2 - 3.5 MAGNESIUM Collection Time: 10/29/20  3:08 AM  
Result Value Ref Range Magnesium 3.4 (H) 1.8 - 2.4 mg/dL GLUCOSE, POC Collection Time: 10/29/20  5:35 AM  
Result Value Ref Range Glucose (POC) 145 (H) 65 - 100 mg/dL GLUCOSE, POC Collection Time: 10/29/20 11:15 AM  
Result Value Ref Range Glucose (POC) 161 (H) 65 - 100 mg/dL All Micro Results None No results found for this visit on 10/25/20. Current Meds: 
Current Facility-Administered Medications Medication Dose Route Frequency  lisinopriL (PRINIVIL, ZESTRIL) tablet 5 mg  5 mg Oral DAILY  montelukast (SINGULAIR) tablet 10 mg  10 mg Oral DAILY  rosuvastatin (CRESTOR) tablet 40 mg  40 mg Oral QHS  carvediloL (COREG) tablet 6.25 mg  6.25 mg Oral BID WITH MEALS  
 [START ON 10/30/2020] lansoprazole (PREVACID) 3 mg/mL oral suspension 30 mg  30 mg Oral ACB  [START ON 10/30/2020] levothyroxine (SYNTHROID) tablet 75 mcg  75 mcg Oral ACB  levETIRAcetam (KEPPRA) tablet 500 mg  500 mg Oral Q12H  
 insulin lispro (HUMALOG) injection   SubCUTAneous Q6H  
 budesonide (PULMICORT) 500 mcg/2 ml nebulizer suspension  500 mcg Nebulization BID RT  
 albuterol (PROVENTIL VENTOLIN) nebulizer solution 2.5 mg  2.5 mg Nebulization Q6H RT  
 [Held by provider] furosemide (LASIX) injection 40 mg  40 mg IntraVENous DAILY  LORazepam (ATIVAN) tablet 0.5 mg  0.5 mg Oral BID PRN  
 metoprolol (LOPRESSOR) injection 5 mg  5 mg IntraVENous Q4H PRN  
 NUTRITIONAL SUPPORT ELECTROLYTE PRN ORDERS   Does Not Apply PRN  
  fluticasone propionate (FLONASE) 50 mcg/actuation nasal spray 2 Spray  2 Spray Both Nostrils DAILY  sodium chloride (NS) flush 5-40 mL  5-40 mL IntraVENous Q8H  
 sodium chloride (NS) flush 5-40 mL  5-40 mL IntraVENous PRN  
 ondansetron (ZOFRAN) injection 4 mg  4 mg IntraVENous Q6H PRN  
 acetaminophen (TYLENOL) suppository 650 mg  650 mg Rectal Q4H PRN  
 LORazepam (ATIVAN) injection 2 mg  2 mg IntraVENous Q2H PRN  
 albuterol-ipratropium (DUO-NEB) 2.5 MG-0.5 MG/3 ML  3 mL Nebulization Q4H PRN  
 dexamethasone (DECADRON) 10 mg/mL injection 6 mg  6 mg IntraVENous Q6H  
 levalbuterol (XOPENEX) nebulizer soln 1.25 mg/3 mL  1.25 mg Nebulization Q6H PRN Other Studies (last 24 hours): Xr Abd (kub) Result Date: 10/29/2020 EXAM: Abdomen x-rays. INDICATION: Assess residual barium. COMPARISON: Yesterday's abdomen x-rays. TECHNIQUE: 2 portable supine x-rays of the abdomen were obtained. FINDINGS: Some of the previously seen barium in the stomach has passed distally into the right colon, where there are now appears to be slightly more barium than on yesterday's study. No bowel obstruction is seen. There are surgical clips in the central abdomen and an enteric tube, with its tip in the region of the mid stomach. IMPRESSION: Prominent amount of retained barium in the right colon, and minimal residual in the stomach. Assessment and Plan:  
 
Hospital Problems as of 10/29/2020 Date Reviewed: 10/19/2020 Codes Class Noted - Resolved POA Systolic heart failure (HCC) ICD-10-CM: I50.20 ICD-9-CM: 428.20  10/27/2020 - Present Yes Expressive aphasia ICD-10-CM: R47.01 
ICD-9-CM: 784.3  10/25/2020 - Present Yes * (Principal) Acute right-sided weakness ICD-10-CM: R53.1 ICD-9-CM: 728.87  10/25/2020 - Present Yes Frontal mass of brain ICD-10-CM: G93.89 ICD-9-CM: 348.89  10/25/2020 - Present Yes Acute encephalopathy ICD-10-CM: G93.40 ICD-9-CM: 348.30  10/25/2020 - Present Yes Multiple lesions on CT of brain and spine ICD-10-CM: R93.0, R93.7 ICD-9-CM: 793.0, 793.7  10/25/2020 - Present Yes Acute CVA (cerebrovascular accident) ([facility] [address] ICD-10-CM: I63.9 ICD-9-CM: 434.91  10/25/2020 - Present Yes Multiple lung nodules on CT (Chronic) ICD-10-CM: R91.8 ICD-9-CM: 793.19  10/12/2020 - Present Yes Overview Addendum 10/12/2020  2:33 PM by Ana Guerrero NP  
  -PET scan 10/2019: PET scan fortunately did not have any abnormal activity in her mass in the left lung. This may mean that we can just follow this radiographically, but we will talk about her at tumor board and come back to her with the group's recommendation. 
-10/30/2019: Patient is discussed at thoracic conference today lead by Dr Rebecca Stern. Patient is a 79 yo never smoker. CT guided biopsy recommended. -CT guided Biopsy 11/2020: lung biopsy showed signs of this nodule being a hamartoma, which is a benign tumor that we can simply follow. repeat CT in 6 months 
-Chest CT June 2020: CT scan is stable 
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 Paula Cheeks are several new pulmonary nodules noted which may suggest some metastatic disease.  This require further follow-up CT of the chest in 2 months or doing PET scan. Hamartoma (HCC) (Chronic) ICD-10-CM: Q85.9 ICD-9-CM: 759.6  10/12/2020 - Present Yes Peripheral vascular disease ([facility] [address] ICD-10-CM: I73.9 ICD-9-CM: 443.9  10/12/2020 - Present Yes Bilateral pleural effusion ICD-10-CM: J90 ICD-9-CM: 511.9  10/7/2020 - Present Yes Overview Signed 10/13/2020  1:25 PM by DENI Dinh  
  10/3/2020: L thoracentesis 1050mL removed R thoracentesis 800mL removed 10/9/2020: L thoracentesis: 1000mL removed R thoracentesis: 600mL removed  Hypercholesterolemia (Chronic) ICD-10-CM: E78.00 
 ICD-9-CM: 272.0  6/30/2015 - Present Yes Dysphagia (Chronic) ICD-10-CM: R13.10 ICD-9-CM: 787.20  6/30/2015 - Present Yes Acquired hypothyroidism (Chronic) ICD-10-CM: E03.9 ICD-9-CM: 244.9  10/17/2013 - Present Yes GERD (gastroesophageal reflux disease) (Chronic) ICD-10-CM: K21.9 ICD-9-CM: 530.81  10/17/2013 - Present Yes RESOLVED: Hypokalemia ICD-10-CM: E87.6 ICD-9-CM: 276.8  10/25/2020 - 10/27/2020 Yes Plan: This is a 71-year-old female with Acute stroke with metastatic disease in the brain POA MRI brain with several enhancing masses with the largest in the left frontal region with significant surrounding edema. CNS lymphoma/metastatic disease small acute right occipital and parietal lobe infarcts. No acute neurosurgical intervention. Neurology on board. Recommend aspirin after biopsy. Statin. Plan to start Aspirin in 24 hours after biopsy. Oncology consulted. Appreciate recommendations. Dexamethasone 6 mg IV every 6 hours. Keppra 500 twice daily. Speech therapy eval today and patient started on diet. Acute metabolic encephalopathy from stroke resolved Frontal brain mass likely metastasis - unknown primary? Chronic systolic congestive heart failure with EF of 35% 
currently on IV Lasix. Multiple lung nodules on CT Oncology on board. Pulmonology consulted for lung nodule biopsy. CT chest/ abdomen and pelvis ordered for metastatic disease and target for biopsy. Bilateral pleural effusions Pulmonary on board. Hypothyroidism Levothyroxine GERD Pepcid. Peripheral vascular disease Palliative care consulted. Appreciate recommendations. CODE STATUS DNR. Goals of care discussed with patient and her  and brother at bedside 10/28. Awaiting tissue diagnosis before making decisions regarding plan of care.   
 
DC planning/Dispo: Okay to transfer to medical floor with remote telemetry. Anticipate inpatient hospital stay for at least 48 hours. May need rehab facility placement. Diet:  DIET MECHANICAL SOFT 
DIET NUTRITIONAL SUPPLEMENTS 
DIET TUBE FEEDING 
DVT ppx:  SCDs Signed: 
Lawanda Contreras MD

## 2020-10-29 NOTE — PROGRESS NOTES
[facility] Inpatient Hematology / Oncology Progress Note Reason for Consult:  Frontal mass of brain [G93.89] Acute right-sided weakness [R53.1] Expressive aphasia [R47.01] Acute encephalopathy [G93.40] Multiple lesions on CT of brain and spine [R93.0, R93.7] Referring Physician:  Zita Rutledge MD 
 
24 Hour Events: 
Ongoing RUE weakness, aphasia Awaiting bx for diagnosis CT CAP pending barium clearance 
VSS, afebrile ROS: 
Constitutional: Negative for fever, chills, weakness, malaise, fatigue. CV: Negative for chest pain, palpitations, edema. Respiratory: Negative for dyspnea, cough, wheezing. GI: Negative for nausea, abdominal pain, diarrhea. 10 point review of systems is otherwise negative with the exception of the elements mentioned above in the HPI. Allergies Allergen Reactions  Compazine [Prochlorperazine Edisylate] Swelling Past Medical History:  
Diagnosis Date  Acute CVA (cerebrovascular accident) ([facility] [address] 10/25/2020  Asthma  Bite of nonvenomous arthropod ? ??  
 Cough  Esophageal stricture 2002  
 dilated 2002  GERD (gastroesophageal reflux disease)  History of rectal bleeding ???  
 Hodgkin's lymphoma ([facility] [address] 1980 Radiation therapy  Hypercholesterolemia  Menopause  Positive RAST testing 2007 IgE level of 1,391  Thyroid disease Past Surgical History:  
Procedure Laterality Date [place]  HX BREAST BIOPSY Left 05/21/2020  
 calcs at 2:00  
 HX CATARACT REMOVAL Bilateral 2007, 2009  HX COLONOSCOPY    
 HX OTHER SURGICAL    
 dental x3  
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 Family History Problem Relation Age of Onset  Cancer Mother Kidney cancer  Cancer Father Prostate cnaceer  Heart Surgery Brother  Breast Cancer Neg Hx Social History Socioeconomic History  Marital status:   
 Spouse name: Not on file  Number of children: Not on file  Years of education: Not on file  Highest education level: Not on file Occupational History  Occupation:  Social Needs  Financial resource strain: Not on file  Food insecurity Worry: Not on file Inability: Not on file  Transportation needs Medical: Not on file Non-medical: Not on file Tobacco Use  Smoking status: Never Smoker  Smokeless tobacco: Never Used Substance and Sexual Activity  Alcohol use: Yes Alcohol/week: 21.0 standard drinks Types: 7 Shots of liquor, 14 Standard drinks or equivalent per week  Drug use: No  
 Sexual activity: Not on file Lifestyle  Physical activity Days per week: Not on file Minutes per session: Not on file  Stress: Not on file Relationships  Social connections Talks on phone: Not on file Gets together: Not on file Attends Yazdanism service: Not on file Active member of club or organization: Not on file Attends meetings of clubs or organizations: Not on file Relationship status: Not on file  Intimate partner violence Fear of current or ex partner: Not on file Emotionally abused: Not on file Physically abused: Not on file Forced sexual activity: Not on file Other Topics Concern  Not on file Social History Narrative There is no significant environmental or industrial exposure. She has no known exposure to TB. She has worked as an . Current Facility-Administered Medications Medication Dose Route Frequency Provider Last Rate Last Dose  lisinopriL (PRINIVIL, ZESTRIL) tablet 5 mg  5 mg Oral DAILY Ala, Irish Crigler, MD   5 mg at 10/29/20 0837  
 montelukast (SINGULAIR) tablet 10 mg  10 mg Oral DAILY Ala, Irish Crigler, MD   10 mg at 10/29/20 0837  
 rosuvastatin (CRESTOR) tablet 40 mg  40 mg Oral QHS Abe Ferrara MD      
  carvediloL (COREG) tablet 6.25 mg  6.25 mg Oral BID WITH MEALS Timothy Knight MD   6.25 mg at 10/29/20 0837  
 [START ON 10/30/2020] lansoprazole (PREVACID) 3 mg/mL oral suspension 30 mg  30 mg Oral ACB Timothy Knight MD      
[address] [place] [address] [START ON 10/30/2020] levothyroxine (SYNTHROID) tablet 75 mcg  75 mcg Oral ACB Timothy Knight MD      
 insulin lispro (HUMALOG) injection   SubCUTAneous Q6H Timothy Knight MD   Stopped at 10/29/20 0600  budesonide (PULMICORT) 500 mcg/2 ml nebulizer suspension  500 mcg Nebulization BID RT Heron Sanders DO   500 mcg at 10/29/20 0731  
 albuterol (PROVENTIL VENTOLIN) nebulizer solution 2.5 mg  2.5 mg Nebulization Q6H RT Heron Sanders DO   2.5 mg at 10/29/20 7551  furosemide (LASIX) injection 40 mg  40 mg IntraVENous DAILY Heron Sanders DO   40 mg at 10/29/20 7115  LORazepam (ATIVAN) tablet 0.5 mg  0.5 mg Oral BID PRN Heron Sanders DO      
 metoprolol (LOPRESSOR) injection 5 mg  5 mg IntraVENous Q4H PRN Heron Sanders DO   5 mg at 10/26/20 1525  
 NUTRITIONAL SUPPORT ELECTROLYTE PRN ORDERS   Does Not Apply PRN Heron Sanders,       
 fluticasone propionate (FLONASE) 50 mcg/actuation nasal spray 2 Spray  2 Spray Both Nostrils DAILY Tracey Bruner MD   2 Register at 10/28/20 4261  sodium chloride (NS) flush 5-40 mL  5-40 mL IntraVENous Q8H Tracey Bruner MD   10 mL at 10/29/20 0600  
 sodium chloride (NS) flush 5-40 mL  5-40 mL IntraVENous PRN Tracey Bruner MD      
 ondansetron [facility]) injection 4 mg  4 mg IntraVENous Q6H PRN Tracey Bruner MD      
 acetaminophen (TYLENOL) suppository 650 mg  650 mg Rectal Q4H PRN Tracey Bruner MD      
 LORazepam (ATIVAN) injection 2 mg  2 mg IntraVENous Q2H PRN Tracey Bruner MD      
 albuterol-ipratropium (DUO-NEB) 2.5 MG-0.5 MG/3 ML  3 mL Nebulization Q4H PRN Tracey Bruner MD   3 mL at 10/26/20 8966  levETIRAcetam (KEPPRA) 500 mg in 0.9% sodium chloride (MBP/ADV) 100 mL 500 mg IntraVENous Q12H Chaya Zayas NP   500 mg at 10/29/20 0837  
 dexamethasone (DECADRON) 10 mg/mL injection 6 mg  6 mg IntraVENous Q6H Akil Presley DO   6 mg at 10/29/20 5690  levalbuterol (XOPENEX) nebulizer soln 1.25 mg/3 mL  1.25 mg Nebulization Q6H PRN Radu Curtis MD      
 
 
OBJECTIVE: 
Patient Vitals for the past 8 hrs: 
 BP Temp Pulse Resp SpO2 Weight 10/29/20 0900 (!) 153/77  (!) 101 28 94 %   
10/29/20 0836 (!) 140/65  (!) 101     
10/29/20 0800 (!) 145/70 97.4 °F (36.3 °C) (!) 104 20 92 %   
10/29/20 0731     94 %   
10/29/20 0400      120 lb 9.5 oz (54.7 kg) 10/29/20 0352     97 %   
10/29/20 0300 (!) 159/67 97.8 °F (36.6 °C) (!) 102 17 95 %   
10/29/20 0258 (!) 159/68  (!) 102 17 95 %  Temp (24hrs), Av.5 °F (36.4 °C), Min:97.3 °F (36.3 °C), Max:97.8 °F (36.6 °C) No intake/output data recorded. Physical Exam: 
Constitutional: Well developed, well nourished female in no acute distress, awake and alert sitting in a chair HEENT: Normocephalic and atraumatic. Oropharynx is clear, mucous membranes are moist. Extraocular muscles are intact. Sclerae anicteric. Neck supple without JVD. No thyromegaly present. Lymph node No palpable submandibular, cervical, supraclavicular, axillary or inguinal lymph nodes. Skin Warm and dry. No bruising and no rash noted. No erythema. No pallor. Respiratory  decreased breath sounds left side CVS Normal rate, regular rhythm and normal S1 and S2. No murmurs, gallops, or rubs. Abdomen Soft, nontender and nondistended, normoactive bowel sounds. No palpable mass. No hepatosplenomegaly. Neuro  expressive aphasia. The patient seems to understand conversation and will attempt to respond. Profound right-sided weakness right arm greater than right leg. MSK Normal range of motion in general.  No edema and no tenderness. Psych  difficult to determine Labs: Recent Results (from the past 24 hour(s)) GLUCOSE, POC Collection Time: 10/28/20 11:24 AM  
Result Value Ref Range Glucose (POC) 171 (H) 65 - 100 mg/dL PLEASE READ & DOCUMENT PPD TEST IN 24 HRS Collection Time: 10/28/20  3:43 PM  
Result Value Ref Range PPD Negative Negative  
 mm Induration 0 0 - 5 mm GLUCOSE, POC Collection Time: 10/28/20  6:21 PM  
Result Value Ref Range Glucose (POC) 178 (H) 65 - 100 mg/dL GLUCOSE, POC Collection Time: 10/28/20 11:10 PM  
Result Value Ref Range Glucose (POC) 157 (H) 65 - 100 mg/dL CBC W/O DIFF Collection Time: 10/29/20  3:08 AM  
Result Value Ref Range WBC 19.0 (H) 4.3 - 11.1 K/uL  
 RBC 3.82 (L) 4.05 - 5.2 M/uL  
 HGB 11.6 (L) 11.7 - 15.4 g/dL HCT 36.1 35.8 - 46.3 % MCV 94.5 79.6 - 97.8 FL  
 MCH 30.4 26.1 - 32.9 PG  
 MCHC 32.1 31.4 - 35.0 g/dL  
 RDW 13.8 11.9 - 14.6 % PLATELET 801 363 - 297 K/uL MPV 9.9 9.4 - 12.3 FL ABSOLUTE NRBC 0.02 0.0 - 0.2 K/uL METABOLIC PANEL, COMPREHENSIVE Collection Time: 10/29/20  3:08 AM  
Result Value Ref Range Sodium 139 136 - 145 mmol/L Potassium 4.6 3.5 - 5.1 mmol/L Chloride 104 98 - 107 mmol/L  
 CO2 29 21 - 32 mmol/L Anion gap 6 (L) 7 - 16 mmol/L Glucose 150 (H) 65 - 100 mg/dL BUN 56 (H) 8 - 23 MG/DL Creatinine 0.90 0.6 - 1.0 MG/DL  
 GFR est AA >60 >60 ml/min/1.73m2 GFR est non-AA >60 >60 ml/min/1.73m2 Calcium 9.0 8.3 - 10.4 MG/DL Bilirubin, total 0.3 0.2 - 1.1 MG/DL  
 ALT (SGPT) 58 12 - 65 U/L  
 AST (SGOT) 49 (H) 15 - 37 U/L Alk. phosphatase 110 50 - 136 U/L Protein, total 6.8 6.3 - 8.2 g/dL Albumin 3.2 3.2 - 4.6 g/dL Globulin 3.6 (H) 2.3 - 3.5 g/dL A-G Ratio 0.9 (L) 1.2 - 3.5 MAGNESIUM Collection Time: 10/29/20  3:08 AM  
Result Value Ref Range Magnesium 3.4 (H) 1.8 - 2.4 mg/dL GLUCOSE, POC Collection Time: 10/29/20  5:35 AM  
Result Value Ref Range Glucose (POC) 145 (H) 65 - 100 mg/dL Imaging: XR ABD (KUB) [[id number]]  Collected: 10/26/20 1308 Order Status: Completed  Updated: 10/26/20 1312 Narrative:     
Abdomen for NG tube placement, one view, 10/26/2020 at 1216 hours A single view upper and left abdomen obtained. There is an NG tube. It is kinked  
at the gastroesophageal junction with the tip directed superiorly overlying the  
distal esophagus. This will probably require removing the tube and reinserting  
for proper positioning. Basilar infiltrates noted XR BARIUM SWALLOW [facility] [[id number]] Order Status: No result MRI BRAIN W WO CONT [[id number]]  Collected: 10/25/20 1158 Order Status: Completed  Updated: 10/25/20 1209 Narrative:     
MRI brain with and without contrast  
 
History: Abnormal CT. History of lymphoma.  Right hemiparesis Imaging sequences: Sagittal short TR/short TE, axial short TR/short TE, long TR/long TE, FLAIR, gradient recall, diffusion weighted images and ADC mapping. Axial and coronal short TR/short TE postcontrast images. Imaging was performed  
on a 1.5 Vicky magnet, utilizing the uneventful administration of 10 mL of  
intravenous Dotarem and order to better evaluate for intracranial pathology. Comparison: None. Correlation is made to the CT scan of the brain performed  
earlier on the same day. Findings: There is an enhancing mass within the left frontal region measuring  
approximately 2.3 x 2.2 x 2.3 cm in AP, transverse and craniocaudal dimensions,  
recently. This is a broad-based dural attachment. There is significant  
surrounding edema. This appears at least in part to be extra-axial although a  
more cystic component cannot be stated as such. There are 2 enhancing  
parenchymal lesions within the tracy with the larger measuring 1.0 cm with  
associated edema. There are 2 right occipital lobe mass with the larger  
measuring up to 1.3 cm, also with surrounding edema. There are enhancing right frontal bone lesions measuring up to 2.1 cm in size. The ventricles and sulci are normal in size and configuration.  There are no  
extra-axial fluid collections.  Normal flow voids are present within all of the  
major intracranial vessels. There is no evidence of intraparenchymal hemorrhage. There are subcentimeter foci of restricted diffusion within the right occipital  
and parietal lobes raising concern for small foci of acute infarctions. Additional subtle restricted diffusion is present medial to the edema within the  
left frontal region at the left corona radiata and extending into the external  
capsule.    
 
Scattered foci of T2 hyperintensity within the supratentorial white matter most  
likely represent the sequela of chronic small vessel ischemic change,  
unremarkable for age. The visualized mastoid air cells and paranasal sinuses are  
well pneumatized and aerated. Impression:     
IMPRESSION:  
1. Several enhancing masses with the largest within the left frontal region  
which may have an extra axial component with significant surrounding edema. 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 There  
are also enhancing right frontal bone lesions presumably representing part of  
the same process. 2. Small acute right occipital and parietal lobe infarcts. Additional infarcts  
are suspected within the left corona radiata/external capsule. XR CHEST PORT [[id number]]  Collected: 10/25/20 6992 Order Status: Completed  Updated: 10/25/20 9852 Narrative:     
EXAM: CHEST X-RAY, 1 VIEW INDICATION: stroke. COMPARISON: Chest x-ray 10/20/2020 TECHNIQUE: Single AP view of the chest was obtained. FINDINGS: Interval worsening of ill-defined perihilar markings throughout both  
lungs. Bilateral pleural effusions also appear to be enlarging. The cardiac  
silhouette is partially obscured. No pneumothorax. The bones are unchanged.   
  
Impression:     
 IMPRESSION:  
Interval worsening of pulmonary edema and enlargement of bilateral effusions. Coexisting infection difficult to fully exclude by x-ray. CTA CODE NEURO HEAD AND NECK W CONT [[id number]]  Collected: 10/25/20 0360 Order Status: Completed  Updated: 10/25/20 7295 Narrative:     
History: Right-sided weakness and difficulty with speech. FINDINGS:  
 
CT angiography was performed of the neck and head with contrast and  
three-dimensional CT angiography reconstruction and reformat was performed. NASCET criteria as needed. CT dose reduction was achieved through use of a  
standardized protocol tailored for this examination and automatic exposure  
control for dose modulation. Bilateral pleural effusion. Parenchymal nodularity in the left upper lobe. There is extensive atherosclerotic ectasia of the imaged segments of the  
thoracic aorta. The ascending aorta measures 2.8 cm. There is a mild stenosis of  
the proximal descending thoracic aorta related to concentric noncalcified  
atheroma. There is a right-sided aortic arch with an occluded right common  
carotid artery and occluded right subclavian artery. The right vertebral artery  
reconstitutes via collaterals. Reversal of flow is not excluded by CT angiogram.  
 
The innominate artery is patent. The left common carotid artery is patent. The  
left internal carotid artery is patent. There is atherosclerosis at the left  
carotid bulb without hemodynamically significant stenosis. There is a moderate  
stenosis in the supraclinoid segment of the left internal carotid artery. The right internal carotid artery reconstitutes at the right carotid bulb. The  
right middle cerebral artery is patent. There is short segment occlusion in the M1 segment of the left middle cerebral  
artery. The posterior cerebral arteries are patent. Dural venous sinuses are patent. Small left transverse and sigmoid sinus. Multiple enhancing lesions within the brainstem and cerebrum. The largest is in  
the left middle cerebral artery territory. Impression:     
IMPRESSION:  
 
Short segment occlusion of the left middle cerebral artery. Finding reported to  
the emergency room bedside physician at the time of this report. Multiple enhancing lesions in the brainstem and cerebrum. Bilateral pleural effusion. Extensive atherosclerosis of the aorta with occlusion of the right subclavian  
and the right common carotid arteries. This is chronic dating back to June 2019. CT Perfusion w/ Cerebral Blood Flow [[id number]]  Collected: 10/25/20 0725 Order Status: Completed  Updated: 10/25/20 0730 Narrative:     
CT Perfusion Imaging INDICATION:  Acute neuro changes. Right-sided weakness. CT perfusion imaging of the brain was performed after the administration of  
intravenous contrast.  Perfusion maps and perfusion analysis output were  
generated using the [employer] perfusion processing software algorithm.   Radiation  
dose reduction techniques were used for this study:  All CT scans performed at  
this facility use one or all of the following: Automated exposure control,  
adjustment of the mA and/or kVp according to patient's size, iterative  
reconstruction. COMPARISON: None. Correlation is made to the CTA and CT brain performed on the  
same day. Network Physics Output Values: CBF < 30% volume:  3 ml   (core infarction volume greater than 50 cc associated  
with poor outcomes) Tmax > 6 seconds: 41 ml Tmax/CBF Mismatch Volume: 38 ml Tmax/CBF Mismatch Ratio: 13.7 Hypoperfusion Intensity Ratio (Tmax > 10 seconds/Tmax > 6 seconds): 0.3    
(values greater than 0.5 associated with poor outcome) Tmax > 10 seconds Volume: 11 ml   (volume greater than 100 mL is associated with  
poor outcome) Impression:     
IMPRESSION: Although there are changes suggesting small core infarct and  
 penumbra within the left frontal lobe the findings are felt to correspond to a  
mass with vasogenic edema on CT scanning. Other intracranial masses are present  
as well. Please note that the determination of patient treatment is not based solely upon  
imaging factors or calculation values. Management of ischemia is at the  
discretion of the primary physician and is based upon a combination of clinical  
and imaging data, along other factors. CT HEAD W CONT [[id number]] Order Status: Canceled CT CODE NEURO HEAD WO CONTRAST [[id number]]  Collected: 10/25/20 1063 Order Status: Completed  Updated: 10/25/20 6248 Narrative:     
CT head without contrast  
 
History: Acute right-sided weakness, speech disturbance. Technique: 5mm axial images were obtained from the skull base to the vertex  
without intravenous contrast.  Radiation dose reduction techniques were used for  
this study:  Our CT scanners use one or all of the following: Automated exposure  
control, adjustment of the mA and/or kVp according to patient's size, iterative  
reconstruction. Comparison: None Findings: The ventricles and sulci are normal in size and configuration. There  
are no extra-axial fluid collections. There is a region of decreased attenuation  
within the left frontal lobe which involves primarily white matter but also a  
component of the cortex. There is a 1 cm cystic region also in close association  
with this. There is 2 mm of left-to-right midline shift There is no evidence of  
intraparenchymal hemorrhage. The bony calvarium is intact. The visualized  
mastoid air cells and paranasal sinuses are well pneumatized and aerated. Impression:     
Impression: Low attenuation within the left frontal lobe may represent an acute  
or subacute infarct however the possibility of this representing vasogenic edema  
from an underlying mass is not excluded. Postcontrast imaging would be recommended for further evaluation. ASSESSMENT: 
Problem List  Date Reviewed: 10/19/2020 Codes Class Noted Systolic heart failure (HCC) ICD-10-CM: I50.20 ICD-9-CM: 428.20  10/27/2020 Expressive aphasia ICD-10-CM: R47.01 
ICD-9-CM: 784.3  10/25/2020 * (Principal) Acute right-sided weakness ICD-10-CM: R53.1 ICD-9-CM: 728.87  10/25/2020 Frontal mass of brain ICD-10-CM: G93.89 ICD-9-CM: 348.89  10/25/2020 Acute encephalopathy ICD-10-CM: G93.40 ICD-9-CM: 348.30  10/25/2020 Multiple lesions on CT of brain and spine ICD-10-CM: R93.0, R93.7 ICD-9-CM: 793.0, 793.7  10/25/2020 Acute CVA (cerebrovascular accident) ([facility] [address] ICD-10-CM: I63.9 ICD-9-CM: 434.91  10/25/2020 Multiple lung nodules on CT (Chronic) ICD-10-CM: R91.8 ICD-9-CM: 793.19  10/12/2020 Overview Addendum 10/12/2020  2:33 PM by Anel Alex NP  
  -PET scan 10/2019: PET scan fortunately did not have any abnormal activity in her mass in the left lung. This may mean that we can just follow this radiographically, but we will talk about her at tumor board and come back to her with the group's recommendation. 
-10/30/2019: Patient is discussed at thoracic conference today lead by Dr Rosa Solorzano. Patient is a 77 yo never smoker. CT guided biopsy recommended. -CT guided Biopsy 11/2020: lung biopsy showed signs of this nodule being a hamartoma, which is a benign tumor that we can simply follow. repeat CT in 6 months 
-Chest CT June 2020: CT scan is stable 
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 Lynn Contes are several new pulmonary nodules noted which may suggest some metastatic disease.  This require further follow-up CT of the chest in 2 months or doing PET scan. Hamartoma (HCC) (Chronic) ICD-10-CM: Q85.9 ICD-9-CM: 759.6  10/12/2020 Peripheral vascular disease ([facility] [address] ICD-10-CM: I73.9 ICD-9-CM: 443.9  10/12/2020 Bilateral pleural effusion ICD-10-CM: J90 ICD-9-CM: 511.9  10/7/2020 Overview Signed 10/13/2020  1:25 PM by DENI Larson  
  10/3/2020: L thoracentesis 1050mL removed R thoracentesis 800mL removed 10/9/2020: L thoracentesis: 1000mL removed R thoracentesis: 600mL removed Acute systolic heart failure (HCC) ICD-10-CM: I50.21 ICD-9-CM: 428.21  10/6/2020 PNA (pneumonia) ICD-10-CM: J18.9 ICD-9-CM: 441  10/1/2020 Mass of lingula of lung ICD-10-CM: R91.8 ICD-9-CM: 786.6  10/16/2019 Right carotid artery occlusion ICD-10-CM: K83.03 ICD-9-CM: 433.10  10/11/2019 Subclavian artery stenosis (HCC) ICD-10-CM: I77.1 ICD-9-CM: 447.1  10/11/2019 Left carotid stenosis ICD-10-CM: F67.94 
ICD-9-CM: 433.10  10/12/2018 Hypercholesterolemia (Chronic) ICD-10-CM: E78.00 ICD-9-CM: 272.0  6/30/2015 Dysphagia (Chronic) ICD-10-CM: R13.10 ICD-9-CM: 787.20  6/30/2015 Mild persistent asthma without complication (Chronic) AGK-13-EP: J45.30 ICD-9-CM: 493.90  10/17/2013 Acquired hypothyroidism (Chronic) ICD-10-CM: E03.9 ICD-9-CM: 244.9  10/17/2013 Allergic rhinitis (Chronic) ICD-10-CM: J30.9 ICD-9-CM: 477.9  10/17/2013 GERD (gastroesophageal reflux disease) (Chronic) ICD-10-CM: K21.9 ICD-9-CM: 530.81  10/17/2013 Ms. Romulo Cruz is a 69 y.o. female admitted on 10/25/2020 with a primary diagnosis of brain masses. Her PMH includes HTN, GERD, Hodgkin's lymphoma, hypothyroidism, sCHF, and recently dx lung hamartomas. CT chest from 9/21/2020 with L lung mass and sclerotic focus at T6. Lung mass has been followed by pulm since 2019. Had bx of L lung mass on 11/11/2019 with path +hamartoma. She presented to ED with c/o acute onset R-sided weakness, aphasia, and confusion. She was recently discharged on 10/21 after being treated for pneumonia.   MRI brain with several enhancing masses with largest in L frontal region, enhancing R frontal bone lesions; and small acute R occipital and parietal lobe infarcts. CTA head/neck with short segment occlusion of MCA. CXR with interval worsening of pulm edema and b/l pleural effusion. Neuro/neurosurgery following. Brain masses not amenable to surgical resection. On Dex 6mg q 6 hours and Keppra. Rad Onc consult pending. We were consulted for recommendations. RECOMMENDATIONS: 
Hx Hodgkin's Lymphoma 
-  
 
Brain Masses / CVA 
- MRI brain with several enhancing masses with largest in L frontal region, enhancing R frontal bone lesions; and small acute R occipital and parietal lobe infarcts 
- Neuro/neurosurgery following - brain masses not amenable to surgical resection - On Dex 6mg q 6 hours and Keppra - Rad Onc consult pending - Check CT AP for staging 10/29 CT CAP pending, rad onc consult pending. L lung mass/hamartoma - Pt had CT chest on 9/21/20 with L lung mass with multiple pulm nodules and sclerotic focus at T6. Lung mass has been followed by pulm since 2019. Had bx of L lung mass on 11/11/2019 with path +hamartoma. 
- Dr. Loera Nim to ask IR if bx of lung nodule possible. Will also check CT AP. 
10/29 Pulmonology following; recent CT chest indicates location likely not amenable to biopsy via EBUS. IR reported biopsy would be difficult although willing to attempt if no other targets available. CT CAP pending barium clearance after swallow study 10/27. Will ask CT to perform CT chest while awaiting CT AP. Lab studies and imaging studies were personally reviewed. Thank you for allowing us to participate in the care of Ms. Karthik Price. ISIDRO Ramirez [facility] Hematology [facility] Oncology [address] [address] Office : [phone] Fax : [phone]

## 2020-10-29 NOTE — PROGRESS NOTES
Chart reviewed and pt discussed in am IDR. Also, discussed with Dr. Carmela Banerjee. Unfortunately, at this time, unable to make clear d/c POC. Awaiting bx results. Most likely STR at SNF vs hospice care. Will discuss with pt, spouse and brother when more definite prognosis. Brother will be here approx another week. PPD already placed. PT/OT/ST per MD. CM following.

## 2020-10-29 NOTE — PROGRESS NOTES
Problem: Dysphagia (Adult) Goal: *Speech Goal: (INSERT TEXT) Outcome: Progressing Towards Goal 
LTG: Patient will tolerate least restrictive diet without overt signs or symptoms of airway compromise. STG: Patient will participate in modified barium swallow study to objectively assess swallow function. Goal met 10/27/20 STG: Patient will consume chopped mechanical soft diet and thin liquids by cup without overt signs or symptoms of respiratory compromise. Goal added 10/27/20 Problem: Neurolinguistics (Adult) Goal: *Speech Goal: (INSERT TEXT) Outcome: Progressing Towards Goal 
LTG: Patient will increase receptive/expressive language skills demonstrated by the ability to communicate basic wants/needs across environments STG: Patient will answer yes/no questions with 75% accuracy given mod cueing. Goal met 10/29/20 STG: Patient will follow 1 step commands with 75% accuracy given moderate cueing. Goal met 10/29/20 STG: Patient will identify item in field of 2 with 60% accuracy given moderate cueing STG: Patient will identify body parts presented verbally with 50% accuracy given moderate cueing. STG: Patient will complete automatic naming tasks with 50%  accuracy given moderate cueing SPEECH LANGUAGE PATHOLOGY: DYSPHAGIA AND SPEECH-LANGUAGE/COGNITION: Daily Note 2 NAME/AGE/GENDER: Austen Lindsay is a 69 y.o. female DATE: 10/29/2020 PRIMARY DIAGNOSIS: Frontal mass of brain [G93.89] Acute right-sided weakness [R53.1] Expressive aphasia [R47.01] Acute encephalopathy [G93.40] Multiple lesions on CT of brain and spine [R93.0, R93.7] ICD-10: Treatment Diagnosis: R13.12 Dysphagia, Oropharyngeal Phase 
F80.2 Mixed Receptive-Expressive Language Disorder 
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 2 Apraxia RECOMMENDATIONS  
DIET:  
· PO diet texture:  Mechanical soft with chopped meat and vegetables, with extra sauces/gravies · Liquids:  regular thin, by cup only 
  
 MEDICATIONS: Crushed in puree COMPENSATORY STRATEGIES/MODIFICATIONS INCLUDING: 
· Fully awake/alert · Supervision with all PO 
· Small bites and sips · Cup/sip · No straws · Remain upright for 20-30 min after any PO · Slow rate of PO intake · Check mouth for pocketed PO 
· Liquid wash OTHER RECOMMENDATIONS (including follow up treatment recommendations): · Treatment to improve/facilitate oral/pharyngeal skills · Training in use of compensatory safe swallowing strategies/feeding guidelines · Patient/family education EDUCATION: 
Recommendations discussed with · Nursing · Patient CONTINUATION OF SKILLED SERVICES/MEDICAL NECESSITY: 
? Patient is expected to demonstrate progress in  swallow strength, swallow timeliness, swallow function and swallow safety in order to  improve swallow safety, work toward diet advancement and decrease aspiration risk. ? Patient is expected to demonstrate progress in expressive communication and receptive ability to decrease assistance required communication, increase independence with activities of daily living and increase communication with family/caregivers. ? Patient continues to require skilled intervention due to dysphagia, aphasia, ? apraxia. RECOMMENDATIONS for CONTINUED SPEECH THERAPY: YES: Anticipate need for ongoing speech therapy during this hospitalization and at next level of care. ASSESSMENT Dysphagia: RN reports patient not eating much PO, so NGT remains in place. Patient willing to try ensure at bedside, but complained of anterior oral leakage when attempting to drink from bottle, especially as NGT hinders ability to get cup to mouth adequately. Attempted use of straw placed to left and patient able to draw liquid through straw 75% of the time. NO overt signs or symptoms of aspiration observed. Expressive/receptive aphasia.  Receptive abilities seem to have improved dramatically. Patient answering Y/N questions appropriately and following multi-step commands. Vocal volume is low, which inhibits intelligibility, but with repetition, able to understand patient >75% of the time. Much more attempts at communication. She would probably benefit from formal reassessment of receptive/expressive language abilities. Recommend ongoing speech therapy during inpatient stay and at next level of care to address dysphagia and functional communication abilities. REHABILITATION POTENTIAL FOR STATED GOALS: Fair PLAN   
FREQUENCY/DURATION: Continue to follow patient 3 times a week for duration of hospital stay to address above goals. - Recommendations for next treatment session: Next treatment will address swallow via modified barium swallow study SUBJECTIVE Upright in bed. Family present. Problem List:  (Impairments causing functional limitations): 1. Oropharyngeal dysphagia 2. Expressive aphasia 3. Receptive aphasia 4. ? apraxia Orientation:  
Person Place Via yes/no questions Pain: Pain Scale 1: Numeric (0 - 10) Pain Intensity 1: 0 OBJECTIVE Swallow: 
 Patient consumed ensure via straw without overt signs or symptoms of aspiration. Discussed with patient and family need to increase PO intake if NGT is going to be removed. Cognitive Linguistic: Increased verbal initiation, but with low vocal volume impacting intelligibility. Patient appropriately answering Y/N questions and following simple multi-step commands in context. INTERDISCIPLINARY COLLABORATION: Registered Nurse PRECAUTIONS/ALLERGIES: Compazine [prochlorperazine edisylate] Tool Used: Dysphagia Outcome and Severity Scale (MONICA) Score Comments Normal Diet  [] 7 With no strategies or extra time needed Functional Swallow  [] 6 May have mild oral or pharyngeal delay Mild Dysphagia  [] 5 Which may require one diet consistency restricted Mild-Moderate Dysphagia  [] 4 With 1-2 diet consistencies restricted Moderate Dysphagia  [] 3 With 2 or more diet consistencies restricted Moderate-Severe Dysphagia  [] 2 With partial PO strategies (trials with ST only) Severe Dysphagia  [] 1 With inability to tolerate any PO safely Score:  Initial: 2 Most Recent: 5 (Date 10/29/20 ) Interpretation of Tool: The Dysphagia Outcome and Severity Scale (MONICA) is a simple, easy-to-use, 7-point scale developed to systematically rate the functional severity of dysphagia based on objective assessment and make recommendations for diet level, independence level, and type of nutrition. Tool Used: MODIFIED CHELSEA SCALE (mRS) Score No Symptoms  [] 0 No significant disability despite symptoms; able to carry out all usual duties and activities  [] 1 Slight disability; unable to carry out all previous activities but able to look after own affairs without assistance. [] 2 Moderate disability; requiring some help but able to walk without assistance  [] 3 Moderately severe disability; unable to walk without assistance and unable to attend to own bodily needs without assistance  [] 4 Severe disability; bedridden, incontinent, and requiring constant nursing care and attention  [] 5 Score:  Initial: 4 Interpretation of Tool: The Modified Chelsea Scale is a 7-point scaled used to quantify level of disability as it relates to a patient's functional abilities. SAFETY: 
After treatment position/precautions: · Upright in bed · RN notified · Family at bedside Total Treatment Duration:  
Time In: 2833 Time Out: 1100 Janis Colón MA, CCC-SLP

## 2020-10-29 NOTE — PROGRESS NOTES
A follow up visit was made to the patient. Emotional support, spiritual presence and  
prayer were provided for the patient. She was sitting up in bed. When she spoke, it was difficult to understand what she was saying. Her brother and  were present. STAR Machado

## 2020-10-30 NOTE — PROGRESS NOTES
10/30/20 1922 NIH Stroke Scale Interval Other (comment) (dual) LOC 0  
LOC Questions 0 LOC Commands 0 Best Gaze 0 Visual 0 Facial Palsy 2 Motor Right Arm 3 Motor Left Arm 0 Motor Right Leg 2 Motor Left Leg 1 Limb Ataxia 1 Sensory 1 Best Language 1 Dysarthria 1 Extinction and Inattention 0 Total 12 Dual NIH with [place] RN

## 2020-10-30 NOTE — PROGRESS NOTES
[facility] OUTREACH NURSE PROGRESS REPORT SUBJECTIVE: Called to assess patient secondary to transfer from ICU. MEWS Score: 2 (10/30/20 1138) Vitals:  
 10/30/20 1138 10/30/20 1327 10/30/20 1540 10/30/20 1600 BP: 120/60   103/72 Pulse: 86   89 Resp: 21   20 Temp: 97.8 °F (36.6 °C)   97.4 °F (36.3 °C) SpO2: 96% 94%  94% Weight:      
Height:   5' 6\" (1.676 m) LAB DATA: 
 
Recent Labs 10/30/20 
0315 10/29/20 
0308 10/28/20 
4442  139 139  
K 4.4 4.6 4.7  104 107 CO2 30 29 25 AGAP 8 6* 7 * 150* 143* BUN 60* 56* 48* CREA 0.88 0.90 0.96 GFRAA >60 >60 >60 GFRNA >60 >60 >60  
CA 8.7 9.0 9.3 MG  --  3.4*  --   
ALB 2.8* 3.2  --   
TP 6.4 6.8  --   
GLOB 3.6* 3.6*  --   
AGRAT 0.8* 0.9*  --   
ALT 75* 58  --   
  
 
Recent Labs 10/30/20 
0315 10/29/20 
0308 10/28/20 
5524 WBC 21.4* 19.0* 20.0* HGB 12.1 11.6* 11.7 HCT 36.4 36.1 35.4*  
 391 415 OBJECTIVE: On arrival to room, I found patient to be resting in bed. Pain Assessment Pain Intensity 1: 0 (10/30/20 1525) Pain Location 1: Throat Patient Stated Pain Goal: 0 
 
 
ASSESSMENT:  Patient alert with no visible signs of distress. Breathing even and unlabored. Patient oriented, able to move Left side, RLE weak but follows commands, RUE weak with some movement. Patient readjusted for comfort. Chart and labs reviewed. PLAN:  Will continue to follow per outreach protocol.

## 2020-10-30 NOTE — PROGRESS NOTES
Fuad Johnson Admission Date: 10/25/2020 Daily Progress Note: 10/30/2020 This patient has been seen and evaluated at the request of Dr. Winsome Vegas for biopsy recommendations.  
  
Patient is a 69 y.o.  female presents with AMS and MRI showing several enhancing masses with metastatic disease. 
  
  
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  
  
History is as follows: She had a PET scan 10/2019: PET scan fortunately did not have any abnormal activity in her mass in the left lung. 10/30/2019: Patient is discussed at thoracic conference lead by Dr Gregory Adams. Patient is a 79 yo never smoker.  CT guided biopsy recommended. CT guided Biopsy 11/2019: lung biopsy showed signs of this nodule being a hamartoma, which is a benign tumor that we can simply follow. Repeat Chest CT June 2020: CT scan was stable. Chest CT 9/2020: She has a chronic absence of L pulmonary artery on review with radiology. [place] are several new pulmonary nodules noted which may suggest some metastatic disease.  October 2020: she presented here with infiltrates, COvid 19 ruled out and treated with antibiotics. CT  Showed pleural effusions and S/P thoracenteses:L thoracentesis with 1050mL removed and R thoracentesis with 800mL removed on 10/3/2020. Cardiology was consulted on 10/9. Repeat L thoracentesis with 1000mL removed and R thoracentesis with 600mL removed. Cytology negative. Flow cytometry was negative. RHC and LHC was discussed by cardiology once PET scan and need for biopsies completed. 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  Vascular surgery felt that no intervention was necessary at this time.  
  
 She was discharged on 10/2/2020 with the plan to have PET scan planned for today. Unfortunately, she presented here again with new onset hemiplegia and aphasia. She was seen by Neurology and MRI shows several enhancing lesions. We were asked to see her for recommendations regarding biopsy. Subjective:  
 
CT Abd/pelvis is still pending removal of barium from abdomen to determine safest target for biopsy. Awake and verbalizing - better articulation as noted by neurology. Review of Systems Constitutional: negative for fevers, chills, sweats and fatigue Respiratory: positive for none Cardiovascular: negative for chest pain, chest pressure/discomfort, dyspnea, palpitations Current Facility-Administered Medications Medication Dose Route Frequency  lisinopriL (PRINIVIL, ZESTRIL) tablet 5 mg  5 mg Oral DAILY  montelukast (SINGULAIR) tablet 10 mg  10 mg Oral DAILY  rosuvastatin (CRESTOR) tablet 40 mg  40 mg Oral QHS  carvediloL (COREG) tablet 6.25 mg  6.25 mg Oral BID WITH MEALS  lansoprazole (PREVACID) 3 mg/mL oral suspension 30 mg  30 mg Oral ACB  levothyroxine (SYNTHROID) tablet 75 mcg  75 mcg Oral ACB  levETIRAcetam (KEPPRA) oral solution 500 mg  500 mg Oral BID  insulin lispro (HUMALOG) injection   SubCUTAneous Q6H  
 budesonide (PULMICORT) 500 mcg/2 ml nebulizer suspension  500 mcg Nebulization BID RT  
 albuterol (PROVENTIL VENTOLIN) nebulizer solution 2.5 mg  2.5 mg Nebulization Q6H RT  
 [Held by provider] furosemide (LASIX) injection 40 mg  40 mg IntraVENous DAILY  LORazepam (ATIVAN) tablet 0.5 mg  0.5 mg Oral BID PRN  
 metoprolol (LOPRESSOR) injection 5 mg  5 mg IntraVENous Q4H PRN  
 NUTRITIONAL SUPPORT ELECTROLYTE PRN ORDERS   Does Not Apply PRN  
 fluticasone propionate (FLONASE) 50 mcg/actuation nasal spray 2 Spray  2 Spray Both Nostrils DAILY  sodium chloride (NS) flush 5-40 mL  5-40 mL IntraVENous Q8H  
  sodium chloride (NS) flush 5-40 mL  5-40 mL IntraVENous PRN  
 ondansetron (ZOFRAN) injection 4 mg  4 mg IntraVENous Q6H PRN  
 acetaminophen (TYLENOL) suppository 650 mg  650 mg Rectal Q4H PRN  
 LORazepam (ATIVAN) injection 2 mg  2 mg IntraVENous Q2H PRN  
 albuterol-ipratropium (DUO-NEB) 2.5 MG-0.5 MG/3 ML  3 mL Nebulization Q4H PRN  
 dexamethasone (DECADRON) 10 mg/mL injection 6 mg  6 mg IntraVENous Q6H  
 levalbuterol (XOPENEX) nebulizer soln 1.25 mg/3 mL  1.25 mg Nebulization Q6H PRN Objective:  
 
Vitals:  
 10/30/20 0430 10/30/20 0617 10/30/20 5406 10/30/20 4401 BP: 126/73 120/67 115/66 Pulse: 96 94 92 Resp: (!) 34 24 22 Temp: 97.5 °F (36.4 °C) 97.7 °F (36.5 °C) 97.7 °F (36.5 °C) SpO2: 95% 93% 95% 95% Weight: 115 lb 4.8 oz (52.3 kg) Intake and Output:  
10/28 1901 - 10/30 0700 In: 4045 [I.V.:100] Out: 2025 [Urine:2025] No intake/output data recorded. Intake/Output Summary (Last 24 hours) at 10/30/2020 7855 Last data filed at 10/30/2020 0430 Gross per 24 hour Intake 837 ml Output 1675 ml Net -838 ml Physical Exam:         
Constitutional: the patient appears frail and noting weight loss HEENT: Sclera clear, pupils equal, oral mucosa moist 
Lungs: CTA on RA Cardiovascular: RRR without M,G,R 
Abd/GI: soft and non-tender; with positive bowel sounds. Ext: warm without cyanosis. There is no lower leg edema. Musculoskeletal: moves all four extremities with equal strength Skin: no jaundice or rashes, no wounds Neuro: some improvement in articulation Lines/Drains:  PIV, bustamante Nutrition: mechanical soft diet CHEST X-RAYS: 
 
  
 
LAB Recent Labs 10/30/20 
0315 10/29/20 
0308 10/28/20 
0884 WBC 21.4* 19.0* 20.0* HGB 12.1 11.6* 11.7 HCT 36.4 36.1 35.4*  
 391 415 Recent Labs 10/30/20 
0315 10/29/20 
0308 10/28/20 
6028  139 139  
K 4.4 4.6 4.7  104 107 CO2 30 29 25 * 150* 143* BUN 60* 56* 48* CREA 0.88 0.90 0.96  
MG  --  3.4*  --   
 
Echocardiogram 
LEFT VENTRICLE: Size was normal. Systolic function was moderately to markedly 
reduced. Ejection fraction was estimated in the range of 30 % to 35 %. There 
was moderate diffuse hypokinesis. Wall thickness was normal. 
  
AORTIC VALVE: The valve was trileaflet. Leaflets exhibited normal cuspal 
separation and mild sclerosis. The aortic valve area by the continuity  
equation 
was 2.07 cm2. The peak velocity was 1.36 m/s. The mean pressure gradient was 5 
mmHg. The peak pressure gradient was 7 mmHg. The Di=0.66. The SVi=36.8. 
  
SUMMARY: 
  
-  Left ventricle: Systolic function was moderately to markedly reduced. Ejection fraction was estimated in the range of 30 % to 35 %. There was 
moderate diffuse hypokinesis.   
-  Aortic valve: The aortic valve area by the continuity equation was 2.07  
cm2. 
  
ADDITIONAL MEASUREMENTS High velocity overlapping flow noted during suprasternal imaging (descending 
aorta appears with normal flow velocities and superimposed high flow Velocities from alternative source of uncertain DHPXUTSF-~1.6B/H). This was also notedon 
previous echo from 9/30/20. 
  
  
  
 
 
Assessment:  
 
Hospital Problems  Date Reviewed: 10/19/2020 Codes Class Noted POA Systolic heart failure (HCC) ICD-10-CM: I50.20 ICD-9-CM: 428.20  10/27/2020 Yes Expressive aphasia ICD-10-CM: R47.01 
ICD-9-CM: 784.3  10/25/2020 Yes * (Principal) Acute right-sided weakness ICD-10-CM: R53.1 ICD-9-CM: 728.87  10/25/2020 Yes Frontal mass of brain ICD-10-CM: G93.89 ICD-9-CM: 348.89  10/25/2020 Yes Acute encephalopathy ICD-10-CM: G93.40 ICD-9-CM: 348.30  10/25/2020 Yes Multiple lesions on CT of brain and spine ICD-10-CM: R93.0, R93.7 ICD-9-CM: 793.0, 793.7  10/25/2020 Yes Acute CVA (cerebrovascular accident) ([facility] [address] ICD-10-CM: I63.9 ICD-9-CM: 434.91  10/25/2020 Yes Multiple lung nodules on CT (Chronic) ICD-10-CM: R91.8 ICD-9-CM: 793.19  10/12/2020 Yes Overview Addendum 10/12/2020  2:33 PM by Anel Alex NP  
  -PET scan 10/2019: PET scan fortunately did not have any abnormal activity in her mass in the left lung. This may mean that we can just follow this radiographically, but we will talk about her at tumor board and come back to her with the group's recommendation. 
-10/30/2019: Patient is discussed at thoracic conference today lead by Dr Rosa Solorzano. Patient is a 79 yo never smoker. CT guided biopsy recommended. -CT guided Biopsy 11/2020: lung biopsy showed signs of this nodule being a hamartoma, which is a benign tumor that we can simply follow. repeat CT in 6 months 
-Chest CT June 2020: CT scan is stable 
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 [place] are several new pulmonary nodules noted which may suggest some metastatic disease.  This require further follow-up CT of the chest in 2 months or doing PET scan. Hamartoma (HCC) (Chronic) ICD-10-CM: Q85.9 ICD-9-CM: 759.6  10/12/2020 Yes Peripheral vascular disease ([address] ICD-10-CM: I73.9 ICD-9-CM: 443.9  10/12/2020 Yes Bilateral pleural effusion ICD-10-CM: J90 ICD-9-CM: 511.9  10/7/2020 Yes Overview Signed 10/13/2020  1:25 PM by DENI Warren  
  10/3/2020: L thoracentesis 1050mL removed R thoracentesis 800mL removed 10/9/2020: L thoracentesis: 1000mL removed R thoracentesis: 600mL removed Hypercholesterolemia (Chronic) ICD-10-CM: E78.00 ICD-9-CM: 272.0  6/30/2015 Yes Dysphagia (Chronic) ICD-10-CM: R13.10 ICD-9-CM: 787.20  6/30/2015 Yes Acquired hypothyroidism (Chronic) ICD-10-CM: E03.9 ICD-9-CM: 244.9  10/17/2013 Yes GERD (gastroesophageal reflux disease) (Chronic) ICD-10-CM: K21.9 ICD-9-CM: 530.81  10/17/2013 Yes Here with aphasia and found have brain lesions. Her recent Chest CT showed new lung nodules. Concern for metastatic cancer. Hem/onc consulted and pulm consulted for biopsy recommendations. Ct abd/pelvis ordered. PLAN:  
 
--Continued efforts to clear barium in order to complete scan and establish plan of care 
--Hem/onc consult completed yesterday 
--Right sided weakness unchanged 
--Monitor nutritional intake - mechanical soft with chopped meat & vegs 
--Support palliative care consult to continue establishing goals of care More than 50% of time documented was spent in face-to-face contact with the patient and in the care of the patient on the floor/unit where the patient is located. I have spoken with and examined the patient. I agree with the above assessment and plan as documented. Lungs:  Decreased breath sounds in lung bases Heart:  RRR with no Murmur/Rubs/Gallops Additional Comments: For ct of abd/pelvis when barium clears,  Will need bx I have spoken with and examined the patient. I agree with the above assessment and plan as documented.  
 
Rima Darling MD

## 2020-10-30 NOTE — PROGRESS NOTES
10/30/20 4621 NIH Stroke Scale Interval Other (comment) 
(Dual NIH )  
LOC 0  
LOC Questions 0 LOC Commands 0 Best Gaze 0 Visual 0 Facial Palsy 2 Motor Right Arm 3 Motor Left Arm 0 Motor Right Leg 2 Motor Left Leg 1 Limb Ataxia 2 Sensory 1 Best Language 1 Dysarthria 1 Extinction and Inattention 0 Total 13

## 2020-10-30 NOTE — PROGRESS NOTES
Per Dr. Almaz Hernandez ok to change Keppra from pill form to liquid as pill form can not be crushed.

## 2020-10-30 NOTE — ROUTINE PROCESS
Respiratory Care Services Policy Number: -[id number] Title: Patient Care Assessment Protocol Effective Date: 01/1999 Revised Date: 05/2014, 04/2018, 12/2018, 07/2019 Reviewed Date: 06/2013/ 03/2015, 03/2016, 06/2017 Overview In an effort to improve quality and reduce costs of respiratory care at [facility], the Respiratory Department has developed a number of Patient Care Protocols. These protocols have been developed according to Omid 3 and are utilized for those patients who are ordered respiratory therapy using therapeutic indications and standardized approaches for accomplishing objectives. Patient Care Protocols are intended to improve care by: ? Defining the indications and standards of care agreed upon by the Pulmonary Medicine and [address] [place] [address] of [facility]. ? Training respiratory care practitioners to apply those criteria to individual patients and modify therapy as indicated by the protocols. ? Documenting the indication and care plan as part of the initial ordering process. ? Tapering or discontinuing treatments once the indication for therapy changes. The Patient Care Protocols shall be universally applied throughout the hospital as determined by  
the Pulmonary Medicine and [address] [place] [address]. Rationale for Patient Care Assessment Protocols: 
? Continuous Quality Improvement ? Cost containment ? Standardization of care 
? Enhanced continuity of care ? Utilization review ? Timely intervention The following patient care assessment protocols have been developed: ? Aerosolized Medication Protocol http://spNewAerb/[facility] CenterTraansmissions/roxana/juan/policies/Respiratory%20Care%20Services%20Policies/-[id number]. doc ? Bronchial Hygiene Protocol http://spweb/localColumbia Gorge Teen Campsstems/Kadrianal/stfrancis/policies/Respiratory%20Care%20Services%20Policies/-[id number]. doc 
 ? Oxygen Protocolhttp://[facility] SSDFullscreenCHI St[facility]/stfrancis/policies/Respiratory Care Services Policies/-[id number]. doc http://[facility] SSDFullscreenCHI St[facility]/stfrancis/policies/Respiratory%20Care%20Services%20Policies/-[id number]. doc 
? CVRU Fast Track Weaning Protocol http://[facility] SSDFullscreenCHI St[facility]/stfrancis/policies/Respiratory%20Care%20Services%20Policies/-[id number]. doc ? Asthma Treatment Protocol ERhttp://[facility] SSDFullscreen[facility]/stfrancis/policies/Respiratory Care Services Policies/-[id number]. doc http://[facility] SSDFullscreenGantts/[facility]/stfrancis/policies/Respiratory%20Care%20Services%20Policies/-[id number]. doc ? Pediatric Asthma Treatment Protocol ERhttp://[facility] SSDFullscreen[facility]/stfrancis/policies/Respiratory Care Services Policies/-[id number]. doc http://[facility] SSDFullscreen[facility]/stfrancis/policies/Respiratory%20Care%20Services%20Policies/-[id number]. doc ? Alpha-1 Antitrypsin Deficiency Protocolhttp://[facility] SSDFullscreen[employer]/[facility]/stfrancis/policies/Respiratory Care Services Policies/-[id number]. doc http://[facility] SSDFullscreen[employer]/[facility]/stfrancis/policies/Respiratory%20Care%20Services%20Policies/-[id number]. doc ? Prone Positioning Protocol http://[facility] SSDFullscreen[employer]/[facility]/stfrancis/policies/Respiratory%20Care%20Services%20Policies/-[id number]. doc 
? COPD Protocol http://[facility] SSDFullscreenG[facility]ts/[facility]/stfrancis/policies/Respiratory%20Care%20Services%20Policies/-[id number]. doc 
? Home Oxygen Assessment Protocolhttp://nettieUtica Psychiatric Center/[facility]/juan/policies/Respiratory Care Services Policies/-[id number]. doc http://[facility]/stancis/policies/Respiratory%20Care%20Services%20Policies/-[id number]. doc 
? Ventilator Weaning Protocol http://nettieUtica Psychiatric Center/[facility]/stancis/policies/Respiratory%20Care%20Services%20Policies/-[id number]. doc ? Lung Volume Expansion Protocolhttp://nettieUtica Psychiatric Center/[facility]/stancis/policies/Respiratory Care Services Policies/-[id number]. doc http://[facility]/gvl/stfrancis/policies/Respiratory%20Care%20Services%20Policies/-[id number]. docm The Director of [address] [place] [address] oversees the Patient Care Assessment Program. The Respiratory Educator is responsible for protocol development and training. The Supervisor is responsible for implementation and  activities. Each patient with an order for respiratory treatments will receive an evaluation. Respiratory Care Practitioners (RCP's) will perform the evaluations. The same evaluation tool will be utilized for initial and follow-up assessments. If the patient does not meet criteria for ordered therapy, the therapy will be discontinued. If the patient demonstrates an adverse response to initially ordered therapy, the therapy will be discontinued and the physician will be contacted. Specific physician's orders that deviate from protocols and are deemed \"inappropriate\" or \"unsafe\" will be addressed with ordering physician and/or medical director as required. Respiratory Patient Care Assessment Protocols I. Policy: In an effort to provide quality patient care and effective utilization of services, physicians who order respiratory therapy will have their patients treated via the protocols established (see attached) Respiratory Care Practitioners (RCP's) will complete the initial assessment which will indicate patient needs,  the care plan developed and will performed within 24 hours of admission. Frequency of the therapy will be set according to the results of the respiratory therapy evaluation and frequency guidelines policy. Reassessment will be continued every 48 hours and more frequently as needed for the individual patient. II. Purpose: A.  To provide a process that will allow for ongoing assessment and care plan modification for patients receiving respiratory services based on both objective and or subjective patient responses to interventions. This process of protocol utilization will assist in patient care progression while eliminating the need for the physician to continually update respiratory therapy orders. B. To assure continuity of respiratory care that meets [facility] Clinical Practice Guidelines. III. Initiation:  Implement Respiratory Care Protocols for patients who are ordered by physician  
       to receive respiratory therapy procedures or for ventilator management. IV. Protocol: A. Upon receiving an order for therapy the RCP will review the patient's EMR (electronic medical record) for all pertinent information includin. Physician's order for therapy 2. Patient history and physical examination 3. Physician progress notes 4. Diagnostic. X-rays, PFT's, arterial blood gases etc. 
B. The RCP will perform a respiratory assessment in the following manner: 1. General observations: color, pattern and effort of breathing, chest expansion, (symmetrical and bilateral), level of consciousness and the ability to ambulate. 2. The RCP will assess patient's cough ability and determine if bronchial hygiene is needed. If patient is unable to produce sputum, at that time, the RCP should question the patient with regard to their sputum: production, color consistency, frequency and amount. 3. Auscultation: Using a stethoscope, the RCP will listen and note quality of breath sounds and presence or absence of adventitious breath sounds in all lung fields, both anteriorly and posteriorly. 4. Upon completing the EMR review and physical assessment, the RCP will document findings in the RT Assessment section of the EMR. The score level will be provided and will be used to determine the frequency of therapy. V. Indications: A. Bronchial Hygiene Protocol indications: 1. Potential for or presence of atelectasis. 1. Need for hydration and removal of retained secretions. 1. Need for improvement of cough effectiveness. 1. Presence of conditions associated with disorder of pulmonary clearance: 
a. Cystic fibrosis b. Bronchiectasis 
c. Neuromuscular disease 
d. Obstructive lung diseases 
e. Restrictive lung diseases Aerosolized Medication(s) Protocol indications:Treatment of bronchospasm/wheezing 2. Improvement of mucociliary clearance 3. Treatment of stridor 4. History of Bronchiectasis, Asthma or COPD 
C. Oxygen Therapy Protocol indications: 1. Documented hypoxemia 2. Severe trauma 3. Acute myocardial infarction 4. Short-term therapy (e.g. post anesthesia recovery) D. [facility] Ventilator Weaning Protocol indications: 1. All mechanically ventilated surgical patients unless they have a no wean order. E. Asthma Treatment Protocol ER indications: 1. Patients 15years of age and older that have been triaged or diagnosed with   asthma exacerbation shall be indicated for the ER Asthma Treatment Protocol. A physician order will be required to initiate the protocol. F. Pediatric Asthma protocol in the ER indications: 1. Patients less than 15years old that have been triaged or diagnosed with asthma exacerbation shall be indicated for the Pediatric Asthma protocol. A physician order will be required to initiate the protocol. G. Alpha-1 Antitrypsin Deficiency Testing protocol indications: 1. Patients admitted and diagnosed with COPD. H. Prone Positioning Protocol indications: 
       1. Acute lung injury 2. Acute respiratory distress syndrome (ARDS) I. Respiratory Care COPD Protocol indications: 1. History of COPD in patient's records 2. Smoking history J. Home Oxygen Assessment Protocol indications: 1. Chronic lung disease 2. Cor pulmonale 3. Unable to wean to room air 48 hours prior to anticipated discharge. K.  Ventilator Weaning Protocol indications: 1. Patient's mechanically ventilated 2. Managed by intensivist 
LAndi Lung Volume Expansion Protocol indications: 
      1. Any patient at risk for pulmonary complications. VI. Maintenance: A. Timely patient assessment is an integral part of this protocol therefore the following will be applied: 1. All non- critical care patients will be evaluated upon receiving initial respiratory care orders within 24 hours and re-evaluated within 48 hours (or more as needed). 2. Orders requesting a Respiratory Consult will be responded to in the following manner: 
a. In patient emergency situations, the RCP assigned to the floor will respond immediately to the patient, provide an initial respiratory assessment, and contact the patient's physician as necessary for appropriate orders. b. In non-emergent situations, the RCP assigned to the floor will respond to the patient within 90 minutes and provide an initial respiratory assessment and contact patient's physician as necessary for appropriate orders. c. An RCP will provide a comprehensive assessment as soon as possible. 3. Upon completion of an evaluation, the RCP will complete documentation in the patient's EMR in the RT Assessment section. 4. The RCP who completes the assessment will document orders for therapy in the orders section of the patient's EMR selecting new order. Next, per protocol should be selected indicating it is a protocol order and sign orders should be selected to complete the process. The applicable protocol must be added to the progress note per Joint Commission guidelines. 5. The Pharmacy and Therapeutics (P&T) Committee has mandated that the medication Xopenex may be changed to unit dose albuterol without an order, except for those patients receiving Xopenex due to cardiac arrhythmias. 1. The dosage for these patients should be 0.63 mg. and may be changed from 1.25 mg. to 0.63 mg per P & T Committee by the RCP completing the assessment. 6. Patients who are not experiencing cardiac arrhythmias, and are ordered Xopenex and Atrovent may be changed to Duoneb. VII. Safety: A. The following safety issues shall be monitored: 1. The RCP will perform hand hygiene per hospital policy utilizing Standard Precautions for all patients and following transmission-based isolation as indicated per hospital policy. 2. The RCP must exercise professional judgment in classifying the patient for frequency of therapy. 3. Appropriate classification of the patient will require an evaluation utilizing the Therapy Assessment Protocol Guidelines. 4. The RCP will confer with the physician concerning the care of the patient at any time questions or problems arise. 5. If during therapy, the patient exhibits no improvement, or deterioration in clinical status the RCP will notify the physician and the patient's nurse. VIII. Interventions: A. The patient's nurse is responsible concerning all items related to his/her care. Ongoing communication with nursing is essential to successful protocol management. B. The RCP recognizes the value of the team approach in meeting the patient's needs. Nursing input regarding the patient's pulmonary condition will be sought as needed. IX. Reportable conditions: The RCP will inform the physician if: 1. There are acute changes in patient's respiratory status. 2. The therapist is unable to determine appropriate care plan upon assessment. 3. The patient fails to reach therapeutic objective. 4. A change or additional medication is needed. X.  Patient Education: A. Patient will receive instruction on the followin. The treatment modality, including objectives and proper technique of therapy 2. Respiratory medications B. Documentation shall occur in the patient education section of the patient's EMR. XI. Documentation: Record all findings as described above in the patient's EMR. Related Protocols: A. Aerosolized Medication Protocol B. Bronchial Hygiene C. Oxygen Protocol D. [facility] Fast Track Weaning Protocol E. Asthma Treatment protocol ER 
F. Alpha-1 antitrypsin Deficiency Protocol G. Prone Positioning Protocol H. Respiratory Care COPD Protocol I. Home Oxygen assessment Protocol J. Ventilator Weaning Protocols K. Volume Expansion protocol Indications      Frequency          Level A. Aerosol therapy 1.  q4h     Severe SOB, wheezing, unable to sleep 1 2.  qid, q4 wa or q6h   Moderate SOB, wheezing   2 3.  tid      Hx of asthma, or COPD mild wheezing,  
      or facilitate secretion removal              3 
 4.  bid      Asthma, or COPD, Intermittent wheezing 4 5. PRN, i.e. tid PRN, qid PRN Asthma, or COPD, occasional wheezing 5 B. Bronchopulmonary Hygiene 1. q4h             Copious secretions, SOB, unable to sleep 1 2. qid & PRN            Moderate amounts of secretions   2 3. tid           Small amounts of secretions and poor cough,   
           history of secretions    3 4. PRN, i.e. tid PRN, qid PRN     Breathing exercises, encourage cough only 4  
  
C. Oxygen Therapy     Follow hospital approved Oxygen Protocol Note: 
qid treatments are due 0800, 1200, 1600, and 2000. tid treatments are due 0800, 1400, and 2000 Q6h treatments are due 0800, 1400, 2000, 0200 Q4 wa teatments are due 0800, 1200, 1600, and 2000. Q4h treatments are due  0800, 1200, 1600, 2000, 0000, and 0400. The Level 1-5 rating system is only to be used as criteria for determining appropriate frequency of therapy. References:  
[address] [place] [address] Standard L    Respiratory Care Department Policy, Procedure and Protocol Guideline Manual, 1995, HIREN Sultana. L  Therapist Driven Respiratory Care Protocols  A Practitioner's Guide for Criteria-Based Respiratory Care by Cameron Thomas M.D., and HIREN Lorenzo, GABY. L  The rationale for therapist-driven protocols: an update. Respiratory Care 1998; 34:217-621 L Therapist Driven Respiratory Care Protocols  A Practitioner's Guide for Criteria-Based Respiratory Care by Cameron Thomas M.D., and CADY Gerardo The rationale for therapist-driven protocols: an update. Respiratory Care 1998; [id number]. N   [facility] Clinical Practice Guidelines. A. The RCP will perform a respiratory assessment in the following manner: 1. Perform hand hygiene per hospital policy utilizing Standard Precautions for all patients and following transmission-based isolation as indicated per policy. 2. Identify patient via ID bracelet verifying patient name and birth date. 3. General observations: color, pattern and effort of breathing, chest expansion, (symmetrical and bilateral), level of consciousness and the ability to ambulate. 4. The RCP will assess patients cough ability and determine if Nasotracheal suctioning is needed. If patient is unable to produce sputum, at that time, the RCP should question the patient with regard to their sputum: production, color consistency, frequency and amount. 5. Auscultation: Using a stethoscope, the RCP will listen and note quality of breath sounds and presence or absence of adventitious breath sounds in all lung fields, both anteriorly and posteriorly. 6. Upon completing the EMR review and physical assessment, the RCP will document findings in the RT Assessment section of the EMR. The score level will be provided and will be used to determine the frequency of therapy. V. Indications: A. Indications for Bronchial Hygiene Protocol will include: 1. Potential for or presence of atelectasis. 2. Need for hydration and removal of retained secretions. 3. Need for improvement of cough effectiveness. 4. Presence of conditions associated with disorder of pulmonary clearance: 
a. Cystic fibrosis b. Bronchiectasis B.  Indications for Aerosolized Medication(s) Protocol should include: 1. Treatment of bronchospasm/wheezing 2. Improvement of mucociliary clearance 3. Treatment of stridor 4. History of Asthma or COPD 
           C.  Indications for Oxygen Therapy Protocol should include: 1. Documented hypoxemia 2. Severe trauma 3. Acute myocardial infarction 4. Short-term therapy (e.g. post anesthesia recovery) VI. Maintenance: A. Timely patient assessment is an integral part of this protocol therefore the following will be applied: 1. All non- critical care patients will be evaluated upon receiving initial respiratory care orders within 24 hours and re-evaluated within 48 hours (or more as needed). 2. Orders requesting a Respiratory Consult will be responded to in the following manner: 
a. In patient emergency situations, the RCP assigned to the floor will respond immediately to the patient, provide an initial respiratory assessment, and contact the patients physician as necessary for appropriate orders. b. In non-emergent situations, the RCP assigned to the floor will respond to the patient within 90 minutes and provide an initial respiratory assessment and contact patients physician as necessary for appropriate orders. c. An RCP will provide a comprehensive assessment as soon as possible. 3. Upon completion of an evaluation, the RCP will complete documentation in the patients EMR in the RT Assessment section. 4. The RCP who completes the assessment will document orders for therapy in the orders section of the patients EMR selecting new order. Next, per protocol should be selected indicating it is a protocol order and sign orders should be selected to complete the process. 5. The Pharmacy and Therapeutics (P&T) Committee has mandated that the medication Xopenex may be changed to unit dose albuterol without an order, except for those patients receiving Xopenex due to cardiac arrhythmias. The dosage for these patients should be 0.63 mg. and may be changed from 1.25 mg. to 0.63 mg per P & T Committee by the RCP completing the assessment. 6. Patients who are not experiencing cardiac arrhythmias, and are ordered Xopenex and Atrovent may be changed to Duoneb. VII. Safety: A. The following safety issues shall be monitored: 1. The RCP will perform hand hygiene per hospital policy utilizing Standard Precautions for all patients and following transmission-based isolation as indicated per hospital policy. 2. The RCP must exercise professional judgment in classifying the patient for frequency of therapy. 3. Appropriate classification of the patient will require an evaluation utilizing the Therapy Assessment Protocol Guidelines. 4. The RCP will confer with the physician concerning the care of the patient at any time questions or problems arise. 5. If during therapy, the patient exhibits no improvement or deterioration in clinical status the RCP will notify the physician and the patients nurse. VIII. Interventions: A. The patients nurse is responsible concerning all items related to his/her care. Ongoing communication with nursing is essential to successful protocol management. B. The RCP recognizes the value of the team approach in meeting the patients needs. Nursing input regarding the patients pulmonary condition will be sought as needed. IX. Reportable conditions: The RCP will inform the physician if: 1. There are acute changes in patients respiratory status. 2. The therapist is unable to determine appropriate care plan upon assessment. 3. The patient fails to reach therapeutic objective. 4. A change or additional medication is needed. X.  Patient Education: A. Patient will receive instruction on the followin. The treatment modality, including objectives and proper technique of therapy 2. Respiratory medications B. Documentation shall occur in the patient education section of the patients EMR. XI. Documentation: Record all findings as described above in the patients EMR. Related Protocols: A. Aerosolized Medication Protocol B. Bronchial Hygiene C. Oxygen Protocol D. Volume Expansion/Secretion Clearance E. Ventilator Weaning Protocols References: 
[address] Standard L    Respiratory Care Department Policy, Procedure and Protocol Guideline Manual, 1995, HIREN PADGETT  Therapist Driven Respiratory Care Protocols  A Practitioners Guide for Criteria-Based Respiratory Care by Jovita Osuna M.D., and HIREN Rdz RRT. L  The rationale for therapist-driven protocols: an update. Respiratory Care 1998; 01:407-195 N   [facility] Clinical Practice Guidelines. Respiratory Care Services Policy Number: -[id number] Title: Aerosolized Medication Protocol Effective Date: 10/1998 Revised Date: 06/13, 03/16, 11/17, 07/19 Reviewed Date: 05/14/ 03/15 , 06/17, 5/18 I. Policy: The Aerosolized Medication Protocol shall by implemented by Respiratory Care Practitioners (RCP) for patients with orders to receive aerosol therapy with medication. II. Purpose: To open and maintain obstructed airways, the RCP, will utilize the following  
protocol to select the indicated aerosolized medication(s) and determine the most effective method of delivery to the patient. III. Patient Type: All patients who are determined to meet aerosolized medication criteria as  
       outlined in this protocol. IV. Responsibility: Director, [address], registered Respiratory Care Practitioners (RCP's) with documented competency in the performance of respiratory therapeutic techniques. V. Equipment needed: C. Stethoscope D. Pulse oximeter E. AeroEclipse nebulizer F. Dry Powder Inhaler (DPI) VI. Protocol: C. The following conditions are accepted indications for aerosolized medication therapy. 5. Bronchospasm/wheezing 6. Impaired mucociliary clearance 7. Tracheobronchial mucosal congestion/and laryngeal stridor 8. Diseases which commonly require aerosolized medication therapy include, but are not limited to: 
f. Asthma/reactive airway disease 
g. Bronchitis/emphysema (COPD) h. Cystic fibrosis 
i. Severe laryngitis/tracheitis 
j. Bronchiectasis 
k. Smoke inhalation or chemical trauma to the lung or upper airway 
l. Physical trauma to the upper airway 
m. Laryngotracheobronchitis 
n. Bronchiolitis 
o. Non-specific wheezing B. Indications for bronchodilator medications will include: 
d. Bronchospasm/ wheezing 
e. Asthma/reactive airway disease 
f. Chronic obstructive pulmonary disease 
g. Obstructive defect on pulmonary function testing C. Administration of medications 5. If a bronchodilator or any other type of respiratory medication is needed, a physician order must be indicated in the medication section in the patient's EMR. 6. When the physician specifies the medication and dosage at the time of request, the ordered medication will be used as part of the care plan. D. The following guidelines will be utilized in the evaluation and selection of the appropriate delivery device for indicated medication(s): 
1. Unassisted aerosol (UA) is the preferred method of aerosol delivery and indicated if 
c. Ventilation is inadequate 
d. Patient demonstrates wheezing  
e. Routine treatments shall be given via the AeroEclipse nebulizer. f. The Aerogen nebulizer shall be used in the following circumstances: 
i. ER patients and they will continue with this nebulizer if admitted to 8th floor or ICU. 
ii. Patients in ICU  
iii. Patients on 8th floor with severe wheezing (at the RCP's discretion) 2. Dry PowderInhaler (DPI)  
d. Patient should be alert/cooperative 
e. Able to perform 3 second breath hold. f. Patient has used DPI therapy previously, either at home or in the hospital. 
g. Note: The only approved inhaler on formulary is Spiriva. VII. Guidelines:  
Monitor patient's vital signs and evaluate patient's clinical status. The need to change medication and/or modality may be indicated by: 5. A pulse greater than 120 bpm, or if a pulse increase of 20 bpm occurs with bronchodilator medications. 6. Significant worsening of dyspnea or wheezing occurring during or within 30 minutes of discontinuing therapy. 7. Worsening of patient's sensorium (e.g. patient becomes confused or obtunded, and unable to follow directions). 8. Worsening of patient's chest x-ray. 9. Change in sputum (e.g. increased pulmonary infiltrate, which might indicate need for volume expansion therapy). 10. Patient has difficulty coughing up secretions, which might indicate need for acetylcysteine and/or bronchial hygiene therapy. 11. Call physician immediately if dyspnea worsens and is not responsive to modifications allowed by protocol. VIII. Clinical Responsibility: 
2. The therapy assessment guidelines will be used to evaluate all patients receiving aerosolized medications with the exception of critical care areas. 5. RCP's will perform changes in therapy per protocol. 6. It will be the responsibility of RCP to provide instruction regarding respiratory medications, possible side effects, aerosol therapy and proper DPI technique, as well as, spacer usage to patients ordered DPI therapy. 7. Current therapy that is part of a patient's home regimen will not be discontinued. a. Provide spacer and educate patient on proper inhaler technique if needed. IX. Documentation A. Document assessment findings in the respiratory assessment section of the patient's EMR. B. Document changes in therapy per protocol in the respiratory orders section and in the care plan section of the patient's EMR. C. Document patient education in the patient education section of the patient's EMR. X. Outcome Criteria: 
B. Relief of wheezes and obstruction C. Improved cough and sputum color and consistency D. Improved chest x-ray E. Improved arterial oxygen tension and or SaO2 
F. Improved Peak Flow on asthmatic patients XI. Related Protocols: 
B. Respiratory Patient Care Protocols C. Bronchial Hygiene Therapy D. Oxygen Protocol Reference: L - Respiratory Care Department Policy, Procedure and Protocol Guideline Manual, 1995, HIREN Sultana. L -  Therapist Driven Respiratory Care Protocols  A Practitioner's Guide for Criteria-Based Respiratory Care by Michelle Adams M.D., and HIREN Aceves, RRT. L - The rationale for therapist-driven protocols: an update. Respiratory Care 1998; [id number]. N -[facility] Clinical Practice Guidelines.

## 2020-10-30 NOTE — PROGRESS NOTES
Problem: Dysphagia (Adult) Goal: *Speech Goal: (INSERT TEXT) Outcome: Progressing Towards Goal 
LTG: Patient will tolerate least restrictive diet without overt signs or symptoms of airway compromise. STG: Patient will participate in modified barium swallow study to objectively assess swallow function. Goal met 10/27/20 STG: Patient will consume chopped mechanical soft diet and thin liquids by cup without overt signs or symptoms of respiratory compromise. Goal added 10/27/20 Problem: Neurolinguistics (Adult) Goal: *Speech Goal: (INSERT TEXT) Outcome: Progressing Towards Goal 
LTG: Patient will increase receptive/expressive language skills demonstrated by the ability to communicate basic wants/needs across environments STG: Patient will answer yes/no questions with 75% accuracy given mod cueing. Goal met 10/29/20 STG: Patient will follow 1 step commands with 75% accuracy given moderate cueing. Goal met 10/29/20 STG: Patient will identify item in field of 2 with 60% accuracy given moderate cueing STG: Patient will identify body parts presented verbally with 50% accuracy given moderate cueing. STG: Patient will complete automatic naming tasks with 50%  accuracy given moderate cueing SPEECH LANGUAGE PATHOLOGY: DYSPHAGIA AND SPEECH-LANGUAGE/COGNITION: Daily Note 3 NAME/AGE/GENDER: Brittny Josue is a 69 y.o. female DATE: 10/30/2020 PRIMARY DIAGNOSIS: Frontal mass of brain [G93.89] Acute right-sided weakness [R53.1] Expressive aphasia [R47.01] Acute encephalopathy [G93.40] Multiple lesions on CT of brain and spine [R93.0, R93.7] ICD-10: Treatment Diagnosis: R13.12 Dysphagia, Oropharyngeal Phase 
F80.2 Mixed Receptive-Expressive Language Disorder 
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 2 Apraxia RECOMMENDATIONS  
DIET:  
· PO diet texture:  Mechanical soft with chopped meat and vegetables, with extra sauces/gravies · Liquids:  regular thin, by cup only 
  
 MEDICATIONS: Crushed in puree COMPENSATORY STRATEGIES/MODIFICATIONS INCLUDING: 
· Fully awake/alert · Supervision with all PO 
· Small bites and sips · Cup/sip · No straws · Remain upright for 20-30 min after any PO · Slow rate of PO intake · Check mouth for pocketed PO 
· Liquid wash OTHER RECOMMENDATIONS (including follow up treatment recommendations): · Treatment to improve/facilitate oral/pharyngeal skills · Training in use of compensatory safe swallowing strategies/feeding guidelines · Patient/family education EDUCATION:Recommendations discussed with RN and patient CONTINUATION OF SKILLED SERVICES/MEDICAL NECESSITY: 
? Patient is expected to demonstrate progress in  swallow strength, swallow timeliness, swallow function and swallow safety in order to  improve swallow safety, work toward diet advancement and decrease aspiration risk. ? Patient is expected to demonstrate progress in expressive communication and receptive ability to decrease assistance required communication, increase independence with activities of daily living and increase communication with family/caregivers. ? Patient continues to require skilled intervention due to dysphagia, aphasia, ? apraxia. RECOMMENDATIONS for CONTINUED SPEECH THERAPY: YES: Anticipate need for ongoing speech therapy during this hospitalization and at next level of care. ASSESSMENT Dysphagia: patient seen for re evaluation of swallowing due to reported difficulty with medications. Patient tolerating mechanical soft textures and thin liquids without overt s/sx airway compromise. Mildly prolonged oral phase, but functional with increased time and liquid rinse. Improved oral containment this date with small controlled sips. Patient endorses pain when swallowing (inconsistent reports of throat versus jaw/facial pain? ? ).  Patient does report limited intake due to feeling \"full\" and odynophagia. Continue mechanical soft diet and thin liquids. Small controlled sips. Will continue to follow. Language: patient demonstrates higher level expressive language deficits that appears to be impacted by impaired thought organization. Basic receptive language within functional limits. Speech remains dysarthric with low volume, but independently repeating at slightly higher volume to improve intelligibility. Recommend ongoing speech therapy during inpatient stay and at next level of care to address dysphagia and functional communication abilities. REHABILITATION POTENTIAL FOR STATED GOALS: Fair PLAN   
FREQUENCY/DURATION: Continue to follow patient 3 times a week for duration of hospital stay to address above goals. - Recommendations for next treatment session: Next treatment will address language/dysphagia SUBJECTIVE Upright in bed. Family entering at end of session. Patient holding onto left side of face when swallowing. Inconsistent reports of jaw/facial versus throat pain. Problem List:  (Impairments causing functional limitations): 1. Oropharyngeal dysphagia 2. Expressive aphasia 3. Receptive aphasia Orientation:  
Person Place Pain: Pain Scale 1: Numeric (0 - 10) Pain Intensity 1: 0 OBJECTIVE Swallow re evaluation: 
 Patient consumed thin liquid by cup and straw, cracker, and fruit. Only consumed a few bites of cracker and a few bites of pears. Mildly prolonged mastication but functional. Patient did appear to have improved oral control this date with cup sips and when given 1:1 assist with straw. When patient independently drinking from straw, increased time required to siphon via straw and mildly impaired coordination with removal of straw resulting in minimal anterior loss. Speech/language:  
Speech intelligibility: impaired; low volume, reduced intensity. Patient independently increasing volume but still remains weak.   
Yes/no questions: 5/5 
 2 step commands: 5/5 Responsive namin/5 Automatic speech:  
Days of the week: \"April\"; able to recognize incorrect but unable to correct requiring verbal cues for initial day. Then able to complete 100% accuracy Months of the year: \"Feburary\"; phonemic cue for first month of the year. Patient then named 2 months appropriately and omitted \"march\" requiring verbal prompt as patient unable to identify/self correct month skipped. Counting 1-10: 100% Divergent naming(concrete) Patient able to name only 2 animals in 1 minute. 30 seconds passed before patient named single animal.  
With verbal prompt for subcategory (\"pets\") patient able to name 3 additional animals. INTERDISCIPLINARY COLLABORATION: Registered Nurse PRECAUTIONS/ALLERGIES: Compazine [prochlorperazine edisylate] Tool Used: Dysphagia Outcome and Severity Scale (MONICA) Score Comments Normal Diet  [] 7 With no strategies or extra time needed Functional Swallow  [] 6 May have mild oral or pharyngeal delay Mild Dysphagia  [] 5 Which may require one diet consistency restricted Mild-Moderate Dysphagia  [] 4 With 1-2 diet consistencies restricted Moderate Dysphagia  [] 3 With 2 or more diet consistencies restricted Moderate-Severe Dysphagia  [] 2 With partial PO strategies (trials with ST only) Severe Dysphagia  [] 1 With inability to tolerate any PO safely Score:  Initial: 2 Most Recent: 5 (Date 10/30/20 ) Interpretation of Tool: The Dysphagia Outcome and Severity Scale (MONICA) is a simple, easy-to-use, 7-point scale developed to systematically rate the functional severity of dysphagia based on objective assessment and make recommendations for diet level, independence level, and type of nutrition. Tool Used: MODIFIED SOLO SCALE (mRS) Score No Symptoms  [] 0 No significant disability despite symptoms; able to carry out all usual duties and activities  [] 1 Slight disability; unable to carry out all previous activities but able to look after own affairs without assistance.   [] 2  
Moderate disability; requiring some help but able to walk without assistance  [] 3  
Moderately severe disability; unable to walk without assistance and unable to attend to own bodily needs without assistance  [] 4  
Severe disability; bedridden, incontinent, and requiring constant nursing care and attention  [] 5  
  
Score:  Initial: 4 3  
Interpretation of Tool: The Modified [place] Scale is a 7-point scaled used to quantify level of disability as it relates to a patient's functional abilities.  
 
SAFETY: 
After treatment position/precautions: 
· Upright in bed 
· RN notified 
· Family at bedside 
 
Total Treatment Duration:  
Time In: 1002 
Time Out: 1025 
 
VERONIKA Pagan, CCC-SLP

## 2020-10-30 NOTE — PROGRESS NOTES
[facility] Hematology & Oncology Inpatient Hematology / Oncology Progress Note Reason for Consult:  Frontal mass of brain [G93.89] Acute right-sided weakness [R53.1] Expressive aphasia [R47.01] Acute encephalopathy [G93.40] Multiple lesions on CT of brain and spine [R93.0, R93.7] Referring Physician:  Erica Hernandez MD 
 
24 Hour Events: 
Ongoing RUE weakness; aphasia somewhat improved Awaiting bx for diagnosis CT AP pending barium clearance 
VSS, afebrile ROS: 
Constitutional: Negative for fever, chills, weakness, malaise, fatigue. CV: Negative for chest pain, palpitations, edema. Respiratory: Negative for dyspnea, cough, wheezing. GI: Negative for nausea, abdominal pain, diarrhea. 10 point review of systems is otherwise negative with the exception of the elements mentioned above in the HPI. Allergies Allergen Reactions  Compazine [Prochlorperazine Edisylate] Swelling Past Medical History:  
Diagnosis Date  Acute CVA (cerebrovascular accident) ([facility] [address] 10/25/2020  Asthma  Bite of nonvenomous arthropod ? ??  
 Cough  Esophageal stricture 2002  
 dilated 2002  GERD (gastroesophageal reflux disease)  History of rectal bleeding ???  
 Hodgkin's lymphoma ([facility] [address] 1980 Radiation therapy  Hypercholesterolemia  Menopause  Positive RAST testing 2007 IgE level of 1,391  Thyroid disease Past Surgical History:  
Procedure Laterality Date [place]  HX BREAST BIOPSY Left 05/21/2020  
 calcs at 2:00  
 HX CATARACT REMOVAL Bilateral 2007, 2009  HX COLONOSCOPY    
 HX OTHER SURGICAL    
 dental x3  
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 Family History Problem Relation Age of Onset  Cancer Mother Kidney cancer  Cancer Father Prostate cnaceer  Heart Surgery Brother  Breast Cancer Neg Hx Social History Socioeconomic History  Marital status:   
 Spouse name: Not on file  Number of children: Not on file  Years of education: Not on file  Highest education level: Not on file Occupational History  Occupation:  Social Needs  Financial resource strain: Not on file  Food insecurity Worry: Not on file Inability: Not on file  Transportation needs Medical: Not on file Non-medical: Not on file Tobacco Use  Smoking status: Never Smoker  Smokeless tobacco: Never Used Substance and Sexual Activity  Alcohol use: Yes Alcohol/week: 21.0 standard drinks Types: 7 Shots of liquor, 14 Standard drinks or equivalent per week  Drug use: No  
 Sexual activity: Not on file Lifestyle  Physical activity Days per week: Not on file Minutes per session: Not on file  Stress: Not on file Relationships  Social connections Talks on phone: Not on file Gets together: Not on file Attends Yarsani service: Not on file Active member of club or organization: Not on file Attends meetings of clubs or organizations: Not on file Relationship status: Not on file  Intimate partner violence Fear of current or ex partner: Not on file Emotionally abused: Not on file Physically abused: Not on file Forced sexual activity: Not on file Other Topics Concern  Not on file Social History Narrative There is no significant environmental or industrial exposure. She has no known exposure to TB. She has worked as an . Current Facility-Administered Medications Medication Dose Route Frequency Provider Last Rate Last Dose  aspirin chewable tablet 81 mg  81 mg Oral DAILY Lilliam Knight MD   81 mg at 10/30/20 1340  lisinopriL (PRINIVIL, ZESTRIL) tablet 5 mg  5 mg Oral DAILY Ashish Knight MD   Stopped at 10/30/20 0836  
 montelukast (SINGULAIR) tablet 10 mg  10 mg Oral DAILY Ashish Knight MD   10 mg at 10/30/20 9823  rosuvastatin (CRESTOR) tablet 40 mg  40 mg Oral QHS Lilliam Knight MD   40 mg at 10/29/20 2207  carvediloL (COREG) tablet 6.25 mg  6.25 mg Oral BID WITH MEALS Nhan Knight MD   6.25 mg at 10/30/20 0836  
 lansoprazole (PREVACID) 3 mg/mL oral suspension 30 mg  30 mg Oral ACB Lilliam Knight MD   30 mg at 10/30/20 0730  levothyroxine (SYNTHROID) tablet 75 mcg  75 mcg Oral ACB Nhan Knight MD   75 mcg at 10/30/20 0730  levETIRAcetam (KEPPRA) oral solution 500 mg  500 mg Oral BID Nhan Knight MD   500 mg at 10/30/20 0836  
 insulin lispro (HUMALOG) injection   SubCUTAneous Q6H Lilliam Knight MD   2 Units at 10/30/20 0639  
 budesonide (PULMICORT) 500 mcg/2 ml nebulizer suspension  500 mcg Nebulization BID RT Shabnam Richey, DO   500 mcg at 10/30/20 5397  albuterol (PROVENTIL VENTOLIN) nebulizer solution 2.5 mg  2.5 mg Nebulization Q6H RT Shabnam Richey, DO   2.5 mg at 10/30/20 1327  [Held by provider] furosemide (LASIX) injection 40 mg  40 mg IntraVENous DAILY Shabnam Richey, DO   40 mg at 10/29/20 9867  LORazepam (ATIVAN) tablet 0.5 mg  0.5 mg Oral BID PRN Shabnam Richey, DO      
 metoprolol (LOPRESSOR) injection 5 mg  5 mg IntraVENous Q4H PRN Shabnam Richey, DO   5 mg at 10/26/20 1525  
 NUTRITIONAL SUPPORT ELECTROLYTE PRN ORDERS   Does Not Apply PRN Shabnam Richey, DO      
 fluticasone propionate (FLONASE) 50 mcg/actuation nasal spray 2 Spray  2 Spray Both Nostrils DAILY Corey Vu MD   2 [place] at 10/30/20 1733  sodium chloride (NS) flush 5-40 mL  5-40 mL IntraVENous Q8H Corey Vu MD   5 mL at 10/30/20 4412  
 sodium chloride (NS) flush 5-40 mL  5-40 mL IntraVENous PRN Corey Vu MD      
 ondansetron [facility]) injection 4 mg  4 mg IntraVENous Q6H PRN Corey Vu MD      
 acetaminophen (TYLENOL) suppository 650 mg  650 mg Rectal Q4H PRN Corey Vu MD      
 LORazepam (ATIVAN) injection 2 mg  2 mg IntraVENous Q2H PRN Nikolay Lizarraga Kiet Solorio MD      
 albuterol-ipratropium (DUO-NEB) 2.5 MG-0.5 MG/3 ML  3 mL Nebulization Q4H PRN Sasha Cabrera MD   3 mL at 10/26/20 6053  dexamethasone (DECADRON) 10 mg/mL injection 6 mg  6 mg IntraVENous Q6H Paris Lara, DO   6 mg at 10/30/20 1340  levalbuterol (XOPENEX) nebulizer soln 1.25 mg/3 mL  1.25 mg Nebulization Q6H PRN Sasha Cabrera MD      
 
 
OBJECTIVE: 
Patient Vitals for the past 8 hrs: 
 BP Temp Pulse Resp SpO2  
10/30/20 1327     94 % 10/30/20 1138 120/60 97.8 °F (36.6 °C) 86 21 96 % 10/30/20 0856     95 % 10/30/20 0756 115/66 97.7 °F (36.5 °C) 92 22 95 % 10/30/20 0617 120/67 97.7 °F (36.5 °C) 94 24 93 % Temp (24hrs), Av.9 °F (36.6 °C), Min:97.5 °F (36.4 °C), Max:98.5 °F (36.9 °C) No intake/output data recorded. Physical Exam: 
Constitutional: Well developed, well nourished female in no acute distress, awake and alert sitting in a chair HEENT: Normocephalic and atraumatic. Oropharynx is clear, mucous membranes are moist. Extraocular muscles are intact. Sclerae anicteric. Neck supple without JVD. No thyromegaly present. Lymph node No palpable submandibular, cervical, supraclavicular, axillary or inguinal lymph nodes. Skin Warm and dry. No bruising and no rash noted. No erythema. No pallor. Respiratory  decreased breath sounds left side CVS Normal rate, regular rhythm and normal S1 and S2. No murmurs, gallops, or rubs. Abdomen Soft, nontender and nondistended, normoactive bowel sounds. No palpable mass. No hepatosplenomegaly. Neuro  expressive aphasia. The patient seems to understand conversation and will attempt to respond. Profound right-sided weakness right arm greater than right leg. MSK Normal range of motion in general.  No edema and no tenderness. Psych  difficult to determine Labs:   
Recent Results (from the past 24 hour(s)) PLEASE READ & DOCUMENT PPD TEST IN 48 HRS  
 Collection Time: 10/29/20  2:31 PM  
Result Value Ref Range PPD Negative Negative  
 mm Induration 0 0 - 5 mm GLUCOSE, POC Collection Time: 10/29/20  6:18 PM  
Result Value Ref Range Glucose (POC) 181 (H) 65 - 100 mg/dL GLUCOSE, POC Collection Time: 10/30/20 12:28 AM  
Result Value Ref Range Glucose (POC) 153 (H) 65 - 100 mg/dL CBC W/O DIFF Collection Time: 10/30/20  3:15 AM  
Result Value Ref Range WBC 21.4 (H) 4.3 - 11.1 K/uL  
 RBC 3.83 (L) 4.05 - 5.2 M/uL  
 HGB 12.1 11.7 - 15.4 g/dL HCT 36.4 35.8 - 46.3 % MCV 95.0 79.6 - 97.8 FL  
 MCH 31.6 26.1 - 32.9 PG  
 MCHC 33.2 31.4 - 35.0 g/dL  
 RDW 13.9 11.9 - 14.6 % PLATELET 943 071 - 988 K/uL MPV 10.2 9.4 - 12.3 FL ABSOLUTE NRBC 0.03 0.0 - 0.2 K/uL METABOLIC PANEL, COMPREHENSIVE Collection Time: 10/30/20  3:15 AM  
Result Value Ref Range Sodium 141 138 - 145 mmol/L Potassium 4.4 3.5 - 5.1 mmol/L Chloride 103 98 - 107 mmol/L  
 CO2 30 21 - 32 mmol/L Anion gap 8 7 - 16 mmol/L Glucose 158 (H) 65 - 100 mg/dL BUN 60 (H) 8 - 23 MG/DL Creatinine 0.88 0.6 - 1.0 MG/DL  
 GFR est AA >60 >60 ml/min/1.73m2 GFR est non-AA >60 >60 ml/min/1.73m2 Calcium 8.7 8.3 - 10.4 MG/DL Bilirubin, total 0.3 0.2 - 1.1 MG/DL  
 ALT (SGPT) 75 (H) 12 - 65 U/L  
 AST (SGOT) 38 (H) 15 - 37 U/L Alk. phosphatase 120 50 - 136 U/L Protein, total 6.4 6.3 - 8.2 g/dL Albumin 2.8 (L) 3.2 - 4.6 g/dL Globulin 3.6 (H) 2.3 - 3.5 g/dL A-G Ratio 0.8 (L) 1.2 - 3.5 GLUCOSE, POC Collection Time: 10/30/20  6:23 AM  
Result Value Ref Range Glucose (POC) 192 (H) 65 - 100 mg/dL GLUCOSE, POC Collection Time: 10/30/20  7:41 AM  
Result Value Ref Range Glucose (POC) 149 (H) 65 - 100 mg/dL GLUCOSE, POC Collection Time: 10/30/20 11:32 AM  
Result Value Ref Range Glucose (POC) 145 (H) 65 - 100 mg/dL Imaging: XR ABD ([facility]) [[id number]]  Collected: 10/26/20 0585 Order Status: Completed  Updated: 10/26/20 1312 Narrative:     
Abdomen for NG tube placement, one view, 10/26/2020 at 1216 hours A single view upper and left abdomen obtained. There is an NG tube. It is kinked  
at the gastroesophageal junction with the tip directed superiorly overlying the  
distal esophagus. This will probably require removing the tube and reinserting  
for proper positioning. Basilar infiltrates noted XR BARIUM SWALLOW [facility] VIDEO [[id number]] Order Status: No result MRI BRAIN W WO CONT [[id number]]  Collected: 10/25/20 1158 Order Status: Completed  Updated: 10/25/20 1209 Narrative:     
MRI brain with and without contrast  
 
History: Abnormal CT. History of lymphoma.  Right hemiparesis Imaging sequences: Sagittal short TR/short TE, axial short TR/short TE, long TR/long TE, FLAIR, gradient recall, diffusion weighted images and ADC mapping. Axial and coronal short TR/short TE postcontrast images. Imaging was performed  
on a 1.5 Vicky magnet, utilizing the uneventful administration of 10 mL of  
intravenous [place] and order to better evaluate for intracranial pathology. Comparison: None. Correlation is made to the CT scan of the brain performed  
earlier on the same day. Findings: There is an enhancing mass within the left frontal region measuring  
approximately 2.3 x 2.2 x 2.3 cm in AP, transverse and craniocaudal dimensions,  
recently. This is a broad-based dural attachment. There is significant  
surrounding edema. This appears at least in part to be extra-axial although a  
more cystic component cannot be stated as such. There are 2 enhancing  
parenchymal lesions within the tracy with the larger measuring 1.0 cm with  
associated edema. There are 2 right occipital lobe mass with the larger  
measuring up to 1.3 cm, also with surrounding edema. There are enhancing right  
frontal bone lesions measuring up to 2.1 cm in size. The ventricles and sulci are normal in size and configuration.  There are no  
extra-axial fluid collections.  Normal flow voids are present within all of the  
major intracranial vessels. There is no evidence of intraparenchymal hemorrhage. There are subcentimeter foci of restricted diffusion within the right occipital  
and parietal lobes raising concern for small foci of acute infarctions. Additional subtle restricted diffusion is present medial to the edema within the  
left frontal region at the left corona radiata and extending into the external  
capsule.    
 
Scattered foci of T2 hyperintensity within the supratentorial white matter most  
likely represent the sequela of chronic small vessel ischemic change,  
unremarkable for age. The visualized mastoid air cells and paranasal sinuses are  
well pneumatized and aerated. Impression:     
IMPRESSION:  
1. Several enhancing masses with the largest within the left frontal region  
which may have an extra axial component with significant surrounding edema. 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 There  
are also enhancing right frontal bone lesions presumably representing part of  
the same process. 2. Small acute right occipital and parietal lobe infarcts. Additional infarcts  
are suspected within the left corona radiata/external capsule. XR CHEST PORT [[id number]]  Collected: 10/25/20 6412 Order Status: Completed  Updated: 10/25/20 1615 Narrative:     
EXAM: CHEST X-RAY, 1 VIEW INDICATION: stroke. COMPARISON: Chest x-ray 10/20/2020 TECHNIQUE: Single AP view of the chest was obtained. FINDINGS: Interval worsening of ill-defined perihilar markings throughout both  
lungs. Bilateral pleural effusions also appear to be enlarging. The cardiac  
silhouette is partially obscured. No pneumothorax. The bones are unchanged.   
  
Impression:     
IMPRESSION:  
 Interval worsening of pulmonary edema and enlargement of bilateral effusions. Coexisting infection difficult to fully exclude by x-ray. CTA CODE NEURO HEAD AND NECK W CONT [[id number]]  Collected: 10/25/20 8109 Order Status: Completed  Updated: 10/25/20 3606 Narrative:     
History: Right-sided weakness and difficulty with speech. FINDINGS:  
 
CT angiography was performed of the neck and head with contrast and  
three-dimensional CT angiography reconstruction and reformat was performed. NASCET criteria as needed. CT dose reduction was achieved through use of a  
standardized protocol tailored for this examination and automatic exposure  
control for dose modulation. Bilateral pleural effusion. Parenchymal nodularity in the left upper lobe. There is extensive atherosclerotic ectasia of the imaged segments of the  
thoracic aorta. The ascending aorta measures 2.8 cm. There is a mild stenosis of  
the proximal descending thoracic aorta related to concentric noncalcified  
atheroma. There is a right-sided aortic arch with an occluded right common  
carotid artery and occluded right subclavian artery. The right vertebral artery  
reconstitutes via collaterals. Reversal of flow is not excluded by CT angiogram.  
 
The innominate artery is patent. The left common carotid artery is patent. The  
left internal carotid artery is patent. There is atherosclerosis at the left  
carotid bulb without hemodynamically significant stenosis. There is a moderate  
stenosis in the supraclinoid segment of the left internal carotid artery. The right internal carotid artery reconstitutes at the right carotid bulb. The  
right middle cerebral artery is patent. There is short segment occlusion in the M1 segment of the left middle cerebral  
artery. The posterior cerebral arteries are patent. Dural venous sinuses are patent. Small left transverse and sigmoid sinus. Multiple enhancing lesions within the brainstem and cerebrum. The largest is in  
the left middle cerebral artery territory. Impression:     
IMPRESSION:  
 
Short segment occlusion of the left middle cerebral artery. Finding reported to  
the emergency room bedside physician at the time of this report. Multiple enhancing lesions in the brainstem and cerebrum. Bilateral pleural effusion. Extensive atherosclerosis of the aorta with occlusion of the right subclavian  
and the right common carotid arteries. This is chronic dating back to June 2019. CT Perfusion w/ Cerebral Blood Flow [[id number]]  Collected: 10/25/20 0725 Order Status: Completed  Updated: 10/25/20 0730 Narrative:     
CT Perfusion Imaging INDICATION:  Acute neuro changes. Right-sided weakness. CT perfusion imaging of the brain was performed after the administration of  
intravenous contrast.  Perfusion maps and perfusion analysis output were  
generated using the Nanol[employer] perfusion processing software algorithm.   Radiation  
dose reduction techniques were used for this study:  All CT scans performed at  
this facility use one or all of the following: Automated exposure control,  
adjustment of the mA and/or kVp according to patient's size, iterative  
reconstruction. COMPARISON: None. Correlation is made to the CTA and CT brain performed on the  
same day. DailyDigital Output Values: CBF < 30% volume:  3 ml   (core infarction volume greater than 50 cc associated  
with poor outcomes) Tmax > 6 seconds: 41 ml Tmax/CBF Mismatch Volume: 38 ml Tmax/CBF Mismatch Ratio: 13.7 Hypoperfusion Intensity Ratio (Tmax > 10 seconds/Tmax > 6 seconds): 0.3    
(values greater than 0.5 associated with poor outcome) Tmax > 10 seconds Volume: 11 ml   (volume greater than 100 mL is associated with  
poor outcome) Impression:     
IMPRESSION: Although there are changes suggesting small core infarct and  
 penumbra within the left frontal lobe the findings are felt to correspond to a  
mass with vasogenic edema on CT scanning. Other intracranial masses are present  
as well. Please note that the determination of patient treatment is not based solely upon  
imaging factors or calculation values. Management of ischemia is at the  
discretion of the primary physician and is based upon a combination of clinical  
and imaging data, along other factors. CT HEAD W CONT [[id number]] Order Status: Canceled CT CODE NEURO HEAD WO CONTRAST [[id number]]  Collected: 10/25/20 0718 Order Status: Completed  Updated: 10/25/20 2209 Narrative:     
CT head without contrast  
 
History: Acute right-sided weakness, speech disturbance. Technique: 5mm axial images were obtained from the skull base to the vertex  
without intravenous contrast.  Radiation dose reduction techniques were used for  
this study:  Our CT scanners use one or all of the following: Automated exposure  
control, adjustment of the mA and/or kVp according to patient's size, iterative  
reconstruction. Comparison: None Findings: The ventricles and sulci are normal in size and configuration. There  
are no extra-axial fluid collections. There is a region of decreased attenuation  
within the left frontal lobe which involves primarily white matter but also a  
component of the cortex. There is a 1 cm cystic region also in close association  
with this. There is 2 mm of left-to-right midline shift There is no evidence of  
intraparenchymal hemorrhage. The bony calvarium is intact. The visualized  
mastoid air cells and paranasal sinuses are well pneumatized and aerated. Impression:     
Impression: Low attenuation within the left frontal lobe may represent an acute  
or subacute infarct however the possibility of this representing vasogenic edema  
from an underlying mass is not excluded. Postcontrast imaging would be recommended for further evaluation. ASSESSMENT: 
Problem List  Date Reviewed: 10/19/2020 Codes Class Noted Systolic heart failure (HCC) ICD-10-CM: I50.20 ICD-9-CM: 428.20  10/27/2020 Expressive aphasia ICD-10-CM: R47.01 
ICD-9-CM: 784.3  10/25/2020 * (Principal) Acute right-sided weakness ICD-10-CM: R53.1 ICD-9-CM: 728.87  10/25/2020 Frontal mass of brain ICD-10-CM: G93.89 ICD-9-CM: 348.89  10/25/2020 Acute encephalopathy ICD-10-CM: G93.40 ICD-9-CM: 348.30  10/25/2020 Multiple lesions on CT of brain and spine ICD-10-CM: R93.0, R93.7 ICD-9-CM: 793.0, 793.7  10/25/2020 Acute CVA (cerebrovascular accident) ([facility] [address] ICD-10-CM: I63.9 ICD-9-CM: 434.91  10/25/2020 Multiple lung nodules on CT (Chronic) ICD-10-CM: R91.8 ICD-9-CM: 793.19  10/12/2020 Overview Addendum 10/12/2020  2:33 PM by My Parrish NP  
  -PET scan 10/2019: PET scan fortunately did not have any abnormal activity in her mass in the left lung. This may mean that we can just follow this radiographically, but we will talk about her at tumor board and come back to her with the group's recommendation. 
-10/30/2019: Patient is discussed at thoracic conference today lead by Dr Mike Estrada. Patient is a 79 yo never smoker. CT guided biopsy recommended. -CT guided Biopsy 11/2020: lung biopsy showed signs of this nodule being a hamartoma, which is a benign tumor that we can simply follow. repeat CT in 6 months 
-Chest CT June 2020: CT scan is stable 
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 Jovan Spatz are several new pulmonary nodules noted which may suggest some metastatic disease.  This require further follow-up CT of the chest in 2 months or doing PET scan. Hamartoma (HCC) (Chronic) ICD-10-CM: Q85.9 ICD-9-CM: 759.6  10/12/2020 Peripheral vascular disease ([facility] [address] ICD-10-CM: I73.9 ICD-9-CM: 443.9  10/12/2020 Bilateral pleural effusion ICD-10-CM: J90 ICD-9-CM: 511.9  10/7/2020 Overview Signed 10/13/2020  1:25 PM by DENI Ruiz  
  10/3/2020: L thoracentesis 1050mL removed R thoracentesis 800mL removed 10/9/2020: L thoracentesis: 1000mL removed R thoracentesis: 600mL removed Acute systolic heart failure (HCC) ICD-10-CM: I50.21 ICD-9-CM: 428.21  10/6/2020 PNA (pneumonia) ICD-10-CM: J18.9 ICD-9-CM: 782  10/1/2020 Mass of lingula of lung ICD-10-CM: R91.8 ICD-9-CM: 786.6  10/16/2019 Right carotid artery occlusion ICD-10-CM: I05.98 ICD-9-CM: 433.10  10/11/2019 Subclavian artery stenosis (HCC) ICD-10-CM: I77.1 ICD-9-CM: 447.1  10/11/2019 Left carotid stenosis ICD-10-CM: M99.87 
ICD-9-CM: 433.10  10/12/2018 Hypercholesterolemia (Chronic) ICD-10-CM: E78.00 ICD-9-CM: 272.0  6/30/2015 Dysphagia (Chronic) ICD-10-CM: R13.10 ICD-9-CM: 787.20  6/30/2015 Mild persistent asthma without complication (Chronic) FWC-95-RI: J45.30 ICD-9-CM: 493.90  10/17/2013 Acquired hypothyroidism (Chronic) ICD-10-CM: E03.9 ICD-9-CM: 244.9  10/17/2013 Allergic rhinitis (Chronic) ICD-10-CM: J30.9 ICD-9-CM: 477.9  10/17/2013 GERD (gastroesophageal reflux disease) (Chronic) ICD-10-CM: K21.9 ICD-9-CM: 530.81  10/17/2013 Ms. Shane Talbot is a 69 y.o. female admitted on 10/25/2020 with a primary diagnosis of brain masses. Her PMH includes HTN, GERD, Hodgkin's lymphoma, hypothyroidism, sCHF, and recently dx lung hamartomas. CT chest from 9/21/2020 with L lung mass and sclerotic focus at T6. Lung mass has been followed by pulm since 2019. Had bx of L lung mass on 11/11/2019 with path +hamartoma. She presented to ED with c/o acute onset R-sided weakness, aphasia, and confusion. She was recently discharged on 10/21 after being treated for pneumonia.   MRI brain with several enhancing masses with largest in L frontal region, enhancing R frontal bone lesions; and small acute R occipital and parietal lobe infarcts. CTA head/neck with short segment occlusion of MCA. CXR with interval worsening of pulm edema and b/l pleural effusion. Neuro/neurosurgery following. Brain masses not amenable to surgical resection. On Dex 6mg q 6 hours and Keppra. Rad Onc consult pending. We were consulted for recommendations. RECOMMENDATIONS: 
Hx Hodgkin's Lymphoma 
-  
 
Brain Masses / CVA 
- MRI brain with several enhancing masses with largest in L frontal region, enhancing R frontal bone lesions; and small acute R occipital and parietal lobe infarcts 
- Neuro/neurosurgery following - brain masses not amenable to surgical resection - On Dex 6mg q 6 hours and Keppra - Rad Onc consult pending - Check CT AP for staging 10/29 CT CAP pending, rad onc consult pending further work up. 
10/30 Aphasia somewhat improved. Ongoing RUE weakness. Work-up pending. L lung mass/hamartoma - Pt had CT chest on 9/21/20 with L lung mass with multiple pulm nodules and sclerotic focus at T6. Lung mass has been followed by pulm since 2019. Had bx of L lung mass on 11/11/2019 with path +hamartoma. 
- Dr. Anahi Funez to ask IR if bx of lung nodule possible. Will also check CT AP. 
10/29 Pulmonology following; recent CT chest indicates location likely not amenable to biopsy via EBUS. IR reported biopsy would be difficult although willing to attempt if no other targets available. CT CAP pending barium clearance after swallow study 10/27. Will ask CT to perform CT chest while awaiting CT AP.  
10/30 CT chest done yesterday; continue to await CT AP - pending barium clearance. We have consulted IR to review images to see if one of the pulmonary nodules is accessible and if possible to have that completed over the weekend but more likely next week if they see an accessible target. Otherwise will await CT AP. Lab studies and imaging studies were personally reviewed. Thank you for allowing us to participate in the care of Ms. Fadi Odonnell. We will continue to follow along and await biopsy results to confirm diagnosis and formulate treatment plan based on results and further work-up. Miranda Duane, FNP [facility] Hematology [facility] On[facility] [address] [place] [address] Office : [phone] Fax : [phone]

## 2020-10-30 NOTE — PROGRESS NOTES
Comprehensive Nutrition Assessment Type and Reason for Visit: Catalino Pratt Nutrition Recommendations/Plan:  
Enteral Nutrition: 
Continue nocturnal TF as it is providing more nutrition than she is able to consume orally at this time. Glucerna 1.5 (2 cartons @ 50ml/hr from 8932-6086. Increase water flushes to 100 ml before and after nocturnal feeding. Total free fluid provided will be ~560 ml free fluid. Oral Nutrition: 
Continue Ensure Enlive TID. Add Magic Cup BID. Nutrition Assessment:  Ms. Chanda Lara is a 69 y.o. female admitted on 10/25/2020 with a primary diagnosis of brain masses. Her PMH includes HTN, GERD, Hodgkin's lymphoma, hypothyroidism, sCHF, and recently dx lung hamartomas. CT chest from 9/21/2020 with L lung mass and sclerotic focus at T6. She presented to ED with c/o acute onset R-sided weakness, aphasia, and confusion. She was recently discharged on 10/21 after being treated for pneumonia. MRI brain with several enhancing masses with largest in L frontal region, enhancing R frontal bone lesions; and small acute R occipital and parietal lobe infarcts. CTA head/neck with short segment occlusion of MCA. CXR with interval worsening of pulm edema and b/l pleural effusion. Neuro/neurosurgery following. Brain masses not amenable to surgical resection. Oncology currently awaiting \"biopsy results to confirm diagnosis and formulate treatment plan based on results and further work-up. \"  
 
TF started 10/26, diet initiated by SLP 10/27, converted to nocturnal TF 10/28. SLP continues to follow patient. Continues with expressive language deficits. At my visit, pt complains of pain with swallowing while pointing at her cheek. She states it is her \"smile that hurts. \"   and brother at bedside. They report minimal po intake. Pt sips on ensure, tea and water during my visit. She is noted to have difficulty coordinating straw to her mouth at times and requires some redirection. When asked about the tube she tells me that she doesn't think she can get enough nutrition without it. She does indicates she is trying to drink the ensure as she knows this is where she gets most of her nutrition. Her oral intake is contributing <25% to her estimated needs. Estimated Daily Nutrient Needs: 
Energy (kcal): 8818-7712 (25-30 brandon/kg/day using 54 kg) Protein (g): 54-65 (1-1.2 gm pro/kg/day using 54 kg) Nutrition Related Findings: Abdominal Status (last documented): Soft abdomen with Active  bowel sounds. Last BM 10/27/20. Pertinent Labs: Na 141, glucose 158, BUN 60 Pertinent Medications: decadron, SSI, prevacid Current Nutrition Therapies: DIET MECHANICAL SOFT 
DIET NUTRITIONAL SUPPLEMENTS All Meals; Ensure Enlive ( ) DIET TUBE FEEDING Open order for details. Keep HOB elevated at least 30 degrees. Check gastric residual every 4 hours and hold TF for residual > than or = to 250 ml x 2 consecutive checks. Current Tube Feeding (TF) Orders: · Feeding Route: Nasogastric · Formula: Glucerna 1.5 · Schedule:Cyclic · Regimen: 50ml [id number] · Water Flushes: 50ml before and after nocturnal feed · Goal TF & Flush Orders Provides: 712 kcal (~53% kcal needs), 40 grams protein (~75% pro goal) and ~460 ml free fluid. Anthropometric Measures: 
· Height:  5' 6\" (167.6 cm) · Current Body Wt:  52.3 kg (115 lb 4.8 oz)(no source) Nutrition Diagnosis:  
· Inadequate oral intake related to cognitive or neurological impairment as evidenced by (inconsistent po intake, nocturnal TF for majority needs) Nutrition Interventions:  
Food and/or Nutrient Delivery: Continue current diet, Modify oral nutrition supplement, Continue tube feeding Coordination of Nutrition Care: Continued inpatient monitoring, Interdisciplinary rounds Goals: 
Meet >75% of daily nutrition needs via oral diet and TF. Nutrition Monitoring and Evaluation: Food/Nutrient Intake Outcomes: Food and nutrient intake, Supplement intake, Enteral nutrition intake/tolerance Physical Signs/Symptoms Outcomes: Biochemical data, Chewing or swallowing, GI status Discharge Planning: Too soon to determine Val Javed RD, LDN on 10/30/2020 at 4:02 PM 
Contact: [phone]

## 2020-10-30 NOTE — PROGRESS NOTES
Chart reviewed for dc planning continuum of care s/p pt's transfer to 7th floor from ICU. Per MD, pt with continued oncology evaluation and dc is not expected until sometime next week. SW will continue to follow and assist with pt's dc plan.

## 2020-10-30 NOTE — PROGRESS NOTES
Hospitalist Note Admit Date:  10/25/2020  6:56 AM  
Name:  Kelby Bustamante Age:  69 y.o. 
:  1950 MRN:  [id number] PCP:  Autumn Gamboa MD 
Treatment Team: Attending Provider: Clayton Mayer MD; Consulting Provider: Thompson Gonzales DO; Consulting Provider: Ron Wyatt NP; Utilization Review: Colonel Lizama RN; Consulting Provider: Andra Ernst MD; Consulting Provider: Kasey Kay NP; Primary Nurse: Alen Herrera; Primary Nurse: Julissa Mai; Charge Nurse: Wang Martell; Consulting Provider: Adalid Fields MD; Physical Therapy Assistant: Huber Santiago; Utilization Review: Rohit Dean RN; Care Manager: Aron Simms LMSW HPI/Subjective: This is a 69years old female with hx of HTN, GERD, Hodgkin's lymphoma s/p Radiation Rx, dyslipidemia, hypothyroidism, asthma, systolic CHF EF 68-04% recently diagnosed lung hamartomas presented to ER with acute onset of right sided weakness, aphasia and confusion that began this AM. Pt was recently discharged from [facility] on 10/21 after being treated for PNA and was doing okay until today. Pt is not able to provide much history, she has sort of blank stare but can nod to yes or no questions. Per , pt was walking, doing ADL's  Until yesterday but this morning a drastic change in her mentation and speech was noted by him. ER work up showed CT evidence of enhancing 2.2 cm left frontal lobe mass with vasogenic edema with 2 mm left to right midline shift with 2 additional small masses in tracy and right occipital lobe. CTA head/neck short segment occlusion of left MCA with some changes suggestive of small core infarct and penumbra within the left frontal lobe, no evidence of intraparenchymal hemorrhage. CXR with interval worsening of pulmonary edema and bilateral pleural effusion. \" 
  
10/26 Decadron and Keppra started. Nursing staff note labored breathing this AM but satting well on RA. 10/27 patient still has dysarthria. Right upper and lower extremity weakness. Patient is alert awake and able to answer some questions with slurred speech. Afebrile. She was started on a diet today after speech therapy eval. 
 
10/28 patient reports feeling better. Still with slurred speech, right upper and lower extremity weakness. Afebrile. 10/29 patient is alert awake. No shortness of breath. No chest pain. Patient has persistent right upper and lower extremity weakness and slurred speech. Afebrile. Patient's brother and  at bedside. Updated regarding current plan of care today. 10/30 patient alert awake, NG tube in place, afebrile. Brother and  at bedside. Discussed about barium on the KUB. Awaiting CT abdomen pelvis. Oncology on board. CT chest shows lung nodules, IR discussed about biopsy of lung nodule by oncology. Objective:  
 
Patient Vitals for the past 24 hrs: 
 Temp Pulse Resp BP SpO2  
10/30/20 1327     94 % 10/30/20 1138 97.8 °F (36.6 °C) 86 21 120/60 96 % 10/30/20 0856     95 % 10/30/20 0756 97.7 °F (36.5 °C) 92 22 115/66 95 % 10/30/20 0617 97.7 °F (36.5 °C) 94 24 120/67 93 % 10/30/20 0430 97.5 °F (36.4 °C) 96 (!) 34 126/73 95 % 10/30/20 0400  92 15 117/66 95 % 10/30/20 0300  93 12 115/65 94 % 10/30/20 0200  86 15 (!) 98/54 93 % 10/30/20 0100  96 17 126/69 93 % 10/30/20 0000 98.2 °F (36.8 °C) 96 17 115/65 93 % 10/29/20 2300  96 16 120/65 95 % 10/29/20 2200  93 16  92 % 10/29/20 2100  89 16  94 % 10/29/20 2045 98.5 °F (36.9 °C) 93 17  98 % 10/29/20 2040     94 % 10/29/20 2000  97 29  92 % 10/29/20 1900  93 13  94 % Oxygen Therapy O2 Sat (%): 94 % (10/30/20 1327) Pulse via Oximetry: 85 beats per minute (10/30/20 1327) O2 Device: Room air (10/30/20 1327) Intake/Output Summary (Last 24 hours) at 10/30/2020 1709 Last data filed at 10/30/2020 0430 Gross per 24 hour Intake 837 ml  
 Output 725 ml Net 112 ml *Note that automatically entered I/Os may not be accurate; dependent on patient compliance with collection and accurate  by techs. General:    Ill appearing, tired looking, alert,awake, HEENT: AT, NC,    
CV:   RRR. No murmur, rub, or gallop. Lungs:   CTAB. No wheezing, rhonchi, or rales. Abdomen:   Soft, nontender, nondistended. Extremities: Warm and dry. No cyanosis or edema. Skin:     No rashes or jaundice. Neuro:  Alert,awake,oriented x3,  slurred speech, right upper and lower extremities 2/5, left upper and lower extremities 5/5, Data Review: 
I have reviewed all labs, meds, and studies from the last 24 hours: 
 
Recent Results (from the past 24 hour(s)) GLUCOSE, POC Collection Time: 10/29/20  6:18 PM  
Result Value Ref Range Glucose (POC) 181 (H) 65 - 100 mg/dL GLUCOSE, POC Collection Time: 10/30/20 12:28 AM  
Result Value Ref Range Glucose (POC) 153 (H) 65 - 100 mg/dL CBC W/O DIFF Collection Time: 10/30/20  3:15 AM  
Result Value Ref Range WBC 21.4 (H) 4.3 - 11.1 K/uL  
 RBC 3.83 (L) 4.05 - 5.2 M/uL  
 HGB 12.1 11.7 - 15.4 g/dL HCT 36.4 35.8 - 46.3 % MCV 95.0 79.6 - 97.8 FL  
 MCH 31.6 26.1 - 32.9 PG  
 MCHC 33.2 31.4 - 35.0 g/dL  
 RDW 13.9 11.9 - 14.6 % PLATELET 178 263 - 802 K/uL MPV 10.2 9.4 - 12.3 FL ABSOLUTE NRBC 0.03 0.0 - 0.2 K/uL METABOLIC PANEL, COMPREHENSIVE Collection Time: 10/30/20  3:15 AM  
Result Value Ref Range Sodium 141 138 - 145 mmol/L Potassium 4.4 3.5 - 5.1 mmol/L Chloride 103 98 - 107 mmol/L  
 CO2 30 21 - 32 mmol/L Anion gap 8 7 - 16 mmol/L Glucose 158 (H) 65 - 100 mg/dL BUN 60 (H) 8 - 23 MG/DL Creatinine 0.88 0.6 - 1.0 MG/DL  
 GFR est AA >60 >60 ml/min/1.73m2 GFR est non-AA >60 >60 ml/min/1.73m2 Calcium 8.7 8.3 - 10.4 MG/DL  Bilirubin, total 0.3 0.2 - 1.1 MG/DL  
 ALT (SGPT) 75 (H) 12 - 65 U/L  
 AST (SGOT) 38 (H) 15 - 37 U/L  
 Alk. phosphatase 120 50 - 136 U/L Protein, total 6.4 6.3 - 8.2 g/dL Albumin 2.8 (L) 3.2 - 4.6 g/dL Globulin 3.6 (H) 2.3 - 3.5 g/dL A-G Ratio 0.8 (L) 1.2 - 3.5 GLUCOSE, POC Collection Time: 10/30/20  6:23 AM  
Result Value Ref Range Glucose (POC) 192 (H) 65 - 100 mg/dL GLUCOSE, POC Collection Time: 10/30/20  7:41 AM  
Result Value Ref Range Glucose (POC) 149 (H) 65 - 100 mg/dL GLUCOSE, POC Collection Time: 10/30/20 11:32 AM  
Result Value Ref Range Glucose (POC) 145 (H) 65 - 100 mg/dL All Micro Results None No results found for this visit on 10/25/20. Current Meds: 
Current Facility-Administered Medications Medication Dose Route Frequency  aspirin chewable tablet 81 mg  81 mg Oral DAILY  albuterol (PROVENTIL VENTOLIN) nebulizer solution 2.5 mg  2.5 mg Nebulization TID RT  
 lisinopriL (PRINIVIL, ZESTRIL) tablet 5 mg  5 mg Oral DAILY  montelukast (SINGULAIR) tablet 10 mg  10 mg Oral DAILY  rosuvastatin (CRESTOR) tablet 40 mg  40 mg Oral QHS  carvediloL (COREG) tablet 6.25 mg  6.25 mg Oral BID WITH MEALS  lansoprazole (PREVACID) 3 mg/mL oral suspension 30 mg  30 mg Oral ACB  levothyroxine (SYNTHROID) tablet 75 mcg  75 mcg Oral ACB  levETIRAcetam (KEPPRA) oral solution 500 mg  500 mg Oral BID  insulin lispro (HUMALOG) injection   SubCUTAneous Q6H  
 budesonide (PULMICORT) 500 mcg/2 ml nebulizer suspension  500 mcg Nebulization BID RT  
 [Held by provider] furosemide (LASIX) injection 40 mg  40 mg IntraVENous DAILY  LORazepam (ATIVAN) tablet 0.5 mg  0.5 mg Oral BID PRN  
 metoprolol (LOPRESSOR) injection 5 mg  5 mg IntraVENous Q4H PRN  
 NUTRITIONAL SUPPORT ELECTROLYTE PRN ORDERS   Does Not Apply PRN  
 fluticasone propionate (FLONASE) 50 mcg/actuation nasal spray 2 Spray  2 Spray Both Nostrils DAILY  sodium chloride (NS) flush 5-40 mL  5-40 mL IntraVENous Q8H  
  sodium chloride (NS) flush 5-40 mL  5-40 mL IntraVENous PRN  
 ondansetron (ZOFRAN) injection 4 mg  4 mg IntraVENous Q6H PRN  
 acetaminophen (TYLENOL) suppository 650 mg  650 mg Rectal Q4H PRN  
 LORazepam (ATIVAN) injection 2 mg  2 mg IntraVENous Q2H PRN  
 albuterol-ipratropium (DUO-NEB) 2.5 MG-0.5 MG/3 ML  3 mL Nebulization Q4H PRN  
 dexamethasone (DECADRON) 10 mg/mL injection 6 mg  6 mg IntraVENous Q6H  
 levalbuterol (XOPENEX) nebulizer soln 1.25 mg/3 mL  1.25 mg Nebulization Q6H PRN Other Studies (last 24 hours): Xr Abd (kub) Result Date: 10/30/2020 KUB 2 VIEWS HISTORY: Evaluate for barium clearance prior to CT. COMPARISON: October 29, 2020 FINDINGS: A feeding tube extends below the diaphragm into the stomach. There is retained barium in the right hemicolon and rectum. Surgical clips are present in the abdomen. IMPRESSION: Residual contrast present in the colon and rectum. Assessment and Plan:  
 
Hospital Problems as of 10/30/2020 Date Reviewed: 10/19/2020 Codes Class Noted - Resolved POA Systolic heart failure (HCC) ICD-10-CM: I50.20 ICD-9-CM: 428.20  10/27/2020 - Present Yes Expressive aphasia ICD-10-CM: R47.01 
ICD-9-CM: 784.3  10/25/2020 - Present Yes * (Principal) Acute right-sided weakness ICD-10-CM: R53.1 ICD-9-CM: 728.87  10/25/2020 - Present Yes Frontal mass of brain ICD-10-CM: G93.89 ICD-9-CM: 348.89  10/25/2020 - Present Yes Acute encephalopathy ICD-10-CM: G93.40 ICD-9-CM: 348.30  10/25/2020 - Present Yes Multiple lesions on CT of brain and spine ICD-10-CM: R93.0, R93.7 ICD-9-CM: 793.0, 793.7  10/25/2020 - Present Yes Acute CVA (cerebrovascular accident) ([facility] [address] ICD-10-CM: I63.9 ICD-9-CM: 434.91  10/25/2020 - Present Yes Multiple lung nodules on CT (Chronic) ICD-10-CM: R91.8 ICD-9-CM: 793.19  10/12/2020 - Present Yes  Overview Addendum 10/12/2020  2:33 PM by Darius Uribe NP  
 -PET scan 10/2019: PET scan fortunately did not have any abnormal activity in her mass in the left lung. This may mean that we can just follow this radiographically, but we will talk about her at tumor board and come back to her with the group's recommendation. 
-10/30/2019: Patient is discussed at thoracic conference today lead by Dr Yanelis Wood. Patient is a 77 yo never smoker. CT guided biopsy recommended. -CT guided Biopsy 11/2020: lung biopsy showed signs of this nodule being a hamartoma, which is a benign tumor that we can simply follow. repeat CT in 6 months 
-Chest CT June 2020: CT scan is stable 
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 Jama Becca are several new pulmonary nodules noted which may suggest some metastatic disease.  This require further follow-up CT of the chest in 2 months or doing PET scan. Hamartoma (HCC) (Chronic) ICD-10-CM: Q85.9 ICD-9-CM: 759.6  10/12/2020 - Present Yes Peripheral vascular disease ([address] ICD-10-CM: I73.9 ICD-9-CM: 443.9  10/12/2020 - Present Yes Bilateral pleural effusion ICD-10-CM: J90 ICD-9-CM: 511.9  10/7/2020 - Present Yes Overview Signed 10/13/2020  1:25 PM by DENI Morataya  
  10/3/2020: L thoracentesis 1050mL removed R thoracentesis 800mL removed 10/9/2020: L thoracentesis: 1000mL removed R thoracentesis: 600mL removed Hypercholesterolemia (Chronic) ICD-10-CM: E78.00 ICD-9-CM: 272.0  6/30/2015 - Present Yes Dysphagia (Chronic) ICD-10-CM: R13.10 ICD-9-CM: 787.20  6/30/2015 - Present Yes Acquired hypothyroidism (Chronic) ICD-10-CM: E03.9 ICD-9-CM: 244.9  10/17/2013 - Present Yes GERD (gastroesophageal reflux disease) (Chronic) ICD-10-CM: K21.9 ICD-9-CM: 530.81  10/17/2013 - Present Yes RESOLVED: Hypokalemia ICD-10-CM: E87.6 ICD-9-CM: 276.8  10/25/2020 - 10/27/2020 Yes Plan: This is a 28-year-old female with Acute stroke with metastatic disease in the brain POA MRI brain with several enhancing masses with the largest in the left frontal region with significant surrounding edema. CNS lymphoma/metastatic disease small acute right occipital and parietal lobe infarcts. No acute neurosurgical intervention. Neurology on board. Recommend aspirin after biopsy. Aspirin, Statin. Oncology consulted. Appreciate recommendations. Dexamethasone 6 mg IV every 6 hours. Keppra 500 twice daily. Speech therapy eval today and patient started on diet. Acute metabolic encephalopathy from stroke resolved Frontal brain mass likely metastasis - unknown primary? Chronic systolic congestive heart failure with EF of 35% 
currently on IV Lasix. Multiple lung nodules on CT Oncology on board. Pulmonology consulted for lung nodule biopsy. CT chest/ abdomen and pelvis ordered (pending due to clearance of barium from prior barium swallow study)  for metastatic disease and target for biopsy. Bilateral pleural effusions Pulmonary on board. Hypothyroidism Levothyroxine GERD Pepcid. Peripheral vascular disease Palliative care consulted. Appreciate recommendations. CODE STATUS DNR. Goals of care discussed with patient and her  and brother at bedside. Awaiting tissue diagnosis before making decisions regarding plan of care. DC planning/Dispo: Anticipate inpatient hospital stay for at least 48 hours. May need rehab facility placement. Diet:  DIET MECHANICAL SOFT 
DIET NUTRITIONAL SUPPLEMENTS 
DIET TUBE FEEDING 
DIET NUTRITIONAL SUPPLEMENTS 
DVT ppx:  SCDs Signed: 
Geoffrey Lim MD

## 2020-10-30 NOTE — PROGRESS NOTES
10/30/20 0708  
NIH Stroke Scale  
Interval Other (comment) 
(Dual NIH with Marisol ALICIA )  
LOC 0  
LOC Questions 0  
LOC Commands 0  
Best Gaze 0  
Visual 0  
Facial Palsy 2  
Motor Right Arm 3  
Motor Left Arm 0  
Motor Right Leg 2  
Motor Left Leg 1  
Limb Ataxia 1  
Sensory 1  
Best Language 1  
Dysarthria 1  
Extinction and Inattention 0  
Total 12

## 2020-10-30 NOTE — PROGRESS NOTES
10/30/20 0602 Dual Skin Pressure Injury Assessment Dual Skin Pressure Injury Assessment WDL Second Care Provider (Based on [address] [place] [address]) 1788 Digital H2OitaRealGravity Skin Integumentary Skin Integumentary (WDL) WDL Pressure  Injury Documentation No Pressure Injury Noted-Pressure Ulcer Prevention Initiated Skin Color Appropriate for ethnicity Skin Condition/Temp Warm;Dry Skin Integrity Intact Turgor Non-tenting Wound Prevention and Protection Methods Orientation of Wound Prevention Posterior Location of Wound Prevention Sacrum/Coccyx Dressing Present  Yes Dressing Status Intact Wound Offloading (Prevention Methods) Bed, pressure reduction mattress;Pillows

## 2020-10-30 NOTE — PROGRESS NOTES
Problem: Patient Education: Go to Patient Education Activity Goal: Patient/Family Education Description: 1. Patient will complete lower body bathing and dressing with SBA and adaptive equipment as needed. 2. Patient will complete toileting with SBA. 3. Patient will tolerate 25 minutes of OT treatment with 1-2 rest breaks to increase activity tolerance for ADLs. 4. Patient will complete functional transfers with CGA and adaptive equipment as needed. 5. Patient will tolerate 15 minutes unsupported sitting balance with SBA and adaptive equipment as needed. 6. Patient will complete functional activity while seated edge of bed unsupported with SBA and adaptive equipment as needed. Timeframe: 7 visits Outcome: Progressing Towards Goal 
 
OCCUPATIONAL THERAPY: Daily Note and PM 10/30/2020 INPATIENT: OT Visit Days: 3 Payor: Lizabeth Etienne / Plan: VALOREMI HUMANA MEDICARE CHOICE PPO/PFFS / Product Type: [employer] Medicare /  
  
NAME/AGE/GENDER: Keisha Dudley is a 69 y.o. female PRIMARY DIAGNOSIS:  Frontal mass of brain [G93.89] Acute right-sided weakness [R53.1] Expressive aphasia [R47.01] Acute encephalopathy [G93.40] Multiple lesions on CT of brain and spine [R93.0, R93.7] Acute right-sided weakness Acute right-sided weakness ICD-10: Treatment Diagnosis:  
 · Generalized Muscle Weakness (M62.81) · Other lack of cordination (R27.8) · Difficulty in walking, Not elsewhere classified (R26.2) Precautions/Allergies: 
  Fall precautions Compazine [prochlorperazine edisylate] ASSESSMENT:  
 
Ms. Karthik Price presents in ICU for the above diagnoses. Upon arrival, pt supine in bed and agreeable to OT evaluation. Pt is alert however presents with moderate expressive aphasia therefore limited questioning completed during today's evaluation; however pt able to nod head appropriately to simple yes/no questions.  Pt states she lives in a 1-story home with spouse. At baseline, pt notes independence with ADLs and functional mobility. 10/30/2020 Pt presents supine upon arrival. Pt tired from busy day. PROM was performed to increase strength and coordination in RUE. Pt issued pink foam block to increase hand strength. Very little  strength in right hand today. Instructed patient in AAROM exercises as well. Will continue to benefit from skilled OT during stay. This section established at most recent assessment PROBLEM LIST (Impairments causing functional limitations): 1. Decreased Strength 2. Decreased ADL/Functional Activities 3. Decreased Transfer Abilities 4. Decreased Ambulation Ability/Technique 5. Decreased Balance 6. Decreased Activity Tolerance 7. Decreased Cognition INTERVENTIONS PLANNED: (Benefits and precautions of occupational therapy have been discussed with the patient.) 1. Activities of daily living training 2. Adaptive equipment training 3. Balance training 4. Clothing management 5. Community reintergration 6. Donning&doffing training 7. Neuromuscular re-eduation 8. Re-evaluation 9. Therapeutic activity 10. Therapeutic exercise TREATMENT PLAN: Frequency/Duration: Follow patient 3x/week to address above goals. Rehabilitation Potential For Stated Goals: Good REHAB RECOMMENDATIONS (at time of discharge pending progress):   
Placement: It is my opinion, based on this patient's performance to date, that Ms. Figueroa Manning may benefit from intensive therapy at an [address] [place] [address] after discharge due to a probable need for multiple therapy disciplines and potential to make ongoing and sustainable functional improvement that is of practical value. Taco Rojas Equipment: ? TBD  
    
 
 
 
OCCUPATIONAL PROFILE AND HISTORY:  
History of Present Injury/Illness (Reason for Referral): 
See H&P Past Medical History/Comorbidities: Ms. Figueroa Manning  has a past medical history of Acute CVA (cerebrovascular accident) [facility]) (10/25/2020), Asthma, Bite of nonvenomous arthropod (???), Cough, Esophageal stricture (2002), GERD (gastroesophageal reflux disease), History of rectal bleeding (???), Hodgkin's lymphoma ([facility] [address] (1980), Hypercholesterolemia, Menopause, Positive RAST testing (2007), and Thyroid disease. She also has no past medical history of Chronic kidney disease. Ms. Miah Toscano  has a past surgical history that includes hx tonsillectomy (1954); hx splenectomy (1973); hx appendectomy (1973); hx cataract removal (Bilateral, 2007, 2009); hx colonoscopy; hx other surgical; and hx breast biopsy (Left, 05/21/2020). Social History/Living Environment:  
Home Environment: Private residence One/Two Story Residence: One story Living Alone: No 
Support Systems: Spouse/Significant Other/Partner Patient Expects to be Discharged to[de-identified] Rehabilitation facility Current DME Used/Available at Home: None Prior Level of Function/Work/Activity: 
Independent with ADLs and functional mobility. Personal Factors:   
      Sex:  female Age:  69 y.o. Other factors that influence how disability is experienced by the patient:  Multiple co-morbidities Number of Personal Factors/Comorbidities that affect the Plan of Care: Brief history (0):  LOW COMPLEXITY ASSESSMENT OF OCCUPATIONAL PERFORMANCE[de-identified]  
Activities of Daily Living:  
Basic ADLs (From Assessment) Complex ADLs (From Assessment) Feeding: Minimum assistance Oral Facial Hygiene/Grooming: Minimum assistance Bathing: Maximum assistance Upper Body Dressing: Maximum assistance Lower Body Dressing: Maximum assistance Toileting: Maximum assistance Instrumental ADL Meal Preparation: Total assistance Homemaking: Total assistance Grooming/Bathing/Dressing Activities of Daily Living Cognitive Retraining Safety/Judgement: Awareness of environment Bed/Mat Mobility Rolling: Minimum assistance Supine to Sit: Minimum assistance;Assist x2 
 Sit to Supine: Moderate assistance;Assist x2 Sit to Stand: Moderate assistance;Assist x2 Stand to Sit: Moderate assistance;Assist x2 Scooting: Minimum assistance Most Recent Physical Functioning:  
Gross Assessment: 
  
         
  
Posture: 
  
Balance: 
Sitting: Impaired Sitting - Static: Fair (occasional) Sitting - Dynamic: Fair (occasional)(-) Standing: Impaired Standing - Static: Poor Standing - Dynamic : Poor Bed Mobility: 
Rolling: Minimum assistance Supine to Sit: Minimum assistance;Assist x2 Sit to Supine: Moderate assistance;Assist x2 Scooting: Minimum assistance Wheelchair Mobility: 
  
Transfers: 
Sit to Stand: Moderate assistance;Assist x2 Stand to Sit: Moderate assistance;Assist x2 Patient Vitals for the past 6 hrs: 
 BP SpO2 Pulse 10/30/20 1138 120/60 96 % 86  
10/30/20 1327  94 %  Mental Status Neurologic State: Alert, Appropriate for age Orientation Level: Oriented X4 Cognition: Follows commands Perception: Appears intact Perseveration: No perseveration noted Safety/Judgement: Awareness of environment Physical Skills Involved: 1. Range of Motion 2. Balance 3. Strength 4. Activity Tolerance 5. Sensation 6. Fine Motor Control 7. Gross Motor Control Cognitive Skills Affected (resulting in the inability to perform in a timely and safe manner): 1. Perception 2. Executive Function 3. Comprehension 4. Expression Psychosocial Skills Affected: 1. Habits/Routines 2. Environmental Adaptation Number of elements that affect the Plan of Care: 5+:  HIGH COMPLEXITY CLINICAL DECISION MAKIN [facility] [address] [id number] AM-PAC 6 Clicks Daily Activity Inpatient Short Form How much help from another person does the patient currently need. .. Total A Lot A Little None 1. Putting on and taking off regular lower body clothing? [] 1   [x] 2   [] 3   [] 4  
2. Bathing (including washing, rinsing, drying)?    [] 1   [x] 2   [] 3   [] 4  
 3.  Toileting, which includes using toilet, bedpan or urinal?   [] 1   [x] 2   [] 3   [] 4  
4. Putting on and taking off regular upper body clothing? [] 1   [x] 2   [] 3   [] 4  
5. Taking care of personal grooming such as brushing teeth? [] 1   [] 2   [x] 3   [] 4  
6. Eating meals? [] 1   [] 2   [x] 3   [] 4  
© 2007, Trustees of [address] [place] [address] Box [facility], under license to [employer]. All rights reserved Score:  Initial: 14 Most Recent: X (Date: -- ) Interpretation of Tool:  Represents activities that are increasingly more difficult (i.e. Bed mobility, Transfers, Gait). Medical Necessity:    
· Patient demonstrates good rehab potential due to higher previous functional level. Reason for Services/Other Comments: 
· Patient continues to require skilled intervention due to medical complications and patient unable to attend/participate in therapy as expected. Use of outcome tool(s) and clinical judgement create a POC that gives a: LOW COMPLEXITY  
 
 
 
TREATMENT:  
(In addition to Assessment/Re-Assessment sessions the following treatments were rendered) Pre-treatment Symptoms/Complaints:   
Pain: Initial:  
Pain Intensity 1: 0 /10 Post Session:  same Therapeutic Exercise: (15 minutes):  Exercises per grid below to improve mobility, strength, coordination and dynamic movement of arm - right, upper to improve functional bending, lifting, carrying and reaching. Required maximal manual and tactile cues to promote proper body alignment and promote proper body mechanics. Progressed resistance and repetitions as indicated. PROM/AAROM Date: 
10/30 Date: 
 Date: Activity/Exercise Parameters Parameters Parameters Shoulder Abd/Adduction 10 reps Shoulder Flexion 10 reps Elbow Flexion 10 reps Forward Punches 10 reps Pro/Supination 10 reps Wrist Flexion/Extension 10 reps Braces/Orthotics/Lines/Etc:  
· IV 
· ICU monitors · O2 Device: Room air Treatment/Session Assessment:   
· Response to Treatment:  Tolerated well with no issues noted. · Interdisciplinary Collaboration:  
o Certified Occupational Therapy Assistant 
o Registered Nurse · After treatment position/precautions:  
o Supine in bed 
o Bed/Chair-wheels locked 
o Bed in low position 
o Call light within reach 
o Family at bedside · Compliance with Program/Exercises: Compliant all of the time, Will assess as treatment progresses. · Recommendations/Intent for next treatment session: \"Next visit will focus on advancements to more challenging activities and reduction in assistance provided\". Total Treatment Duration: OT Patient Time In/Time Out Time In: 0173 Time Out: 1540 Tom Reyez

## 2020-10-31 NOTE — PROGRESS NOTES
10/31/20 1933 NIH Stroke Scale Interval Other (comment) 
(Dual with Charlene ALICIA)  
LOC 0  
LOC Questions 0 LOC Commands 0 Best Gaze 0 Visual 0 Facial Palsy 2 Motor Right Arm 3 Motor Left Arm 0 Motor Right Leg 2 Motor Left Leg 0 Limb Ataxia 1 Sensory 1 Best Language 1 Dysarthria 1 Extinction and Inattention 0 Total 11

## 2020-10-31 NOTE — PROGRESS NOTES
Problem: Falls - Risk of 
Goal: *Absence of Falls Description: Document Gurvinder Morrowyarelis Fall Risk and appropriate interventions in the flowsheet. Outcome: Progressing Towards Goal 
Note: Fall Risk Interventions: 
Mobility Interventions: Communicate number of staff needed for ambulation/transfer, Bed/chair exit alarm Mentation Interventions: Bed/chair exit alarm Medication Interventions: Bed/chair exit alarm, Patient to call before getting OOB Elimination Interventions: Call light in reach Problem: Patient Education: Go to Patient Education Activity Goal: Patient/Family Education Outcome: Progressing Towards Goal 
  
Problem: Pressure Injury - Risk of 
Goal: *Prevention of pressure injury Description: Document Bhupendra Scale and appropriate interventions in the flowsheet. Outcome: Progressing Towards Goal 
Note: Pressure Injury Interventions: 
Sensory Interventions: Assess changes in LOC Moisture Interventions: Absorbent underpads Activity Interventions: Pressure redistribution bed/mattress(bed type) Mobility Interventions: Pressure redistribution bed/mattress (bed type) Nutrition Interventions: Document food/fluid/supplement intake, Offer support with meals,snacks and hydration Friction and Shear Interventions: Apply protective barrier, creams and emollients, Foam dressings/transparent film/skin sealants Problem: Patient Education: Go to Patient Education Activity Goal: Patient/Family Education Outcome: Progressing Towards Goal 
  
Problem: TIA/CVA Stroke: 0-24 hours Goal: Off Pathway (Use only if patient is Off Pathway) Outcome: Progressing Towards Goal 
Goal: Activity/Safety Outcome: Progressing Towards Goal 
Goal: Consults, if ordered Outcome: Progressing Towards Goal 
Goal: Diagnostic Test/Procedures Outcome: Progressing Towards Goal 
Goal: Nutrition/Diet Outcome: Progressing Towards Goal 
Goal: Discharge Planning Outcome: Progressing Towards Goal 
 Goal: Medications Outcome: Progressing Towards Goal 
Goal: Respiratory Outcome: Progressing Towards Goal 
Goal: Treatments/Interventions/Procedures Outcome: Progressing Towards Goal 
Goal: Minimize risk of bleeding post-thrombolytic infusion Outcome: Progressing Towards Goal 
Goal: Monitor for complications post-thrombolytic infusion Outcome: Progressing Towards Goal 
Goal: Psychosocial 
Outcome: Progressing Towards Goal 
Goal: *Hemodynamically stable Outcome: Progressing Towards Goal 
Goal: *Neurologically stable Description: Absence of additional neurological deficits Outcome: Progressing Towards Goal 
Goal: *Verbalizes anxiety and depression are reduced or absent Outcome: Progressing Towards Goal 
Goal: *Absence of Signs of Aspiration on Current Diet Outcome: Progressing Towards Goal 
Goal: *Absence of deep venous thrombosis signs and symptoms(Stroke Metric) Outcome: Progressing Towards Goal 
Goal: *Ability to perform ADLs and demonstrates progressive mobility and function Outcome: Progressing Towards Goal 
Goal: *Stroke education started(Stroke Metric) Outcome: Progressing Towards Goal 
Goal: *Dysphagia screen performed(Stroke Metric) Outcome: Progressing Towards Goal 
Goal: *Rehab consulted(Stroke Metric) Outcome: Progressing Towards Goal 
  
Problem: Dysphagia (Adult) Goal: *Speech Goal: (INSERT TEXT) Outcome: Progressing Towards Goal 
  
Problem: Patient Education: Go to Patient Education Activity Goal: Patient/Family Education Outcome: Progressing Towards Goal 
  
Problem: Patient Education: Go to Patient Education Activity Goal: Patient/Family Education Outcome: Progressing Towards Goal 
  
Problem: Patient Education: Go to Patient Education Activity Goal: Patient/Family Education Description: 1. Patient will complete lower body bathing and dressing with SBA and adaptive equipment as needed. 2. Patient will complete toileting with SBA. 3. Patient will tolerate 25 minutes of OT treatment with 1-2 rest breaks to increase activity tolerance for ADLs. 4. Patient will complete functional transfers with CGA and adaptive equipment as needed. 5. Patient will tolerate 15 minutes unsupported sitting balance with SBA and adaptive equipment as needed. 6. Patient will complete functional activity while seated edge of bed unsupported with SBA and adaptive equipment as needed. Timeframe: 7 visits Outcome: Progressing Towards Goal 
  
Problem: Patient Education: Go to Patient Education Activity Goal: Patient/Family Education Outcome: Progressing Towards Goal 
  
Problem: Patient Education: Go to Patient Education Activity Goal: Patient/Family Education Outcome: Progressing Towards Goal 
  
Problem: TIA/CVA Stroke: Day 2 Until Discharge Goal: Off Pathway (Use only if patient is Off Pathway) Outcome: Progressing Towards Goal 
Goal: Activity/Safety Outcome: Progressing Towards Goal 
Goal: Diagnostic Test/Procedures Outcome: Progressing Towards Goal 
Goal: Nutrition/Diet Outcome: Progressing Towards Goal 
Goal: Discharge Planning Outcome: Progressing Towards Goal 
Goal: Medications Outcome: Progressing Towards Goal 
Goal: Respiratory Outcome: Progressing Towards Goal 
Goal: Treatments/Interventions/Procedures Outcome: Progressing Towards Goal 
Goal: Psychosocial 
Outcome: Progressing Towards Goal 
Goal: *Verbalizes anxiety and depression are reduced or absent Outcome: Progressing Towards Goal 
Goal: *Absence of aspiration Outcome: Progressing Towards Goal 
Goal: *Absence of deep venous thrombosis signs and symptoms(Stroke Metric) Outcome: Progressing Towards Goal 
Goal: *Optimal pain control at patient's stated goal 
Outcome: Progressing Towards Goal 
Goal: *Tolerating diet Outcome: Progressing Towards Goal 
Goal: *Ability to perform ADLs and demonstrates progressive mobility and function Outcome: Progressing Towards Goal 
 Goal: *Stroke education continued(Stroke Metric) 
Outcome: Progressing Towards Goal

## 2020-10-31 NOTE — PROGRESS NOTES
Date of Outreach Update: 
Marco Nguyen was seen and assessed. Patient sleeping, respirations even and unlabored. Per primary RN, no changes. No distress noted at this time. Tube feeding running overnight for decreased PO intake. MEWS Score: 2 (10/31/20 0000) Vitals:  
 10/30/20 1600 10/30/20 1954 10/30/20 2000 10/31/20 0000 BP: 103/72  93/60 90/60 Pulse: 89  82 83 Resp: 20  18 16 Temp: 97.4 °F (36.3 °C)  97.4 °F (36.3 °C) 97.5 °F (36.4 °C) SpO2: 94% 93% 94% 95% Weight:      
Height:      
  
 
 Pain Assessment Pain Intensity 1: 0 (10/30/20 1525) Pain Location 1: Throat Patient Stated Pain Goal: 0 Previous Outreach assessment has been reviewed. There have been no significant clinical changes since the completion of the last dated Outreach assessment. Will continue to follow up per outreach protocol. Signed By:   Kaylee Quiroga RN   October 31, 2020 12:00 AM

## 2020-10-31 NOTE — PROGRESS NOTES
Problem: Falls - Risk of 
Goal: *Absence of Falls Description: Document Ming Melissa Fall Risk and appropriate interventions in the flowsheet. 10/31/2020 0040 by Eduarda Johnson RN Outcome: Progressing Towards Goal 
Note: Fall Risk Interventions: 
Mobility Interventions: Bed/chair exit alarm Mentation Interventions: Bed/chair exit alarm Medication Interventions: Bed/chair exit alarm Elimination Interventions: Call light in reach 
 
  
 
 
10/31/2020 0039 by Asher Green RN Outcome: Progressing Towards Goal 
Note: Fall Risk Interventions: 
Mobility Interventions: Bed/chair exit alarm Mentation Interventions: Bed/chair exit alarm Medication Interventions: Bed/chair exit alarm Elimination Interventions: Call light in reach Problem: Patient Education: Go to Patient Education Activity Goal: Patient/Family Education 10/31/2020 0040 by Eduarda Johnson RN Outcome: Progressing Towards Goal 
10/31/2020 0039 by Asher Green RN Outcome: Progressing Towards Goal 
  
Problem: Pressure Injury - Risk of 
Goal: *Prevention of pressure injury Description: Document Bhupendra Scale and appropriate interventions in the flowsheet. 10/31/2020 0040 by Eduarda Johnson RN Outcome: Progressing Towards Goal 
Note: Pressure Injury Interventions: 
Sensory Interventions: Assess changes in LOC Moisture Interventions: Absorbent underpads Activity Interventions: Increase time out of bed Mobility Interventions: Pressure redistribution bed/mattress (bed type) Nutrition Interventions: Document food/fluid/supplement intake, Offer support with meals,snacks and hydration Friction and Shear Interventions: Foam dressings/transparent film/skin sealants 10/31/2020 0039 by Eduarda Johnson RN Outcome: Progressing Towards Goal 
Note: Pressure Injury Interventions: 
Sensory Interventions: Assess changes in LOC Moisture Interventions: Absorbent underpads Activity Interventions: Increase time out of bed Mobility Interventions: Pressure redistribution bed/mattress (bed type) Nutrition Interventions: Document food/fluid/supplement intake, Offer support with meals,snacks and hydration Friction and Shear Interventions: Foam dressings/transparent film/skin sealants Problem: Patient Education: Go to Patient Education Activity Goal: Patient/Family Education 10/31/2020 0040 by Kim Johnsno RN Outcome: Progressing Towards Goal 
10/31/2020 0039 by Roselia Henderson RN Outcome: Progressing Towards Goal 
  
Problem: Patient Education: Go to Patient Education Activity Goal: Patient/Family Education 10/31/2020 0040 by Kim Johnson RN Outcome: Progressing Towards Goal 
10/31/2020 0039 by Roselia Henderson RN Outcome: Progressing Towards Goal 
  
Problem: Patient Education: Go to Patient Education Activity Goal: Patient/Family Education 10/31/2020 0040 by Kim Johnson RN Outcome: Progressing Towards Goal 
10/31/2020 0039 by Roselia Henderson RN Outcome: Progressing Towards Goal 
  
Problem: Patient Education: Go to Patient Education Activity Goal: Patient/Family Education Description: 1. Patient will complete lower body bathing and dressing with SBA and adaptive equipment as needed. 2. Patient will complete toileting with SBA. 3. Patient will tolerate 25 minutes of OT treatment with 1-2 rest breaks to increase activity tolerance for ADLs. 4. Patient will complete functional transfers with CGA and adaptive equipment as needed. 5. Patient will tolerate 15 minutes unsupported sitting balance with SBA and adaptive equipment as needed. 6. Patient will complete functional activity while seated edge of bed unsupported with SBA and adaptive equipment as needed. Timeframe: 7 visits 10/31/2020 0040 by Kim Johnson RN Outcome: Progressing Towards Goal 
10/31/2020 0039 by Roselia Henderson RN 
 Outcome: Progressing Towards Goal 
  
Problem: Patient Education: Go to Patient Education Activity Goal: Patient/Family Education 10/31/2020 0040 by Digna Johnson RN Outcome: Progressing Towards Goal 
10/31/2020 0039 by Janette Bangura RN Outcome: Progressing Towards Goal 
  
Problem: Patient Education: Go to Patient Education Activity Goal: Patient/Family Education 10/31/2020 0040 by Digna Johnson RN Outcome: Progressing Towards Goal 
10/31/2020 0039 by Janette Bangura RN Outcome: Progressing Towards Goal 
  
Problem: TIA/CVA Stroke: Day 2 Until Discharge Goal: Activity/Safety Outcome: Progressing Towards Goal 
Goal: Diagnostic Test/Procedures Outcome: Progressing Towards Goal 
Goal: Nutrition/Diet Outcome: Progressing Towards Goal 
Goal: Discharge Planning Outcome: Progressing Towards Goal 
Goal: Medications Outcome: Progressing Towards Goal 
Goal: Respiratory Outcome: Progressing Towards Goal 
Goal: Treatments/Interventions/Procedures Outcome: Progressing Towards Goal 
Goal: Psychosocial 
Outcome: Progressing Towards Goal 
Goal: *Verbalizes anxiety and depression are reduced or absent Outcome: Progressing Towards Goal 
Goal: *Absence of aspiration Outcome: Progressing Towards Goal 
Goal: *Absence of deep venous thrombosis signs and symptoms(Stroke Metric) Outcome: Progressing Towards Goal 
Goal: *Optimal pain control at patient's stated goal 
Outcome: Progressing Towards Goal 
Goal: *Tolerating diet Outcome: Progressing Towards Goal 
Goal: *Ability to perform ADLs and demonstrates progressive mobility and function Outcome: Progressing Towards Goal 
Goal: *Stroke education continued(Stroke Metric) Outcome: Progressing Towards Goal

## 2020-10-31 NOTE — PROGRESS NOTES
Hospitalist Note Admit Date:  10/25/2020  6:56 AM  
Name:  Marco Nguyen Age:  69 y.o. 
:  1950 MRN:  [id number] PCP:  Porsche Bautista MD 
Treatment Team: Attending Provider: Meredith Brewster MD; Consulting Provider: Tequila Fong DO; Consulting Provider: Michelle Salazar NP; Utilization Review: Cassidy Agosto RN; Consulting Provider: Renetta Ulloa MD; Consulting Provider: Lei Coombs NP; Primary Nurse: Trang Mcdermott; Primary Nurse: Keshav Green; Consulting Provider: Gideon Morfin MD; Utilization Review: Thania Carlson RN; Care Manager: Nicanor Mcgee LMSW; Occupational Therapy Assistant: Jeanne Sullivan; Consulting Provider: Yasemin Eubanks MD 
 
HPI/Subjective: This is a 69years old female with hx of HTN, GERD, Hodgkin's lymphoma s/p Radiation Rx, dyslipidemia, hypothyroidism, asthma, systolic CHF EF 55-14% recently diagnosed lung hamartomas presented to ER with acute onset of right sided weakness, aphasia and confusion that began this AM. Pt was recently discharged from [facility] on 10/21 after being treated for PNA and was doing okay until today. Pt is not able to provide much history, she has sort of blank stare but can nod to yes or no questions. Per , pt was walking, doing ADL's  Until yesterday but this morning a drastic change in her mentation and speech was noted by him. ER work up showed CT evidence of enhancing 2.2 cm left frontal lobe mass with vasogenic edema with 2 mm left to right midline shift with 2 additional small masses in tracy and right occipital lobe. CTA head/neck short segment occlusion of left MCA with some changes suggestive of small core infarct and penumbra within the left frontal lobe, no evidence of intraparenchymal hemorrhage. CXR with interval worsening of pulmonary edema and bilateral pleural effusion. \" 
  
10/26 Decadron and Keppra started. Nursing staff note labored breathing this AM but satting well on RA. 10/27 patient still has dysarthria. Right upper and lower extremity weakness. Patient is alert awake and able to answer some questions with slurred speech. Afebrile. She was started on a diet today after speech therapy eval. 
 
10/28 patient reports feeling better. Still with slurred speech, right upper and lower extremity weakness. Afebrile. 10/29 patient is alert awake. No shortness of breath. No chest pain. Patient has persistent right upper and lower extremity weakness and slurred speech. Afebrile. Patient's brother and  at bedside. Updated regarding current plan of care today. 10/30 patient alert awake, NG tube in place, afebrile. Brother and  at bedside. Discussed about barium on the KUB. Awaiting CT abdomen pelvis. Oncology on board. CT chest shows lung nodules, IR discussed about biopsy of lung nodule by oncology. 10/31 patient complained of pain in the right leg. Duplex ultrasound ordered. KUB abdomen this morning shows barium in the colon. Afebrile. Persistent weakness in the right upper and lower extremities. Objective:  
 
Patient Vitals for the past 24 hrs: 
 Temp Pulse Resp BP SpO2  
10/31/20 1200 97.6 °F (36.4 °C) 83 17 107/64 96 % 10/31/20 0935     95 % 10/31/20 0800 98.3 °F (36.8 °C) 87 18 126/81 96 % 10/31/20 0400 97.6 °F (36.4 °C) 98 18 110/74 97 % 10/31/20 0000 97.5 °F (36.4 °C) 83 16 90/60 95 % 10/30/20 2000 97.4 °F (36.3 °C) 82 18 93/60 94 % 10/30/20 1954     93 % Oxygen Therapy O2 Sat (%): 96 % (10/31/20 1200) Pulse via Oximetry: 84 beats per minute (10/30/20 1954) O2 Device: Room air (10/31/20 0935) Intake/Output Summary (Last 24 hours) at 10/31/2020 1637 Last data filed at 10/31/2020 5607 Gross per 24 hour Intake  Output 1350 ml Net -1350 ml *Note that automatically entered I/Os may not be accurate; dependent on patient compliance with collection and accurate  by [employer] General:    Ill appearing, tired looking, alert,awake, HEENT: AT, NC,    
CV:   RRR. No murmur, rub, or gallop. Lungs:   CTAB. No wheezing, rhonchi, or rales. Abdomen:   Soft, nontender, nondistended. Extremities: Warm and dry. No cyanosis or edema. Skin:     No rashes or jaundice. Neuro:  Alert,awake,oriented x3,  slurred speech, right upper and lower extremities 2/5, left upper and lower extremities 5/5, Data Review: 
I have reviewed all labs, meds, and studies from the last 24 hours: 
 
Recent Results (from the past 24 hour(s)) GLUCOSE, POC Collection Time: 10/30/20  6:05 PM  
Result Value Ref Range Glucose (POC) 175 (H) 65 - 100 mg/dL GLUCOSE, POC Collection Time: 10/30/20 11:36 PM  
Result Value Ref Range Glucose (POC) 134 (H) 65 - 100 mg/dL GLUCOSE, POC Collection Time: 10/31/20  5:23 AM  
Result Value Ref Range Glucose (POC) 163 (H) 65 - 100 mg/dL METABOLIC PANEL, BASIC Collection Time: 10/31/20 10:04 AM  
Result Value Ref Range Sodium 138 138 - 145 mmol/L Potassium 4.5 3.5 - 5.1 mmol/L Chloride 100 98 - 107 mmol/L  
 CO2 31 21 - 32 mmol/L Anion gap 7 7 - 16 mmol/L Glucose 153 (H) 65 - 100 mg/dL BUN 54 (H) 8 - 23 MG/DL Creatinine 0.69 0.6 - 1.0 MG/DL  
 GFR est AA >60 >60 ml/min/1.73m2 GFR est non-AA >60 >60 ml/min/1.73m2 Calcium 8.7 8.3 - 10.4 MG/DL  
GLUCOSE, POC Collection Time: 10/31/20 12:07 PM  
Result Value Ref Range Glucose (POC) 156 (H) 65 - 100 mg/dL All Micro Results None No results found for this visit on 10/25/20. Current Meds: 
Current Facility-Administered Medications Medication Dose Route Frequency  dexamethasone (DECADRON) 4 mg/mL injection 4 mg  4 mg IntraVENous Q6H  
 traMADoL (ULTRAM) tablet 50 mg  50 mg Oral Q6H PRN  
 enoxaparin (LOVENOX) injection 40 mg  40 mg SubCUTAneous Q24H  polyethylene glycol (MIRALAX) packet 17 g  17 g Oral DAILY PRN  
  aspirin chewable tablet 81 mg  81 mg Oral DAILY  albuterol (PROVENTIL VENTOLIN) nebulizer solution 2.5 mg  2.5 mg Nebulization TID RT  
 furosemide (LASIX) injection 20 mg  20 mg IntraVENous DAILY  lisinopriL (PRINIVIL, ZESTRIL) tablet 5 mg  5 mg Oral DAILY  montelukast (SINGULAIR) tablet 10 mg  10 mg Oral DAILY  rosuvastatin (CRESTOR) tablet 40 mg  40 mg Oral QHS  carvediloL (COREG) tablet 6.25 mg  6.25 mg Oral BID WITH MEALS  lansoprazole (PREVACID) 3 mg/mL oral suspension 30 mg  30 mg Oral ACB  levothyroxine (SYNTHROID) tablet 75 mcg  75 mcg Oral ACB  levETIRAcetam (KEPPRA) oral solution 500 mg  500 mg Oral BID  insulin lispro (HUMALOG) injection   SubCUTAneous Q6H  
 budesonide (PULMICORT) 500 mcg/2 ml nebulizer suspension  500 mcg Nebulization BID RT  
 LORazepam (ATIVAN) tablet 0.5 mg  0.5 mg Oral BID PRN  
 metoprolol (LOPRESSOR) injection 5 mg  5 mg IntraVENous Q4H PRN  
 NUTRITIONAL SUPPORT ELECTROLYTE PRN ORDERS   Does Not Apply PRN  
 fluticasone propionate (FLONASE) 50 mcg/actuation nasal spray 2 Spray  2 Spray Both Nostrils DAILY  sodium chloride (NS) flush 5-40 mL  5-40 mL IntraVENous Q8H  
 sodium chloride (NS) flush 5-40 mL  5-40 mL IntraVENous PRN  
 ondansetron (ZOFRAN) injection 4 mg  4 mg IntraVENous Q6H PRN  
 acetaminophen (TYLENOL) suppository 650 mg  650 mg Rectal Q4H PRN  
 LORazepam (ATIVAN) injection 2 mg  2 mg IntraVENous Q2H PRN  
 albuterol-ipratropium (DUO-NEB) 2.5 MG-0.5 MG/3 ML  3 mL Nebulization Q4H PRN  
 levalbuterol (XOPENEX) nebulizer soln 1.25 mg/3 mL  1.25 mg Nebulization Q6H PRN Other Studies (last 24 hours): Xr Abd (kub) Result Date: 10/31/2020 KUB 2 views HISTORY: Evaluate barium prior to CT COMPARISON: October 30, 2020 FINDINGS: A feeding tube extends below the diaphragm and stomach.  Atelectasis or consolidation is present in the left lower lobe and there are likely bilateral small pleural effusions. Barium is scattered throughout the colon and rectum. Surgical clips are present in the lower abdomen. IMPRESSION: Retained barium scattered throughout the colon. Assessment and Plan:  
 
Hospital Problems as of 10/31/2020 Date Reviewed: 10/19/2020 Codes Class Noted - Resolved POA Systolic heart failure (HCC) ICD-10-CM: I50.20 ICD-9-CM: 428.20  10/27/2020 - Present Yes Expressive aphasia ICD-10-CM: R47.01 
ICD-9-CM: 784.3  10/25/2020 - Present Yes * (Principal) Acute right-sided weakness ICD-10-CM: R53.1 ICD-9-CM: 728.87  10/25/2020 - Present Yes Frontal mass of brain ICD-10-CM: G93.89 ICD-9-CM: 348.89  10/25/2020 - Present Yes Acute encephalopathy ICD-10-CM: G93.40 ICD-9-CM: 348.30  10/25/2020 - Present Yes Multiple lesions on CT of brain and spine ICD-10-CM: R93.0, R93.7 ICD-9-CM: 793.0, 793.7  10/25/2020 - Present Yes Acute CVA (cerebrovascular accident) ([facility] [address] ICD-10-CM: I63.9 ICD-9-CM: 434.91  10/25/2020 - Present Yes Multiple lung nodules on CT (Chronic) ICD-10-CM: R91.8 ICD-9-CM: 793.19  10/12/2020 - Present Yes Overview Addendum 10/12/2020  2:33 PM by Corinna Braun NP  
  -PET scan 10/2019: PET scan fortunately did not have any abnormal activity in her mass in the left lung. This may mean that we can just follow this radiographically, but we will talk about her at tumor board and come back to her with the group's recommendation. 
-10/30/2019: Patient is discussed at thoracic conference today lead by Dr Suzanne Navarro. Patient is a 79 yo never smoker. CT guided biopsy recommended. -CT guided Biopsy 11/2020: lung biopsy showed signs of this nodule being a hamartoma, which is a benign tumor that we can simply follow.  repeat CT in 6 months 
-Chest CT June 2020: CT scan is stable 
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 Claudia Collar are several new pulmonary nodules noted which may suggest some metastatic disease.  This require further follow-up CT of the chest in 2 months or doing PET scan. Hamartoma (HCC) (Chronic) ICD-10-CM: Q85.9 ICD-9-CM: 759.6  10/12/2020 - Present Yes Peripheral vascular disease ([address] ICD-10-CM: I73.9 ICD-9-CM: 443.9  10/12/2020 - Present Yes Bilateral pleural effusion ICD-10-CM: J90 ICD-9-CM: 511.9  10/7/2020 - Present Yes Overview Signed 10/13/2020  1:25 PM by DENI Ghotra  
  10/3/2020: L thoracentesis 1050mL removed R thoracentesis 800mL removed 10/9/2020: L thoracentesis: 1000mL removed R thoracentesis: 600mL removed Hypercholesterolemia (Chronic) ICD-10-CM: E78.00 ICD-9-CM: 272.0  6/30/2015 - Present Yes Dysphagia (Chronic) ICD-10-CM: R13.10 ICD-9-CM: 787.20  6/30/2015 - Present Yes Acquired hypothyroidism (Chronic) ICD-10-CM: E03.9 ICD-9-CM: 244.9  10/17/2013 - Present Yes GERD (gastroesophageal reflux disease) (Chronic) ICD-10-CM: K21.9 ICD-9-CM: 530.81  10/17/2013 - Present Yes RESOLVED: Hypokalemia ICD-10-CM: E87.6 ICD-9-CM: 276.8  10/25/2020 - 10/27/2020 Yes Plan: This is a 65-year-old female with Acute stroke with metastatic disease in the brain POA MRI brain with several enhancing masses with the largest in the left frontal region with significant surrounding edema. CNS lymphoma/metastatic disease small acute right occipital and parietal lobe infarcts. No acute neurosurgical intervention. Neurology on board. Recommend aspirin after biopsy. Aspirin, Statin. Oncology consulted. Appreciate recommendations. Dexamethasone 6 mg IV every 6 hours. Keppra 500 twice daily. Acute metabolic encephalopathy from stroke resolved Frontal brain mass likely metastasis - unknown primary? Chronic systolic congestive heart failure with EF of 35% 
currently on IV Lasix. Multiple lung nodules on CT Oncology on board. Pulmonology consulted for lung nodule biopsy. CT chest/ abdomen and pelvis ordered (pending due to clearance of barium from prior barium swallow study)  for metastatic disease and target for biopsy. Bilateral pleural effusions Pulmonary on board. Hypothyroidism Levothyroxine GERD Pepcid. Peripheral vascular disease Right leg pain: Duplex ultrasound ordered. Palliative care consulted. Appreciate recommendations. CODE STATUS DNR. Goals of care discussed with patient and her  and brother at bedside. Awaiting tissue diagnosis before making decisions regarding plan of care. DC planning/Dispo: Anticipate inpatient hospital stay for at least 48 hours. May need rehab facility placement. Diet:  DIET MECHANICAL SOFT 
DIET NUTRITIONAL SUPPLEMENTS 
DIET TUBE FEEDING 
DIET NUTRITIONAL SUPPLEMENTS 
DVT ppx:  Lovenox Signed: 
Geoffrey Lim MD

## 2020-10-31 NOTE — PROGRESS NOTES
[facility] Inpatient Hematology / Oncology Progress Note Reason for Consult:  Frontal mass of brain [G93.89] Acute right-sided weakness [R53.1] Expressive aphasia [R47.01] Acute encephalopathy [G93.40] Multiple lesions on CT of brain and spine [R93.0, R93.7] Referring Physician:  Andrew Lawrence MD 
 
24 Hour Events: 
Ongoing RUE weakness; aphasia somewhat improved Awaiting bx for diagnosis CT AP pending barium clearance 
VSS, afebrile No new issues from yestereday ROS: 
Constitutional: Negative for fever, chills, weakness, malaise, fatigue. CV: Negative for chest pain, palpitations, edema. Respiratory: Negative for dyspnea, cough, wheezing. GI: Negative for nausea, abdominal pain, diarrhea. 10 point review of systems is otherwise negative with the exception of the elements mentioned above in the HPI. Allergies Allergen Reactions  Compazine [Prochlorperazine Edisylate] Swelling Past Medical History:  
Diagnosis Date  Acute CVA (cerebrovascular accident) ([facility] [address] 10/25/2020  Asthma  Bite of nonvenomous arthropod ? ??  
 Cough  Esophageal stricture 2002  
 dilated 2002  GERD (gastroesophageal reflux disease)  History of rectal bleeding ???  
 Hodgkin's lymphoma ([facility] [address] 1980 Radiation therapy  Hypercholesterolemia  Menopause  Positive RAST testing 2007 IgE level of 1,391  Thyroid disease Past Surgical History:  
Procedure Laterality Date [place]  HX BREAST BIOPSY Left 05/21/2020  
 calcs at 2:00  
 HX CATARACT REMOVAL Bilateral 2007, 2009  HX COLONOSCOPY    
 HX OTHER SURGICAL    
 dental x3  
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 [address] Family History Problem Relation Age of Onset  Cancer Mother Kidney cancer  Cancer Father Prostate cnaceer  Heart Surgery Brother  Breast Cancer Neg Hx Social History Socioeconomic History  Marital status:  Spouse name: Not on file  Number of children: Not on file  Years of education: Not on file  Highest education level: Not on file Occupational History  Occupation:  Social Needs  Financial resource strain: Not on file  Food insecurity Worry: Not on file Inability: Not on file  Transportation needs Medical: Not on file Non-medical: Not on file Tobacco Use  Smoking status: Never Smoker  Smokeless tobacco: Never Used Substance and Sexual Activity  Alcohol use: Yes Alcohol/week: 21.0 standard drinks Types: 7 Shots of liquor, 14 Standard drinks or equivalent per week  Drug use: No  
 Sexual activity: Not on file Lifestyle  Physical activity Days per week: Not on file Minutes per session: Not on file  Stress: Not on file Relationships  Social connections Talks on phone: Not on file Gets together: Not on file Attends Muslim service: Not on file Active member of club or organization: Not on file Attends meetings of clubs or organizations: Not on file Relationship status: Not on file  Intimate partner violence Fear of current or ex partner: Not on file Emotionally abused: Not on file Physically abused: Not on file Forced sexual activity: Not on file Other Topics Concern  Not on file Social History Narrative There is no significant environmental or industrial exposure. She has no known exposure to TB. She has worked as an . Current Facility-Administered Medications Medication Dose Route Frequency Provider Last Rate Last Dose  dexamethasone (DECADRON) 4 mg/mL injection 4 mg  4 mg IntraVENous Q6H Lilliam Knight MD      
 aspirin chewable tablet 81 mg  81 mg Oral DAILY Ben Knight MD   81 mg at 10/31/20 1310  albuterol (PROVENTIL VENTOLIN) nebulizer solution 2.5 mg  2.5 mg Nebulization TID RT Timothy Knight MD   2.5 mg at 10/31/20 0901  furosemide (LASIX) injection 20 mg  20 mg IntraVENous DAILY Timothy Knight MD   20 mg at 10/31/20 3226  lisinopriL (PRINIVIL, ZESTRIL) tablet 5 mg  5 mg Oral DAILY Timothy Knight MD   5 mg at 10/31/20 0903  
 montelukast (SINGULAIR) tablet 10 mg  10 mg Oral DAILY Timothy Knight MD   10 mg at 10/31/20 8366  rosuvastatin (CRESTOR) tablet 40 mg  40 mg Oral QHS Lilliam Knight MD   40 mg at 10/30/20 2216  carvediloL (COREG) tablet 6.25 mg  6.25 mg Oral BID WITH MEALS Timothy Knight MD   6.25 mg at 10/31/20 1789  lansoprazole (PREVACID) 3 mg/mL oral suspension 30 mg  30 mg Oral ACB Timothy Knight MD   30 mg at 10/30/20 0730  levothyroxine (SYNTHROID) tablet 75 mcg  75 mcg Oral ACB Timothy Knight MD   75 mcg at 10/31/20 0515  levETIRAcetam (KEPPRA) oral solution 500 mg  500 mg Oral BID Timothy Knight MD   500 mg at 10/31/20 8304  insulin lispro (HUMALOG) injection   SubCUTAneous Q6H Timothy Knight MD   2 Units at 10/31/20 8893  budesonide (PULMICORT) 500 mcg/2 ml nebulizer suspension  500 mcg Nebulization BID RT Heron Sanders DO   500 mcg at 10/31/20 0901  LORazepam (ATIVAN) tablet 0.5 mg  0.5 mg Oral BID PRN Heron Sanders DO      
 metoprolol (LOPRESSOR) injection 5 mg  5 mg IntraVENous Q4H PRN Heron Sanders DO   5 mg at 10/26/20 1525  
 NUTRITIONAL SUPPORT ELECTROLYTE PRN ORDERS   Does Not Apply PRN Heron Sanders DO      
 fluticasone propionate (FLONASE) 50 mcg/actuation nasal spray 2 Spray  2 Spray Both Nostrils DAILY Tracey Bruner MD   2 Spray at 10/31/20 1558  
 sodium chloride (NS) flush 5-40 mL  5-40 mL IntraVENous Q8H Tracey Bruner MD   5 mL at 10/31/20 6105  sodium chloride (NS) flush 5-40 mL  5-40 mL IntraVENous PRN Tracey Bruner MD      
 ondansetron [facility]) injection 4 mg  4 mg IntraVENous Q6H PRN Tracey Bruner MD      
 acetaminophen (TYLENOL) suppository 650 mg  650 mg Rectal Q4H PRN Ryan Gómez MD      
 LORazepam (ATIVAN) injection 2 mg  2 mg IntraVENous Q2H PRN Ryan Gómez MD      
 albuterol-ipratropium (DUO-NEB) 2.5 MG-0.5 MG/3 ML  3 mL Nebulization Q4H PRN Ryan Gómez MD   3 mL at 10/26/20 4742  levalbuterol (XOPENEX) nebulizer soln 1.25 mg/3 mL  1.25 mg Nebulization Q6H PRN Ryan Gómez MD      
 
 
OBJECTIVE: 
Patient Vitals for the past 8 hrs: 
 BP Temp Pulse Resp SpO2  
10/31/20 0935     95 % 10/31/20 0800 126/81 98.3 °F (36.8 °C) 87 18 96 % 10/31/20 0400 110/74 97.6 °F (36.4 °C) 98 18 97 % Temp (24hrs), Av.7 °F (36.5 °C), Min:97.4 °F (36.3 °C), Max:98.3 °F (36.8 °C) No intake/output data recorded. Physical Exam: 
Constitutional: Well developed, well nourished female in no acute distress, awake and alert sitting in a chair HEENT: Normocephalic and atraumatic. Oropharynx is clear, mucous membranes are moist. Extraocular muscles are intact. Sclerae anicteric. Neck supple without JVD. No thyromegaly present. Lymph node No palpable submandibular, cervical, supraclavicular, axillary or inguinal lymph nodes. Skin Warm and dry. No bruising and no rash noted. No erythema. No pallor. Respiratory  decreased breath sounds left side CVS Normal rate, regular rhythm and normal S1 and S2. No murmurs, gallops, or rubs. Abdomen Soft, nontender and nondistended, normoactive bowel sounds. No palpable mass. No hepatosplenomegaly. Neuro  expressive aphasia. The patient seems to understand conversation and will attempt to respond. Profound right-sided weakness right arm greater than right leg. MSK Normal range of motion in general.  No edema and no tenderness. Psych  difficult to determine Labs:   
Recent Results (from the past 24 hour(s)) GLUCOSE, POC Collection Time: 10/30/20 11:32 AM  
Result Value Ref Range Glucose (POC) 145 (H) 65 - 100 mg/dL GLUCOSE, POC  Collection Time: 10/30/20  6:05 PM  
 Result Value Ref Range Glucose (POC) 175 (H) 65 - 100 mg/dL GLUCOSE, POC Collection Time: 10/30/20 11:36 PM  
Result Value Ref Range Glucose (POC) 134 (H) 65 - 100 mg/dL GLUCOSE, POC Collection Time: 10/31/20  5:23 AM  
Result Value Ref Range Glucose (POC) 163 (H) 65 - 100 mg/dL METABOLIC PANEL, BASIC Collection Time: 10/31/20 10:04 AM  
Result Value Ref Range Sodium 138 138 - 145 mmol/L Potassium 4.5 3.5 - 5.1 mmol/L Chloride 100 98 - 107 mmol/L  
 CO2 31 21 - 32 mmol/L Anion gap 7 7 - 16 mmol/L Glucose 153 (H) 65 - 100 mg/dL BUN 54 (H) 8 - 23 MG/DL Creatinine 0.69 0.6 - 1.0 MG/DL  
 GFR est AA >60 >60 ml/min/1.73m2 GFR est non-AA >60 >60 ml/min/1.73m2 Calcium 8.7 8.3 - 10.4 MG/DL Imaging: XR ABD (KUB) [[id number]]  Collected: 10/26/20 1308 Order Status: Completed  Updated: 10/26/20 1312 Narrative:     
Abdomen for NG tube placement, one view, 10/26/2020 at 1216 hours A single view upper and left abdomen obtained. There is an NG tube. It is kinked  
at the gastroesophageal junction with the tip directed superiorly overlying the  
distal esophagus. This will probably require removing the tube and reinserting  
for proper positioning. Basilar infiltrates noted XR BARIUM SWALLOW [facility] VIDEO [[id number]] Order Status: No result MRI BRAIN W WO CONT [[id number]]  Collected: 10/25/20 1158 Order Status: Completed  Updated: 10/25/20 1209 Narrative:     
MRI brain with and without contrast  
 
History: Abnormal CT. History of lymphoma.  Right hemiparesis Imaging sequences: Sagittal short TR/short TE, axial short TR/short TE, long TR/long TE, FLAIR, gradient recall, diffusion weighted images and ADC mapping. Axial and coronal short TR/short TE postcontrast images.  Imaging was performed  
on a 1.5 Vicky magnet, utilizing the uneventful administration of 10 mL of  
intravenous Dotarem and order to better evaluate for intracranial pathology. Comparison: None. Correlation is made to the CT scan of the brain performed  
earlier on the same day. Findings: There is an enhancing mass within the left frontal region measuring  
approximately 2.3 x 2.2 x 2.3 cm in AP, transverse and craniocaudal dimensions,  
recently. This is a broad-based dural attachment. There is significant  
surrounding edema. This appears at least in part to be extra-axial although a  
more cystic component cannot be stated as such. There are 2 enhancing  
parenchymal lesions within the tracy with the larger measuring 1.0 cm with  
associated edema. There are 2 right occipital lobe mass with the larger  
measuring up to 1.3 cm, also with surrounding edema. There are enhancing right  
frontal bone lesions measuring up to 2.1 cm in size. The ventricles and sulci are normal in size and configuration.  There are no  
extra-axial fluid collections.  Normal flow voids are present within all of the  
major intracranial vessels. There is no evidence of intraparenchymal hemorrhage. There are subcentimeter foci of restricted diffusion within the right occipital  
and parietal lobes raising concern for small foci of acute infarctions. Additional subtle restricted diffusion is present medial to the edema within the  
left frontal region at the left corona radiata and extending into the external  
capsule.    
 
Scattered foci of T2 hyperintensity within the supratentorial white matter most  
likely represent the sequela of chronic small vessel ischemic change,  
unremarkable for age. The visualized mastoid air cells and paranasal sinuses are  
well pneumatized and aerated. Impression:     
IMPRESSION:  
1. Several enhancing masses with the largest within the left frontal region  
which may have an extra axial component with significant surrounding edema. 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 There are also enhancing right frontal bone lesions presumably representing part of  
the same process. 2. Small acute right occipital and parietal lobe infarcts. Additional infarcts  
are suspected within the left corona radiata/external capsule. XR CHEST PORT [[id number]]  Collected: 10/25/20 7846 Order Status: Completed  Updated: 10/25/20 6267 Narrative:     
EXAM: CHEST X-RAY, 1 VIEW INDICATION: stroke. COMPARISON: Chest x-ray 10/20/2020 TECHNIQUE: Single AP view of the chest was obtained. FINDINGS: Interval worsening of ill-defined perihilar markings throughout both  
lungs. Bilateral pleural effusions also appear to be enlarging. The cardiac  
silhouette is partially obscured. No pneumothorax. The bones are unchanged. Impression:     
IMPRESSION:  
Interval worsening of pulmonary edema and enlargement of bilateral effusions. Coexisting infection difficult to fully exclude by x-ray. CTA CODE NEURO HEAD AND NECK W CONT [[id number]]  Collected: 10/25/20 9507 Order Status: Completed  Updated: 10/25/20 7272 Narrative:     
History: Right-sided weakness and difficulty with speech. FINDINGS:  
 
CT angiography was performed of the neck and head with contrast and  
three-dimensional CT angiography reconstruction and reformat was performed. NASCET criteria as needed. CT dose reduction was achieved through use of a  
standardized protocol tailored for this examination and automatic exposure  
control for dose modulation. Bilateral pleural effusion. Parenchymal nodularity in the left upper lobe. There is extensive atherosclerotic ectasia of the imaged segments of the  
thoracic aorta. The ascending aorta measures 2.8 cm. There is a mild stenosis of  
the proximal descending thoracic aorta related to concentric noncalcified  
atheroma. There is a right-sided aortic arch with an occluded right common  
carotid artery and occluded right subclavian artery.  The right vertebral artery  
reconstitutes via collaterals. Reversal of flow is not excluded by CT angiogram.  
 
The innominate artery is patent. The left common carotid artery is patent. The  
left internal carotid artery is patent. There is atherosclerosis at the left  
carotid bulb without hemodynamically significant stenosis. There is a moderate  
stenosis in the supraclinoid segment of the left internal carotid artery. The right internal carotid artery reconstitutes at the right carotid bulb. The  
right middle cerebral artery is patent. There is short segment occlusion in the M1 segment of the left middle cerebral  
artery. The posterior cerebral arteries are patent. Dural venous sinuses are patent. Small left transverse and sigmoid sinus. Multiple enhancing lesions within the brainstem and cerebrum. The largest is in  
the left middle cerebral artery territory. Impression:     
IMPRESSION:  
 
Short segment occlusion of the left middle cerebral artery. Finding reported to  
the emergency room bedside physician at the time of this report. Multiple enhancing lesions in the brainstem and cerebrum. Bilateral pleural effusion. Extensive atherosclerosis of the aorta with occlusion of the right subclavian  
and the right common carotid arteries. This is chronic dating back to June 2019. CT Perfusion w/ Cerebral Blood Flow [[id number]]  Collected: 10/25/20 0725 Order Status: Completed  Updated: 10/25/20 0730 Narrative:     
CT Perfusion Imaging INDICATION:  Acute neuro changes. Right-sided weakness. CT perfusion imaging of the brain was performed after the administration of  
intravenous contrast.  Perfusion maps and perfusion analysis output were  
generated using the [employer] perfusion processing software algorithm.   Radiation  
dose reduction techniques were used for this study:  All CT scans performed at  
this facility use one or all of the following: Automated exposure control,  
 adjustment of the mA and/or kVp according to patient's size, iterative  
reconstruction. COMPARISON: None. Correlation is made to the CTA and CT brain performed on the  
same day. [employer] Output Values: CBF < 30% volume:  3 ml   (core infarction volume greater than 50 cc associated  
with poor outcomes) Tmax > 6 seconds: 41 ml Tmax/CBF Mismatch Volume: 38 ml Tmax/CBF Mismatch Ratio: 13.7 Hypoperfusion Intensity Ratio (Tmax > 10 seconds/Tmax > 6 seconds): 0.3    
(values greater than 0.5 associated with poor outcome) Tmax > 10 seconds Volume: 11 ml   (volume greater than 100 mL is associated with  
poor outcome) Impression:     
IMPRESSION: Although there are changes suggesting small core infarct and  
penumbra within the left frontal lobe the findings are felt to correspond to a  
mass with vasogenic edema on CT scanning. Other intracranial masses are present  
as well. Please note that the determination of patient treatment is not based solely upon  
imaging factors or calculation values. Management of ischemia is at the  
discretion of the primary physician and is based upon a combination of clinical  
and imaging data, along other factors. CT HEAD W CONT [[id number]] Order Status: Canceled CT CODE NEURO HEAD WO CONTRAST [[id number]]  Collected: 10/25/20 6444 Order Status: Completed  Updated: 10/25/20 0049 Narrative:     
CT head without contrast  
 
History: Acute right-sided weakness, speech disturbance. Technique: 5mm axial images were obtained from the skull base to the vertex  
without intravenous contrast.  Radiation dose reduction techniques were used for  
this study:  Our CT scanners use one or all of the following: Automated exposure  
control, adjustment of the mA and/or kVp according to patient's size, iterative  
reconstruction. Comparison: None Findings: The ventricles and sulci are normal in size and configuration. There are no extra-axial fluid collections. There is a region of decreased attenuation  
within the left frontal lobe which involves primarily white matter but also a  
component of the cortex. There is a 1 cm cystic region also in close association  
with this. There is 2 mm of left-to-right midline shift There is no evidence of  
intraparenchymal hemorrhage. The bony calvarium is intact. The visualized  
mastoid air cells and paranasal sinuses are well pneumatized and aerated. Impression:     
Impression: Low attenuation within the left frontal lobe may represent an acute  
or subacute infarct however the possibility of this representing vasogenic edema  
from an underlying mass is not excluded. Postcontrast imaging would be  
recommended for further evaluation. ASSESSMENT: 
Problem List  Date Reviewed: 10/19/2020 Codes Class Noted Systolic heart failure (HCC) ICD-10-CM: I50.20 ICD-9-CM: 428.20  10/27/2020 Expressive aphasia ICD-10-CM: R47.01 
ICD-9-CM: 784.3  10/25/2020 * (Principal) Acute right-sided weakness ICD-10-CM: R53.1 ICD-9-CM: 728.87  10/25/2020 Frontal mass of brain ICD-10-CM: G93.89 ICD-9-CM: 348.89  10/25/2020 Acute encephalopathy ICD-10-CM: G93.40 ICD-9-CM: 348.30  10/25/2020 Multiple lesions on CT of brain and spine ICD-10-CM: R93.0, R93.7 ICD-9-CM: 793.0, 793.7  10/25/2020 Acute CVA (cerebrovascular accident) ([facility] [address] ICD-10-CM: I63.9 ICD-9-CM: 434.91  10/25/2020 Multiple lung nodules on CT (Chronic) ICD-10-CM: R91.8 ICD-9-CM: 793.19  10/12/2020 Overview Addendum 10/12/2020  2:33 PM by Alice Whittaker NP  
  -PET scan 10/2019: PET scan fortunately did not have any abnormal activity in her mass in the left lung. This may mean that we can just follow this radiographically, but we will talk about her at tumor board and come back to her with the group's recommendation. -10/30/2019: Patient is discussed at thoracic conference today lead by Dr Taylor Garcia. Patient is a 79 yo never smoker. CT guided biopsy recommended. -CT guided Biopsy 11/2020: lung biopsy showed signs of this nodule being a hamartoma, which is a benign tumor that we can simply follow. repeat CT in 6 months 
-Chest CT June 2020: CT scan is stable 
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 Ramon Lava are several new pulmonary nodules noted which may suggest some metastatic disease.  This require further follow-up CT of the chest in 2 months or doing PET scan. Hamartoma (HCC) (Chronic) ICD-10-CM: Q85.9 ICD-9-CM: 759.6  10/12/2020 Peripheral vascular disease ([address] ICD-10-CM: I73.9 ICD-9-CM: 443.9  10/12/2020 Bilateral pleural effusion ICD-10-CM: J90 ICD-9-CM: 511.9  10/7/2020 Overview Signed 10/13/2020  1:25 PM by DENI Martinez  
  10/3/2020: L thoracentesis 1050mL removed R thoracentesis 800mL removed 10/9/2020: L thoracentesis: 1000mL removed R thoracentesis: 600mL removed Acute systolic heart failure (HCC) ICD-10-CM: I50.21 ICD-9-CM: 428.21  10/6/2020 PNA (pneumonia) ICD-10-CM: J18.9 ICD-9-CM: 715  10/1/2020 Mass of lingula of lung ICD-10-CM: R91.8 ICD-9-CM: 786.6  10/16/2019 Right carotid artery occlusion ICD-10-CM: D98.52 ICD-9-CM: 433.10  10/11/2019 Subclavian artery stenosis (HCC) ICD-10-CM: I77.1 ICD-9-CM: 447.1  10/11/2019 Left carotid stenosis ICD-10-CM: T90.28 
ICD-9-CM: 433.10  10/12/2018 Hypercholesterolemia (Chronic) ICD-10-CM: E78.00 ICD-9-CM: 272.0  6/30/2015 Dysphagia (Chronic) ICD-10-CM: R13.10 ICD-9-CM: 787.20  6/30/2015 Mild persistent asthma without complication (Chronic) ZAB-88-ZF: J45.30 ICD-9-CM: 493.90  10/17/2013 Acquired hypothyroidism (Chronic) ICD-10-CM: E03.9 ICD-9-CM: 244.9  10/17/2013    
 Allergic rhinitis (Chronic) ICD-10-CM: J30.9 
ICD-9-CM: 477.9  10/17/2013  
   
 GERD (gastroesophageal reflux disease) (Chronic) ICD-10-CM: K21.9 
ICD-9-CM: 530.81  10/17/2013  
   
  
 
Ms. Roland is a 70 y.o. female admitted on 10/25/2020 with a primary diagnosis of brain masses.  Her PMH includes HTN, GERD, Hodgkin's lymphoma, hypothyroidism, sCHF, and recently dx lung hamartomas.  CT chest from 9/21/2020 with L lung mass and sclerotic focus at T6.  Lung mass has been followed by pulm since 2019. Had bx of L lung mass on 11/11/2019 with path +hamartoma.   She presented to ED with c/o acute onset R-sided weakness, aphasia, and confusion.  She was recently discharged on 10/21 after being treated for pneumonia.  MRI brain with several enhancing masses with largest in L frontal region, enhancing R frontal bone lesions; and small acute R occipital and parietal lobe infarcts.  CTA head/neck with short segment occlusion of MCA.  CXR with interval worsening of pulm edema and b/l pleural effusion.  Neuro/neurosurgery following. Brain masses not amenable to surgical resection.  On Dex 6mg q 6 hours and Keppra.  Rad Onc consult pending.  We were consulted for recommendations. 
 
RECOMMENDATIONS: 
Hx Hodgkin's Lymphoma 
-  
 
Brain Masses / CVA 
- MRI brain with several enhancing masses with largest in L frontal region, enhancing R frontal bone lesions; and small acute R occipital and parietal lobe infarcts 
- Neuro/neurosurgery following - brain masses not amenable to surgical resection 
- On Dex 6mg q 6 hours and Keppra 
- Rad Onc consult pending 
- Check CT AP for staging 
10/29 CT CAP pending, rad onc consult pending further work up. 
10/30 Aphasia somewhat improved. Ongoing RUE weakness.  
10/31 Neurologically stable. Ultimately this is most likely due to brain metastases although the source remains a question. Nevertheless we will ask radiation oncology to see her so they are familiar as we proceed with  evaluation. L lung mass/hamartoma - Pt had CT chest on 9/21/20 with L lung mass with multiple pulm nodules and sclerotic focus at T6. Lung mass has been followed by pulm since 2019. Had bx of L lung mass on 11/11/2019 with path +hamartoma. 
- Dr. Trevor Dandy to ask IR if bx of lung nodule possible. Will also check CT AP. 
10/29 Pulmonology following; recent CT chest indicates location likely not amenable to biopsy via EBUS. IR reported biopsy would be difficult although willing to attempt if no other targets available. CT CAP pending barium clearance after swallow study 10/27. Will ask CT to perform CT chest while awaiting CT AP.  
10/30 CT chest done yesterday; continue to await CT AP - pending barium clearance. We have consulted IR to review images to see if one of the pulmonary nodules is accessible and if possible to have that completed over the weekend but more likely next week if they see an accessible target. Otherwise will await CT AP. 
10/31 Referral placed to IR to consider biopsy of one of the lung lesions. Issues discussed with family. Lab studies and imaging studies were personally reviewed. Thank you for allowing us to participate in the care of Ms. Shane Talbot. We will continue to follow along and await biopsy results to confirm diagnosis and formulate treatment plan based on results and further work-up. Anna Eagle MD  
[facility] [address] [address] Office : [phone] Fax : [phone]

## 2020-10-31 NOTE — PROGRESS NOTES
10/31/20 0745 NIH Stroke Scale Interval Other (comment) LOC 0  
LOC Questions 0 LOC Commands 0 Best Gaze 0 Visual 0 Facial Palsy 2 Motor Right Arm 3 Motor Left Arm 0 Motor Right Leg 2 Motor Left Leg 0 Limb Ataxia 1 Sensory 1 Best Language 1 Dysarthria 1 Extinction and Inattention 0 Total 11

## 2020-10-31 NOTE — PROGRESS NOTES
Rick Santana Admission Date: 10/25/2020 Daily Progress Note: 10/31/2020 This patient has been seen and evaluated at the request of Dr. Keith Massey for biopsy recommendations.  
  
Patient is a 69 y.o.  female presents with AMS and MRI showing several enhancing masses with metastatic disease. 
  
  
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  
  
History is as follows: She had a PET scan 10/2019: PET scan fortunately did not have any abnormal activity in her mass in the left lung. 10/30/2019: Patient is discussed at thoracic conference lead by Dr Marek Andrade. Patient is a 79 yo never smoker.  CT guided biopsy recommended. CT guided Biopsy 11/2019: lung biopsy showed signs of this nodule being a hamartoma, which is a benign tumor that we can simply follow. Repeat Chest CT June 2020: CT scan was stable. Chest CT 9/2020: She has a chronic absence of L pulmonary artery on review with radiology. [place] are several new pulmonary nodules noted which may suggest some metastatic disease.  October 2020: she presented here with infiltrates, COvid 19 ruled out and treated with antibiotics. CT  Showed pleural effusions and S/P thoracenteses:L thoracentesis with 1050mL removed and R thoracentesis with 800mL removed on 10/3/2020. Cardiology was consulted on 10/9. Repeat L thoracentesis with 1000mL removed and R thoracentesis with 600mL removed. Cytology negative. Flow cytometry was negative. RHC and LHC was discussed by cardiology once PET scan and need for biopsies completed. 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  Vascular surgery felt that no intervention was necessary at this time.  
  
 She was discharged on 10/2/2020 with the plan to have PET scan planned for today. Unfortunately, she presented here again with new onset hemiplegia and aphasia. She was seen by Neurology and MRI shows several enhancing lesions. We were asked to see her for recommendations regarding biopsy. CT Abd/pelvis is still pending removal of barium from abdomen to determine safest target for biopsy. Subjective: On room air. Awake and conversing. Denies shortness of breath. Review of Systems Constitutional: negative for fevers, chills, sweats and fatigue Respiratory: positive for none Cardiovascular: negative for chest pain, chest pressure/discomfort, dyspnea, palpitations Current Facility-Administered Medications Medication Dose Route Frequency  dexamethasone (DECADRON) 4 mg/mL injection 4 mg  4 mg IntraVENous Q6H  
 aspirin chewable tablet 81 mg  81 mg Oral DAILY  albuterol (PROVENTIL VENTOLIN) nebulizer solution 2.5 mg  2.5 mg Nebulization TID RT  
 furosemide (LASIX) injection 20 mg  20 mg IntraVENous DAILY  lisinopriL (PRINIVIL, ZESTRIL) tablet 5 mg  5 mg Oral DAILY  montelukast (SINGULAIR) tablet 10 mg  10 mg Oral DAILY  rosuvastatin (CRESTOR) tablet 40 mg  40 mg Oral QHS  carvediloL (COREG) tablet 6.25 mg  6.25 mg Oral BID WITH MEALS  lansoprazole (PREVACID) 3 mg/mL oral suspension 30 mg  30 mg Oral ACB  levothyroxine (SYNTHROID) tablet 75 mcg  75 mcg Oral ACB  levETIRAcetam (KEPPRA) oral solution 500 mg  500 mg Oral BID  insulin lispro (HUMALOG) injection   SubCUTAneous Q6H  
 budesonide (PULMICORT) 500 mcg/2 ml nebulizer suspension  500 mcg Nebulization BID RT  
 LORazepam (ATIVAN) tablet 0.5 mg  0.5 mg Oral BID PRN  
 metoprolol (LOPRESSOR) injection 5 mg  5 mg IntraVENous Q4H PRN  
 NUTRITIONAL SUPPORT ELECTROLYTE PRN ORDERS   Does Not Apply PRN  
 fluticasone propionate (FLONASE) 50 mcg/actuation nasal spray 2 Spray  2 Spray Both Nostrils DAILY  sodium chloride (NS) flush 5-40 mL  5-40 mL IntraVENous Q8H  
 sodium chloride (NS) flush 5-40 mL  5-40 mL IntraVENous PRN  
 ondansetron (ZOFRAN) injection 4 mg  4 mg IntraVENous Q6H PRN  
 acetaminophen (TYLENOL) suppository 650 mg  650 mg Rectal Q4H PRN  
 LORazepam (ATIVAN) injection 2 mg  2 mg IntraVENous Q2H PRN  
 albuterol-ipratropium (DUO-NEB) 2.5 MG-0.5 MG/3 ML  3 mL Nebulization Q4H PRN  
 levalbuterol (XOPENEX) nebulizer soln 1.25 mg/3 mL  1.25 mg Nebulization Q6H PRN Objective:  
 
Vitals:  
 10/31/20 0000 10/31/20 0400 10/31/20 0800 10/31/20 0935 BP: 90/60 110/74 126/81 Pulse: 83 98 87 Resp: 16 18 18 Temp: 97.5 °F (36.4 °C) 97.6 °F (36.4 °C) 98.3 °F (36.8 °C) SpO2: 95% 97% 96% 95% Weight:      
Height:      
 
Intake and Output:  
10/29 1901 - 10/31 0700 In: 26 Out: 2075 [Urine:2075] No intake/output data recorded. Intake/Output Summary (Last 24 hours) at 10/31/2020 5281 Last data filed at 10/31/2020 3262 Gross per 24 hour Intake  Output 1350 ml Net -1350 ml Physical Exam:         
Constitutional: the patient appears frail and noting weight loss HEENT: Sclera clear, pupils equal, oral mucosa moist 
Lungs: CTA on RA Cardiovascular: RRR without M,G,R 
Abd/GI: soft and non-tender; with positive bowel sounds. Ext: warm without cyanosis. There is no lower leg edema. Musculoskeletal: moves all four extremities with equal strength Skin: no jaundice or rashes, no wounds Neuro: some improvement in articulation Lines/Drains:  PIV, bustamante, NG tube Nutrition: mechanical soft diet CHEST X-RAYS: 
 
  
CT Chest: 10/[id number] KUB 10/31 LAB Recent Labs 10/30/20 
0315 10/29/20 
0308 WBC 21.4* 19.0* HGB 12.1 11.6* HCT 36.4 36.1  391 Recent Labs 10/30/20 
0315 10/29/20 
0308  139  
K 4.4 4.6  104 CO2 30 29 * 150* BUN 60* 56* CREA 0.88 0.90 MG  --  3.4* Echocardiogram 
 LEFT VENTRICLE: Size was normal. Systolic function was moderately to markedly 
reduced. Ejection fraction was estimated in the range of 30 % to 35 %. There 
was moderate diffuse hypokinesis. Wall thickness was normal. 
  
AORTIC VALVE: The valve was trileaflet. Leaflets exhibited normal cuspal 
separation and mild sclerosis. The aortic valve area by the continuity  
equation 
was 2.07 cm2. The peak velocity was 1.36 m/s. The mean pressure gradient was 5 
mmHg. The peak pressure gradient was 7 mmHg. The Di=0.66. The SVi=36.8. 
  
SUMMARY: 
  
-  Left ventricle: Systolic function was moderately to markedly reduced. Ejection fraction was estimated in the range of 30 % to 35 %. There was 
moderate diffuse hypokinesis.   
-  Aortic valve: The aortic valve area by the continuity equation was 2.07  
cm2. 
  
ADDITIONAL MEASUREMENTS High velocity overlapping flow noted during suprasternal imaging (descending 
aorta appears with normal flow velocities and superimposed high flow Velocities from alternative source of uncertain ESNIFEXF-~4.4Z/K). This was also notedon 
previous echo from 9/30/20. 
  
  
  
 
 
Assessment:  
 
Hospital Problems  Date Reviewed: 10/19/2020 Codes Class Noted POA Systolic heart failure (HCC) ICD-10-CM: I50.20 ICD-9-CM: 428.20  10/27/2020 Yes Expressive aphasia ICD-10-CM: R47.01 
ICD-9-CM: 784.3  10/25/2020 Yes * (Principal) Acute right-sided weakness ICD-10-CM: R53.1 ICD-9-CM: 728.87  10/25/2020 Yes Frontal mass of brain ICD-10-CM: G93.89 ICD-9-CM: 348.89  10/25/2020 Yes Acute encephalopathy ICD-10-CM: G93.40 ICD-9-CM: 348.30  10/25/2020 Yes Multiple lesions on CT of brain and spine ICD-10-CM: R93.0, R93.7 ICD-9-CM: 793.0, 793.7  10/25/2020 Yes Acute CVA (cerebrovascular accident) ([facility] [address] ICD-10-CM: I63.9 ICD-9-CM: 434.91  10/25/2020 Yes Multiple lung nodules on CT (Chronic) ICD-10-CM: R91.8 ICD-9-CM: 793.19  10/12/2020 Yes -PET scan 10/2019: PET scan fortunately did not have any abnormal activity in her mass in the left lung. This may mean that we can just follow this radiographically, but we will talk about her at tumor board and come back to her with the group's recommendation. 
-10/30/2019: Patient is discussed at thoracic conference today lead by Dr Alla Richey. Patient is a 79 yo never smoker. CT guided biopsy recommended. -CT guided Biopsy 11/2020: lung biopsy showed signs of this nodule being a hamartoma, which is a benign tumor that we can simply follow. repeat CT in 6 months 
-Chest CT June 2020: CT scan is stable 
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 Noreene Shouts are several new pulmonary nodules noted which may suggest some metastatic disease.  This require further follow-up CT of the chest in 2 months or doing PET scan. Hamartoma (HCC) (Chronic) ICD-10-CM: Q85.9 ICD-9-CM: 759.6  10/12/2020 Yes Peripheral vascular disease ([address] ICD-10-CM: I73.9 ICD-9-CM: 443.9  10/12/2020 Yes Bilateral pleural effusion ICD-10-CM: J90 ICD-9-CM: 511.9  10/7/2020 Yes 10/3/2020: L thoracentesis 1050mL removed R thoracentesis 800mL removed 10/9/2020: L thoracentesis: 1000mL removed R thoracentesis: 600mL removed Hypercholesterolemia (Chronic) ICD-10-CM: E78.00 ICD-9-CM: 272.0  6/30/2015 Yes Dysphagia (Chronic) ICD-10-CM: R13.10 ICD-9-CM: 787.20  6/30/2015 Yes Acquired hypothyroidism (Chronic) ICD-10-CM: E03.9 ICD-9-CM: 244.9  10/17/2013 Yes GERD (gastroesophageal reflux disease) (Chronic) ICD-10-CM: K21.9 ICD-9-CM: 530.81  10/17/2013 Yes Here with aphasia and found have brain lesions. Her recent Chest CT showed new lung nodules. Concern for metastatic cancer. Hem/onc consulted and pulm consulted for biopsy recommendations. Ct abd/pelvis ordered.   
 
PLAN:  
 
 --Continued efforts to clear barium in order to complete CT Abd/pevis scan and establish plan of care 
--CT chest completed. Per Heme/Onc, IR considering needle biopsy. May need more diuresis prior to procedure 
--Hem/onc following 
--Right sided weakness unchanged 
--Monitor nutritional intake  
--Support palliative care consult to continue establishing goals of care More than 50% of time documented was spent in face-to-face contact with the patient and in the care of the patient on the floor/unit where the patient is located. Qian Ventura NP The patient has been seen and examined by me personally, the chart,labs, and radiographic studies have been reviewed. Chest: CTA- on RA Extremities: no edema I agree with the above assessment and plan. Await abd and pelvis CT but Th spine lesion and LLL lesion are both amenable to IR CT guided Bx. Nothing to add, will be available upon further request and see PRN for now.  
 
Catrina Gamble MD.

## 2020-11-01 NOTE — PROGRESS NOTES
Problem: Falls - Risk of 
Goal: *Absence of Falls Description: Document Geni Hoover Fall Risk and appropriate interventions in the flowsheet. Outcome: Progressing Towards Goal 
Note: Fall Risk Interventions: 
Mobility Interventions: Communicate number of staff needed for ambulation/transfer Mentation Interventions: Bed/chair exit alarm, Door open when patient unattended, Family/sitter at bedside Medication Interventions: Bed/chair exit alarm Elimination Interventions: Call light in reach, Bed/chair exit alarm Problem: Patient Education: Go to Patient Education Activity Goal: Patient/Family Education Outcome: Progressing Towards Goal 
  
Problem: Pressure Injury - Risk of 
Goal: *Prevention of pressure injury Description: Document Bhupendra Scale and appropriate interventions in the flowsheet. Outcome: Progressing Towards Goal 
Note: Pressure Injury Interventions: 
Sensory Interventions: Assess changes in LOC Moisture Interventions: Absorbent underpads Activity Interventions: Pressure redistribution bed/mattress(bed type), PT/OT evaluation Mobility Interventions: Pressure redistribution bed/mattress (bed type), PT/OT evaluation Nutrition Interventions: Document food/fluid/supplement intake Friction and Shear Interventions: Apply protective barrier, creams and emollients, Minimize layers Problem: Patient Education: Go to Patient Education Activity Goal: Patient/Family Education Outcome: Progressing Towards Goal 
  
Problem: TIA/CVA Stroke: 0-24 hours Goal: Off Pathway (Use only if patient is Off Pathway) Outcome: Progressing Towards Goal 
Goal: Activity/Safety Outcome: Progressing Towards Goal 
Goal: Consults, if ordered Outcome: Progressing Towards Goal 
Goal: Diagnostic Test/Procedures Outcome: Progressing Towards Goal 
Goal: Nutrition/Diet Outcome: Progressing Towards Goal 
Goal: Discharge Planning Outcome: Progressing Towards Goal 
Goal: Medications Outcome: Progressing Towards Goal 
Goal: Respiratory Outcome: Progressing Towards Goal 
Goal: Treatments/Interventions/Procedures Outcome: Progressing Towards Goal 
Goal: Minimize risk of bleeding post-thrombolytic infusion Outcome: Progressing Towards Goal 
Goal: Monitor for complications post-thrombolytic infusion Outcome: Progressing Towards Goal 
Goal: Psychosocial 
Outcome: Progressing Towards Goal 
Goal: *Hemodynamically stable Outcome: Progressing Towards Goal 
Goal: *Neurologically stable Description: Absence of additional neurological deficits Outcome: Progressing Towards Goal 
Goal: *Verbalizes anxiety and depression are reduced or absent Outcome: Progressing Towards Goal 
Goal: *Absence of Signs of Aspiration on Current Diet Outcome: Progressing Towards Goal 
Goal: *Absence of deep venous thrombosis signs and symptoms(Stroke Metric) Outcome: Progressing Towards Goal 
Goal: *Ability to perform ADLs and demonstrates progressive mobility and function Outcome: Progressing Towards Goal 
Goal: *Stroke education started(Stroke Metric) Outcome: Progressing Towards Goal 
Goal: *Dysphagia screen performed(Stroke Metric) Outcome: Progressing Towards Goal 
Goal: *Rehab consulted(Stroke Metric) Outcome: Progressing Towards Goal 
  
Problem: Dysphagia (Adult) Goal: *Speech Goal: (INSERT TEXT) Outcome: Progressing Towards Goal 
  
Problem: Patient Education: Go to Patient Education Activity Goal: Patient/Family Education Outcome: Progressing Towards Goal 
  
Problem: Patient Education: Go to Patient Education Activity Goal: Patient/Family Education Outcome: Progressing Towards Goal 
  
Problem: Patient Education: Go to Patient Education Activity Goal: Patient/Family Education Description: 1. Patient will complete lower body bathing and dressing with SBA and adaptive equipment as needed. 2. Patient will complete toileting with SBA. 3. Patient will tolerate 25 minutes of OT treatment with 1-2 rest breaks to increase activity tolerance for ADLs. 4. Patient will complete functional transfers with CGA and adaptive equipment as needed. 5. Patient will tolerate 15 minutes unsupported sitting balance with SBA and adaptive equipment as needed. 6. Patient will complete functional activity while seated edge of bed unsupported with SBA and adaptive equipment as needed. Timeframe: 7 visits Outcome: Progressing Towards Goal 
  
Problem: Patient Education: Go to Patient Education Activity Goal: Patient/Family Education Outcome: Progressing Towards Goal 
  
Problem: Patient Education: Go to Patient Education Activity Goal: Patient/Family Education Outcome: Progressing Towards Goal 
  
Problem: TIA/CVA Stroke: Day 2 Until Discharge Goal: Off Pathway (Use only if patient is Off Pathway) Outcome: Progressing Towards Goal 
Goal: Activity/Safety Outcome: Progressing Towards Goal 
Goal: Diagnostic Test/Procedures Outcome: Progressing Towards Goal 
Goal: Nutrition/Diet Outcome: Progressing Towards Goal 
Goal: Discharge Planning Outcome: Progressing Towards Goal 
Goal: Medications Outcome: Progressing Towards Goal 
Goal: Respiratory Outcome: Progressing Towards Goal 
Goal: Treatments/Interventions/Procedures Outcome: Progressing Towards Goal 
Goal: Psychosocial 
Outcome: Progressing Towards Goal 
Goal: *Verbalizes anxiety and depression are reduced or absent Outcome: Progressing Towards Goal 
Goal: *Absence of aspiration Outcome: Progressing Towards Goal 
Goal: *Absence of deep venous thrombosis signs and symptoms(Stroke Metric) Outcome: Progressing Towards Goal 
Goal: *Optimal pain control at patient's stated goal 
Outcome: Progressing Towards Goal 
Goal: *Tolerating diet Outcome: Progressing Towards Goal 
Goal: *Ability to perform ADLs and demonstrates progressive mobility and function Outcome: Progressing Towards Goal 
 Goal: *Stroke education continued(Stroke Metric) Outcome: Progressing Towards Goal

## 2020-11-01 NOTE — PROGRESS NOTES
Problem: Patient Education: Go to Patient Education Activity Goal: Patient/Family Education Description: 1. Patient will complete lower body bathing and dressing with SBA and adaptive equipment as needed. 2. Patient will complete toileting with SBA. 3. Patient will tolerate 25 minutes of OT treatment with 1-2 rest breaks to increase activity tolerance for ADLs. 4. Patient will complete functional transfers with CGA and adaptive equipment as needed. 5. Patient will tolerate 15 minutes unsupported sitting balance with SBA and adaptive equipment as needed. 6. Patient will complete functional activity while seated edge of bed unsupported with SBA and adaptive equipment as needed. Timeframe: 7 visits Outcome: Progressing Towards Goal 
 
OCCUPATIONAL THERAPY: Daily Note and PM 11/1/2020 INPATIENT: OT Visit Days: 4 Payor: Dewey Meyer / Plan: [employer] MEDICARE CHOICE PPO/PFFS / Product Type: Managed Care Medicare /  
  
NAME/AGE/GENDER: Dann Sharp is a 69 y.o. female PRIMARY DIAGNOSIS:  Frontal mass of brain [G93.89] Acute right-sided weakness [R53.1] Expressive aphasia [R47.01] Acute encephalopathy [G93.40] Multiple lesions on CT of brain and spine [R93.0, R93.7] Acute right-sided weakness Acute right-sided weakness ICD-10: Treatment Diagnosis:  
 · Generalized Muscle Weakness (M62.81) · Other lack of cordination (R27.8) · Difficulty in walking, Not elsewhere classified (R26.2) Precautions/Allergies: 
  Fall precautions Compazine [prochlorperazine edisylate] ASSESSMENT:  
 
Ms. Gabrielle Aguirre presents in ICU for the above diagnoses. Upon arrival, pt supine in bed and agreeable to OT evaluation. Pt is alert however presents with moderate expressive aphasia therefore limited questioning completed during today's evaluation; however pt able to nod head appropriately to simple yes/no questions.  Pt states she lives in a 1-story home with spouse. At baseline, pt notes independence with ADLs and functional mobility. 11/1/2020 Pt presents supine upon arrival. Pt tired from busy day. PROM was performed to increase strength and coordination in RUE. Pt has more active elbow flexion today. Very little  strength in right hand still. Instructed patient in AAROM exercises as well. Pt did not want to perform OOB activities at this time. Will continue to benefit from skilled OT during stay. This section established at most recent assessment PROBLEM LIST (Impairments causing functional limitations): 1. Decreased Strength 2. Decreased ADL/Functional Activities 3. Decreased Transfer Abilities 4. Decreased Ambulation Ability/Technique 5. Decreased Balance 6. Decreased Activity Tolerance 7. Decreased Cognition INTERVENTIONS PLANNED: (Benefits and precautions of occupational therapy have been discussed with the patient.) 1. Activities of daily living training 2. Adaptive equipment training 3. Balance training 4. Clothing management 5. Community reintergration 6. Donning&doffing training 7. Neuromuscular re-eduation 8. Re-evaluation 9. Therapeutic activity 10. Therapeutic exercise TREATMENT PLAN: Frequency/Duration: Follow patient 3x/week to address above goals. Rehabilitation Potential For Stated Goals: Good REHAB RECOMMENDATIONS (at time of discharge pending progress):   
Placement: It is my opinion, based on this patient's performance to date, that Ms. Gabrielle Aguirre may benefit from intensive therapy at an [address] [place] [address] after discharge due to a probable need for multiple therapy disciplines and potential to make ongoing and sustainable functional improvement that is of practical value. [employer] Equipment: ? TBD  
    
 
 
 
OCCUPATIONAL PROFILE AND HISTORY:  
History of Present Injury/Illness (Reason for Referral): 
See H&P Past Medical History/Comorbidities: Ms. Eber Coburn  has a past medical history of Acute CVA (cerebrovascular accident) ([facility] [address] (10/25/2020), Asthma, Bite of nonvenomous arthropod (???), Cough, Esophageal stricture (2002), GERD (gastroesophageal reflux disease), History of rectal bleeding (???), Hodgkin's lymphoma ([facility] [address] (1980), Hypercholesterolemia, Menopause, Positive RAST testing (2007), and Thyroid disease. She also has no past medical history of Chronic kidney disease. Ms. Eber Coburn  has a past surgical history that includes hx tonsillectomy (1954); hx splenectomy (1973); hx appendectomy (1973); hx cataract removal (Bilateral, 2007, 2009); hx colonoscopy; hx other surgical; and hx breast biopsy (Left, 05/21/2020). Social History/Living Environment:  
Home Environment: Private residence One/Two Story Residence: One story Living Alone: No 
Support Systems: Spouse/Significant Other/Partner Patient Expects to be Discharged to[de-identified] Rehabilitation facility Current DME Used/Available at Home: None Prior Level of Function/Work/Activity: 
Independent with ADLs and functional mobility. Personal Factors:   
      Sex:  female Age:  69 y.o. Other factors that influence how disability is experienced by the patient:  Multiple co-morbidities Number of Personal Factors/Comorbidities that affect the Plan of Care: Brief history (0):  LOW COMPLEXITY ASSESSMENT OF OCCUPATIONAL PERFORMANCE[de-identified]  
Activities of Daily Living:  
Basic ADLs (From Assessment) Complex ADLs (From Assessment) Feeding: Minimum assistance Oral Facial Hygiene/Grooming: Minimum assistance Bathing: Maximum assistance Upper Body Dressing: Maximum assistance Lower Body Dressing: Maximum assistance Toileting: Maximum assistance Instrumental ADL Meal Preparation: Total assistance Homemaking: Total assistance Grooming/Bathing/Dressing Activities of Daily Living Most Recent Physical Functioning:  
Gross Assessment: 
  
         
  
Posture: Balance: 
  Bed Mobility: 
  
Wheelchair Mobility: 
  
Transfers: 
   
 
    
 
Patient Vitals for the past 6 hrs: 
 BP BP Patient Position SpO2 Pulse 20 1200 (!) 97/56 At rest 90 % 65  
20 1421   90 %   
20 1545 (!) 145/91 At rest 95 % 86 Mental Status Neurologic State: Alert Orientation Level: Oriented to person, Oriented to place, Oriented to situation, Disoriented to time Cognition: Follows commands Perception: Appears intact Perseveration: No perseveration noted Safety/Judgement: Awareness of environment Physical Skills Involved: 1. Range of Motion 2. Balance 3. Strength 4. Activity Tolerance 5. Sensation 6. Fine Motor Control 7. Gross Motor Control Cognitive Skills Affected (resulting in the inability to perform in a timely and safe manner): 1. Perception 2. Executive Function 3. Comprehension 4. Expression Psychosocial Skills Affected: 1. Habits/Routines 2. Environmental Adaptation Number of elements that affect the Plan of Care: 5+:  HIGH COMPLEXITY CLINICAL DECISION MAKIN[address] [place] [id number] AM-PAC 6 Clicks Daily Activity Inpatient Short Form How much help from another person does the patient currently need. .. Total A Lot A Little None 1. Putting on and taking off regular lower body clothing? [] 1   [x] 2   [] 3   [] 4  
2. Bathing (including washing, rinsing, drying)? [] 1   [x] 2   [] 3   [] 4  
3. Toileting, which includes using toilet, bedpan or urinal?   [] 1   [x] 2   [] 3   [] 4  
4. Putting on and taking off regular upper body clothing? [] 1   [x] 2   [] 3   [] 4  
5. Taking care of personal grooming such as brushing teeth? [] 1   [] 2   [x] 3   [] 4  
6. Eating meals? [] 1   [] 2   [x] 3   [] 4  
© , Trustees of [address] [place] [address] [address] [address], under license to [employer]. All rights reserved Score:  Initial: 14 Most Recent: X (Date: -- ) Interpretation of Tool:  Represents activities that are increasingly more difficult (i.e. Bed mobility, Transfers, Gait). Medical Necessity:    
· Patient demonstrates good rehab potential due to higher previous functional level. Reason for Services/Other Comments: 
· Patient continues to require skilled intervention due to medical complications and patient unable to attend/participate in therapy as expected. Use of outcome tool(s) and clinical judgement create a POC that gives a: LOW COMPLEXITY  
 
 
 
TREATMENT:  
(In addition to Assessment/Re-Assessment sessions the following treatments were rendered) Pre-treatment Symptoms/Complaints:   
Pain: Initial:  
Pain Intensity 1: 0 /10 Post Session:  same Therapeutic Exercise: (15 minutes):  Exercises per grid below to improve mobility, strength, coordination and dynamic movement of arm - right, upper to improve functional bending, lifting, carrying and reaching. Required maximal manual and tactile cues to promote proper body alignment and promote proper body mechanics. Progressed resistance and repetitions as indicated. PROM/AAROM Date: 
10/30 Date: 
11/1 Date: Activity/Exercise Parameters Parameters Parameters Shoulder Abd/Adduction 10 reps 15 reps Shoulder Flexion 10 reps 15 reps Elbow Flexion 10 reps 15 reps Forward Punches 10 reps 15 reps Pro/Supination 10 reps 15 reps Wrist Flexion/Extension 10 reps 15 reps Braces/Orthotics/Lines/Etc:  
· IV 
· ICU monitors · O2 Device: Room air Treatment/Session Assessment:   
· Response to Treatment:  Tolerated well with no issues noted. · Interdisciplinary Collaboration:  
o Certified Occupational Therapy Assistant 
o Registered Nurse · After treatment position/precautions:  
o Supine in bed 
o Bed/Chair-wheels locked 
o Bed in low position 
o Call light within reach 
o Family at bedside · Compliance with Program/Exercises: Compliant all of the time, Will assess as treatment progresses. · Recommendations/Intent for next treatment session: \"Next visit will focus on advancements to more challenging activities and reduction in assistance provided\". Total Treatment Duration: OT Patient Time In/Time Out Time In: 0943 Time Out: 1540 Tom Burroughs

## 2020-11-01 NOTE — PROGRESS NOTES
Hospital course Patient was admitted for acute stroke Hospitalist Note Admit Date:  10/25/2020  6:56 AM  
Name:  Goldie Engle Age:  69 y.o. 
:  1950 MRN:  [id number] PCP:  Jorje Snellen, MD 
Treatment Team: Attending Provider: Andrew Lawrence MD; Consulting Provider: Dante King DO; Consulting Provider: Oscar Ferrer NP; Utilization Review: Mishel Hurt RN; Consulting Provider: Malena Mandel MD; Consulting Provider: Annika Magana NP; Primary Nurse: Gage Gonzales; Primary Nurse: Ju Higuera; Consulting Provider: Deya Collier MD; Utilization Review: Lucius Goodson RN; Care Manager: Be Hall LMSW; Occupational Therapy Assistant: Ramo Mauro; Consulting Provider: Alannah Sheets MD; Charge Nurse: Palma Yanes 
 
HPI/Subjective: This is a 69years old female with hx of HTN, GERD, Hodgkin's lymphoma s/p Radiation Rx, dyslipidemia, hypothyroidism, asthma, systolic CHF EF 99-13% recently diagnosed lung hamartomas presented to ER with acute onset of right sided weakness, aphasia and confusion that began this AM. Pt was recently discharged from [facility] on 10/21 after being treated for PNA and was doing okay until today. Pt is not able to provide much history, she has sort of blank stare but can nod to yes or no questions. Per , pt was walking, doing ADL's  Until yesterday but this morning a drastic change in her mentation and speech was noted by him. ER work up showed CT evidence of enhancing 2.2 cm left frontal lobe mass with vasogenic edema with 2 mm left to right midline shift with 2 additional small masses in tracy and right occipital lobe. CTA head/neck short segment occlusion of left MCA with some changes suggestive of small core infarct and penumbra within the left frontal lobe, no evidence of intraparenchymal hemorrhage. CXR with interval worsening of pulmonary edema and bilateral pleural effusion. \" 
  
 10/26 Decadron and Keppra started. Nursing staff note labored breathing this AM but satting well on RA.   
 
10/27 patient still has dysarthria. Right upper and lower extremity weakness. Patient is alert awake and able to answer some questions with slurred speech. Afebrile. She was started on a diet today after speech therapy eval. 
 
10/28 patient reports feeling better. Still with slurred speech, right upper and lower extremity weakness. Afebrile. 10/29 patient is alert awake. No shortness of breath. No chest pain. Patient has persistent right upper and lower extremity weakness and slurred speech. Afebrile. Patient's brother and  at bedside. Updated regarding current plan of care today. 10/30 patient alert awake, NG tube in place, afebrile. Brother and  at bedside. Discussed about barium on the KUB. Awaiting CT abdomen pelvis. Oncology on board. CT chest shows lung nodules, IR discussed about biopsy of lung nodule by oncology. 10/31 patient complained of pain in the right leg. Duplex ultrasound ordered. KUB abdomen this morning shows barium in the colon. Afebrile. Persistent weakness in the right upper and lower extremities. 11/1 Pt doing well. Duplex right leg negatibe. Afebrile. No nausea, no vomiting. No chest pain. Objective:  
 
Patient Vitals for the past 24 hrs: 
 Temp Pulse Resp BP SpO2  
11/01/20 1421     90 % 11/01/20 1200 97.9 °F (36.6 °C) 65 18 (!) 97/56 90 % 11/01/20 0800 97.7 °F (36.5 °C) 92 18 112/69 94 % 11/01/20 0719     94 % 11/01/20 0400 98.1 °F (36.7 °C) 98 17 113/60 97 % 11/01/20 0000 97.5 °F (36.4 °C) 79 16 116/74 95 % 10/31/20 2000 97.5 °F (36.4 °C) 78 16 93/61 95 % 10/31/20 1928     96 % 10/31/20 1759  81  (!) 101/58   
10/31/20 1600 97.7 °F (36.5 °C) 84 17 (!) 86/53 96 % Oxygen Therapy O2 Sat (%): 90 % (11/01/20 1421) Pulse via Oximetry: 84 beats per minute (11/01/20 1421) O2 Device: Room air (11/01/20 1421) Intake/Output Summary (Last 24 hours) at 11/1/2020 1439 Last data filed at 11/1/2020 1703 Gross per 24 hour Intake  Output 750 ml Net -750 ml *Note that automatically entered I/Os may not be accurate; dependent on patient compliance with collection and accurate  by techs. General:    Ill appearing, tired looking, alert,awake, HEENT: AT, NC,    
CV:   RRR. No murmur, rub, or gallop. Lungs:   CTAB. No wheezing, rhonchi, or rales. Abdomen:   Soft, nontender, nondistended. Extremities: Warm and dry. No cyanosis or edema. Skin:     No rashes or jaundice. Neuro:  Alert,awake,oriented x3,  slurred speech, right upper and lower extremities 2/5, left upper and lower extremities 5/5, Data Review: 
I have reviewed all labs, meds, and studies from the last 24 hours: 
 
Recent Results (from the past 24 hour(s)) GLUCOSE, POC Collection Time: 10/31/20  6:07 PM  
Result Value Ref Range Glucose (POC) 138 (H) 65 - 100 mg/dL GLUCOSE, POC Collection Time: 10/31/20 11:54 PM  
Result Value Ref Range Glucose (POC) 198 (H) 65 - 100 mg/dL METABOLIC PANEL, BASIC Collection Time: 11/01/20  5:36 AM  
Result Value Ref Range Sodium 138 136 - 145 mmol/L Potassium 4.9 3.5 - 5.1 mmol/L Chloride 103 98 - 107 mmol/L  
 CO2 30 21 - 32 mmol/L Anion gap 5 (L) 7 - 16 mmol/L Glucose 174 (H) 65 - 100 mg/dL BUN 54 (H) 8 - 23 MG/DL Creatinine 0.60 0.6 - 1.0 MG/DL  
 GFR est AA >60 >60 ml/min/1.73m2 GFR est non-AA >60 >60 ml/min/1.73m2 Calcium 8.4 8.3 - 10.4 MG/DL  
GLUCOSE, POC Collection Time: 11/01/20  6:06 AM  
Result Value Ref Range Glucose (POC) 170 (H) 65 - 100 mg/dL GLUCOSE, POC Collection Time: 11/01/20 12:20 PM  
Result Value Ref Range Glucose (POC) 161 (H) 65 - 100 mg/dL GLUCOSE, POC Collection Time: 11/01/20 12:47 PM  
Result Value Ref Range Glucose (POC) 160 (H) 65 - 100 mg/dL All Micro Results None No results found for this visit on 10/25/20. Current Meds: 
Current Facility-Administered Medications Medication Dose Route Frequency  [START ON 11/2/2020] furosemide (LASIX) tablet 20 mg  20 mg Oral DAILY  dexamethasone (DECADRON) 4 mg/mL injection 4 mg  4 mg IntraVENous Q6H  
 traMADoL (ULTRAM) tablet 50 mg  50 mg Oral Q6H PRN  
 enoxaparin (LOVENOX) injection 40 mg  40 mg SubCUTAneous Q24H  polyethylene glycol (MIRALAX) packet 17 g  17 g Oral DAILY PRN  
 aspirin chewable tablet 81 mg  81 mg Oral DAILY  albuterol (PROVENTIL VENTOLIN) nebulizer solution 2.5 mg  2.5 mg Nebulization TID RT  
 lisinopriL (PRINIVIL, ZESTRIL) tablet 5 mg  5 mg Oral DAILY  montelukast (SINGULAIR) tablet 10 mg  10 mg Oral DAILY  rosuvastatin (CRESTOR) tablet 40 mg  40 mg Oral QHS  carvediloL (COREG) tablet 6.25 mg  6.25 mg Oral BID WITH MEALS  lansoprazole (PREVACID) 3 mg/mL oral suspension 30 mg  30 mg Oral ACB  levothyroxine (SYNTHROID) tablet 75 mcg  75 mcg Oral ACB  levETIRAcetam (KEPPRA) oral solution 500 mg  500 mg Oral BID  insulin lispro (HUMALOG) injection   SubCUTAneous Q6H  
 budesonide (PULMICORT) 500 mcg/2 ml nebulizer suspension  500 mcg Nebulization BID RT  
 LORazepam (ATIVAN) tablet 0.5 mg  0.5 mg Oral BID PRN  
 metoprolol (LOPRESSOR) injection 5 mg  5 mg IntraVENous Q4H PRN  
 NUTRITIONAL SUPPORT ELECTROLYTE PRN ORDERS   Does Not Apply PRN  
 fluticasone propionate (FLONASE) 50 mcg/actuation nasal spray 2 Spray  2 Spray Both Nostrils DAILY  sodium chloride (NS) flush 5-40 mL  5-40 mL IntraVENous Q8H  
 sodium chloride (NS) flush 5-40 mL  5-40 mL IntraVENous PRN  
 ondansetron (ZOFRAN) injection 4 mg  4 mg IntraVENous Q6H PRN  
 acetaminophen (TYLENOL) suppository 650 mg  650 mg Rectal Q4H PRN  
 LORazepam (ATIVAN) injection 2 mg  2 mg IntraVENous Q2H PRN  
 albuterol-ipratropium (DUO-NEB) 2.5 MG-0.5 MG/3 ML  3 mL Nebulization Q4H PRN  
• levalbuterol (XOPENEX) nebulizer soln 1.25 mg/3 mL  1.25 mg Nebulization Q6H PRN  
 
 
Other Studies (last 24 hours): 
Duplex Lower Ext Venous Right 
 
Result Date: 11/1/2020 
RIGHT LOWER EXTREMITY DEEP VENOUS SONOGRAPHY: CLINICAL HISTORY: Leg pain and swelling. FINDINGS: Multiple images from real time ultrasound evaluation of the deep venous system of the right leg demonstrate normal venous flow in the posterior tibial, popliteal, and superficial and common femoral veins. Normal compressibility was demonstrated. Flow was also documented in the proximal saphenous and deep femoral veins. No intraluminal echogenic material was seen to suggest the presence of nonobstructive thrombus.  
 
IMPRESSION: NO ULTRASOUND EVIDENCE OF DEEP VENOUS THROMBOSIS IN THE RIGHT LEG.  
 
 
Assessment and Plan:  
 
Hospital Problems as of 11/1/2020 Date Reviewed: 10/19/2020  
       Codes Class Noted - Resolved POA  
 Systolic heart failure (HCC) ICD-10-CM: I50.20 
ICD-9-CM: 428.20  10/27/2020 - Present Yes  
   
 Expressive aphasia ICD-10-CM: R47.01 
ICD-9-CM: 784.3  10/25/2020 - Present Yes  
   
 * (Principal) Acute right-sided weakness ICD-10-CM: R53.1 
ICD-9-CM: 728.87  10/25/2020 - Present Yes  
   
 Frontal mass of brain ICD-10-CM: G93.89 
ICD-9-CM: 348.89  10/25/2020 - Present Yes  
   
 Acute encephalopathy ICD-10-CM: G93.40 
ICD-9-CM: 348.30  10/25/2020 - Present Yes  
   
 Multiple lesions on CT of brain and spine ICD-10-CM: R93.0, R93.7 
ICD-9-CM: 793.0, 793.7  10/25/2020 - Present Yes  
   
 Acute CVA (cerebrovascular accident) (HCC) ICD-10-CM: I63.9 
ICD-9-CM: 434.91  10/25/2020 - Present Yes  
   
 Multiple lung nodules on CT (Chronic) ICD-10-CM: R91.8 
ICD-9-CM: 793.19  10/12/2020 - Present Yes  
 Overview Addendum 10/12/2020  2:33 PM by Hazel Nichole NP  
  -PET scan 10/2019: PET scan fortunately did not have any abnormal activity in her mass in the left lung. This may mean that we can just  follow this radiographically, but we will talk about her at tumor board and come back to her with the group's recommendation. 
-10/30/2019: Patient is discussed at thoracic conference today lead by Dr Fabiola Rosado. Patient is a 77 yo never smoker. CT guided biopsy recommended. -CT guided Biopsy 11/2020: lung biopsy showed signs of this nodule being a hamartoma, which is a benign tumor that we can simply follow. repeat CT in 6 months 
-Chest CT June 2020: CT scan is stable 
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 Reynold Krissy are several new pulmonary nodules noted which may suggest some metastatic disease.  This require further follow-up CT of the chest in 2 months or doing PET scan. Hamartoma (HCC) (Chronic) ICD-10-CM: Q85.9 ICD-9-CM: 759.6  10/12/2020 - Present Yes Peripheral vascular disease ([address] ICD-10-CM: I73.9 ICD-9-CM: 443.9  10/12/2020 - Present Yes Bilateral pleural effusion ICD-10-CM: J90 ICD-9-CM: 511.9  10/7/2020 - Present Yes Overview Signed 10/13/2020  1:25 PM by DENI Ruiz  
  10/3/2020: L thoracentesis 1050mL removed R thoracentesis 800mL removed 10/9/2020: L thoracentesis: 1000mL removed R thoracentesis: 600mL removed Hypercholesterolemia (Chronic) ICD-10-CM: E78.00 ICD-9-CM: 272.0  6/30/2015 - Present Yes Dysphagia (Chronic) ICD-10-CM: R13.10 ICD-9-CM: 787.20  6/30/2015 - Present Yes Acquired hypothyroidism (Chronic) ICD-10-CM: E03.9 ICD-9-CM: 244.9  10/17/2013 - Present Yes GERD (gastroesophageal reflux disease) (Chronic) ICD-10-CM: K21.9 ICD-9-CM: 530.81  10/17/2013 - Present Yes RESOLVED: Hypokalemia ICD-10-CM: E87.6 ICD-9-CM: 276.8  10/25/2020 - 10/27/2020 Yes Hospital course: 
 
Patient was admitted for acute stroke with several enhancing masses in the brain likely metastatic. MRI brain showed several enhancing masses with the largest in the left frontal region with significant surrounding edema. CNS lymphoma/metastatic disease and small acute right occipital and parietal lobe infarcts. Neurosurgery recommended no acute intervention. Neurology was consulted patient is currently on aspirin statin. Patient is currently on dexamethasone for cerebral edema. Keppra for seizure prophylaxis. Patient had acute metabolic and colopathy from stroke which resolved. Oncology was consulted given her frontal brain mass. Unknown primary. She has lung nodules but may be difficult for biopsy by pulmonology or IR. Therefore a CT chest abdomen and pelvis was ordered but pending due to clearance of barium from prior barium swallow study to target for biopsy. She complained of right leg pain for which duplex ultrasound was done and negative. She has chronic systolic congestive heart failure with EF of 35%, peripheral vascular disease, GERD, hypothyroidism. Plan: This is a 65-year-old female with Acute stroke with metastatic disease in the brain POA MRI brain with several enhancing masses with the largest in the left frontal region with significant surrounding edema. CNS lymphoma/metastatic disease small acute right occipital and parietal lobe infarcts. No acute neurosurgical intervention. Neurology on board. Recommend aspirin after biopsy. Aspirin, Statin. Oncology consulted. Appreciate recommendations. Dexamethasone 4 mg IV every 6 hours. Keppra 500 twice daily. Acute metabolic encephalopathy from stroke resolved Frontal brain mass likely metastasis - unknown primary? Chronic systolic congestive heart failure with EF of 35% 
currently on IV Lasix. Switch to PO. Multiple lung nodules on CT Oncology on board. Pulmonology consulted for lung nodule biopsy.  
CT chest/ abdomen and pelvis ordered (pending due to clearance of barium from prior barium swallow study)  for metastatic disease and target for biopsy. Bilateral pleural effusions Pulmonary on board. Hypothyroidism Levothyroxine GERD Pepcid. Peripheral vascular disease Right leg pain: Duplex ultrasound negative for DVT. Palliative care consulted. Appreciate recommendations. CODE STATUS DNR. Goals of care discussed with patient and her  and brother at bedside. Awaiting tissue diagnosis before making decisions regarding plan of care. DC planning/Dispo: Anticipate inpatient hospital stay for at least 48 hours. May need rehab facility placement. Diet:  DIET MECHANICAL SOFT 
DIET NUTRITIONAL SUPPLEMENTS 
DIET TUBE FEEDING 
DIET NUTRITIONAL SUPPLEMENTS 
DVT ppx:  Lovenox Signed: 
Severo Abbott MD

## 2020-11-01 NOTE — PROGRESS NOTES
[facility] Hematology & Oncology Inpatient Hematology / Oncology Progress Note Reason for Consult:  Frontal mass of brain [G93.89] Acute right-sided weakness [R53.1] Expressive aphasia [R47.01] Acute encephalopathy [G93.40] Multiple lesions on CT of brain and spine [R93.0, R93.7] Referring Physician:  Syd Kelley MD 
 
24 Hour Events: 
Ongoing RUE weakness; aphasia somewhat improved No major changes otherwise CT AP pending barium clearance 
VSS, afebrile Doppler studies of legs performed. Results pending. ROS: 
Constitutional: Negative for fever, chills, weakness, malaise, fatigue. CV: Negative for chest pain, palpitations, edema. Respiratory: Negative for dyspnea, cough, wheezing. GI: Negative for nausea, abdominal pain, diarrhea. 10 point review of systems is otherwise negative with the exception of the elements mentioned above in the HPI. Allergies Allergen Reactions  Compazine [Prochlorperazine Edisylate] Swelling Past Medical History:  
Diagnosis Date  Acute CVA (cerebrovascular accident) ([facility] [address] 10/25/2020  Asthma  Bite of nonvenomous arthropod ? ??  
 Cough  Esophageal stricture 2002  
 dilated 2002  GERD (gastroesophageal reflux disease)  History of rectal bleeding ???  
 Hodgkin's lymphoma ([facility] [address] 1980 Radiation therapy  Hypercholesterolemia  Menopause  Positive RAST testing 2007 IgE level of 1,391  Thyroid disease Past Surgical History:  
Procedure Laterality Date [address] [facility]  HX BREAST BIOPSY Left 05/21/2020  
 calcs at 2:00  
 HX CATARACT REMOVAL Bilateral 2007, 2009  HX COLONOSCOPY    
 HX OTHER SURGICAL    
 dental x3  
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 [id number]  Family History Problem Relation Age of Onset  Cancer Mother Kidney cancer  Cancer Father Prostate cnaceer  Heart Surgery Brother  Breast Cancer Neg Hx Social History Socioeconomic History  Marital status:  Spouse name: Not on file  Number of children: Not on file  Years of education: Not on file  Highest education level: Not on file Occupational History  Occupation:  Social Needs  Financial resource strain: Not on file  Food insecurity Worry: Not on file Inability: Not on file  Transportation needs Medical: Not on file Non-medical: Not on file Tobacco Use  Smoking status: Never Smoker  Smokeless tobacco: Never Used Substance and Sexual Activity  Alcohol use: Yes Alcohol/week: 21.0 standard drinks Types: 7 Shots of liquor, 14 Standard drinks or equivalent per week  Drug use: No  
 Sexual activity: Not on file Lifestyle  Physical activity Days per week: Not on file Minutes per session: Not on file  Stress: Not on file Relationships  Social connections Talks on phone: Not on file Gets together: Not on file Attends Mu-ism service: Not on file Active member of club or organization: Not on file Attends meetings of clubs or organizations: Not on file Relationship status: Not on file  Intimate partner violence Fear of current or ex partner: Not on file Emotionally abused: Not on file Physically abused: Not on file Forced sexual activity: Not on file Other Topics Concern  Not on file Social History Narrative There is no significant environmental or industrial exposure. She has no known exposure to TB. She has worked as an . Current Facility-Administered Medications Medication Dose Route Frequency Provider Last Rate Last Dose  dexamethasone (DECADRON) 4 mg/mL injection 4 mg  4 mg IntraVENous Q6H Reyna Knight MD   4 mg at 11/01/20 0550  traMADoL (ULTRAM) tablet 50 mg  50 mg Oral Q6H PRN Reyna Knight MD   50 mg at 11/01/20 0608  enoxaparin (LOVENOX) injection 40 mg  40 mg SubCUTAneous Q24H Maria Del Rosario Knight MD   40 mg at 10/31/20 1451  polyethylene glycol (MIRALAX) packet 17 g  17 g Oral DAILY PRN Maria Del Rosario Knight MD      
 aspirin chewable tablet 81 mg  81 mg Oral DAILY Lilliam Knight MD   81 mg at 11/01/20 0850  
 albuterol (PROVENTIL VENTOLIN) nebulizer solution 2.5 mg  2.5 mg Nebulization TID RT Maria Del Rosario Knight MD   2.5 mg at 11/01/20 0719  furosemide (LASIX) injection 20 mg  20 mg IntraVENous DAILY Maria Del Rosario Knight MD   20 mg at 11/01/20 0849  
 lisinopriL (PRINIVIL, ZESTRIL) tablet 5 mg  5 mg Oral DAILY Lilliam Knight MD   5 mg at 11/01/20 0850  
 montelukast (SINGULAIR) tablet 10 mg  10 mg Oral DAILY Lilliam Knight MD   10 mg at 11/01/20 0850  
 rosuvastatin (CRESTOR) tablet 40 mg  40 mg Oral QHS Lilliam Knight MD   40 mg at 10/31/20 2206  carvediloL (COREG) tablet 6.25 mg  6.25 mg Oral BID WITH MEALS Maria Del Rosario Knight MD   6.25 mg at 11/01/20 1812  lansoprazole (PREVACID) 3 mg/mL oral suspension 30 mg  30 mg Oral ACB Maria Del Rosario Knight MD   30 mg at 11/01/20 3525  levothyroxine (SYNTHROID) tablet 75 mcg  75 mcg Oral ACB Maria Del Rosario Knight MD   75 mcg at 11/01/20 0550  levETIRAcetam (KEPPRA) oral solution 500 mg  500 mg Oral BID Maria Del Rosario Knight MD   500 mg at 11/01/20 0849  
 insulin lispro (HUMALOG) injection   SubCUTAneous Q6H Maria Del Rosario Knight MD   2 Units at 11/01/20 1820  budesonide (PULMICORT) 500 mcg/2 ml nebulizer suspension  500 mcg Nebulization BID RT Veronica Cos, DO   500 mcg at 11/01/20 9623  LORazepam (ATIVAN) tablet 0.5 mg  0.5 mg Oral BID PRN Veronica Cos, DO      
 metoprolol (LOPRESSOR) injection 5 mg  5 mg IntraVENous Q4H PRN Veronica Cos, DO   5 mg at 10/26/20 1520  
 NUTRITIONAL SUPPORT ELECTROLYTE PRN ORDERS   Does Not Apply PRN Veronica Cos, DO      
 fluticasone propionate (FLONASE) 50 mcg/actuation nasal spray 2 Spray  2 Spray Both Nostrils DAILY Kevin Hendrickson MD   2 [place] at 11/01/20 0267  sodium chloride (NS) flush 5-40 mL  5-40 mL IntraVENous Q8H Edward Branch MD   5 mL at 20 0600  
 sodium chloride (NS) flush 5-40 mL  5-40 mL IntraVENous PRN Edward Branch MD      
 ondansetron [facility]) injection 4 mg  4 mg IntraVENous Q6H PRN Edward Branch MD      
 acetaminophen (TYLENOL) suppository 650 mg  650 mg Rectal Q4H PRN Edward Branch MD      
 LORazepam (ATIVAN) injection 2 mg  2 mg IntraVENous Q2H PRN Edward Branch MD      
 albuterol-ipratropium (DUO-NEB) 2.5 MG-0.5 MG/3 ML  3 mL Nebulization Q4H PRN Edward Branch MD   3 mL at 10/26/20 0930  levalbuterol (XOPENEX) nebulizer soln 1.25 mg/3 mL  1.25 mg Nebulization Q6H PRN Edward Branch MD      
 
 
OBJECTIVE: 
Patient Vitals for the past 8 hrs: 
 BP Temp Pulse Resp SpO2  
20 0800 112/69 97.7 °F (36.5 °C) 92 18 94 % 20 0719     94 % 20 0400 113/60 98.1 °F (36.7 °C) 98 17 97 % Temp (24hrs), Av.7 °F (36.5 °C), Min:97.5 °F (36.4 °C), Max:98.1 °F (36.7 °C) No intake/output data recorded. Physical Exam: 
Constitutional: Well developed, well nourished female in no acute distress, awake and alert sitting in a chair HEENT: Normocephalic and atraumatic. Oropharynx is clear, mucous membranes are moist. Extraocular muscles are intact. Sclerae anicteric. Neck supple without JVD. No thyromegaly present. Lymph node No palpable submandibular, cervical, supraclavicular, axillary or inguinal lymph nodes. Skin Warm and dry. No bruising and no rash noted. No erythema. No pallor. Respiratory  decreased breath sounds left side CVS Normal rate, regular rhythm and normal S1 and S2. No murmurs, gallops, or rubs. Abdomen Soft, nontender and nondistended, normoactive bowel sounds. No palpable mass. No hepatosplenomegaly. Neuro  expressive aphasia. The patient seems to understand conversation and will attempt to respond.   Profound right-sided weakness right arm greater than right leg. MSK Normal range of motion in general.  No edema and no tenderness. Psych  difficult to determine Labs:   
Recent Results (from the past 24 hour(s)) GLUCOSE, POC Collection Time: 10/31/20 12:07 PM  
Result Value Ref Range Glucose (POC) 156 (H) 65 - 100 mg/dL GLUCOSE, POC Collection Time: 10/31/20  6:07 PM  
Result Value Ref Range Glucose (POC) 138 (H) 65 - 100 mg/dL GLUCOSE, POC Collection Time: 10/31/20 11:54 PM  
Result Value Ref Range Glucose (POC) 198 (H) 65 - 100 mg/dL METABOLIC PANEL, BASIC Collection Time: 11/01/20  5:36 AM  
Result Value Ref Range Sodium 138 136 - 145 mmol/L Potassium 4.9 3.5 - 5.1 mmol/L Chloride 103 98 - 107 mmol/L  
 CO2 30 21 - 32 mmol/L Anion gap 5 (L) 7 - 16 mmol/L Glucose 174 (H) 65 - 100 mg/dL BUN 54 (H) 8 - 23 MG/DL Creatinine 0.60 0.6 - 1.0 MG/DL  
 GFR est AA >60 >60 ml/min/1.73m2 GFR est non-AA >60 >60 ml/min/1.73m2 Calcium 8.4 8.3 - 10.4 MG/DL  
GLUCOSE, POC Collection Time: 11/01/20  6:06 AM  
Result Value Ref Range Glucose (POC) 170 (H) 65 - 100 mg/dL Imaging: XR ABD (KUB) [[id number]]  Collected: 10/26/20 1308 Order Status: Completed  Updated: 10/26/20 1312 Narrative:     
Abdomen for NG tube placement, one view, 10/26/2020 at 1216 hours A single view upper and left abdomen obtained. There is an NG tube. It is kinked  
at the gastroesophageal junction with the tip directed superiorly overlying the  
distal esophagus. This will probably require removing the tube and reinserting  
for proper positioning. Basilar infiltrates noted XR BARIUM SWALLOW [facility] VIDEO [[id number]] Order Status: No result MRI BRAIN W WO CONT [[id number]]  Collected: 10/25/20 5967 Order Status: Completed  Updated: 10/25/20 1207 Narrative:     
MRI brain with and without contrast  
 
History: Abnormal CT. History of lymphoma.  Right hemiparesis Imaging sequences: Sagittal short TR/short TE, axial short TR/short TE, long TR/long TE, FLAIR, gradient recall, diffusion weighted images and ADC mapping. Axial and coronal short TR/short TE postcontrast images. Imaging was performed  
on a 1.5 Vicky magnet, utilizing the uneventful administration of 10 mL of  
intravenous Dotarem and order to better evaluate for intracranial pathology. Comparison: None. Correlation is made to the CT scan of the brain performed  
earlier on the same day. Findings: There is an enhancing mass within the left frontal region measuring  
approximately 2.3 x 2.2 x 2.3 cm in AP, transverse and craniocaudal dimensions,  
recently. This is a broad-based dural attachment. There is significant  
surrounding edema. This appears at least in part to be extra-axial although a  
more cystic component cannot be stated as such. There are 2 enhancing  
parenchymal lesions within the tracy with the larger measuring 1.0 cm with  
associated edema. There are 2 right occipital lobe mass with the larger  
measuring up to 1.3 cm, also with surrounding edema. There are enhancing right  
frontal bone lesions measuring up to 2.1 cm in size. The ventricles and sulci are normal in size and configuration.  There are no  
extra-axial fluid collections.  Normal flow voids are present within all of the  
major intracranial vessels. There is no evidence of intraparenchymal hemorrhage. There are subcentimeter foci of restricted diffusion within the right occipital  
and parietal lobes raising concern for small foci of acute infarctions.   
Additional subtle restricted diffusion is present medial to the edema within the  
left frontal region at the left corona radiata and extending into the external  
capsule.    
 
Scattered foci of T2 hyperintensity within the supratentorial white matter most  
likely represent the sequela of chronic small vessel ischemic change,  
 unremarkable for age. The visualized mastoid air cells and paranasal sinuses are  
well pneumatized and aerated. Impression:     
IMPRESSION:  
1. Several enhancing masses with the largest within the left frontal region  
which may have an extra axial component with significant surrounding edema. 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 There  
are also enhancing right frontal bone lesions presumably representing part of  
the same process. 2. Small acute right occipital and parietal lobe infarcts. Additional infarcts  
are suspected within the left corona radiata/external capsule. XR CHEST PORT [[id number]]  Collected: 10/25/20 1790 Order Status: Completed  Updated: 10/25/20 2253 Narrative:     
EXAM: CHEST X-RAY, 1 VIEW INDICATION: stroke. COMPARISON: Chest x-ray 10/20/2020 TECHNIQUE: Single AP view of the chest was obtained. FINDINGS: Interval worsening of ill-defined perihilar markings throughout both  
lungs. Bilateral pleural effusions also appear to be enlarging. The cardiac  
silhouette is partially obscured. No pneumothorax. The bones are unchanged. Impression:     
IMPRESSION:  
Interval worsening of pulmonary edema and enlargement of bilateral effusions. Coexisting infection difficult to fully exclude by x-ray. CTA CODE NEURO HEAD AND NECK W CONT [[id number]]  Collected: 10/25/20 4629 Order Status: Completed  Updated: 10/25/20 2583 Narrative:     
History: Right-sided weakness and difficulty with speech. FINDINGS:  
 
CT angiography was performed of the neck and head with contrast and  
three-dimensional CT angiography reconstruction and reformat was performed. NASCET criteria as needed. CT dose reduction was achieved through use of a  
standardized protocol tailored for this examination and automatic exposure  
control for dose modulation. Bilateral pleural effusion. Parenchymal nodularity in the left upper lobe. There is extensive atherosclerotic ectasia of the imaged segments of the  
thoracic aorta. The ascending aorta measures 2.8 cm. There is a mild stenosis of  
the proximal descending thoracic aorta related to concentric noncalcified  
atheroma. There is a right-sided aortic arch with an occluded right common  
carotid artery and occluded right subclavian artery. The right vertebral artery  
reconstitutes via collaterals. Reversal of flow is not excluded by CT angiogram.  
 
The innominate artery is patent. The left common carotid artery is patent. The  
left internal carotid artery is patent. There is atherosclerosis at the left  
carotid bulb without hemodynamically significant stenosis. There is a moderate  
stenosis in the supraclinoid segment of the left internal carotid artery. The right internal carotid artery reconstitutes at the right carotid bulb. The  
right middle cerebral artery is patent. There is short segment occlusion in the M1 segment of the left middle cerebral  
artery. The posterior cerebral arteries are patent. Dural venous sinuses are patent. Small left transverse and sigmoid sinus. Multiple enhancing lesions within the brainstem and cerebrum. The largest is in  
the left middle cerebral artery territory. Impression:     
IMPRESSION:  
 
Short segment occlusion of the left middle cerebral artery. Finding reported to  
the emergency room bedside physician at the time of this report. Multiple enhancing lesions in the brainstem and cerebrum. Bilateral pleural effusion. Extensive atherosclerosis of the aorta with occlusion of the right subclavian  
and the right common carotid arteries. This is chronic dating back to June 2019. CT Perfusion w/ Cerebral Blood Flow [[id number]]  Collected: 10/25/20 0725 Order Status: Completed  Updated: 10/25/20 0730 Narrative:     
CT Perfusion Imaging INDICATION:  Acute neuro changes. Right-sided weakness. CT perfusion imaging of the brain was performed after the administration of  
intravenous contrast.  Perfusion maps and perfusion analysis output were  
generated using the [employer] perfusion processing software algorithm.   Radiation  
dose reduction techniques were used for this study:  All CT scans performed at  
this facility use one or all of the following: Automated exposure control,  
adjustment of the mA and/or kVp according to patient's size, iterative  
reconstruction. COMPARISON: None. Correlation is made to the CTA and CT brain performed on the  
same day. [employer] Output Values: CBF < 30% volume:  3 ml   (core infarction volume greater than 50 cc associated  
with poor outcomes) Tmax > 6 seconds: 41 ml Tmax/CBF Mismatch Volume: 38 ml Tmax/CBF Mismatch Ratio: 13.7 Hypoperfusion Intensity Ratio (Tmax > 10 seconds/Tmax > 6 seconds): 0.3    
(values greater than 0.5 associated with poor outcome) Tmax > 10 seconds Volume: 11 ml   (volume greater than 100 mL is associated with  
poor outcome) Impression:     
IMPRESSION: Although there are changes suggesting small core infarct and  
penumbra within the left frontal lobe the findings are felt to correspond to a  
mass with vasogenic edema on CT scanning. Other intracranial masses are present  
as well. Please note that the determination of patient treatment is not based solely upon  
imaging factors or calculation values. Management of ischemia is at the  
discretion of the primary physician and is based upon a combination of clinical  
and imaging data, along other factors. CT HEAD W CONT [[id number]] Order Status: Canceled CT CODE NEURO HEAD WO CONTRAST [[id number]]  Collected: 10/25/20 1777 Order Status: Completed  Updated: 10/25/20 9786 Narrative:     
CT head without contrast  
 
History: Acute right-sided weakness, speech disturbance. Technique: 5mm axial images were obtained from the skull base to the vertex without intravenous contrast.  Radiation dose reduction techniques were used for  
this study:  Our CT scanners use one or all of the following: Automated exposure  
control, adjustment of the mA and/or kVp according to patient's size, iterative  
reconstruction. Comparison: None Findings: The ventricles and sulci are normal in size and configuration. There  
are no extra-axial fluid collections. There is a region of decreased attenuation  
within the left frontal lobe which involves primarily white matter but also a  
component of the cortex. There is a 1 cm cystic region also in close association  
with this. There is 2 mm of left-to-right midline shift There is no evidence of  
intraparenchymal hemorrhage. The bony calvarium is intact. The visualized  
mastoid air cells and paranasal sinuses are well pneumatized and aerated. Impression:     
Impression: Low attenuation within the left frontal lobe may represent an acute  
or subacute infarct however the possibility of this representing vasogenic edema  
from an underlying mass is not excluded. Postcontrast imaging would be  
recommended for further evaluation. ASSESSMENT: 
Problem List  Date Reviewed: 10/19/2020 Codes Class Noted Systolic heart failure (HCC) ICD-10-CM: I50.20 ICD-9-CM: 428.20  10/27/2020 Expressive aphasia ICD-10-CM: R47.01 
ICD-9-CM: 784.3  10/25/2020 * (Principal) Acute right-sided weakness ICD-10-CM: R53.1 ICD-9-CM: 728.87  10/25/2020 Frontal mass of brain ICD-10-CM: G93.89 ICD-9-CM: 348.89  10/25/2020 Acute encephalopathy ICD-10-CM: G93.40 ICD-9-CM: 348.30  10/25/2020 Multiple lesions on CT of brain and spine ICD-10-CM: R93.0, R93.7 ICD-9-CM: 793.0, 793.7  10/25/2020 Acute CVA (cerebrovascular accident) ([facility] [address] ICD-10-CM: I63.9 ICD-9-CM: 434.91  10/25/2020 Multiple lung nodules on CT (Chronic) ICD-10-CM: R91.8 ICD-9-CM: 793.19  10/12/2020 Overview Addendum 10/12/2020  2:33 PM by Lyric Chin NP  
  -PET scan 10/2019: PET scan fortunately did not have any abnormal activity in her mass in the left lung. This may mean that we can just follow this radiographically, but we will talk about her at tumor board and come back to her with the group's recommendation. 
-10/30/2019: Patient is discussed at thoracic conference today lead by Dr Maribell Bello. Patient is a 77 yo never smoker. CT guided biopsy recommended. -CT guided Biopsy 11/2020: lung biopsy showed signs of this nodule being a hamartoma, which is a benign tumor that we can simply follow. repeat CT in 6 months 
-Chest CT June 2020: CT scan is stable 
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 Lucetta Record are several new pulmonary nodules noted which may suggest some metastatic disease.  This require further follow-up CT of the chest in 2 months or doing PET scan. Hamartoma (HCC) (Chronic) ICD-10-CM: Q85.9 ICD-9-CM: 759.6  10/12/2020 Peripheral vascular disease ([address] ICD-10-CM: I73.9 ICD-9-CM: 443.9  10/12/2020 Bilateral pleural effusion ICD-10-CM: J90 ICD-9-CM: 511.9  10/7/2020 Overview Signed 10/13/2020  1:25 PM by DENI Giordano  
  10/3/2020: L thoracentesis 1050mL removed R thoracentesis 800mL removed 10/9/2020: L thoracentesis: 1000mL removed R thoracentesis: 600mL removed Acute systolic heart failure (HCC) ICD-10-CM: I50.21 ICD-9-CM: 428.21  10/6/2020 PNA (pneumonia) ICD-10-CM: J18.9 ICD-9-CM: 591  10/1/2020 Mass of lingula of lung ICD-10-CM: R91.8 ICD-9-CM: 786.6  10/16/2019 Right carotid artery occlusion ICD-10-CM: Y88.57 ICD-9-CM: 433.10  10/11/2019 Subclavian artery stenosis (HCC) ICD-10-CM: I77.1 ICD-9-CM: 447.1  10/11/2019 Left carotid stenosis ICD-10-CM: C36.24 
ICD-9-CM: 433.10  10/12/2018 Hypercholesterolemia (Chronic) ICD-10-CM: E78.00 ICD-9-CM: 272.0  6/30/2015 Dysphagia (Chronic) ICD-10-CM: R13.10 ICD-9-CM: 787.20  6/30/2015 Mild persistent asthma without complication (Chronic) [id number]-45-MF: J45.30 ICD-9-CM: 493.90  10/17/2013 Acquired hypothyroidism (Chronic) ICD-10-CM: E03.9 ICD-9-CM: 244.9  10/17/2013 Allergic rhinitis (Chronic) ICD-10-CM: J30.9 ICD-9-CM: 477.9  10/17/2013 GERD (gastroesophageal reflux disease) (Chronic) ICD-10-CM: K21.9 ICD-9-CM: 530.81  10/17/2013 Ms. Miah Toscano is a 69 y.o. female admitted on 10/25/2020 with a primary diagnosis of brain masses. Her PMH includes HTN, GERD, Hodgkin's lymphoma, hypothyroidism, sCHF, and recently dx lung hamartomas. CT chest from 9/21/2020 with L lung mass and sclerotic focus at T6. Lung mass has been followed by pulm since 2019. Had bx of L lung mass on 11/11/2019 with path +hamartoma. She presented to ED with c/o acute onset R-sided weakness, aphasia, and confusion. She was recently discharged on 10/21 after being treated for pneumonia. MRI brain with several enhancing masses with largest in L frontal region, enhancing R frontal bone lesions; and small acute R occipital and parietal lobe infarcts. CTA head/neck with short segment occlusion of MCA. CXR with interval worsening of pulm edema and b/l pleural effusion. Neuro/neurosurgery following. Brain masses not amenable to surgical resection. On Dex 6mg q 6 hours and Keppra. Rad Onc consult pending. We were consulted for recommendations. RECOMMENDATIONS: 
Hx Hodgkin's Lymphoma 
-  
 
Brain Masses / CVA 
- MRI brain with several enhancing masses with largest in L frontal region, enhancing R frontal bone lesions; and small acute R occipital and parietal lobe infarcts 
- Neuro/neurosurgery following - brain masses not amenable to surgical resection - On Dex 6mg q 6 hours and Keppra - Rad Onc consult pending - Check CT AP for staging 10/29 CT CAP pending, rad onc consult pending further work up. 
10/30 Aphasia somewhat improved. Ongoing RUE weakness. 10/31 Neurologically stable. Ultimately this is most likely due to brain metastases although the source remains a question. Nevertheless we will ask radiation oncology to see her so they are familiar as we proceed with evaluation. 11/1 No change in neuro function. Working with PT. Consult placed for Rad Onc. Hopefully IR can biopsy either a lung mass (preferrable) or bone lesion. Await CT abdomen pending barium clearance. If persistent, might consider laxative. L lung mass/hamartoma - Pt had CT chest on 9/21/20 with L lung mass with multiple pulm nodules and sclerotic focus at T6. Lung mass has been followed by pulm since 2019. Had bx of L lung mass on 11/11/2019 with path +hamartoma. 
- Dr. Evaristo Rosales to ask IR if bx of lung nodule possible. Will also check CT AP. 
10/29 Pulmonology following; recent CT chest indicates location likely not amenable to biopsy via EBUS. IR reported biopsy would be difficult although willing to attempt if no other targets available. CT CAP pending barium clearance after swallow study 10/27. Will ask CT to perform CT chest while awaiting CT AP.  
10/30 CT chest done yesterday; continue to await CT AP - pending barium clearance. We have consulted IR to review images to see if one of the pulmonary nodules is accessible and if possible to have that completed over the weekend but more likely next week if they see an accessible target. Otherwise will await CT AP. 
10/31 Referral placed to IR to consider biopsy of one of the lung lesions. Issues discussed with family. Lab studies and imaging studies were personally reviewed. Thank you for allowing us to participate in the care of Ms. Karthik Price. We will continue to follow along and await biopsy results to confirm diagnosis and formulate treatment plan based on results and further work-up. Stephanie Oden MD  
[facility] H[facility]ology [facility] On[facility] [address] [address] Office : [phone] Fax : [phone]

## 2020-11-01 NOTE — PROGRESS NOTES
11/01/20 0749  
NIH Stroke Scale  
Interval Other (comment) 
(Dual NIH with Alba ALICIA )  
LOC 0  
LOC Questions 0  
LOC Commands 0  
Best Gaze 0  
Visual 0  
Facial Palsy 2  
Motor Right Arm 3  
Motor Left Arm 0  
Motor Right Leg 2  
Motor Left Leg 0  
Limb Ataxia 1  
Sensory 1  
Best Language 1  
Dysarthria 1  
Extinction and Inattention 0  
Total 11

## 2020-11-02 NOTE — PROGRESS NOTES
Problem: Patient Education: Go to Patient Education Activity Goal: Patient/Family Education Description: 1. Patient will complete lower body bathing and dressing with SBA and adaptive equipment as needed. 2. Patient will complete toileting with SBA. 3. Patient will tolerate 25 minutes of OT treatment with 1-2 rest breaks to increase activity tolerance for ADLs. 4. Patient will complete functional transfers with CGA and adaptive equipment as needed. 5. Patient will tolerate 15 minutes unsupported sitting balance with SBA and adaptive equipment as needed. 6. Patient will complete functional activity while seated edge of bed unsupported with SBA and adaptive equipment as needed. Timeframe: 7 visits Outcome: Progressing Towards Goal 
 
OCCUPATIONAL THERAPY: Daily Note, AM and PM 11/2/2020 INPATIENT: OT Visit Days: 5 Payor: Edin Lechuga / Plan: [employer] MEDICARE CHOICE PPO/PFFS / Product Type: Managed Care Medicare /  
  
NAME/AGE/GENDER: Maura Piña is a 69 y.o. female PRIMARY DIAGNOSIS:  Frontal mass of brain [G93.89] Acute right-sided weakness [R53.1] Expressive aphasia [R47.01] Acute encephalopathy [G93.40] Multiple lesions on CT of brain and spine [R93.0, R93.7] Acute right-sided weakness Acute right-sided weakness ICD-10: Treatment Diagnosis:  
 · Generalized Muscle Weakness (M62.81) · Other lack of cordination (R27.8) · Difficulty in walking, Not elsewhere classified (R26.2) Precautions/Allergies: 
  Fall precautions Compazine [prochlorperazine edisylate] ASSESSMENT:  
 
Ms. Juana Escalante presents in ICU for the above diagnoses. Upon arrival, pt supine in bed and agreeable to OT evaluation. Pt is alert however presents with moderate expressive aphasia therefore limited questioning completed during today's evaluation; however pt able to nod head appropriately to simple yes/no questions.  Pt states she lives in a 1-story home with spouse. At baseline, pt notes independence with ADLs and functional mobility. 11/2/2020 Pt presents supine upon arrival. Pt assisted with self care tasks (in grid below) while long sitting in bed. Pt required more assistance due to RUE weakness. Pt required mod assist x2 for all mobility and transfers at this time. Pt demonstrates fair sitting balance at edge of bed and poor standing balance while performing transfers. Pt looked good up in chair. Later this afternoon, PROM was performed to patient's RUE to increase strength and coordination. Pt continue to have significant amount of weakness. Some active elbow flexion but minimal. Left with all needs in reach. Will continue to benefit from skilled OT during stay. This section established at most recent assessment PROBLEM LIST (Impairments causing functional limitations): 1. Decreased Strength 2. Decreased ADL/Functional Activities 3. Decreased Transfer Abilities 4. Decreased Ambulation Ability/Technique 5. Decreased Balance 6. Decreased Activity Tolerance 7. Decreased Cognition INTERVENTIONS PLANNED: (Benefits and precautions of occupational therapy have been discussed with the patient.) 1. Activities of daily living training 2. Adaptive equipment training 3. Balance training 4. Clothing management 5. Community reintergration 6. Donning&doffing training 7. Neuromuscular re-eduation 8. Re-evaluation 9. Therapeutic activity 10. Therapeutic exercise TREATMENT PLAN: Frequency/Duration: Follow patient 3x/week to address above goals. Rehabilitation Potential For Stated Goals: Good REHAB RECOMMENDATIONS (at time of discharge pending progress):   
Placement: It is my opinion, based on this patient's performance to date, that Ms. Olvin Cai may benefit from intensive therapy at an [address] [place] [address] after discharge due to a probable need for multiple therapy disciplines and potential to make ongoing and sustainable functional improvement that is of practical value. Alfonso Glow Equipment: ? TBD  
    
 
 
 
OCCUPATIONAL PROFILE AND HISTORY:  
History of Present Injury/Illness (Reason for Referral): 
See H&P Past Medical History/Comorbidities: Ms. Romulo Cruz  has a past medical history of Acute CV[facility]ce[facility] (10/25/2020), Asthma, Bite of nonvenomous arthropod (???), Cough, Esophageal stricture (2002), GERD (gastroesophageal reflux disease), History of rectal bleeding (???), Hodgkin's lymphoma ([facility] (1980), Hypercholesterolemia, Menopause, Positive RAST testing (2007), and Thyroid disease. She also has no past medical history of Chronic kidney disease. Ms. Romulo Cruz  has a past surgical history that includes hx tonsillectomy (1954); hx splenectomy (1973); hx appendectomy (1973); hx cataract removal (Bilateral, 2007, 2009); hx colonoscopy; hx other surgical; and hx breast biopsy (Left, 05/21/2020). Social History/Living Environment:  
Home Environment: Private residence One/Two Story Residence: One story Living Alone: No 
Support Systems: Spouse/Significant Other/Partner Patient Expects to be Discharged to[de-identified] Rehabilitation facility Current DME Used/Available at Home: None Prior Level of Function/Work/Activity: 
Independent with ADLs and functional mobility. Personal Factors:   
      Sex:  female Age:  69 y.o. Other factors that influence how disability is experienced by the patient:  Multiple co-morbidities Number of Personal Factors/Comorbidities that affect the Plan of Care: Brief history (0):  LOW COMPLEXITY ASSESSMENT OF OCCUPATIONAL PERFORMANCE[de-identified]  
Activities of Daily Living:  
Basic ADLs (From Assessment) Complex ADLs (From Assessment) Feeding: Minimum assistance Oral Facial Hygiene/Grooming: Minimum assistance Bathing: Maximum assistance Upper Body Dressing: Maximum assistance Lower Body Dressing: Maximum assistance Toileting: Maximum assistance Instrumental ADL 
 Meal Preparation: Total assistance Homemaking: Total assistance Grooming/Bathing/Dressing Activities of Daily Living Grooming Washing Face: Set-up; Supervision Brushing/Combing Hair: Moderate assistance(washed hair with shampoo cap) Cognitive Retraining Safety/Judgement: Awareness of environment Upper Body Bathing Bathing Assistance: Moderate assistance Position Performed: Long sitting on bed Lower Body Bathing Perineal  : Maximum assistance Position Performed: Supine Lower Body : Maximum assistance Position Performed: Long sitting on bed Upper Body Dressing Assistance Hospital Gown: Minimum  assistance Lower Body Dressing Assistance Socks: Total assistance (dependent) Bed/Mat Mobility Rolling: Minimum assistance Supine to Sit: Minimum assistance;Assist x2 Sit to Supine: Moderate assistance;Assist x2 Sit to Stand: Minimum assistance;Assist x2(R knee block) Stand to Sit: Minimum assistance;Assist x2 Bed to Chair: Moderate assistance;Assist x2 Scooting: Minimum assistance; Moderate assistance Most Recent Physical Functioning:  
Gross Assessment: 
  
         
  
Posture: 
  
Balance: 
Sitting: Impaired Sitting - Static: Fair (occasional) Sitting - Dynamic: Fair (occasional) Standing: Impaired Standing - Static: Fair Standing - Dynamic : Poor Bed Mobility: 
Rolling: Minimum assistance Supine to Sit: Minimum assistance;Assist x2 Sit to Supine: Moderate assistance;Assist x2 Scooting: Minimum assistance; Moderate assistance Wheelchair Mobility: 
  
Transfers: 
Sit to Stand: Minimum assistance;Assist x2(R knee block) Stand to Sit: Minimum assistance;Assist x2 Bed to Chair: Moderate assistance;Assist x2 Interventions: Safety awareness training;Verbal cues; Visual cues Patient Vitals for the past 6 hrs: 
 BP BP Patient Position SpO2 Pulse 11/02/20 1200 126/74 At rest 96 % 87  
11/02/20 1325   98 %  Mental Status Neurologic State: Alert Orientation Level: Oriented X4 Cognition: Follows commands Perception: Appears intact Perseveration: No perseveration noted Safety/Judgement: Awareness of environment Physical Skills Involved: 1. Range of Motion 2. Balance 3. Strength 4. Activity Tolerance 5. Sensation 6. Fine Motor Control 7. Gross Motor Control Cognitive Skills Affected (resulting in the inability to perform in a timely and safe manner): 1. Perception 2. Executive Function 3. Comprehension 4. Expression Psychosocial Skills Affected: 1. Habits/Routines 2. Environmental Adaptation Number of elements that affect the Plan of Care: 5+:  HIGH COMPLEXITY CLINICAL DECISION MAKIN[address] [place] [address] AM-PAC 6 Clicks Daily Activity Inpatient Short Form How much help from another person does the patient currently need. .. Total A Lot A Little None 1. Putting on and taking off regular lower body clothing? [] 1   [x] 2   [] 3   [] 4  
2. Bathing (including washing, rinsing, drying)? [] 1   [x] 2   [] 3   [] 4  
3. Toileting, which includes using toilet, bedpan or urinal?   [] 1   [x] 2   [] 3   [] 4  
4. Putting on and taking off regular upper body clothing? [] 1   [x] 2   [] 3   [] 4  
5. Taking care of personal grooming such as brushing teeth? [] 1   [] 2   [x] 3   [] 4  
6. Eating meals? [] 1   [] 2   [x] 3   [] 4  
© , Trustees of [address] [place] [address], under license to [employer]. All rights reserved Score:  Initial: 14 Most Recent: X (Date: -- ) Interpretation of Tool:  Represents activities that are increasingly more difficult (i.e. Bed mobility, Transfers, Gait). Medical Necessity:    
· Patient demonstrates good rehab potential due to higher previous functional level. Reason for Services/Other Comments: 
· Patient continues to require skilled intervention due to medical complications and patient unable to attend/participate in therapy as expected. Use of outcome tool(s) and clinical judgement create a POC that gives a: LOW COMPLEXITY  
 
 
 
TREATMENT:  
(In addition to Assessment/Re-Assessment sessions the following treatments were rendered) Pre-treatment Symptoms/Complaints:   
Pain: Initial:  
Pain Intensity 1: 0 /10 Post Session:  same Self Care: (10 minutes): Procedure(s) (per grid) utilized to improve and/or restore self-care/home management as related to dressing, bathing and grooming. Required moderate manual and tactile cueing to facilitate activities of daily living skills. Therapeutic Activity: (15 minutes): Therapeutic activities including Bed transfers, Chair transfers and sitting edge of bed to improve mobility, strength and balance. Required moderate assistance x2 to promote static and dynamic balance in standing. Therapeutic Exercise: (10 minutes):  Exercises per grid below to improve mobility, strength, coordination and dynamic movement of arm - right, upper to improve functional bending, lifting, carrying and reaching. Required maximal manual and tactile cues to promote proper body mechanics. Progressed repetitions as indicated. PROM/AAROM Date: 
10/30 Date: 
11/1 Date: 
11/2 Activity/Exercise Parameters Parameters Parameters Shoulder Abd/Adduction 10 reps 15 reps 10 reps Shoulder Flexion 10 reps 15 reps 10 reps Elbow Flexion 10 reps 15 reps 10 reps Forward Punches 10 reps 15 reps 10 reps Pro/Supination 10 reps 15 reps 10 reps Wrist Flexion/Extension 10 reps 15 reps 10 reps Braces/Orthotics/Lines/Etc:  
· IV 
· ICU monitors · O2 Device: Room air Treatment/Session Assessment:   
· Response to Treatment:  Tolerated well with no issues noted. · Interdisciplinary Collaboration:  
o Certified Occupational Therapy Assistant 
o Registered Nurse · After treatment position/precautions:  
o Supine in bed 
o Bed/Chair-wheels locked 
o Bed in low position 
o Call light within reach · Compliance with Program/Exercises: Compliant all of the time, Will assess as treatment progresses. · Recommendations/Intent for next treatment session: \"Next visit will focus on advancements to more challenging activities and reduction in assistance provided\". Total Treatment Duration: OT Patient Time In/Time Out Time In: 7466(6274) Time Out: 5889(0266) Tom Henry

## 2020-11-02 NOTE — PROGRESS NOTES
11/02/20 1856 NIH Stroke Scale Interval Other (comment) (dual NIH with MARAL Antony)  
LOC 0  
LOC Questions 0 LOC Commands 0 Best Gaze 0 Visual 0 Facial Palsy 2 Motor Right Arm 2 Motor Left Arm 0 Motor Right Leg 2 Motor Left Leg 0 Limb Ataxia 1 Sensory 1 Best Language 1 Dysarthria 1 Extinction and Inattention 0 Total 10

## 2020-11-02 NOTE — PROGRESS NOTES
Problem: Dysphagia (Adult) Goal: *Speech Goal: (INSERT TEXT) Outcome: Progressing Towards Goal 
LTG: Patient will tolerate least restrictive diet without overt signs or symptoms of airway compromise. STG: Patient will participate in modified barium swallow study to objectively assess swallow function. Goal met 10/27/20 STG: Patient will consume chopped mechanical soft diet and thin liquids by cup without overt signs or symptoms of respiratory compromise. Goal added 10/27/20 Problem: Neurolinguistics (Adult) Goal: *Speech Goal: (INSERT TEXT) Outcome: Progressing Towards Goal 
LTG: Patient will increase receptive/expressive language skills demonstrated by the ability to communicate basic wants/needs across environments STG: Patient will answer yes/no questions with 75% accuracy given mod cueing. Goal met 10/29/20 STG: Patient will follow 1 step commands with 75% accuracy given moderate cueing. Goal met 10/29/20 STG: Patient will identify item in field of 2 with 60% accuracy given moderate cueing STG: Patient will identify body parts presented verbally with 50% accuracy given moderate cueing. STG: Patient will complete automatic naming tasks with 50%  accuracy given moderate cueing SPEECH LANGUAGE PATHOLOGY: DYSPHAGIA AND SPEECH-LANGUAGE/COGNITION: Daily Note 4 NAME/AGE/GENDER: Austen Lindsay is a 69 y.o. female DATE: 11/2/2020 PRIMARY DIAGNOSIS: Frontal mass of brain [G93.89] Acute right-sided weakness [R53.1] Expressive aphasia [R47.01] Acute encephalopathy [G93.40] Multiple lesions on CT of brain and spine [R93.0, R93.7] ICD-10: Treatment Diagnosis: R13.12 Dysphagia, Oropharyngeal Phase 
F80.2 Mixed Receptive-Expressive Language Disorder 
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 2 Apraxia RECOMMENDATIONS  
DIET:  
· PO diet texture:  Mechanical soft with chopped meat and vegetables, with extra sauces/gravies · Liquids:  regular thin (single sips) 
  
 MEDICATIONS: Crushed in puree COMPENSATORY STRATEGIES/MODIFICATIONS INCLUDING: 
· Fully awake/alert · Small bites and sips · Remain upright for 20-30 min after any PO · Slow rate of PO intake · Check mouth for pocketed PO 
· Liquid wash · Assistance with meals OTHER RECOMMENDATIONS (including follow up treatment recommendations): · Treatment to improve/facilitate oral/pharyngeal skills · Training in use of compensatory safe swallowing strategies/feeding guidelines · Patient/family education EDUCATION:Recommendations discussed with RN and patient CONTINUATION OF SKILLED SERVICES/MEDICAL NECESSITY: 
? Patient is expected to demonstrate progress in  swallow strength, swallow timeliness, swallow function and swallow safety in order to  improve swallow safety, work toward diet advancement and decrease aspiration risk. ? Patient is expected to demonstrate progress in expressive communication and receptive ability to decrease assistance required communication, increase independence with activities of daily living and increase communication with family/caregivers. ? Patient continues to require skilled intervention due to dysphagia, aphasia RECOMMENDATIONS for CONTINUED SPEECH THERAPY: YES: Anticipate need for ongoing speech therapy during this hospitalization and at next level of care. ASSESSMENT Dysphagia: patient tolerating mechanical soft diet and thin liquids without overt s/sx airway compromise. Mildly prolonged mastication and mild oral residue, but functional given increased time for clearance. Patient reports she prefers magic cups, yogurt, ensure. Patient declined downgrade to pureed diet when offered in efforts to improve overall intake/timelienss. Continue mechanical soft diet and thin liquids. Small controlled sips. Assistance with po intake. Will continue to follow. Language: not addressed.  Continue higher level language treatment to improve expressive communication. Recommend ongoing speech therapy during inpatient stay and at next level of care to address dysphagia and functional communication abilities. REHABILITATION POTENTIAL FOR STATED GOALS: Fair PLAN   
FREQUENCY/DURATION: Continue to follow patient 3 times a week for duration of hospital stay to address above goals. - Recommendations for next treatment session: Next treatment will address language/dysphagia SUBJECTIVE Upright in bed.  at bedside. Problem List:  (Impairments causing functional limitations): 1. Oropharyngeal dysphagia 2. Expressive aphasia Orientation:  
Person Place Pain: Pain Scale 1: Numeric (0 - 10) Pain Intensity 1: 0 OBJECTIVE Swallow treatment- 
Patient presented with thin liquid via cup and straw, puree, and mixed. No overt s/sx airway compromise. Mildly prolonged prep with chewables and mild oral residue, but adequate clearance with lingual sweep and liquid wash independently. Speech/language: Not addressed this date. INTERDISCIPLINARY COLLABORATION: Registered Nurse PRECAUTIONS/ALLERGIES: Compazine [prochlorperazine edisylate] Tool Used: Dysphagia Outcome and Severity Scale (MONICA) Score Comments Normal Diet  [] 7 With no strategies or extra time needed Functional Swallow  [] 6 May have mild oral or pharyngeal delay Mild Dysphagia  [] 5 Which may require one diet consistency restricted Mild-Moderate Dysphagia  [] 4 With 1-2 diet consistencies restricted Moderate Dysphagia  [] 3 With 2 or more diet consistencies restricted Moderate-Severe Dysphagia  [] 2 With partial PO strategies (trials with ST only) Severe Dysphagia  [] 1 With inability to tolerate any PO safely Score:  Initial: 2 Most Recent: 5 (Date 11/02/20 ) Interpretation of Tool: The Dysphagia Outcome and Severity Scale (MONICA) is a simple, easy-to-use, 7-point scale developed to systematically rate the functional severity of dysphagia based on objective assessment and make recommendations for diet level, independence level, and type of nutrition. Tool Used: MODIFIED SOLO SCALE (mRS) Score No Symptoms  [] 0 No significant disability despite symptoms; able to carry out all usual duties and activities  [] 1 Slight disability; unable to carry out all previous activities but able to look after own affairs without assistance. [] 2 Moderate disability; requiring some help but able to walk without assistance  [] 3 Moderately severe disability; unable to walk without assistance and unable to attend to own bodily needs without assistance  [] 4 Severe disability; bedridden, incontinent, and requiring constant nursing care and attention  [] 5 Score:  Initial: 4 3 Interpretation of Tool: The Modified [place] Scale is a 7-point scaled used to quantify level of disability as it relates to a patient's functional abilities. SAFETY: 
After treatment position/precautions: · Upright in bed · RN notified · Family at bedside Total Treatment Duration:  
Time In: 2981 Time Out: 13[address] CCC-SLP

## 2020-11-02 NOTE — PROGRESS NOTES
Date of Outreach Update: 
Fuad Westfall was seen and assessed. MEWS Score: 1 (11/02/20 0000) Vitals:  
 11/01/20 1919 11/01/20 2000 11/02/20 0000 11/02/20 0400 BP:  95/62 115/67 108/71 Pulse:  83 87 92 Resp:  18 18 18 Temp:  97.9 °F (36.6 °C) 97.4 °F (36.3 °C) 97.7 °F (36.5 °C) SpO2: 94% 94% 92% 94% Weight:      
Height:      
  
 
 Pain Assessment Pain Intensity 1: 0 (11/02/20 0030) Pain Location 1: Back Pain Intervention(s) 1: Medication (see MAR) Patient Stated Pain Goal: 0 Previous Outreach assessment has been reviewed. There have been no significant clinical changes since the completion of the last dated Outreach assessment. VS, labs, and progress notes reviewed. Pt sleeping comfortably on RA, no signs of respiratory distress. Spoke with pt's primary RN who says she is AOx4 when awake,following commands. No new concerns at this time. Will discharge from program per outreach protocol. Signed By:   Dulce Maria Greco RN   November 2, 2020 5:22 AM

## 2020-11-02 NOTE — PROGRESS NOTES
Hospital course Patient was admitted for acute stroke Hospitalist Note Admit Date:  10/25/2020  6:56 AM  
Name:  Maranda Lane Age:  69 y.o. 
:  1950 MRN:  [id number] PCP:  Heriberto Hinojosa MD 
Treatment Team: Attending Provider: Dmitriy Martinez MD; Consulting Provider: Alla Petit DO; Consulting Provider: Jessica Aldridge NP; Utilization Review: Carlos Salvador RN; Consulting Provider: Abdi Escobedo MD; Consulting Provider: Mike Tirado NP; Primary Nurse: Misa Taveras; Utilization Review: Digna Hoffman, MARAL; Care Manager: Asmita Caraballo [facility] [place] Consulting Provider: Yara Lopez MD; Charge Nurse: Luis Davila RN; Utilization Review: Bob Lucas, MARAL; Charge Nurse: Luis M Wen; Physical Therapist: Dalila Jimenez, PT, DPT; Occupational Therapy Assistant: Eliza Maria; Speech Language Pathologist: Alyssa Kendrick 
 
HPI/Subjective: This is a 69years old female with hx of HTN, GERD, Hodgkin's lymphoma s/p Radiation Rx, dyslipidemia, hypothyroidism, asthma, systolic CHF EF 57-23% recently diagnosed lung hamartomas presented to ER with acute onset of right sided weakness, aphasia and confusion that began this AM. Pt was recently discharged from [facility] on 10/21 after being treated for PNA and was doing okay until today. Pt is not able to provide much history, she has sort of blank stare but can nod to yes or no questions. Per , pt was walking, doing ADL's  Until yesterday but this morning a drastic change in her mentation and speech was noted by him. ER work up showed CT evidence of enhancing 2.2 cm left frontal lobe mass with vasogenic edema with 2 mm left to right midline shift with 2 additional small masses in tracy and right occipital lobe. CTA head/neck short segment occlusion of left MCA with some changes suggestive of small core infarct and penumbra within the left frontal lobe, no evidence of intraparenchymal hemorrhage. CXR with interval worsening of pulmonary edema and bilateral pleural effusion. \" 
  
10/26 Decadron and Keppra started. Nursing staff note labored breathing this AM but satting well on RA.   
 
10/27 patient still has dysarthria. Right upper and lower extremity weakness. Patient is alert awake and able to answer some questions with slurred speech. Afebrile. She was started on a diet today after speech therapy eval. 
 
10/28 patient reports feeling better. Still with slurred speech, right upper and lower extremity weakness. Afebrile. 10/29 patient is alert awake. No shortness of breath. No chest pain. Patient has persistent right upper and lower extremity weakness and slurred speech. Afebrile. Patient's brother and  at bedside. Updated regarding current plan of care today. 10/30 patient alert awake, NG tube in place, afebrile. Brother and  at bedside. Discussed about barium on the KUB. Awaiting CT abdomen pelvis. Oncology on board. CT chest shows lung nodules, IR discussed about biopsy of lung nodule by oncology. 10/31 patient complained of pain in the right leg. Duplex ultrasound ordered. KUB abdomen this morning shows barium in the colon. Afebrile. Persistent weakness in the right upper and lower extremities. 11/1 Pt doing well. Duplex right leg negative. Afebrile. No nausea, no vomiting. No chest pain. 11/2 patient is doing well this morning. Alert awake. Still has persistent right upper and lower extremity weakness. Afebrile. No nausea no vomiting no abdominal pain. Objective:  
 
Patient Vitals for the past 24 hrs: 
 Temp Pulse Resp BP SpO2  
11/02/20 1200 97.4 °F (36.3 °C) 87 18 126/74 96 % 11/02/20 0800 97.9 °F (36.6 °C) 94 18 105/65 93 % 11/02/20 0701     95 % 11/02/20 0400 97.7 °F (36.5 °C) 92 18 108/71 94 % 11/02/20 0000 97.4 °F (36.3 °C) 87 18 115/67 92 % 11/01/20 2000 97.9 °F (36.6 °C) 83 18 95/62 94 % 11/01/20 1919     94 % 11/01/20 1545 98.1 °F (36.7 °C) 86 18 (!) 145/91 95 % 11/01/20 1421     90 % Oxygen Therapy O2 Sat (%): 96 % (11/02/20 1200) Pulse via Oximetry: 94 beats per minute (11/02/20 0701) O2 Device: Room air (11/02/20 0701) Intake/Output Summary (Last 24 hours) at 11/2/2020 1309 Last data filed at 11/2/2020 0800 Gross per 24 hour Intake 160 ml Output 450 ml Net -290 ml *Note that automatically entered I/Os may not be accurate; dependent on patient compliance with collection and accurate  by techs. General:    Ill appearing, tired looking, alert,awake, HEENT: AT, NC, NG tube in place, CV:   RRR. No murmur, rub, or gallop. Lungs:   CTAB. No wheezing, rhonchi, or rales. Abdomen:   Soft, nontender, nondistended. Extremities: Warm and dry. No cyanosis or edema. Skin:     No rashes or jaundice. Neuro:  Alert,awake,oriented x3,  slurred speech, right upper and lower extremities 2/5, left upper and lower extremities 5/5, Data Review: 
I have reviewed all labs, meds, and studies from the last 24 hours: 
 
Recent Results (from the past 24 hour(s)) GLUCOSE, POC Collection Time: 11/01/20  5:43 PM  
Result Value Ref Range Glucose (POC) 238 (H) 65 - 100 mg/dL GLUCOSE, POC Collection Time: 11/02/20  1:33 AM  
Result Value Ref Range Glucose (POC) 279 (H) 65 - 100 mg/dL CBC W/O DIFF Collection Time: 11/02/20  4:54 AM  
Result Value Ref Range WBC 27.6 (H) 4.3 - 11.1 K/uL  
 RBC 4.15 4.05 - 5.2 M/uL  
 HGB 12.9 11.7 - 15.4 g/dL HCT 39.7 35.8 - 46.3 % MCV 95.7 79.6 - 97.8 FL  
 MCH 31.1 26.1 - 32.9 PG  
 MCHC 32.5 31.4 - 35.0 g/dL  
 RDW 13.5 11.9 - 14.6 % PLATELET 783 790 - 577 K/uL MPV 11.2 9.4 - 12.3 FL ABSOLUTE NRBC 0.05 0.0 - 0.2 K/uL METABOLIC PANEL, BASIC Collection Time: 11/02/20  4:54 AM  
Result Value Ref Range Sodium 136 (L) 138 - 145 mmol/L Potassium 5.0 3.5 - 5.1 mmol/L  Chloride 99 98 - 107 mmol/L  
 CO2 30 21 - 32 mmol/L Anion gap 7 7 - 16 mmol/L Glucose 164 (H) 65 - 100 mg/dL BUN 55 (H) 8 - 23 MG/DL Creatinine 0.67 0.6 - 1.0 MG/DL  
 GFR est AA >60 >60 ml/min/1.73m2 GFR est non-AA >60 >60 ml/min/1.73m2 Calcium 8.6 8.3 - 10.4 MG/DL  
GLUCOSE, POC Collection Time: 11/02/20  6:15 AM  
Result Value Ref Range Glucose (POC) 129 (H) 65 - 100 mg/dL GLUCOSE, POC Collection Time: 11/02/20 12:10 PM  
Result Value Ref Range Glucose (POC) 134 (H) 65 - 100 mg/dL All Micro Results None No results found for this visit on 10/25/20. Current Meds: 
Current Facility-Administered Medications Medication Dose Route Frequency  polyethylene glycol (MIRALAX) packet 17 g  17 g Oral DAILY  furosemide (LASIX) tablet 20 mg  20 mg Oral DAILY  phenol throat spray (CHLORASEPTIC) 1 Spray  1 Spray Oral PRN  
 dexamethasone (DECADRON) 4 mg/mL injection 4 mg  4 mg IntraVENous Q6H  
 traMADoL (ULTRAM) tablet 50 mg  50 mg Oral Q6H PRN  
 enoxaparin (LOVENOX) injection 40 mg  40 mg SubCUTAneous Q24H  polyethylene glycol (MIRALAX) packet 17 g  17 g Oral DAILY PRN  
 aspirin chewable tablet 81 mg  81 mg Oral DAILY  albuterol (PROVENTIL VENTOLIN) nebulizer solution 2.5 mg  2.5 mg Nebulization TID RT  
 lisinopriL (PRINIVIL, ZESTRIL) tablet 5 mg  5 mg Oral DAILY  montelukast (SINGULAIR) tablet 10 mg  10 mg Oral DAILY  rosuvastatin (CRESTOR) tablet 40 mg  40 mg Oral QHS  carvediloL (COREG) tablet 6.25 mg  6.25 mg Oral BID WITH MEALS  lansoprazole (PREVACID) 3 mg/mL oral suspension 30 mg  30 mg Oral ACB  levothyroxine (SYNTHROID) tablet 75 mcg  75 mcg Oral ACB  levETIRAcetam (KEPPRA) oral solution 500 mg  500 mg Oral BID  insulin lispro (HUMALOG) injection   SubCUTAneous Q6H  
 budesonide (PULMICORT) 500 mcg/2 ml nebulizer suspension  500 mcg Nebulization BID RT  
 LORazepam (ATIVAN) tablet 0.5 mg  0.5 mg Oral BID PRN  
  metoprolol (LOPRESSOR) injection 5 mg  5 mg IntraVENous Q4H PRN  
 NUTRITIONAL SUPPORT ELECTROLYTE PRN ORDERS   Does Not Apply PRN  
 fluticasone propionate (FLONASE) 50 mcg/actuation nasal spray 2 Spray  2 Spray Both Nostrils DAILY  sodium chloride (NS) flush 5-40 mL  5-40 mL IntraVENous Q8H  
 sodium chloride (NS) flush 5-40 mL  5-40 mL IntraVENous PRN  
 ondansetron (ZOFRAN) injection 4 mg  4 mg IntraVENous Q6H PRN  
 acetaminophen (TYLENOL) suppository 650 mg  650 mg Rectal Q4H PRN  
 LORazepam (ATIVAN) injection 2 mg  2 mg IntraVENous Q2H PRN  
 albuterol-ipratropium (DUO-NEB) 2.5 MG-0.5 MG/3 ML  3 mL Nebulization Q4H PRN  
 levalbuterol (XOPENEX) nebulizer soln 1.25 mg/3 mL  1.25 mg Nebulization Q6H PRN Other Studies (last 24 hours): Xr Abd (kub) Result Date: 11/2/2020 KUB: CLINICAL HISTORY:  Barium transit surveillance prior to CT. COMPARISON:  October 31, 2020. FINDINGS:  AP supine images demonstrates interval clearance of barium from the right colon with residual from the distal transverse colon through the rectum. No dilated small bowel loops. No abnormal soft tissue mass or calcific density is noted. Proximal sidehole of the nasogastric tube is at the level of the gastroesophageal junction. Pleural effusions are seen at both lung bases. IMPRESSION:  1. SLIGHT PROGRESSION OF COLONIC BARIUM SINCE OCTOBER 31 WITH RESIDUAL FROM THE DISTAL TRANSVERSE COLON THROUGH THE RECTUM. 2. NASOGASTRIC TUBE PROXIMAL SIDEHOLE IS AT THE LEVEL OF THE GASTROESOPHAGEAL JUNCTION. ADVANCEMENT BY APPROXIMATELY 10 CM IS SUGGESTED. Assessment and Plan:  
 
Hospital Problems as of 11/2/2020 Date Reviewed: 10/19/2020 Codes Class Noted - Resolved POA Systolic heart failure (HCC) ICD-10-CM: I50.20 ICD-9-CM: 428.20  10/27/2020 - Present Yes Expressive aphasia ICD-10-CM: R47.01 
ICD-9-CM: 784.3  10/25/2020 - Present Yes * (Principal) Acute right-sided weakness ICD-10-CM: R53.1 ICD-9-CM: 728.87  10/25/2020 - Present Yes Frontal mass of brain ICD-10-CM: G93.89 ICD-9-CM: 348.89  10/25/2020 - Present Yes Acute encephalopathy ICD-10-CM: G93.40 ICD-9-CM: 348.30  10/25/2020 - Present Yes Multiple lesions on CT of brain and spine ICD-10-CM: R93.0, R93.7 ICD-9-CM: 793.0, 793.7  10/25/2020 - Present Yes Acute CVA (cerebrovascular accident) ([facility] [address] ICD-10-CM: I63.9 ICD-9-CM: 434.91  10/25/2020 - Present Yes Multiple lung nodules on CT (Chronic) ICD-10-CM: R91.8 ICD-9-CM: 793.19  10/12/2020 - Present Yes Overview Addendum 10/12/2020  2:33 PM by Rafat Dorantes NP  
  -PET scan 10/2019: PET scan fortunately did not have any abnormal activity in her mass in the left lung. This may mean that we can just follow this radiographically, but we will talk about her at tumor board and come back to her with the group's recommendation. 
-10/30/2019: Patient is discussed at thoracic conference today lead by Dr Nelda Brower. Patient is a 77 yo never smoker. CT guided biopsy recommended. -CT guided Biopsy 11/2020: lung biopsy showed signs of this nodule being a hamartoma, which is a benign tumor that we can simply follow. repeat CT in 6 months 
-Chest CT June 2020: CT scan is stable 
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 Juanetta Cornet are several new pulmonary nodules noted which may suggest some metastatic disease.  This require further follow-up CT of the chest in 2 months or doing PET scan. Hamartoma (HCC) (Chronic) ICD-10-CM: Q85.9 ICD-9-CM: 759.6  10/12/2020 - Present Yes Peripheral vascular disease ([facility] [address] ICD-10-CM: I73.9 ICD-9-CM: 443.9  10/12/2020 - Present Yes Bilateral pleural effusion ICD-10-CM: J90 ICD-9-CM: 511.9  10/7/2020 - Present Yes  Overview Signed 10/13/2020  1:25 PM by DENI Shields  
 10/3/2020: L thoracentesis 1050mL removed R thoracentesis 800mL removed 10/9/2020: L thoracentesis: 1000mL removed R thoracentesis: 600mL removed Hypercholesterolemia (Chronic) ICD-10-CM: E78.00 ICD-9-CM: 272.0  6/30/2015 - Present Yes Dysphagia (Chronic) ICD-10-CM: R13.10 ICD-9-CM: 787.20  6/30/2015 - Present Yes Acquired hypothyroidism (Chronic) ICD-10-CM: E03.9 ICD-9-CM: 244.9  10/17/2013 - Present Yes GERD (gastroesophageal reflux disease) (Chronic) ICD-10-CM: K21.9 ICD-9-CM: 530.81  10/17/2013 - Present Yes RESOLVED: Hypokalemia ICD-10-CM: E87.6 ICD-9-CM: 276.8  10/25/2020 - 10/27/2020 Yes Hospital course: 
 
Patient was admitted for acute stroke with several enhancing masses in the brain likely metastatic. MRI brain showed several enhancing masses with the largest in the left frontal region with significant surrounding edema. CNS lymphoma/metastatic disease and small acute right occipital and parietal lobe infarcts. Neurosurgery recommended no acute intervention. Neurology was consulted patient is currently on aspirin statin. Patient is currently on dexamethasone for cerebral edema. Keppra for seizure prophylaxis. Patient had acute metabolic and colopathy from stroke which resolved. Oncology was consulted given her frontal brain mass. Unknown primary. She has lung nodules but may be difficult for biopsy by pulmonology or IR. CT abdomen and pelvis was ordered but pending due to clearance of barium from prior barium swallow study to target for biopsy. She complained of right leg pain for which duplex ultrasound was done and negative. She has chronic systolic congestive heart failure with EF of 35%, peripheral vascular disease, GERD, hypothyroidism. Plan: This is a 71-year-old female with Acute stroke with metastatic disease in the brain POA MRI brain with several enhancing masses with the largest in the left frontal region with significant surrounding edema. CNS lymphoma/metastatic disease small acute right occipital and parietal lobe infarcts. No acute neurosurgical intervention. Neurology on board. Recommend aspirin after biopsy. Aspirin, Statin. Oncology consulted. Appreciate recommendations. Dexamethasone 4 mg IV every 6 hours. Keppra 500 twice daily. Acute metabolic encephalopathy from stroke resolved Frontal brain mass likely metastasis - unknown primary? Chronic systolic congestive heart failure with EF of 35% 
currently on IV Lasix. Switch to PO. Multiple lung nodules on CT Oncology on board. Pulmonology consulted for lung nodule biopsy. CT chest shows multiple pulmonary nodules throughout the left lung. Discrete left lung mass inferiorly very similar in size. Increase in size of mixed lytic and sclerotic lesion of T6 vertebral body worrisome for metastatic disease. Soft tissue density within the left hilar/lower paratracheal region is unchanged. Groundglass opacity with interlobular septal thickening throughout the left lung could be infection/inflammation although lymphangitic carcinomatosis also possibility. CT abdomen and pelvis ordered (pending due to clearance of barium from prior barium swallow study)  for metastatic disease and target for biopsy. Bilateral pleural effusions Pulmonary on board. Hypothyroidism Levothyroxine GERD Pepcid. Peripheral vascular disease Right leg pain: Duplex ultrasound negative for DVT. Palliative care consulted. Appreciate recommendations. CODE STATUS DNR. Goals of care discussed with patient and her  and brother at bedside. Awaiting tissue diagnosis before making decisions regarding plan of care. DC planning/Dispo: Anticipate inpatient hospital stay for at least 48 hours. May need rehab facility placement.  
 
Diet:  DIET MECHANICAL SOFT 
DIET NUTRITIONAL SUPPLEMENTS 
DIET TUBE FEEDING 
 DIET NUTRITIONAL SUPPLEMENTS 
DVT ppx:  Lovenox Signed: 
Jeniffer Osman MD

## 2020-11-02 NOTE — PROGRESS NOTES
Problem: Mobility Impaired (Adult and Pediatric) Goal: *Acute Goals and Plan of Care (Insert Text) Description: LTG: 
(1.)Ms. iGlma Ortega will move from supine to sit and sit to supine , scoot up and down, and roll side to side in bed with MINIMAL ASSIST within 7 treatment day(s). (2.)Ms. Gilma Ortega will transfer from bed to chair and chair to bed with MINIMAL ASSIST using the least restrictive device within 7 treatment day(s). (3.)Ms. Gilma Ortega will ambulate with MINIMAL ASSIST for 30+ feet with the least restrictive device within 7 treatment day(s). 4.  Ms. Gilma Ortega will sit EOB with good balance x10' while performing LE ex's within 7 treatment days. ________________________________________________________________________________________________ Outcome: Progressing Towards Goal 
  
PHYSICAL THERAPY: Daily Note and PM 11/2/2020 INPATIENT: PT Visit Days : 4 Payor: Jesse Manuel / Plan: [facility] MEDICARE CHOICE PPO/PFFS / Product Type: Managed Care Medicare /   
  
NAME/AGE/GENDER: Rohini Older is a 69 y.o. female PRIMARY DIAGNOSIS: Frontal mass of brain [G93.89] Acute right-sided weakness [R53.1] Expressive aphasia [R47.01] Acute encephalopathy [G93.40] Multiple lesions on CT of brain and spine [R93.0, R93.7] Acute right-sided weakness Acute right-sided weakness ICD-10: Treatment Diagnosis:  
 · Other abnormalities of gait and mobility (R26.89) Precaution/Allergies: 
Compazine [prochlorperazine edisylate] ASSESSMENT:  
 
Ms. Gilma Ortega presents supine in bed with brother at bedside. At baseline she is independent. Worked on transferring from supine to sitting while patient protecting RUE at the elbow. Required moderate assist x2 for trunk control. Pillows placed under RUE to decrease risk of subluxation while working on static/dynamic sitting balance.  Initially static sitting balance poor, however progressed to god with verbal/manual postural corrections. Reaching out of base of support performed and return to midline with CGA/close SBA. Able to perform sit to stand x2 with R knee blocked and manual/verbal cues for weight shift. Able to stand 30'+ each time she stood. Progressing well towards goals. Demonstrated progress with sitting and standing balance. Pt returned supine with mod a x 2. Ms. Britany Gilmore would benefit from skilled physical therapy (medically necessary) to address her deficits and maximize her function. This section established at most recent assessment PROBLEM LIST (Impairments causing functional limitations): 1. Decreased ADL/Functional Activities 2. Decreased Transfer Abilities 3. Decreased Ambulation Ability/Technique 4. Decreased Balance 5. Increased Pain 6. Decreased Activity Tolerance 7. Decreased Knowledge of Precautions 8. Decreased [place] with Home Exercise Program 
9. Decreased Cognition INTERVENTIONS PLANNED: (Benefits and precautions of physical therapy have been discussed with the patient.) 1. Balance Exercise 2. Bed Mobility 3. Gait Training 4. Therapeutic Activites 5. Therapeutic Exercise/Strengthening 6. Transfer Training 7. education TREATMENT PLAN: Frequency/Duration: 3 times a week for duration of hospital stay Rehabilitation Potential For Stated Goals: Good REHAB RECOMMENDATIONS (at time of discharge pending progress):   
Placement: It is my opinion, based on this patient's performance to date, that Ms. Britany Gilmore may benefit from intensive therapy at an [address] [place] [address] after discharge due to a probable need for close medical supervision by a rehab physician, a probable need for 24 hour rehab nursing, a probable need for multiple therapy disciplines and potential to make ongoing and sustainable functional improvement that is of practical value. [facility] Equipment:  
? None at this time HISTORY:  
History of Present Injury/Illness (Reason for Referral): 
 Per MD note, \"Patient is a 69years old female with hx of HTN, GERD, Hodgkin's lymphoma s/p Radiation Rx, dyslipidemia, hypothyroidism, asthma, systolic CHF EF 04-80% recently diagnosed lung hamartomas presented to ER with acute onset of right sided weakness, aphasia and confusion that began this AM. Pt was recently discharged from [facility] on 10/21 after being treated for PNA and was doing okay until today. Pt is not able to provide much history, she has sort of blank stare but can nod to yes or no questions. Per , pt was walking, doing ADL's  Until yesterday but this morning a drastic change in her mentation and speech was noted by him. Again, pt's  is also not a great historian. No reported fever, nausea/vomiting, fall, diarrhea, abdominal pain, headaches. ER work up showed CT evidence of enhancing 2.2 cm left frontal lobe mass with vasogenic edema with 2 mm left to right midline shift with 2 additional small masses in tracy and right occipital lobe. CTA head/neck short segment occlusion of left MCA with some changes suggestive of small core infarct and penumbra within the left frontal lobe, no evidence of intraparenchymal hemorrhage. CXR with interval worsening of pulmonary edema and bilateral pleural effusion. \" 
Past Medical History/Comorbidities: Ms. Maral Fonseca  has a past medical history of Acute CVA (cerebrovascular accident) ([facility] [address] (10/25/2020), Asthma, Bite of nonvenomous arthropod (???), Cough, Esophageal stricture (2002), GERD (gastroesophageal reflux disease), History of rectal bleeding (???), Hodgkin's lymphoma ([address] (1980), Hypercholesterolemia, Menopause, Positive RAST testing (2007), and Thyroid disease. She also has no past medical history of Chronic kidney disease.   Ms. Maral Fonseca  has a past surgical history that includes hx tonsillectomy (1954); hx splenectomy (1973); hx appendectomy (1973); hx cataract removal (Bilateral, 2007, 2009); hx colonoscopy; hx other surgical; and hx breast biopsy (Left, 2020). Social History/Living Environment:  
Home Environment: Private residence One/Two Story Residence: One story Living Alone: No 
Support Systems: Spouse/Significant Other/Partner Patient Expects to be Discharged to[de-identified] Rehabilitation facility Current DME Used/Available at Home: None Prior Level of Function/Work/Activity: At baseline she is independent. Number of Personal Factors/Comorbidities that affect the Plan of Care: 3+: HIGH COMPLEXITY EXAMINATION:  
Most Recent Physical Functioning:  
Gross Assessment: 
  
         
  
Posture: 
  
Balance: 
Sitting: Impaired Sitting - Static: Fair (occasional) Sitting - Dynamic: Fair (occasional) Standing: Impaired Standing - Static: Fair Bed Mobility: 
Rolling: Minimum assistance Supine to Sit: Minimum assistance;Assist x2 Sit to Supine: Moderate assistance;Assist x2 Scooting: Minimum assistance; Moderate assistance Wheelchair Mobility: 
  
Transfers: 
Sit to Stand: Minimum assistance;Assist x2(R knee block) Stand to Sit: Minimum assistance;Assist x2 Interventions: Safety awareness training;Verbal cues; Visual cues Gait: 
  
   
  
Body Structures Involved: 1. Voice/Speech 2. Muscles Body Functions Affected: 1. Sensory/Pain 2. Voice and Speech 3. Neuromusculoskeletal 
4. Movement Related Activities and Participation Affected: 1. Mobility 2. Self Care 3. Domestic Life Number of elements that affect the Plan of Care: 4+: HIGH COMPLEXITY CLINICAL PRESENTATION:  
Presentation: Evolving clinical presentation with changing clinical characteristics: MODERATE COMPLEXITY CLINICAL DECISION MAKIN [facility] [address] [address] AM-PAC 6 Clicks Basic Mobility Inpatient Short Form How much difficulty does the patient currently have. .. Unable A Lot A Little None 1. Turning over in bed (including adjusting bedclothes, sheets and blankets)?    [] 1   [x] 2   [] 3   [] 4  
 2.  Sitting down on and standing up from a chair with arms ( e.g., wheelchair, bedside commode, etc.)   [] 1   [x] 2   [] 3   [] 4  
3. Moving from lying on back to sitting on the side of the bed? [] 1   [x] 2   [] 3   [] 4 How much help from another person does the patient currently need. .. Total A Lot A Little None 4. Moving to and from a bed to a chair (including a wheelchair)? [] 1   [x] 2   [] 3   [] 4  
5. Need to walk in hospital room? [x] 1   [] 2   [] 3   [] 4  
6. Climbing 3-5 steps with a railing? [x] 1   [] 2   [] 3   [] 4  
© 2007, Trustees of [address] [place] [address] [address] [id number], under license to [employer]. All rights reserved Score:  Initial: 10 Most Recent: X (Date: -- ) Interpretation of Tool:  Represents activities that are increasingly more difficult (i.e. Bed mobility, Transfers, Gait). Medical Necessity:    
· Patient is expected to demonstrate progress in  
· balance and functional technique ·  to  
· decrease assistance required with all functional mobility · . Reason for Services/Other Comments: 
· Patient continues to require skilled intervention due to · medical complications and patient unable to attend/participate in therapy as expected · . Use of outcome tool(s) and clinical judgement create a POC that gives a: Questionable prediction of patient's progress: MODERATE COMPLEXITY  
  
 
 
 
TREATMENT:  
(In addition to Assessment/Re-Assessment sessions the following treatments were rendered) Pre-treatment Symptoms/Complaints:  none. Pain: Initial:  
Pain Intensity 1: 0  Post Session: 3 Neuromuscular Re-education: ( 38 minutes):  Bed mobility while protecting RUE, static/dynamic sitting balance activities, postural corrections, sit<>stand transfers to improve balance, coordination, kinesthetic sense, posture and proprioception.   Required moderate visual, verbal and manual cues to promote static and dynamic balance in standing and promote motor control of right, upper extremity(s), lower extremity(s). DATE: 10/26/20 10/28/20 Straight leg raise Hip abduct/ adduct X10 AAR Heel slides  X10 AAR 2x10 AL, AAR Hip external/ internal rotation Ankle dorsiflexion/ plantarflexion 2x10 AB 2x10 AB Key:  A=active, AA=active assisted, P=passive, B=bilaterally, R=right, L=left Braces/Orthotics/Lines/Etc:  
· bustamante catheter · O2 Device: Room air Treatment/Session Assessment:   
· Response to Treatment:  pleasant and cooperative. · Interdisciplinary Collaboration:  
o Physical Therapist 
o Registered Nurse 
o Respiratory Therapist 
· After treatment position/precautions:  
o Supine in bed 
o Bed/Chair-wheels locked 
o Bed in low position 
o Call light within reach 
o RN notified 
o Family at bedside 
o Nurse at bedside · Compliance with Program/Exercises: Compliant all of the time, Will assess as treatment progresses · Recommendations/Intent for next treatment session: \"Next visit will focus on advancements to more challenging activities and reduction in assistance provided\". Total Treatment Duration: PT Patient Time In/Time Out Time In: 1400 Time Out: 1438 Lobo Guillen, PT, DPT

## 2020-11-02 NOTE — PROGRESS NOTES
11/02/20 4779 NIH Stroke Scale Interval Other (comment) (dual Nih with Kelly Velasco RN)  
LOC 0  
LOC Questions 0 LOC Commands 0 Best Gaze 0 Visual 0 Facial Palsy 2 Motor Right Arm 2 Motor Left Arm 0 Motor Right Leg 2 Motor Left Leg 0 Limb Ataxia 1 Sensory 1 Best Language 1 Dysarthria 1 Extinction and Inattention 0 Total 10

## 2020-11-02 NOTE — PROGRESS NOTES
11/01/20 1900 NIH Stroke Scale Interval Other (comment) 
(Dual with Charlene ALICIA)  
LOC 0  
LOC Questions 0 LOC Commands 0 Best Gaze 0 Visual 0 Facial Palsy 2 Motor Right Arm 2 Motor Left Arm 0 Motor Right Leg 2 Motor Left Leg 0 Limb Ataxia 1 Sensory 1 Best Language 1 Dysarthria 1 Extinction and Inattention 0 Total 10

## 2020-11-02 NOTE — PROGRESS NOTES
[facility] Inpatient Hematology / Oncology Progress Note Reason for Consult:  Frontal mass of brain [G93.89] Acute right-sided weakness [R53.1] Expressive aphasia [R47.01] Acute encephalopathy [G93.40] Multiple lesions on CT of brain and spine [R93.0, R93.7] Referring Physician:  Dmitriy Martinez MD 
 
24 Hour Events: 
Ongoing RUE weakness; aphasia somewhat improved No overnight events IR consult pending for pulmonary nodule bx 
CT AP pending barium clearance 
VSS, afebrile ROS: 
Constitutional: Negative for fever, chills, weakness, malaise, fatigue. CV: Negative for chest pain, palpitations, edema. Respiratory: Negative for dyspnea, cough, wheezing. GI: Negative for nausea, abdominal pain, diarrhea. 10 point review of systems is otherwise negative with the exception of the elements mentioned above in the HPI. Allergies Allergen Reactions  Compazine [Prochlorperazine Edisylate] Swelling Past Medical History:  
Diagnosis Date  Acute CVA (cerebrovascular accident) ([facility] [address] 10/25/2020  Asthma  Bite of nonvenomous arthropod ? ??  
 Cough  Esophageal stricture 2002  
 dilated 2002  GERD (gastroesophageal reflux disease)  History of rectal bleeding ???  
 Hodgkin's lymphoma ([facility] [address] 1980 Radiation therapy  Hypercholesterolemia  Menopause  Positive RAST testing 2007 IgE level of 1,391  Thyroid disease Past Surgical History:  
Procedure Laterality Date [place]  HX BREAST BIOPSY Left 05/21/2020  
 calcs at 2:00  
 HX CATARACT REMOVAL Bilateral 2007, 2009  HX COLONOSCOPY    
 HX OTHER SURGICAL    
 dental x3  
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 [address] Family History Problem Relation Age of Onset  Cancer Mother Kidney cancer  Cancer Father Prostate cnaceer  Heart Surgery Brother  Breast Cancer Neg Hx Social History Socioeconomic History  Marital status:  Spouse name: Not on file  Number of children: Not on file  Years of education: Not on file  Highest education level: Not on file Occupational History  Occupation:  Social Needs  Financial resource strain: Not on file  Food insecurity Worry: Not on file Inability: Not on file  Transportation needs Medical: Not on file Non-medical: Not on file Tobacco Use  Smoking status: Never Smoker  Smokeless tobacco: Never Used Substance and Sexual Activity  Alcohol use: Yes Alcohol/week: 21.0 standard drinks Types: 7 Shots of liquor, 14 Standard drinks or equivalent per week  Drug use: No  
 Sexual activity: Not on file Lifestyle  Physical activity Days per week: Not on file Minutes per session: Not on file  Stress: Not on file Relationships  Social connections Talks on phone: Not on file Gets together: Not on file Attends Yazidi service: Not on file Active member of club or organization: Not on file Attends meetings of clubs or organizations: Not on file Relationship status: Not on file  Intimate partner violence Fear of current or ex partner: Not on file Emotionally abused: Not on file Physically abused: Not on file Forced sexual activity: Not on file Other Topics Concern  Not on file Social History Narrative There is no significant environmental or industrial exposure. She has no known exposure to TB. She has worked as an . Current Facility-Administered Medications Medication Dose Route Frequency Provider Last Rate Last Dose  furosemide (LASIX) tablet 20 mg  20 mg Oral DAILY Rosendo Knight MD      
 phenol throat spray (CHLORASEPTIC) 1 Spray  1 Spray Oral PRN Rosendo Knight MD   1 [place] at 11/01/20 7207  
 dexamethasone (DECADRON) 4 mg/mL injection 4 mg  4 mg IntraVENous Q6H Radha Macedo MD   4 mg at 11/02/20 8467  traMADoL (ULTRAM) tablet 50 mg  50 mg Oral Q6H PRN Timothy Knight MD   50 mg at 11/01/20 2331  enoxaparin (LOVENOX) injection 40 mg  40 mg SubCUTAneous Q24H Timothy Knight MD   40 mg at 11/01/20 1552  polyethylene glycol (MIRALAX) packet 17 g  17 g Oral DAILY PRN Timothy Knight MD      
 aspirin chewable tablet 81 mg  81 mg Oral DAILY Lilliam Knight MD   81 mg at 11/01/20 0850  
 albuterol (PROVENTIL VENTOLIN) nebulizer solution 2.5 mg  2.5 mg Nebulization TID RT Timothy Knight MD   2.5 mg at 11/02/20 0701  lisinopriL (PRINIVIL, ZESTRIL) tablet 5 mg  5 mg Oral DAILY Lilliam Knight MD   5 mg at 11/01/20 0850  
 montelukast (SINGULAIR) tablet 10 mg  10 mg Oral DAILY Lilliam Knight MD   10 mg at 11/01/20 0850  
 rosuvastatin (CRESTOR) tablet 40 mg  40 mg Oral QHS Lilliam Knight MD   40 mg at 11/01/20 2314  carvediloL (COREG) tablet 6.25 mg  6.25 mg Oral BID WITH MEALS Lilliam Knight MD   6.25 mg at 11/01/20 1732  
 lansoprazole (PREVACID) 3 mg/mL oral suspension 30 mg  30 mg Oral ACB Timothy Knight MD   30 mg at 11/02/20 0167  levothyroxine (SYNTHROID) tablet 75 mcg  75 mcg Oral ACB Timothy Knight MD   75 mcg at 11/02/20 7953  levETIRAcetam (KEPPRA) oral solution 500 mg  500 mg Oral BID Timothy Knight MD   500 mg at 11/01/20 1732  
 insulin lispro (HUMALOG) injection   SubCUTAneous Q6H Timothy Knight MD   Stopped at 11/02/20 0600  budesonide (PULMICORT) 500 mcg/2 ml nebulizer suspension  500 mcg Nebulization BID RT Heron Sanders DO   500 mcg at 11/02/20 7305  LORazepam (ATIVAN) tablet 0.5 mg  0.5 mg Oral BID PRN Heron Sanders DO      
 metoprolol (LOPRESSOR) injection 5 mg  5 mg IntraVENous Q4H PRN Heron Sanders DO   5 mg at 10/26/20 1525  
 NUTRITIONAL SUPPORT ELECTROLYTE PRN ORDERS   Does Not Apply PRN Heron Sanders DO      
 fluticasone propionate (FLONASE) 50 mcg/actuation nasal spray 2 Spray  2 Chilo Pretty MD   2 Spray at 20 8710  sodium chloride (NS) flush 5-40 mL  5-40 mL IntraVENous Q8H Marlin Carr MD   5 mL at 20 3206  sodium chloride (NS) flush 5-40 mL  5-40 mL IntraVENous PRN Marlin Carr MD      
 ondansetron [facility]) injection 4 mg  4 mg IntraVENous Q6H PRN Marlin Carr MD      
 acetaminophen (TYLENOL) suppository 650 mg  650 mg Rectal Q4H PRN Marlin Carr MD      
 LORazepam (ATIVAN) injection 2 mg  2 mg IntraVENous Q2H PRN Marlin Carr MD      
 albuterol-ipratropium (DUO-NEB) 2.5 MG-0.5 MG/3 ML  3 mL Nebulization Q4H PRN Marlin Carr MD   3 mL at 10/26/20 7410  levalbuterol (XOPENEX) nebulizer soln 1.25 mg/3 mL  1.25 mg Nebulization Q6H PRN Marlin Carr MD      
 
 
OBJECTIVE: 
Patient Vitals for the past 8 hrs: 
 BP Temp Pulse Resp SpO2  
20 0800 105/65 97.9 °F (36.6 °C) 94 18 93 % 20 0701     95 % 20 0400 108/71 97.7 °F (36.5 °C) 92 18 94 % Temp (24hrs), Av.8 °F (36.6 °C), Min:97.4 °F (36.3 °C), Max:98.1 °F (36.7 °C) No intake/output data recorded. Physical Exam: 
Constitutional: Well developed, well nourished female in no acute distress, awake and alert sitting in a chair HEENT: Normocephalic and atraumatic. Oropharynx is clear, mucous membranes are moist. Extraocular muscles are intact. Sclerae anicteric. Neck supple without JVD. No thyromegaly present. Lymph node No palpable submandibular, cervical, supraclavicular, axillary or inguinal lymph nodes. Skin Warm and dry. No bruising and no rash noted. No erythema. No pallor. Respiratory  decreased breath sounds left side CVS Normal rate, regular rhythm and normal S1 and S2. No murmurs, gallops, or rubs. Abdomen Soft, nontender and nondistended, normoactive bowel sounds. No palpable mass. No hepatosplenomegaly. Neuro  expressive aphasia.   The patient seems to understand conversation and will attempt to respond. Profound right-sided weakness right arm greater than right leg. MSK Normal range of motion in general.  No edema and no tenderness. Psych  difficult to determine Labs:   
Recent Results (from the past 24 hour(s)) GLUCOSE, POC Collection Time: 11/01/20 12:20 PM  
Result Value Ref Range Glucose (POC) 161 (H) 65 - 100 mg/dL GLUCOSE, POC Collection Time: 11/01/20 12:47 PM  
Result Value Ref Range Glucose (POC) 160 (H) 65 - 100 mg/dL GLUCOSE, POC Collection Time: 11/01/20  5:43 PM  
Result Value Ref Range Glucose (POC) 238 (H) 65 - 100 mg/dL GLUCOSE, POC Collection Time: 11/02/20  1:33 AM  
Result Value Ref Range Glucose (POC) 279 (H) 65 - 100 mg/dL CBC W/O DIFF Collection Time: 11/02/20  4:54 AM  
Result Value Ref Range WBC 27.6 (H) 4.3 - 11.1 K/uL  
 RBC 4.15 4.05 - 5.2 M/uL  
 HGB 12.9 11.7 - 15.4 g/dL HCT 39.7 35.8 - 46.3 % MCV 95.7 79.6 - 97.8 FL  
 MCH 31.1 26.1 - 32.9 PG  
 MCHC 32.5 31.4 - 35.0 g/dL  
 RDW 13.5 11.9 - 14.6 % PLATELET 162 894 - 869 K/uL MPV 11.2 9.4 - 12.3 FL ABSOLUTE NRBC 0.05 0.0 - 0.2 K/uL METABOLIC PANEL, BASIC Collection Time: 11/02/20  4:54 AM  
Result Value Ref Range Sodium 136 (L) 138 - 145 mmol/L Potassium 5.0 3.5 - 5.1 mmol/L Chloride 99 98 - 107 mmol/L  
 CO2 30 21 - 32 mmol/L Anion gap 7 7 - 16 mmol/L Glucose 164 (H) 65 - 100 mg/dL BUN 55 (H) 8 - 23 MG/DL Creatinine 0.67 0.6 - 1.0 MG/DL  
 GFR est AA >60 >60 ml/min/1.73m2 GFR est non-AA >60 >60 ml/min/1.73m2 Calcium 8.6 8.3 - 10.4 MG/DL  
GLUCOSE, POC Collection Time: 11/02/20  6:15 AM  
Result Value Ref Range Glucose (POC) 129 (H) 65 - 100 mg/dL Imaging: XR ABD (KUB) [[id number]]  Collected: 10/26/20 1308 Order Status: Completed  Updated: 10/26/20 1312 Narrative:     
Abdomen for NG tube placement, one view, 10/26/2020 at 1216 hours A single view upper and left abdomen obtained. There is an NG tube. It is kinked  
at the gastroesophageal junction with the tip directed superiorly overlying the  
distal esophagus. This will probably require removing the tube and reinserting  
for proper positioning. Basilar infiltrates noted XR BARIUM SWALLOW [facility] [[id number]] Order Status: No result MRI BRAIN W WO CONT [[id number]]  Collected: 10/25/20 1158 Order Status: Completed  Updated: 10/25/20 1209 Narrative:     
MRI brain with and without contrast  
 
History: Abnormal CT. History of lymphoma.  Right hemiparesis Imaging sequences: Sagittal short TR/short TE, axial short TR/short TE, long TR/long TE, FLAIR, gradient recall, diffusion weighted images and ADC mapping. Axial and coronal short TR/short TE postcontrast images. Imaging was performed  
on a 1.5 Vicky magnet, utilizing the uneventful administration of 10 mL of  
intravenous Dotarem and order to better evaluate for intracranial pathology. Comparison: None. Correlation is made to the CT scan of the brain performed  
earlier on the same day. Findings: There is an enhancing mass within the left frontal region measuring  
approximately 2.3 x 2.2 x 2.3 cm in AP, transverse and craniocaudal dimensions,  
recently. This is a broad-based dural attachment. There is significant  
surrounding edema. This appears at least in part to be extra-axial although a  
more cystic component cannot be stated as such. There are 2 enhancing  
parenchymal lesions within the tracy with the larger measuring 1.0 cm with  
associated edema. There are 2 right occipital lobe mass with the larger  
measuring up to 1.3 cm, also with surrounding edema. There are enhancing right  
frontal bone lesions measuring up to 2.1 cm in size.   
 
The ventricles and sulci are normal in size and configuration.  There are no  
extra-axial fluid collections.  Normal flow voids are present within all of the  
major intracranial vessels. There is no evidence of intraparenchymal hemorrhage. There are subcentimeter foci of restricted diffusion within the right occipital  
and parietal lobes raising concern for small foci of acute infarctions. Additional subtle restricted diffusion is present medial to the edema within the  
left frontal region at the left corona radiata and extending into the external  
capsule.    
 
Scattered foci of T2 hyperintensity within the supratentorial white matter most  
likely represent the sequela of chronic small vessel ischemic change,  
unremarkable for age. The visualized mastoid air cells and paranasal sinuses are  
well pneumatized and aerated. Impression:     
IMPRESSION:  
1. Several enhancing masses with the largest within the left frontal region  
which may have an extra axial component with significant surrounding edema. 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 There  
are also enhancing right frontal bone lesions presumably representing part of  
the same process. 2. Small acute right occipital and parietal lobe infarcts. Additional infarcts  
are suspected within the left corona radiata/external capsule. XR CHEST PORT [[id number]]  Collected: 10/25/20 5568 Order Status: Completed  Updated: 10/25/20 1703 Narrative:     
EXAM: CHEST X-RAY, 1 VIEW INDICATION: stroke. COMPARISON: Chest x-ray 10/20/2020 TECHNIQUE: Single AP view of the chest was obtained. FINDINGS: Interval worsening of ill-defined perihilar markings throughout both  
lungs. Bilateral pleural effusions also appear to be enlarging. The cardiac  
silhouette is partially obscured. No pneumothorax. The bones are unchanged. Impression:     
IMPRESSION:  
Interval worsening of pulmonary edema and enlargement of bilateral effusions. Coexisting infection difficult to fully exclude by x-ray. CTA CODE NEURO HEAD AND NECK W CONT [[id number]]  Collected: 10/25/20 6914 Order Status: Completed  Updated: 10/25/20 2768 Narrative:     
History: Right-sided weakness and difficulty with speech. FINDINGS:  
 
CT angiography was performed of the neck and head with contrast and  
three-dimensional CT angiography reconstruction and reformat was performed. NASCET criteria as needed. CT dose reduction was achieved through use of a  
standardized protocol tailored for this examination and automatic exposure  
control for dose modulation. Bilateral pleural effusion. Parenchymal nodularity in the left upper lobe. There is extensive atherosclerotic ectasia of the imaged segments of the  
thoracic aorta. The ascending aorta measures 2.8 cm. There is a mild stenosis of  
the proximal descending thoracic aorta related to concentric noncalcified  
atheroma. There is a right-sided aortic arch with an occluded right common  
carotid artery and occluded right subclavian artery. The right vertebral artery  
reconstitutes via collaterals. Reversal of flow is not excluded by CT angiogram.  
 
The innominate artery is patent. The left common carotid artery is patent. The  
left internal carotid artery is patent. There is atherosclerosis at the left  
carotid bulb without hemodynamically significant stenosis. There is a moderate  
stenosis in the supraclinoid segment of the left internal carotid artery. The right internal carotid artery reconstitutes at the right carotid bulb. The  
right middle cerebral artery is patent. There is short segment occlusion in the M1 segment of the left middle cerebral  
artery. The posterior cerebral arteries are patent. Dural venous sinuses are patent. Small left transverse and sigmoid sinus. Multiple enhancing lesions within the brainstem and cerebrum. The largest is in  
the left middle cerebral artery territory.    
Impression:     
IMPRESSION:  
 
 Short segment occlusion of the left middle cerebral artery. Finding reported to  
the emergency room bedside physician at the time of this report. Multiple enhancing lesions in the brainstem and cerebrum. Bilateral pleural effusion. Extensive atherosclerosis of the aorta with occlusion of the right subclavian  
and the right common carotid arteries. This is chronic dating back to June 2019. CT Perfusion w/ Cerebral Blood Flow [[id number]]  Collected: 10/25/20 0725 Order Status: Completed  Updated: 10/25/20 0730 Narrative:     
CT Perfusion Imaging INDICATION:  Acute neuro changes. Right-sided weakness. CT perfusion imaging of the brain was performed after the administration of  
intravenous contrast.  Perfusion maps and perfusion analysis output were  
generated using the [employer] perfusion processing software algorithm.   Radiation  
dose reduction techniques were used for this study:  All CT scans performed at  
this facility use one or all of the following: Automated exposure control,  
adjustment of the mA and/or kVp according to patient's size, iterative  
reconstruction. COMPARISON: None. Correlation is made to the CTA and CT brain performed on the  
same day. [employer] Output Values: CBF < 30% volume:  3 ml   (core infarction volume greater than 50 cc associated  
with poor outcomes) Tmax > 6 seconds: 41 ml Tmax/CBF Mismatch Volume: 38 ml Tmax/CBF Mismatch Ratio: 13.7 Hypoperfusion Intensity Ratio (Tmax > 10 seconds/Tmax > 6 seconds): 0.3    
(values greater than 0.5 associated with poor outcome) Tmax > 10 seconds Volume: 11 ml   (volume greater than 100 mL is associated with  
poor outcome) Impression:     
IMPRESSION: Although there are changes suggesting small core infarct and  
penumbra within the left frontal lobe the findings are felt to correspond to a  
mass with vasogenic edema on CT scanning. Other intracranial masses are present  
as well. Please note that the determination of patient treatment is not based solely upon  
imaging factors or calculation values. Management of ischemia is at the  
discretion of the primary physician and is based upon a combination of clinical  
and imaging data, along other factors. CT HEAD W CONT [[id number]] Order Status: Canceled CT CODE NEURO HEAD WO CONTRAST [[id number]]  Collected: 10/25/20 3072 Order Status: Completed  Updated: 10/25/20 6351 Narrative:     
CT head without contrast  
 
History: Acute right-sided weakness, speech disturbance. Technique: 5mm axial images were obtained from the skull base to the vertex  
without intravenous contrast.  Radiation dose reduction techniques were used for  
this study:  Our CT scanners use one or all of the following: Automated exposure  
control, adjustment of the mA and/or kVp according to patient's size, iterative  
reconstruction. Comparison: None Findings: The ventricles and sulci are normal in size and configuration. There  
are no extra-axial fluid collections. There is a region of decreased attenuation  
within the left frontal lobe which involves primarily white matter but also a  
component of the cortex. There is a 1 cm cystic region also in close association  
with this. There is 2 mm of left-to-right midline shift There is no evidence of  
intraparenchymal hemorrhage. The bony calvarium is intact. The visualized  
mastoid air cells and paranasal sinuses are well pneumatized and aerated. Impression:     
Impression: Low attenuation within the left frontal lobe may represent an acute  
or subacute infarct however the possibility of this representing vasogenic edema  
from an underlying mass is not excluded. Postcontrast imaging would be  
recommended for further evaluation. ASSESSMENT: 
Problem List  Date Reviewed: 10/19/2020 Codes Class Noted Systolic heart failure (HCC) ICD-10-CM: I50.20 ICD-9-CM: 428.20  10/27/2020 Expressive aphasia ICD-10-CM: R47.01 
ICD-9-CM: 784.3  10/25/2020 * (Principal) Acute right-sided weakness ICD-10-CM: R53.1 ICD-9-CM: 728.87  10/25/2020 Frontal mass of brain ICD-10-CM: G93.89 ICD-9-CM: 348.89  10/25/2020 Acute encephalopathy ICD-10-CM: G93.40 ICD-9-CM: 348.30  10/25/2020 Multiple lesions on CT of brain and spine ICD-10-CM: R93.0, R93.7 ICD-9-CM: 793.0, 793.7  10/25/2020 Acute CVA (cerebrovascular accident) ([facility] [address] ICD-10-CM: I63.9 ICD-9-CM: 434.91  10/25/2020 Multiple lung nodules on CT (Chronic) ICD-10-CM: R91.8 ICD-9-CM: 793.19  10/12/2020 Overview Addendum 10/12/2020  2:33 PM by Vonda Hurst NP  
  -PET scan 10/2019: PET scan fortunately did not have any abnormal activity in her mass in the left lung. This may mean that we can just follow this radiographically, but we will talk about her at tumor board and come back to her with the group's recommendation. 
-10/30/2019: Patient is discussed at thoracic conference today lead by Dr Gregory Adams. Patient is a 79 yo never smoker. CT guided biopsy recommended. -CT guided Biopsy 11/2020: lung biopsy showed signs of this nodule being a hamartoma, which is a benign tumor that we can simply follow. repeat CT in 6 months 
-Chest CT June 2020: CT scan is stable 
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 [place] are several new pulmonary nodules noted which may suggest some metastatic disease.  This require further follow-up CT of the chest in 2 months or doing PET scan. Hamartoma (HCC) (Chronic) ICD-10-CM: Q85.9 ICD-9-CM: 759.6  10/12/2020 Peripheral vascular disease ([facility] [address] ICD-10-CM: I73.9 ICD-9-CM: 443.9  10/12/2020 Bilateral pleural effusion ICD-10-CM: J90 ICD-9-CM: 511.9  10/7/2020  Overview Signed 10/13/2020  1:25 PM by DENI Hendricks  
 10/3/2020: L thoracentesis 1050mL removed R thoracentesis 800mL removed 10/9/2020: L thoracentesis: 1000mL removed R thoracentesis: 600mL removed Acute systolic heart failure (HCC) ICD-10-CM: I50.21 ICD-9-CM: 428.21  10/6/2020 PNA (pneumonia) ICD-10-CM: J18.9 ICD-9-CM: 154  10/1/2020 Mass of lingula of lung ICD-10-CM: R91.8 ICD-9-CM: 786.6  10/16/2019 Right carotid artery occlusion ICD-10-CM: E49.81 ICD-9-CM: 433.10  10/11/2019 Subclavian artery stenosis (HCC) ICD-10-CM: I77.1 ICD-9-CM: 447.1  10/11/2019 Left carotid stenosis ICD-10-CM: W15.96 
ICD-9-CM: 433.10  10/12/2018 Hypercholesterolemia (Chronic) ICD-10-CM: E78.00 ICD-9-CM: 272.0  6/30/2015 Dysphagia (Chronic) ICD-10-CM: R13.10 ICD-9-CM: 787.20  6/30/2015 Mild persistent asthma without complication (Chronic) NRG-97-DU: J45.30 ICD-9-CM: 493.90  10/17/2013 Acquired hypothyroidism (Chronic) ICD-10-CM: E03.9 ICD-9-CM: 244.9  10/17/2013 Allergic rhinitis (Chronic) ICD-10-CM: J30.9 ICD-9-CM: 477.9  10/17/2013 GERD (gastroesophageal reflux disease) (Chronic) ICD-10-CM: K21.9 ICD-9-CM: 530.81  10/17/2013 Ms. Prashanth Walton is a 69 y.o. female admitted on 10/25/2020 with a primary diagnosis of brain masses. Her PMH includes HTN, GERD, Hodgkin's lymphoma, hypothyroidism, sCHF, and recently dx lung hamartomas. CT chest from 9/21/2020 with L lung mass and sclerotic focus at T6. Lung mass has been followed by pulm since 2019. Had bx of L lung mass on 11/11/2019 with path +hamartoma. She presented to ED with c/o acute onset R-sided weakness, aphasia, and confusion. She was recently discharged on 10/21 after being treated for pneumonia. MRI brain with several enhancing masses with largest in L frontal region, enhancing R frontal bone lesions; and small acute R occipital and parietal lobe infarcts.   CTA head/neck with short segment occlusion of MCA. CXR with interval worsening of pulm edema and b/l pleural effusion. Neuro/neurosurgery following. Brain masses not amenable to surgical resection. On Dex 6mg q 6 hours and Keppra. Rad Onc consult pending. We were consulted for recommendations. RECOMMENDATIONS: 
Hx Hodgkin's Lymphoma 
-  
 
Brain Masses / CVA 
- MRI brain with several enhancing masses with largest in L frontal region, enhancing R frontal bone lesions; and small acute R occipital and parietal lobe infarcts 
- Neuro/neurosurgery following - brain masses not amenable to surgical resection - On Dex 6mg q 6 hours and Keppra - Rad Onc consult pending - Check CT AP for staging 10/29 CT CAP pending, rad onc consult pending further work up. 
10/30 Aphasia somewhat improved. Ongoing RUE weakness. 10/31 Neurologically stable. Ultimately this is most likely due to brain metastases although the source remains a question. Nevertheless we will ask radiation oncology to see her so they are familiar as we proceed with evaluation. 11/1 No change in neuro function. Working with PT. Consult placed for Rad Onc. Hopefully IR can biopsy either a lung mass (preferrable) or bone lesion. Await CT abdomen pending barium clearance. If persistent, might consider laxative. 11/2 Rad Onc consult pending. CT AP still pending barium clearance; IR consult pending for bx. Check tumor markers while awaiting consults/further workup CA 27.29, CA 15-3, , CEA and CA 19-9. L lung mass/hamartoma - Pt had CT chest on 9/21/20 with L lung mass with multiple pulm nodules and sclerotic focus at T6. Lung mass has been followed by pulm since 2019. Had bx of L lung mass on 11/11/2019 with path +hamartoma. 
- Dr. Mirza Dyer to ask IR if bx of lung nodule possible. Will also check CT AP. 
10/29 Pulmonology following; recent CT chest indicates location likely not amenable to biopsy via EBUS.  IR reported biopsy would be difficult although willing to attempt if no other targets available. CT CAP pending barium clearance after swallow study 10/27. Will ask CT to perform CT chest while awaiting CT AP.  
10/30 CT chest done yesterday; continue to await CT AP - pending barium clearance. We have consulted IR to review images to see if one of the pulmonary nodules is accessible and if possible to have that completed over the weekend but more likely next week if they see an accessible target. Otherwise will await CT AP. 
10/31 Referral placed to IR to consider biopsy of one of the lung lesions. 11/2 IR consult pending for biopsy. Issues discussed with family. Lab studies and imaging studies were personally reviewed. Thank you for allowing us to participate in the care of Ms. Shane Talbot. We will continue to follow along and await biopsy results to confirm diagnosis and formulate treatment plan based on results and further work-up. ISIDRO Lassiter [facility] Hematology & Oncology [address] [place] [address] Office : [phone] Fax : [phone] Attending Addendum: 
I have personally performed a face to face diagnostic evaluation on this patient. I have reviewed and agree with the care plan as documented above by  Gwendolyn Welch N.P.  My findings are as follows: Patient appears stable, heart rate regular without murmurs, abdomen is non-tender, bowel sounds are positive. Aphasia improved though right-sided weakness continues. Continue with Decadron, and consult IR for pulmonary nodule biopsy attempt. CTAP once barium cleared. Radiation oncology consulted. Ren Rodriguez MD 
[facility] Hematology/Oncology [address] [place] [address] Office : [phone] Fax : [phone]

## 2020-11-03 NOTE — PROGRESS NOTES
11/03/20 0715 NIH Stroke Scale Interval Other (comment) 
(Dual NIH with Frankey Railing RN )  
LOC 0  
LOC Questions 0 LOC Commands 0 Best Gaze 0 Visual 0 Facial Palsy 2 Motor Right Arm 2 Motor Left Arm 0 Motor Right Leg 2 Motor Left Leg 0 Limb Ataxia 1 Sensory 1 Best Language 1 Dysarthria 1 Extinction and Inattention 0 Total 10

## 2020-11-03 NOTE — PROGRESS NOTES
Progress Note 
 
Patient: Bhakti Roland MRN: [id number]  SSN: [id number]   
YOB: 1950  Age: 70 y.o.  Sex: female   
 
Admit Date: 10/25/2020  
 LOS: 9 days  
 
Subjective:  
F/U brain masses 
 
\"70 years old female with hx of HTN, GERD, Hodgkin's lymphoma s/p Radiation Rx, dyslipidemia, hypothyroidism, asthma, systolic CHF EF 35-40% recently diagnosed lung hamartomas presented to ER with acute onset of right sided weakness, aphasia and confusion that began this AM. Pt was recently discharged from [facility] on 10/21 after being treated for PNA and was doing okay until today. Pt is not able to provide much history, she has sort of blank stare but can nod to yes or no questions. Per , pt was walking, doing ADL's  Until yesterday but this morning a drastic change in her mentation and speech was noted by him. ER work up showed CT evidence of enhancing 2.2 cm left frontal lobe mass with vasogenic edema with 2 mm left to right midline shift with 2 additional small masses in tracy and right occipital lobe. CTA head/neck short segment occlusion of left MCA with some changes suggestive of small core infarct and penumbra within the left frontal lobe, no evidence of intraparenchymal hemorrhage. CXR with interval worsening of pulmonary edema and bilateral pleural effusion.\" 
  
 380, CEA 2.5. Cancer antigen 19-9 21.2. KUB yesterday still showed barium so unable to have CT yet with contrast so we can look at masses more detailed and to possibly perform biopsy. CT chest without contrast on 10/29 showed Multiple pulmonary nodules throughout the left lung. Increase in size of a mixed lytic and sclerotic lesion of T6 vertebral body worrisome for metastatic disease. Moderate right and small left pleural effusions with atelectasis. Groundglass opacities with interlobular septal thickening throughout the left lung could be related to an infectious or  inflammatory process although lymphangitic carcinomatosis is also a possibility. Decadron and Keppra started. NIH 10. Difficulty with urination since removing Reid. No chest pain or SOB. Patient states she is having difficulty eating much but trying to drink more. Still with NG tube. Current Facility-Administered Medications Medication Dose Route Frequency  polyethylene glycol (MIRALAX) packet 17 g  17 g Oral DAILY  furosemide (LASIX) tablet 20 mg  20 mg Oral DAILY  phenol throat spray (CHLORASEPTIC) 1 Spray  1 Spray Oral PRN  
 dexamethasone (DECADRON) 4 mg/mL injection 4 mg  4 mg IntraVENous Q6H  
 traMADoL (ULTRAM) tablet 50 mg  50 mg Oral Q6H PRN  
 enoxaparin (LOVENOX) injection 40 mg  40 mg SubCUTAneous Q24H  polyethylene glycol (MIRALAX) packet 17 g  17 g Oral DAILY PRN  
 aspirin chewable tablet 81 mg  81 mg Oral DAILY  albuterol (PROVENTIL VENTOLIN) nebulizer solution 2.5 mg  2.5 mg Nebulization TID RT  
 lisinopriL (PRINIVIL, ZESTRIL) tablet 5 mg  5 mg Oral DAILY  montelukast (SINGULAIR) tablet 10 mg  10 mg Oral DAILY  rosuvastatin (CRESTOR) tablet 40 mg  40 mg Oral QHS  carvediloL (COREG) tablet 6.25 mg  6.25 mg Oral BID WITH MEALS  lansoprazole (PREVACID) 3 mg/mL oral suspension 30 mg  30 mg Oral ACB  levothyroxine (SYNTHROID) tablet 75 mcg  75 mcg Oral ACB  levETIRAcetam (KEPPRA) oral solution 500 mg  500 mg Oral BID  insulin lispro (HUMALOG) injection   SubCUTAneous Q6H  
 budesonide (PULMICORT) 500 mcg/2 ml nebulizer suspension  500 mcg Nebulization BID RT  
 LORazepam (ATIVAN) tablet 0.5 mg  0.5 mg Oral BID PRN  
 metoprolol (LOPRESSOR) injection 5 mg  5 mg IntraVENous Q4H PRN  
 NUTRITIONAL SUPPORT ELECTROLYTE PRN ORDERS   Does Not Apply PRN  
 fluticasone propionate (FLONASE) 50 mcg/actuation nasal spray 2 Spray  2 Spray Both Nostrils DAILY  sodium chloride (NS) flush 5-40 mL  5-40 mL IntraVENous Q8H  
  sodium chloride (NS) flush 5-40 mL  5-40 mL IntraVENous PRN  
 ondansetron (ZOFRAN) injection 4 mg  4 mg IntraVENous Q6H PRN  
 acetaminophen (TYLENOL) suppository 650 mg  650 mg Rectal Q4H PRN  
 LORazepam (ATIVAN) injection 2 mg  2 mg IntraVENous Q2H PRN  
 albuterol-ipratropium (DUO-NEB) 2.5 MG-0.5 MG/3 ML  3 mL Nebulization Q4H PRN  
 levalbuterol (XOPENEX) nebulizer soln 1.25 mg/3 mL  1.25 mg Nebulization Q6H PRN Objective:  
 
Vitals:  
 11/03/20 0000 11/03/20 0400 11/03/20 0709 11/03/20 0800 BP: 106/69 121/67  106/68 Pulse: 87 94  94 Resp: 16 18  18 Temp: 97.7 °F (36.5 °C) 97.5 °F (36.4 °C)  98.1 °F (36.7 °C) SpO2: 94% 94% 94% 96% Weight:      
Height:      
  
  
Intake and Output: 
Current Shift: 11/03 0701 - 11/03 1900 In: 79 Out: - Last three shifts: 11/01 1901 - 11/03 0700 In: 290 Out: 6615 [[facility]:[id number]] Physical Exam:  
General:  Alert, cooperative, no distress, talking but slow Eyes:  Conjunctivae/corneas clear. Ears:  Normal TMs and external ear canals both ears. Nose: NG tube in place with no obvious signs of ulceration. Mouth/Throat: Dry tongue Neck:  no JVD. Back:   deferred Lungs:   Clear to auscultation bilaterally. Heart:  Regular rate and rhythm, S1, S2 normal  
Abdomen:   Soft, non-tender. Bowel sounds normal.   
Extremities: 2/5 strength to right LE and right UE. 3/5 strength to left UE and 4/5 strength to left LE. Poor ROM in bed. Pulses: 2+ and symmetric all extremities. Skin: Skin color, texture, turgor normal. No rashes or lesions Lymph nodes: Cervical, supraclavicular, and axillary nodes normal.  
Neurologic: Good hand coordination with her left hand though slow. Able to follow commands but very weak. Can answer all questions appropriately. Lab/Data Review: 
 
Recent Results (from the past 24 hour(s)) GLUCOSE, POC Collection Time: 11/02/20 12:10 PM  
Result Value Ref Range Glucose (POC) 134 (H) 65 - 100 mg/dL CA 27.29 Collection Time: 11/02/20  1:37 PM  
Result Value Ref Range CA 27.29 23.5 0.0 - 38.6 U/mL CANCER ANTIGEN (CA) 15-3 Collection Time: 11/02/20  1:37 PM  
Result Value Ref Range Cancer antigen 15-3 20.50 1.0 - 35.0 U/mL CANCER ANTIGEN 125 Collection Time: 11/02/20  1:37 PM  
Result Value Ref Range CA-125 380 (H) 1.5 - 35.0 U/mL CEA Collection Time: 11/02/20  1:37 PM  
Result Value Ref Range CEA 2.5 0.0 - 3.0 ng/mL CANCER AG 19-9 Collection Time: 11/02/20  1:37 PM  
Result Value Ref Range Cancer antigen 19-9 21.20 2.0 - 37.0 U/mL GLUCOSE, POC Collection Time: 11/02/20  6:02 PM  
Result Value Ref Range Glucose (POC) 130 (H) 65 - 100 mg/dL GLUCOSE, POC Collection Time: 11/02/20 11:46 PM  
Result Value Ref Range Glucose (POC) 160 (H) 65 - 100 mg/dL CBC WITH AUTOMATED DIFF Collection Time: 11/03/20  5:35 AM  
Result Value Ref Range WBC 26.3 (H) 4.3 - 11.1 K/uL  
 RBC 4.40 4.05 - 5.2 M/uL  
 HGB 13.4 11.7 - 15.4 g/dL HCT 41.3 35.8 - 46.3 % MCV 93.9 79.6 - 97.8 FL  
 MCH 30.5 26.1 - 32.9 PG  
 MCHC 32.4 31.4 - 35.0 g/dL  
 RDW 13.2 11.9 - 14.6 % PLATELET 419 528 - 376 K/uL MPV 11.5 9.4 - 12.3 FL ABSOLUTE NRBC 0.04 0.0 - 0.2 K/uL  
 DF AUTOMATED NEUTROPHILS 91 (H) 43 - 78 % LYMPHOCYTES 4 (L) 13 - 44 % MONOCYTES 4 4.0 - 12.0 % EOSINOPHILS 0 (L) 0.5 - 7.8 % BASOPHILS 0 0.0 - 2.0 % IMMATURE GRANULOCYTES 1 0.0 - 5.0 %  
 ABS. NEUTROPHILS 23.8 (H) 1.7 - 8.2 K/UL  
 ABS. LYMPHOCYTES 1.0 0.5 - 4.6 K/UL  
 ABS. MONOCYTES 1.1 0.1 - 1.3 K/UL  
 ABS. EOSINOPHILS 0.0 0.0 - 0.8 K/UL  
 ABS. BASOPHILS 0.0 0.0 - 0.2 K/UL  
 ABS. IMM. GRANS. 0.3 0.0 - 0.5 K/UL METABOLIC PANEL, BASIC Collection Time: 11/03/20  5:35 AM  
Result Value Ref Range Sodium 131 (L) 136 - 145 mmol/L Potassium 5.0 3.5 - 5.1 mmol/L Chloride 97 (L) 98 - 107 mmol/L  
 CO2 30 21 - 32 mmol/L  Anion gap 4 (L) 7 - 16 mmol/L  
 Glucose 154 (H) 65 - 100 mg/dL BUN 49 (H) 8 - 23 MG/DL Creatinine 0.61 0.6 - 1.0 MG/DL  
 GFR est AA >60 >60 ml/min/1.73m2 GFR est non-AA >60 >60 ml/min/1.73m2 Calcium 8.7 8.3 - 10.4 MG/DL  
GLUCOSE, POC Collection Time: 11/03/20  6:16 AM  
Result Value Ref Range Glucose (POC) 204 (H) 65 - 100 mg/dL Assessment/ Plan:  
 
Principal Problem: 
  Acute right-sided weakness (10/25/2020) Active Problems: 
  Acquired hypothyroidism (10/17/2013) GERD (gastroesophageal reflux disease) (10/17/2013) Hypercholesterolemia (6/30/2015) Dysphagia (6/30/2015) Bilateral pleural effusion (10/7/2020) Overview: 10/3/2020: L thoracentesis 1050mL removed R thoracentesis 800mL removed 10/9/2020: L thoracentesis: 1000mL removed R thoracentesis: 600mL removed Multiple lung nodules on CT (10/12/2020) Overview: -PET scan 10/2019: PET scan fortunately did not have any abnormal activity  
    in her mass in the left lung. This may mean that we can just follow this  
    radiographically, but we will talk about her at tumor board and come back  
    to her with the group's recommendation. 
    -10/30/2019: Patient is discussed at thoracic conference today lead by Dr Rosa Palomino. Patient is a 79 yo never smoker. CT guided biopsy recommended. -CT guided Biopsy 11/2020: lung biopsy showed signs of this nodule being a  
    hamartoma, which is a benign tumor that we can simply follow. repeat CT in  
    6 months 
    -Chest CT June 2020: CT scan is stable 
    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  
    secondary to her left lung mass. Lilly Cheers are several new pulmonary nodules  
    noted which may suggest some metastatic disease.  This require further  
    follow-up CT of the chest in 2 months or doing PET scan. 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 dexmethasone 4mg IV q 6 hr. Keppra 500mg BID. Once barium completely gone, plan for CT with contrast to look at nodules in further detail. Once we have this, likely can take biopsy to determine etiology of suspected cancer. Albuterol q 8 hrs. Pulmicort BID. Repeat KUB in AM to see if we can order CT. 
  
sCHF EF 35%-  Lasix 20mg daily. Strict Is and Os.  
  
HTN- Coreg, ACE Stated to nurse if still unable to urinate, to place Reid. May have neurogenic component from brain lesions, mets to spine, or CVA that is causing difficulty with urination. Still getting feedings at night via NG tube. Will try to encourage her to eat more which she seems to be slowly doing. I would like to remove NG tube in next few days to avoid necrosis or ulcerations. Will need rehab at discharge. DVT prophylaxis - Lovenox Signed By: Alejandra Menchaca DO November 3, 2020

## 2020-11-03 NOTE — PROGRESS NOTES
Problem: Dysphagia (Adult) Goal: *Speech Goal: (INSERT TEXT) Outcome: Progressing Towards Goal 
LTG: Patient will tolerate least restrictive diet without overt signs or symptoms of airway compromise. STG: Patient will participate in modified barium swallow study to objectively assess swallow function. Goal met 10/27/20 STG: Patient will consume chopped mechanical soft diet and thin liquids by cup without overt signs or symptoms of respiratory compromise. Goal added 10/27/20 Problem: Neurolinguistics (Adult) Goal: *Speech Goal: (INSERT TEXT) Outcome: Progressing Towards Goal 
LTG: Patient will increase receptive/expressive language skills demonstrated by the ability to communicate basic wants/needs across environments STG: Patient will answer yes/no questions with 75% accuracy given mod cueing. Goal met 10/29/20 STG: Patient will follow 1 step commands with 75% accuracy given moderate cueing. Goal met 10/29/20 STG: Patient will identify item in field of 2 with 60% accuracy given moderate cueing STG: Patient will identify body parts presented verbally with 50% accuracy given moderate cueing. STG: Patient will complete automatic naming tasks with 50%  accuracy given moderate cueing STG: Patient will name 10 items to concrete category in 1 minute. Added 11/3/2020 STG: patient will name pictures/objects with 80% accuracy given minimal cueing. Added 11/3/2020 STG: patient will create sentence given word/picture with 80% accuracy given minimal cueing. Added 11/3/2020 SPEECH LANGUAGE PATHOLOGY: DYSPHAGIA AND SPEECH-LANGUAGE/COGNITION: Daily Note 5 NAME/AGE/GENDER: Susu Pandey is a 69 y.o. female DATE: 11/3/2020 PRIMARY DIAGNOSIS: Frontal mass of brain [G93.89] Acute right-sided weakness [R53.1] Expressive aphasia [R47.01] Acute encephalopathy [G93.40] Multiple lesions on CT of brain and spine [R93.0, R93.7] ICD-10: Treatment Diagnosis: R13.12 Dysphagia, Oropharyngeal Phase F80.2 Mixed Receptive-Expressive Language Disorder 
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 2 Apraxia RECOMMENDATIONS  
DIET:  
· PO diet texture:  Mechanical soft with chopped meat and vegetables, with extra sauces/gravies · Liquids:  regular thin (single sips) 
  MEDICATIONS: Crushed in puree COMPENSATORY STRATEGIES/MODIFICATIONS INCLUDING: 
· Fully awake/alert · Small bites and sips · Remain upright for 20-30 min after any PO · Slow rate of PO intake · Check mouth for pocketed PO 
· Liquid wash · Assistance with meals OTHER RECOMMENDATIONS (including follow up treatment recommendations): · Treatment to improve/facilitate oral/pharyngeal skills · Training in use of compensatory safe swallowing strategies/feeding guidelines · Patient/family education EDUCATION:Recommendations discussed with RN and patient CONTINUATION OF SKILLED SERVICES/MEDICAL NECESSITY: 
? Patient is expected to demonstrate progress in  swallow strength, swallow timeliness, swallow function and swallow safety in order to  improve swallow safety, work toward diet advancement and decrease aspiration risk. ? Patient is expected to demonstrate progress in expressive communication and receptive ability to decrease assistance required communication, increase independence with activities of daily living and increase communication with family/caregivers. ? Patient continues to require skilled intervention due to dysphagia, aphasia RECOMMENDATIONS for CONTINUED SPEECH THERAPY: YES: Anticipate need for ongoing speech therapy during this hospitalization and at next level of care. ASSESSMENT Dysphagia: not addressed. Patient reports nothing tastes good except ensure and magic cups. Continue mechanical soft diet and thin liquids. Small controlled sips. Assistance with po intake. NGT in place. Will continue to follow for ongoing tolerance/trials. Language: patient with non fluent expressive aphasia characterized by short utterances with impaired word finding, thought organization, and verbal fluency. Patient is able to express basic wants/needs but tends to do so with a few words/short utterance primarily consisting of content words. Recommend ongoing speech therapy during inpatient stay and at next level of care to address dysphagia and functional communication abilities. REHABILITATION POTENTIAL FOR STATED GOALS: Fair PLAN   
FREQUENCY/DURATION: Continue to follow patient 3 times a week for duration of hospital stay to address above goals. - Recommendations for next treatment session: Next treatment will address language/dysphagia SUBJECTIVE Family member present. Problem List:  (Impairments causing functional limitations): 1. Oropharyngeal dysphagia 2. Expressive aphasia Orientation:  
Person Place Pain: Pain Scale 1: Numeric (0 - 10) Pain Intensity 1: 0 OBJECTIVE Swallow treatment-deferred. Speech/language:  
Given picture- 
Patient named picture with 70% accuracy (7/10); 8/10 with carrier phrase Patient then created sentence with 60% accuracy (6/10) with increased time Patient frequently using fillers or creating basic low level sentences. For example, when shown picture of coat  patient created sentence \"hang with hangers\" with verbal prompting and increased time for more information patient stating \"I hang with the hangers\" with prompted \"what do you hang\", patient able to respond appropriately with \"clothes\" INTERDISCIPLINARY COLLABORATION: Registered Nurse PRECAUTIONS/ALLERGIES: Compazine [prochlorperazine edisylate] Tool Used: Dysphagia Outcome and Severity Scale (MONICA) Score Comments Normal Diet  [] 7 With no strategies or extra time needed Functional Swallow  [] 6 May have mild oral or pharyngeal delay Mild Dysphagia  [] 5 Which may require one diet consistency restricted Mild-Moderate Dysphagia  [] 4 With 1-2 diet consistencies restricted Moderate Dysphagia  [] 3 With 2 or more diet consistencies restricted Moderate-Severe Dysphagia  [] 2 With partial PO strategies (trials with ST only) Severe Dysphagia  [] 1 With inability to tolerate any PO safely Score:  Initial: 2 Most Recent: 5 (Date 11/03/20 ) Interpretation of Tool: The Dysphagia Outcome and Severity Scale (MONICA) is a simple, easy-to-use, 7-point scale developed to systematically rate the functional severity of dysphagia based on objective assessment and make recommendations for diet level, independence level, and type of nutrition. Tool Used: MODIFIED SOLO SCALE (mRS) Score No Symptoms  [] 0 No significant disability despite symptoms; able to carry out all usual duties and activities  [] 1 Slight disability; unable to carry out all previous activities but able to look after own affairs without assistance. [] 2 Moderate disability; requiring some help but able to walk without assistance  [] 3 Moderately severe disability; unable to walk without assistance and unable to attend to own bodily needs without assistance  [] 4 Severe disability; bedridden, incontinent, and requiring constant nursing care and attention  [] 5 Score:  Initial: 4 3 Interpretation of Tool: The Modified [place] Scale is a 7-point scaled used to quantify level of disability as it relates to a patient's functional abilities. SAFETY: 
After treatment position/precautions: · Upright in bed · RN notified · Family at bedside Total Treatment Duration:  
Time In: 2121 Time Out: [address] CCC-SLP

## 2020-11-03 NOTE — PROGRESS NOTES
Comprehensive Nutrition Assessment Type and Reason for Visit: Dalton Mayer Tube Feeding Management (Hospitalist) Nutrition Recommendations/Plan:  
Enteral Nutrition: 
Continue nocturnal feeds as oral intake is contributing minimal to total daily needs. Oral Nutrition:  
Change oral supplements as follows: Ensure clear daily, Ensure High Protein daily, Glucerna Daily. Increase Magic cup to TID. Nursing to feed patient. If aggressive care pursued pt will likely require long term enteral feeding for primary needs given minimal progression overall or oral intake. 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 TF started 10/26, diet initiated by SLP 10/27, converted to nocturnal TF 10/28. SLP continues to follow patient. Continues with expressive language deficits. Visit with pt,  and brother at bedside. She continues to complain of difficulty swallowing while pointing to her cheek and referencing her smile. She is noticed to grimace at times with swallow. She reports consuming more oral intake since last RD visit but indicates she feels she can't get enough. She complains of ensure enlive having \"too much calcium\" upon further discussion she uses the word creamy and points to her tongue saying it sticks talking about the thickness of the product. She enjoys the magic cup but is unable to eat more than 25% of a serving prior to being fatigued. We discussed the potential of the tube benefits vs hindering her regarding her complaints of limited po progression, c/o consistency of items she swallows independently. She accepted ~25% of a magic cup with feeding assistance during my visit prior to fatiguing and requesting a break before eating more. I returned after visiting other patients on the unit, family had left and patient was sleeping and not appropriate for additional po at that time. Estimated Daily Nutrient Needs: Energy (kcal): 5022-8322 (25-30 brandon/kg/day using 54 kg) Protein (g): 54-65 (1-1.2 gm pro/kg/day using 54 kg) Nutrition Related Findings: Abdominal Status (last documented): Soft abdomen with Active  bowel sounds. Last BM 11/03/20. Pertinent Medications: decadron, lasix, prevacid, synthroid, miralax Pertinent Labs: Na 131, K 5, Glu 154, BUN 49 Current Nutrition Therapies: DIET MECHANICAL SOFT 
DIET NUTRITIONAL SUPPLEMENTS All Meals; Ensure Enlive ( ) DIET TUBE FEEDING Open order for details. Keep HOB elevated at least 30 degrees. DIET NUTRITIONAL SUPPLEMENTS Lunch, Dinner; Anjana ( ) DIET NPO Current Tube Feeding (TF) Orders: · Feeding Route: Nasogastric · Formula: Glucerna 1.5 · Schedule:Cyclic · Regimen: 50ml 1560-1119 · Water Flushes: 100 ml before and after nocturnal feed · Current TF & Flush Orders Provides: At goal 
· Goal TF & Flush Orders Provides: 712 kcal (~53% kcal needs), 40 grams protein (~75% pro goal) and ~560 ml free fluid. Anthropometric Measures: 
· Height:  5' 6\" (167.6 cm) · Current Body Wt:  52.3 kg (115 lb 4.8 oz)(no source) Last 3 Recorded Weights in this Encounter 10/28/20 0629 10/29/20 0400 10/30/20 0430 Weight: 55.4 kg (122 lb 2.2 oz) 54.7 kg (120 lb 9.5 oz) 52.3 kg (115 lb 4.8 oz) Nutrition Diagnosis:  
· Inadequate oral intake related to cognitive or neurological impairment, biting/chewing (masticatory) difficulty(fatigue) as evidenced by (continues w nocturnal feeds w slow inc in po ~35% needs) Nutrition Interventions:  
Food and/or Nutrient Delivery: Continue current diet, Modify oral nutrition supplement, Continue tube feeding Coordination of Nutrition Care: Continue to monitor while inpatient Plan of Care Discussed with Dr. Kev Bustillo and Frankey Railing, RN. Goals: 
Meet >75% of daily nutrition needs via oral diet and TF.     
 
Nutrition Monitoring and Evaluation:  
Food/Nutrient Intake Outcomes: Food and nutrient intake, Supplement intake, Enteral nutrition intake/tolerance Physical Signs/Symptoms Outcomes: Biochemical data, Chewing or swallowing, GI status Discharge Planning: Too soon to determine Val Sahu RD, LDN on 11/3/2020 at 5:17 PM 
Contact: [phone]

## 2020-11-03 NOTE — PROGRESS NOTES
Problem: Falls - Risk of 
Goal: *Absence of Falls Description: Document Ming Torres Fall Risk and appropriate interventions in the flowsheet. Outcome: Progressing Towards Goal 
Note: Fall Risk Interventions: 
Mobility Interventions: Bed/chair exit alarm, Communicate number of staff needed for ambulation/transfer, Patient to call before getting OOB Mentation Interventions: Bed/chair exit alarm, Door open when patient unattended, Increase mobility, More frequent rounding, Reorient patient, Room close to nurse's station Medication Interventions: Bed/chair exit alarm, Patient to call before getting OOB, Teach patient to arise slowly Elimination Interventions: Bed/chair exit alarm, Call light in reach, Patient to call for help with toileting needs, Stay With Me (per policy), Toilet paper/wipes in reach, Toileting schedule/hourly rounds Problem: Patient Education: Go to Patient Education Activity Goal: Patient/Family Education Outcome: Progressing Towards Goal 
  
Problem: Pressure Injury - Risk of 
Goal: *Prevention of pressure injury Description: Document Bhupendra Scale and appropriate interventions in the flowsheet. Outcome: Progressing Towards Goal 
Note: Pressure Injury Interventions: 
Sensory Interventions: Assess changes in LOC, Avoid rigorous massage over bony prominences Moisture Interventions: Absorbent underpads, Check for incontinence Q2 hours and as needed Activity Interventions: Increase time out of bed, Pressure redistribution bed/mattress(bed type), PT/OT evaluation Mobility Interventions: HOB 30 degrees or less, Pressure redistribution bed/mattress (bed type), PT/OT evaluation Nutrition Interventions: Document food/fluid/supplement intake, Offer support with meals,snacks and hydration Friction and Shear Interventions: HOB 30 degrees or less, Foam dressings/transparent film/skin sealants Problem: Patient Education: Go to Patient Education Activity Goal: Patient/Family Education Outcome: Progressing Towards Goal 
  
Problem: Dysphagia (Adult) Goal: *Speech Goal: (INSERT TEXT) Outcome: Progressing Towards Goal 
  
Problem: Patient Education: Go to Patient Education Activity Goal: Patient/Family Education Outcome: Progressing Towards Goal 
  
Problem: Patient Education: Go to Patient Education Activity Goal: Patient/Family Education Outcome: Progressing Towards Goal 
  
Problem: Patient Education: Go to Patient Education Activity Goal: Patient/Family Education Description: 1. Patient will complete lower body bathing and dressing with SBA and adaptive equipment as needed. 2. Patient will complete toileting with SBA. 3. Patient will tolerate 25 minutes of OT treatment with 1-2 rest breaks to increase activity tolerance for ADLs. 4. Patient will complete functional transfers with CGA and adaptive equipment as needed. 5. Patient will tolerate 15 minutes unsupported sitting balance with SBA and adaptive equipment as needed. 6. Patient will complete functional activity while seated edge of bed unsupported with SBA and adaptive equipment as needed. Timeframe: 7 visits Outcome: Progressing Towards Goal 
  
Problem: Patient Education: Go to Patient Education Activity Goal: Patient/Family Education Outcome: Progressing Towards Goal 
  
Problem: Patient Education: Go to Patient Education Activity Goal: Patient/Family Education Outcome: Progressing Towards Goal

## 2020-11-04 NOTE — PROGRESS NOTES
Problem: Mobility Impaired (Adult and Pediatric) Goal: *Acute Goals and Plan of Care (Insert Text) Description: LTG: 
(1.)Ms. Figueroa Manning will move from supine to sit and sit to supine , scoot up and down, and roll side to side in bed with MINIMAL ASSIST within 7 treatment day(s). (2.)Ms. Figueroa Manning will transfer from bed to chair and chair to bed with MINIMAL ASSIST using the least restrictive device within 7 treatment day(s). (3.)Ms. Figueroa Manning will ambulate with MINIMAL ASSIST for 30+ feet with the least restrictive device within 7 treatment day(s). 4.  Ms. Figueroa Manning will sit EOB with good balance x10' while performing LE ex's within 7 treatment days. ________________________________________________________________________________________________ Outcome: Progressing Towards Goal 
  
PHYSICAL THERAPY: Daily Note and PM 11/4/2020 INPATIENT: PT Visit Days : 5 Payor: Marcela Collado / Plan: [facility] MEDICARE CHOICE PPO/PFFS / Product Type: [employer] Medicare /   
  
NAME/AGE/GENDER: Brittny Josue is a 69 y.o. female PRIMARY DIAGNOSIS: Frontal mass of brain [G93.89] Acute right-sided weakness [R53.1] Expressive aphasia [R47.01] Acute encephalopathy [G93.40] Multiple lesions on CT of brain and spine [R93.0, R93.7] Acute right-sided weakness Acute right-sided weakness ICD-10: Treatment Diagnosis:  
 · Other abnormalities of gait and mobility (R26.89) Precaution/Allergies: 
Compazine [prochlorperazine edisylate] ASSESSMENT:  
 
Ms. Figueroa Manning presents requiring increased assistance for all mobility this PM with significant verbal/visual and manual cues required for sitting posture and balance while HERNANDEZ completed self care activities with patient. Poor activity tolerance this therapy session fatiguing more quickly than previous sessions. No progress towards stated goals.   Ms. Figueroa Manning would benefit from skilled physical therapy (medically necessary) to address her deficits and maximize her function. At this time, patient is appropriate for Co-treatment with occupational therapy due to patient's decreased overall endurance/tolerance levels, as well as need for high level skilled assistance to complete functional transfers/mobility and functional tasks. Brittny Josue is appropriate for a multidisciplinary co-treatment of PT and OT to address goals of both disciplines. This section established at most recent assessment PROBLEM LIST (Impairments causing functional limitations): 1. Decreased ADL/Functional Activities 2. Decreased Transfer Abilities 3. Decreased Ambulation Ability/Technique 4. Decreased Balance 5. Increased Pain 6. Decreased Activity Tolerance 7. Decreased Knowledge of Precautions 8. Decreased [place] with Home Exercise Program 
9. Decreased Cognition INTERVENTIONS PLANNED: (Benefits and precautions of physical therapy have been discussed with the patient.) 1. Balance Exercise 2. Bed Mobility 3. Gait Training 4. Therapeutic Activites 5. Therapeutic Exercise/Strengthening 6. Transfer Training 7. education TREATMENT PLAN: Frequency/Duration: 3 times a week for duration of hospital stay Rehabilitation Potential For Stated Goals: Good REHAB RECOMMENDATIONS (at time of discharge pending progress):   
Placement: It is my opinion, based on this patient's performance to date, that Ms. Figueroa Manning may benefit from intensive therapy at an [address] [place] [address] after discharge due to a probable need for close medical supervision by a rehab physician, a probable need for 24 hour rehab nursing, a probable need for multiple therapy disciplines and potential to make ongoing and sustainable functional improvement that is of practical value. vs. Short term rehab at a SNF pending progress Equipment:  
? None at this time HISTORY:  
History of Present Injury/Illness (Reason for Referral): 
 Per MD note, \"Patient is a 69years old female with hx of HTN, GERD, Hodgkin's lymphoma s/p Radiation Rx, dyslipidemia, hypothyroidism, asthma, systolic CHF EF 88-74% recently diagnosed lung hamartomas presented to ER with acute onset of right sided weakness, aphasia and confusion that began this AM. Pt was recently discharged from [facility] on 10/21 after being treated for PNA and was doing okay until today. Pt is not able to provide much history, she has sort of blank stare but can nod to yes or no questions. Per , pt was walking, doing ADL's  Until yesterday but this morning a drastic change in her mentation and speech was noted by him. Again, pt's  is also not a great historian. No reported fever, nausea/vomiting, fall, diarrhea, abdominal pain, headaches. ER work up showed CT evidence of enhancing 2.2 cm left frontal lobe mass with vasogenic edema with 2 mm left to right midline shift with 2 additional small masses in tracy and right occipital lobe. CTA head/neck short segment occlusion of left MCA with some changes suggestive of small core infarct and penumbra within the left frontal lobe, no evidence of intraparenchymal hemorrhage. CXR with interval worsening of pulmonary edema and bilateral pleural effusion. \" 
Past Medical History/Comorbidities: Ms. Renetta Osorio  has a past medical history of Acute CVA (cerebrovascular accident) ([address] (10/25/2020), Asthma, Bite of nonvenomous arthropod (???), Cough, Esophageal stricture (2002), GERD (gastroesophageal reflux disease), History of rectal bleeding (???), Hodgkin's lymphoma ([address] (1980), Hypercholesterolemia, Menopause, Positive RAST testing (2007), and Thyroid disease. She also has no past medical history of Chronic kidney disease.   Ms. Renetta Osorio  has a past surgical history that includes hx tonsillectomy (1954); hx splenectomy (1973); hx appendectomy (1973); hx cataract removal (Bilateral, 2007, 2009); hx colonoscopy; hx other surgical; and hx breast biopsy (Left, 05/21/2020). Social History/Living Environment:  
Home Environment: Private residence One/Two Story Residence: One story Living Alone: No 
Support Systems: Spouse/Significant Other/Partner Patient Expects to be Discharged to[de-identified] Rehabilitation facility Current DME Used/Available at Home: None Prior Level of Function/Work/Activity: At baseline she is independent. Number of Personal Factors/Comorbidities that affect the Plan of Care: 3+: HIGH COMPLEXITY EXAMINATION:  
Most Recent Physical Functioning:  
Gross Assessment: 
  
         
  
Posture: 
  
Balance: 
Sitting: Impaired Sitting - Static: Poor (constant support); Prop sitting Sitting - Dynamic: Poor (constant support) Bed Mobility: 
Rolling: Moderate assistance Supine to Sit: Maximum assistance;Assist x2 Sit to Supine: Maximum assistance;Assist x2 Scooting: Moderate assistance;Maximum assistance;Assist x2 Wheelchair Mobility: 
  
Transfers: 
  
Gait: 
  
   
  
Body Structures Involved: 1. Voice/Speech 2. Muscles Body Functions Affected: 1. Sensory/Pain 2. Voice and Speech 3. Neuromusculoskeletal 
4. Movement Related Activities and Participation Affected: 1. Mobility 2. Self Care 3. Domestic Life Number of elements that affect the Plan of Care: 4+: HIGH COMPLEXITY CLINICAL PRESENTATION:  
Presentation: Evolving clinical presentation with changing clinical characteristics: MODERATE COMPLEXITY CLINICAL DECISION MAKING:  
[employer] 6 Clicks Basic Mobility Inpatient Short Form How much difficulty does the patient currently have. .. Unable A Lot A Little None 1. Turning over in bed (including adjusting bedclothes, sheets and blankets)? [] 1   [x] 2   [] 3   [] 4  
2. Sitting down on and standing up from a chair with arms ( e.g., wheelchair, bedside commode, etc.)   [] 1   [x] 2   [] 3   [] 4  
3. Moving from lying on back to sitting on the side of the bed?    [] 1 [x] 2   [] 3   [] 4 How much help from another person does the patient currently need. .. Total A Lot A Little None 4. Moving to and from a bed to a chair (including a wheelchair)? [] 1   [x] 2   [] 3   [] 4  
5. Need to walk in hospital room? [x] 1   [] 2   [] 3   [] 4  
6. Climbing 3-5 steps with a railing? [x] 1   [] 2   [] 3   [] 4  
© 2007, Trustees of [address] [place] [address] [address] [id number], under license to [employer]. All rights reserved Score:  Initial: 10 Most Recent: X (Date: -- ) Interpretation of Tool:  Represents activities that are increasingly more difficult (i.e. Bed mobility, Transfers, Gait). Medical Necessity:    
· Patient is expected to demonstrate progress in  
· balance and functional technique ·  to  
· decrease assistance required with all functional mobility · . Reason for Services/Other Comments: 
· Patient continues to require skilled intervention due to · medical complications and patient unable to attend/participate in therapy as expected · . Use of outcome tool(s) and clinical judgement create a POC that gives a: Questionable prediction of patient's progress: MODERATE COMPLEXITY  
  
 
 
 
TREATMENT:  
(In addition to Assessment/Re-Assessment sessions the following treatments were rendered) Pre-treatment Symptoms/Complaints:  none. Pain: Initial:  
Pain Intensity 1: 0  Post Session: 3 Today's treatment session addressed Decreased Strength, Decreased Transfer Abilities, Decreased Balance and Decreased Activity Tolerance to progress towards achieving goal(s) 1, 2 and 3. During this session, Occupational Therapy addressed ADLs to progress towards their discipline specific goal(s). Co-treatment was necessary to improve patient's ability to follow higher level commands, ability to increase activity demands and ability to return to normal functional activity.  
 
Neuromuscular Re-education: ( 23 minutes):  Bed mobility while protecting RUE, static/dynamic sitting balance activities, postural corrections to improve balance, coordination, kinesthetic sense, posture and proprioception. Required moderate visual, verbal and manual cues to promote static and dynamic balance in standing and promote motor control of right, upper extremity(s), lower extremity(s). DATE: 10/26/20 10/28/20 Straight leg raise Hip abduct/ adduct X10 AAR Heel slides  X10 AAR 2x10 AL, AAR Hip external/ internal rotation Ankle dorsiflexion/ plantarflexion 2x10 AB 2x10 AB Key:  A=active, AA=active assisted, P=passive, B=bilaterally, R=right, L=left Braces/Orthotics/Lines/Etc:  
· bustamante catheter · O2 Device: Room air Treatment/Session Assessment:   
· Response to Treatment:  pleasant and cooperative. · Interdisciplinary Collaboration:  
o Physical Therapist 
o Certified Occupational Therapy Assistant 
o Registered Nurse · After treatment position/precautions:  
o Supine in bed 
o Bed/Chair-wheels locked 
o Bed in low position 
o Call light within reach 
o RN notified 
o Family at bedside · Compliance with Program/Exercises: Compliant all of the time, Will assess as treatment progresses · Recommendations/Intent for next treatment session: \"Next visit will focus on advancements to more challenging activities and reduction in assistance provided\". Total Treatment Duration: PT Patient Time In/Time Out Time In: 0800 Time Out: 1408 Milli Fletcher, PT, DPT

## 2020-11-04 NOTE — PROGRESS NOTES
11/03/20 1912 NIH Stroke Scale Interval Other (comment) (dual NIH with MARAL Antony)  
LOC 0  
LOC Questions 0 LOC Commands 0 Best Gaze 0 Visual 0 Facial Palsy 2 Motor Right Arm 2 Motor Left Arm 0 Motor Right Leg 2 Motor Left Leg 0 Limb Ataxia 1 Sensory 1 Best Language 1 Dysarthria 1 Extinction and Inattention 0 Total 10

## 2020-11-04 NOTE — PROGRESS NOTES
Progress Note Patient: Gina Schwartz MRN: [id number]  SSN: [id number] YOB: 1950  Age: 69 y.o. Sex: female Admit Date: 10/25/2020 LOS: 10 days Subjective: F/U brain masses \"75 years old female with hx of HTN, GERD, Hodgkin's lymphoma s/p Radiation Rx, dyslipidemia, hypothyroidism, asthma, systolic CHF EF 95-75% recently diagnosed lung hamartomas presented to ER with acute onset of right sided weakness, aphasia and confusion that began this AM. Pt was recently discharged from [facility] on 10/21 after being treated for PNA and was doing okay until today. Pt is not able to provide much history, she has sort of blank stare but can nod to yes or no questions. Per , pt was walking, doing ADL's  Until yesterday but this morning a drastic change in her mentation and speech was noted by him. ER work up showed CT evidence of enhancing 2.2 cm left frontal lobe mass with vasogenic edema with 2 mm left to right midline shift with 2 additional small masses in tracy and right occipital lobe. CTA head/neck short segment occlusion of left MCA with some changes suggestive of small core infarct and penumbra within the left frontal lobe, no evidence of intraparenchymal hemorrhage. CXR with interval worsening of pulmonary edema and bilateral pleural effusion. \" 
  
 380, CEA 2.5. Cancer antigen 19-9 21.2. CT chest without contrast on 10/29 showed Multiple pulmonary nodules throughout the left lung. Increase in size of a mixed lytic and sclerotic lesion of T6 vertebral body worrisome for metastatic disease. Moderate right and small left pleural effusions with atelectasis. Groundglass opacities with interlobular septal thickening throughout the left lung could be related to an infectious or inflammatory process although lymphangitic carcinomatosis is also a possibility. CT abdomen pelvis to be performed today with possibility of biopsy. Decadron and Keppra started. NIH 10. Reid reinserted on 11/3. No chest pain or SOB. Patient states she is having difficulty eating due to NG tube but trying to drink more and eat more. Still with NG tube. Current Facility-Administered Medications Medication Dose Route Frequency  sodium chloride 0.9 % bolus infusion 100 mL  100 mL IntraVENous RAD ONCE  
 iopamidoL (ISOVUE-370) 76 % injection 100 mL  100 mL IntraVENous RAD ONCE  
 saline peripheral flush soln 10 mL  10 mL InterCATHeter RAD ONCE  
 furosemide (LASIX) tablet 10 mg  10 mg Oral DAILY  polyethylene glycol (MIRALAX) packet 17 g  17 g Oral DAILY  phenol throat spray (CHLORASEPTIC) 1 Spray  1 Spray Oral PRN  
 dexamethasone (DECADRON) 4 mg/mL injection 4 mg  4 mg IntraVENous Q6H  
 traMADoL (ULTRAM) tablet 50 mg  50 mg Oral Q6H PRN  
 [Held by provider] enoxaparin (LOVENOX) injection 40 mg  40 mg SubCUTAneous Q24H  polyethylene glycol (MIRALAX) packet 17 g  17 g Oral DAILY PRN  
 aspirin chewable tablet 81 mg  81 mg Oral DAILY  albuterol (PROVENTIL VENTOLIN) nebulizer solution 2.5 mg  2.5 mg Nebulization TID RT  
 lisinopriL (PRINIVIL, ZESTRIL) tablet 5 mg  5 mg Oral DAILY  montelukast (SINGULAIR) tablet 10 mg  10 mg Oral DAILY  rosuvastatin (CRESTOR) tablet 40 mg  40 mg Oral QHS  carvediloL (COREG) tablet 6.25 mg  6.25 mg Oral BID WITH MEALS  lansoprazole (PREVACID) 3 mg/mL oral suspension 30 mg  30 mg Oral ACB  levothyroxine (SYNTHROID) tablet 75 mcg  75 mcg Oral ACB  levETIRAcetam (KEPPRA) oral solution 500 mg  500 mg Oral BID  insulin lispro (HUMALOG) injection   SubCUTAneous Q6H  
 budesonide (PULMICORT) 500 mcg/2 ml nebulizer suspension  500 mcg Nebulization BID RT  
 LORazepam (ATIVAN) tablet 0.5 mg  0.5 mg Oral BID PRN  
 metoprolol (LOPRESSOR) injection 5 mg  5 mg IntraVENous Q4H PRN  
 NUTRITIONAL SUPPORT ELECTROLYTE PRN ORDERS   Does Not Apply PRN  
  fluticasone propionate (FLONASE) 50 mcg/actuation nasal spray 2 Spray  2 Spray Both Nostrils DAILY  sodium chloride (NS) flush 5-40 mL  5-40 mL IntraVENous Q8H  
 sodium chloride (NS) flush 5-40 mL  5-40 mL IntraVENous PRN  
 ondansetron (ZOFRAN) injection 4 mg  4 mg IntraVENous Q6H PRN  
 acetaminophen (TYLENOL) suppository 650 mg  650 mg Rectal Q4H PRN  
 LORazepam (ATIVAN) injection 2 mg  2 mg IntraVENous Q2H PRN  
 albuterol-ipratropium (DUO-NEB) 2.5 MG-0.5 MG/3 ML  3 mL Nebulization Q4H PRN  
 levalbuterol (XOPENEX) nebulizer soln 1.25 mg/3 mL  1.25 mg Nebulization Q6H PRN Objective:  
 
Vitals:  
 11/04/20 0717 11/04/20 0800 11/04/20 1200 11/04/20 1328 BP:  132/78 (!) 101/59 Pulse:  96 86 Resp:  18 18 Temp:  98 °F (36.7 °C) 98.2 °F (36.8 °C) SpO2: 95% 96% 95% 94% Weight:      
Height:      
  
  
Intake and Output: 
Current Shift: 11/04 0701 - 11/04 1900 In: 36 Out: - Last three shifts: 11/02 1901 - 11/04 0700 In: 0 [P.O.:240] Out: 600 [Urine:600] Physical Exam:  
General:  Alert, cooperative, no distress, talking but slow, weak appearing. Frail Eyes:  Conjunctivae/corneas clear. Ears:  Normal TMs and external ear canals both ears. Nose: NG tube in place with no obvious signs of ulceration. Mouth/Throat: Dry tongue Neck:  no JVD. Back:   deferred Lungs:   Clear to auscultation bilaterally. Heart:  Regular rate and rhythm, S1, S2 normal  
Abdomen:   Soft, non-tender. Bowel sounds normal.   
Extremities: 2/5 strength to right LE and right UE. 3/5 strength to left UE and 4/5 strength to left LE. Poor ROM in bed. Pulses: 2+ and symmetric all extremities. Skin: Skin color, texture, turgor normal. No rashes or lesions Lymph nodes: Cervical, supraclavicular, and axillary nodes normal.  
Neurologic: Good hand coordination with her left hand though slow. Can answer all questions appropriately. Lab/Data Review: Recent Results (from the past 24 hour(s)) GLUCOSE, POC Collection Time: 11/03/20  6:18 PM  
Result Value Ref Range Glucose (POC) 136 (H) 65 - 100 mg/dL GLUCOSE, POC Collection Time: 11/03/20 11:21 PM  
Result Value Ref Range Glucose (POC) 215 (H) 65 - 100 mg/dL GLUCOSE, POC Collection Time: 11/04/20  5:54 AM  
Result Value Ref Range Glucose (POC) 154 (H) 65 - 100 mg/dL METABOLIC PANEL, BASIC Collection Time: 11/04/20  6:12 AM  
Result Value Ref Range Sodium 136 136 - 145 mmol/L Potassium 4.9 3.5 - 5.1 mmol/L Chloride 100 98 - 107 mmol/L  
 CO2 32 21 - 32 mmol/L Anion gap 4 (L) 7 - 16 mmol/L Glucose 140 (H) 65 - 100 mg/dL BUN 44 (H) 8 - 23 MG/DL Creatinine 0.55 (L) 0.6 - 1.0 MG/DL  
 GFR est AA >60 >60 ml/min/1.73m2 GFR est non-AA >60 >60 ml/min/1.73m2 Calcium 8.2 (L) 8.3 - 10.4 MG/DL  
GLUCOSE, POC Collection Time: 11/04/20 11:41 AM  
Result Value Ref Range Glucose (POC) 159 (H) 65 - 100 mg/dL Assessment/ Plan:  
 
Principal Problem: 
  Acute right-sided weakness (10/25/2020) Active Problems: 
  Acquired hypothyroidism (10/17/2013) GERD (gastroesophageal reflux disease) (10/17/2013) Hypercholesterolemia (6/30/2015) Dysphagia (6/30/2015) Bilateral pleural effusion (10/7/2020) Overview: 10/3/2020: L thoracentesis 1050mL removed R thoracentesis 800mL removed 10/9/2020: L thoracentesis: 1000mL removed R thoracentesis: 600mL removed Multiple lung nodules on CT (10/12/2020) Overview: -PET scan 10/2019: PET scan fortunately did not have any abnormal activity  
    in her mass in the left lung.  This may mean that we can just follow this  
    radiographically, but we will talk about her at tumor board and come back  
    to her with the group's recommendation. 
    -10/30/2019: Patient is discussed at thoracic conference today lead by   
 Jeanie Platt. Patient is a 79 yo never smoker. CT guided biopsy recommended. -CT guided Biopsy 11/2020: lung biopsy showed signs of this nodule being a  
    hamartoma, which is a benign tumor that we can simply follow. repeat CT in  
    6 months 
    -Chest CT June 2020: CT scan is stable 
    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  
    secondary to her left lung mass. Juris Maisha are several new pulmonary nodules  
    noted which may suggest some metastatic disease.  This require further  
    follow-up CT of the chest in 2 months or doing PET scan. 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 dexmethasone 4mg IV q 6 hr. Keppra 500mg BID. Plan for CT with contrast to look at nodules in further detail and possible biopsy today. Albuterol q 8 hrs. Pulmicort BID. I have offered palliative care due to likelihood of not doing well with radiation or chemo. She states she is not ready for palliative care at this time.  
  
sCHF EF 35%-  Lasix 10mg daily since little PO intake. Strict Is and Os.  
  
HTN- Coreg, ACE Reid re inserted 11/3. May have neurogenic component from brain lesions, mets to spine, or CVA that is causing difficulty with urination. Remove NG tube to see if will help with oral intake. Will need rehab at discharge. Maybe [facility]? DVT prophylaxis - Lovenox held for possible biopsy Signed By: Major Carey DO November 4, 2020

## 2020-11-04 NOTE — PROGRESS NOTES
Problem: Falls - Risk of 
Goal: *Absence of Falls Description: Document Leafy Oakes Fall Risk and appropriate interventions in the flowsheet. Outcome: Progressing Towards Goal 
Note: Fall Risk Interventions: 
Mobility Interventions: Bed/chair exit alarm, Communicate number of staff needed for ambulation/transfer, Patient to call before getting OOB Mentation Interventions: Bed/chair exit alarm, Door open when patient unattended, Increase mobility Medication Interventions: Bed/chair exit alarm, Patient to call before getting OOB, Teach patient to arise slowly Elimination Interventions: Bed/chair exit alarm, Call light in reach, Patient to call for help with toileting needs, Stay With Me (per policy), Toilet paper/wipes in reach, Toileting schedule/hourly rounds Problem: Patient Education: Go to Patient Education Activity Goal: Patient/Family Education Outcome: Progressing Towards Goal 
  
Problem: Pressure Injury - Risk of 
Goal: *Prevention of pressure injury Description: Document Bhupendra Scale and appropriate interventions in the flowsheet. Outcome: Progressing Towards Goal 
Note: Pressure Injury Interventions: 
Sensory Interventions: Assess changes in LOC, Avoid rigorous massage over bony prominences Moisture Interventions: Absorbent underpads, Check for incontinence Q2 hours and as needed Activity Interventions: Increase time out of bed, Pressure redistribution bed/mattress(bed type), PT/OT evaluation Mobility Interventions: HOB 30 degrees or less, Pressure redistribution bed/mattress (bed type), PT/OT evaluation Nutrition Interventions: Offer support with meals,snacks and hydration, Document food/fluid/supplement intake Friction and Shear Interventions: HOB 30 degrees or less Problem: Patient Education: Go to Patient Education Activity Goal: Patient/Family Education Outcome: Progressing Towards Goal 
  
Problem: Dysphagia (Adult) Goal: *Speech Goal: (INSERT TEXT) Outcome: Progressing Towards Goal 
  
Problem: Patient Education: Go to Patient Education Activity Goal: Patient/Family Education Outcome: Progressing Towards Goal 
  
Problem: Patient Education: Go to Patient Education Activity Goal: Patient/Family Education Outcome: Progressing Towards Goal 
  
Problem: Patient Education: Go to Patient Education Activity Goal: Patient/Family Education Description: 1. Patient will complete lower body bathing and dressing with SBA and adaptive equipment as needed. 2. Patient will complete toileting with SBA. 3. Patient will tolerate 25 minutes of OT treatment with 1-2 rest breaks to increase activity tolerance for ADLs. 4. Patient will complete functional transfers with CGA and adaptive equipment as needed. 5. Patient will tolerate 15 minutes unsupported sitting balance with SBA and adaptive equipment as needed. 6. Patient will complete functional activity while seated edge of bed unsupported with SBA and adaptive equipment as needed. Timeframe: 7 visits Outcome: Progressing Towards Goal 
  
Problem: Patient Education: Go to Patient Education Activity Goal: Patient/Family Education Outcome: Progressing Towards Goal 
  
Problem: Patient Education: Go to Patient Education Activity Goal: Patient/Family Education Outcome: Progressing Towards Goal

## 2020-11-04 NOTE — PROGRESS NOTES
11/04/20 315 NIH Stroke Scale Interval Other (comment) 
(Dual NIH with Amalia Finders) LOC 0  
LOC Questions 0 LOC Commands 0 Best Gaze 0 Visual 0 Facial Palsy 2 Motor Right Arm 2 Motor Left Arm 0 Motor Right Leg 2 Motor Left Leg 0 Limb Ataxia 1 Sensory 1 Best Language 1 Dysarthria 1 Extinction and Inattention 0 Total 10

## 2020-11-04 NOTE — PROGRESS NOTES
SW requested that physiatrist review pt's case for possible rehab admission. Per physiatrist, pt remains too impaired to be able to tolerate/participate in aggressive rehab at [facility]. Pt's medical treatment plan remains pending. SW to continue to follow to assist as needed.

## 2020-11-05 NOTE — PROGRESS NOTES
11/05/20 3998 NIH Stroke Scale Interval Other (comment) LOC 0  
LOC Questions 0 LOC Commands 0 Best Gaze 0 Visual 0 Facial Palsy 2 Motor Right Arm 2 Motor Left Arm 0 Motor Right Leg 2 Motor Left Leg 0 Limb Ataxia 1 Sensory 1 Best Language 1 Dysarthria 1 Extinction and Inattention 0 Total 10 Dual nIH w/ AL

## 2020-11-05 NOTE — PROGRESS NOTES
Progress Note Patient: Goldie Engle MRN: [id number]  SSN: [id number] YOB: 1950  Age: 69 y.o. Sex: female Admit Date: 10/25/2020 LOS: 11 days Subjective: F/U brain masses \"75 years old female with hx of HTN, GERD, Hodgkin's lymphoma s/p Radiation Rx, dyslipidemia, hypothyroidism, asthma, systolic CHF EF 15-30% recently diagnosed lung hamartomas presented to ER with acute onset of right sided weakness, aphasia and confusion that began this AM. Pt was recently discharged from [facility] on 10/21 after being treated for PNA and was doing okay until today. Pt is not able to provide much history, she has sort of blank stare but can nod to yes or no questions. Per , pt was walking, doing ADL's  Until yesterday but this morning a drastic change in her mentation and speech was noted by him. ER work up showed CT evidence of enhancing 2.2 cm left frontal lobe mass with vasogenic edema with 2 mm left to right midline shift with 2 additional small masses in tracy and right occipital lobe. CTA head/neck short segment occlusion of left MCA with some changes suggestive of small core infarct and penumbra within the left frontal lobe, no evidence of intraparenchymal hemorrhage. CXR with interval worsening of pulmonary edema and bilateral pleural effusion. \" 
  
 380, CEA 2.5. Cancer antigen 19-9 21.2. CT chest without contrast on 10/29 showed Multiple pulmonary nodules throughout the left lung. Increase in size of a mixed lytic and sclerotic lesion of T6 vertebral body worrisome for metastatic disease. Moderate right and small left pleural effusions with atelectasis. Groundglass opacities with interlobular septal thickening throughout the left lung could be related to an infectious or inflammatory process although lymphangitic carcinomatosis is also a possibility. CT abdomen pelvis from 11/4 showed no evidence of primary lesion. IR performed biopsy on 11/5.  Biopsy results pending. We are looking into [facility]. Decadron and Keppra started. NIH 10. Franklin reinserted on 11/3. No chest pain or SOB. NG tube removed 11/4 and now able to tolerate PO better. BP low. States she is eating more today. Current Facility-Administered Medications Medication Dose Route Frequency  midazolam (VERSED) injection 0.5-2 mg  0.5-2 mg IntraVENous Multiple  fentaNYL citrate (PF) injection 25-50 mcg  25-50 mcg IntraVENous Multiple  0.9% sodium chloride infusion  150 mL/hr IntraVENous CONTINUOUS  
 [Held by provider] furosemide (LASIX) tablet 10 mg  10 mg Oral DAILY  polyethylene glycol (MIRALAX) packet 17 g  17 g Oral DAILY  phenol throat spray (CHLORASEPTIC) 1 Spray  1 Spray Oral PRN  
 dexamethasone (DECADRON) 4 mg/mL injection 4 mg  4 mg IntraVENous Q6H  
 traMADoL (ULTRAM) tablet 50 mg  50 mg Oral Q6H PRN  
 [Held by provider] enoxaparin (LOVENOX) injection 40 mg  40 mg SubCUTAneous Q24H  polyethylene glycol (MIRALAX) packet 17 g  17 g Oral DAILY PRN  
 aspirin chewable tablet 81 mg  81 mg Oral DAILY  albuterol (PROVENTIL VENTOLIN) nebulizer solution 2.5 mg  2.5 mg Nebulization TID RT  
 lisinopriL (PRINIVIL, ZESTRIL) tablet 5 mg  5 mg Oral DAILY  montelukast (SINGULAIR) tablet 10 mg  10 mg Oral DAILY  rosuvastatin (CRESTOR) tablet 40 mg  40 mg Oral QHS  carvediloL (COREG) tablet 6.25 mg  6.25 mg Oral BID WITH MEALS  lansoprazole (PREVACID) 3 mg/mL oral suspension 30 mg  30 mg Oral ACB  levothyroxine (SYNTHROID) tablet 75 mcg  75 mcg Oral ACB  levETIRAcetam (KEPPRA) oral solution 500 mg  500 mg Oral BID  insulin lispro (HUMALOG) injection   SubCUTAneous Q6H  
 budesonide (PULMICORT) 500 mcg/2 ml nebulizer suspension  500 mcg Nebulization BID RT  
 LORazepam (ATIVAN) tablet 0.5 mg  0.5 mg Oral BID PRN  
 metoprolol (LOPRESSOR) injection 5 mg  5 mg IntraVENous Q4H PRN  
 NUTRITIONAL SUPPORT ELECTROLYTE PRN ORDERS   Does Not Apply PRN  
  fluticasone propionate (FLONASE) 50 mcg/actuation nasal spray 2 Spray  2 Spray Both Nostrils DAILY  sodium chloride (NS) flush 5-40 mL  5-40 mL IntraVENous Q8H  
 sodium chloride (NS) flush 5-40 mL  5-40 mL IntraVENous PRN  
 ondansetron (ZOFRAN) injection 4 mg  4 mg IntraVENous Q6H PRN  
 acetaminophen (TYLENOL) suppository 650 mg  650 mg Rectal Q4H PRN  
 LORazepam (ATIVAN) injection 2 mg  2 mg IntraVENous Q2H PRN  
 albuterol-ipratropium (DUO-NEB) 2.5 MG-0.5 MG/3 ML  3 mL Nebulization Q4H PRN  
 levalbuterol (XOPENEX) nebulizer soln 1.25 mg/3 mL  1.25 mg Nebulization Q6H PRN Objective:  
 
Vitals:  
 11/05/20 1116 11/05/20 1122 11/05/20 1200 11/05/20 1357 BP: (!) 88/44 (!) 111/59 (!) 101/59 Pulse: 79 83 80 Resp: 16 16 17 Temp:   97.8 °F (36.6 °C) SpO2: 97% 99% 100% 94% Weight:      
Height:      
  
  
Intake and Output: 
Current Shift: No intake/output data recorded. Last three shifts: 11/03 1901 - 11/05 0700 In: 170 Out: - Physical Exam:  
General:  Alert, cooperative, no distress, talking more today, weak appearing. Frail Eyes:  Conjunctivae/corneas clear. Ears:  Normal TMs and external ear canals both ears. Nose: No ulcerations. Mouth/Throat: Wet tongue Neck:  no JVD. Back:   deferred Lungs:   Clear to auscultation bilaterally. Heart:  Regular rate and rhythm, S1, S2 normal  
Abdomen:   Soft, non-tender. Bowel sounds normal.   
Extremities: 2/5 strength to right LE and right UE. 3/5 strength to left UE and 4/5 strength to left LE. Poor ROM in bed. Pulses: 2+ and symmetric all extremities. Skin: Skin color, texture, turgor normal. No rashes or lesions Lymph nodes: Cervical, supraclavicular, and axillary nodes normal.  
Neurologic: Good hand coordination with her left hand though slow. Can answer all questions appropriately. Lab/Data Review: 
 
Recent Results (from the past 24 hour(s)) GLUCOSE, POC  
 Collection Time: 11/04/20  5:22 PM  
Result Value Ref Range Glucose (POC) 129 (H) 65 - 100 mg/dL GLUCOSE, POC Collection Time: 11/04/20 11:32 PM  
Result Value Ref Range Glucose (POC) 182 (H) 65 - 100 mg/dL METABOLIC PANEL, BASIC Collection Time: 11/05/20  5:07 AM  
Result Value Ref Range Sodium 133 (L) 136 - 145 mmol/L Potassium 4.8 3.5 - 5.1 mmol/L Chloride 98 98 - 107 mmol/L  
 CO2 33 (H) 21 - 32 mmol/L Anion gap 2 (L) 7 - 16 mmol/L Glucose 132 (H) 65 - 100 mg/dL BUN 42 (H) 8 - 23 MG/DL Creatinine 0.50 (L) 0.6 - 1.0 MG/DL  
 GFR est AA >60 >60 ml/min/1.73m2 GFR est non-AA >60 >60 ml/min/1.73m2 Calcium 8.4 8.3 - 10.4 MG/DL  
GLUCOSE, POC Collection Time: 11/05/20  5:14 AM  
Result Value Ref Range Glucose (POC) 217 (H) 65 - 100 mg/dL GLUCOSE, POC Collection Time: 11/05/20  1:04 PM  
Result Value Ref Range Glucose (POC) 150 (H) 65 - 100 mg/dL Assessment/ Plan:  
 
Principal Problem: 
  Acute right-sided weakness (10/25/2020) Active Problems: 
  Acquired hypothyroidism (10/17/2013) GERD (gastroesophageal reflux disease) (10/17/2013) Hypercholesterolemia (6/30/2015) Dysphagia (6/30/2015) Bilateral pleural effusion (10/7/2020) Overview: 10/3/2020: L thoracentesis 1050mL removed R thoracentesis 800mL removed 10/9/2020: L thoracentesis: 1000mL removed R thoracentesis: 600mL removed Multiple lung nodules on CT (10/12/2020) Overview: -PET scan 10/2019: PET scan fortunately did not have any abnormal activity  
    in her mass in the left lung. This may mean that we can just follow this  
    radiographically, but we will talk about her at tumor board and come back  
    to her with the group's recommendation. 
    -10/30/2019: Patient is discussed at thoracic conference today lead by Dr Rebecca Stern. Patient is a 79 yo never smoker. CT guided biopsy recommended. -CT guided Biopsy 11/2020: lung biopsy showed signs of this nodule being a  
    hamartoma, which is a benign tumor that we can simply follow. repeat CT in  
    6 months 
    -Chest CT June 2020: CT scan is stable 
    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  
    secondary to her left lung mass. [place] Julio Cesar are several new pulmonary nodules  
    noted which may suggest some metastatic disease.  This require further  
    follow-up CT of the chest in 2 months or doing PET scan. 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 dexmethasone 4mg IV q 6 hr. Keppra 500mg BID. Albuterol q 8 hrs. Pulmicort BID. I have offered palliative care due to likelihood of not doing well with radiation or chemo. She states she is not ready for palliative care at this time. Biopsy results pending from IR biopsy on 11/4 
  
sCHF EF 35%-  Held Lasix 10mg daily since NPO for procedure. Strict Is and Os.  
  
HTN- Coreg, ACE that I have reduced dose. Reid re inserted 11/3. May have neurogenic component from brain lesions, mets to spine, or CVA that is causing difficulty with urination. Possible dc to [facility] DVT prophylaxis - Lovenox Signed By: Clint Farrell, DO November 5, 2020

## 2020-11-05 NOTE — PROCEDURES
Department of Interventional Radiology 
[phone] Interventional Radiology Brief Procedure Note Patient: Severa Jing MRN: [id number]  SSN: [id number] YOB: 1950  Age: 69 y.o. Sex: female Date of Procedure: 11/5/2020 Pre-Procedure Diagnosis: Lung nodules Post-Procedure Diagnosis: SAME Procedure(s): Image Guided Biopsy Brief Description of Procedure: ct biopsy Performed By: Maria D Case MD  
 
Assistants: None Anesthesia:Moderate Sedation Estimated Blood Loss: Less than 10ml Specimens:  Pathology Implants:  None Findings: LLL nodule, small ptx post procedure Complications: None Recommendations: cxr now and 2 hours Follow Up: as above Signed By: Maria D Case MD   
 November 5, 2020

## 2020-11-05 NOTE — PROGRESS NOTES
Comprehensive Nutrition Assessment Type and Reason for Visit: Joshua Stack Nutrition Recommendations/Plan:  
Enteral Nutrition: 
Discontinued 11/4. Oral Nutrition: 
Continue diet per SLP. Oral supplements continue as follows: Ensure High Protein daily, Ensure Clear daily, Magic Cup TID. Continue to provide assistance with oral intake. Offer boost or ensure products to drink with each entry to room to optimize oral intake. Current po trend if maintained will meet more of her needs than last assessment but will remains inadequate potentially meeting <50% needs. 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 TF started 10/26, diet initiated by SLP 10/27, converted to nocturnal TF 10/28, NG tube discontinued 11/4. SLP continues to follow patient. Continues with expressive language deficits. s/p IR guided biopsy today, diet was restarted as Regular changed to mechanical soft per prior SLP eval.  
 
Visit with pt and  at bedside.  is feeding her lunch. She self initiates drinking a variety of beverages independently and accepts spoon from him with magic cup. She declined any foods from the tray at present but did consume the full serving of magic cup and took sips of bethany plus from family. She continues to grimace with swallow but less pronounced than last visit. She denies any pain with swallowing. Estimated Daily Nutrient Needs: 
Energy (kcal): 9144-4860 (25-30 brandon/kg/day using 54 kg) Protein (g): 54-65 (1-1.2 gm pro/kg/day using 54 kg) Nutrition Related Findings: Abdominal Status (last documented): Soft abdomen with Active  bowel sounds. Last BM 11/03/20. Pertinent Medications: decadron, lasix, SSI, prevacid, synthroid, miralax, NS @ 100 ml/hr Pertinent Labs: Na 133, Glu 132, BUN 42 Pt has been NPO for the majority of the time s/p removal of NGT.   She did receive evening tray last PM but is unable to tell me what she ate. Current Nutrition Therapies: DIET NUTRITIONAL SUPPLEMENTS Lunch, Dinner, Breakfast; Anjana ( ) DIET NUTRITIONAL SUPPLEMENTS Breakfast; Ensure High Protein ( ) DIET NUTRITIONAL SUPPLEMENTS Lunch; Ensure Clear ( ) DIET NUTRITIONAL SUPPLEMENTS Dinner; Sejalergloria Cheung ( ) DIET MECHANICAL SOFT Anthropometric Measures: 
· Height:  5' 6\" (167.6 cm) · Current Body Wt:  52.2 kg (115 lb 1.3 oz)(no source) Last 3 Recorded Weights in this Encounter 10/29/20 0400 10/30/20 0430 11/05/20 8368 Weight: 54.7 kg (120 lb 9.5 oz) 52.3 kg (115 lb 4.8 oz) 52.2 kg (115 lb) Nutrition Diagnosis:  
· Inadequate oral intake related to cognitive or neurological impairment, biting/chewing (masticatory) difficulty(fatigue) as evidenced by (NG d/c, minimal chances for oral intake since NG removed) Nutrition Interventions:  
Food and/or Nutrient Delivery: Continue current diet, Continue oral nutrition supplement Coordination of Nutrition Care: Continue to monitor while inpatient Plan of Care Discussed with Al, RN. Goals: 
Meet >75% estimated needs orally Nutrition Monitoring and Evaluation:  
Behavioral-Environmental Outcomes:   
Food/Nutrient Intake Outcomes: Food and nutrient intake, Supplement intake Physical Signs/Symptoms Outcomes: Biochemical data, Chewing or swallowing, GI status Discharge Planning: Too soon to determine Val Block RD, LDN on 11/5/2020 at 1:23 PM 
Contact: [phone]

## 2020-11-06 NOTE — PROGRESS NOTES
11/05/20 2000 NIH Stroke Scale Interval Other (comment) 
(Dual NIH with Al, RN)  
LOC 0  
LOC Questions 0 LOC Commands 0 Best Gaze 0 Visual 0 Facial Palsy 2 Motor Right Arm 3 Motor Left Arm 0 Motor Right Leg 2 Motor Left Leg 0 Limb Ataxia 0 Sensory 1 Best Language 1 Dysarthria 1 Extinction and Inattention 0 Total 10

## 2020-11-06 NOTE — HOSPICE
[address] liaison spoke with floor RN who reports that the NG tube has been removed. Patient only on comfort medications at this time. Patient's brother Brenton Bell is in the room with her. Liaison called Israel Peoples and spoke with him, he reports that Braydon Hogan is only taking occasional bites of ice cream, and sips of liquids. Swallowing pills is difficult and inconsistent. She is now requiring IV morphine for pain relief, c/o pain in her back. No enteral feeding at this time, patient has declined. Per Izabella Kaur wishes to be comfortable and die a natural death. Liaison will update Dr. Heather Tejada and obtain a determination of level of hospice care. Liaison will update CM and family at that time.

## 2020-11-06 NOTE — PROGRESS NOTES
[facility] Hematology & Oncology Inpatient Hematology / Oncology Progress Note Reason for Consult:  Frontal mass of brain [G93.89] Acute right-sided weakness [R53.1] Expressive aphasia [R47.01] Acute encephalopathy [G93.40] Multiple lesions on CT of brain and spine [R93.0, R93.7] Referring Physician:  Estephania Bah, DO 
 
24 Hour Events: 
Ongoing RUE weakness; aphasia somewhat improved Requesting hospice S/p bx of pulmonary nodule in IR 11/5 - non-diagnostic CT AP without further evidence of malignancy VSS, afebrile ROS: 
Constitutional: Negative for fever, chills, weakness, malaise, fatigue. CV: Negative for chest pain, palpitations, edema. Respiratory: Negative for dyspnea, cough, wheezing. GI: Negative for nausea, abdominal pain, diarrhea. 10 point review of systems is otherwise negative with the exception of the elements mentioned above in the HPI. Allergies Allergen Reactions  Compazine [Prochlorperazine Edisylate] Swelling Past Medical History:  
Diagnosis Date  Acute CVA (cerebrovascular accident) ([address] 10/25/2020  Asthma  Bite of nonvenomous arthropod ? ??  
 Cough  Esophageal stricture 2002  
 dilated 2002  GERD (gastroesophageal reflux disease)  History of rectal bleeding ???  
 Hodgkin's lymphoma ([address] 1980 Radiation therapy  Hypercholesterolemia  Menopause  Positive RAST testing 2007 IgE level of 1,391  Thyroid disease Past Surgical History:  
Procedure Laterality Date [place]  HX BREAST BIOPSY Left 05/21/2020  
 calcs at 2:00  
 HX CATARACT REMOVAL Bilateral 2007, 2009  HX COLONOSCOPY    
 HX OTHER SURGICAL    
 dental x3  
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 [address] Family History Problem Relation Age of Onset  Cancer Mother Kidney cancer  Cancer Father Prostate cnaceer  Heart Surgery Brother  Breast Cancer Neg Hx Social History Socioeconomic History  Marital status:  Spouse name: Not on file  Number of children: Not on file  Years of education: Not on file  Highest education level: Not on file Occupational History  Occupation:  Social Needs  Financial resource strain: Not on file  Food insecurity Worry: Not on file Inability: Not on file  Transportation needs Medical: Not on file Non-medical: Not on file Tobacco Use  Smoking status: Never Smoker  Smokeless tobacco: Never Used Substance and Sexual Activity  Alcohol use: Yes Alcohol/week: 21.0 standard drinks Types: 7 Shots of liquor, 14 Standard drinks or equivalent per week  Drug use: No  
 Sexual activity: Not on file Lifestyle  Physical activity Days per week: Not on file Minutes per session: Not on file  Stress: Not on file Relationships  Social connections Talks on phone: Not on file Gets together: Not on file Attends Orthodoxy service: Not on file Active member of club or organization: Not on file Attends meetings of clubs or organizations: Not on file Relationship status: Not on file  Intimate partner violence Fear of current or ex partner: Not on file Emotionally abused: Not on file Physically abused: Not on file Forced sexual activity: Not on file Other Topics Concern  Not on file Social History Narrative There is no significant environmental or industrial exposure. She has no known exposure to TB. She has worked as an . Current Facility-Administered Medications Medication Dose Route Frequency Provider Last Rate Last Dose  midazolam (VERSED) injection 0.5-2 mg  0.5-2 mg IntraVENous Kimberley Coreas MD   0.5 mg at 11/05/20 [phone]  [Held by provider] fentaNYL citrate (PF) injection 25-50 mcg  25-50 mcg IntraVENous Multiple Nikhil Trent MD   25 mcg at 11/05/20 1044  [Held by provider] carvediloL (COREG) tablet 3.125 mg  3.125 mg Oral BID WITH MEALS Kadie Board, DO      
 [Held by provider] lisinopriL (PRINIVIL, ZESTRIL) tablet 2.5 mg  2.5 mg Oral DAILY Kadie Board, DO      
 [Held by provider] furosemide (LASIX) tablet 10 mg  10 mg Oral DAILY Kadie Board, DO   10 mg at 11/04/20 0169  polyethylene glycol (MIRALAX) packet 17 g  17 g Oral DAILY Herb Knight MD   17 g at 11/05/20 1227  phenol throat spray (CHLORASEPTIC) 1 Spray  1 [place] Oral PRN Herb Knight MD   1 Spray at 11/01/20 1747  
 dexamethasone (DECADRON) 4 mg/mL injection 4 mg  4 mg IntraVENous Q6H Herb Knight MD   4 mg at 11/06/20 0535  traMADoL (ULTRAM) tablet 50 mg  50 mg Oral Q6H PRN Herb Knight MD   50 mg at 11/06/20 0535  enoxaparin (LOVENOX) injection 40 mg  40 mg SubCUTAneous Q24H Herb Knight MD   40 mg at 11/03/20 1429  polyethylene glycol (MIRALAX) packet 17 g  17 g Oral DAILY PRN Herb Knight MD      
 aspirin chewable tablet 81 mg  81 mg Oral DAILY Herb Knight MD   Stopped at 11/05/20 1224  albuterol (PROVENTIL VENTOLIN) nebulizer solution 2.5 mg  2.5 mg Nebulization TID RT Herb Knight MD   2.5 mg at 11/06/20 0906  
 montelukast (SINGULAIR) tablet 10 mg  10 mg Oral DAILY Lilliam Knight MD   10 mg at 11/05/20 1224  
 rosuvastatin (CRESTOR) tablet 40 mg  40 mg Oral QHS Lilliam Knight MD   40 mg at 11/05/20 2131  
 lansoprazole (PREVACID) 3 mg/mL oral suspension 30 mg  30 mg Oral ACB Lilliam Knight MD   30 mg at 11/06/20 0534  levothyroxine (SYNTHROID) tablet 75 mcg  75 mcg Oral ACB Herb Knight MD   75 mcg at 11/06/20 0535  levETIRAcetam (KEPPRA) oral solution 500 mg  500 mg Oral BID Herb Knight MD   500 mg at 11/05/20 1758  insulin lispro (HUMALOG) injection   SubCUTAneous Q6H Herb Knight MD   Stopped at 11/05/20 1200  budesonide (PULMICORT) 500 mcg/2 ml nebulizer suspension  500 mcg Nebulization BID RT Hu Mclaughlin, DO   500 mcg at 20 7113  LORazepam (ATIVAN) tablet 0.5 mg  0.5 mg Oral BID PRN Ma Arnoldo, DO      
 metoprolol (LOPRESSOR) injection 5 mg  5 mg IntraVENous Q4H PRN Hu Mclaughlin, DO   5 mg at 10/26/20 1525  
 NUTRITIONAL SUPPORT ELECTROLYTE PRN ORDERS   Does Not Apply PRN Hu Mclaughlin, DO      
 fluticasone propionate (FLONASE) 50 mcg/actuation nasal spray 2 Spray  2 Spray Both Nostrils DAILY Excell Mom, MD   2 Spray at 20 1225  sodium chloride (NS) flush 5-40 mL  5-40 mL IntraVENous Q8H Timmy Flores MD   10 mL at 20 4175  sodium chloride (NS) flush 5-40 mL  5-40 mL IntraVENous PRN Timmy Flores MD   10 mL at 20 0045  ondansetron (ZOFRAN) injection 4 mg  4 mg IntraVENous Q6H PRN Timmy Flores MD   4 mg at 20 1004  acetaminophen (TYLENOL) suppository 650 mg  650 mg Rectal Q4H PRN Timmy Flores MD      
 LORazepam (ATIVAN) injection 2 mg  2 mg IntraVENous Q2H PRN Timmy Flores MD      
 albuterol-ipratropium (DUO-NEB) 2.5 MG-0.5 MG/3 ML  3 mL Nebulization Q4H PRN Timmy Flores MD   3 mL at 10/26/20 5553  levalbuterol (XOPENEX) nebulizer soln 1.25 mg/3 mL  1.25 mg Nebulization Q6H PRN Timmy Flores MD      
 
 
OBJECTIVE: 
Patient Vitals for the past 8 hrs: 
 BP Temp Pulse Resp SpO2  
20 0906     98 % 20 0400 (!) 86/56 97.7 °F (36.5 °C) 83 16 99 % Temp (24hrs), Av.6 °F (36.4 °C), Min:97.4 °F (36.3 °C), Max:97.8 °F (36.6 °C) No intake/output data recorded. Physical Exam: 
Constitutional: Well developed, well nourished female in no acute distress, awake and alert sitting in a chair HEENT: Normocephalic and atraumatic. Oropharynx is clear, mucous membranes are moist. Extraocular muscles are intact. Sclerae anicteric. Neck supple without JVD. No thyromegaly present. Lymph node  No palpable submandibular, cervical, supraclavicular, axillary or inguinal lymph nodes. Skin Warm and dry. No bruising and no rash noted. No erythema. No pallor. Respiratory  decreased breath sounds left side CVS Normal rate, regular rhythm and normal S1 and S2. No murmurs, gallops, or rubs. Abdomen Soft, nontender and nondistended, normoactive bowel sounds. No palpable mass. No hepatosplenomegaly. Neuro  expressive aphasia. The patient seems to understand conversation and will attempt to respond. Profound right-sided weakness right arm greater than right leg. MSK Normal range of motion in general.  No edema and no tenderness. Psych  difficult to determine Labs:   
Recent Results (from the past 24 hour(s)) GLUCOSE, POC Collection Time: 11/05/20  1:04 PM  
Result Value Ref Range Glucose (POC) 150 (H) 65 - 100 mg/dL GLUCOSE, POC Collection Time: 11/05/20  5:42 PM  
Result Value Ref Range Glucose (POC) 205 (H) 65 - 100 mg/dL GLUCOSE, POC Collection Time: 11/06/20 12:26 AM  
Result Value Ref Range Glucose (POC) 133 (H) 65 - 100 mg/dL GLUCOSE, POC Collection Time: 11/06/20  5:18 AM  
Result Value Ref Range Glucose (POC) 128 (H) 65 - 100 mg/dL CBC W/O DIFF Collection Time: 11/06/20  5:53 AM  
Result Value Ref Range WBC 38.6 (H) 4.3 - 11.1 K/uL  
 RBC 3.62 (L) 4.05 - 5.2 M/uL  
 HGB 11.1 (L) 11.7 - 15.4 g/dL HCT 34.3 (L) 35.8 - 46.3 % MCV 94.8 79.6 - 97.8 FL  
 MCH 30.7 26.1 - 32.9 PG  
 MCHC 32.4 31.4 - 35.0 g/dL  
 RDW 13.2 11.9 - 14.6 % PLATELET 523 659 - 890 K/uL MPV 11.9 9.4 - 12.3 FL ABSOLUTE NRBC 0.02 0.0 - 0.2 K/uL METABOLIC PANEL, BASIC Collection Time: 11/06/20  5:53 AM  
Result Value Ref Range Sodium 134 (L) 136 - 145 mmol/L Potassium 5.0 3.5 - 5.1 mmol/L Chloride 98 98 - 107 mmol/L  
 CO2 28 21 - 32 mmol/L Anion gap 8 7 - 16 mmol/L Glucose 116 (H) 65 - 100 mg/dL BUN PENDING MG/DL Creatinine 0.53 (L) 0.6 - 1.0 MG/DL  
 GFR est AA >60 >60 ml/min/1.73m2 GFR est non-AA >60 >60 ml/min/1.73m2 Calcium 7.8 (L) 8.3 - 10.4 MG/DL Imaging: XR ABD (KUB) [[id number]]  Collected: 10/26/20 1308 Order Status: Completed  Updated: 10/26/20 1312 Narrative:     
Abdomen for NG tube placement, one view, 10/26/2020 at 1216 hours A single view upper and left abdomen obtained. There is an NG tube. It is kinked  
at the gastroesophageal junction with the tip directed superiorly overlying the  
distal esophagus. This will probably require removing the tube and reinserting  
for proper positioning. Basilar infiltrates noted XR [place] [facility] [place] [[id number]] Order Status: No result MRI BRAIN W WO CONT [[id number]]  Collected: 10/25/20 1158 Order Status: Completed  Updated: 10/25/20 1209 Narrative:     
MRI brain with and without contrast  
 
History: Abnormal CT. History of lymphoma.  Right hemiparesis Imaging sequences: Sagittal short TR/short TE, axial short TR/short TE, long TR/long TE, FLAIR, gradient recall, diffusion weighted images and ADC mapping. Axial and coronal short TR/short TE postcontrast images. Imaging was performed  
on a 1.5 Vicky magnet, utilizing the uneventful administration of 10 mL of  
intravenous [place] and order to better evaluate for intracranial pathology. Comparison: None. Correlation is made to the CT scan of the brain performed  
earlier on the same day. Findings: There is an enhancing mass within the left frontal region measuring  
approximately 2.3 x 2.2 x 2.3 cm in AP, transverse and craniocaudal dimensions,  
recently. This is a broad-based dural attachment. There is significant  
surrounding edema. This appears at least in part to be extra-axial although a  
more cystic component cannot be stated as such. There are 2 enhancing  
parenchymal lesions within the tracy with the larger measuring 1.0 cm with  
associated edema. There are 2 right occipital lobe mass with the larger measuring up to 1.3 cm, also with surrounding edema. There are enhancing right  
frontal bone lesions measuring up to 2.1 cm in size. The ventricles and sulci are normal in size and configuration.  There are no  
extra-axial fluid collections.  Normal flow voids are present within all of the  
major intracranial vessels. There is no evidence of intraparenchymal hemorrhage. There are subcentimeter foci of restricted diffusion within the right occipital  
and parietal lobes raising concern for small foci of acute infarctions. Additional subtle restricted diffusion is present medial to the edema within the  
left frontal region at the left corona radiata and extending into the external  
capsule.    
 
Scattered foci of T2 hyperintensity within the supratentorial white matter most  
likely represent the sequela of chronic small vessel ischemic change,  
unremarkable for age. The visualized mastoid air cells and paranasal sinuses are  
well pneumatized and aerated. Impression:     
IMPRESSION:  
1. Several enhancing masses with the largest within the left frontal region  
which may have an extra axial component with significant surrounding edema. 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 There  
are also enhancing right frontal bone lesions presumably representing part of  
the same process. 2. Small acute right occipital and parietal lobe infarcts. Additional infarcts  
are suspected within the left corona radiata/external capsule. XR CHEST PORT [[id number]]  Collected: 10/25/20 0066 Order Status: Completed  Updated: 10/25/20 4344 Narrative:     
EXAM: CHEST X-RAY, 1 VIEW INDICATION: stroke. COMPARISON: Chest x-ray 10/20/2020 TECHNIQUE: Single AP view of the chest was obtained. FINDINGS: Interval worsening of ill-defined perihilar markings throughout both  
lungs. Bilateral pleural effusions also appear to be enlarging. The cardiac silhouette is partially obscured. No pneumothorax. The bones are unchanged. Impression:     
IMPRESSION:  
Interval worsening of pulmonary edema and enlargement of bilateral effusions. Coexisting infection difficult to fully exclude by x-ray. CTA CODE NEURO HEAD AND NECK W CONT [[id number]]  Collected: 10/25/20 5905 Order Status: Completed  Updated: 10/25/20 4668 Narrative:     
History: Right-sided weakness and difficulty with speech. FINDINGS:  
 
CT angiography was performed of the neck and head with contrast and  
three-dimensional CT angiography reconstruction and reformat was performed. NASCET criteria as needed. CT dose reduction was achieved through use of a  
standardized protocol tailored for this examination and automatic exposure  
control for dose modulation. Bilateral pleural effusion. Parenchymal nodularity in the left upper lobe. There is extensive atherosclerotic ectasia of the imaged segments of the  
thoracic aorta. The ascending aorta measures 2.8 cm. There is a mild stenosis of  
the proximal descending thoracic aorta related to concentric noncalcified  
atheroma. There is a right-sided aortic arch with an occluded right common  
carotid artery and occluded right subclavian artery. The right vertebral artery  
reconstitutes via collaterals. Reversal of flow is not excluded by CT angiogram.  
 
The innominate artery is patent. The left common carotid artery is patent. The  
left internal carotid artery is patent. There is atherosclerosis at the left  
carotid bulb without hemodynamically significant stenosis. There is a moderate  
stenosis in the supraclinoid segment of the left internal carotid artery. The right internal carotid artery reconstitutes at the right carotid bulb. The  
right middle cerebral artery is patent. There is short segment occlusion in the M1 segment of the left middle cerebral  
artery. The posterior cerebral arteries are patent. Dural venous sinuses are patent. Small left transverse and sigmoid sinus. Multiple enhancing lesions within the brainstem and cerebrum. The largest is in  
the left middle cerebral artery territory. Impression:     
IMPRESSION:  
 
Short segment occlusion of the left middle cerebral artery. Finding reported to  
the emergency room bedside physician at the time of this report. Multiple enhancing lesions in the brainstem and cerebrum. Bilateral pleural effusion. Extensive atherosclerosis of the aorta with occlusion of the right subclavian  
and the right common carotid arteries. This is chronic dating back to June 2019. CT Perfusion w/ Cerebral Blood Flow [[id number]]  Collected: 10/25/20 0725 Order Status: Completed  Updated: 10/25/20 0730 Narrative:     
CT Perfusion Imaging INDICATION:  Acute neuro changes. Right-sided weakness. CT perfusion imaging of the brain was performed after the administration of  
intravenous contrast.  Perfusion maps and perfusion analysis output were  
generated using the [employer] perfusion processing software algorithm.   Radiation  
dose reduction techniques were used for this study:  All CT scans performed at  
this facility use one or all of the following: Automated exposure control,  
adjustment of the mA and/or kVp according to patient's size, iterative  
reconstruction. COMPARISON: None. Correlation is made to the CTA and CT brain performed on the  
same day. [employer] Output Values: CBF < 30% volume:  3 ml   (core infarction volume greater than 50 cc associated  
with poor outcomes) Tmax > 6 seconds: 41 ml Tmax/CBF Mismatch Volume: 38 ml Tmax/CBF Mismatch Ratio: 13.7 Hypoperfusion Intensity Ratio (Tmax > 10 seconds/Tmax > 6 seconds): 0.3    
(values greater than 0.5 associated with poor outcome) Tmax > 10 seconds Volume: 11 ml   (volume greater than 100 mL is associated with  
poor outcome) Impression:     
 IMPRESSION: Although there are changes suggesting small core infarct and  
penumbra within the left frontal lobe the findings are felt to correspond to a  
mass with vasogenic edema on CT scanning. Other intracranial masses are present  
as well. Please note that the determination of patient treatment is not based solely upon  
imaging factors or calculation values. Management of ischemia is at the  
discretion of the primary physician and is based upon a combination of clinical  
and imaging data, along other factors. CT HEAD W CONT [[id number]] Order Status: Canceled CT CODE NEURO HEAD WO CONTRAST [[id number]]  Collected: 10/25/20 4350 Order Status: Completed  Updated: 10/25/20 5887 Narrative:     
CT head without contrast  
 
History: Acute right-sided weakness, speech disturbance. Technique: 5mm axial images were obtained from the skull base to the vertex  
without intravenous contrast.  Radiation dose reduction techniques were used for  
this study:  Our CT scanners use one or all of the following: Automated exposure  
control, adjustment of the mA and/or kVp according to patient's size, iterative  
reconstruction. Comparison: None Findings: The ventricles and sulci are normal in size and configuration. There  
are no extra-axial fluid collections. There is a region of decreased attenuation  
within the left frontal lobe which involves primarily white matter but also a  
component of the cortex. There is a 1 cm cystic region also in close association  
with this. There is 2 mm of left-to-right midline shift There is no evidence of  
intraparenchymal hemorrhage. The bony calvarium is intact. The visualized  
mastoid air cells and paranasal sinuses are well pneumatized and aerated. Impression:     
Impression: Low attenuation within the left frontal lobe may represent an acute  
or subacute infarct however the possibility of this representing vasogenic edema from an underlying mass is not excluded. Postcontrast imaging would be  
recommended for further evaluation. ASSESSMENT: 
Problem List  Date Reviewed: 10/19/2020 Codes Class Noted Systolic heart failure (HCC) ICD-10-CM: I50.20 ICD-9-CM: 428.20  10/27/2020 Expressive aphasia ICD-10-CM: R47.01 
ICD-9-CM: 784.3  10/25/2020 * (Principal) Acute right-sided weakness ICD-10-CM: R53.1 ICD-9-CM: 728.87  10/25/2020 Frontal mass of brain ICD-10-CM: G93.89 ICD-9-CM: 348.89  10/25/2020 Acute encephalopathy ICD-10-CM: G93.40 ICD-9-CM: 348.30  10/25/2020 Multiple lesions on CT of brain and spine ICD-10-CM: R93.0, R93.7 ICD-9-CM: 793.0, 793.7  10/25/2020 Acute CVA (cerebrovascular accident) ([facility] [address] ICD-10-CM: I63.9 ICD-9-CM: 434.91  10/25/2020 Multiple lung nodules on CT (Chronic) ICD-10-CM: R91.8 ICD-9-CM: 793.19  10/12/2020 Overview Addendum 10/12/2020  2:33 PM by My Parrish NP  
  -PET scan 10/2019: PET scan fortunately did not have any abnormal activity in her mass in the left lung. This may mean that we can just follow this radiographically, but we will talk about her at tumor board and come back to her with the group's recommendation. 
-10/30/2019: Patient is discussed at thoracic conference today lead by Dr Annalee Bruce. Patient is a 77 yo never smoker. CT guided biopsy recommended. -CT guided Biopsy 11/2020: lung biopsy showed signs of this nodule being a hamartoma, which is a benign tumor that we can simply follow. repeat CT in 6 months 
-Chest CT June 2020: CT scan is stable 
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 Jovan Spatz are several new pulmonary nodules noted which may suggest some metastatic disease.  This require further follow-up CT of the chest in 2 months or doing PET scan. Hamartoma (HCC) (Chronic) ICD-10-CM: Q85.9 ICD-9-CM: 759.6  10/12/2020 Peripheral vascular disease ([facility] [address] ICD-10-CM: I73.9 ICD-9-CM: 443.9  10/12/2020 Bilateral pleural effusion ICD-10-CM: J90 ICD-9-CM: 511.9  10/7/2020 Overview Signed 10/13/2020  1:25 PM by DENI Negro  
  10/3/2020: L thoracentesis 1050mL removed R thoracentesis 800mL removed 10/9/2020: L thoracentesis: 1000mL removed R thoracentesis: 600mL removed Acute systolic heart failure (HCC) ICD-10-CM: I50.21 ICD-9-CM: 428.21  10/6/2020 PNA (pneumonia) ICD-10-CM: J18.9 ICD-9-CM: 520  10/1/2020 Mass of lingula of lung ICD-10-CM: R91.8 ICD-9-CM: 786.6  10/16/2019 Right carotid artery occlusion ICD-10-CM: L97.30 ICD-9-CM: 433.10  10/11/2019 Subclavian artery stenosis (HCC) ICD-10-CM: I77.1 ICD-9-CM: 447.1  10/11/2019 Left carotid stenosis ICD-10-CM: L24.09 
ICD-9-CM: 433.10  10/12/2018 Hypercholesterolemia (Chronic) ICD-10-CM: E78.00 ICD-9-CM: 272.0  6/30/2015 Dysphagia (Chronic) ICD-10-CM: R13.10 ICD-9-CM: 787.20  6/30/2015 Mild persistent asthma without complication (Chronic) AEL-19-YT: J45.30 ICD-9-CM: 493.90  10/17/2013 Acquired hypothyroidism (Chronic) ICD-10-CM: E03.9 ICD-9-CM: 244.9  10/17/2013 Allergic rhinitis (Chronic) ICD-10-CM: J30.9 ICD-9-CM: 477.9  10/17/2013 GERD (gastroesophageal reflux disease) (Chronic) ICD-10-CM: K21.9 ICD-9-CM: 530.81  10/17/2013 Ms. Sloane Barrientos is a 69 y.o. female admitted on 10/25/2020 with a primary diagnosis of brain masses. Her PMH includes HTN, GERD, Hodgkin's lymphoma, hypothyroidism, sCHF, and recently dx lung hamartomas. CT chest from 9/21/2020 with L lung mass and sclerotic focus at T6. Lung mass has been followed by pulm since 2019. Had bx of L lung mass on 11/11/2019 with path +hamartoma. She presented to ED with c/o acute onset R-sided weakness, aphasia, and confusion.   She was recently discharged on 10/21 after being treated for pneumonia. MRI brain with several enhancing masses with largest in L frontal region, enhancing R frontal bone lesions; and small acute R occipital and parietal lobe infarcts. CTA head/neck with short segment occlusion of MCA. CXR with interval worsening of pulm edema and b/l pleural effusion. Neuro/neurosurgery following. Brain masses not amenable to surgical resection. On Dex 6mg q 6 hours and Keppra. Rad Onc consult pending. We were consulted for recommendations. RECOMMENDATIONS: 
Hx Hodgkin's Lymphoma 
-  
 
Brain Masses / CVA 
- MRI brain with several enhancing masses with largest in L frontal region, enhancing R frontal bone lesions; and small acute R occipital and parietal lobe infarcts 
- Neuro/neurosurgery following - brain masses not amenable to surgical resection - On Dex 6mg q 6 hours and Keppra - Rad Onc consult pending - Check CT AP for staging 10/29 CT CAP pending, rad onc consult pending further work up. 
10/30 Aphasia somewhat improved. Ongoing RUE weakness. 10/31 Neurologically stable. Ultimately this is most likely due to brain metastases although the source remains a question. Nevertheless we will ask radiation oncology to see her so they are familiar as we proceed with evaluation. 11/1 No change in neuro function. Working with PT. Consult placed for Rad Onc. Hopefully IR can biopsy either a lung mass (preferrable) or bone lesion. Await CT abdomen pending barium clearance. If persistent, might consider laxative. 11/2 Rad Onc consult pending. CT AP still pending barium clearance; IR consult pending for bx. Check tumor markers while awaiting consults/further workup CA 27.29, CA 15-3, , CEA and CA 19-9. 
11/6 CT AP showed no evidence of primary or metastatic disease - no further targets identified for biopsy. S/p biopsy of lung nodule in IR yesterday - path non-diagnostic.  Discussed rationale for obtaining biopsy in order to devise treatment plan. Discussed at this point given non-diagnostic biopsies, next step would be more invasive procedure that would possibly include intracranial biopsy. She reports she does not wish to pursue any further treatments and does not want to undergo an invasive procedure. We also dicussed that once biopsy was obtained chemotherapy would also be likely, which she also expresses she has no desire for. She states she has had enough and is ready for hospice. Reassured her that this is a reasonable option given her comorbidities and now functional deficits following her CVA/brain mass and prolonged hospital stay and continued decline since admission. She is also requesting comfort measures only at this time. Hospice consult pending. L lung mass/hamartoma - Pt had CT chest on 9/21/20 with L lung mass with multiple pulm nodules and sclerotic focus at T6. Lung mass has been followed by pulm since 2019. Had bx of L lung mass on 11/11/2019 with path +hamartoma. 
- Dr. Marielena Marrero to ask IR if bx of lung nodule possible. Will also check CT AP. 
10/29 Pulmonology following; recent CT chest indicates location likely not amenable to biopsy via EBUS. IR reported biopsy would be difficult although willing to attempt if no other targets available. CT CAP pending barium clearance after swallow study 10/27. Will ask CT to perform CT chest while awaiting CT AP.  
10/30 CT chest done yesterday; continue to await CT AP - pending barium clearance. We have consulted IR to review images to see if one of the pulmonary nodules is accessible and if possible to have that completed over the weekend but more likely next week if they see an accessible target. Otherwise will await CT AP. 
10/31 Referral placed to IR to consider biopsy of one of the lung lesions. 11/2 IR consult pending for biopsy. 11/6 Lung nodule biopsy completed in IR 11/5 non-diagnostic. Issues discussed with family. Lab studies and imaging studies were personally reviewed. Thank you for allowing us to participate in the care of Ms. Sloane Barrientos. We will now sign off. Please do not hesitate to call with questions or concerns. ISIDRO Manuel Caro [facility] Hematology & Oncology [address] [place] [address] Office : [phone] Fax : [phone] Attending Addendum: 
I have personally performed a face to face diagnostic evaluation on this patient. I have reviewed and agree with the care plan as documented above by  Tamica Burr N.P.  My findings are as follows: Patient appears stable, heart rate regular without murmurs, abdomen is non-tender, bowel sounds are positive. Aphasia improved though right-sided weakness continues. On Decadron, s/p IR directed pulmonary nodule biopsy unrevealing. Primary site of disease for likely malignant process not readily clear. CTAP was unremarkable. In interim pt has continues to clinically decline, and today has made an educated decision along w/ family members at bedside to pursue hospice care. She would prefer hospice facility on discharge, will let primary team know to proceed as such.   
 
     
  
 
  
Femi Brandon MD 
[facility] Hematology/Oncology [address] [address] Office : [phone] Fax : [phone]

## 2020-11-06 NOTE — PROGRESS NOTES
11/06/20 0400 NIH Stroke Scale Interval Other (comment) (Neuro checks) LOC 0  
LOC Questions 0 LOC Commands 0 Motor Right Arm 3 Motor Left Arm 0 Motor Right Leg 2 Motor Left Leg 0 Dysarthria 1 Neuro checks

## 2020-11-06 NOTE — PROGRESS NOTES
Hypotensive 70's/40's Called to Dr William Fierro who came and examined patient. NAD Pt stated her BP runs low and \"my doctor can only find it in my left leg. BP LLE still 70/48 Manual BP by Dr William Fierro 60/48 Rx for NSS bolus x250cc due to low  EF Will monitor and continue to follow Pt is A&O denies distress. Spoke to Dr William Fierro

## 2020-11-06 NOTE — PROGRESS NOTES
11/05/20 1952 Vital Signs Temp 97.4 °F (36.3 °C) Temp Source Oral  
Pulse (Heart Rate) 78 Heart Rate Source Monitor Resp Rate 16  
O2 Sat (%) 100 % Level of Consciousness Alert  
BP (!) 68/47 MAP (Calculated) (!) 54 BP 1 Method Automatic  
BP 1 Location Left leg 
(upper) BP Patient Position At rest  
MEWS Score 4 Additional Blood Pressure/Pulse Data Pulse 2 79 BP 2 (!) 77/37 MAP 2 (Calculated) (!) 50 BP 2 Location Left leg 
(left upper leg) BP Method 2 Automatic Patient Position 2 At rest  
Pulse 3 79 BP 3 (!) 84/37 MAP 3 (Calculated) (!) 53 BP 3 Location Left leg 
(left upper leg) BP Method 3 Automatic Patient Position 3 At rest  
 
Notified Dr. Aarti Bejarano of patient's MEWs score of 4 and low BP. Also let him know that patient was requesting pain medicaton. Dr. Aarti Bejarano stated that the patient can have Tramadol.

## 2020-11-06 NOTE — PROGRESS NOTES
Problem: Mobility Impaired (Adult and Pediatric) Goal: *Acute Goals and Plan of Care (Insert Text) Description: LTG: 
(1.)Ms. Britany Gilmore will move from supine to sit and sit to supine , scoot up and down, and roll side to side in bed with MINIMAL ASSIST within 7 treatment day(s). (2.)Ms. Britany Gilmore will transfer from bed to chair and chair to bed with MINIMAL ASSIST using the least restrictive device within 7 treatment day(s). (3.)Ms. Britany Gilmore will ambulate with MINIMAL ASSIST for 30+ feet with the least restrictive device within 7 treatment day(s). 4.  Ms. Britany Gilmore will sit EOB with good balance x10' while performing LE ex's within 7 treatment days. ________________________________________________________________________________________________ Outcome: Progressing Towards Goal 
  
PHYSICAL THERAPY: Daily Note and AM 11/6/2020 INPATIENT: PT Visit Days : 6 Payor: Micha Jack / Plan: [facility] MEDICARE CHOICE PPO/PFFS / Product Type: Managed Care Medicare /   
  
NAME/AGE/GENDER: Fuad Johnson is a 69 y.o. female PRIMARY DIAGNOSIS: Frontal mass of brain [G93.89] Acute right-sided weakness [R53.1] Expressive aphasia [R47.01] Acute encephalopathy [G93.40] Multiple lesions on CT of brain and spine [R93.0, R93.7] Acute right-sided weakness Acute right-sided weakness ICD-10: Treatment Diagnosis:  
 · Other abnormalities of gait and mobility (R26.89) Precaution/Allergies: 
Compazine [prochlorperazine edisylate] ASSESSMENT:  
 
Ms. Britany Gilmore presents supine in bed and agreeable to therapy. Pt performed supine to sit with max assist.  Pt worked on sitting balance activities for improved balance. Initially with poor sitting balance which improved to fair+. Pt does well with cues to correct her balance and was able to perform reaching activities. Pt returned supine with max assist x 2, performed rolling L<>R for brief change.   Pt was left with family present and needs in reach. Will continue with POC. This section established at most recent assessment PROBLEM LIST (Impairments causing functional limitations): 1. Decreased ADL/Functional Activities 2. Decreased Transfer Abilities 3. Decreased Ambulation Ability/Technique 4. Decreased Balance 5. Increased Pain 6. Decreased Activity Tolerance 7. Decreased Knowledge of Precautions 8. Decreased Parks with Home Exercise Program 
9. Decreased Cognition INTERVENTIONS PLANNED: (Benefits and precautions of physical therapy have been discussed with the patient.) 1. Balance Exercise 2. Bed Mobility 3. Gait Training 4. Therapeutic Activites 5. Therapeutic Exercise/Strengthening 6. Transfer Training 7. education TREATMENT PLAN: Frequency/Duration: 3 times a week for duration of hospital stay Rehabilitation Potential For Stated Goals: Good REHAB RECOMMENDATIONS (at time of discharge pending progress):   
Placement: It is my opinion, based on this patient's performance to date, that Ms. Demetri Bal may benefit from intensive therapy at an [address] [place] [address] after discharge due to a probable need for close medical supervision by a rehab physician, a probable need for 24 hour rehab nursing, a probable need for multiple therapy disciplines and potential to make ongoing and sustainable functional improvement that is of practical value. vs. Short term rehab at a SNF pending progress Equipment:  
? None at this time HISTORY:  
History of Present Injury/Illness (Reason for Referral): 
Per MD note, \"Patient is a 69years old female with hx of HTN, GERD, Hodgkin's lymphoma s/p Radiation Rx, dyslipidemia, hypothyroidism, asthma, systolic CHF EF 82-79% recently diagnosed lung hamartomas presented to ER with acute onset of right sided weakness, aphasia and confusion that began this AM. Pt was recently discharged from [facility] on 10/21 after being treated for PNA and was doing okay until today. Pt is not able to provide much history, she has sort of blank stare but can nod to yes or no questions. Per , pt was walking, doing ADL's  Until yesterday but this morning a drastic change in her mentation and speech was noted by him. Again, pt's  is also not a great historian. No reported fever, nausea/vomiting, fall, diarrhea, abdominal pain, headaches. ER work up showed CT evidence of enhancing 2.2 cm left frontal lobe mass with vasogenic edema with 2 mm left to right midline shift with 2 additional small masses in tracy and right occipital lobe. CTA head/neck short segment occlusion of left MCA with some changes suggestive of small core infarct and penumbra within the left frontal lobe, no evidence of intraparenchymal hemorrhage. CXR with interval worsening of pulmonary edema and bilateral pleural effusion. \" 
Past Medical History/Comorbidities: Ms. Fadi Odonnell  has a past medical history of Acute CVA (cerebrovascular accident) ([facility] [address] (10/25/2020), Asthma, Bite of nonvenomous arthropod (???), Cough, Esophageal stricture (2002), GERD (gastroesophageal reflux disease), History of rectal bleeding (???), Hodgkin's lymphoma ([facility] [address] (1980), Hypercholesterolemia, Menopause, Positive RAST testing (2007), and Thyroid disease. She also has no past medical history of Chronic kidney disease. Ms. Fadi Odonnell  has a past surgical history that includes hx tonsillectomy (1954); hx splenectomy (1973); hx appendectomy (1973); hx cataract removal (Bilateral, 2007, 2009); hx colonoscopy; hx other surgical; and hx breast biopsy (Left, 05/21/2020). Social History/Living Environment:  
Home Environment: Private residence One/Two Story Residence: One story Living Alone: No 
Support Systems: Spouse/Significant Other/Partner Patient Expects to be Discharged to[de-identified] Rehabilitation facility Current DME Used/Available at Home: None Prior Level of Function/Work/Activity: At baseline she is independent. Number of Personal Factors/Comorbidities that affect the Plan of Care: 3+: HIGH COMPLEXITY EXAMINATION:  
Most Recent Physical Functioning:  
Gross Assessment: 
  
         
  
Posture: 
  
Balance: 
Sitting - Static: Fair (occasional) Sitting - Dynamic: Fair (occasional) Standing - Static: Fair Bed Mobility: 
Rolling: Moderate assistance Supine to Sit: Maximum assistance Sit to Supine: Maximum assistance;Assist x2 Scooting: Moderate assistance;Maximum assistance Wheelchair Mobility: 
  
Transfers: 
  
Gait: 
  
   
  
Body Structures Involved: 1. Voice/Speech 2. Muscles Body Functions Affected: 1. Sensory/Pain 2. Voice and Speech 3. Neuromusculoskeletal 
4. Movement Related Activities and Participation Affected: 1. Mobility 2. Self Care 3. Domestic Life Number of elements that affect the Plan of Care: 4+: HIGH COMPLEXITY CLINICAL PRESENTATION:  
Presentation: Evolving clinical presentation with changing clinical characteristics: MODERATE COMPLEXITY CLINICAL DECISION MAKIN[address] [place], [place] [id number] AM-PAC 6 Clicks Basic Mobility Inpatient Short Form How much difficulty does the patient currently have. .. Unable A Lot A Little None 1. Turning over in bed (including adjusting bedclothes, sheets and blankets)? [] 1   [x] 2   [] 3   [] 4  
2. Sitting down on and standing up from a chair with arms ( e.g., wheelchair, bedside commode, etc.)   [] 1   [x] 2   [] 3   [] 4  
3. Moving from lying on back to sitting on the side of the bed? [] 1   [x] 2   [] 3   [] 4 How much help from another person does the patient currently need. .. Total A Lot A Little None 4. Moving to and from a bed to a chair (including a wheelchair)? [] 1   [x] 2   [] 3   [] 4  
5. Need to walk in hospital room? [x] 1   [] 2   [] 3   [] 4  
6. Climbing 3-5 steps with a railing?    [x] 1   [] 2   [] 3   [] 4  
 © 2007, Trustees of [address] [place] [address] [address] [address], under license to [employer]. All rights reserved Score:  Initial: 10 Most Recent: X (Date: -- ) Interpretation of Tool:  Represents activities that are increasingly more difficult (i.e. Bed mobility, Transfers, Gait). Medical Necessity:    
· Patient is expected to demonstrate progress in  
· balance and functional technique ·  to  
· decrease assistance required with all functional mobility · . Reason for Services/Other Comments: 
· Patient continues to require skilled intervention due to · medical complications and patient unable to attend/participate in therapy as expected · . Use of outcome tool(s) and clinical judgement create a POC that gives a: Questionable prediction of patient's progress: MODERATE COMPLEXITY  
  
 
 
 
TREATMENT:  
(In addition to Assessment/Re-Assessment sessions the following treatments were rendered) Pre-treatment Symptoms/Complaints:  none. Pain: Initial:  
   Post Session: 3 Neuromuscular Re-education: ( 38 minutes):  Bed mobility while protecting RUE, static/dynamic sitting balance activities, postural corrections to improve balance, coordination, kinesthetic sense, posture and proprioception. Required moderate visual, verbal and manual cues to promote static and dynamic balance in standing and promote motor control of right, upper extremity(s), lower extremity(s). DATE: 10/26/20 10/28/20 Straight leg raise Hip abduct/ adduct X10 AAR Heel slides  X10 AAR 2x10 AL, AAR Hip external/ internal rotation Ankle dorsiflexion/ plantarflexion 2x10 AB 2x10 AB Key:  A=active, AA=active assisted, P=passive, B=bilaterally, R=right, L=left Braces/Orthotics/Lines/Etc:  
· bustamante catheter · O2 Device: Room air Treatment/Session Assessment:   
· Response to Treatment:  pleasant and cooperative. · Interdisciplinary Collaboration: o Physical Therapy Assistant 
o Registered Nurse 
o Certified Nursing Assistant/Patient Care Technician · After treatment position/precautions:  
o Supine in bed 
o Bed/Chair-wheels locked 
o Bed in low position 
o Call light within reach 
o RN notified 
o Family at bedside · Compliance with Program/Exercises: Compliant all of the time, Will assess as treatment progresses · Recommendations/Intent for next treatment session: \"Next visit will focus on advancements to more challenging activities and reduction in assistance provided\". Total Treatment Duration: PT Patient Time In/Time Out Time In: 1 Time Out: 1043 Jess Santoyo, PTA

## 2020-11-06 NOTE — HOSPICE
[facility] RN liaison has received referral. Reviewing chart now, will consult with Medical Director and update CM and family.   
 
 
Thank you for this referral.

## 2020-11-06 NOTE — HOSPICE
[facility] Liaison received approval for patient to be admitted GIP to [facility] on Saturday 11/7. Patient to arrive at [facility] at 11:00.

## 2020-11-06 NOTE — PROGRESS NOTES
11/06/20 0264 NIH Stroke Scale Interval Other (comment) 
(Dual NIH) LOC 0  
LOC Questions 0 LOC Commands 0 Best Gaze 0 Visual 0 Facial Palsy 2 Motor Right Arm 3 Motor Left Arm 0 Motor Right Leg 2 Motor Left Leg 0 Limb Ataxia 0 Sensory 1 Best Language 1 Dysarthria 1 Extinction and Inattention 0 Total 10 Dual NIH with Al, RN

## 2020-11-06 NOTE — PROGRESS NOTES
Progress Note Patient: Sheri Mata MRN: [id number]  SSN: [id number] YOB: 1950  Age: 69 y.o. Sex: female Admit Date: 10/25/2020 LOS: 12 days Subjective: F/U brain masses \"75 years old female with hx of HTN, GERD, Hodgkin's lymphoma s/p Radiation Rx, dyslipidemia, hypothyroidism, asthma, systolic CHF EF 74-54% recently diagnosed lung hamartomas presented to ER with acute onset of right sided weakness, aphasia and confusion that began this AM. Pt was recently discharged from [facility] on 10/21 after being treated for PNA and was doing okay until today. Pt is not able to provide much history, she has sort of blank stare but can nod to yes or no questions. Per , pt was walking, doing ADL's  Until yesterday but this morning a drastic change in her mentation and speech was noted by him. ER work up showed CT evidence of enhancing 2.2 cm left frontal lobe mass with vasogenic edema with 2 mm left to right midline shift with 2 additional small masses in tracy and right occipital lobe. CTA head/neck short segment occlusion of left MCA with some changes suggestive of small core infarct and penumbra within the left frontal lobe, no evidence of intraparenchymal hemorrhage. CXR with interval worsening of pulmonary edema and bilateral pleural effusion. \" 
  
 380, CEA 2.5. Cancer antigen 19-9 21.2. CT chest without contrast on 10/29 showed Multiple pulmonary nodules throughout the left lung. Increase in size of a mixed lytic and sclerotic lesion of T6 vertebral body worrisome for metastatic disease. Moderate right and small left pleural effusions with atelectasis. Groundglass opacities with interlobular septal thickening throughout the left lung could be related to an infectious or inflammatory process although lymphangitic carcinomatosis is also a possibility. CT abdomen pelvis from 11/4 showed no evidence of primary lesion. IR performed biopsy on 11/5.  Biopsy results with no malignancy. Throughout stay patient has continued to decline. Poor PO intake and requiring moderate assist. Very weak. Patient now stating she is wanting hospice. She is tired and does not think she would do well with aggressive treatments such as radiation. Decadron and Keppra started. NIH 10. [place] reinserted on 11/3. No chest pain or SOB. Little oral intake. Cannot take care of herself and only has her  who lives in [place] to take care of her. He is elderly. Current Facility-Administered Medications Medication Dose Route Frequency  haloperidol lactate (HALDOL) injection 4 mg  4 mg IntraVENous Q4H PRN  
 LORazepam (ATIVAN) injection 1 mg  1 mg IntraVENous Q15MIN PRN  
 ondansetron (ZOFRAN) injection 4 mg  4 mg IntraVENous Q4H PRN  
 morphine injection 2 mg  2 mg IntraVENous Q15MIN PRN  
 acetaminophen (TYLENOL) suppository 650 mg  650 mg Rectal Q4H PRN  
 atropine 1 % ophthalmic solution 3 Drop  3 Drop SubLINGual TID PRN  
 glycopyrrolate (ROBINUL) injection 0.2 mg  0.2 mg IntraVENous Q6H PRN  
 scopolamine (TRANSDERM-SCOP) 1 mg over 3 days 1 Patch  1 Patch TransDERmal Q72H PRN  
 [Held by provider] furosemide (LASIX) tablet 10 mg  10 mg Oral DAILY  dexamethasone (DECADRON) 4 mg/mL injection 4 mg  4 mg IntraVENous Q6H  
 levETIRAcetam (KEPPRA) oral solution 500 mg  500 mg Oral BID Objective:  
 
Vitals:  
 11/06/20 0000 11/06/20 0400 11/06/20 0800 11/06/20 0906 BP: (!) 102/55 (!) 86/56 96/61 Pulse: 84 83 88 Resp: 16 16 16 Temp: 97.6 °F (36.4 °C) 97.7 °F (36.5 °C) 97.6 °F (36.4 °C) SpO2: 98% 99% 97% 98% Weight:      
Height:      
  
  
Intake and Output: 
Current Shift: No intake/output data recorded. Last three shifts: 11/04 1901 - 11/06 0700 In: 0 Out: 550 [Urine:550] Physical Exam:  
General:  Alert, cooperative, no distress, talking more today, weak appearing. Frail Eyes:  Conjunctivae/corneas clear. Ears:  Normal TMs and external ear canals both ears. Nose: No ulcerations. Mouth/Throat: Wet tongue Neck:  no JVD. Back:   deferred Lungs:   Clear to auscultation bilaterally. Heart:  Regular rate and rhythm, S1, S2 normal  
Abdomen:   Soft, non-tender. Bowel sounds normal.   
Extremities: Only moving left arm during exam.   
Pulses: 2+ and symmetric all extremities. Skin: Skin color, texture, turgor normal. No rashes or lesions Lymph nodes: Cervical, supraclavicular, and axillary nodes normal.  
Neurologic:  Can answer all questions appropriately. Lab/Data Review: 
 
Recent Results (from the past 24 hour(s)) GLUCOSE, POC Collection Time: 11/05/20  1:04 PM  
Result Value Ref Range Glucose (POC) 150 (H) 65 - 100 mg/dL GLUCOSE, POC Collection Time: 11/05/20  5:42 PM  
Result Value Ref Range Glucose (POC) 205 (H) 65 - 100 mg/dL GLUCOSE, POC Collection Time: 11/06/20 12:26 AM  
Result Value Ref Range Glucose (POC) 133 (H) 65 - 100 mg/dL GLUCOSE, POC Collection Time: 11/06/20  5:18 AM  
Result Value Ref Range Glucose (POC) 128 (H) 65 - 100 mg/dL CBC W/O DIFF Collection Time: 11/06/20  5:53 AM  
Result Value Ref Range WBC 38.6 (H) 4.3 - 11.1 K/uL  
 RBC 3.62 (L) 4.05 - 5.2 M/uL  
 HGB 11.1 (L) 11.7 - 15.4 g/dL HCT 34.3 (L) 35.8 - 46.3 % MCV 94.8 79.6 - 97.8 FL  
 MCH 30.7 26.1 - 32.9 PG  
 MCHC 32.4 31.4 - 35.0 g/dL  
 RDW 13.2 11.9 - 14.6 % PLATELET 107 882 - 162 K/uL MPV 11.9 9.4 - 12.3 FL ABSOLUTE NRBC 0.02 0.0 - 0.2 K/uL METABOLIC PANEL, BASIC Collection Time: 11/06/20  5:53 AM  
Result Value Ref Range Sodium 134 (L) 136 - 145 mmol/L Potassium 5.0 3.5 - 5.1 mmol/L Chloride 98 98 - 107 mmol/L  
 CO2 28 21 - 32 mmol/L Anion gap 8 7 - 16 mmol/L Glucose 116 (H) 65 - 100 mg/dL BUN 48 (H) 8 - 23 MG/DL Creatinine 0.53 (L) 0.6 - 1.0 MG/DL  
 GFR est AA >60 >60 ml/min/1.73m2 GFR est non-AA >60 >60 ml/min/1.73m2 Calcium 7.8 (L) 8.3 - 10.4 MG/DL  
GLUCOSE, POC Collection Time: 11/06/20 11:16 AM  
Result Value Ref Range Glucose (POC) 173 (H) 65 - 100 mg/dL Assessment/ Plan:  
 
Principal Problem: 
  Acute right-sided weakness (10/25/2020) Active Problems: 
  Acquired hypothyroidism (10/17/2013) GERD (gastroesophageal reflux disease) (10/17/2013) Hypercholesterolemia (6/30/2015) Dysphagia (6/30/2015) Bilateral pleural effusion (10/7/2020) Overview: 10/3/2020: L thoracentesis 1050mL removed R thoracentesis 800mL removed 10/9/2020: L thoracentesis: 1000mL removed R thoracentesis: 600mL removed Multiple lung nodules on CT (10/12/2020) Overview: -PET scan 10/2019: PET scan fortunately did not have any abnormal activity  
    in her mass in the left lung. This may mean that we can just follow this  
    radiographically, but we will talk about her at tumor board and come back  
    to her with the group's recommendation. 
    -10/30/2019: Patient is discussed at thoracic conference today lead by Dr Zahra Piper. Patient is a 79 yo never smoker. CT guided biopsy recommended. -CT guided Biopsy 11/2020: lung biopsy showed signs of this nodule being a  
    hamartoma, which is a benign tumor that we can simply follow. repeat CT in  
    6 months 
    -Chest CT June 2020: CT scan is stable 
    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  
    secondary to her left lung mass. Tony Gonzales are several new pulmonary nodules  
    noted which may suggest some metastatic disease.  This require further  
    follow-up CT of the chest in 2 months or doing PET scan. 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 dexmethasone 4mg IV q 6 hr. Keppra 500mg BID. Patient now wanting hospice. Will consult hospice. They are interested in hospice house since  will not be able to take care of her at home.  
  
sCHF EF 35%.  
  
HTN Reid re inserted 11/3. May have neurogenic component from brain lesions, mets to spine, or CVA that is causing difficulty with urination. Patient wants comfort measures only. Will keep decadron and keppra I have spoken to multiple family members and oncology specialty team. Time took greater than 30 minutes. DVT prophylaxis - Lovenox Signed By: Hugo Palacio DO November 6, 2020

## 2020-11-06 NOTE — PROGRESS NOTES
ELIZABETH has requested that [facility] liaison review the pt's chart for possible LTACH admission. Per the liaison, pt just barely meets criteria and she is unsure if insurance will approve. She also stated they would not be able to take the pt until sometime next week even if insurance approves. Per MD, pt/family are also interested in hospice services. Consult placed. Per MD, spouse and brother have stated they can not care for the pt at home and are interested in hospice house admission. SW to continue to follow to assist as needed.

## 2020-11-06 NOTE — HOSPICE
[address] Liaison spoke with Dr Jj Burleson who has agreed to bring Mrs. Chevy Jeff to [facility] for hospice admission. Awaiting a room number and transport time. Liaison to update CM and family, patient's spouse Migdalia Fell to come sign consents prior to transport.

## 2020-11-06 NOTE — PROGRESS NOTES
Nutrition Note Nutrition follow-up Pt is electing comfort care at this time. Diet: DIET NUTRITIONAL SUPPLEMENTS Lunch, Dinner, Breakfast; Anjana ( ) DIET NUTRITIONAL SUPPLEMENTS Breakfast; Ensure High Protein ( ) DIET NUTRITIONAL SUPPLEMENTS Lunch; Ensure Clear ( ) DIET NUTRITIONAL SUPPLEMENTS Dinner; Maribell Cheung ( ) DIET MECHANICAL SOFT 
DIET ONE TIME MESSAGE She is consuming sips of fluids and some ice ream and/or magic cup. Her oral intake continues to meet <25% needs. She requests to receive magic cup as the only supplement on future trays. Orders updated to reflect pt request.   
 
Angely Rosado MA, RD, LDN on 11/6/2020 at 3:46 PM 
Contact: [phone]

## 2020-11-06 NOTE — ADT AUTH CERT NOTES
Patient Demographics Patient Name Jace Hannon  
[id number]  Sex Female    
1950  Address [address] [place] [address] Avenue  Phone [phone] (Home) [phone] (Mobile) *Preferred*   
CSN:   
[id number] Admit Date:  Admit Time  Room  Bed Oct 25, 2020   6:56 AM  717 [[id number]]  01 [924] Attending Providers Provider  Pager  From  To   
Cinthia Mims MD   10/25/20  10/25/20 Chaya Oliveira MD   10/25/20  10/26/20 Jayson Peña DO   10/26/20  10/27/20 Satnam Carter MD   10/27/20  11/03/20 Jayson Peña DO   20 Emergency Contact(s) Name  Relation  Home  Work  Mobile [phone] [facility]   [phone] [address]  [phone] Utilization Reviews  
 
   
Neurology [address] [place] [address] Day 11 (2020) by Judith Beard RN  
 
   
Review Entered  Review Status 2020 13:59  Completed   
   
Criteria Review Care Day: 11 Care Date: 2020 Level of Care:   
Guideline Day 3 Level Of Care   
(X) * Activity level acceptable ( ) * Complete discharge planning Clinical Status   
(X) * No infection, or status acceptable 2020 13:59:39 EST by Reina Sacks   
  NA   
(X) * Isolation not needed, or status acceptable 2020 13:59:39 EST by Reina Sacks   
  NA   
(X) * Respiratory status acceptable 2020 13:59:39 EST by Reina Sacks   
  RA   
(X) * Pain and nausea absent or adequately managed 2020 13:59:39 EST by Brandon Hernandez   
(X) * Ventilatory status acceptable   
(X) * Neurologic problems absent or stabilized   
(X) * Muscle or nerve damage absent or stable 2020 13:59:39 EST by Reina Sacks NA   
( ) * General Discharge Criteria met Interventions ( ) * Intake acceptable 2020 13:59:39 EST by Reina Sacks   
  CHANGED FROM [facility] TUBE TO [facility] SOFT DIET ( ) * No inpatient interventions needed 11/5/2020 13:59:39 EST by Karin Quintana   
  [facility] SOFT DIET, BEDREST, ASSIST FEED, AVINA 
 
DECADRON 4 MG Q6H IV   
* Milestone Additional Notes  380, CEA 2.5. Cancer antigen 19-9 21.2. CT chest without contrast on 10/29 showed Multiple pulmonary nodules throughout the left lung. Increase in size of a mixed lytic and sclerotic lesion of T6 vertebral body worrisome for metastatic disease. Moderate right and small left pleural effusions with atelectasis. Groundglass opacities with interlobular septal thickening throughout the left lung could be related to an infectious or inflammatory process although lymphangitic carcinomatosis is also a possibility. CT abdomen pelvis to be performed today with possibility of biopsy.   
    
Decadron and Keppra started. NIH 10. Avina reinserted on 11/3. No chest pain or SOB. Patient states she is having difficulty eating due to NG tube but trying to drink more and eat more. Still with NG tube. Physical Exam:   
General: Alert, cooperative, no distress, talking but slow, weak appearing. Frail Eyes: Conjunctivae/corneas clear. Ears: Normal TMs and external ear canals both ears. Nose: NG tube in place with no obvious signs of ulceration. Mouth/Throat: Dry tongue Neck: no JVD. Back:   deferred Lungs:   Clear to auscultation bilaterally. Heart: Regular rate and rhythm, S1, S2 normal   
Abdomen:   Soft, non-tender. Bowel sounds normal.   
Extremities: 2/5 strength to right LE and right UE. 3/5 strength to left UE and 4/5 strength to left LE. Poor ROM in bed. Pulses: 2+ and symmetric all extremities. Skin: Skin color, texture, turgor normal. No rashes or lesions Lymph nodes: Cervical, supraclavicular, and axillary nodes normal.   
Neurologic: Good hand coordination with her left hand though slow. Can answer all questions appropriately.   
    
    
Bilateral pleural effusion (10/7/2020)     Overview: 10/3/2020: L thoracentesis 1050mL removed   
           R thoracentesis 800mL removed   
    10/9/2020: L thoracentesis: 1000mL removed   
           R thoracentesis: 600mL removed   
       
    
  Multiple lung nodules on CT (10/12/2020)   
    Overview: -PET scan 10/2019: PET scan fortunately did not have any abnormal activity   
    in her mass in the left lung. This may mean that we can just follow this   
    radiographically, but we will talk about her at tumor board and come back   
    to her with the group's recommendation.   
    -10/30/2019: Patient is discussed at thoracic conference today lead by Dr Angel Keller. Patient is a 79 yo never smoker.  CT guided biopsy recommended.   
    -CT guided Biopsy 11/2020: lung biopsy showed signs of this nodule being a   
    hamartoma, which is a benign tumor that we can simply follow. repeat CT in   
    6 months   
    -Chest CT June 2020: CT scan is stable   
    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   
    secondary to her left lung mass. [place] are several new pulmonary nodules   
    noted which may suggest some metastatic disease.  This require further   
    follow-up CT of the chest in 2 months or doing PET scan. 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 dexmethasone 4mg IV q 6 hr. Keppra 500mg BID. Plan for CT with contrast to look at nodules in further detail and possible biopsy today. Albuterol q 8 hrs. Pulmicort BID. I have offered palliative care due to likelihood of not doing well with radiation or chemo. She states she is not ready for palliative care at this time.   
    
sCHF EF 35%-  Lasix 10mg daily since little PO intake. Strict Is and Os.   
    
HTN- Coreg, ACE   
    
Reid re inserted 11/3.  May have neurogenic component from brain lesions, mets to spine, or CVA that is causing difficulty with urination.   
    
 Remove NG tube to see if will help with oral intake.   
    
Will need rehab at discharge. [facility]?   
    
DVT prophylaxis - Lovenox held for possible biopsy   
    
  
  
  
bp 95/60 TEMP 97.9 HR 80   
RR 18   
O2 98 ON RA   
  
BUN 44, CA 8.2 CT:  No primary malignancy appreciated. Scattered degenerative changes   
as above.   
  
  
   
Neurology [facility] Day 6 (10/30/2020) by Shruthi Donnelly RN  
 
   
Review Entered  Review Status 11/3/2020 08:37  Completed   
   
Criteria Review Care Day: 6 Care Date: 10/30/2020 Level of Care:   
Guideline Day 2 Level Of Care (X) Floor Clinical Status   
(X) * No ICU or intermediate care needs Interventions (X) Inpatient interventions continue 11/3/2020 08:37:26 EST by Tarik MARIO, DECADRON 6 MG Q6H IV, LASIX 40 MG QD IV - TUBE FEEDS,, STRICT I/O, NG TUBE, PALLIATIVE CARE   
(X) Transition to oral routes * Milestone Additional Notes PULM NOTE   
    
CT Abd/pelvis is still pending removal of barium from abdomen to determine safest target for biopsy. Awake and verbalizing - better articulation as noted by neurology.     
Physical Exam:           
Constitutional: the patient appears frail and noting weight loss HEENT: Sclera clear, pupils equal, oral mucosa moist   
Lungs: CTA on RA Cardiovascular: RRR without M,G,R   
Abd/GI: soft and non-tender; with positive bowel sounds. Ext: warm without cyanosis. There is no lower leg edema. Musculoskeletal: moves all four extremities with equal strength Skin: no jaundice or rashes, no wounds Neuro: some improvement in articulation   
  
--Continued efforts to clear barium in order to complete scan and establish plan of care   
--Hem/onc consult completed yesterday   
--Right sided weakness unchanged   
--Monitor nutritional intake - mechanical soft with chopped meat & vegs --Support palliative care consult to continue establishing goals of care   
    
Additional Comments:  For ct of abd/pelvis when barium clears,  Will need bx HEM/ONC NOTE Ms. Quentin Kohler seems a bit brighter but still has a dense hemiparesis on the right particularly in her arm.  Her aphasia seems somewhat better.  We remain however without diagnosis. Kaitlyn Mooreing is unclear whether there is any evidence of disease below the diaphragm and we have been hindered in analyzing this because of the persistent barium.  The CT scan of the chest however does clearly show growing nodules unfortunately complicated by the presence of pleural fluid which has proven to be cytologically negative.  I have previously talked with interventional radiology who indicated that they would give a needle biopsy an attempt.  Perhaps they might be more effective if there were a way to remove some additional fluid prior to that procedure.   
    
  
  
IM NOTE   
10/30 patient alert awake, NG tube in place, afebrile.  Brother and  at bedside.  Discussed about barium on the KUB.  Awaiting CT abdomen pelvis.  Oncology on board.  CT chest shows lung nodules, IR discussed about biopsy of lung nodule by oncology. Acute stroke with metastatic disease in the brain POA MRI brain with several enhancing masses with the largest in the left frontal region with significant surrounding edema.  CNS lymphoma/metastatic disease small acute right occipital and parietal lobe infarcts. No acute neurosurgical intervention. Neurology on board.  Recommend aspirin after biopsy.     
Aspirin, Statin. Oncology consulted.  Appreciate recommendations. Dexamethasone 6 mg IV every 6 hours. Keppra 500 twice daily.    
Speech therapy eval today and patient started on diet.   
    
Acute metabolic encephalopathy from stroke resolved   
    
Frontal brain mass likely metastasis - unknown primary?   
    
 Chronic systolic congestive heart failure with EF of 35%   
currently on IV Lasix.   
    
Multiple lung nodules on CT Oncology on board. Pulmonology consulted for lung nodule biopsy. CT chest/ abdomen and pelvis ordered (pending due to clearance of barium from prior barium swallow study)  for metastatic disease and target for biopsy.   
    
Bilateral pleural effusions Pulmonary on board.   
    
Hypothyroidism Levothyroxine   
    
GERD Pepcid.   
    
Peripheral vascular disease   
    
Palliative care consulted.  Appreciate recommendations.  CODE STATUS DNR.  Goals of care discussed with patient and her  and brother at bedside. Awaiting tissue diagnosis before making decisions regarding plan of care.   
    
DC planning/Dispo: Anticipate inpatient hospital stay for at least 48 hours.  May need rehab facility placement. /60 TEMP 97.8 HR 86   
RR 21   
O2 94 ON RA   
  
WBC 21.4, BUN 60, ALB 2.8 ABD XR:  Residual contrast present in the colon and rectum.   
  
  
  
  
   
Neurology [address] Day 3 (10/27/2020) by Andrez Vora RN  
 
   
Review Entered  Review Status 10/27/2020 13:18  Completed   
   
Criteria Review Care Day: 3 Care Date: 10/27/2020 Level of Care:   
Guideline Day 2 Level Of Care (X) Floor 10/27/2020 13:18:16 EDT by Cherri Oden Pt still in ICU. Clinical Status ( ) * No ICU or intermediate care needs Interventions (X) Inpatient interventions continue 10/27/2020 13:18:16 EDT by Andrez Voar   
  Decadron 6 mg iv Q 6hours.  Pepcid 20 mg iv daily.  Keppra 500 mg iv Q 12 hours.  Lasix 40 mg iv daily. (X) Transition to oral routes 10/27/2020 13:18:16 EDT by Andrez Vora   
  Mechanical soft diet. * Milestone Additional Notes 10/27/20-   
VS:  97.4- P- 108- R- 27- 99/67.  O2 sat= 96% on r/a. Abnormal Labs: K+= 5.3.  Glucose= 158.  BUN= 37.  Cr= 1.05.  GFR= 55.   Wbc= 22.3.  RBC= 3.77.  Hct= 34.8. KUB from 10/26=  The NG tube has been repositioned. The tip now projects over the gastric body with the sidehole projecting over the cardia of the stomach.  Basilar pulmonary infiltrates and small pleural effusion. Modified Barium Swallow=  No aspiration Neuro Note:   
Physical Exam:   
General - Well developed, well nourished, in no apparent distress. Aphasic. HEENT - Normocephalic, atraumatic. Conjunctiva are clear. Neck - Supple without masses Extremities - Peripheral pulses intact. No edema and no rashes.   
    
Neurological examination - Awake, alert, able to follow commands. The patient has expressive aphasia. On cranial nerve examination, (II, III, IV, VI) pupils are equal, round, and reactive to light. Visual acuity is adequate. Visual fields are full to finger confrontation. Extraocular motility is normal. (V, VII) Right facial droop (VIII) Hearing is intact. Motor examination - There is normal muscle tone and bulk in LUE, LLE. Decreased muscle tone on the right. Strength is 5/5 in LUE, LLE, 0/5 in RUE, RLE. Muscle stretch reflexes are normoactive and there are no pathological reflexes present. Assessment:   
    
69year old female with likely metastatic brain disease. She also has multiple small infarcts bilaterally. Neurosurgery is following and she will need biopsy. An extracranial site is preferred. Amadou Rivera has a history of lung mass. Will consult pulmonary for further recommendations.   
    
Plan:   
    
Continue Keppra   
    
Continue decadron   
    
Pulmonology consult   
    
CT chest/abdomen/pelvis   
    
  
Palliative Medicine Note:   
pt alert attempting to speak.  Seems comfortable. Physical Exam:   
General: Cooperative. No acute distress. Eyes: Conjunctivae/corneas clear Nose: Nares normal. Septum midline. Neck: Supple, symmetrical, trachea midline, no JVD Lungs:   Clear to auscultation bilaterally, unlabored Heart: Regular rate and rhythm, no murmur Abdomen:   Soft, non-tender, non-distended Extremities: Normal, atraumatic, no cyanosis or edema Skin: Skin color, texture, turgor normal. No rash or lesions. Neurologic: Moves all extremities spontaneously Psych: Alert, calm Physiatry Note:   
    
  Multiple lung nodules on CT (10/12/2020)   
    Overview: -PET scan 10/2019: PET scan fortunately did not have any abnormal activity   
    in her mass in the left lung. This may mean that we can just follow this   
    radiographically, but we will talk about her at tumor board and come back   
    to her with the group's recommendation.   
    -10/30/2019: Patient is discussed at thoracic conference today lead by Dr Byron Barr. Patient is a 79 yo never smoker.  CT guided biopsy recommended.   
    -CT guided Biopsy 11/2020: lung biopsy showed signs of this nodule being a   
    hamartoma, which is a benign tumor that we can simply follow. repeat CT in   
    6 months   
    -Chest CT June 2020: CT scan is stable   
    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   
    secondary to her left lung mass. [place] are several new pulmonary nodules   
    noted which may suggest some metastatic disease.  This require further   
    follow-up CT of the chest in 2 months or doing PET scan.   
    
  Hamartoma ([address] (10/12/2020)     
  Peripheral vascular disease ([address] (10/12/2020)     
  Expressive aphasia (10/25/2020)     
  Frontal mass of brain (10/25/2020)     
  Acute encephalopathy (10/25/2020)     
  Multiple lesions on CT of brain and spine (10/25/2020)     
  Hypokalemia (10/25/2020)     
  Acute CVA (cerebrovascular accident) ([address] (10/25/2020)     
    
    
Plan / Recommendations / Medical Decision Making:   
    
Recommendations: Continue Acute Rehab Program   
Coordination of rehab / medical care Counseling of PM&R care issues management Monitoring and management of medical conditions per plan of care / orders   
    
· Medically, patient continues in work-up for new brain lesions, lung nodules; numerous services involved include Neurology, Pulmonology, Neurosurgery, Oncology, Radiation Oncology, Palliative in addition to attending Hospitalists · Ultimately, patient's diagnosis will determine her plan of care and disposition · At this time with her current low level of function, patient would not tolerate 3h daily of intense therapies as prescribed by [facility] Pulmonary Consult:   
Plan:   
Hem/onc consulted, here with lung nodules, brain lesions. Would get CT abdomen/pelvis to see if there is an accessible target On decadron Speech following,suction, weak cough May need to repeat T/C. Continue lasix for now, watch I/O   
  
  
SLP Note: DIET:   
· PO diet texture:  Mechanical soft with chopped meat and vegetables, with extra sauces/gravies · Liquids:  regular thin, by cup only   
  MEDICATIONS: Crushed in puree   
    
COMPENSATORY STRATEGIES/MODIFICATIONS INCLUDING:   
· Fully awake/alert · Supervision with all PO   
· Small bites and sips · Cup/sip · No straws · Remain upright for 20-30 min after any PO · Slow rate of PO intake · Check mouth for pocketed PO   
· Liquid wash   
    
OTHER RECOMMENDATIONS (including follow up treatment recommendations): · Treatment to improve/facilitate oral/pharyngeal skills · Training in use of compensatory safe swallowing strategies/feeding guidelines · Patient/family education Additional Orders:   
ICU. Daily CBC.  I and O. Neuro checks Q 4 hours.  PT/OT.  Spot check oximetry.  Continuous VS.  NG. daily weights.  Fall precautions. DNR. Lisbeth Markham weights.  Oncology Consult.  Radiation oncology Consult.  Tube feedings.  A/P CT. Albuterol nebs Q 6 hours.  Pulmicort nebs bid. Coreg 3.125 mg-ng bid.  Flonase daily.  Humalog SSI.  Synthroid 75 mcg/ng daily.  Singular 10 mg po daily.  Crestor 40 mg/ng daily.  Tylenol prn.  Duonebs Q 4 prn.  Xopenex  nebs Q 6 prn. Ativan 0.5 mg po bid prn.  Lopressor 5 mg iv Q 4 prn for HR > 120.  Zofran 4 mg iv Q 6 prn.

## 2020-11-07 PROBLEM — I69.30 SEQUELA OF CARDIOEMBOLIC STROKE: Status: ACTIVE | Noted: 2020-01-01

## 2020-11-07 NOTE — PROGRESS NOTES
Care Management Interventions PCP Verified by CM: Yes Mode of Transport at Discharge: [facility] Transport Time of Discharge: 1100 Transition of Care Consult (CM Consult): [facility] Current Support Network: Lives with Spouse Confirm Follow Up Transport: Family The Plan for Transition of Care is Related to the Following Treatment Goals : Discharge to hospice house for comfort measures. The Patient and/or Patient Representative was Provided with a Choice of Provider and Agrees with the Discharge Plan?: Yes Name of the Patient Representative Who was Provided with a Choice of Provider and Agrees with the Discharge Plan: Elex  Freedom of Choice List was Provided with Basic Dialogue that Supports the Patient's Individualized Plan of Care/Goals, Treatment Preferences and Shares the Quality Data Associated with the Providers?: Yes [employer] Information Provided?: [employer]) Discharge Location Discharge Placement: Other:([facility]) Patient to be discharged to [facility]. CM spoke with [facility] liaison, Brett Manuel. Patient will be going to room 111. Transport set up with [employer] for [employer]; per [facility] request. CM place call to patient's , Maikel; notified him of transport time;  verbalized understanding and states he will be going to [facility] to sign consents. Primary RN has been notified of  time and has called report to [facility]. Prescriptions for Keppra and Decadron placed in packet.

## 2020-11-07 NOTE — DISCHARGE SUMMARY
Hospitalist Discharge Summary Patient ID: Fuad Johnson [id number] 
1 y.o. 
1950 Admit date: 10/25/2020  6:56 AM 
Discharge date and time: 11/7/2020 Attending: Bri Loera DO 
PCP:  Alex Morse MD 
Treatment Team: Attending Provider: Bri Loera DO; Consulting Provider: Adilene Richardson DO; Consulting Provider: Susannah Dye NP; Utilization Review: Africa Wallace RN; Consulting Provider: Imer Macedo NP; Primary Nurse: Eliud Brush; Utilization Review: Ray Lucero RN; Care Manager: Alicja Car [facility]; Consulting Provider: Sophia Alex MD; Primary Nurse: Renzo Salazar RN 
 
Principal Diagnosis Acute right-sided weakness Principal Problem: 
  Acute right-sided weakness (10/25/2020) Active Problems: 
  Acquired hypothyroidism (10/17/2013) GERD (gastroesophageal reflux disease) (10/17/2013) Hypercholesterolemia (6/30/2015) Dysphagia (6/30/2015) Bilateral pleural effusion (10/7/2020) Overview: 10/3/2020: L thoracentesis 1050mL removed R thoracentesis 800mL removed 10/9/2020: L thoracentesis: 1000mL removed R thoracentesis: 600mL removed Multiple lung nodules on CT (10/12/2020) Overview: -PET scan 10/2019: PET scan fortunately did not have any abnormal activity  
    in her mass in the left lung. This may mean that we can just follow this  
    radiographically, but we will talk about her at tumor board and come back  
    to her with the group's recommendation. 
    -10/30/2019: Patient is discussed at thoracic conference today lead by Dr Gregory Adams. Patient is a 77 yo never smoker. CT guided biopsy recommended. -CT guided Biopsy 11/2020: lung biopsy showed signs of this nodule being a  
    hamartoma, which is a benign tumor that we can simply follow. repeat CT in  
    6 months 
    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  
    secondary to her left lung mass. Zina Addie are several new pulmonary nodules  
    noted which may suggest some metastatic disease.  This require further  
    follow-up CT of the chest in 2 months or doing PET scan. Hamartoma ([address] (10/12/2020) Peripheral vascular disease ([address] (10/12/2020) Expressive aphasia (10/25/2020) Frontal mass of brain (10/25/2020) Acute encephalopathy (10/25/2020) Multiple lesions on CT of brain and spine (10/25/2020) Acute CVA (cerebrovascular accident) ([address] (10/25/2020) Systolic heart failure ([address] (10/27/2020) Hospital Course: \"75 years old female with hx of HTN, GERD, Hodgkin's lymphoma s/p Radiation Rx, dyslipidemia, hypothyroidism, asthma, systolic CHF EF 03-79% recently diagnosed lung hamartomas presented to ER with acute onset of right sided weakness, aphasia and confusion that began this AM. Pt was recently discharged from [facility] on 10/21 after being treated for PNA and was doing okay until today. Pt is not able to provide much history, she has sort of blank stare but can nod to yes or no questions. Per , pt was walking, doing ADL's  Until yesterday but this morning a drastic change in her mentation and speech was noted by him. ER work up showed CT evidence of enhancing 2.2 cm left frontal lobe mass with vasogenic edema with 2 mm left to right midline shift with 2 additional small masses in tracy and right occipital lobe. CTA head/neck short segment occlusion of left MCA with some changes suggestive of small core infarct and penumbra within the left frontal lobe, no evidence of intraparenchymal hemorrhage. CXR with interval worsening of pulmonary edema and bilateral pleural effusion. \" 
  
 380, CEA 2.5. Cancer antigen 19-9 21.2. CT chest without contrast on 10/29 showed Multiple pulmonary nodules throughout the left lung. Increase in size of a mixed lytic and sclerotic lesion of T6 vertebral body worrisome for metastatic disease. Moderate right and small left pleural effusions with atelectasis. Groundglass opacities with interlobular septal thickening throughout the left lung could be related to an infectious or inflammatory process although lymphangitic carcinomatosis is also a possibility. CT abdomen pelvis from 11/4 showed no evidence of primary lesion. IR performed biopsy on 11/5. Biopsy results with no malignancy. Throughout stay patient has continued to decline. Poor PO intake and requiring moderate assist. Very weak. Also requiring IV pain medications. Patient now stating she is wanting hospice. She is tired and does not think she would do well with aggressive treatments such as radiation. Oncology has agreed to poor prognosis.  
  
Decadron and Keppra started. NIH 10. Reid reinserted on 11/3. May have neurogenic component from brain lesions, mets to spine, or CVA that is causing difficulty with urination. No chest pain or SOB.   
  
Little oral intake. Cannot take care of herself and only has her  who lives in [place] to take care of her. He is elderly. Hospice consulted who has accepted to hospice house. Please refer to the admission H&P for details of presentation. In summary, the patient is stable for discharge to hospice house Significant Diagnostic Studies:  
 
 
Labs: Results:  
   
Chemistry Recent Labs 11/06/20 
0553 11/05/20 
0507 * 132* * 133*  
K 5.0 4.8 CL 98 98 CO2 28 33* BUN 48* 42* CREA 0.53* 0.50* CA 7.8* 8.4 AGAP 8 2* CBC w/Diff Recent Labs 11/06/20 
5396 WBC 38.6*  
RBC 3.62* HGB 11.1*  
HCT 34.3*  
 Cardiac Enzymes No results for input(s): CPK, CKND1, GERONIMO in the last 72 hours. No lab exists for component: Hessie Salines Coagulation No results for input(s): PTP, INR, APTT, INREXT in the last 72 hours. Lipid Panel Lab Results Component Value Date/Time Cholesterol, total 243 (H) 10/26/2020 03:29 AM  
 HDL Cholesterol 60 10/26/2020 03:29 AM  
 LDL, calculated 161.8 (H) 10/26/2020 03:29 AM  
 VLDL, calculated 21.2 10/26/2020 03:29 AM  
 Triglyceride 106 10/26/2020 03:29 AM  
 CHOL/HDL Ratio 4.1 10/26/2020 03:29 AM  
  
BNP No results for input(s): BNPP in the last 72 hours. Liver Enzymes No results for input(s): TP, ALB, TBIL, AP in the last 72 hours. No lab exists for component: SGOT, GPT, DBIL Thyroid Studies Lab Results Component Value Date/Time TSH 8.570 (H) 05/29/2020 09:37 AM  
    
 
 
Discharge Exam: 
Visit Vitals /68 (BP 1 Location: Left leg, BP Patient Position: Sitting) Pulse 93 Temp 97.5 °F (36.4 °C) Resp 16 Ht 5' 6\" (1.676 m) Wt 52.2 kg (115 lb) SpO2 100% Breastfeeding No  
BMI 18.56 kg/m² General appearance: alert, slow to answer questions, frail appearing. Lethargic Lungs: clear to auscultation bilaterally, limited breathe sounds Heart: regular rate and rhythm, S1, S2 normal 
Abdomen: soft, non-tender. Bowel sounds normal.  
Extremities: Cannot move right UE. LE with very limited movement. Can use left UE well. Neurologic: Limited functional ability. Disposition:Hospice house Discharge Condition: stable Patient Instructions: As above Current Discharge Medication List  
  
START taking these medications Details  
levETIRAcetam (KEPPRA) 100 mg/ml soln oral solution Take 5 mL by mouth two (2) times a day. Qty: 120 mL, Refills: 0  
  
dexAMETHasone (DECADRON) 4 mg tablet Take 4 mg by mouth every six (6) hours. Qty: 20 Tab, Refills: 0 STOP taking these medications  
  
 midodrine (PROAMATINE) 2.5 mg tablet Comments:  
Reason for Stopping:   
   
 fludrocortisone (FLORINEF) 0.1 mg tablet Comments:  
Reason for Stopping: LORazepam (ATIVAN) 0.5 mg tablet Comments:  
Reason for Stopping:   
   
 lisinopriL (PRINIVIL, ZESTRIL) 2.5 mg tablet Comments: Reason for Stopping:   
   
 metoprolol succinate (TOPROL-XL) 25 mg XL tablet Comments:  
Reason for Stopping:   
   
 furosemide (LASIX) 40 mg tablet Comments:  
Reason for Stopping:   
   
 fluticasone propionate (FLONASE) 50 mcg/actuation nasal spray Comments:  
Reason for Stopping:   
   
 levothyroxine (SYNTHROID) 75 mcg tablet Comments:  
Reason for Stopping:   
   
 simvastatin (ZOCOR) 20 mg tablet Comments:  
Reason for Stopping:   
   
 Omeprazole delayed release (PRILOSEC D/R) 20 mg tablet Comments:  
Reason for Stopping:   
   
 albuterol (Ventolin HFA) 90 mcg/actuation inhaler Comments:  
Reason for Stopping:   
   
 fluticasone furoate-vilanteroL (Breo Ellipta) 200-25 mcg/dose inhaler Comments:  
Reason for Stopping:   
   
 montelukast (SINGULAIR) 10 mg tablet Comments:  
Reason for Stopping:   
   
 baclofen (LIORESAL) 10 mg tablet Comments:  
Reason for Stopping:   
   
 melatonin 5 mg tablet Comments:  
Reason for Stopping:   
   
 naproxen sodium (NAPROSYN) 220 mg tablet Comments:  
Reason for Stopping:   
   
 acetaminophen (Tylenol Arthritis Pain) 650 mg TbER Comments:  
Reason for Stopping:   
   
 levocetirizine (XYZAL) 5 mg tablet Comments:  
Reason for Stopping:   
   
 aspirin delayed-release 81 mg tablet Comments:  
Reason for Stopping:   
   
 multivitamin (ONE A DAY) tablet Comments:  
Reason for Stopping:   
   
  
 
 
Activity:Bedrest, complete Diet:Mechanical soft, magic cups with all meals. Wound Care:None Follow-up Hospice today. · Time spent to discharge patient 35 minutes Signed: 
Yeny Nielsen DO 
11/7/2020 
6:52 AM

## 2020-11-07 NOTE — PROGRESS NOTES
History and Physical    Patient: Heide Trinidad MRN: [id number]  SSN: [id number]    YOB: 1950  Age: 69 y.o. Sex: female      Subjective:      Heide Trinidad is a 69 y.o. female who after being hospitalized and treated for PNA  Developed sudden right sided weakness, and aphasia and was admitted again  ER work up showed CT evidence of enhancing 2.2 cm left frontal lobe mass with vasogenic edema with 2 mm left to right midline shift with 2 additional small masses in tracy and right occipital lobe. CTA head/neck short segment occlusion of left MCA with some changes suggestive of small core infarct and penumbra within the left frontal lobe, no evidence of intraparenchymal hemorrhage. CXR with interval worsening of pulmonary edema and bilateral pleural effusion. \". She was found to have multiple areas of metastasis and occluded of RCA occlusion. Patient continued to decline in the hospital taking very little po. Patient decided she was exhausted and felt that she is too weak to continue treatment. She opted to pursue only comfort measures and her oncologist agreed. She will be admitted for parenteral management of pain, and seizures. Her complex medical history and lack of po intake make her prognosis poor. Past Medical History:   Diagnosis Date    Acute CVA (cerebrovascular accident) ([facility] 10/25/2020    Asthma     Bite of nonvenomous arthropod ? ??    Cough     Esophageal stricture 2002    dilated 2002    GERD (gastroesophageal reflux disease)     History of rectal bleeding ???    Hodgkin's lymphoma ([facility] 1980    Radiation therapy    Hypercholesterolemia     Menopause     Positive RAST testing 2007    IgE level of 1,391    Thyroid disease      Past Surgical History:   Procedure Laterality Date    HX APPENDECTOMY  1973    HX BREAST BIOPSY Left 05/21/2020    calcs at 2:00    HX CATARACT REMOVAL Bilateral 2007, 2009    HX COLONOSCOPY      HX OTHER SURGICAL      dental x3   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    HX TONSILLECTOMY  1954      Family History   Problem Relation Age of Onset    Cancer Mother         Kidney cancer    Cancer Father         Prostate cnaceer    Heart Surgery Brother     Breast Cancer Neg Hx      Social History     Tobacco Use    Smoking status: Never Smoker    Smokeless tobacco: Never Used   Substance Use Topics    Alcohol use: Yes     Alcohol/week: 21.0 standard drinks     Types: 7 Shots of liquor, 14 Standard drinks or equivalent per week      Prior to Admission medications    Medication Sig Start Date End Date Taking? Authorizing Provider   levETIRAcetam (KEPPRA) 100 mg/ml soln oral solution Take 5 mL by mouth two (2) times a day. 11/7/20   Kaitlyn Chopra DO   dexAMETHasone (DECADRON) 4 mg tablet Take 4 mg by mouth every six (6) hours. 11/7/20   Kaitlyn Chopra DO        Allergies   Allergen Reactions    Compazine [Prochlorperazine Edisylate] Swelling       Review of Systems:  Review of systems not obtained due to patient factors. Objective:     Vitals:    11/07/20 1234   BP: 122/70   Pulse: 86   Resp: 16   Temp: 97.2 °F (36.2 °C)        Physical Exam:  GENERAL: fatigued, cooperative, mild distress, slowed mentation, appears older than stated age, cachectic  LUNG: clear to auscultation bilaterally, rhonchi R anterior, diminished breath sounds R base, L base  HEART: S3 present, S4 present, systolic murmur: early systolic 3/6, crescendo at lower left sternal border  ABDOMEN: soft, non-tender. Bowel sounds normal. No masses,  no organomegaly  EXTREMITIES:  extremities normal, atraumatic, no cyanosis or edema, edema to right arm  SKIN: stage 2 decubitus reported  NEUROLOGIC: positive findings: expressive aphasia, confused, abnormal muscle tone right arm and muscular weakness right leg weakness. Alert to self.      Assessment:     Hospital Problems  Date Reviewed: 10/19/2020          Codes Class Noted POA    Sequela of cardioembolic stroke ORP-03-UD: I69.30  ICD-9-CM: 438.9  11/7/2020 Unknown              Plan:     Current Facility-Administered Medications   Medication Dose Route Frequency    sodium chloride (NS) flush 3 mL  3 mL IntraVENous Q12H    morphine injection 2 mg  2 mg SubCUTAneous Q20MIN PRN    Or    morphine injection 2 mg  2 mg IntraVENous Q20MIN PRN    LORazepam (ATIVAN) injection 1 mg  1 mg IntraVENous Q4H PRN    bisacodyL (DULCOLAX) suppository 10 mg  10 mg Rectal PRN    LORazepam (ATIVAN) injection 2 mg  2 mg IntraVENous Q10MIN PRN    PHENobarbital (LUMINAL) injection 65 mg  65 mg SubCUTAneous Q12H    dexAMETHasone (DECADRON) tablet 4 mg  4 mg Oral Q8H       Plans  Will included above medications for comfort and seizure prevention. We will continue to offer po fluids as requested.  is frail and unable to care for her. Her life expectancy given her current status is less than 3 weeks. We will work to optimize palliative efforts. No family was present during her examination.      Signed By: Pierre Meyers NP     November 7, 2020

## 2020-11-07 NOTE — PROGRESS NOTES
Ms Crow Le is a 69year old female admitted to room 111 General in patient for sequalae of stroke along with brain metastasis of unknown primary origin, pulmonary metastasis, pulmonary edema, pulmonary effusions and increased intercranial pressure which altogether gives her a poor prognosis. She's admitted for control of pain, agitation and seizures. Pt arrived an tolerated transfer to bed with minimal complaint of pain. Pt denies pain, nausea or shortness of breath. Physical assessment completed. Lung sounds clear but diminished in bases, heart sounds irregular, pt can answer name and date of birth but not date or current events. Bowel sounds hypoactive, pt has bustamante draining clear yellow urine. Pt extremities cool to cold. Upper extremities pale with cyanotic nail beds, cool with palpable radial pulse. Lower extremities cold, cyanotic, non palpable pedal pulses. Pt has iv that flushed without difficulty. Pt has , Kinga Fierro, and brother, Liban Antonio that visited. Kinga Fierro appears confused asking what we are going to do next, wondered aimlessly from room asking each time what we were going to do. Explained the change of emphasis from treatment to comfort measures. Explained if pt has pain or discomfort we will medicate for that. Pt brother, Liban Antonio, appeared to understand and agreed with the emphasis on comfort. Pt states she has no children so her brother and  are her supporters. Pt asking for a drink of apple juice, gave her apple juice, water and strawberry ice cream and a couple of icee pops. 1300 pt ate half the the strawberry ice cream, one icee pop and apple juice and half the water. 1500 pt used call light, pt states she's uncomfortable. Repositioned. Pt voice garbled and confused.       1700 pt resting quietly, sitting up watching TV    Report given to James Crespo

## 2020-11-07 NOTE — [facility] IDG NURSE NOTES
Patient: Reymundo Johnson    Date: 11/07/20  Time: 5:42 PM    [facility] Nurse Notes  Ms Diamante Jean-Baptiste is a 69year old female admitted to room 111 General in patient for sequalae of stroke along with brain metastasis of unknown primary origin, pulmonary metastasis, pulmonary edema, pulmonary effusions and increased intercranial pressure which altogether gives her a poor prognosis. She's admitted for control of pain, agitation and seizures. Pt arrived an tolerated transfer to bed with minimal complaint of pain. Pt denies pain, nausea or shortness of breath. Physical assessment completed. Lung sounds clear but diminished in bases, heart sounds irregular, pt can answer name and date of birth but not date or current events. Bowel sounds hypoactive, pt has bustamante draining clear yellow urine. Pt extremities cool to cold. Upper extremities pale with cyanotic nail beds, cool with palpable radial pulse. Lower extremities cold, cyanotic, non palpable pedal pulses. Pt has iv that flushed without difficulty. Pt has , Omi Gandhi, and brother, Vincent Rios that visited. Omi Gandhi appears confused asking what we are going to do next, wondered aimlessly from room asking each time what we were going to do. Explained the change of emphasis from treatment to comfort measures. Explained if pt has pain or discomfort we will medicate for that. Pt brother, Vincent Rios, appeared to understand and agreed with the emphasis on comfort. Pt states she has no children so her brother and  are her supporters. Pt asking for a drink of apple juice, gave her apple juice, water and strawberry ice cream and a couple of icee pops.    1300 pt ate half the the strawberry ice cream, one icee pop and apple juice and half the water.      1500 pt used call light, pt states she's uncomfortable. Repositioned.  Pt voice garbled and confused.           Signed by: Bridgette Quinn RN

## 2020-11-08 NOTE — PROGRESS NOTES
Report received from off-going nurse,Shanda Mckeon RN  visual identification made, assumed care of pt. Pt resting quietly with eyes closed, no agitation or restlessness, no grimacing or groaning. Pt respirations unlabored. Tab alert in place, rails up x 2, bed in lowest position, safety maintained. FLACC 0.    [id number] pt awakened, she asked the time, we asked if she slept well. Gave her water and grape juice per her request, she drank without difficulty, with two assist repositioned \"floating\" pt with pillows under each hip. Lung sounds clear, heart sounds regular hypoactive bowel sounds, bustamante draining yellow urine with sediment, upper extremities palpable pulses, cool, 1+ edema to right, lower extremities pale cold, non palpable pulses 1+ edema to bilateral lower extremities. 1141 call light went off, pt states she's having back pain especially on the right lower aspect, administered morphine IVP and repositioned her on her left side. Pt brother, Lennox Saa, and , Panchito Pizano, at bedside. Gave them a blue book and stated would answer their questions after they have read the book. Pt asked how we would help her once she becomes short of breath, discussed use of prn morphine and ativan for dyspnea and anxiety. 1230 pt resting quietly, no grimacing, groaning or agitation    1425 call light went off , pt brother states pt wondering if it is time for her scheduled medication. Administered scheduled prednisone po pt able to swallow, pt drank some grape juice. Pt brother and  at bedside. 1546 call light went off, Lennox Saa and Panchito Pizano were leaving, pt states she needs to pee. Her bustamante has large amount of sediment in tubing, flushed with 20 cc Normal saline and tubing cleared. Pt states her bladder hurts. Administered morphine IVP and with two assist, repositioned pt turned toward the left. Applied moisture barrier cream to sacral wound.     1745 pt resting quietly, no grimacing, groaning or agitation    Report given to Darrell ALICIA

## 2020-11-08 NOTE — PROGRESS NOTES
Problem: Pain  Goal: Verbalize satisfaction of level of comfort and symptom control  Description: Pt pain would be less then 4/10  Morphine 2 mg IV/SQ q 20 minutes prn   Outcome: Progressing Towards Goal  Note: Pt pain would be less the 4/10  Morphine 2 mg q 20 minutes prn      Problem: Anxiety/Agitation  Goal: Verbalize and demonstrate ability to manage anxiety  Description: The patient/family/caregiver will verbalize and demonstrate ability to manage the patient's anxiety throughout hospice care.   Outcome: Progressing Towards Goal  Note: Pt would not moan, groan or attempt to get out of bed  Ativan 1 mg IV/IM q 4 hours prn anxiety

## 2020-11-08 NOTE — PROGRESS NOTES
Problem: Pressure Injury - Risk of  Goal: *Prevention of pressure injury  Description: Document Bhupendra Scale and appropriate interventions in the flowsheet. Outcome: Progressing Towards Goal  Note: Pressure Injury Interventions:  Sensory Interventions: Assess changes in LOC, Float heels, Check visual cues for pain    Moisture Interventions: Absorbent underpads, Apply protective barrier, creams and emollients    Activity Interventions: Assess need for specialty bed    Mobility Interventions: Assess need for specialty bed, Float heels    Nutrition Interventions: Document food/fluid/supplement intake    Friction and Shear Interventions: Apply protective barrier, creams and emollients, Minimize layers, Lift sheet                Problem: Pain  Goal: Verbalize satisfaction of level of comfort and symptom control  Description: Pt pain would be less then 4/10  Morphine 2 mg IV/SQ q 20 minutes prn   Outcome: Progressing Towards Goal     Problem: Anxiety/Agitation  Goal: Verbalize and demonstrate ability to manage anxiety  Description: The patient/family/caregiver will verbalize and demonstrate ability to manage the patient's anxiety throughout hospice care.   Outcome: Progressing Towards Goal

## 2020-11-08 NOTE — PROGRESS NOTES
Walking rounds completed with off going RN. Pt identified by name/. Resting peacefully with eyes closed. Respirations non labored. Facial features flat,relaxed. No signs/symptoms of pain,anxiety,nausea or distress. Bed in low and locked position with side rails up x 2 with call light in reach.  Pm assessment completed. Scheduled pm medications administered as ordered with difficulty. Tolerates sips of water. Pt is drowsy but is able to tell this writer her name and her  but is unable however to tell the season or where she is. Emotional support provided. Dozes off while this writer completes assessment. FLACC 0/10. No signs/symptoms of pain,anxiety,nausea,seizures or distress. Bed in low and locked position with side rails up x 2 with call light in reach. 2355 Resting peacefully with eyes closed. Respirations non labored. Facial features flat,relaxed. FLACC 0/10. No evidence of pain,anxiety,nausea,distress or seizures at this time. All safety measures continue. Bed in low and locked position with side rails up x 2 with call light in reach. Door left open as pt is unaccompanied. 0300 Resting peacefully with eyes closed. Respirations unlabored. FLACC 0/10. No signs/symptoms of pain,anxiety,nausea or distress. Bed in low and locked position with side rails up x 2 with call light in reach. 3320 Awakened for scheduled am Dexamethasone 4 mg po at this time. Tolerated well. Remains quite drowsy. No voiced complaints. No evidence of pain,anxiety,nausea,distress or seizures at this time. All safety measures continue. Bed in low and locked position with side rails up x 2 with call light in reach. Door left open as pt is unaccompanied.      Report to [facility]

## 2020-11-08 NOTE — PROGRESS NOTES
Problem: Pressure Injury - Risk of  Goal: *Prevention of pressure injury  Description: Document Bhupendra Scale and appropriate interventions in the flowsheet.   Outcome: Progressing Towards Goal  Note: Pressure Injury Interventions:  Sensory Interventions: Assess changes in LOC, Float heels, Check visual cues for pain    Moisture Interventions: Absorbent underpads, Apply protective barrier, creams and emollients    Activity Interventions: Assess need for specialty bed    Mobility Interventions: Assess need for specialty bed, Float heels    Nutrition Interventions: Document food/fluid/supplement intake    Friction and Shear Interventions: Apply protective barrier, creams and emollients, Minimize layers, Lift sheet                Problem: Patient Education: Go to Patient Education Activity  Goal: Patient/Family Education  Outcome: Progressing Towards Goal

## 2020-11-09 PROBLEM — C80.1 METASTASIS TO BRAIN OF UNKNOWN ORIGIN (HCC): Status: ACTIVE | Noted: 2020-01-01

## 2020-11-09 PROBLEM — C79.31 METASTASIS TO BRAIN OF UNKNOWN ORIGIN (HCC): Status: ACTIVE | Noted: 2020-01-01

## 2020-11-09 NOTE — PROGRESS NOTES
Progress Note    Patient: Jaime Rebollar MRN: [id number]  SSN: [id number]    YOB: 1950  Age: 69 y.o. Sex: female      Admit Date: 11/7/2020    LOS: 2 days     Subjective:     Lethargic. Taking in bites and sips. Has required morphine x 3 for pain. Review of Systems:  Review of systems not obtained due to patient factors. Objective:     Vitals:    11/07/20 1234 11/08/20 0611 11/08/20 1440 11/09/20 0527   BP: 122/70 91/60 (!) 94/46 (!) 91/51   Pulse: 86 84 87 86   Resp: 16 16 12 14   Temp: 97.2 °F (36.2 °C) 97.9 °F (36.6 °C) 97.8 °F (36.6 °C) (!) 96.1 °F (35.6 °C)        Intake and Output:  Current Shift: 11/09 0701 - 11/09 1900  In: 118 [P.O.:118]  Out: -   Last three shifts: 11/07 1901 - 11/09 0700  In: 120 [P.O.:100]  Out: 1290 [Urine:1290]    Physical Exam:   GENERAL: fatigued, cooperative, mild distress, appears older than stated age, pale, cachectic  LUNG: Clear diminished breath sounds with unlabored respirations. HEART: regular rate and rhythm  ABDOMEN: soft, non-tender. Bowel sounds hypoactive. : Reid catheter with dark yellow urine. EXTREMITIES:  extremities with no cyanosis or edema. + pulses. SKIN: Pale. Cool to touch. Stage 2 wound to sacrum. Peripheral IV in the right antecubital with dressing intact. NEUROLOGIC: Lethargic. Slow to respond to questions. Right sided weakness. Bedbound. PSYCHIATRIC: anxious    Lab/Data Review:  No new labs resulted in the last 24 hours.     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 ([facility] [address] (11/9/2020)    Active Problems:    Sequela of cardioembolic stroke (23/5/2380)        Plan:     Current Facility-Administered Medications   Medication Dose Route Frequency    fentaNYL (DURAGESIC) 12 mcg/hr patch 1 Patch  1 Patch TransDERmal Q72H    sodium chloride (NS) flush 3 mL  3 mL IntraVENous Q12H    morphine injection 2 mg  2 mg SubCUTAneous Q20MIN PRN    Or    morphine injection 2 mg  2 mg IntraVENous Q20MIN PRN    LORazepam (ATIVAN) injection 1 mg  1 mg IntraVENous Q4H PRN    bisacodyL (DULCOLAX) suppository 10 mg  10 mg Rectal PRN    LORazepam (ATIVAN) injection 2 mg  2 mg IntraVENous Q10MIN PRN    PHENobarbital (LUMINAL) injection 65 mg  65 mg SubCUTAneous Q12H    dexAMETHasone (DECADRON) tablet 4 mg  4 mg Oral Q8H       11/7: ([facility]) Admitted GIP with 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 1. Pain: Morphine 2mg IV/SQ Q20 minutes as needed. 2. Dyspnea: Morphine 2mg IV/SQ Q20 minutes as needed. Glycopyrrolate 0.2mg q4 prn secretions. Oxygen at 2 L/min prn.    3. Agitation: Lorazepam 1mg IV/IM q4 hours prn.    4. Seizures: Phenobarbital 65mg SQ Q12. Decadron 4mg Q8. Lorazepam as ordered prn sustained seizure activity and notify provider. 5. Family/Pt Support: Family at bedside during exam. Medications and plan of care discussed with nursing staff and family. Will continue to monitor for symptoms and adjust medications as needed to maintain patient comfort. PPS 20%. Case discussed with Dr. Jess Salcedo and in [facility], [place]. Add Fentanyl 12mcg patch for pain.       Signed By: Alek Wadsworth NP     November 9, 2020

## 2020-11-09 NOTE — PROGRESS NOTES
11/09/20 5234   Pyschosocial Assessment 1   Concerns from previous vist? No   Significant changes in relationships or household members? No   Signs of abuse or neglect? No   Financial Need Community Resources   Follow up notes: Provided the family a seniors book,   Pain signs needing to be reported to  No   Psychosocial Components/Teaching/Interventions Tactile stimulation to the non-responsive patient; Emotional support/supportive listening;Provided requested forms/applications; Instructed on how to contact the agency with concerns   The patient has designated their health care surrogate Patient has not made wishes known   Follow up notes Spouse is decision maker       Demographics     Information provided by: Brother Sergey Gore and spouse Daya Wood in the room       Name:  Vania Vaughn                                                     Level of Care  GIP [x] Routine  [] Respite   []           From the in home program Yes [] No [x]  Team                                                        Diagnosis Sequela of Stroke         Insurance    Medicare [x]   Medicaid  []  [employer]  []      Other []              Social    [x]   Single []     []    []    Spouse name Daya Wood   Length of marriage 55 years   Children: none                    Community Resources Used in the Home prior to admission Yes []  No [x]      Financial Concerns :   Spouse states he has no financial concerns              [place] Application Needed   Yes []  No [x]     Medicaid application needed Yes []  No [x]                                   IFA Form Complete  Yes [x]   No []    Discharge Plans: Unsure if there will be a need for DC planning at this time. Spouse has some dementia and all of the family lives out of state.         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 []  Linked to [address] [facility]   Yes []  No [x]  Referral made to [place] Yes [] No [x]        Advanced Directives Scanned in the system     Living Will  Yes  []  No [x]   HCPOA     Yes  []  No [x]   DPOA        Yes  []  No [x]  DNR           Yes [x]  No []       Spiritual / Alexey Deisi Support: Pt is a member of [employer] [address] [place] [address] Ave is Augustus Arciniega. [phone] Pt has asked that we allow her  all information on her health and condition. She has given him the pass code. She is concerned with how her spouse is going to be cared for when she passes. Final Arrangements: Chante states that the pt will be cremated with [facility]     Medical History and/or Narrative copied from the patients chart from the Admitting Physician or Rn:  Ms Gian Stubbs is a 69year old female admitted to room 111 General in patient for sequalae of stroke along with brain metastasis of unknown primary origin, pulmonary metastasis, pulmonary edema, pulmonary effusions and increased intercranial pressure which altogether gives her a poor prognosis. She's admitted for control of pain, agitation and seizures. Pt arrived an tolerated transfer to bed with minimal complaint of pain. Pt denies pain, nausea or shortness of breath. Physical assessment completed. Lung sounds clear but diminished in bases, heart sounds irregular, pt can answer name and date of birth but not date or current events. Bowel sounds hypoactive, pt has bustamante draining clear yellow urine. Pt extremities cool to cold. Upper extremities pale with cyanotic nail beds, cool with palpable radial pulse. ? Lower extremities cold, cyanotic, non palpable pedal pulses. Pt has iv that flushed without difficulty. Pt has , Cristobal Cole, and brother, Elonda Brunner that visited.  Cristobal Cole appears confused asking what we are going to do next, wondered aimlessly from room asking each time what we were going to do. Explained the change of emphasis from treatment to comfort measures. Explained if pt has pain or discomfort we will medicate for that. Pt brother, Radha Murcia, appeared to understand and agreed with the emphasis on comfort. Pt states she has no children so her brother and  are her supporters. Pt asking for a drink of apple juice, gave her apple juice, water and strawberry ice cream and a couple of icee pops. patient informed there are no volunteer services [facility] to [facility] History, No mental health concerns         Volunteer discussion: Yes []    No [x]The volunteer program has been suspended due to the Covid-19 virus. LMSW will ensure the pt and families receive their comfort bags. Goals of care for the patient and family:  Assist the family with community resources for the spouse,  Active listening and emotional support       Coping and Bereavement:    Spouse has some cognitive decline and the family is worried about him living alone after her death.   He has no family here in [place] everyone lives in [place]                     Was a Referral made to Bereavement  Yes [] No [x]

## 2020-11-09 NOTE — PROGRESS NOTES
1900- Report received, patient resting quietly in bed with no s/sx pain or distress. Call light within reach. Bed is low and locked, tab alert in place, and door left open for continuous monitoring. 2107- scheduled medications administered. Patient drowsy, but interacting. able to swallow pill with thin liquids, however, requires coaching with using a straw. Prn morphine given prior to bath. 2130- patient tolerated bath well. Is now resting with eyes closed. Respirations even and non labored. flacc 0/10.    2245- Patient resting with eyes closed. No s/sx of pain or distress. 65- Patient resting with eyes closed. No s/sx of pain or distress. 0150- Patient resting with eyes closed. No s/sx of pain or distress. 8477- Patient resting with eyes closed. No s/sx of pain or distress. 8780- scheduled dexamethasone administered. Patient unable to pull water through a straw even with instruction. Gave patient water and grape juice via syringe, and after a couple of tries and then sticking pill to back of throat, patient was able to swallow pill. May benefit from crushing pill and administering with syringe in future. Patient denies pain at this time.     Report given to Viki Patel

## 2020-11-09 NOTE — [facility] IDG MASTER NOTE
[facility]  Date: 11/09/20  Time: 10:23 AM    ___________________    Patient: Vanessa Moon  Coverage Information:     Pay[place]: [place]ARE     Plan: [place] MEDICARE PART A AND B     Subscriber ID: [id number]     Phone Number:   MRN: [id number]  Current Benefit Period: Benefit Period 1  Start Date: 11/7/2020  End Date: 2/4/2021      Medical Director: Andrzej Nevarez MD   Hospice Attending Provider: Dominic Plata MD  [address] [place] [address] [address] [id number]  Phone: [phone]  Fax: [phone]    Level of Care: General Inpatient Care      ___________________    Diagnoses: There were no encounter diagnoses. Current Medications:    Current Facility-Administered Medications:     sodium chloride (NS) flush 3 mL, 3 mL, IntraVENous, Q12H, Tameka Morenoo T, NP, 3 mL at 11/09/20 0933    morphine injection 2 mg, 2 mg, SubCUTAneous, Q20MIN PRN **OR** morphine injection 2 mg, 2 mg, IntraVENous, Q20MIN PRN, Tameka Morenoo T, NP, 2 mg at 11/08/20 2107    LORazepam (ATIVAN) injection 1 mg, 1 mg, IntraVENous, Q4H PRN, Tameka MARY, NP    bisacodyL (DULCOLAX) suppository 10 mg, 10 mg, Rectal, PRN, Tameka MARY, NP    LORazepam (ATIVAN) injection 2 mg, 2 mg, IntraVENous, Q10MIN PRN, Tameka Morenoo T, NP    PHENobarbital (LUMINAL) injection 65 mg, 65 mg, SubCUTAneous, Q12H, Ruby Harper NP, 65 mg at 11/09/20 0930    dexAMETHasone (DECADRON) tablet 4 mg, 4 mg, Oral, Q8H, Tameka Trejo T, NP, 4 mg at 11/09/20 7247    Orders:  Orders Placed This Encounter    IP CONSULT TO SPIRITUAL CARE     Standing Status:   Standing     Number of Occurrences:   1     Order Specific Question:   Reason for Consult: Answer: Once on week one, then PRN. For [facility] Only. For contracted patients, their primary hospice will continue to manage spiritual care needs.     DIET PLEASURE     Standing Status:   Standing     Number of Occurrences:   [address] SIGNS     Standing Status:   Standing     Number of Occurrences:   1    AVINA CATHETER, CARE     1. Avina Catheter care every shift and PRN  2. Notify Physician of Avina Catheter leakage, occlusion, gross adherent sediment or accidental removal  3. Change Avina 30 days after insertion. Standing Status:   Standing     Number of Occurrences:   [id number]    NURSING ASSESSMENT:  SPECIFY Assess for GIP, routine, or respite level of care. Q SHIFT Routine     Standing Status:   Standing     Number of Occurrences:   1     Order Specific Question:   Please describe the test or procedure you would like to order. Answer:   Assess for GIP, routine, or respite level of care.  BEDREST, COMPLETE     Standing Status:   Standing     Number of Occurrences:   1    DO NOT RESUSCITATE     Standing Status:   Standing     Number of Occurrences:   1     Order Specific Question:   Comfort Measures Only? Answer: Yes    OXYGEN CANNULA Liters per minute: 2; Indications for O2 therapy: RESPIRATORY DISTRESS PRN Routine     Standing Status:   Standing     Number of Occurrences:   [id number]     Order Specific Question:   Liters per minute:      Answer:   2     Order Specific Question:   Indications for O2 therapy     Answer:   RESPIRATORY DISTRESS    sodium chloride (NS) flush 3 mL    OR Linked Order Group     morphine injection 2 mg     morphine injection 2 mg    LORazepam (ATIVAN) injection 1 mg    bisacodyL (DULCOLAX) suppository 10 mg    LORazepam (ATIVAN) injection 2 mg    PHENobarbital (LUMINAL) injection 65 mg    dexAMETHasone (DECADRON) tablet 4 mg    INITIAL PHYSICIAN ORDER: HOSPICE Level Of Care: General Inpatient; Reason for Admission: GIP management of pain and seizures in light of dysphagia secondary to stroke     Standing Status:   Standing     Number of Occurrences:   1     Order Specific Question:   Status     Answer:   Hospice     Order Specific Question:   Level Of Care     Answer:   General Inpatient     Order Specific Question:   Reason for Admission     Answer:   GIP management of pain and seizures in light of dysphagia secondary to stroke     Order Specific Question:   Inpatient Hospitalization Certified Necessary for the Following Reasons     Answer:   3. Patient receiving treatment that can only be provided in an inpatient setting (further clarification in H&P documentation)     Order Specific Question:   Admitting Diagnosis     Answer:   Sequela of cardioembolic stroke [[id number]]     Order Specific Question:   Terminal Prognosis Diagnosis(es)     Answer:   Metastasis to brain [facility]) [[id number]]     Order Specific Question:   Terminal Prognosis Diagnosis(es)     Answer:   Metastasis to lung [facility]) [[id number]]     Order Specific Question:   Terminal Prognosis Diagnosis(es)     Answer:   Lymphoma [facility]) [[id number]]     Order Specific Question:   Terminal Prognosis Diagnosis(es)     Answer:   Seizures ([facility] [address] [[id number]]     Order Specific Question:   Terminal Prognosis Diagnosis(es)     Answer:   Right carotid artery occlusion [[id number]]     Order Specific Question:   Terminal Prognosis Diagnosis(es)     Answer:   Metastasis to spinal column [facility]) [[id number]]     Order Specific Question:   Admitting Physician     Answer:   Frankyie Drain     Order Specific Question:   Attending Physician     Answer:   Hessie Drain     Order Specific Question:   Discharge Plan:     Answer:   Home with Home Hospice     Comments:    is frail    IP CONSULT TO SOCIAL WORK     Standing Status:   Standing     Number of Occurrences:   1     Order Specific Question:   Reason for Consult: Answer: For [facility] Only. For contracted patients, their primary hospice will continue to manage social work needs. Allergies:   Allergies   Allergen Reactions    Compazine [Prochlorperazine Edisylate] Swelling       Care Plan:  Multidisciplinary Problems (Active)     Problem: Anxiety/Agitation     Dates: Start: 11/07/20 Disciplines: Interdisciplinary    Goal: Verbalize and demonstrate ability to manage anxiety     Dates: Start: 11/07/20   Expected End: 11/21/20       Description: The patient/family/caregiver will verbalize and demonstrate ability to manage the patient's anxiety throughout hospice care. Disciplines: Interdisciplinary    Intervention: Assess for anxiety/agitation     Dates: Start: 11/07/20       Description: Assess for signs and symptoms of anxiety and agitation. Intervention: Instruction strategies to reduce anxiety/agitation     Dates: Start: 11/07/20       Description: Instruct patient/caregiver on strategies to reduce anxiety/agitation. Problem: End of Life Process     Dates: Start: 11/07/20       Description: Pt  and brother will be able to describe the changes indicate near death  Give  and brother a blue book   Answer questions     Disciplines: Interdisciplinary    Goal: Demonstrate understanding of end of life processes     Dates: Start: 11/07/20   Expected End: 11/21/20       Description: Jose Walden will understand end of life processes.     Disciplines: Interdisciplinary    Intervention: Assess for signs/symptoms of terminal restlessness     Dates: Start: 11/07/20             Intervention: Sade Smiley on strategies to reduce terminal restlessness     Dates: Start: 11/07/20             Intervention: Instruct on the dying process     Dates: Start: 11/07/20             Intervention: Instruct: imminent death     Dates: Start: 11/07/20             Intervention: Instruct: process at end of life     Dates: Start: 11/07/20                         Problem: Hospice Orientation     Dates: Start: 11/07/20       Disciplines: Interdisciplinary    Goal: Demonstrate understanding of hospice philosophy, plan of care, and home hospice program     Dates: Start: 11/07/20   Expected End: 11/09/20       Description: The patient/family/caregiver will demonstrate understanding of hospice philosophy, plan of care and the home hospice program as evidenced by participation in meeting the patient's psychosocial, spiritual, medical, and physical needs inclusive of medical supplies/equipment focusing on symptoms. Discussed with  ,Renee Langston, and brother, Rosa Maria Coburn, the change from curative to comfort measures. Disciplines: Interdisciplinary    Intervention: Instruct on hospice philosophy     Dates: Start: 11/07/20             Intervention: Instruct: hospice orientation     Dates: Start: 11/07/20             Intervention: Instruct: medical equipment     Dates: Start: 11/07/20       Description: Instruct patient/caregiver on medical equipment and supplies.           Intervention: Instruct: medical power of  and will     Dates: Start: 11/07/20             Intervention: Instruct:terminal illness     Dates: Start: 11/07/20                         Problem: Pain     Dates: Start: 11/07/20       Disciplines: Interdisciplinary    Goal: Verbalize satisfaction of level of comfort and symptom control     Dates: Start: 11/07/20   Expected End: 11/21/20       Description: Pt pain would be less then 4/10  Morphine 2 mg IV/SQ q 20 minutes prn     Disciplines: Interdisciplinary    Intervention: Assess effectiveness of pain management     Dates: Start: 11/07/20       Description: Pt will be relaxed not moaning or groaning or attempting to get out of bed  Ativan 1 mg IV/IM q 4 hours prn           Intervention: Assess for signs/symptoms of acute pain     Dates: Start: 11/07/20             Intervention: Assess for signs/symptoms of chronic pain     Dates: Start: 11/07/20             Intervention: Chava De on non-pharmacological pain management     Dates: Start: 11/07/20             Intervention: Chava De on pain scales     Dates: Start: 11/07/20             Intervention: Instruct on pharmacological pain management     Dates: Start: 11/07/20                         Problem: Patient Education: Go to Patient Education Activity     Dates: Start: 11/07/20       Disciplines: Interdisciplinary    Goal: Patient/Family Education     Dates: Start: 11/07/20   Expected End: 11/21/20       Disciplines: Interdisciplinary                Problem: Pressure Injury - Risk of     Dates: Start: 11/07/20       Disciplines: Interdisciplinary    Goal: *Prevention of pressure injury     Dates: Start: 11/07/20   Expected End: 11/21/20       Description: Document Bhupendra Scale and appropriate interventions in the flowsheet.     Disciplines: Interdisciplinary                  Care Plan Problems/Goals      Progressing Towards Goal (5)      *Prevention of pressure injury (Pressure Injury - Risk of)    Disciplines:  Interdisciplinary Expected end:  11/21/20        Outcome: Progressing Towards Goal By Radha Myers on 11/09/20 0300            Patient/Family Education (Patient Education: Go to Patient Education Activity)    Disciplines:  Interdisciplinary Expected end:  11/21/20        Outcome: Progressing Towards Goal By Isiah Bruno RN on 11/07/20 1928            Demonstrate understanding of hospice philosophy, plan of care, and home hospice program (Hospice Orientation)    Disciplines:  Interdisciplinary Expected end:  11/09/20        Outcome: Progressing Towards Goal By Isiah Bruno RN on 11/08/20 1446            Verbalize satisfaction of level of comfort and symptom control (Pain)    Disciplines:  Interdisciplinary Expected end:  11/21/20        Outcome: Progressing Towards Goal By Radha Myers on 11/09/20 0300            Verbalize and demonstrate ability to manage anxiety (Anxiety/Agitation)    Disciplines:  Interdisciplinary Expected end:  11/21/20        Outcome: Progressing Towards Goal By Isiah Bruno RN on 11/08/20 1446                         No Outcome (1)      Demonstrate understanding of end of life processes (End of Life Process)    Disciplines:  Interdisciplinary Expected end:  11/21/20 ___________________    Care Team Notes          POC/IDG Notes      [facility] IDG  Notes by Neil Ortiz at 11/09/20 1022  Version 1 of 1    Author:  Neil Ortiz Service:  Licensed Clinical  Author Type:      Filed:  11/09/20 1022 Date of Service:  11/09/20 1022 Status:  Signed    :  Neil Ortiz ()       Patient: Jaime Rebollar    Date: 11/09/20  Time: 10:22 AM    [facility]  Notes  LMSW has read and agrees with the RN initial assessment. LMSW will meet with pt and family to complete the SW assessment. The volunteer program has been suspended due to the Covid-19 virus. LMSW will ensure the pt and families receive their comfort bags. LMSW will meet with the patient and family to complete the SW initial assessment. The care plan will be created from there. Signed by: Marzena Feliz       [facility] IDG  Notes by Herb Roman at 11/09/20 0932  Version 1 of 1    Author:  Herb Roman Service:  Spiritual Care Author Type:  Pastoral Care    Filed:  11/09/20 0933 Date of Service:  11/09/20 0932 Status:  Signed    :  Herb Roman (Pastoral Care)       Patient: Jaime Rebollar    Date: 11/09/20  Time: 9:32 AM    [facility]  Notes    / Grief Counselor has reviewed  Initial Comprehensive Assessment and plan of care. Bereavement and Spiritual Care Assessments to be completed and plan of care put in place to meet the needs, requests and referrals.         Signed by: Jatinder Thomas       [facility] IDG Nurse Notes by Juliane Hauser RN at 11/07/20 1742  Version 1 of 1    Author:  Juliane Hauser RN Service:  NURSING Author Type:  Registered Nurse    Filed:  11/07/20 1742 Date of Service:  11/07/20 1742 Status:  Signed    :  Juliane Hauser RN (Registered Nurse)       Patient: Jaime Rebollar    Date: 11/07/20  Time: 5:42 PM    [facility] Nurse Notes  Ms Annalise Aguilar is a 69year old female admitted to room 111 General in patient for sequalae of stroke along with brain metastasis of unknown primary origin, pulmonary metastasis, pulmonary edema, pulmonary effusions and increased intercranial pressure which altogether gives her a poor prognosis. She's admitted for control of pain, agitation and seizures. Pt arrived an tolerated transfer to bed with minimal complaint of pain. Pt denies pain, nausea or shortness of breath. Physical assessment completed. Lung sounds clear but diminished in bases, heart sounds irregular, pt can answer name and date of birth but not date or current events. Bowel sounds hypoactive, pt has bustamante draining clear yellow urine. Pt extremities cool to cold. Upper extremities pale with cyanotic nail beds, cool with palpable radial pulse. Lower extremities cold, cyanotic, non palpable pedal pulses. Pt has iv that flushed without difficulty. Pt has , Dorita Urbina, and brother, Faustino Maki that visited. Dorita Urbina appears confused asking what we are going to do next, wondered aimlessly from room asking each time what we were going to do. Explained the change of emphasis from treatment to comfort measures. Explained if pt has pain or discomfort we will medicate for that. Pt brother, Faustino Maki, appeared to understand and agreed with the emphasis on comfort. Pt states she has no children so her brother and  are her supporters. Pt asking for a drink of apple juice, gave her apple juice, water and strawberry ice cream and a couple of icee pops.    1300 pt ate half the the strawberry ice cream, one icee pop and apple juice and half the water.      1500 pt used call light, pt states she's uncomfortable. Repositioned.  Pt voice garbled and confused.           Signed by: Tyler Kruse RN                Care Team Present:   Team Members Present: Physician, Nurse, , [address] [place] [address] Road  Physician Team Member: Camelia Robles MD  Nurse Team Member: Alonso Schaffer Rn  Social Work Team Member: Oseas Q[address] Team Member: SONIA Lux  Div

## 2020-11-09 NOTE — [facility] IDG SOCIAL WORKER NOTES
Patient: Aracelis Godinez    Date: 11/09/20  Time: 10:22 AM    [facility]  Notes  LMSW has read and agrees with the RN initial assessment. LMSW will meet with pt and family to complete the SW assessment. The volunteer program has been suspended due to the Covid-19 virus. LMSW will ensure the pt and families receive their comfort bags. LMSW will meet with the patient and family to complete the SW initial assessment. The care plan will be created from there.            Signed by: Shila Henry

## 2020-11-09 NOTE — PROGRESS NOTES
Problem: Pressure Injury - Risk of  Goal: *Prevention of pressure injury  Description: Document Bhupendra Scale and appropriate interventions in the flowsheet. Outcome: Progressing Towards Goal  Note: Pressure Injury Interventions:  Sensory Interventions: Assess changes in LOC, Avoid rigorous massage over bony prominences, Check visual cues for pain, Float heels, Minimize linen layers    Moisture Interventions: Absorbent underpads, Apply protective barrier, creams and emollients, Limit adult briefs, Minimize layers, Moisture barrier    Activity Interventions: Assess need for specialty bed    Mobility Interventions: Assess need for specialty bed, Float heels, HOB 30 degrees or less    Nutrition Interventions: Document food/fluid/supplement intake    Friction and Shear Interventions: Apply protective barrier, creams and emollients, Lift sheet, Minimize layers                Problem: Patient Education: Go to Patient Education Activity  Goal: Patient/Family Education  Outcome: Progressing Towards Goal  Educated on Fentanyl patch   Problem: Hospice Orientation  Goal: Demonstrate understanding of hospice philosophy, plan of care, and home hospice program  Description: The patient/family/caregiver will demonstrate understanding of hospice philosophy, plan of care and the home hospice program as evidenced by participation in meeting the patient's psychosocial, spiritual, medical, and physical needs inclusive of medical supplies/equipment focusing on symptoms. Discussed with  ,Lakisha Sanchez, and brother, Oneal Soulier, the change from curative to comfort measures. Outcome: Resolved/Met  Note: Patient, patient 's  and her brother all voiced understanding of the hospice philosophy.       Problem: Pain  Goal: Verbalize satisfaction of level of comfort and symptom control  Description: Pt pain would be less then 4/10  Morphine 2 mg IV/SQ q 20 minutes prn   Fentanyl 12 mcg added today Morphine 2 mg times one with good results Problem: Anxiety/Agitation  Goal: Verbalize and demonstrate ability to manage anxiety  Description: The patient/family/caregiver will verbalize and demonstrate ability to manage the patient's anxiety throughout hospice care. Outcome: Progressing Towards Goal  Lorazepam 1 mg IV with good results  Problem: Falls - Risk of  Goal: *Absence of Falls  Description: Document Dru España Fall Risk and appropriate interventions in the flowsheet.   Note: Fall Risk Interventions:  Mobility Interventions: Bed/chair exit alarm    Mentation Interventions: Adequate sleep, hydration, pain control, Bed/chair exit alarm, Door open when patient unattended, Evaluate medications/consider consulting pharmacy    Medication Interventions: Bed/chair exit alarm, Evaluate medications/consider consulting pharmacy    Elimination Interventions: Bed/chair exit alarm, Toileting schedule/hourly rounds                 Outcome: Progressing Towards Goal

## 2020-11-09 NOTE — [facility] IDG MASTER NOTE
[facility]ospice Interdisciplinary Group Collaborative  Date: 11/09/20  Time: 1:17 PM    ___________________    Patient: Williams Padron  Coverage Information:     Payor: [place]     Plan: [facility] MEDICARE PART A AND B     Subscriber ID: [id number]     Phone Number:   MRN: [id number]    Current Benefit Period: Benefit Period 1  Start Date: 11/7/2020  End Date: 2/4/2021        Hospice Attending Provider: Paola Ceja [address] [place] [address]  [id number]  Phone: [phone]  Fax: [phone]    Level of Care: General Inpatient Care      ___________________    Diagnoses: There were no encounter diagnoses. Current Medications:    Current Facility-Administered Medications:     fentaNYL (DURAGESIC) 12 mcg/hr patch 1 Patch, 1 Patch, TransDERmal, Q72H, Amanda Javier, NP    sodium chloride (NS) flush 3 mL, 3 mL, IntraVENous, Q12H, [place] T, NP, 3 mL at 11/09/20 0933    morphine injection 2 mg, 2 mg, SubCUTAneous, Q20MIN PRN **OR** morphine injection 2 mg, 2 mg, IntraVENous, Q20MIN PRN, [place] T, NP, 2 mg at 11/08/20 2107    LORazepam (ATIVAN) injection 1 mg, 1 mg, IntraVENous, Q4H PRN, [place] T, NP    bisacodyL (DULCOLAX) suppository 10 mg, 10 mg, Rectal, PRN, [place] T, NP    LORazepam (ATIVAN) injection 2 mg, 2 mg, IntraVENous, Q10MIN PRN, [place] T, NP    PHENobarbital (LUMINAL) injection 65 mg, 65 mg, SubCUTAneous, Q12H, Ruby Mcintyre T, NP, 65 mg at 11/09/20 0930    dexAMETHasone (DECADRON) tablet 4 mg, 4 mg, Oral, Q8H, [place] T, NP, 4 mg at 11/09/20 1056    Orders:  Orders Placed This Encounter    IP CONSULT TO SPIRITUAL CARE     Standing Status:   Standing     Number of Occurrences:   1     Order Specific Question:   Reason for Consult: Answer: Once on week one, then PRN. For [facility] Only. For contracted patients, their primary hospice will continue to manage spiritual care needs.     DIET PLEASURE     Standing Status:   Standing Number of Occurrences:   1    VITAL SIGNS     Standing Status:   Standing     Number of Occurrences:   1    AVINA CATHETER, CARE     1. Avina Catheter care every shift and PRN  2. Notify Physician of Avina Catheter leakage, occlusion, gross adherent sediment or accidental removal  3. Change Avina 30 days after insertion. Standing Status:   Standing     Number of Occurrences:   [id number]    NURSING ASSESSMENT:  SPECIFY Assess for GIP, routine, or respite level of care. Q SHIFT Routine     Standing Status:   Standing     Number of Occurrences:   1     Order Specific Question:   Please describe the test or procedure you would like to order. Answer:   Assess for GIP, routine, or respite level of care.  BEDREST, COMPLETE     Standing Status:   Standing     Number of Occurrences:   1    DO NOT RESUSCITATE     Standing Status:   Standing     Number of Occurrences:   1     Order Specific Question:   Comfort Measures Only? Answer: Yes    OXYGEN CANNULA Liters per minute: 2; Indications for O2 therapy: RESPIRATORY DISTRESS PRN Routine     Standing Status:   Standing     Number of Occurrences:   [id number]     Order Specific Question:   Liters per minute:      Answer:   2     Order Specific Question:   Indications for O2 therapy     Answer:   RESPIRATORY DISTRESS    sodium chloride (NS) flush 3 mL    OR Linked Order Group     morphine injection 2 mg     morphine injection 2 mg    LORazepam (ATIVAN) injection 1 mg    bisacodyL (DULCOLAX) suppository 10 mg    LORazepam (ATIVAN) injection 2 mg    PHENobarbital (LUMINAL) injection 65 mg    dexAMETHasone (DECADRON) tablet 4 mg    fentaNYL (DURAGESIC) 12 mcg/hr patch 1 Patch    INITIAL PHYSICIAN ORDER: HOSPICE Level Of Care: General Inpatient; Reason for Admission: GIP management of pain and seizures in light of dysphagia secondary to stroke     Standing Status:   Standing     Number of Occurrences:   1     Order Specific Question:   Status     Answer: Hospice     Order Specific Question:   Level Of Care     Answer:   General Inpatient     Order Specific Question:   Reason for Admission     Answer:   GIP management of pain and seizures in light of dysphagia secondary to stroke     Order Specific Question:   Inpatient Hospitalization Certified Necessary for the Following Reasons     Answer:   3. Patient receiving treatment that can only be provided in an inpatient setting (further clarification in H&P documentation)     Order Specific Question:   Admitting Diagnosis     Answer:   Sequela of cardioembolic stroke [[id number]]     Order Specific Question:   Terminal Prognosis Diagnosis(es)     Answer:   Metastasis to brain [facility]) [[id number]]     Order Specific Question:   Terminal Prognosis Diagnosis(es)     Answer:   Metastasis to lung [facility]) [[id number]]     Order Specific Question:   Terminal Prognosis Diagnosis(es)     Answer:   Lymphoma [facility]) [[id number]]     Order Specific Question:   Terminal Prognosis Diagnosis(es)     Answer:   Seizures ([facility] [address] [[id number]]     Order Specific Question:   Terminal Prognosis Diagnosis(es)     Answer:   Right carotid artery occlusion [[id number]]     Order Specific Question:   Terminal Prognosis Diagnosis(es)     Answer:   Metastasis to spinal column [facility]) [[id number]]     Order Specific Question:   Admitting Physician     Answer:   Hessie Drain     Order Specific Question:   Attending Physician     Answer:   Hessie Drain     Order Specific Question:   Discharge Plan:     Answer:   Home with Home Hospice     Comments:    is frail    IP CONSULT TO SOCIAL WORK     Standing Status:   Standing     Number of Occurrences:   1     Order Specific Question:   Reason for Consult: Answer: For [facility] Only. For contracted patients, their primary hospice will continue to manage social work needs. Allergies:   Allergies   Allergen Reactions    Compazine [Prochlorperazine Edisylate] Swelling       Care Plan:  Multidisciplinary Problems (Active)     Problem: Anxiety/Agitation     Dates: Start: 11/07/20       Disciplines: Interdisciplinary    Goal: Verbalize and demonstrate ability to manage anxiety     Dates: Start: 11/07/20   Expected End: 11/21/20       Description: The patient/family/caregiver will verbalize and demonstrate ability to manage the patient's anxiety throughout hospice care. Disciplines: Interdisciplinary    Intervention: Assess for anxiety/agitation     Dates: Start: 11/07/20       Description: Assess for signs and symptoms of anxiety and agitation. Intervention: Instruction strategies to reduce anxiety/agitation     Dates: Start: 11/07/20       Description: Instruct patient/caregiver on strategies to reduce anxiety/agitation. Problem: End of Life Process     Dates: Start: 11/07/20       Description: Pt  and brother will be able to describe the changes indicate near death  Give  and brother a blue book   Answer questions     Disciplines: Interdisciplinary    Goal: Demonstrate understanding of end of life processes     Dates: Start: 11/07/20   Expected End: 11/21/20       Description: Steve Ding will understand end of life processes.     Disciplines: Interdisciplinary    Intervention: Assess for signs/symptoms of terminal restlessness     Dates: Start: 11/07/20             Intervention: Tarsha Mendoza on strategies to reduce terminal restlessness     Dates: Start: 11/07/20             Intervention: Instruct on the dying process     Dates: Start: 11/07/20             Intervention: Instruct: imminent death     Dates: Start: 11/07/20             Intervention: Instruct: process at end of life     Dates: Start: 11/07/20                         Problem: Hospice Orientation     Dates: Start: 11/07/20       Disciplines: Interdisciplinary    Goal: Demonstrate understanding of hospice philosophy, plan of care, and home hospice program     Dates: Start: 11/07/20 Expected End: 11/09/20       Description: The patient/family/caregiver will demonstrate understanding of hospice philosophy, plan of care and the home hospice program as evidenced by participation in meeting the patient's psychosocial, spiritual, medical, and physical needs inclusive of medical supplies/equipment focusing on symptoms. Discussed with  ,Lakisha Sanchez, and brother, Oneal Soulier, the change from curative to comfort measures. Disciplines: Interdisciplinary    Intervention: Instruct on hospice philosophy     Dates: Start: 11/07/20             Intervention: Instruct: hospice orientation     Dates: Start: 11/07/20             Intervention: Instruct: medical equipment     Dates: Start: 11/07/20       Description: Instruct patient/caregiver on medical equipment and supplies.           Intervention: Instruct: medical power of  and will     Dates: Start: 11/07/20             Intervention: Instruct:terminal illness     Dates: Start: 11/07/20                         Problem: Pain     Dates: Start: 11/07/20       Disciplines: Interdisciplinary    Goal: Verbalize satisfaction of level of comfort and symptom control     Dates: Start: 11/07/20   Expected End: 11/21/20       Description: Pt pain would be less then 4/10  Morphine 2 mg IV/SQ q 20 minutes prn     Disciplines: Interdisciplinary    Intervention: Assess effectiveness of pain management     Dates: Start: 11/07/20       Description: Pt will be relaxed not moaning or groaning or attempting to get out of bed  Ativan 1 mg IV/IM q 4 hours prn           Intervention: Assess for signs/symptoms of acute pain     Dates: Start: 11/07/20             Intervention: Assess for signs/symptoms of chronic pain     Dates: Start: 11/07/20             Intervention: Viridiana Lopez on non-pharmacological pain management     Dates: Start: 11/07/20             Intervention: Instruct on pain scales     Dates: Start: 11/07/20             Intervention: Instruct on pharmacological pain management     Dates: Start: 11/07/20                         Problem: Patient Education: Go to Patient Education Activity     Dates: Start: 11/07/20       Disciplines: Interdisciplinary    Goal: Patient/Family Education     Dates: Start: 11/07/20   Expected End: 11/21/20       Disciplines: Interdisciplinary                Problem: Pressure Injury - Risk of     Dates: Start: 11/07/20       Disciplines: Interdisciplinary    Goal: *Prevention of pressure injury     Dates: Start: 11/07/20   Expected End: 11/21/20       Description: Document Bhupendra Scale and appropriate interventions in the flowsheet.     Disciplines: Interdisciplinary                  Care Plan Problems/Goals      Progressing Towards Goal (5)      *Prevention of pressure injury (Pressure Injury - Risk of)    Disciplines:  Interdisciplinary Expected end:  11/21/20        Outcome: Progressing Towards Goal By Yessenia Hartley on 11/09/20 0300            Patient/Family Education (Patient Education: Go to Patient Education Activity)    Disciplines:  Interdisciplinary Expected end:  11/21/20        Outcome: Progressing Towards Goal By Levon Gordon RN on 11/07/20 1928            Demonstrate understanding of hospice philosophy, plan of care, and home hospice program (Hospice Orientation)    Disciplines:  Interdisciplinary Expected end:  11/09/20        Outcome: Progressing Towards Goal By Levon Gordon RN on 11/08/20 1446            Verbalize satisfaction of level of comfort and symptom control (Pain)    Disciplines:  Interdisciplinary Expected end:  11/21/20        Outcome: Progressing Towards Goal By Yessenia Hartley on 11/09/20 0300            Verbalize and demonstrate ability to manage anxiety (Anxiety/Agitation)    Disciplines:  Interdisciplinary Expected end:  11/21/20        Outcome: Progressing Towards Goal By Levon Gordon RN on 11/08/20 1446                         No Outcome (1)      Demonstrate understanding of end of life processes (End of Life Process)    Disciplines:  Interdisciplinary Expected end:  11/21/20                            ___________________    Care Team Notes          POC/IDG Notes      [facility] ID Nurse Notes by Bro Marrufo RN at 11/09/20 1316  Version 1 of 1    Author:  Bro Marrufo RN Service:  Hospice and Palliative Care Author Type:  Registered Nurse    Filed:  11/09/20 1317 Date of Service:  11/09/20 1316 Status:  Signed    :  Bro Marrufo RN (Registered Nurse)       Patient: Marylen Bern    Date: 11/09/20  Time: 1:16 PM    [facility] Nurse Notes  1st IDG: Pt is a 65-year-old female with sequalae of stroke who is here GIP level of care for management of pain, agitation. BP 91/51. Reid with UOP of 540cc. IV access in right AC. 2l/min of oxygen. PO intake: bites and sips. Wounds: stage II on sacrum. PRN medications:  Morphine 2 mg IV x 3 for pain. Scheduled meds: Decadron 4 mg PO q 8h, Phenobarb 65 mg SQ q 12h. Plan: Add Fentanyl 12 mcg patch. Comprehensive plan of care reviewed. IDG and pt./family in agreement with plan of care. The IDG identifies through on-going assessment when a change is needed to the POC; the pt/family will receive care and services necessitated by changes in POC. Medications reviewed by the pharmacist and Medical Director. Signed by: Jasmin Viera RN       [address] [facility]  Notes by Fuad Cruz at 11/09/20 1204  Version 1 of 1    Author:  Fuad Cruz Service:  Spiritual Care Author Type:  Pastoral Care    Filed:  11/09/20 1205 Date of Service:  11/09/20 1204 Status:  Signed    :  Fuad Cruz (Pastoral Care)       Patient: Marylen Bern    Date: 11/09/20  Time: 12:04 PM    [facility]  Notes    Assessments pending for spiritual and bereavement support.          Signed by: Yves Justice       [address] [facility]  Notes by Lucy Kohler at 11/09/20 1106  Version 1 of 1    Author:  Lucy Kohler Service:  Licensed Clinical  Author Type:      Filed:  11/09/20 1204 Date of Service:  11/09/20 1109 Status:  Signed    :  Noam Leslie ()       Patient: Jarek Lewis    Date: 11/09/20  Time: 11:09 AM    [facility]  Notes  LMSW will meet with the pt and her family to complete the SW initial assessment. After the assessment is done the care plan will be completed. The volunteer program has been suspended due to the Covid-19 virus. LMSW will ensure the pt and families receive their comfort bags. Signed by: Roberth Reyes       [address] [place] [address]  Notes by Noam Leslie at 11/09/20 1022  Version 1 of 1    Author:  Noam Leslie Service:  Licensed Clinical  Author Type:      Filed:  11/09/20 1022 Date of Service:  11/09/20 1022 Status:  Signed    :  Noam Leslie ()       Patient: Jarek Lewis    Date: 11/09/20  Time: 10:22 AM    [facility]  Notes  LMSW has read and agrees with the RN initial assessment. LMSW will meet with pt and family to complete the SW assessment. The volunteer program has been suspended due to the Covid-19 virus. LMSW will ensure the pt and families receive their comfort bags. LMSW will meet with the patient and family to complete the SW initial assessment. The care plan will be created from there. Signed by: Roberth Reyes       [facility]  Notes by Katie Finney at 11/09/20 0932  Version 1 of 1    Author:  Katie Finney Service:  Spiritual Care Author Type:  Pastoral Care    Filed:  11/09/20 0933 Date of Service:  11/09/20 0932 Status:  Signed    :  Katie Finney (Pastoral Care)       Patient: Jarek Lewis    Date: 11/09/20  Time: 9:32 AM    [facility]  Notes    / Grief Counselor has reviewed  Initial Comprehensive Assessment and plan of care.  Bereavement and Spiritual Care Assessments to be completed and plan of care put in place to meet the needs, requests and referrals. Signed by: Dulce Maria Logan       [facility] IDG Nurse Notes by Eri Nixon RN at 11/07/20 1742  Version 1 of 1    Author:  Eri Nixon RN Service:  NURSING Author Type:  Registered Nurse    Filed:  11/07/20 1742 Date of Service:  11/07/20 1742 Status:  Signed    :  Eri Nixon RN (Registered Nurse)       Patient: Madison Overton    Date: 11/07/20  Time: 5:42 PM    [facility] Nurse Notes  Ms Lavon Mcclure is a 69year old female admitted to room 111 General in patient for sequalae of stroke along with brain metastasis of unknown primary origin, pulmonary metastasis, pulmonary edema, pulmonary effusions and increased intercranial pressure which altogether gives her a poor prognosis. She's admitted for control of pain, agitation and seizures. Pt arrived an tolerated transfer to bed with minimal complaint of pain. Pt denies pain, nausea or shortness of breath. Physical assessment completed. Lung sounds clear but diminished in bases, heart sounds irregular, pt can answer name and date of birth but not date or current events. Bowel sounds hypoactive, pt has bustamante draining clear yellow urine. Pt extremities cool to cold. Upper extremities pale with cyanotic nail beds, cool with palpable radial pulse. Lower extremities cold, cyanotic, non palpable pedal pulses. Pt has iv that flushed without difficulty. Pt has , Leeanne Hickman, and brother, Madelyn Panda that visited. Leeanne Phlegm appears confused asking what we are going to do next, wondered aimlessly from room asking each time what we were going to do. Explained the change of emphasis from treatment to comfort measures. Explained if pt has pain or discomfort we will medicate for that. Pt brother, Madelyn Panda, appeared to understand and agreed with the emphasis on comfort. Pt states she has no children so her brother and  are her supporters.  Pt asking for a drink of apple juice, gave her apple juice, water and strawberry ice cream and a couple of icee pops.    1300 pt ate half the the strawberry ice cream, one icee pop and apple juice and half the water.      1500 pt used call light, pt states she's uncomfortable. Repositioned. Pt voice garbled and confused.           Signed by: Genet Rodriguez RN                Care Team Present:   Team Members Present: Physician, Nurse, , [address]  Physician Team Member: Moo Melendez MD  Nurse Team Member: Migdalia Gamez RN  Social Work Team Member: Jay Ferrari, [address] [place] [address] Team Member: Dominique Patel.  Div

## 2020-11-09 NOTE — [facility] IDG CHAPLAIN NOTES
Patient: Edd Cabral    Date: 11/09/20  Time: 9:32 AM    \[facility]\""  Notes    / Grief Counselor has reviewed  Initial Comprehensive Assessment and plan of care. Bereavement and Spiritual Care Assessments to be completed and plan of care put in place to meet the needs, requests and referrals.         Signed by: Santino Mcelroy

## 2020-11-09 NOTE — PROGRESS NOTES
Problem: Pressure Injury - Risk of  Goal: *Prevention of pressure injury  Description: patient would not have further pressure sores during stay.   Outcome: Progressing Towards Goal  Note: Pressure Injury Interventions:  Sensory Interventions: Assess changes in LOC, Avoid rigorous massage over bony prominences, Check visual cues for pain, Float heels, Minimize linen layers    Moisture Interventions: Absorbent underpads, Apply protective barrier, creams and emollients, Limit adult briefs, Minimize layers, Moisture barrier    Activity Interventions: Assess need for specialty bed    Mobility Interventions: Assess need for specialty bed, Float heels, HOB 30 degrees or less    Nutrition Interventions: Document food/fluid/supplement intake    Friction and Shear Interventions: Apply protective barrier, creams and emollients, Lift sheet, Minimize layers                Problem: Pain  Goal: Verbalize satisfaction of level of comfort and symptom control  Description: Pt pain would be less then 4/10  Morphine 2 mg IV/SQ q 20 minutes prn   Outcome: Progressing Towards Goal

## 2020-11-09 NOTE — PROGRESS NOTES
9663- The patient is resting quietly in the bed at the change of shift rounds and report with Jaren Zamorano RN. The patient is identified by name and . The patient is resting with her eyes closed and no signs of distress. Pain is at 0 using the non verbal scale. The patient shows no signs of anxiety, SOB or nausea /vomit. The bed is low and locked with two bed rails up and the tab alert in place. The door to the patient's room is open as the patient is alone. 0930- The patient is awake and is alert to person, place and time. She denies any pain, SOB, anxiety or nausea. Administered scheduled Phenobarbital 65 mg subcutaneous for seizure prevention. IV line flushed with normal saline. The line flushed with no problems. The patient states that she is concerned about what will happen to her  once she dies. She states that they are alone here. Educated that this RN would tell the . Patient voiced understanding. 1115- The patient's , Dave Chaidez, is in the room and the patient says that it is ok to let Cookie Gay know anything he ask. She instructs this RN to give Cookie Gay the password stating that he will be the one to assist to get her  some help. Informed Valentino Failing, , and DANGELO Arias. Patient states no pain, no SOB and no nausea or anxiety. 1330- New order given for Fentanyl 12 mcg to be placed. The family and the patient was educated on the medication. The patch was placed to the patient's upper left arm. The patient has eaten an ice cream and a cup of juice. 1550- The patient states pain in her abdomen 5-6 that is aching. She is crying. Asked patient if she would like to have some medication to help her relax and she said yes. Morphine 2 mg IV for pain and Lorazepam 1 mg IV for agitation. The patient was repositioned for comfort. 1620- The patient is resting quietly in the bed with no signs of pain or anxiety. No tears.  Respirations are even and unlabored. Facial features are relaxed. The patient has the call light within her reach. 1800- The patient is resting quietly. Will report off to the on coming RN and complete walking rounds.

## 2020-11-09 NOTE — [facility] IDG SOCIAL WORKER NOTES
Patient: Jacob Gauthier    Date: 11/09/20  Time: 11:09 AM    [facility]  Notes  LMSW will meet with the pt and her family to complete the SW initial assessment. After the assessment is done the care plan will be completed. The volunteer program has been suspended due to the Covid-19 virus. LMSW will ensure the pt and families receive their comfort bags.         Signed by: Lo Chandra

## 2020-11-09 NOTE — [facility] IDG CHAPLAIN NOTES
Patient: Aracelis Godinez    Date: 11/09/20  Time: 12:04 PM    [facility]  Notes    Assessments pending for spiritual and bereavement support.          Signed by: Bijan Amaya

## 2020-11-09 NOTE — [facility] IDG NURSE NOTES
Patient: Marley Herbert    Date: 11/09/20  Time: 1:16 PM    [facility] Nurse Notes  1st IDG: Pt is a 79-year-old female with sequalae of stroke who is here GIP level of care for management of pain, agitation. BP 91/51. Reid with UOP of 540cc. IV access in right AC. 2l/min of oxygen. PO intake: bites and sips. Wounds: stage II on sacrum. PRN medications:  Morphine 2 mg IV x 3 for pain. Scheduled meds: Decadron 4 mg PO q 8h, Phenobarb 65 mg SQ q 12h. Plan: Add Fentanyl 12 mcg patch. Comprehensive plan of care reviewed. IDG and pt./family in agreement with plan of care. The IDG identifies through on-going assessment when a change is needed to the POC; the pt/family will receive care and services necessitated by changes in POC. Medications reviewed by the pharmacist and Medical Director.         Signed by: Dominique Anderson RN

## 2020-11-10 NOTE — PROGRESS NOTES
Problem: Anticipatory Grief  Goal: Grief heard and acknowledged, anxiety reduced, patient coping identified, patient/family expressed gratitude  Description: Patient/ family will acknowledge feelings of anxiety and grief and utilize available support to assist in their grief journey.    11/10/2020 1554 by Suzanne Quevedo

## 2020-11-10 NOTE — PROGRESS NOTES
211:  Patient report from Tim Cervantes RN. Patient ID by name and . Patient resting with eyes closed. No s/sx of pain, dyspnea, or agitation observed. FLACC 0/10. Bed low and locked and all safety measures in place. Door open. :  Scheduled Dexamethasone PO given crushed in water via syringe with no problems. Reid catheter noted to be leaking some. Sediment noted. Flushed with 30 cc NS. Will continue to monitor. Physical assessment complete. :  PRN Morphine 2 mg IV given for patient complaints of pain. Unable to rate. FLACC 4/10. Will reassess. 223:  Reassessment:  Patient resting with no s/sx of pain observed. FLACC 0/10.    0045:  Patient resting with no s/sx of pain, dyspnea, or agitation observed. FLACC 0/10.    0300:  Patient resting with no s/sx of pain, dyspnea, or agitation observed. FLACC 0/10.    0500:  Patient resting with no s/sx of pain, dyspnea, or agitation observed. FLACC 0/10.    0557:  Scheduled Dexamethasone PO given crushed in water via syringe. PRN Morphine 2 mg IV given for patient reports of pain. Unable to rate pain. FLACC 3/10. Bisacodyl suppository given for LBM on . Will reassess pain. 4056:  Reassessment:  Pt resting with no s/sx of pain observed. FLACC 0/10. Patient has required Morphine x 2 for pain management this shift. Report to Brian Kendall RN.

## 2020-11-10 NOTE — PROGRESS NOTES
Background:  Mrs. Raj Carnes is a 69year old female who according to the family comes to us after an extended illness and long hospitalization at [address] [place] [address]. She is  to Maikel. They have been together 46 plus years. They do not have children of their own. Their extended family members are still in [place]. They came here from  [place] many years ago. Mrs. Florentin Carnes was an   For many years. Her last position was with [employer]. Her  Maikel worked in construction. She is a Latter-day and affiliated with  [employer]. Her  is involved in her spiritual support. Mr. Florentin Carnes has not been involved with the Buddhist. Mrs. Florentin Carnes has a brother, Meg Ferrell who is here providing support. Assessment:  Mrs. Florentin Carnes appears to be comfortable. No signs of distress or grimacing. When  arrived she was lying quietly in the bed. Her brother was sitting over across the room.  introduced herself. Meg Ferrell was receptive to spiritual care. He graciously shared parts of his sister's story. He shared concerns for her . He stated the Buddhist is going to be helpful for him and he is grateful because he is aware that his brother in law will need support. After a few minutes Mr. Florentin Carnes came to the  Room. He stated he is holding up ok but has some pain issues. He held his hands out for  to see while stating he has arthritis. His hands were viably missharpened. He said he is due an injection in his spine to treat the arthritis. He is taking pain meds  Twice a day and said he forgot to bring it with him so he is hurting. Khari talked about how Mr. Florentin Carnes had been going to the hospital every day for 5 weeks and he knows he has to be really tired but Mr. Florentin Carnes didn't complain. While with the family Jordanon Keturah, LLOYD knocked on the door.  invited her in as we were about to pray.   She joined us as we all prayed together for strength, peace and comfort. Family appears to be coping adequately at this time. There is some concern for Mr. Ayad Hidalgo due to his medical issues. His brother in law stated he felt some of what others perceive as cognitive issues may really be hearing impairment. He stated Mr. Aayd Hidalgo will definitely need support when he is alone. During this encounter  offered care through active listening, compassion, positive affirmation of their love for Mrs. Ayad Hidalgo and  Prayer.  encouraged Mr. Ayad Hidalgo to take care of himself and take his meds appropriately. Plan:  Referral to Bereavement. Focus on Anticipatory Grief and encourage self care.

## 2020-11-10 NOTE — PROGRESS NOTES
Report received from Boaz Tucker RN with walking rounds. Patient identified by name and . Patient resting quietly in bed with eyes closed. No signs or symptoms of distress noted at present. Fentanyl patch confirmed to left arm. Bed in low and locked position, side rails up x2, call bell within reach, tab alarm in place. No family present at bedside. Door open for safety.  Gave scheduled dose of phenobarbital.  Patient drowsy after receiving ativan IV this afternoon. She wakes to touch but falls back asleep quickly. FLACC 0/10, respirations even and unlabored. No sign of distress.  Report given to Maura Penaloza RN.

## 2020-11-10 NOTE — PROGRESS NOTES
4136 Report received from Shira Ley RN. Pt was admitted on 11/7 [facility] level of care with hospice dx of sequelae of stroke/brain mets of unknown primary origin and symptom management of pain, agitation and seizures. Pt is currently resting with her eyes closed, oxygen in place at 2L via NC. Reid to gravity draining clear dark orange urine. Bed is low and locked, side rails up x2, tab alert in place, call bell in reach. Door remains open for continued monitoring. 1807 Assessment completed. PIV flushed easily, dressing is clean, dry and intact. Scheduled dose of Phenobarbitol 65mg SQ given in right upper arm. Pt was able to answer if she was in pain and replied \"no\" in a soft whisper voice. Mouth care performed. Safety measures in place. 26 Pt's  and brother are at the bedside. Pt said \"yes\" when asked if she wanted to try lunch. Sips of grape juice given on a spoon, pt unable to draw up juice in a straw. Pt took one spoonful of pudding. Safety measures in place. Family instructed to use call bell for any needs or concerns. 1412 Pt medicated with scheduled dose of Decadron, IV. Family is at the bedside. Safety measures in place. 1615 Pt resting with her eyes closed, no signs or symptoms of pain or distress. FLACC 0/10. Currently there is no family at the bedside. 1820 Pt resting with her eyes closed. No signs or symptoms of pain or distress. FLACC 0/10. Safety measures in place. Report given to [facility], RN.

## 2020-11-10 NOTE — PROGRESS NOTES
Problem: Pressure Injury - Risk of  Goal: *Prevention of pressure injury  Description: Document Bhupendra Scale and appropriate interventions in the flowsheet. Outcome: Progressing Towards Goal  Note: Pressure Injury Interventions:  Sensory Interventions: Assess changes in LOC, Float heels, Keep linens dry and wrinkle-free, Minimize linen layers    Moisture Interventions: Absorbent underpads, Apply protective barrier, creams and emollients, Internal/External urinary devices, Limit adult briefs    Activity Interventions: Assess need for specialty bed    Mobility Interventions: Assess need for specialty bed, Float heels, HOB 30 degrees or less    Nutrition Interventions: Document food/fluid/supplement intake    Friction and Shear Interventions: Apply protective barrier, creams and emollients, HOB 30 degrees or less, Lift sheet, Minimize layers                Problem: Pain  Goal: Verbalize satisfaction of level of comfort and symptom control  Description: Pt pain would be less then 4/10  Morphine 2 mg IV/SQ q 20 minutes prn   Outcome: Progressing Towards Goal     Problem: Anxiety/Agitation  Goal: Verbalize and demonstrate ability to manage anxiety  Description: The patient/family/caregiver will verbalize and demonstrate ability to manage the patient's anxiety throughout hospice care. Outcome: Progressing Towards Goal     Problem: Falls - Risk of  Goal: *Absence of Falls  Description: Document Trent Parmar Fall Risk and appropriate interventions in the flowsheet.   Outcome: Progressing Towards Goal  Note: Fall Risk Interventions:  Mobility Interventions: (P) Bed/chair exit alarm    Mentation Interventions: (P) Adequate sleep, hydration, pain control, Bed/chair exit alarm, Door open when patient unattended    Medication Interventions: (P) Bed/chair exit alarm    Elimination Interventions: (P) Bed/chair exit alarm, Call light in reach

## 2020-11-11 NOTE — PROGRESS NOTES
Progress Note    Patient: Nahum Hebert MRN: [id number]  SSN: [id number]    YOB: 1950  Age: 69 y.o. Sex: female      Admit Date: 11/7/2020    LOS: 3 days     Subjective:     Lethargic. Taking in sips. Having difficulty swallowing decadron tablet. Has required morphine x 3 for pain.      Review of Systems:  Review of systems not obtained due to patient factors. Objective:     Vitals:    11/09/20 1748 11/10/20 0536 11/10/20 1351 11/11/20 0441   BP: 110/65 119/64 (!) 138/54 (!) 122/57   Pulse: 79 90 95 (!) 102   Resp: 18 12 16 20   Temp: 98.7 °F (37.1 °C) (!) 96.5 °F (35.8 °C) (!) 93.3 °F (34.1 °C) (!) 95.7 °F (35.4 °C)        Intake and Output:  Current Shift: No intake/output data recorded. Last three shifts: 11/09 1901 - 11/11 0700  In: 30   Out: 550 [Urine:550]    Physical Exam:   GENERAL: fatigued, cooperative, mild distress, appears older than stated age, pale, cachectic  LUNG: Clear diminished breath sounds with unlabored respirations. HEART: regular rate and rhythm  ABDOMEN: soft, non-tender. Bowel sounds hypoactive. : Reid catheter with dark yellow urine. EXTREMITIES:  extremities with no cyanosis or edema. + pulses. SKIN: Pale. Cool to touch. Stage 2 wound to sacrum. Peripheral IV in the right antecubital with dressing intact. NEUROLOGIC: Lethargic. Slow to respond to questions. Right sided weakness. Bedbound. PSYCHIATRIC: anxious    Lab/Data Review:  No new labs resulted in the last 24 hours.     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 ([facility] [address] (11/9/2020)    Active Problems:    Sequela of cardioembolic stroke (87/0/8910)        Plan:     Current Facility-Administered Medications   Medication Dose Route Frequency    dexamethasone (DECADRON) 4 mg/mL injection 4 mg  4 mg IntraVENous Q8H    fentaNYL (DURAGESIC) 12 mcg/hr patch 1 Patch  1 Patch TransDERmal Q72H    sodium chloride (NS) flush 3 mL  3 mL IntraVENous Q12H    morphine injection 2 mg  2 mg SubCUTAneous Q20MIN PRN    Or    morphine injection 2 mg  2 mg IntraVENous Q20MIN PRN    LORazepam (ATIVAN) injection 1 mg  1 mg IntraVENous Q4H PRN    bisacodyL (DULCOLAX) suppository 10 mg  10 mg Rectal PRN    LORazepam (ATIVAN) injection 2 mg  2 mg IntraVENous Q10MIN PRN    PHENobarbital (LUMINAL) injection 65 mg  65 mg SubCUTAneous Q12H       11/7: ([facility]) Admitted GIP with 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      1. Pain: Morphine 2mg IV/SQ Q20 minutes as needed. Fentanyl 12mcg patch in place.      2. Dyspnea: Morphine 2mg IV/SQ Q20 minutes as needed. Glycopyrrolate 0.2mg q4 prn secretions. Oxygen at 2 L/min prn.     3. Agitation: Lorazepam 1mg IV/IM q4 hours prn.     4. Seizures: Phenobarbital 65mg SQ Q12. Decadron 4mg Q8. Lorazepam as ordered prn sustained seizure activity and notify provider.      5. Family/Pt Support: Family at bedside during exam. Medications and plan of care discussed with nursing staff and family. Will continue to monitor for symptoms and adjust medications as needed to maintain patient comfort. PPS 20%. Case discussed with Dr. Kecia Bah.     Change decadron to IV.       Signed By: Santos Swanson NP     November 10, 2020

## 2020-11-11 NOTE — PROGRESS NOTES
Problem: Pressure Injury - Risk of  Goal: *Prevention of pressure injury  Description: Document Bhupendra Scale and appropriate interventions in the flowsheet. Outcome: Progressing Towards Goal  Note: Pressure Injury Interventions:  Sensory Interventions: Assess changes in LOC, Avoid rigorous massage over bony prominences, Check visual cues for pain, Float heels, Keep linens dry and wrinkle-free, Minimize linen layers, Pad between skin to skin, Pressure redistribution bed/mattress (bed type)    Moisture Interventions: Absorbent underpads, Apply protective barrier, creams and emollients, Internal/External urinary devices, Maintain skin hydration (lotion/cream), Minimize layers, Moisture barrier    Activity Interventions: Assess need for specialty bed    Mobility Interventions: Pressure redistribution bed/mattress (bed type)    Nutrition Interventions: Document food/fluid/supplement intake    Friction and Shear Interventions: Apply protective barrier, creams and emollients, Lift sheet, Lift team/patient mobility team, Minimize layers                Problem: Anxiety/Agitation  Goal: Verbalize and demonstrate ability to manage anxiety  Description: The patient/family/caregiver will verbalize and demonstrate ability to manage the patient's anxiety throughout hospice care. Outcome: Progressing Towards Goal     Problem: Falls - Risk of  Goal: *Absence of Falls  Description: Document Patricia Phillips Fall Risk and appropriate interventions in the flowsheet.   Outcome: Progressing Towards Goal  Note: Fall Risk Interventions:  Mobility Interventions: Bed/chair exit alarm    Mentation Interventions: Adequate sleep, hydration, pain control, Bed/chair exit alarm, Door open when patient unattended    Medication Interventions: Bed/chair exit alarm    Elimination Interventions: Bed/chair exit alarm

## 2020-11-11 NOTE — PROGRESS NOTES
Problem: Pressure Injury - Risk of  Goal: *Prevention of pressure injury  Description: Document Bhupendra Scale and appropriate interventions in the flowsheet. Outcome: Progressing Towards Goal  Note: Pressure Injury Interventions:  Sensory Interventions: Assess changes in LOC, Check visual cues for pain, Float heels, Keep linens dry and wrinkle-free, Maintain/enhance activity level, Minimize linen layers, Pad between skin to skin, Pressure redistribution bed/mattress (bed type)    Moisture Interventions: Absorbent underpads, Apply protective barrier, creams and emollients, Internal/External urinary devices, Limit adult briefs, Maintain skin hydration (lotion/cream), Minimize layers, Moisture barrier    Activity Interventions: Pressure redistribution bed/mattress(bed type)    Mobility Interventions: Float heels, HOB 30 degrees or less, Pressure redistribution bed/mattress (bed type)    Nutrition Interventions: Document food/fluid/supplement intake, Offer support with meals,snacks and hydration    Friction and Shear Interventions: Apply protective barrier, creams and emollients, HOB 30 degrees or less, Lift sheet, Minimize layers                Problem: Pain  Goal: Verbalize satisfaction of level of comfort and symptom control  Description: Pt pain would be less then 4/10  Morphine 2 mg IV/SQ q 20 minutes prn   Outcome: Progressing Towards Goal     Problem: Falls - Risk of  Goal: *Absence of Falls  Description: Document Richi Fall Risk and appropriate interventions in the flowsheet.   Outcome: Progressing Towards Goal  Note: Fall Risk Interventions:  Mobility Interventions: Bed/chair exit alarm    Mentation Interventions: Adequate sleep, hydration, pain control, Bed/chair exit alarm, Door open when patient unattended, Familiar objects from home, Reorient patient, More frequent rounding, Room close to nurse's station    Medication Interventions: Bed/chair exit alarm    Elimination Interventions: Bed/chair exit alarm, Call light in reach

## 2020-11-11 NOTE — PROGRESS NOTES
Report received. Pt round. Pt identified by name. In bed with eyes closed. No s/s of pain, agitation or dyspnea. Bed low and SR up with tab alerts on. Fentanyl 12 mcg patch present on left arm.     0911 scheduled phenobarb given SQ. Pt responds with a moan to injection, but quickly returns to sleep. IV access in right arm flushes easily. Reid is draining yellow urine with sediment. Mouth care done. 1100 Family at bedside. Pt appears comfortable with no s/s of distress. FLACC 0. RR regular and even. 1440 Scheduled decadron given IV. RR regular and even. FLACC 0. No s/s of pain, agitation or dyspnea at this time. Family at bedside. 1705 RR regular and even. FLACC 0. No s/s of pain, agitation or dyspnea at this time. Family is no longer at bedside. 1812 Pt crying out. Morphine 2 mg IV for pain and ativan 1 mg for agitation.      Report given to oncoming RN

## 2020-11-11 NOTE — PROGRESS NOTES
JOSESW visited with the pt and her family in the room. I asked if they needed anything. A woman in the room that I had not seen before asked me what my job was. I responded that I was a ,  She said \"good I have questions for you. \"  She continued on to tell me that \"when she dies he is going to need help\" referring to her spouse Richard Jennings. I looked at the pt's brother Abram Pradhan and reminded him that I had provided him resources for him to check into getting a geriatric case manager. I also suggested for them to discuss the needs with his PCP because If he needed a referral to home care it would have to come from a physician.     LMSW addressed all of the families concerns

## 2020-11-11 NOTE — PROGRESS NOTES
-Report received from Pastora Cabrera RN. Patient identified by name and . Patient resting quietly in bed with eyes closed. No signs or symptoms of distress, pain, agitation/anxiety, or SOB noted at present. Oxygen in use at 2L NC. Fentanyl patch 12mcg to left arm in place. Reid catheter in place draining orestes colored urine. Bed locked and in lowest position, side rails up x 2, call bell within reach, tab alarm in place. No family present at bedside. Door open for continuous monitoring. -Nurse entered patient's room at this time due to moaning aloud. Patient confirmed being in pain. Patient unable to verbalize where pain is, but is able to motion that pain is around her stomach area. PRN Morphine 2mg IV given for symptom management of pain. Patient requiring skilled nursing assessment, ongoing monitoring, and reevaluation of medication regimen to achieve optimum level of comfort. FLACC 4/10.    -PRN Morphine 2mg IV given for symptom management of pain aeb patient continues to moan aloud. Patient continues to require skilled nursing assessment, ongoing monitoring, and reevaluation of medication regimen to achieve optimum level of comfort. FLACC 4/10.    -Patient resting in bed with eyes closed after previous doses of PRN medications. FLACC 0. Patient admitted to [facility] on 2020 with a terminal diagnosis of Metastasis to mich of unknown origin. Patient is GIP level of care for symptom management of pain, agitation, and seizures. Patient was admitted to [facility] on 10/25/2020 after c/o sudden weakness on right side and aphasia. CT scan completed that showed frontal lobe mass with edema as well as right midline shift and additional small masses in tracy and right occipital lobe. CXR completed that showed worsening pulmonary edema and bilateral pleural effusion. Patient had recently been admitted and treated for pneumonia. Multiple areas of metastasis also was found.  Patient continued to show signs of decline and PO intake remained poor. Patient elected to stop all aggressive treatments and elected to have comfort measures with hospice. Patient continues to require skilled nursing assessment, ongoing monitoring, and reevaluation of medication regimen to achieve optimum level of comfort. FLACC 0.    2142-Scheduled Phenobarbital 65mg SQ and Decadron 4mg IV given at this time. Patient lethargic but arousal with touch and nurse voice. No needs voiced. Patient denies pain. No signs or symptoms of distress, pain, agitation, or discomfort noted. FLACC 0.    2354-Nurse entered into patient's room after hearing patient moan aloud. Patient has garbled speech and pulling covers and gown down. Facial grimacing noted. PRN Morphine 2mg IV and PRN Ativan 1mg IV given for symptom management of pain and restlessness. FLACC 6/10.    0034-Patient resting in bed with eyes closed after previous dose of PRN medications. Respirations unlabored. FLACC 0.    0223-Patient continues to rest in bed with eyes closed. Respirations unlabored with no signs or symptoms of distress, pain, agitation, or discomfort noted. FLACC 0.    0400-PRN Morphine 2mg IV given for symptom management of pain aeb patient moaning aloud with repositioning and turning. Patient continues to be lethargic but responds to pain. Patient continues to require skilled nursing assessment, ongoing monitoring, and reevaluation of medication regimen to achieve optimum level of comfort. FLACC 4/10.    0430-Patient resting in bed with eyes closed after previous dose of PRN Morphine. FLACC 0.    0528-Scheduled Decadron 4mg IV given at this time. Patient remains drowsy but able to deny pain. No signs or symptoms of distress, pain, agitation, or discomfort noted. FLACC 0.    0558-PRN Morphine 2mg IV given for symptom management of pain aeb patient moaning aloud. Patient remains drowsy.  FLACC 2/10.    0620-Patient resting in bed with eyes partially closed after PRN medication and repositioning. Fentanyl patch 12mcg remain to left arm. FLACC 0.     Report given to Yoni Morris RN

## 2020-11-11 NOTE — PROGRESS NOTES
Progress Note    Patient: Ryemundo Johnson MRN: [id number]  SSN: [id number]    YOB: 1950  Age: 69 y.o. Sex: female      Admit Date: 11/7/2020    LOS: 4 days     Subjective:     Minimally responsive. NPO. Has required morphine x 5 for pain and ativan x 1 IV for agitation.     Review of Systems:  Review of systems not obtained due to patient factors. Objective:     Vitals:    11/09/20 1748 11/10/20 0536 11/10/20 1351 11/11/20 0441   BP: 110/65 119/64 (!) 138/54 (!) 122/57   Pulse: 79 90 95 (!) 102   Resp: 18 12 16 20   Temp: 98.7 °F (37.1 °C) (!) 96.5 °F (35.8 °C) (!) 93.3 °F (34.1 °C) (!) 95.7 °F (35.4 °C)        Intake and Output:  Current Shift: No intake/output data recorded. Last three shifts: 11/09 1901 - 11/11 0700  In: 30   Out: 550 [Urine:550]    Physical Exam:   GENERAL: minimally responsive, mild distress, appears older than stated age, pale, cachectic  LUNG: Clear diminished breath sounds with unlabored respirations.   HEART: regular rate and rhythm  ABDOMEN: soft, non-tender. Bowel sounds hypoactive. : Reid catheter with dark yellow urine.   EXTREMITIES:  extremities with no cyanosis or edema. + pulses.   SKIN: Pale. Cool to touch. Stage 2 wound to sacrum. Peripheral IV in the right antecubital with dressing intact.   NEUROLOGIC: Minimally responsive. Right sided weakness. Bedbound.    PSYCHIATRIC: agitated with stimuli. Lab/Data Review:  No new labs resulted in the last 24 hours.     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 ([facility] [address] (11/9/2020)    Active Problems:    Sequela of cardioembolic stroke (67/4/0401)        Plan:     Current Facility-Administered Medications   Medication Dose Route Frequency    dexamethasone (DECADRON) 4 mg/mL injection 4 mg  4 mg IntraVENous Q8H    fentaNYL (DURAGESIC) 12 mcg/hr patch 1 Patch  1 Patch TransDERmal Q72H    sodium chloride (NS) flush 3 mL  3 mL IntraVENous Q12H    morphine injection 2 mg  2 mg SubCUTAneous Q20MIN PRN    Or    morphine injection 2 mg  2 mg IntraVENous Q20MIN PRN    LORazepam (ATIVAN) injection 1 mg  1 mg IntraVENous Q4H PRN    bisacodyL (DULCOLAX) suppository 10 mg  10 mg Rectal PRN    LORazepam (ATIVAN) injection 2 mg  2 mg IntraVENous Q10MIN PRN    PHENobarbital (LUMINAL) injection 65 mg  65 mg SubCUTAneous Q12H       11/7: (SFD) Admitted GIP with 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      1. Pain: Morphine 2mg IV/SQ Q20 minutes as needed. Fentanyl 12mcg patch in place.      2. Dyspnea: Morphine 2mg IV/SQ Q20 minutes as needed. Glycopyrrolate 0.2mg q4 prn secretions. Oxygen at 2 L/min prn.     3. Agitation: Lorazepam 1mg IV/IM q4 hours prn.     4. Seizures: Phenobarbital 65mg SQ Q12. Decadron 4mg Q8. Lorazepam as ordered prn sustained seizure activity and notify provider.      5. Family/Pt Support: Family at bedside during exam. Medications and plan of care discussed with nursing staff and family. Will continue to monitor for symptoms and adjust medications as needed to maintain patient comfort. PPS 10%. Case discussed with Dr. Idalia Patton. No change in plan of care.      Signed By: Jocelyn Nava NP     November 11, 2020

## 2020-11-12 NOTE — [facility] IDG SOCIAL WORKER NOTES
Patient: Marley Herbert    Date: 11/12/20  Time: 8:41 AM    [facility]  Notes  ABHIJEET Has completed the  initial assessment with the family. The family is very concerned about her spouse Cecilia Rey. He has some cognitive disability and his wife was his care giver. He has no support here locally. All of his family lives in [place]. JOSE has provided the family with multiple resources to obtain a geriatric case manager in the area. Suggested contacting his PCP for additional referrals if needed for home health/ hospice  The volunteer program has been suspended due to the Covid-19 virus. Continue on with care plan as written. Working towards all goals.          Signed by: Carrie Means

## 2020-11-12 NOTE — PROGRESS NOTES
Progress Note    Patient: Madison Overton MRN: [id number]  SSN: [id number]    YOB: 1950  Age: 69 y.o. Sex: female      Admit Date: 11/7/2020    LOS: 5 days     Subjective:     Minimally responsive. NPO.  Has required morphine x 3 for pain and ativan x 1 IV for agitation.     Review of Systems:  Review of systems not obtained due to patient factors. Objective:     Vitals:    11/10/20 1351 11/11/20 0441 11/11/20 1556 11/12/20 0606   BP: (!) 138/54 (!) 122/57 123/68 97/65   Pulse: 95 (!) 102 (!) 101 (!) 122   Resp: 16 20 18 20   Temp: (!) 93.3 °F (34.1 °C) (!) 95.7 °F (35.4 °C) 97.8 °F (36.6 °C) 97.1 °F (36.2 °C)        Intake and Output:  Current Shift: No intake/output data recorded. Last three shifts: 11/10 1901 - 11/12 0700  In: -   Out: 550 [Urine:550]    Physical Exam:  GENERAL: minimally responsive, mild distress, appears older than stated age, pale, cachectic  LUNG: Clear diminished breath sounds with unlabored respirations.   HEART: regular rate and rhythm  ABDOMEN: soft, non-tender. Bowel sounds hypoactive. : Reid catheter with orestes urine.   EXTREMITIES:  extremities with no cyanosis or edema. + pulses.   SKIN: Pale. Cool to touch. Stage 2 wound to sacrum. Peripheral IV in the right antecubital with dressing intact.   NEUROLOGIC: Minimally responsive. Right sided weakness. Bedbound.    PSYCHIATRIC: agitated with stimuli. Lab/Data Review:  No new labs resulted in the last 24 hours.     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 ([facility] [address] (11/9/2020)    Active Problems:    Sequela of cardioembolic stroke (34/3/0185)        Plan:     Current Facility-Administered Medications   Medication Dose Route Frequency    sodium chloride (NS) flush 3 mL  3 mL IntraVENous PRN    dexamethasone (DECADRON) 4 mg/mL injection 4 mg  4 mg IntraVENous Q8H    fentaNYL (DURAGESIC) 12 mcg/hr patch 1 Patch  1 Patch TransDERmal Q72H    sodium chloride (NS) flush 3 mL  3 mL IntraVENous Q12H    morphine injection 2 mg  2 mg SubCUTAneous Q20MIN PRN    Or    morphine injection 2 mg  2 mg IntraVENous Q20MIN PRN    LORazepam (ATIVAN) injection 1 mg  1 mg IntraVENous Q4H PRN    bisacodyL (DULCOLAX) suppository 10 mg  10 mg Rectal PRN    LORazepam (ATIVAN) injection 2 mg  2 mg IntraVENous Q10MIN PRN    PHENobarbital (LUMINAL) injection 65 mg  65 mg SubCUTAneous Q12H       11/7: (SFD) Admitted GIP with 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      1. Pain: Morphine 2mg IV/SQ Q20 minutes as needed. Fentanyl 12mcg patch in place.      2. Dyspnea: Morphine 2mg IV/SQ Q20 minutes as needed. Glycopyrrolate 0.2mg q4 prn secretions. Oxygen at 2 L/min prn.     3. Agitation: Lorazepam 1mg IV/IM q4 hours prn.     4. Seizures: Phenobarbital 65mg SQ Q12. Decadron 4mg Q8. Lorazepam as ordered prn sustained seizure activity and notify provider.      5. Family/Pt Support: Family at bedside during exam. Medications and plan of care discussed with nursing staff and family. Will continue to monitor for symptoms and adjust medications as needed to maintain patient comfort. PPS 10%. Case discussed with Dr. Castro and in [facility] meeting today. Increase fentanyl patch to 25mcg and increase morphine prn dose to 4mg.     Signed By: Santos Swanson NP     November 12, 2020

## 2020-11-12 NOTE — [facility] IDG NURSE NOTES
Patient: Jaime Rebollar    Date: 11/12/20  Time: 12:53 PM    [facility] Nurse Notes  F/U IDG: Pt is a 78-year-old female with metastasis to brain of unknown origin who is here GIP level of care for management of pain and agitation. Reid with UOP of 300cc. IV access via right forearm. PO intake: none. Wounds: stage II sacrum. PRN medications: Ativan 1 mg IV x1 for agitation, morphine 2 mg IV x 3 for pain. Scheduled meds:  phenobarb 65 mg SQ q 12 hours, Decadron 4 mg IV x 8 hours, Fentanyl 12 mcg patch. Plan: Increase Fentanyl to 25 mcg patch, increase PRN morphine to 4 mg IV/SQ q 20 min. Comprehensive plan of care reviewed. IDG and pt./family in agreement with plan of care. The IDG identifies through on-going assessment when a change is needed to the POC; the pt/family will receive care and services necessitated by changes in POC. Medications reviewed by the pharmacist and Medical Director.         Signed by: Will Goins RN

## 2020-11-12 NOTE — [facility] IDG CHAPLAIN NOTES
Patient: Aracelis Godienz    Date: 11/12/20  Time: 11:53 AM    hospitals  Notes    Intervention: Spiritual and Bereavement Assessments completed. Ministry of presence, family support, prayer, meaningful conversation with brother and . Outcome: Family shared their love for patient, Brother shared his concerns for his brother in law. Plan: Continue to provide spiritual and emotional support with focus on anticipatory grief.          Signed by: Bijan Amaya

## 2020-11-12 NOTE — PROGRESS NOTES
Report received. Pt round. Pt identified by name. In bed with eyes closed. No s/s of pain, agitation or dyspnea. Fentanyl patch present on left arm. Bed low and SR up with tab alerts on.     0749 Morphine 2 mg IV for premedication prior to AM care    0930 RR regular and even. FLACC 0. No s/s of pain, agitation or dyspnea at this time. 1351 Ativan 1 mg IV and morphine 4 mg IV for pain and agitation. Family at bedside. 1516 RR regular and even. FLACC 0. No s/s of pain, agitation or dyspnea at this time. Family leaving at this time. 1630 Pt resting with eyes closed with no s/s of distress. 1800 RR regular and even. FLACC 0. No s/s of pain, agitation or dyspnea at this time.      Report given to oncoming RN

## 2020-11-12 NOTE — [facility] IDG MASTER NOTE
[facility]  Date: 11/12/20  Time: 12:54 PM    ___________________    Patient: Edd Cabral  Coverage Information:     Pay[place]     Plan: [place] MEDICARE PART A AND B     Subscriber ID: [id number]     Phone Number:   MRN: [id number]    Current Benefit Period: Benefit Period 1  Start Date: 11/7/2020  End Date: 2/4/2021        Hospice Attending Provider: Kedar Fernandez [address] [place] [address]  [id number]  Phone: [phone]  Fax: [phone]    Level of Care: General Inpatient Care      ___________________    Diagnoses: There were no encounter diagnoses. Current Medications:    Current Facility-Administered Medications:     fentaNYL (DURAGESIC) 25 mcg/hr patch 1 Patch, 1 Patch, TransDERmal, Q72H, Jamilah Javier NP    morphine injection 4 mg, 4 mg, IntraVENous, Q20MIN PRN **OR** morphine injection 4 mg, 4 mg, SubCUTAneous, Q20MIN PRN, Migdalia Amezcua NP    sodium chloride (NS) flush 3 mL, 3 mL, IntraVENous, PRN, Migdalia Amezcua NP, 3 mL at 11/12/20 0751    dexamethasone (DECADRON) 4 mg/mL injection 4 mg, 4 mg, IntraVENous, Q8H, Jamilah Javier NP, 4 mg at 11/12/20 0558    sodium chloride (NS) flush 3 mL, 3 mL, IntraVENous, Q12H, Elray Jainism T, NP, 3 mL at 11/12/20 0814    LORazepam (ATIVAN) injection 1 mg, 1 mg, IntraVENous, Q4H PRN, Elray Jainism T, NP, 1 mg at 11/11/20 1812    bisacodyL (DULCOLAX) suppository 10 mg, 10 mg, Rectal, PRN, Elray Jainism T, NP, 10 mg at 11/10/20 0557    LORazepam (ATIVAN) injection 2 mg, 2 mg, IntraVENous, Q10MIN PRN, Elray Jainism T, NP    PHENobarbital (LUMINAL) injection 65 mg, 65 mg, SubCUTAneous, Q12H, Elranegra Jainism T, NP, 65 mg at 11/12/20 1737    Orders:  Orders Placed This Encounter    IP CONSULT TO SPIRITUAL CARE     Standing Status:   Standing     Number of Occurrences:   1     Order Specific Question:   Reason for Consult: Answer: Once on week one, then PRN. For [facility] Only. For contracted patients, their primary hospice will continue to manage spiritual care needs.  DIET PLEASURE     Standing Status:   Standing     Number of Occurrences:   1     Order Specific Question:   Likes/Dislikes/Preferences     Answer:   hold tray    VITAL SIGNS     Standing Status:   Standing     Number of Occurrences:   1    AVINA CATHETER, CARE     1. Avina Catheter care every shift and PRN  2. Notify Physician of Avina Catheter leakage, occlusion, gross adherent sediment or accidental removal  3. Change Avina 30 days after insertion. Standing Status:   Standing     Number of Occurrences:   [id number]    NURSING ASSESSMENT:  SPECIFY Assess for GIP, routine, or respite level of care. Q SHIFT Routine     Standing Status:   Standing     Number of Occurrences:   1     Order Specific Question:   Please describe the test or procedure you would like to order. Answer:   Assess for GIP, routine, or respite level of care.  BEDREST, COMPLETE     Standing Status:   Standing     Number of Occurrences:   1    WOUND CARE, DRESSING CHANGE     Wound Care:  Location: sacrum  Decubitus Wound Stage II (Cavalon)- Cleanse wound location with wound cleanser, pat dry and apply Cavilon Durable Barrier Cream every 12 hours and PRN if soiled. Turn every 2 hours. Assess with each nursing assessment visit. 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 ..     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    Other Hospice diagnoses:     Carcinoma metastatic to lung, right ([address] (C78.01)     Metastatic carcinoma to lung, left (HCC) (C78.02)     Bilateral pleural effusion (J90)     New onset seizure ([address] (R56.9)     Hemiparesis affecting right side as late effect of stroke (HCC) (M94.980)     Aphasia as late effect of stroke (I69.320)     Altered sensorium (R40.4)     Intracranial pressure increased (G93.2)     Respiratory failure with hypoxia, unspecified chronicity (HCC) (J96.91)     Chronic systolic CHF (congestive heart failure) (HCC) (I50.22)     History of Hodgkin's disease (Z85.71)     Benefit Period 1  Start Date: 11/7/2020  End Date: 7/0/6548     I certifnegra Lewis has a prognosis for a life expectancy of 6 months or less if the terminal illness runs its normal course.     As evidenced by a 44-year-old woman with brain metastases, pulmonary metastases and pleural effusion from an undetermined anatomic origin and cell type.  Associated diagnoses include: New onset seizure disorder, right hemiparesis, altered sensorium, increased intracranial blood pressure, calvarial metastases, hypoxic respiratory failure.  The bilateral pleural effusions have been twice evacuated with a total volume of 3400 mL retrieved and 4 punctures.  These continue to reaccumulate rapidly.  Chronic LV systolic dysfunction (LVEF 35%) congestive heart failure, Hodgkin's disease treated to clinical remission, and thoracic hamartoma are diagnoses unrelated to metastatic cancer which adversely affect prognosis.  Acute left frontoparietal ischemic stroke with significant penumbra is related to her cancer in that it probably occurred secondary to hypercoagulability associated with very advanced malignant disease.  She has subsequent right hemiparesis and expressive aphasia with confusion.  Brain metastases include several left frontal lobe lesions, 2 pontine lesions and 2 right occipital lesions.  Pulmonary metastases are widely distributed in both lungs and have resulted in hypoxic respiratory failure.  The patient's spouse is significantly demented and her brother is serving as substituted decisional source. Ginny Pillai has elected on her behalf to avoid invasive procedures for determining cell type as he will also refuse any disease modifying therapy with chemo or radiation. Elen Leung the circumstances this woman's life expectancy is less than 4 weeks. James Ball will be cared for at home with her  by her brother and his family with assistance from a age. Terrace Habermann carotid atherosclerosis, occlusion proximal right common carotid artery, peripheral vascular disease or other diagnoses unrelated to cancer adversely affecting prognosis. Standing Status:   Standing     Number of Occurrences:   1     Order Specific Question:   Description of Order:     Answer:   admit    DO NOT RESUSCITATE     Standing Status:   Standing     Number of Occurrences:   1     Order Specific Question:   Comfort Measures Only? Answer: Yes    OXYGEN CANNULA Liters per minute: 2; Indications for O2 therapy: RESPIRATORY DISTRESS PRN Routine     Standing Status:   Standing     Number of Occurrences:   25714     Order Specific Question:   Liters per minute:      Answer:   2     Order Specific Question:   Indications for O2 therapy     Answer:   RESPIRATORY DISTRESS    sodium chloride (NS) flush 3 mL    DISCONTD: morphine injection 2 mg    DISCONTD: morphine injection 2 mg    LORazepam (ATIVAN) injection 1 mg    bisacodyL (DULCOLAX) suppository 10 mg    LORazepam (ATIVAN) injection 2 mg    PHENobarbital (LUMINAL) injection 65 mg    DISCONTD: dexAMETHasone (DECADRON) tablet 4 mg    DISCONTD: fentaNYL (DURAGESIC) 12 mcg/hr patch 1 Patch    dexamethasone (DECADRON) 4 mg/mL injection 4 mg    sodium chloride (NS) flush 3 mL    fentaNYL (DURAGESIC) 25 mcg/hr patch 1 Patch    OR Linked Order Group     morphine injection 4 mg     morphine injection 4 mg    INITIAL PHYSICIAN ORDER: HOSPICE Level Of Care: General Inpatient; Reason for Admission: GIP management of pain and seizures in light of dysphagia secondary to stroke     Standing Status:   Standing Number of Occurrences:   1     Order Specific Question:   Status     Answer:   Hospice     Order Specific Question:   Level Of Care     Answer:   General Inpatient     Order Specific Question:   Reason for Admission     Answer:   GIP management of pain and seizures in light of dysphagia secondary to stroke     Order Specific Question:   Inpatient Hospitalization Certified Necessary for the Following Reasons     Answer:   3. Patient receiving treatment that can only be provided in an inpatient setting (further clarification in H&P documentation)     Order Specific Question:   Admitting Diagnosis     Answer:   Sequela of cardioembolic stroke [[id number]]     Order Specific Question:   Terminal Prognosis Diagnosis(es)     Answer:   Metastasis to brain [facility]) [[id number]]     Order Specific Question:   Terminal Prognosis Diagnosis(es)     Answer:   Metastasis to lung [facility]) [[id number]]     Order Specific Question:   Terminal Prognosis Diagnosis(es)     Answer:   Lymphoma [facility]) [[id number]]     Order Specific Question:   Terminal Prognosis Diagnosis(es)     Answer:   Seizures ([facility] [[id number]]     Order Specific Question:   Terminal Prognosis Diagnosis(es)     Answer:   Right carotid artery occlusion [[id number]]     Order Specific Question:   Terminal Prognosis Diagnosis(es)     Answer:   Metastasis to spinal column [facility]) [[id number]]     Order Specific Question:   Admitting Physician     Answer:   Allyn Anderson     Order Specific Question:   Attending Physician     Answer:   Allyn Anderson     Order Specific Question:   Discharge Plan:     Answer:   Home with Home Hospice     Comments:    is frail    IP CONSULT TO SOCIAL WORK     Standing Status:   Standing     Number of Occurrences:   1     Order Specific Question:   Reason for Consult: Answer: For [facility] Only. For contracted patients, their primary hospice will continue to manage social work needs. Allergies:   Allergies   Allergen Reactions    Compazine [Prochlorperazine Edisylate] Swelling       Care Plan:  Multidisciplinary Problems (Active)     Problem: Anticipatory Grief     Dates: Start: 11/10/20       Disciplines: Interdisciplinary    Goal: Grief heard and acknowledged, anxiety reduced, patient coping identified, patient/family expressed gratitude     Dates: Start: 11/10/20   Expected End: 11/20/20       Description: Patient/ family will acknowledge feelings of anxiety and grief and utilize available support to assist in their grief journey. Disciplines: Interdisciplinary    Intervention: Assess grief responses     Dates: Start: 11/10/20       Description: Sanchez Clifford will assess grief responses with each visit. Intervention: Support grieving process     Dates: Start: 11/10/20       Description: Sanchez Clifford will support the grief process by providing opportunities for expression of feelings while attempting  to normalize the journey. Problem: End of Life Process     Dates: Start: 11/07/20       Description: Pt  and brother will be able to describe the changes indicate near death  Give  and brother a blue book   Answer questions     Disciplines: Interdisciplinary    Goal: Demonstrate understanding of end of life processes     Dates: Start: 11/07/20   Expected End: 11/21/20       Description: Meseret Jay will understand end of life processes.     Disciplines: Interdisciplinary    Intervention: Assess for signs/symptoms of terminal restlessness     Dates: Start: 11/07/20             Intervention: Ann-Marie Feng on strategies to reduce terminal restlessness     Dates: Start: 11/07/20             Intervention: Instruct on the dying process     Dates: Start: 11/07/20             Intervention: Instruct: imminent death     Dates: Start: 11/07/20             Intervention: Instruct: process at end of life     Dates: Start: 11/07/20                         Problem: Falls - Risk of     Dates: Start: 11/09/20       Disciplines: Interdisciplinary    Goal: *Absence of Falls     Dates: Start: 11/09/20   Expected End: 11/13/20       Description: Document Gita Varela Fall Risk and appropriate interventions in the flowsheet. Disciplines: Interdisciplinary                Problem: Pain     Dates: Start: 11/07/20       Disciplines: Interdisciplinary    Goal: Verbalize satisfaction of level of comfort and symptom control     Dates: Start: 11/07/20   Expected End: 11/21/20       Description: Pt pain would be less then 4/10  Morphine 2 mg IV/SQ q 20 minutes prn     Disciplines: Interdisciplinary    Intervention: Assess effectiveness of pain management     Dates: Start: 11/07/20       Description: Pt will be relaxed not moaning or groaning or attempting to get out of bed  Ativan 1 mg IV/IM q 4 hours prn           Intervention: Assess for signs/symptoms of acute pain     Dates: Start: 11/07/20             Intervention: Assess for signs/symptoms of chronic pain     Dates: Start: 11/07/20             Intervention: Blanca Bello on non-pharmacological pain management     Dates: Start: 11/07/20             Intervention: Blanca Bello on pain scales     Dates: Start: 11/07/20             Intervention: Instruct on pharmacological pain management     Dates: Start: 11/07/20                         Problem: Pressure Injury - Risk of     Dates: Start: 11/07/20       Disciplines: Interdisciplinary    Goal: *Prevention of pressure injury     Dates: Start: 11/07/20   Expected End: 11/21/20       Description: Document Bhupendra Scale and appropriate interventions in the flowsheet.     Disciplines: Interdisciplinary                  Care Plan Problems/Goals      Progressing Towards Goal (4)      *Prevention of pressure injury (Pressure Injury - Risk of)    Disciplines:  Interdisciplinary Expected end:  11/21/20        Outcome: Progressing Towards Goal By Regina Ji RN on 11/12/20 9843            Verbalize satisfaction of level of comfort and symptom control (Pain)    Disciplines:  Interdisciplinary Expected end:  11/21/20        Outcome: Progressing Towards Goal By Eric Arellano RN on 11/12/20 9556            *Absence of Falls (Falls - Risk of)    Disciplines:  Interdisciplinary Expected end:  11/13/20        Outcome: Progressing Towards Goal By Eric Arellano RN on 11/12/20 9281            Grief heard and acknowledged, anxiety reduced, patient coping identified, patient/family expressed gratitude (Anticipatory Grief)    Disciplines:  Interdisciplinary Expected end:  11/20/20        Outcome: Progressing Towards Goal By Jennie Marti on 11/10/20 1554                         No Outcome (1)      Demonstrate understanding of end of life processes (End of Life Process)    Disciplines:  Interdisciplinary Expected end:  11/21/20                       Resolved/Met (4)      Patient/Family Education (Patient Education: Go to Patient Education Activity)    Disciplines:  Interdisciplinary Expected end:  11/21/20        Outcome: Resolved/Met By Tyronne Goodpasture, RN on 11/11/20 1702            Demonstrate understanding of hospice philosophy, plan of care, and home hospice program (Hospice Orientation)    Disciplines:  Interdisciplinary Expected end:  11/13/20        Outcome: Resolved/Met By Domenico Smart RN on 11/09/20 1657            Verbalize and demonstrate ability to manage anxiety (Anxiety/Agitation)    Disciplines:  Interdisciplinary Expected end:  11/21/20        Outcome: Resolved/Met By Tyronne Goodpasture, RN on 11/11/20 1702            Patient/Family Education (Patient Education: Go to Patient Education Activity)    Disciplines:  Interdisciplinary Expected end:  -        Outcome: Resolved/Met By Tyronne Goodpasture, RN on 11/11/20 1702                              ___________________    Care Team Notes          POC/IDG Notes      [facility] IDG Nurse Notes by Tyronne Goodpasture, RN at 11/12/20 1253  Version 1 of 1    Author:  Tyronne Goodpasture, RN Service:  Hospice and Palliative Care Author Type:  Registered Nurse    Filed:  11/12/20 1254 Date of Service:  11/12/20 1253 Status:  Signed    :  Tasha Powers RN (Registered Nurse)       Patient: Jarek Lewis    Date: 11/12/20  Time: 12:53 PM    [facility] Nurse Notes  F/U IDG: Pt is a 42-year-old female with metastasis to brain of unknown origin who is here GIP level of care for management of pain and agitation. Reid with UOP of 300cc. IV access via right forearm. PO intake: none. Wounds: stage II sacrum. PRN medications: Ativan 1 mg IV x1 for agitation, morphine 2 mg IV x 3 for pain. Scheduled meds:  phenobarb 65 mg SQ q 12 hours, Decadron 4 mg IV x 8 hours, Fentanyl 12 mcg patch. Plan: Increase Fentanyl to 25 mcg patch, increase PRN morphine to 4 mg IV/SQ q 20 min. Comprehensive plan of care reviewed. IDG and pt./family in agreement with plan of care. The IDG identifies through on-going assessment when a change is needed to the POC; the pt/family will receive care and services necessitated by changes in POC. Medications reviewed by the pharmacist and Medical Director. Signed by: Eulalia Sadler RN       [address] [facility]  Notes by Katie Finney at 11/12/20 1153  Version 1 of 1    Author:  Katie Finney Service:  Spiritual Care Author Type:  Pastoral Care    Filed:  11/12/20 1200 Date of Service:  11/12/20 1153 Status:  Signed    :  Katie Finney (Pastoral Care)       Patient: Jarek Lewis    Date: 11/12/20  Time: 11:53 AM    [facility]  Notes    Intervention: Spiritual and Bereavement Assessments completed. Ministry of presence, family support, prayer, meaningful conversation with brother and . Outcome: Family shared their love for patient, Brother shared his concerns for his brother in law. Plan: Continue to provide spiritual and emotional support with focus on anticipatory grief.          Signed by: Buffalo Jewels       [address] [facility]  Notes by Noam Leslie at 11/12/20 8588  Version 1 of 1    Author:  Hodan Aldana Service:  Licensed Clinical  Author Type:      Filed:  11/12/20 1159 Date of Service:  11/12/20 0841 Status:  Signed    :  Hodan Aldana ()       Patient: Jose D Boles    Date: 11/12/20  Time: 8:41 AM    [facility]  Notes  LMSW Has completed the  initial assessment with the family. The family is very concerned about her spouse Pipo Armijo. He has some cognitive disability and his wife was his care giver. He has no support here locally. All of his family lives in [place]. [facility] has provided the family with multiple resources to obtain a geriatric case manager in the area. Suggested contacting his PCP for additional referrals if needed for home health/ hospice  The volunteer program has been suspended due to the Covid-19 virus. Continue on with care plan as written. Working towards all goals. Signed by: Hernán Urbano       [facility] IDG Nurse Notes by Nagi Decker RN at 11/09/20 1316  Version 1 of 1    Author:  Nagi Decker RN Service:  Hospice and Palliative Care Author Type:  Registered Nurse    Filed:  11/09/20 1317 Date of Service:  11/09/20 1316 Status:  Signed    :  Nagi Decker RN (Registered Nurse)       Patient: Jose D Boles    Date: 11/09/20  Time: 1:16 PM    [facility] Nurse Notes  1st IDG: Pt is a 65-year-old female with sequalae of stroke who is here GIP level of care for management of pain, agitation. BP 91/51. Reid with UOP of 540cc. IV access in right AC. 2l/min of oxygen. PO intake: bites and sips. Wounds: stage II on sacrum. PRN medications:  Morphine 2 mg IV x 3 for pain. Scheduled meds: Decadron 4 mg PO q 8h, Phenobarb 65 mg SQ q 12h. Plan: Add Fentanyl 12 mcg patch. Comprehensive plan of care reviewed. IDG and pt./family in agreement with plan of care.  The IDG identifies through on-going assessment when a change is needed to the POC; the pt/family will receive care and services necessitated by changes in POC. Medications reviewed by the pharmacist and Medical Director. Signed by: Dominique Anderson RN       [facility]  Notes by Valerio Brnenan at 11/09/20 1204  Version 1 of 1    Author:  Valerio Brennan Service:  Spiritual Care Author Type:  Pastoral Care    Filed:  11/09/20 1205 Date of Service:  11/09/20 1204 Status:  Signed    :  Valerio Brennan (Pastoral Care)       Patient: Marley Herbert    Date: 11/09/20  Time: 12:04 PM    \[facility]\""  Notes    Assessments pending for spiritual and bereavement support. Signed by: Uzair Jimenez       [facility]  Notes by Nydia Preciado at 11/09/20 1109  Version 1 of 1    Author:  Nydia Preciado Service:  Licensed Clinical  Author Type:      Filed:  11/09/20 1204 Date of Service:  11/09/20 1109 Status:  Signed    :  Nydia Preciado ()       Patient: Marley Herbert    Date: 11/09/20  Time: 11:09 AM    \[facility]\""  Notes  LMSW will meet with the pt and her family to complete the SW initial assessment. After the assessment is done the care plan will be completed. The volunteer program has been suspended due to the Covid-19 virus. LMSW will ensure the pt and families receive their comfort bags. Signed by: Carrie Means       [facility]  Notes by Nydia Preciado at 11/09/20 1022  Version 1 of 1    Author:  Nydia Preciado Service:  Licensed Clinical  Author Type:      Filed:  11/09/20 1022 Date of Service:  11/09/20 1022 Status:  Signed    :  Nydia Preciado ()       Patient: Marley Herbert    Date: 11/09/20  Time: 10:22 AM    \[facility]\""  Notes  LMSW has read and agrees with the RN initial assessment. LMSW will meet with pt and family to complete the SW assessment. The volunteer program has been suspended due to the Covid-19 virus.   LMSW will ensure the pt and families receive their comfort bags. LMSW will meet with the patient and family to complete the SW initial assessment. The care plan will be created from there. Signed by: Manuel White       [facility]  Notes by Francisco Javier Aguilar at 11/09/20 0932  Version 1 of 1    Author:  Francisco Javier Aguilar Service:  Spiritual Care Author Type:  Pastoral Care    Filed:  11/09/20 0933 Date of Service:  11/09/20 0932 Status:  Signed    :  Francisco Javier Aguilar (Pastoral Care)       Patient: Alexander Primer    Date: 11/09/20  Time: 9:32 AM    [facility]  Notes    / Grief Counselor has reviewed  Initial Comprehensive Assessment and plan of care. Bereavement and Spiritual Care Assessments to be completed and plan of care put in place to meet the needs, requests and referrals. Signed by: Radha Burch       [facility] IDG Nurse Notes by Jolly Thomas RN at 11/07/20 1742  Version 1 of 1    Author:  Jolly Thomas RN Service:  NURSING Author Type:  Registered Nurse    Filed:  11/07/20 1742 Date of Service:  11/07/20 1742 Status:  Signed    :  Jolly Thomas RN (Registered Nurse)       Patient: Alexander Primer    Date: 11/07/20  Time: 5:42 PM    [facility] Nurse Notes  Ms Fernanda Don is a 69year old female admitted to room 111 General in patient for sequalae of stroke along with brain metastasis of unknown primary origin, pulmonary metastasis, pulmonary edema, pulmonary effusions and increased intercranial pressure which altogether gives her a poor prognosis. She's admitted for control of pain, agitation and seizures. Pt arrived an tolerated transfer to bed with minimal complaint of pain. Pt denies pain, nausea or shortness of breath. Physical assessment completed. Lung sounds clear but diminished in bases, heart sounds irregular, pt can answer name and date of birth but not date or current events. Bowel sounds hypoactive, pt has bustamante draining clear yellow urine.  Pt extremities cool to cold. Upper extremities pale with cyanotic nail beds, cool with palpable radial pulse. Lower extremities cold, cyanotic, non palpable pedal pulses. Pt has iv that flushed without difficulty. Pt has , Sharmaine Christiansen, and brother, Wesley Villagran that visited. Sharmaine Christiansen appears confused asking what we are going to do next, wondered aimlessly from room asking each time what we were going to do. Explained the change of emphasis from treatment to comfort measures. Explained if pt has pain or discomfort we will medicate for that. Pt brother, Wesley Villagran, appeared to understand and agreed with the emphasis on comfort. Pt states she has no children so her brother and  are her supporters. Pt asking for a drink of apple juice, gave her apple juice, water and strawberry ice cream and a couple of icee pops.    1300 pt ate half the the strawberry ice cream, one icee pop and apple juice and half the water.      1500 pt used call light, pt states she's uncomfortable. Repositioned. Pt voice garbled and confused.           Signed by: Jeff Newby RN                Care Team Present:   Team Members Present: Physician, Nurse, , Bonnie Cagle  Physician Team Member: Dr. Mahi Sarmiento  Nurse Team Member: Merline Lombard, Rn  Social Work Team Member: Deloris Garay, [facility] Team Member: SONIA Romero  Div

## 2020-11-12 NOTE — PROGRESS NOTES
Problem: Pressure Injury - Risk of  Goal: *Prevention of pressure injury  Description: Document Bhupendra Scale and appropriate interventions in the flowsheet. Outcome: Progressing Towards Goal  Note: Pressure Injury Interventions:  Sensory Interventions: Assess changes in LOC, Float heels, Minimize linen layers, Check visual cues for pain, Keep linens dry and wrinkle-free    Moisture Interventions: Internal/External urinary devices, Absorbent underpads, Minimize layers, Limit adult briefs, Apply protective barrier, creams and emollients, Moisture barrier    Activity Interventions: Pressure redistribution bed/mattress(bed type)    Mobility Interventions: Pressure redistribution bed/mattress (bed type), Float heels    Nutrition Interventions: Document food/fluid/supplement intake    Friction and Shear Interventions: Apply protective barrier, creams and emollients, Minimize layers, Lift sheet                Problem: Pain  Goal: Verbalize satisfaction of level of comfort and symptom control  Description: Pt pain would be less then 4/10  Morphine 2 mg IV/SQ q 20 minutes prn   Outcome: Progressing Towards Goal     Problem: Falls - Risk of  Goal: *Absence of Falls  Description: Document Richi Fall Risk and appropriate interventions in the flowsheet.   Outcome: Progressing Towards Goal  Note: Fall Risk Interventions:  Mobility Interventions: Bed/chair exit alarm    Mentation Interventions: Bed/chair exit alarm, Door open when patient unattended, Evaluate medications/consider consulting pharmacy, Reorient patient    Medication Interventions: Bed/chair exit alarm, Evaluate medications/consider consulting pharmacy    Elimination Interventions: Bed/chair exit alarm, Call light in reach

## 2020-11-12 NOTE — PROGRESS NOTES
[facility] report from Maria Del Rosario Delgado RN. Pt Id by name and  during walking rounds.   Pt with eyes closed. Lethargic. No s/sx of distress. No facial grimace. Flacc =0-1. Resp non labored on RA. Lungs coarse. HR irreg. BS active. No edema noted. Reid cath draining cloudy orestes urine. Fentanyl 12 mcg/hr patch intact on left arm and dated 20. SR up x 2. Bed low/locked. Call light with in reach. Door opened.   Peripheral line flushed with 3 ml ns. Flushed well. Dressing clean/dry/intact. Scheduled phenobarbital 65 mg sq given. Scheduled dexamethasone 4 mg IVP given. Pt tolerated well. 2330  Pt with eyes closed. No facial grimace. No moans. Flacc =0-1. Resp non labored on RA. SR up x 2. Bed low/locked. Call light with in reach. Door opened. 0122  Pt lying in bed. Calm. No agitation. No s/sx of distress. Flacc =0-1. Resp non labored on RA. SR up x 2. Bed low/locked. Call light with in reach. Door opened. 0322  Pt lying in bed with eyes closed. No moans. No facial grimace. Flacc =0-1. Resp non labored on RA. SR up x 2. Bed low/locked. Call light with in reach. Door opened. 0553  Pt lying in bed. Calm. No agitation. No s/sx of distress. Flacc =0-1. Resp non labored on RA. Scheduled dexamethasone 4 mg IVP given. SR up x 2. Bed low/locked. Call light with in reach. Door opened. Report given to Maria Del Rosario Delgado RN.

## 2020-11-13 NOTE — PROGRESS NOTES
ABHIJEET was called to the room by the RN. Family had misplaced the resource book I had given them. The pt's brother wanted me to make a copy of the page if geriatric . JOSESW made a copy of the book page for him.

## 2020-11-13 NOTE — PROGRESS NOTES
9501 - Report received from [address] Patient identified. Patient GIP level of care with hospice diagnosis of sew of stroke; brain mets of unknown origin. Patient in bed, eyes closed. No s/sx anxiety, agitation, NVD or respiratory distress. FLACC 0/10. Fent 25mcg to R chest verified. Bed in lowest, locked position, call light within reach, tab alert on, and SR up for safety. 0900 - Assessment complete (see flowsheets). Patient appears restless. Pulling at gown and blanket. PRN ativan 1mg IV given. No s/sx NVD, respiratory distress or pain noted. 9275 - Re-assessment:  Patient now resting quietly. No restlessness noted. 1058 - Patient in bed, eyes closed. No s/sx anxiety, agitation, NVD or respiratory distress. FLACC 0/10. Family at bedside. 1330 - Patien in bed, eyes closed. Patient pulling at gown and blanket and slightly kicking legs. PRN ativan 1mg IV given. No s/sx NVD, or respiratory distress. FLACC 0/10. Family at bedside. Scheduled dexamethason given per orders. 1350 - Re-assessment:  Patient resting quietly and no longer pulling at gown and blanket. 1505 - Patient in bed, eyes closed. No s/sx anxiety, agitation, NVD or respiratory distress. FLACC 0/10. Family at bedside. 1718 - Patient's bustamante found to be leaking and pink tinged. Bustamante flushed and sediment cleared. Patient repositioned. In bed, eyes closed. No s/sx anxiety, agitation, NVD or respiratory distress. FLACC 0/10.     Report given to Patti oYung RN

## 2020-11-13 NOTE — PROGRESS NOTES
9325 [facility] report from Maria Del Rosario Delgado RN. Pt Id by name and  during walking rounds. 1935 Pt lying quietly in bed. Eyes closed. No facial grimace. Flacc =0-1. Non interactive. Resp non labored on RA. Lungs diminished. HR irreg. BS hypo. Bustamante cath draining cloudy orestes urine. Fentanyl 25 mcg/hr patch intact on left arm and dated 20. SR up x 2. Bed low/locked. Call light with in reach. Door opened.   Scheduled Phenobarbital 65 mg sq given. Scheduled dexamethasone 4 mg IVP given. Peripheral line flushed with 3 ml ns. Flushed well. Dressing clean/dry/intact. SR up x 2. Bed low/locked. Call light with in reach. Door opened. 2343  Pt lying in bed. Resting comfortably. No facial grimace. Flacc =0-1. Resp non labored on RA. SR up x 2. Bed low/locked. Call light with in reach. Door opened. 0148  Pt with eyes closed. No facial grimace. Flacc =0-1. Resp non labored on RA. SR up x 2. Bed low/locked. Call light with in reach. Door opened. 0335  Pt with bustamante leaking. Bustamante tube kinked at bag. Pt repositioned. Incontinent care completed. No facial grimace. Flacc =0-1. Resp non labored on RA. SR up x 2. Bed low/locked. Call light with in reach. Door opened. 0530   Pt lying in bed. Resting comfortably. No facial grimace. Flacc =0-1. Resp non labored on RA. Scheduled dexamethasone 4 mg IVP given. SR up x 2. Bed low/locked. Call light with in reach. Door opened. Report given to Le López RN.

## 2020-11-13 NOTE — PROGRESS NOTES
Problem: Pressure Injury - Risk of  Goal: *Prevention of pressure injury  Description: Document Bhupendra Scale and appropriate interventions in the flowsheet. Outcome: Progressing Towards Goal  Note: Pressure Injury Interventions:  Sensory Interventions: Assess changes in LOC, Float heels, Minimize linen layers, Pressure redistribution bed/mattress (bed type), Keep linens dry and wrinkle-free, Check visual cues for pain    Moisture Interventions: Absorbent underpads, Internal/External urinary devices, Minimize layers, Moisture barrier, Limit adult briefs, Apply protective barrier, creams and emollients    Activity Interventions: Pressure redistribution bed/mattress(bed type)    Mobility Interventions: Pressure redistribution bed/mattress (bed type), Float heels    Nutrition Interventions: Document food/fluid/supplement intake    Friction and Shear Interventions: Apply protective barrier, creams and emollients, Minimize layers, Lift sheet                Problem: Pain  Goal: Verbalize satisfaction of level of comfort and symptom control  Description: Pt pain would be less then 4/10  Morphine 2 mg IV/SQ q 20 minutes prn   Outcome: Progressing Towards Goal     Problem: Falls - Risk of  Goal: *Absence of Falls  Description: Document Richi Fall Risk and appropriate interventions in the flowsheet.   Outcome: Progressing Towards Goal  Note: Fall Risk Interventions:  Mobility Interventions: Bed/chair exit alarm    Mentation Interventions: Bed/chair exit alarm, Door open when patient unattended, Evaluate medications/consider consulting pharmacy, Reorient patient    Medication Interventions: Evaluate medications/consider consulting pharmacy, Bed/chair exit alarm    Elimination Interventions: Bed/chair exit alarm, Call light in reach

## 2020-11-14 NOTE — PROGRESS NOTES
Bedside Report taken from [facility]. Pt identified. Pt in bed with eyes closed; displaying no signs or symptoms of pain, dyspnea, agitation,seizures, nausea, or vomiting. FLACC 0. Bed locked and low, side rails up, tabs/bed alarm in place for pt. safety. Call light with in reach, and door opened for continued monitoring. Fentanyl patch visualized with Previous shift nurse patch intact. Care plan reviewed with Cna.    0472  Pt admitted gip for metastasis to brain of unknown origin  With symptom management of pain and agitation. Reid with incontinence . IV access via right forearm. PO intake: none. Wounds: stage II sacrum. PRN medications: Ativan 1 mg I for agitation, morphine 2 mg IV for pain. Scheduled meds:  phenobarb 65 mg SQ q 12 hours, Decadron 4 mg IV x 8 hours, Fentanyl 25 mcg patch. iv flushed and scheduled medications given sq.    1020 Pt observed and appears to be sleeping/resting, no facial grimacing, no moaning, easy relaxed respirations. No symptoms to manage at this time. Flacc 0/10.    1113 updated . Pt observed and appears to be sleeping/resting, no facial grimacing, no moaning, easy relaxed respirations. No symptoms to manage at this time. Flacc 0/10.  arrived just as I left the room    1300 Pt observed and appears to be sleeping/resting, no facial grimacing, no moaning, easy relaxed respirations. No symptoms to manage at this time. Flacc 0/10. Updated  as her brother sat outside    0 Pt observed and appears to be sleeping/resting, no facial grimacing, no moaning, easy relaxed respirations. No symptoms to manage at this time. Flacc 0/10.  asked about vital signs, he seemed confused. 1625 Pt observed and appears to be sleeping/resting, no facial grimacing, no moaning, easy relaxed respirations. No symptoms to manage at this time. Flacc 0/10   leaving for today.     1744 Pt observed and appears to be sleeping/resting, no facial grimacing, no moaning, easy relaxed respirations. No symptoms to manage at this time.  Flacc 0/10  Report to be given to colton smith rn

## 2020-11-14 NOTE — PROGRESS NOTES
Problem: Pressure Injury - Risk of  Goal: *Prevention of pressure injury  Description: Document Bhupendra Scale and appropriate interventions in the flowsheet. Outcome: Progressing Towards Goal  Note: Pressure Injury Interventions:  Sensory Interventions: Assess changes in LOC, Float heels, Minimize linen layers, Pressure redistribution bed/mattress (bed type), Keep linens dry and wrinkle-free, Check visual cues for pain    Moisture Interventions: Absorbent underpads, Internal/External urinary devices, Minimize layers, Moisture barrier, Limit adult briefs, Apply protective barrier, creams and emollients    Activity Interventions: Pressure redistribution bed/mattress(bed type)    Mobility Interventions: Pressure redistribution bed/mattress (bed type), Float heels    Nutrition Interventions: Document food/fluid/supplement intake    Friction and Shear Interventions: Apply protective barrier, creams and emollients, Minimize layers, Lift sheet                Problem: Pain  Goal: Verbalize satisfaction of level of comfort and symptom control  Description: Pt pain would be less then 4/10  Morphine 2 mg IV/SQ q 20 minutes prn   Outcome: Progressing Towards Goal     Problem: End of Life Process  Goal: Demonstrate understanding of end of life processes  Description: Patient/caregiver will understand end of life processes.   Outcome: Progressing Towards Goal

## 2020-11-14 NOTE — PROGRESS NOTES
Progress Note    Patient: Madison Overton MRN: [id number]  SSN: [id number]    YOB: 1950  Age: 69 y.o. Sex: female      Admit Date: 11/7/2020    LOS: 7 days     Clinical Summary:     Seen at appears to be comfortable and in no distress pleasant time. Several doses of parenteral medications required over the past 24 hours. No seizures have been observed. 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 No change in her current treatment plan and death is likely the within the week.           Current Facility-Administered Medications   Medication Dose Route Frequency    fentaNYL (DURAGESIC) 25 mcg/hr patch 1 Patch  1 Patch TransDERmal Q72H    morphine injection 4 mg  4 mg IntraVENous Q20MIN PRN    Or    morphine injection 4 mg  4 mg SubCUTAneous Q20MIN PRN    sodium chloride (NS) flush 3 mL  3 mL IntraVENous PRN    dexamethasone (DECADRON) 4 mg/mL injection 4 mg  4 mg IntraVENous Q8H    sodium chloride (NS) flush 3 mL  3 mL IntraVENous Q12H    LORazepam (ATIVAN) injection 1 mg  1 mg IntraVENous Q4H PRN    bisacodyL (DULCOLAX) suppository 10 mg  10 mg Rectal PRN    LORazepam (ATIVAN) injection 2 mg  2 mg IntraVENous Q10MIN PRN    PHENobarbital (LUMINAL) injection 65 mg  65 mg SubCUTAneous Q12H           Signed By: Ford Oden MD     November 14, 2020

## 2020-11-14 NOTE — PROGRESS NOTES
Walking rounds completed. Pt identified by name/. Resting peacefully with eyes closed. Respirations non labored. Pt is GIP level of care with hospice dx of sequelae of stroke,brain mets of unknown origin for symptom management of pain,agitation and seizures. No signs/symptoms of pain,agitation or seizures. Bed in low and locked position with side rails up x 2 and call light in reach. Door left open for monitoring.  Pm assessment completed. Lethargic. Scheduled medications administered as ordered. Peripheral site flushed with ease,site WNL. Respirations non labored. FLACC 0/10. No signs/symptoms of pain,agitation or seizures. Bed in low and locked position with side rails up x 2 and call light in reach. Door left open for monitoring. 0037 Medicated with Ativan 1 mg IVP for restlessness and Morphine 4 mg IVP for pain 5/10 flacc. Emotional support provided. Repositioned for comfort. Safety maintained. 0130 Sleeping soundly with eyes closed. Respirations non labored. Facial features flat,relaxed. FLACC 0/10. No signs/symptoms of pain,agitation or seizures. Bed in low and locked position with side rails up x 2 and call light in reach. Door left open. 0355 Continues to rest peacefully with eyes closed. Respirations non labored. FLACC 0/10. No s/sx of pain,anxiety,nausea,distress or seizures. Safety maintained. 0507 Repositioned for comfort. Scheduled Dexamethasone 4 mg given IV. Extremities cold to touch with mottling continuing. FLACC 0/10. No signs/symptoms of pain,agitation or seizures. Bed in low and locked position with side rails up x 2 and call light in reach. Door left open.     Report given to [facility]

## 2020-11-14 NOTE — PROGRESS NOTES
Walking rounds completed. Pt identified by name/. Resting peacefully with eyes closed. Respirations non labored. Pt is GIP level of care with hospice dx of sequelae of stroke,brain mets of unknown origin for symptom management of pain,agitation and seizures. No signs/symptoms of pain,agitation or seizures. Bed in low and locked position with side rails up x 2 and call light in reach. Door left open for monitoring.  Pm assessment completed. Scheduled pm medications administered as ordered. Peripheral site flushed. Extremities remain cool to touch but less mottling is observed than from end of shift this am. FLACC 0/10. No signs/symptoms of pain,agitation or seizures. Bed in low and locked position with side rails up x 2 and call light in reach. Door left open for monitoring. 2350 Assisted cna with repositioning/bath at this time. Tolerated well. Swapna care provided and new brief placed as bustamante leaks at times. FLACC 0/10. No signs/symptoms of pain,agitation or seizures. Bed in low and locked position with side rails up x 2 and call light in reach. 0130 Continues to rest peacefully with eyes closed. Respirations non labored. FLACC 0/10. No s/sx of pain,anxiety,nausea,distress or seizures. Safety maintained. 0430 Vitals taken by cna at this time. Pt continues to rest peacefully with eyes closed. Respirations non labored. FLACC 0/10. No evidence of pain,anxiety,nausea,seizures or distress. Bed in low and locked position with side rails up x 2 and call light in reach.      Report given to [facility]

## 2020-11-15 NOTE — PROGRESS NOTES
8168 Report received from [facility], RN. Pt was admitted on 11/7 [facility] level of care with hospice dx of sequelae of stroke/brain mets of unknown primary origin and symptom management of pain, agitation and seizures. Pt is currently resting with her eyes closed, FLACC 0/10. Bed is low and locked, side rails up x2, tab alert in place, call bell in reach. Door remains open for continued monitoring. 0745 Shift assessment completed, pt moaning, raising arms, FLACC 4/10. Morphine 4mg IVP given. 0820 Pt resting with her eyes closed, FLACC 0/10. Safety measures in place. 1100 Family is at the bedside. Door remains closed. Pt is resting with FLACC 0/10.    1500 Pt resting with her eyes half open. No signs or symptoms of pain or distress, FLACC 0/10. Safety measures in place.  and brother are at the bedside. 1800 Pt moaning, moving head from side to side. Pt medicated with Ativan 1mg IVP and Morphine 2mg IVP for comfort measures. Safety measures in place. Family is no longer at the bedside. Door remains open for continued monitoring.      Report given to Rosie Jose.

## 2020-11-16 NOTE — PROGRESS NOTES
Follow up visit:  Ms. Bryan Moreira appears to be moving closer to her celestial departure. Her brother Rosaura Bhat is at bedside. Her  has been in the room but Rosaura Bhat said he had  Gone to the  Car to warm up. Khrai shared some  Of the things the family is working on to support Mr. Bryan Moreira after his wife's death. Family and friends are very concerned about him and want him to have the appropriate support. The family will return to their hometown for interment and service after her passing.

## 2020-11-16 NOTE — [facility] IDG SOCIAL WORKER NOTES
Patient: Nimco Manley    Date: 11/16/20  Time: 8:30 AM    [facility]  Notes  LMSW will continue to provide support to the pt and her family. Family has concerns over what will happen with the spouse who has cogitative concerns. Resources given to the family for the spouses specific needs. Family lost the resources I previously gave them. Second resources given. Continue with care plan as written. The volunteer program has been suspended due to the Covid-19 virus. LMSW will ensure the pt and families receive their comfort bags.     Signed by: Bhavya Harman

## 2020-11-16 NOTE — [facility] IDG CHAPLAIN NOTES
Patient: Madison Overton    Date: 11/16/20  Time: 10:35 AM    [facility]  Notes  Intervention: Ministry of presence, family support, prayer. Outcome: Conversation with family. Plan: Continued spiritual and emotional support. Grief support.        Signed by: Dulce Maria Logan

## 2020-11-16 NOTE — PROGRESS NOTES
Problem: Infection - Risk of, Urinary Catheter-Associated Urinary Tract Infection  Goal: *Absence of infection signs and symptoms  Outcome: Not Progressing Towards Goal  Note: Patient's Reid catheter has been leaking and tubing looks almost occluded with sediment. Attempted to flush but unable to obtain any urine return. Removed old Reid which was caked with sediment at insertion end, foul odor noted. Inserted new 12 [place] Reid catheter using sterile technique. Immediate return of pink colored urine with flecks of blood and sediment. Flushed new Reid with 60 mL sterile water. Patient tolerated well.      Problem: Pain  Goal: Verbalize satisfaction of level of comfort and symptom control  Description: Pt pain would be less then 4/10  Morphine 2 mg IV/SQ q 20 minutes prn   Outcome: Progressing Towards Goal

## 2020-11-16 NOTE — [facility] IDG MASTER NOTE
Hospice Interdisciplinary Group Collaborative  Date: 11/16/20  Time: 1:28 PM    ___________________    Patient: Carlos Taylor  Coverage Information:     Payor: [place]     Plan: [facility] MEDICARE PART A AND B     Subscriber ID: [id number]     Phone Number:   MRN: [id number]    Current Benefit Period: Benefit Period 1  Start Date: 11/7/2020  End Date: 2/4/2021        Hospice Attending Provider: Viviana Gonzalez, [address] [place] [address]  [id number]  Phone: [phone]  Fax: [phone]    Level of Care: General Inpatient Care      ___________________    Diagnoses: There were no encounter diagnoses. Current Medications:    Current Facility-Administered Medications:     fentaNYL (DURAGESIC) 25 mcg/hr patch 1 Patch, 1 Patch, TransDERmal, Q72H, Marv Bahama, NP, 1 Patch at 11/15/20 1236    morphine injection 4 mg, 4 mg, IntraVENous, Q20MIN PRN, 4 mg at 11/15/20 2005 **OR** morphine injection 4 mg, 4 mg, SubCUTAneous, Q20MIN PRN, Marv Bahama, NP    sodium chloride (NS) flush 3 mL, 3 mL, IntraVENous, PRN, Marv Bahama, NP, 3 mL at 11/16/20 0512    sodium chloride (NS) flush 3 mL, 3 mL, IntraVENous, Q12H, Isabel Alstrom T, NP, 3 mL at 11/16/20 0847    LORazepam (ATIVAN) injection 1 mg, 1 mg, IntraVENous, Q4H PRN, Isabel Alstrom T, NP, 1 mg at 11/15/20 1755    bisacodyL (DULCOLAX) suppository 10 mg, 10 mg, Rectal, PRN, Isabel Alstrom T, NP, 10 mg at 11/10/20 0557    LORazepam (ATIVAN) injection 2 mg, 2 mg, IntraVENous, Q10MIN PRN, Isabel Alstrom T, NP    PHENobarbital (LUMINAL) injection 65 mg, 65 mg, SubCUTAneous, Q12H, Isabel Alstrom T, NP, 65 mg at 11/16/20 4644    Orders:  Orders Placed This Encounter    IP CONSULT TO SPIRITUAL CARE     Standing Status:   Standing     Number of Occurrences:   1     Order Specific Question:   Reason for Consult: Answer: Once on week one, then PRN. For [facility] Only.  For contracted patients, their primary hospice will continue to manage spiritual care needs.  DIET PLEASURE     Standing Status:   Standing     Number of Occurrences:   1     Order Specific Question:   Likes/Dislikes/Preferences     Answer:   hold tray    VITAL SIGNS     Standing Status:   Standing     Number of Occurrences:   1    AVINA CATHETER, CARE     1. Avina Catheter care every shift and PRN  2. Notify Physician of Avina Catheter leakage, occlusion, gross adherent sediment or accidental removal  3. Change Avina 30 days after insertion. Standing Status:   Standing     Number of Occurrences:   14982    NURSING ASSESSMENT:  SPECIFY Assess for GIP, routine, or respite level of care. Q SHIFT Routine     Standing Status:   Standing     Number of Occurrences:   1     Order Specific Question:   Please describe the test or procedure you would like to order. Answer:   Assess for GIP, routine, or respite level of care.  BEDREST, COMPLETE     Standing Status:   Standing     Number of Occurrences:   1    WOUND CARE, DRESSING CHANGE     Wound Care:  Location: sacrum  Decubitus Wound Stage II (Cavalon)- Cleanse wound location with wound cleanser, pat dry and apply Cavilon Durable Barrier Cream every 12 hours and PRN if soiled. Turn every 2 hours. Assess with each nursing assessment visit. 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 ..     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    Other Hospice diagnoses:     Carcinoma metastatic to lung, right ([address] (C78.01)     Metastatic carcinoma to lung, left (HCC) (C78.02)     Bilateral pleural effusion (J90)     New onset seizure (HCC) (R56.9)     Hemiparesis affecting right side as late effect of stroke ([facility] (M86.094)     Aphasia as late effect of stroke (I69.320)     Altered sensorium (R40.4)     Intracranial pressure increased (G93.2)     Respiratory failure with hypoxia, unspecified chronicity (HCC) (J96.91)     Chronic systolic CHF (congestive heart failure) (HCC) (I50.22)     History of Hodgkin's disease (Z85.71)     Benefit Period 1  Start Date: 11/7/2020  End Date: 8/5/8714     I ghazala Dubois has a prognosis for a life expectancy of 6 months or less if the terminal illness runs its normal course.     As evidenced by a 43-year-old woman with brain metastases, pulmonary metastases and pleural effusion from an undetermined anatomic origin and cell type.  Associated diagnoses include: New onset seizure disorder, right hemiparesis, altered sensorium, increased intracranial blood pressure, calvarial metastases, hypoxic respiratory failure.  The bilateral pleural effusions have been twice evacuated with a total volume of 3400 mL retrieved and 4 punctures.  These continue to reaccumulate rapidly.  Chronic LV systolic dysfunction (LVEF 35%) congestive heart failure, Hodgkin's disease treated to clinical remission, and thoracic hamartoma are diagnoses unrelated to metastatic cancer which adversely affect prognosis.  Acute left frontoparietal ischemic stroke with significant penumbra is related to her cancer in that it probably occurred secondary to hypercoagulability associated with very advanced malignant disease.  She has subsequent right hemiparesis and expressive aphasia with confusion.  Brain metastases include several left frontal lobe lesions, 2 pontine lesions and 2 right occipital lesions.  Pulmonary metastases are widely distributed in both lungs and have resulted in hypoxic respiratory failure.  The patient's spouse is significantly demented and her brother is serving as substituted decisional source. Nick Barfield has elected on her behalf to avoid invasive procedures for determining cell type as he will also refuse any disease modifying therapy with chemo or radiation. Jordon Michael the circumstances this woman's life expectancy is less than 4 weeks. Alexis Powell will be cared for at home with her  by her brother and his family with assistance from a ageAndi Jay carotid atherosclerosis, occlusion proximal right common carotid artery, peripheral vascular disease or other diagnoses unrelated to cancer adversely affecting prognosis. Standing Status:   Standing     Number of Occurrences:   1     Order Specific Question:   Description of Order:     Answer:   admit    DO NOT RESUSCITATE     Standing Status:   Standing     Number of Occurrences:   1     Order Specific Question:   Comfort Measures Only? Answer: Yes    OXYGEN CANNULA Liters per minute: 2; Indications for O2 therapy: RESPIRATORY DISTRESS PRN Routine     Standing Status:   Standing     Number of Occurrences:   [id number]     Order Specific Question:   Liters per minute:      Answer:   2     Order Specific Question:   Indications for O2 therapy     Answer:   RESPIRATORY DISTRESS    sodium chloride (NS) flush 3 mL    DISCONTD: morphine injection 2 mg    DISCONTD: morphine injection 2 mg    LORazepam (ATIVAN) injection 1 mg    bisacodyL (DULCOLAX) suppository 10 mg    LORazepam (ATIVAN) injection 2 mg    PHENobarbital (LUMINAL) injection 65 mg    DISCONTD: dexAMETHasone (DECADRON) tablet 4 mg    DISCONTD: fentaNYL (DURAGESIC) 12 mcg/hr patch 1 Patch    DISCONTD: dexamethasone (DECADRON) 4 mg/mL injection 4 mg    sodium chloride (NS) flush 3 mL    fentaNYL (DURAGESIC) 25 mcg/hr patch 1 Patch    OR Linked Order Group     morphine injection 4 mg     morphine injection 4 mg    INITIAL PHYSICIAN ORDER: HOSPICE Level Of Care: General Inpatient; Reason for Admission: GIP management of pain and seizures in light of dysphagia secondary to stroke     Standing Status:   Standing     Number of Occurrences:   1     Order Specific Question:   Status Answer:   Hospice     Order Specific Question:   Level Of Care     Answer:   General Inpatient     Order Specific Question:   Reason for Admission     Answer:   GIP management of pain and seizures in light of dysphagia secondary to stroke     Order Specific Question:   Inpatient Hospitalization Certified Necessary for the Following Reasons     Answer:   3. Patient receiving treatment that can only be provided in an inpatient setting (further clarification in H&P documentation)     Order Specific Question:   Admitting Diagnosis     Answer:   Sequela of cardioembolic stroke [[id number]]     Order Specific Question:   Terminal Prognosis Diagnosis(es)     Answer:   Metastasis to brain [facility]) [[id number]]     Order Specific Question:   Terminal Prognosis Diagnosis(es)     Answer:   Metastasis to lung [facility]) [[id number]]     Order Specific Question:   Terminal Prognosis Diagnosis(es)     Answer:   Lymphoma [facility]) [[id number]]     Order Specific Question:   Terminal Prognosis Diagnosis(es)     Answer:   Seizures ([facility] [[id number]]     Order Specific Question:   Terminal Prognosis Diagnosis(es)     Answer:   Right carotid artery occlusion [[id number]]     Order Specific Question:   Terminal Prognosis Diagnosis(es)     Answer:   Metastasis to spinal column [facility]) [[id number]]     Order Specific Question:   Admitting Physician     Answer:   Beverly Barrios     Order Specific Question:   Attending Physician     Answer:   Beverly Barrios     Order Specific Question:   Discharge Plan:     Answer:   Home with Home Hospice     Comments:    is frail    IP CONSULT TO SOCIAL WORK     Standing Status:   Standing     Number of Occurrences:   1     Order Specific Question:   Reason for Consult: Answer: For [facility] Only. For contracted patients, their primary hospice will continue to manage social work needs. Allergies:   Allergies   Allergen Reactions    Compazine [Prochlorperazine Edisylate] Swelling Care Plan:  Multidisciplinary Problems (Active)     Problem: Anticipatory Grief     Dates: Start: 11/10/20       Disciplines: Interdisciplinary    Goal: Grief heard and acknowledged, anxiety reduced, patient coping identified, patient/family expressed gratitude     Dates: Start: 11/10/20   Expected End: 11/20/20       Description: Patient/ family will acknowledge feelings of anxiety and grief and utilize available support to assist in their grief journey. Disciplines: Interdisciplinary    Intervention: Assess grief responses     Dates: Start: 11/10/20       Description: Maria Lutz will assess grief responses with each visit. Intervention: Support grieving process     Dates: Start: 11/10/20       Description: Maria Lutz will support the grief process by providing opportunities for expression of feelings while attempting  to normalize the journey. Problem: End of Life Process     Dates: Start: 11/07/20       Description: Pt  and brother will be able to describe the changes indicate near death  Give  and brother a blue book   Answer questions     Disciplines: Interdisciplinary    Goal: Demonstrate understanding of end of life processes     Dates: Start: 11/07/20   Expected End: 11/21/20       Description: Ceci Nelson will understand end of life processes.     Disciplines: Interdisciplinary    Intervention: Assess for signs/symptoms of terminal restlessness     Dates: Start: 11/07/20             Intervention: Sea Simms on strategies to reduce terminal restlessness     Dates: Start: 11/07/20             Intervention: Instruct on the dying process     Dates: Start: 11/07/20             Intervention: Instruct: imminent death     Dates: Start: 11/07/20             Intervention: Instruct: process at end of life     Dates: Start: 11/07/20                         Problem: Falls - Risk of     Dates: Start: 11/09/20       Description: Pt will remain free from falls aeb no injuries while at [facility]. Disciplines: Interdisciplinary    Goal: *Absence of Falls     Dates: Start: 11/09/20   Expected End: 11/20/20       Description: Document Idris Garcias Fall Risk and appropriate interventions in the flowsheet. Disciplines: Interdisciplinary                Problem: Infection - Risk of, Urinary Catheter-Associated Urinary Tract Infection     Dates: Start: 11/16/20       Disciplines: Interdisciplinary    Goal: *Absence of infection signs and symptoms     Dates: Start: 11/16/20   Expected End: 11/23/20       Disciplines: Interdisciplinary    Intervention: Urinary catheter needs assessment (eg: Impaired neurologic status; post void residual; spinal cord injury; urinary outflow obstruction; urinary retention; urinary incontinence; fluid imbalance)     Dates: Start: 11/16/20             Intervention: Urine assessment (eg: Clarity; color; odor; volume)     Dates: Start: 11/16/20             Intervention: Infection signs and symptoms monitoring - urinary tract (eg: Flank or suprapubic pain; burning; fever; dysuria; frequency; urgency; cloudy/bloody/foul odor urine; confusion/agitation; incontinence)     Dates: Start: 11/16/20             Intervention: Lab monitoring (eg: Urinalysis; culture and sensitivity; complete blood count with differential)     Dates: Start: 11/16/20             Intervention: Urinary catheter management (eg: Closed urinary catheter system maintenance; urinary catheter patency with free downhill flow; perineal care; monitor intake and output)     Dates: Start: 11/16/20                         Problem: Pain     Dates: Start: 11/07/20       Description: Pt will remain free from pain aeb pain score or flacc less than 4 while at [facility].           Disciplines: Interdisciplinary    Goal: Verbalize satisfaction of level of comfort and symptom control     Dates: Start: 11/07/20   Expected End: 11/21/20       Description: Pt pain would be less then 4/10  Morphine 2 mg IV/SQ q 20 minutes prn     Disciplines: Interdisciplinary    Intervention: Assess effectiveness of pain management     Dates: Start: 11/07/20       Description: Pt will be relaxed not moaning or groaning or attempting to get out of bed  Ativan 1 mg IV/IM q 4 hours prn           Intervention: Assess for signs/symptoms of acute pain     Dates: Start: 11/07/20             Intervention: Assess for signs/symptoms of chronic pain     Dates: Start: 11/07/20             Intervention: Monique Latch on non-pharmacological pain management     Dates: Start: 11/07/20             Intervention: Monique Latch on pain scales     Dates: Start: 11/07/20             Intervention: Instruct on pharmacological pain management     Dates: Start: 11/07/20                         Problem: Pressure Injury - Risk of     Dates: Start: 11/07/20       Description: Pt will remain free from falls aeb no injuries while at [facility]. Disciplines: Interdisciplinary    Goal: *Prevention of pressure injury     Dates: Start: 11/07/20   Expected End: 11/21/20       Description: Document Bhupendra Scale and appropriate interventions in the flowsheet.     Disciplines: Interdisciplinary                  Care Plan Problems/Goals      Progressing Towards Goal (5)      *Prevention of pressure injury (Pressure Injury - Risk of)    Disciplines:  Interdisciplinary Expected end:  11/21/20        Outcome: Progressing Towards Goal By Bambi Gallegos RN on 11/15/20 0282            Verbalize satisfaction of level of comfort and symptom control (Pain)    Disciplines:  Interdisciplinary Expected end:  11/21/20        Outcome: Progressing Towards Goal By Manny Palomino RN on 11/16/20 0041            Demonstrate understanding of end of life processes (End of Life Process)    Disciplines:  Interdisciplinary Expected end:  11/21/20        Outcome: Progressing Towards Goal By Bambi Gallegos RN on 11/14/20 0451            *Absence of Falls (Falls - Risk of)    Disciplines:  Interdisciplinary Expected end:  11/20/20        Outcome: Progressing Towards Goal By Kelechi Gonzales, RN on 11/15/20 5387            Grief heard and acknowledged, anxiety reduced, patient coping identified, patient/family expressed gratitude (Anticipatory Grief)    Disciplines:  Interdisciplinary Expected end:  11/20/20        Outcome: Progressing Towards Goal By Liang High on 11/10/20 1554                         Not Progressing Towards Goal (1)      *Absence of infection signs and symptoms (Infection - Risk of, Urinary Catheter-Associated Urinary Tract Infection)    Disciplines:  Interdisciplinary Expected end:  11/23/20        Outcome: Not Progressing Towards Goal By Andre Parker RN on 11/16/20 0041                         Resolved/Met (4)      Patient/Family Education (Patient Education: Go to Patient Education Activity)    Disciplines:  Interdisciplinary Expected end:  11/21/20        Outcome: Resolved/Met By Maxim Mehta RN on 11/11/20 1702            Demonstrate understanding of hospice philosophy, plan of care, and home hospice program (Hospice Orientation)    Disciplines:  Interdisciplinary Expected end:  11/13/20        Outcome: Resolved/Met By Ange Miller RN on 11/09/20 1657            Verbalize and demonstrate ability to manage anxiety (Anxiety/Agitation)    Disciplines:  Interdisciplinary Expected end:  11/21/20        Outcome: Resolved/Met By Maxim Mehta RN on 11/11/20 1702            Patient/Family Education (Patient Education: Go to Patient Education Activity)    Disciplines:  Interdisciplinary Expected end:  -        Outcome: Resolved/Met By Maxim Mehta RN on 11/11/20 1702                              ___________________    Care Team Notes          POC/IDG Notes      [facility] IDG Nurse Notes by Maxim Mehta RN at 11/16/20 1328  Version 1 of 1    Author:  Maxim Mehta RN Service:  Hospice and Palliative Care Author Type: Registered Nurse    Filed:  11/16/20 1328 Date of Service:  11/16/20 1328 Status:  Signed    :  Betsy Bradshaw RN (Registered Nurse)       Patient: Edd Cabral    Date: 11/16/20  Time: 1:28 PM    [facility] Nurse Notes  F/U IDG: Pt is a 67-year-old female with metastasis of brain of unknown origin who is here GIP level of care for management of pain, agitation, and seizure management. Pt with irregular respiratory rate. Reid with UOP of 250cc. Reid changed out yesterday. IV access in right AC. PO intake: none. Wounds: stage II sacrum. PRN medications: Ativan 1 mg IV x 1 and morphine 4 mg IV x 3 for pain. Scheduled meds:  Fentanyl 25 mcg patch, Decadron 4 mg IV q 8 hours, phenobarb 65 mg SQ q 12 hours. Plan: Discontinue Decadron. Comprehensive plan of care reviewed. IDG and pt./family in agreement with plan of care. The IDG identifies through on-going assessment when a change is needed to the POC; the pt/family will receive care and services necessitated by changes in POC. Medications reviewed by the pharmacist and Medical Director. Signed by: Jessica Fernandez RN       [address] [facility]  Notes by Walt Vyas at 11/16/20 1035  Version 1 of 1    Author:  Walt Vays Service:  Spiritual Care Author Type:  Pastoral Care    Filed:  11/16/20 1156 Date of Service:  11/16/20 1035 Status:  Signed    :  Walt Vyas (Pastoral Care)       Patient: Edd Cabral    Date: 11/16/20  Time: 10:35 AM    [facility]  Notes  Intervention: Ministry of presence, family support, prayer. Outcome: Conversation with family. Plan: Continued spiritual and emotional support. Grief support.        Signed by: Santino Mcelroy       [address] [facility]  Notes by Gianna Sharpe at 11/16/20 0830  Version 1 of 1    Author:  Gianna Sharpe Service:  Licensed Clinical  Author Type:      Filed:  11/16/20 1152 Date of Service:  11/16/20 0830 Status:  Signed    :  Carlos Page JOHNNY ()       Patient: Rowdy Savage    Date: 11/16/20  Time: 8:30 AM    \[facility] [place] [facility]\""  Notes  LMSW will continue to provide support to the pt and her family. Family has concerns over what will happen with the spouse who has cogitative concerns. Resources given to the family for the spouses specific needs. Family lost the resources I previously gave them. Second resources given. Continue with care plan as written. The volunteer program has been suspended due to the Covid-19 virus. LMSW will ensure the pt and families receive their comfort bags. Signed by: Haroon Cedillo       \[facility]\"" IDG Nurse Notes by Mae Méndez RN at 11/12/20 1253  Version 1 of 1    Author:  Mae Méndez RN Service:  Hospice and Palliative Care Author Type:  Registered Nurse    Filed:  11/12/20 125 Date of Service:  11/12/20 1253 Status:  Signed    :  Mae Méndez RN (Registered Nurse)       Patient: Rowdy Savage    Date: 11/12/20  Time: 12:53 PM    \[facility]\"" Nurse Notes  F/U IDG: Pt is a 74-year-old female with metastasis to brain of unknown origin who is here GIP level of care for management of pain and agitation. Reid with UOP of 300cc. IV access via right forearm. PO intake: none. Wounds: stage II sacrum. PRN medications: Ativan 1 mg IV x1 for agitation, morphine 2 mg IV x 3 for pain. Scheduled meds:  phenobarb 65 mg SQ q 12 hours, Decadron 4 mg IV x 8 hours, Fentanyl 12 mcg patch. Plan: Increase Fentanyl to 25 mcg patch, increase PRN morphine to 4 mg IV/SQ q 20 min. Comprehensive plan of care reviewed. IDG and pt./family in agreement with plan of care. The IDG identifies through on-going assessment when a change is needed to the POC; the pt/family will receive care and services necessitated by changes in POC. Medications reviewed by the pharmacist and Medical Director.         Signed by: Cindy Ferrera RN       \[facility]\"" IDG  Notes by Laura Mcdermott at 11/12/20 5886  Version 1 of 1 Author:  Valerio Brennan Service:  Spiritual Care Author Type:  Pastoral Care    Filed:  11/12/20 1200 Date of Service:  11/12/20 1153 Status:  Signed    :  Valerio Brennan (Pastoral Care)       Patient: Marley Herbert    Date: 11/12/20  Time: 11:53 AM    [facility]  Notes    Intervention: Spiritual and Bereavement Assessments completed. Ministry of presence, family support, prayer, meaningful conversation with brother and . Outcome: Family shared their love for patient, Brother shared his concerns for his brother in law. Plan: Continue to provide spiritual and emotional support with focus on anticipatory grief. Signed by: Uzair TARIQ [facility] IDG  Notes by Nydia Preciado at 11/12/20 5041  Version 1 of 1    Author:  Nydia Preciado Service:  Licensed Clinical  Author Type:      Filed:  11/12/20 1159 Date of Service:  11/12/20 0841 Status:  Signed    :  Nydia Preciado ()       Patient: Marley Herbert    Date: 11/12/20  Time: 8:41 AM    [facility]  Notes  JOSESW Has completed the  initial assessment with the family. The family is very concerned about her spouse Cecilia Rey. He has some cognitive disability and his wife was his care giver. He has no support here locally. All of his family lives in [place]. JOSE has provided the family with multiple resources to obtain a geriatric case manager in the area. Suggested contacting his PCP for additional referrals if needed for home health/ hospice  The volunteer program has been suspended due to the Covid-19 virus. Continue on with care plan as written. Working towards all goals.          Signed by: Shantelle FRASERG Nurse Notes by Neena Quinn RN at 11/09/20 1316  Version 1 of 1    Author:  Neena Quinn RN Service:  Hospice and Palliative Care Author Type:  Registered Nurse    Filed:  11/09/20 1317 Date of Service:  11/09/20 1316 Status:  Signed    :  Maxim Mehta RN (Registered Nurse)       Patient: Allen Garcia    Date: 11/09/20  Time: 1:16 PM    [facility] Nurse Notes  1st IDG: Pt is a 78-year-old female with sequalae of stroke who is here GIP level of care for management of pain, agitation. BP 91/51. Reid with UOP of 540cc. IV access in right AC. 2l/min of oxygen. PO intake: bites and sips. Wounds: stage II on sacrum. PRN medications:  Morphine 2 mg IV x 3 for pain. Scheduled meds: Decadron 4 mg PO q 8h, Phenobarb 65 mg SQ q 12h. Plan: Add Fentanyl 12 mcg patch. Comprehensive plan of care reviewed. IDG and pt./family in agreement with plan of care. The IDG identifies through on-going assessment when a change is needed to the POC; the pt/family will receive care and services necessitated by changes in POC. Medications reviewed by the pharmacist and Medical Director. Signed by: Anastasia Taylor RN       [facility] IDG  Notes by Liang High at 11/09/20 1204  Version 1 of 1    Author:  Liang High Service:  Spiritual Care Author Type:  Pastoral Care    Filed:  11/09/20 1205 Date of Service:  11/09/20 1204 Status:  Signed    :  Liang High (Pastoral Care)       Patient: Allen Garcia    Date: 11/09/20  Time: 12:04 PM    [facility]  Notes    Assessments pending for spiritual and bereavement support. Signed by: Baltazar Torre       [facility] IDG  Notes by Randi Rivera at 11/09/20 1109  Version 1 of 1    Author:  Randi Rivera Service:  Licensed Clinical  Author Type:      Filed:  11/09/20 1204 Date of Service:  11/09/20 1109 Status:  Signed    :  Randi Rivera ()       Patient: Allen Garcia    Date: 11/09/20  Time: 11:09 AM    [facility]  Notes  LMSW will meet with the pt and her family to complete the SW initial assessment. After the assessment is done the care plan will be completed.     The volunteer program has been suspended due to the Covid-19 virus. LMSW will ensure the pt and families receive their comfort bags. Signed by: Wild AMBROSIOFLORIAN [facility] IDG  Notes by Singh Lezama at 11/09/20 1022  Version 1 of 1    Author:  Singh Lezama Service:  Licensed Clinical  Author Type:      Filed:  11/09/20 1022 Date of Service:  11/09/20 1022 Status:  Signed    :  Singh Lezama ()       Patient: Reymundo Johnson    Date: 11/09/20  Time: 10:22 AM    [facility]  Notes  LMSW has read and agrees with the RN initial assessment. LMSW will meet with pt and family to complete the SW assessment. The volunteer program has been suspended due to the Covid-19 virus. LMSW will ensure the pt and families receive their comfort bags. LMSW will meet with the patient and family to complete the SW initial assessment. The care plan will be created from there. Signed by: Wild Macedo       [facility] IDG  Notes by Jennie Marti at 11/09/20 0932  Version 1 of 1    Author:  Jennie Marti Service:  Spiritual Care Author Type:  Pastoral Care    Filed:  11/09/20 0933 Date of Service:  11/09/20 0932 Status:  Signed    :  Jennie Marti (Pastoral Care)       Patient: Reymundo Johnson    Date: 11/09/20  Time: 9:32 AM    [facility]  Notes    / Grief Counselor has reviewed  Initial Comprehensive Assessment and plan of care. Bereavement and Spiritual Care Assessments to be completed and plan of care put in place to meet the needs, requests and referrals.         Signed by: Finesse AMBROSIOR [facility] IDG Nurse Notes by Cassidy Mcmullen RN at 11/07/20 1742  Version 1 of 1    Author:  Cassidy Mcmullen RN Service:  NURSING Author Type:  Registered Nurse    Filed:  11/07/20 1742 Date of Service:  11/07/20 1742 Status:  Signed    :  Cassidy Mcmullen RN (Registered Nurse)       Patient: Reymundo Johnson    Date: 11/07/20  Time: 5:42 PM    [address] [place] [address] Nurse Notes  Ms Domingo Molina is a 69year old female admitted to room 111 General in patient for sequalae of stroke along with brain metastasis of unknown primary origin, pulmonary metastasis, pulmonary edema, pulmonary effusions and increased intercranial pressure which altogether gives her a poor prognosis. She's admitted for control of pain, agitation and seizures. Pt arrived an tolerated transfer to bed with minimal complaint of pain. Pt denies pain, nausea or shortness of breath. Physical assessment completed. Lung sounds clear but diminished in bases, heart sounds irregular, pt can answer name and date of birth but not date or current events. Bowel sounds hypoactive, pt has bustamante draining clear yellow urine. Pt extremities cool to cold. Upper extremities pale with cyanotic nail beds, cool with palpable radial pulse. Lower extremities cold, cyanotic, non palpable pedal pulses. Pt has iv that flushed without difficulty. Pt has , Radha Cassidy, and brother, Xander Bullock that visited. Radha Cassidy appears confused asking what we are going to do next, wondered aimlessly from room asking each time what we were going to do. Explained the change of emphasis from treatment to comfort measures. Explained if pt has pain or discomfort we will medicate for that. Pt brother, Xander Bullock, appeared to understand and agreed with the emphasis on comfort. Pt states she has no children so her brother and  are her supporters. Pt asking for a drink of apple juice, gave her apple juice, water and strawberry ice cream and a couple of icee pops.    1300 pt ate half the the strawberry ice cream, one icee pop and apple juice and half the water.      1500 pt used call light, pt states she's uncomfortable. Repositioned.  Pt voice garbled and confused.           Signed by: Mario Boo RN                Care Team Present:   Team Members Present: Physician, Nurse, , [address] [place] [address] Road  Physician Team Member: Spencer Salinas MD  Nurse Team Member: Maria Del Rosario Delgado Rn  Social Work Team Member: Natalya Mota, [address] [facility] Team Member: SONIA Cartagena  Div

## 2020-11-16 NOTE — [facility] IDG NURSE NOTES
Patient: Odean Goldberg    Date: 11/16/20  Time: 1:28 PM    [facility] Nurse Notes  F/U IDG: Pt is a 70-year-old female with metastasis of brain of unknown origin who is here GIP level of care for management of pain, agitation, and seizure management. Pt with irregular respiratory rate. Reid with UOP of 250cc. Reid changed out yesterday. IV access in right AC. PO intake: none. Wounds: stage II sacrum. PRN medications: Ativan 1 mg IV x 1 and morphine 4 mg IV x 3 for pain. Scheduled meds:  Fentanyl 25 mcg patch, Decadron 4 mg IV q 8 hours, phenobarb 65 mg SQ q 12 hours. Plan: Discontinue Decadron. Comprehensive plan of care reviewed. IDG and pt./family in agreement with plan of care. The IDG identifies through on-going assessment when a change is needed to the POC; the pt/family will receive care and services necessitated by changes in POC. Medications reviewed by the pharmacist and Medical Director.         Signed by: Della Sherman RN

## 2020-11-16 NOTE — PROGRESS NOTES
1845 Report received from Cesar Neri RN with walking rounds. Patient identified by name and . Patient resting quietly in bed with eyes closed. No signs or symptoms of distress noted at present. Fentanyl patch confirmed to left chest.  Bed in low and locked position, side rails up x2, call bell within reach, tab alarm in place. No family present at bedside. Door open for safety.  Gave PRN dose of morphine 4 mg IV as premedication for bath. Patient's Reid catheter has been leaking and tubing looks almost occluded with sediment. Attempted to flush but unable to obtain any urine return. Removed old Reid which was caked with sediment at insertion end, foul odor noted. Inserted new 12 Moldovan Reid catheter using sterile technique. Immediate return of pink colored urine with flecks of blood and sediment. Flushed new Reid with 60 mL sterile water. Patient tolerated well.    212 Gave scheduled dose of dexamethasone IV. Patient is resting quietly in bed with her eyes closed after her bath, respirations even and unlabored. FLACC 0/10, no signs of distress or discomfort. Door open for patient's safety. 5225 Patient resting quietly in bed with eyes closed, respirations even and unlabored. FLACC 0/10, no signs of distress or discomfort. Reid patent and draining. Door open for patient's safety. 0220 Flushed Reid catheter with 40 mL sterile water. Urine is no longer pink but still with sediment/mucous when flushed. Patient resting quietly in bed with eyes closed, respirations even and unlabored. FLACC 0/10, no signs of distress or discomfort. Door open for patient's safety. 3802 Patient resting quietly in bed with eyes closed, respirations even and unlabored but slow and shallow. Her color is pale. FLACC 0/10, no signs of distress or discomfort. Door open for patient's safety. 1246 Gave scheduled dose of dexamethasone 4 mg IV.   Patient appears peaceful, resting quietly in bed with eyes closed, respirations even and unlabored. FLACC 0/10, no signs of distress or discomfort. Door open for patient's safety. 9043 Patient resting quietly in bed with eyes closed, respirations even and unlabored. FLACC 0/10, no signs of distress or discomfort. Door open for patient's safety. See flowsheets for additional patient observation/rounds. Report given to Paul Miller RN.

## 2020-11-16 NOTE — PROGRESS NOTES
Report received. Pt round. Pt identified by name. In bed with eyes closed. No s/s of pain, agitation or dyspnea. Bed low and SR up with tab alerts on. Fentanyl patch present on left chest.     0847 Scheduled phenobarb given. Pt is non responsive with irregular respirations. No s/s of pain or agitation. 1055 RR irregular. FLACC 0. No s/s of pain, agitation or dyspnea at this time. Family at bedside. 1315 Pt resting with no s/s of distress. Family remains at bedside. 1500 Pt resting with no s/s of pain or agitation. FLACC 0. Family has left. 1715 Pt resting with eyes closed. FLACC 0. Brother at bedside.      1830 Report given to oncoming RN

## 2020-11-17 NOTE — PROGRESS NOTES
Aleksander Mcclellan 82 report from Santi Garrido RN. Pt Id by name and  during walking rounds.   Pt lying in bed. Eyes closed. Non interactive. No facial grimace. Flacc =0-1. Resp irreg non labored on RA. Lungs diminished. HR irreg. BS hypo. No edema noted. Reid draining cloudy orestes urine. Fentanyl 25 mcg/hr patch intact on left chest and dated 11/15/20. Mottling noted on feet and knees. Body cool and dry. SR up x 2. Bed low/locked. Call light with in reach. Family at the bedside.   Scheduled phenobarbital 65 mg sq given. Peripheral line flushed with 3 ml ns. Flushed well. Dressing clean/dry/intact. Pt tolerated well. SR up x 2. Bed low/locked. Call light with in reach. Family at the bedside. 2320  Pt with eyes closed. No facial grimace. No moans. Flacc =0-1. Pt repositioned. Resp irreg non labored on RA. SR up x 2. Bed low/locked. Call light with in reach. Family at the bedside. 0112   Pt lying in bed with eyes closed. No s/sx of distress. No facial grimace. No moans. Flacc =0-1. Resp irreg non labored on RA. SR up x 2. Bed low/locked. Call light with in reach. Family at the bedside. 0310   Pt with eyes closed. No facial grimace. No moans. Flacc =0-1. Pt repositioned. Resp irreg non labored on RA. SR up x 2. Bed low/locked. Call light with in reach. Family at the bedside. 0881  Pt with moans and facial grimace. Flacc =4. Morphine 4mg IVP given. Resp irreg non labored on RA. Family at the bedside. 0525  Pt lying in bed with eyes closed. No s/sx of distress. No facial grimace. No moans. Flacc =0-1. Resp irreg non labored on RA. SR up x 2. Bed low/locked. Call light with in reach. Family at the bedside. Report given to Shashank Peña RN.

## 2020-11-17 NOTE — PROGRESS NOTES
0611 - Report received from [address]. Patient identified. Patient GIP level of care with hospice diagnosis of sew of stroke; brain mets of unknown origin. Patient in bed, eyes closed. No s/sx anxiety, agitation, NVD or respiratory distress. FLACC 0/10. Fent 25mcg to L chest verified. Bed in lowest, locked position, call light within reach, tab alert on, and SR up for safety. Brother asleep at bedside. 7498 - Assessment complete (see flowsheets). Scheduled phenobarbital given per orders. Patient in bed, eyes closed. No s/sx anxiety, agitation, NVD or respiratory distress. FLACC 0/10. Brother remains at bedside. 1045 - Patient began moaning out during bath. PRN morphine 4mg IV given. Patient pulled up and repositioend with help of CNA. 1110 - Re-assessment:  Patient no longer moaning out. No s/sx anxiety, agitation, NVD or respiratory distress. FLACC 0/10.    1227 - Patient in bed, eyes closed. No s/sx anxiety, agitation, NVD or respiratory distress. FLACC 0/10.  and brother at bedside. Provided with ice water and coffee per request.    1430 - Patient in bed, eyes closed. No s/sx anxiety, agitation, NVD or respiratory distress. FLACC 0/10.  and brother at bedside. 1650 - PRN ativan 1mg IV and PRN morphine 4mg IV given for restlessness and labored breathing. Brother at bedside. 1715 - Re-assessment:  No labored breathing or restlessness noted. Patient in bed, eyes closed. No s/sx anxiety, agitation, NVD or respiratory distress. FLACC 0/10. Brother remains at bedside.     Report given to Mariza Farooq RN

## 2020-11-17 NOTE — PROGRESS NOTES
Problem: Pressure Injury - Risk of  Goal: *Prevention of pressure injury  Description: Document Bhupendra Scale and appropriate interventions in the flowsheet. Outcome: Progressing Towards Goal  Note: Pressure Injury Interventions:  Sensory Interventions: Assess changes in LOC, Float heels, Minimize linen layers, Check visual cues for pain, Keep linens dry and wrinkle-free    Moisture Interventions: Absorbent underpads, Minimize layers, Internal/External urinary devices, Moisture barrier, Limit adult briefs, Apply protective barrier, creams and emollients    Activity Interventions: Pressure redistribution bed/mattress(bed type)    Mobility Interventions: Pressure redistribution bed/mattress (bed type), Float heels    Nutrition Interventions: Document food/fluid/supplement intake    Friction and Shear Interventions: Lift sheet, Minimize layers, Apply protective barrier, creams and emollients                Problem: Pain  Goal: Verbalize satisfaction of level of comfort and symptom control  Description: Pt pain would be less then 4/10  Morphine 2 mg IV/SQ q 20 minutes prn   Outcome: Progressing Towards Goal     Problem: Falls - Risk of  Goal: *Absence of Falls  Description: Document Richi Fall Risk and appropriate interventions in the flowsheet.   Outcome: Progressing Towards Goal  Note: Fall Risk Interventions:  Mobility Interventions: Bed/chair exit alarm    Mentation Interventions: Bed/chair exit alarm, Door open when patient unattended, Evaluate medications/consider consulting pharmacy, Family/sitter at bedside    Medication Interventions: Bed/chair exit alarm, Evaluate medications/consider consulting pharmacy    Elimination Interventions: Bed/chair exit alarm, Call light in reach              Problem: Infection - Risk of, Urinary Catheter-Associated Urinary Tract Infection  Goal: *Absence of infection signs and symptoms  Outcome: Progressing Towards Goal

## 2020-11-17 NOTE — PROGRESS NOTES
Pt resting [id number]  Received report from Charly Fields RN. Pt Id by name and  during walking rounds.   Pt lying in bed with eyes closed. Non interactive. No facial grimace. Flacc =0-1. Resp shallow irreg on RA. Lungs diminished. HR irreg. BS hypo. No edema noted at this time. Reid cath draining cloudy orestes urine. Fentanyl 25 mcg/hr patch intact on left chest and dated 11-15-20. SR up x 2. Bed low/locked. Call light with in reach. Family at the bedside.   Scheduled phenobarbital 65 mg sq given. Peripheral line  Flushed with 3  Ml ns. Flushed well. Dressing clean/dry/ intact. Family at the bedside. 2253  Pt with eyes closed. No facial grimace. Flacc =0-1. Non interactive. Resp shallow irreg on RA. SR up x 2. Bed low/locked. Call light with in reach. Family at the bedside. 0048  Pt resting comfortably with eyes closed. Eyes closed. No facial grimace. Flacc =0-1 Non interactive. Resp shallow irreg on RA. Cool to touch. Mottling noted on feet. SR up x 2. Bed low/locked. Call light with in reach. Family at the bedside. 0249  Pt with eyes closed. No facial grimace. Flacc =0-1. Non interactive. Resp shallow irreg on RA. SR up x 2. Bed low/locked. Call light with in reach. Family at the bedside. 0549   Pt resting comfortably with eyes closed. Eyes closed. No facial grimace. Flacc =0-1 Non interactive. Resp shallow irreg on RA. Cool to touch. Mottling noted on feet. SR up x 2. Bed low/locked. Call light with in reach. Family remain at the bedside. Report given to Frieda Martin RN.

## 2020-11-18 NOTE — PROGRESS NOTES
Bedside Report taken from [facility]. Pt identified. Pt in bed with eyes closed; displaying no signs or symptoms of pain, dyspnea, agitation,seizures, nausea, or vomiting. FLACC 0. Bed locked and low, side rails up, tabs/bed alarm in place for pt. safety. Call light with in reach, and door opened for continued monitoring. Fentanyl patch visualized with Previous shift nurse patch intact. Care plan reviewed with Cna. Brother at bedside on his cell phone. 46 Pt is a 78-year-old female admitted with metastasis of brain of unknown origin who is here GIP level of care for management of pain, agitation, and seizure management. Pt with irregular respiratory rate. Reid with UOP of 300cc. Reid changed out 11/15. IV access in right AC. PO intake: none. Wounds: stage II sacrum. PRN medications: Ativan 1 mg IV for agitation and seizure prevention and morphine 4 mg for pain. Scheduled meds:  Fentanyl 25 mcg patch, Decadron 4 mg IV q 8 hours, phenobarb 65 mg SQ q 12 hours. Brother remains at bedside, no needs nor questions. 0836 Pt resting quietly, scheduled sq injection, and iv flushed. Brother given coffee. Pt observed and appears to be sleeping/resting, no facial grimacing, no moaning, easy relaxed respirations. No symptoms to manage at this time. Flacc 0/10.    [id number]  arrived, he was updated and given some tea as he requested. No needs at this time. flacc 0/10.    1040 Pt observed and appears to be sleeping/resting, no facial grimacing, no moaning, easy relaxed respirations. No symptoms to manage at this time. Flacc 0/10.  still at bedside. 1140 Pt observed and appears to be sleeping/resting, no facial grimacing, no moaning, easy relaxed respirations. No symptoms to manage at this time. Flacc 0/10    1315 Pt fentanyl patch changed and brother / requested drinks. Pt observed and appears to be sleeping/resting, no facial grimacing, no moaning, easy relaxed respirations.  No symptoms to manage at this time. Flacc 0/10    1445 Pt observed and appears to be sleeping/resting, no facial grimacing, no moaning, easy relaxed respirations. No symptoms to manage at this time. Flacc 0/10    1540 per chris balbuena. 1 silver ring with 2 stones taken off pt ring finger and given to     1701 pt brother back at bedside.  back home. Pt observed and appears to be sleeping/resting, no facial grimacing, no moaning, easy relaxed respirations. No symptoms to manage at this time. Flacc 0/10    1837 Pt observed and appears to be sleeping/resting, no facial grimacing, no moaning, easy relaxed respirations. No symptoms to manage at this time.  Flacc 0/10  Report to be given to [facility], rn

## 2020-11-18 NOTE — PROGRESS NOTES
Problem: Pressure Injury - Risk of  Goal: *Prevention of pressure injury  Description: Document Bhupendra Scale and appropriate interventions in the flowsheet. Outcome: Progressing Towards Goal  Note: Pressure Injury Interventions:  Sensory Interventions: Assess changes in LOC, Float heels, Minimize linen layers, Pressure redistribution bed/mattress (bed type), Check visual cues for pain, Keep linens dry and wrinkle-free    Moisture Interventions: Absorbent underpads, Internal/External urinary devices, Minimize layers, Moisture barrier, Limit adult briefs, Apply protective barrier, creams and emollients    Activity Interventions: Pressure redistribution bed/mattress(bed type)    Mobility Interventions: Float heels, Pressure redistribution bed/mattress (bed type)    Nutrition Interventions: Document food/fluid/supplement intake    Friction and Shear Interventions: Apply protective barrier, creams and emollients, Minimize layers, Lift sheet                Problem: Pain  Goal: Verbalize satisfaction of level of comfort and symptom control  Description: Pt pain would be less then 4/10  Morphine 2 mg IV/SQ q 20 minutes prn   Outcome: Progressing Towards Goal     Problem: Falls - Risk of  Goal: *Absence of Falls  Description: Document Richi Fall Risk and appropriate interventions in the flowsheet.   Outcome: Progressing Towards Goal  Note: Fall Risk Interventions:  Mobility Interventions: Bed/chair exit alarm    Mentation Interventions: Bed/chair exit alarm, Door open when patient unattended, Evaluate medications/consider consulting pharmacy, Family/sitter at bedside    Medication Interventions: Evaluate medications/consider consulting pharmacy, Bed/chair exit alarm    Elimination Interventions: Bed/chair exit alarm, Call light in reach              Problem: Infection - Risk of, Urinary Catheter-Associated Urinary Tract Infection  Goal: *Absence of infection signs and symptoms  Outcome: Progressing Towards Goal

## 2020-11-18 NOTE — PROGRESS NOTES
Problem: Pressure Injury - Risk of  Goal: *Prevention of pressure injury  Description: Document Bhupendra Scale and appropriate interventions in the flowsheet. Outcome: Progressing Towards Goal  Note: Pressure Injury Interventions:  Sensory Interventions: Assess changes in LOC, Float heels, Minimize linen layers, Pressure redistribution bed/mattress (bed type), Check visual cues for pain, Keep linens dry and wrinkle-free    Moisture Interventions: Absorbent underpads, Internal/External urinary devices, Minimize layers, Moisture barrier, Limit adult briefs, Apply protective barrier, creams and emollients    Activity Interventions: Pressure redistribution bed/mattress(bed type)    Mobility Interventions: Float heels, Pressure redistribution bed/mattress (bed type)    Nutrition Interventions: Document food/fluid/supplement intake    Friction and Shear Interventions: Apply protective barrier, creams and emollients, Minimize layers, Lift sheet                Problem: Pain  Goal: Verbalize satisfaction of level of comfort and symptom control  Description: Pt pain would be less then 4/10  Morphine 2 mg IV/SQ q 20 minutes prn   Outcome: Progressing Towards Goal     Problem: Falls - Risk of  Goal: *Absence of Falls  Description: Document Richi Fall Risk and appropriate interventions in the flowsheet.   Outcome: Progressing Towards Goal  Note: Fall Risk Interventions:  Mobility Interventions: Bed/chair exit alarm    Mentation Interventions: Bed/chair exit alarm, Door open when patient unattended, Evaluate medications/consider consulting pharmacy, Family/sitter at bedside    Medication Interventions: Evaluate medications/consider consulting pharmacy, Bed/chair exit alarm    Elimination Interventions: Bed/chair exit alarm, Call light in reach

## 2020-11-18 NOTE — PROGRESS NOTES
Progress Note    Patient: Cristiano Dubois MRN: [id number]  SSN: [id number]    YOB: 1950  Age: 69 y.o. Sex: female      Admit Date: 11/7/2020    LOS: 11 days     Subjective:     Unresponsive. NPO.  Has required morphine x 2 for pain and ativan x 1 IV for agitation. Hypotensive. Review of Systems:  Review of systems not obtained due to patient factors. Objective:     Vitals:    11/17/20 0502 11/17/20 1334 11/18/20 0519 11/18/20 1749   BP: (!) 71/47 (!) 100/49 (!) 82/49 126/60   Pulse: (!) 104 (!) 101 (!) 114 (!) 133   Resp: 14 16 22 24   Temp: (!) 95.8 °F (35.4 °C) (!) 95 °F (35 °C) (!) 96.5 °F (35.8 °C) 97.8 °F (36.6 °C)        Intake and Output:  Current Shift: No intake/output data recorded. Last three shifts: 11/16 1901 - 11/18 0700  In: -   Out: 800 [Urine:800]    Physical Exam:  GENERAL: Unresponsive, mild distress, appears older than stated age, pale, cachectic  LUNG: Clear diminished breath sounds with labored respirations. Tachypneic. HEART: regular rate and rhythm  ABDOMEN: soft, non-tender. Bowel sounds hypoactive. : Reid catheter with orestes urine.   EXTREMITIES:  extremities with + pulses. Mild lower ext edema. Mottling noted up to level of the thighs. SKIN: Pale. Cool to touch. Stage 2 wound to sacrum. Peripheral IV in the right antecubital with dressing intact.   NEUROLOGIC: Unresponsive. Right sided weakness. Bedbound.    PSYCHIATRIC: agitated with noxious stimuli. Lab/Data Review:  No new labs resulted in the last 24 hours.     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 ([facility] [address] (11/9/2020)    Active Problems:    Sequela of cardioembolic stroke (80/9/6888)        Plan:     Current Facility-Administered Medications   Medication Dose Route Frequency    fentaNYL (DURAGESIC) 25 mcg/hr patch 1 Patch  1 Patch TransDERmal Q72H    morphine injection 4 mg  4 mg IntraVENous Q20MIN PRN    Or    morphine injection 4 mg  4 mg SubCUTAneous Q20MIN PRN    sodium chloride (NS) flush 3 mL  3 mL IntraVENous PRN    sodium chloride (NS) flush 3 mL  3 mL IntraVENous Q12H    LORazepam (ATIVAN) injection 1 mg  1 mg IntraVENous Q4H PRN    bisacodyL (DULCOLAX) suppository 10 mg  10 mg Rectal PRN    LORazepam (ATIVAN) injection 2 mg  2 mg IntraVENous Q10MIN PRN    PHENobarbital (LUMINAL) injection 65 mg  65 mg SubCUTAneous Q12H       11/7: (SFD) Admitted GIP with 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      1. Pain: Morphine 4mg IV/SQ Q20 minutes as needed. Fentanyl 25mcg patch in place.      2. Dyspnea: Morphine 4mg IV/SQ Q20 minutes as needed. Glycopyrrolate 0.2mg q4 prn secretions. Oxygen at 2 L/min prn.     3. Agitation: Lorazepam 1mg IV/IM q4 hours prn.     4. Seizures: Phenobarbital 65mg SQ Q12. Lorazepam as ordered prn sustained seizure activity and notify provider.      5. Family/Pt Support: Family at bedside during exam. Medications and plan of care discussed with nursing staff and family. Will continue to monitor for symptoms and adjust medications as needed to maintain patient comfort. PPS 10%. Case discussed with Dr. Valle. Add scheduled morphine 2mg IV Q6.     Signed By: Tiffani Fletcher NP     November 18, 2020

## 2020-11-19 NOTE — PROGRESS NOTES
Problem: Pressure Injury - Risk of  Goal: *Prevention of pressure injury  Description: Document Bhupendra Scale and appropriate interventions in the flowsheet. Outcome: Progressing Towards Goal  Note: Pressure Injury Interventions:  Sensory Interventions: Assess changes in LOC, Float heels, Minimize linen layers, Pressure redistribution bed/mattress (bed type), Check visual cues for pain, Keep linens dry and wrinkle-free    Moisture Interventions: Absorbent underpads, Internal/External urinary devices, Minimize layers, Moisture barrier, Limit adult briefs, Apply protective barrier, creams and emollients    Activity Interventions: Pressure redistribution bed/mattress(bed type)    Mobility Interventions: Float heels, Pressure redistribution bed/mattress (bed type)    Nutrition Interventions: Document food/fluid/supplement intake    Friction and Shear Interventions: Apply protective barrier, creams and emollients, Minimize layers, Lift sheet                Problem: Falls - Risk of  Goal: *Absence of Falls  Description: Document Richi Fall Risk and appropriate interventions in the flowsheet.   Outcome: Progressing Towards Goal  Note: Fall Risk Interventions:  Mobility Interventions: Bed/chair exit alarm    Mentation Interventions: Bed/chair exit alarm, Door open when patient unattended, Evaluate medications/consider consulting pharmacy, Family/sitter at bedside    Medication Interventions: Evaluate medications/consider consulting pharmacy, Bed/chair exit alarm    Elimination Interventions: Bed/chair exit alarm, Call light in reach              Problem: Infection - Risk of, Urinary Catheter-Associated Urinary Tract Infection  Goal: *Absence of infection signs and symptoms  Outcome: Progressing Towards Goal

## 2020-11-19 NOTE — PROGRESS NOTES
184-Report received from Navid Godwin RN. Patient identified by name and . Patient resting quietly in bed with eyes closed. No signs or symptoms of distress, pain, agitation/anxiety, or SOB noted at present. Fentanyl patch 25mcg to left arm in place. Reid catheter in place draining orestes colored urine. Bed locked and in lowest position, side rails up x 2, call bell within reach, tab alarm in place. Brother at side. No immediate needs seen or voiced by brother. -Scheduled Morphine 2mg IV and Phenobarbital 65mg SQ given at this time. Patient remains in bed with eyes partially opened. No signs or symptoms of distress, pain, agitation, or discomfort noted. Brother remain at side and voiced no immediate needs seen. FLACC 0.    -Patient continues to rest after previous dose of scheduled medications. FLACC 0. [facility] with a terminal diagnosis of Metastasis to mich of unknown origin. Patient is GIP level of care for symptom management of pain, agitation, and seizures. Patient was admitted to [facility] on 10/25/2020 after c/o sudden weakness on right side and aphasia. CT scan completed that showed frontal lobe mass with edema as well as right midline shift and additional small masses in tracy and right occipital lobe. CXR completed that showed worsening pulmonary edema and bilateral pleural effusion. Patient had recently been admitted and treated for pneumonia. Multiple areas of metastasis also was found. Patient continued to show signs of decline and PO intake remained poor. Patient elected to stop all aggressive treatments and elected to have comfort measures with hospice. Patient is now obtunded and responds minimally to pain with repositioning and turning at times. Patient continues to require skilled nursing assessment, ongoing monitoring, and reevaluation of medication regimen to achieve optimum level of comfort.      2220-Patient continues to rest in bed with eyes partially closed. Respirations unlabored with no signs or symptoms of distress, pain, agitation, or discomfort noted. Brother remain at side. Patient turned and repositioned during bathing. Patient showing no signs of pain or discomfort at this time. FLACC 0.    0033-Scheduled Morphine 2mg IV given at this time. FLACC 0.    0110-Patient continues to rest in bed with eyes partially closed. Respirations unlabored with no signs or symptoms of distress, pain, agitation, or discomfort noted. FLACC 0.    0345-Patient continues to rest in bed with eyes closed. Respirations unlabored with no signs or symptoms of distress, pain, agitation, or discomfort noted. FLACC 0.    0616-Scheduled Morphine 2mg IV given at this time. Patient continues to resting in bed with eyes partially closed. Respirations unlabored with no signs or symptoms of distress, pain, agitation, or discomfort noted. Fentanyl patch 25mcg remain to left arm. FLACC 0. Brother remains at side. 0640-Patient continues to rest in bed with eyes partially closed. Respirations remain unlabored with no signs or symptoms of distress, pain, agitation, or discomfort noted. FLACC 0.     Report given to Nohelia Rajan RN

## 2020-11-19 NOTE — PROGRESS NOTES
Bedside Report taken from [facility]. Pt identified. Pt in bed with eyes closed; displaying no signs or symptoms of pain, dyspnea, agitation,seizures, nausea, or vomiting. FLACC 0. Bed locked and low, side rails up, tabs/bed alarm in place for pt. safety. Call light with in reach, and door opened for continued monitoring. Fentanyl patch visualized with Previous shift nurse patch intact. Care plan reviewed with Delmy Cardona Pt is a 71-year-old female admitted with metastasis of brain of unknown origin who is here GIP level of care for management of pain, agitation, and seizure management. Pt with irregular respiratory rate. Reid with UOP of 300cc. Reid changed out 11/15. Leslie Ponce access in right AC.  PO intake: none. Wounds: stage II sacrum. PRN medications: Ativan 1 mg IV for agitation and seizure prevention and morphine 4 mg for pain. Scheduled meds:  Fentanyl 25 mcg patch, Decadron 4 mg IV q 8 hours, phenobarb 65 mg SQ q 12 hours. Brother remains at bedside, no needs nor questions. 0794 scheduled sq injection for seizure management. Given.  updated upon his arrival. Pt observed and appears to be sleeping/resting, no facial grimacing, no moaning, easy relaxed respirations. No symptoms to manage at this time. Flacc 0/10    1120 Scheduled morphine given Pt observed and appears to be sleeping/resting, no facial grimacing, no moaning, easy relaxed respirations. No symptoms to manage at this time. Flacc 0/10.  still at bedside, ice water provided. 1320 Pt observed and appears to be sleeping/resting, no facial grimacing, no moaning, easy relaxed respirations. No symptoms to manage at this time. Flacc 0/10   and brother at bedside. 1500  leaving for today. Pt observed and appears to be sleeping/resting, no facial grimacing, no moaning, easy relaxed respirations. No symptoms to manage at this time. Flacc 0/10. Brother remains at bedside.     12 brother leaving, he struggles with stay and not sleeping well verses leaving her. I tried to reassure him that she wouldn't want him wearing himself out tomorrow. 1730 Pt observed and appears to be sleeping/resting, no facial grimacing, no moaning, easy relaxed respirations. No symptoms to manage at this time.  Flacc 0/10  Scheduled morphine given

## 2020-11-19 NOTE — PROGRESS NOTES
Progress Note    Patient: Sheridan Hoffman MRN: [id number]  SSN: [id number]    YOB: 1950  Age: 69 y.o. Sex: female      Admit Date: 11/7/2020    LOS: 12 days     Clinical Summary:     Ms. Sugar Pollack is lying in bed unresponsive but quite comfortable. There is minimal response to verbal and gentle tactile stimuli. There are no signs of respiratory distress. Vitals:    11/17/20 1334 11/18/20 0519 11/18/20 1749 11/19/20 0529   BP: (!) 100/49 (!) 82/49 126/60 (!) 85/45   Pulse: (!) 101 (!) 114 (!) 133 (!) 101   Resp: 16 22 24 22   Temp: (!) 95 °F (35 °C) (!) 96.5 °F (35.8 °C) 97.8 °F (36.6 °C) 97.2 °F (36.2 °C)            Clinical 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 ([facility] [address] (11/9/2020)    Active Problems:    Sequela of cardioembolic stroke (30/1/2152)        Treatment Plan:      I have reviewed the patient's Plan of Care with the nursing staff. Her  is at the bedside and we have talked. I reassured him regards her comfort level and likelihood of death within the next few days.           Current Facility-Administered Medications   Medication Dose Route Frequency    morphine injection 2 mg  2 mg IntraVENous Q6H    fentaNYL (DURAGESIC) 25 mcg/hr patch 1 Patch  1 Patch TransDERmal Q72H    morphine injection 4 mg  4 mg IntraVENous Q20MIN PRN    Or    morphine injection 4 mg  4 mg SubCUTAneous Q20MIN PRN    sodium chloride (NS) flush 3 mL  3 mL IntraVENous PRN    sodium chloride (NS) flush 3 mL  3 mL IntraVENous Q12H    LORazepam (ATIVAN) injection 1 mg  1 mg IntraVENous Q4H PRN    bisacodyL (DULCOLAX) suppository 10 mg  10 mg Rectal PRN    LORazepam (ATIVAN) injection 2 mg  2 mg IntraVENous Q10MIN PRN    PHENobarbital (LUMINAL) injection 65 mg  65 mg SubCUTAneous Q12H           Signed By: Alexa Young MD     November 19, 2020

## 2020-11-20 NOTE — [facility] IDG NURSE NOTES
Patient: Larey Claude    Date: 11/20/20  Time: 3:50 PM    hospitals Nurse Notes  F/U IDG: Pt is a 69-year-old female with sequala of stroke who is here GIP level of care for management of pain and agitation. Reid with UOP of 400 cc in past 24 hours. IV access in peripheral IV. PO intake: none. Wounds: skin intact. PRN medications: none in past 24 hours. Scheduled meds:  Fentanyl 25 mcg patch, Phenobarb 65 mg SQ q 12 hours, morphine 2 mg IV q 6 hours. Plan: Discontinue scheduled morphine. Comprehensive plan of care reviewed. IDG and pt./family in agreement with plan of care. The IDG identifies through on-going assessment when a change is needed to the POC; the pt/family will receive care and services necessitated by changes in POC. Medications reviewed by the pharmacist and Medical Director.         Signed by: Jeff Bonner RN

## 2020-11-20 NOTE — PROGRESS NOTES
Report received from [facility]. Care assumed and pt identified by name and . Pt resting in bed, unresponsive to stimuli, respirations present. No distress observed or voiced at this time and no s/sx of agitation, anxiety, pain, dyspnea, sob, n/v or seizures. FLACC score of 0/10. Bed low and locked, siderails x2, call light within pt's reach. Tab alert on and attached to pt. CNA operating under RN supervision. 2205:  No acute changes at this time. Pt remains sleeping with unlabored breathing. Call light remains in reach. Tab alarm remains connected to patient. No s/sx of agitation, anxiety, pain, dyspnea, sob, n/v or seizures. Bed in lowest locked position with siderails x2.    0015:  Pt resting in bed. No distress visualized. No s/sx of agitation, anxiety, pain, dyspnea, sob, n/v or seizures. Respirations present. Call light in reach. Tab alert on. Bed remains lowered and locked. 0221: Sleeping with respirations present. Call light in reach. No s/sx of distress observed or voiced. FLACC score 0/10.    0403:  No acute changes at this time. Pt remains sleeping with unlabored breathing. Call light remains in reach. Tab alarm remains connected to patient. No s/sx of agitation, anxiety, pain, dyspnea, sob, n/v or seizures. Bed in lowest locked position with siderails x2. Sleeping with respirations present. Call light in reach. No s/sx of distress observed or voiced. FLACC score 0/10. Report given to , RN.

## 2020-11-20 NOTE — [facility] IDG MASTER NOTE
[facility]  Date: 11/20/20  Time: 3:50 PM    ___________________    Patient: Larey Claude  Coverage Information:     Pay[place]     Plan: [place] MEDICARE PART A AND B     Subscriber ID: [id number]     Phone Number:   MRN: [id number]    Current Benefit Period: Benefit Period 1  Start Date: 11/7/2020  End Date: 2/4/2021        Hospice Attending Provider: Tianna Harrell [address] [place] [address]  [id number]  Phone: [phone]  Fax: [phone]    Level of Care: General Inpatient Care      ___________________    Diagnoses: There were no encounter diagnoses. Current Medications:    Current Facility-Administered Medications:     fentaNYL (DURAGESIC) 25 mcg/hr patch 1 Patch, 1 Patch, TransDERmal, Q72H, Denzel Dasilva NP, 1 Patch at 11/18/20 1313    morphine injection 4 mg, 4 mg, IntraVENous, Q20MIN PRN, 4 mg at 11/17/20 1650 **OR** morphine injection 4 mg, 4 mg, SubCUTAneous, Q20MIN PRN, Denzel Dasilva, NP    sodium chloride (NS) flush 3 mL, 3 mL, IntraVENous, PRN, Wynell Patron, NP, 3 mL at 11/19/20 0616    sodium chloride (NS) flush 3 mL, 3 mL, IntraVENous, Q12H, Perla Hannon T NP, Stopped at 11/20/20 1232    LORazepam (ATIVAN) injection 1 mg, 1 mg, IntraVENous, Q4H PRN, Perla Mutton T, NP, 1 mg at 11/17/20 1650    bisacodyL (DULCOLAX) suppository 10 mg, 10 mg, Rectal, PRN, Perla Mutton T, NP, 10 mg at 11/10/20 0557    LORazepam (ATIVAN) injection 2 mg, 2 mg, IntraVENous, Q10MIN PRN, Perla Mutton T, NP    PHENobarbital (LUMINAL) injection 65 mg, 65 mg, SubCUTAneous, Q12H, Perla Mutton T, NP, 65 mg at 11/20/20 7916    Orders:  Orders Placed This Encounter    IP CONSULT TO SPIRITUAL CARE     Standing Status:   Standing     Number of Occurrences:   1     Order Specific Question:   Reason for Consult: Answer: Once on week one, then PRN. For [facility] Only.  For contracted patients, their primary hospice will continue to manage spiritual care needs.  DIET PLEASURE     Standing Status:   Standing     Number of Occurrences:   1     Order Specific Question:   Likes/Dislikes/Preferences     Answer:   hold tray    VITAL SIGNS     Standing Status:   Standing     Number of Occurrences:   1    AVINA CATHETER, CARE     1. Avina Catheter care every shift and PRN  2. Notify Physician of Avina Catheter leakage, occlusion, gross adherent sediment or accidental removal  3. Change Avina 30 days after insertion. Standing Status:   Standing     Number of Occurrences:   880[address]    NURSING ASSESSMENT:  SPECIFY Assess for GIP, routine, or respite level of care. Q SHIFT Routine     Standing Status:   Standing     Number of Occurrences:   1     Order Specific Question:   Please describe the test or procedure you would like to order. Answer:   Assess for GIP, routine, or respite level of care.  BEDREST, COMPLETE     Standing Status:   Standing     Number of Occurrences:   1    WOUND CARE, DRESSING CHANGE     Wound Care:  Location: sacrum  Decubitus Wound Stage II (Cavalon)- Cleanse wound location with wound cleanser, pat dry and apply Cavilon Durable Barrier Cream every 12 hours and PRN if soiled. Turn every 2 hours. Assess with each nursing assessment visit. 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 ..     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    Other Hospice diagnoses:     Carcinoma metastatic to lung, right ([address] (C78.01)     Metastatic carcinoma to lung, left (HCC) (C78.02)     Bilateral pleural effusion (J90)     New onset seizure (HCC) (R56.9)     Hemiparesis affecting right side as late effect of stroke (HCC) (B73.097)     Aphasia as late effect of stroke (I69.320)     Altered sensorium (R40.4)     Intracranial pressure increased (G93.2)     Respiratory failure with hypoxia, unspecified chronicity (HCC) (J96.91)     Chronic systolic CHF (congestive heart failure) (HCC) (I50.22)     History of Hodgkin's disease (Z85.71)     Benefit Period 1  Start Date: 11/7/2020  End Date: 7/6/0903     I ghazala Padron has a prognosis for a life expectancy of 6 months or less if the terminal illness runs its normal course.     As evidenced by a 67-year-old woman with brain metastases, pulmonary metastases and pleural effusion from an undetermined anatomic origin and cell type.  Associated diagnoses include: New onset seizure disorder, right hemiparesis, altered sensorium, increased intracranial blood pressure, calvarial metastases, hypoxic respiratory failure.  The bilateral pleural effusions have been twice evacuated with a total volume of 3400 mL retrieved and 4 punctures.  These continue to reaccumulate rapidly.  Chronic LV systolic dysfunction (LVEF 35%) congestive heart failure, Hodgkin's disease treated to clinical remission, and thoracic hamartoma are diagnoses unrelated to metastatic cancer which adversely affect prognosis.  Acute left frontoparietal ischemic stroke with significant penumbra is related to her cancer in that it probably occurred secondary to hypercoagulability associated with very advanced malignant disease.  She has subsequent right hemiparesis and expressive aphasia with confusion.  Brain metastases include several left frontal lobe lesions, 2 pontine lesions and 2 right occipital lesions.  Pulmonary metastases are widely distributed in both lungs and have resulted in hypoxic respiratory failure.  The patient's spouse is significantly demented and her brother is serving as substituted decisional source. Anson Wayne has elected on her behalf to avoid invasive procedures for determining cell type as he will also refuse any disease modifying therapy with chemo or radiation. Florencio Round the circumstances this woman's life expectancy is less than 4 weeks. Sharonda Horan will be cared for at home with her  by her brother and his family with assistance from a age. Nanette Sandhu carotid atherosclerosis, occlusion proximal right common carotid artery, peripheral vascular disease or other diagnoses unrelated to cancer adversely affecting prognosis. Standing Status:   Standing     Number of Occurrences:   1     Order Specific Question:   Description of Order:     Answer:   admit    DO NOT RESUSCITATE     Standing Status:   Standing     Number of Occurrences:   1     Order Specific Question:   Comfort Measures Only? Answer: Yes    OXYGEN CANNULA Liters per minute: 2; Indications for O2 therapy: RESPIRATORY DISTRESS PRN Routine     Standing Status:   Standing     Number of Occurrences:   00548     Order Specific Question:   Liters per minute:      Answer:   2     Order Specific Question:   Indications for O2 therapy     Answer:   RESPIRATORY DISTRESS    sodium chloride (NS) flush 3 mL    DISCONTD: morphine injection 2 mg    DISCONTD: morphine injection 2 mg    LORazepam (ATIVAN) injection 1 mg    bisacodyL (DULCOLAX) suppository 10 mg    LORazepam (ATIVAN) injection 2 mg    PHENobarbital (LUMINAL) injection 65 mg    DISCONTD: dexAMETHasone (DECADRON) tablet 4 mg    DISCONTD: fentaNYL (DURAGESIC) 12 mcg/hr patch 1 Patch    DISCONTD: dexamethasone (DECADRON) 4 mg/mL injection 4 mg    sodium chloride (NS) flush 3 mL    fentaNYL (DURAGESIC) 25 mcg/hr patch 1 Patch    OR Linked Order Group     morphine injection 4 mg     morphine injection 4 mg    DISCONTD: morphine injection 2 mg    INITIAL PHYSICIAN ORDER: HOSPICE Level Of Care: General Inpatient; Reason for Admission: GIP management of pain and seizures in light of dysphagia secondary to stroke     Standing Status:   Standing     Number of Occurrences: 1     Order Specific Question:   Status     Answer:   Hospice     Order Specific Question:   Level Of Care     Answer:   General Inpatient     Order Specific Question:   Reason for Admission     Answer:   GIP management of pain and seizures in light of dysphagia secondary to stroke     Order Specific Question:   Inpatient Hospitalization Certified Necessary for the Following Reasons     Answer:   3. Patient receiving treatment that can only be provided in an inpatient setting (further clarification in H&P documentation)     Order Specific Question:   Admitting Diagnosis     Answer:   Sequela of cardioembolic stroke [[id number]]     Order Specific Question:   Terminal Prognosis Diagnosis(es)     Answer:   Metastasis to brain [facility]) [[id number]]     Order Specific Question:   Terminal Prognosis Diagnosis(es)     Answer:   Metastasis to lung [facility]) [[id number]]     Order Specific Question:   Terminal Prognosis Diagnosis(es)     Answer:   Lymphoma [facility]) [[id number]]     Order Specific Question:   Terminal Prognosis Diagnosis(es)     Answer:   Seizures ([facility] [[id number]]     Order Specific Question:   Terminal Prognosis Diagnosis(es)     Answer:   Right carotid artery occlusion [[id number]]     Order Specific Question:   Terminal Prognosis Diagnosis(es)     Answer:   Metastasis to spinal column [facility]) [[id number]]     Order Specific Question:   Admitting Physician     Answer:   Leora Orourke     Order Specific Question:   Attending Physician     Answer:   Leora Orourke     Order Specific Question:   Discharge Plan:     Answer:   Home with Home Hospice     Comments:    is frail    IP CONSULT TO SOCIAL WORK     Standing Status:   Standing     Number of Occurrences:   1     Order Specific Question:   Reason for Consult: Answer: For [facility] Only. For contracted patients, their primary hospice will continue to manage social work needs. Allergies:   Allergies   Allergen Reactions    Compazine [Prochlorperazine Edisylate] Swelling       Care Plan:  Multidisciplinary Problems (Active)     Problem: Anticipatory Grief     Dates: Start: 11/10/20       Disciplines: Interdisciplinary    Goal: Grief heard and acknowledged, anxiety reduced, patient coping identified, patient/family expressed gratitude     Dates: Start: 11/10/20   Expected End: 11/20/20       Description: Patient/ family will acknowledge feelings of anxiety and grief and utilize available support to assist in their grief journey. Disciplines: Interdisciplinary    Intervention: Assess grief responses     Dates: Start: 11/10/20       Description: [employer] will assess grief responses with each visit. Intervention: Support grieving process     Dates: Start: 11/10/20       Description: [employer] will support the grief process by providing opportunities for expression of feelings while attempting  to normalize the journey. Problem: End of Life Process     Dates: Start: 11/07/20       Description: Pt  and brother will be able to describe the changes indicate near death  Give  and brother a blue book   Answer questions     Disciplines: Interdisciplinary    Goal: Demonstrate understanding of end of life processes     Dates: Start: 11/07/20   Expected End: 11/21/20       Description: Cindy Sumner will understand end of life processes.     Disciplines: Interdisciplinary    Intervention: Assess for signs/symptoms of terminal restlessness     Dates: Start: 11/07/20             Intervention: Lala Bright on strategies to reduce terminal restlessness     Dates: Start: 11/07/20             Intervention: Instruct on the dying process     Dates: Start: 11/07/20             Intervention: Instruct: imminent death     Dates: Start: 11/07/20             Intervention: Instruct: process at end of life     Dates: Start: 11/07/20                         Problem: Falls - Risk of     Dates: Start: 11/09/20       Description: Pt will remain free from falls aeb no injuries while at [facility]. Disciplines: Interdisciplinary    Goal: *Absence of Falls     Dates: Start: 11/09/20   Expected End: 11/20/20       Description: Document Kennedy Merchant Fall Risk and appropriate interventions in the flowsheet. Disciplines: Interdisciplinary                Problem: Infection - Risk of, Urinary Catheter-Associated Urinary Tract Infection     Dates: Start: 11/16/20       Disciplines: Interdisciplinary    Goal: *Absence of infection signs and symptoms     Dates: Start: 11/16/20   Expected End: 11/23/20       Disciplines: Interdisciplinary    Intervention: Urinary catheter needs assessment (eg: Impaired neurologic status; post void residual; spinal cord injury; urinary outflow obstruction; urinary retention; urinary incontinence; fluid imbalance)     Dates: Start: 11/16/20             Intervention: Urine assessment (eg: Clarity; color; odor; volume)     Dates: Start: 11/16/20             Intervention: Infection signs and symptoms monitoring - urinary tract (eg: Flank or suprapubic pain; burning; fever; dysuria; frequency; urgency; cloudy/bloody/foul odor urine; confusion/agitation; incontinence)     Dates: Start: 11/16/20             Intervention: Lab monitoring (eg: Urinalysis; culture and sensitivity; complete blood count with differential)     Dates: Start: 11/16/20             Intervention: Urinary catheter management (eg: Closed urinary catheter system maintenance; urinary catheter patency with free downhill flow; perineal care; monitor intake and output)     Dates: Start: 11/16/20                         Problem: Pain     Dates: Start: 11/07/20       Description: Pt will remain free from pain aeb pain score or flacc less than 4 while at [facility].           Disciplines: Interdisciplinary    Goal: Verbalize satisfaction of level of comfort and symptom control     Dates: Start: 11/07/20   Expected End: 11/21/20 Description: Pt pain would be less then 4/10  Morphine 2 mg IV/SQ q 20 minutes prn     Disciplines: Interdisciplinary    Intervention: Assess effectiveness of pain management     Dates: Start: 11/07/20       Description: Pt will be relaxed not moaning or groaning or attempting to get out of bed  Ativan 1 mg IV/IM q 4 hours prn           Intervention: Assess for signs/symptoms of acute pain     Dates: Start: 11/07/20             Intervention: Assess for signs/symptoms of chronic pain     Dates: Start: 11/07/20             Intervention: Ann-Marie Feng on non-pharmacological pain management     Dates: Start: 11/07/20             Intervention: Ann-Marie Feng on pain scales     Dates: Start: 11/07/20             Intervention: Instruct on pharmacological pain management     Dates: Start: 11/07/20                         Problem: Pressure Injury - Risk of     Dates: Start: 11/07/20       Description: Pt will remain free from falls aeb no injuries while at [facility]. Disciplines: Interdisciplinary    Goal: *Prevention of pressure injury     Dates: Start: 11/07/20   Expected End: 11/21/20       Description: Document Bhupendra Scale and appropriate interventions in the flowsheet.     Disciplines: Interdisciplinary                  Care Plan Problems/Goals      Progressing Towards Goal (6)      *Prevention of pressure injury (Pressure Injury - Risk of)    Disciplines:  Interdisciplinary Expected end:  11/21/20        Outcome: Progressing Towards Goal By Harinder Yanez RN on 11/19/20 5904            Verbalize satisfaction of level of comfort and symptom control (Pain)    Disciplines:  Interdisciplinary Expected end:  11/21/20        Outcome: Progressing Towards Goal By Harinder Yanez RN on 11/19/20 2552            Demonstrate understanding of end of life processes (End of Life Process)    Disciplines:  Interdisciplinary Expected end:  11/21/20        Outcome: Progressing Towards Goal By Mook Mcgraw RN on 11/20/20 1519            *Absence of Falls (Falls - Risk of)    Disciplines:  Interdisciplinary Expected end:  11/20/20        Outcome: Progressing Towards Goal By Lb Piña RN on 11/19/20 7612            Grief heard and acknowledged, anxiety reduced, patient coping identified, patient/family expressed gratitude (Anticipatory Grief)    Disciplines:  Interdisciplinary Expected end:  11/20/20        Outcome: Progressing Towards Goal By Francisco Javier Aguilar on 11/10/20 1554            *Absence of infection signs and symptoms (Infection - Risk of, Urinary Catheter-Associated Urinary Tract Infection)    Disciplines:  Interdisciplinary Expected end:  11/23/20        Outcome: Progressing Towards Goal By Wayne Gonzalez RN on 11/19/20 0439                         Resolved/Met (4)      Patient/Family Education (Patient Education: Go to Patient Education Activity)    Disciplines:  Interdisciplinary Expected end:  11/21/20        Outcome: Resolved/Met By Michael Cosby RN on 11/11/20 1702            Demonstrate understanding of hospice philosophy, plan of care, and home hospice program (Hospice Orientation)    Disciplines:  Interdisciplinary Expected end:  11/13/20        Outcome: Resolved/Met By John Nesbitt RN on 11/09/20 1657            Verbalize and demonstrate ability to manage anxiety (Anxiety/Agitation)    Disciplines:  Interdisciplinary Expected end:  11/21/20        Outcome: Resolved/Met By Michael Cosby RN on 11/11/20 1702            Patient/Family Education (Patient Education: Go to Patient Education Activity)    Disciplines:  Interdisciplinary Expected end:  -        Outcome: Resolved/Met By Michael Cosby RN on 11/11/20 1702                              ___________________    Care Team Notes          POC/IDG Notes      [facility] IDG Nurse Notes by Michael Cosby RN at 11/20/20 1550  Version 1 of 1    Author:  Michael Cosby RN Service:  Hospice and Palliative Care Author Type:  Registered Nurse Filed:  11/20/20 1550 Date of Service:  11/20/20 1550 Status:  Signed    :  Nagi Decker RN (Registered Nurse)       Patient: Jose D Boles    Date: 11/20/20  Time: 3:50 PM    [facility] Nurse Notes  F/U IDG: Pt is a 76-year-old female with sequala of stroke who is here [facility] level of care for management of pain and agitation. Reid with UOP of 400 cc in past 24 hours. IV access in peripheral IV. PO intake: none. Wounds: skin intact. PRN medications: none in past 24 hours. Scheduled meds:  Fentanyl 25 mcg patch, Phenobarb 65 mg SQ q 12 hours, morphine 2 mg IV q 6 hours. Plan: Discontinue scheduled morphine. Comprehensive plan of care reviewed. IDG and pt./family in agreement with plan of care. The IDG identifies through on-going assessment when a change is needed to the POC; the pt/family will receive care and services necessitated by changes in POC. Medications reviewed by the pharmacist and Medical Director. Signed by: Yoni Morris RN       [facility] IDG  Notes by Hodan Aldana at 11/20/20 0919  Version 1 of 1    Author:  Hodan Aldana Service:  Licensed Clinical  Author Type:      Filed:  11/20/20 1320 Date of Service:  11/20/20 0919 Status:  Signed    :  Hodan Aldana ()       Patient: Jose D Boles    Date: 11/20/20  Time: 9:19 AM    [facility]  Notes  Pt is here [facility] loc. LMSW will continue on with providing emotional support and community resources as the pt and family need. Continue on with the care plan as written.        Signed by: Hernán Urbano       [facility] IDG Nurse Notes by Nagi Decker RN at 11/16/20 1328  Version 1 of 1    Author:  Nagi Decker RN Service:  Hospice and Palliative Care Author Type:  Registered Nurse    Filed:  11/16/20 1328 Date of Service:  11/16/20 1328 Status:  Signed    :  Nagi Decker RN (Registered Nurse)       Patient: Jose D Boles    Date: 11/16/20  Time: 1:28 PM    [facility] Nurse Notes  F/U IDG: Pt is a 48-year-old female with metastasis of brain of unknown origin who is here GIP level of care for management of pain, agitation, and seizure management. Pt with irregular respiratory rate. Reid with UOP of 250cc. Reid changed out yesterday. IV access in right AC. PO intake: none. Wounds: stage II sacrum. PRN medications: Ativan 1 mg IV x 1 and morphine 4 mg IV x 3 for pain. Scheduled meds:  Fentanyl 25 mcg patch, Decadron 4 mg IV q 8 hours, phenobarb 65 mg SQ q 12 hours. Plan: Discontinue Decadron. Comprehensive plan of care reviewed. IDG and pt./family in agreement with plan of care. The IDG identifies through on-going assessment when a change is needed to the POC; the pt/family will receive care and services necessitated by changes in POC. Medications reviewed by the pharmacist and Medical Director. Signed by: Pierre Treadwell RN       [address] [place] [address]  Notes by Lakisha Ho at 11/16/20 1035  Version 1 of 1    Author:  Lakisha Ho Service:  Spiritual Care Author Type:  Pastoral Care    Filed:  11/16/20 1156 Date of Service:  11/16/20 1035 Status:  Signed    :  Lakisha Ho (Pastoral Care)       Patient: José Fix    Date: 11/16/20  Time: 10:35 AM    [facility]  Notes  Intervention: Ministry of presence, family support, prayer. Outcome: Conversation with family. Plan: Continued spiritual and emotional support. Grief support. Signed by: Scott Smith       [address] [place] [address]  Notes by Verenice Flores at 11/16/20 0830  Version 1 of 1    Author:  Verenice Flores Service:  Licensed Clinical  Author Type:      Filed:  11/16/20 1155 Date of Service:  11/16/20 0830 Status:  Signed    :  Verenice Flores ()       Patient: José Fix    Date: 11/16/20  Time: 8:30 AM    [facility]  Notes  LMSW will continue to provide support to the pt and her family.   Family has concerns over what will happen with the spouse who has cogitative concerns. Resources given to the family for the spouses specific needs. Family lost the resources I previously gave them. Second resources given. Continue with care plan as written. The volunteer program has been suspended due to the Covid-19 virus. LMSW will ensure the pt and families receive their comfort bags. Signed by: Aden Allen       [facility] IDG Nurse Notes by Floridalma Chan RN at 11/12/20 1253  Version 1 of 1    Author:  Floridalma Chan RN Service:  Hospice and Palliative Care Author Type:  Registered Nurse    Filed:  11/12/20 1254 Date of Service:  11/12/20 1253 Status:  Signed    :  Floridalma Chan RN (Registered Nurse)       Patient: Madison Overton    Date: 11/12/20  Time: 12:53 PM    [facility] Nurse Notes  F/U IDG: Pt is a 74-year-old female with metastasis to brain of unknown origin who is here GIP level of care for management of pain and agitation. Reid with UOP of 300cc. IV access via right forearm. PO intake: none. Wounds: stage II sacrum. PRN medications: Ativan 1 mg IV x1 for agitation, morphine 2 mg IV x 3 for pain. Scheduled meds:  phenobarb 65 mg SQ q 12 hours, Decadron 4 mg IV x 8 hours, Fentanyl 12 mcg patch. Plan: Increase Fentanyl to 25 mcg patch, increase PRN morphine to 4 mg IV/SQ q 20 min. Comprehensive plan of care reviewed. IDG and pt./family in agreement with plan of care. The IDG identifies through on-going assessment when a change is needed to the POC; the pt/family will receive care and services necessitated by changes in POC. Medications reviewed by the pharmacist and Medical Director.         Signed by: Edy Burris RN       [address] IDG  Notes by Samantha Garcia at 11/12/20 1153  Version 1 of 1    Author:  Samantha Garcia Service:  Spiritual Care Author Type:  Pastoral Care    Filed:  11/12/20 1200 Date of Service:  11/12/20 1153 Status:  Signed    :  Samantha Garcia (Pastoral Care) Patient: Williams Padron    Date: 11/12/20  Time: 11:53 AM    [facility]  Notes    Intervention: Spiritual and Bereavement Assessments completed. Ministry of presence, family support, prayer, meaningful conversation with brother and . Outcome: Family shared their love for patient, Brother shared his concerns for his brother in law. Plan: Continue to provide spiritual and emotional support with focus on anticipatory grief. Signed by: Christopher AMBROSIOR [facility] IDG  Notes by Mariela Ricardo at 11/12/20 6065  Version 1 of 1    Author:  Mariela Ricardo Service:  Licensed Clinical  Author Type:      Filed:  11/12/20 1159 Date of Service:  11/12/20 0851 Status:  Signed    :  Mariela Ricardo ()       Patient: Williams Padron    Date: 11/12/20  Time: 8:41 AM    [facility] [place]  Notes  SW Has completed the  initial assessment with the family. The family is very concerned about her spouse Padmini Storey. He has some cognitive disability and his wife was his care giver. He has no support here locally. All of his family lives in [place]. [facility] has provided the family with multiple resources to obtain a geriatric case manager in the area. Suggested contacting his PCP for additional referrals if needed for home health/ hospice  The volunteer program has been suspended due to the Covid-19 virus. Continue on with care plan as written. Working towards all goals.          Signed by: Mony Wilson       [facility] [place] IDG Nurse Notes by Vinod Oliveira RN at 11/09/20 1316  Version 1 of 1    Author:  Vinod Oliveira RN Service:  Hospice and Palliative Care Author Type:  Registered Nurse    Filed:  11/09/20 1317 Date of Service:  11/09/20 1316 Status:  Signed    :  Vinod Oliveira RN (Registered Nurse)       Patient: Williams Padron    Date: 11/09/20  Time: 1:16 PM    [facility] [place] Nurse Notes  1st IDG: Pt is a 76-year-old female with sequalae of stroke who is here GIP level of care for management of pain, agitation. BP 91/51. Reid with UOP of 540cc. IV access in right AC. 2l/min of oxygen. PO intake: bites and sips. Wounds: stage II on sacrum. PRN medications:  Morphine 2 mg IV x 3 for pain. Scheduled meds: Decadron 4 mg PO q 8h, Phenobarb 65 mg SQ q 12h. Plan: Add Fentanyl 12 mcg patch. Comprehensive plan of care reviewed. IDG and pt./family in agreement with plan of care. The IDG identifies through on-going assessment when a change is needed to the POC; the pt/family will receive care and services necessitated by changes in POC. Medications reviewed by the pharmacist and Medical Director. Signed by: Aaron Dozier RN       [facility] IDG  Notes by Chan Jain at 11/09/20 1204  Version 1 of 1    Author:  Chan Jain Service:  Spiritual Care Author Type:  Pastoral Care    Filed:  11/09/20 1205 Date of Service:  11/09/20 1204 Status:  Signed    :  Chan Jain (Pastoral Care)       Patient: Yumiko Mcleod    Date: 11/09/20  Time: 12:04 PM    [facility]  Notes    Assessments pending for spiritual and bereavement support. Signed by: Ramon Cooper       [facility] IDG  Notes by Adama Dimas at 11/09/20 1109  Version 1 of 1    Author:  Adama Dimas Service:  Licensed Clinical  Author Type:      Filed:  11/09/20 1204 Date of Service:  11/09/20 1109 Status:  Signed    :  Adama Dimas ()       Patient: Yumiko Mcleod    Date: 11/09/20  Time: 11:09 AM    [facility]  Notes  LMSW will meet with the pt and her family to complete the SW initial assessment. After the assessment is done the care plan will be completed. The volunteer program has been suspended due to the Covid-19 virus. LMSW will ensure the pt and families receive their comfort bags.         Signed by: Ambreen Mott       [facility] IDG  Notes by Adama Dimas at 11/09/20 1022  Version 1 of 1    Author:  Kenzie Prieto Service:  Licensed Clinical  Author Type:      Filed:  11/09/20 1022 Date of Service:  11/09/20 1022 Status:  Signed    :  Kenzie Prieto ()       Patient: Vanessa Moon    Date: 11/09/20  Time: 10:22 AM    [facility]  Notes  LMSW has read and agrees with the RN initial assessment. LMSW will meet with pt and family to complete the SW assessment. The volunteer program has been suspended due to the Covid-19 virus. LMSW will ensure the pt and families receive their comfort bags. LMSW will meet with the patient and family to complete the SW initial assessment. The care plan will be created from there. Signed by: Lauren Moss       [facility] IDG  Notes by Awa Bates at 11/09/20 0932  Version 1 of 1    Author:  Awa Bates Service:  Spiritual Care Author Type:  Pastoral Care    Filed:  11/09/20 0933 Date of Service:  11/09/20 0932 Status:  Signed    :  Awa Bates (Pastoral Care)       Patient: Vanessa Moon    Date: 11/09/20  Time: 9:32 AM    [facility]  Notes    / Grief Counselor has reviewed  Initial Comprehensive Assessment and plan of care. Bereavement and Spiritual Care Assessments to be completed and plan of care put in place to meet the needs, requests and referrals.         Signed by: Iftikhar TARIQ [facility] IDG Nurse Notes by Brianda Granados RN at 11/07/20 1742  Version 1 of 1    Author:  Brianda Granados RN Service:  NURSING Author Type:  Registered Nurse    Filed:  11/07/20 1742 Date of Service:  11/07/20 1742 Status:  Signed    :  Brianda Granados RN (Registered Nurse)       Patient: Vanessa Moon    Date: 11/07/20  Time: 5:42 PM    [facility] Nurse Notes  Ms Sruthi Waldrop is a 69year old female admitted to room 111 General in patient for sequalae of stroke along with brain metastasis of unknown primary origin, pulmonary metastasis, pulmonary edema, pulmonary effusions and increased intercranial pressure which altogether gives her a poor prognosis. She's admitted for control of pain, agitation and seizures. Pt arrived an tolerated transfer to bed with minimal complaint of pain. Pt denies pain, nausea or shortness of breath. Physical assessment completed. Lung sounds clear but diminished in bases, heart sounds irregular, pt can answer name and date of birth but not date or current events. Bowel sounds hypoactive, pt has bustamante draining clear yellow urine. Pt extremities cool to cold. Upper extremities pale with cyanotic nail beds, cool with palpable radial pulse. Lower extremities cold, cyanotic, non palpable pedal pulses. Pt has iv that flushed without difficulty. Pt has , Bacilio Fernández, and brother, Katie Nevarez that visited. Bacilio Fernández appears confused asking what we are going to do next, wondered aimlessly from room asking each time what we were going to do. Explained the change of emphasis from treatment to comfort measures. Explained if pt has pain or discomfort we will medicate for that. Pt brother, Katie Nevarez, appeared to understand and agreed with the emphasis on comfort. Pt states she has no children so her brother and  are her supporters. Pt asking for a drink of apple juice, gave her apple juice, water and strawberry ice cream and a couple of icee pops.    1300 pt ate half the the strawberry ice cream, one icee pop and apple juice and half the water.      1500 pt used call light, pt states she's uncomfortable. Repositioned.  Pt voice garbled and confused.           Signed by: Yolis Horan RN                Care Team Present:   Team Members Present: Physician, Nurse, , [address]  Physician Team Member: Sue Menchaca MD  Nurse Team Member: Paul Miller, MARAL  Social Work Team Member: Milvia Fernandez, [address] [facility] Team Member: Cherry Flannerychleroy

## 2020-11-20 NOTE — PROGRESS NOTES
3537 - Report received from [address] identified.  Patient GIP level of care with hospice diagnosis of sequalae of stroke; brain mets of unknown origin.  Patient in bed, eyes closed.  No s/sx anxiety, agitation, NVD or respiratory distress.  FLACC 0/10.  Fent 25mcg to L arm verified.  Bed in lowest, locked position, call light within reach, tab alert on, and SR up for safety. 8421 - Assessment complete (see flowsheets). Scheduled phenobarbital given per orders. Patient in bed,eyes closed. No s/sx anxiety, agitation, NVD or respiratory distress.  FLACC 0/10.     1040 - Patient in bed,eyes closed. No s/sx anxiety, agitation, NVD or respiratory distress.  FLACC 0/10. Brother and  at bedside. Provided with coffee and water per request.    5632 - Patient in bed,eyes closed. No s/sx anxiety, agitation, NVD or respiratory distress.  FLACC 0/10. Brother and  at bedside. Provided with coffee per request.  Scheduled morphine held due to patient's condition (obtunded and no s/sx pain or resp distress). 1449 - Patient in bed,eyes closed. No s/sx anxiety, agitation, NVD or respiratory distress.  FLACC 0/10.    1733 - Patient in bed,eyes closed. No s/sx anxiety, agitation, NVD or respiratory distress.  FLACC 0/10.     Report given to [address]

## 2020-11-20 NOTE — [facility] IDG SOCIAL WORKER NOTES
Patient: Jacob Gauthier    Date: 11/20/20  Time: 9:19 AM    [facility]  Notes  Pt is here GIP loc. LMSW will continue on with providing emotional support and community resources as the pt and family need. Continue on with the care plan as written.        Signed by: Lo Chandra

## 2020-11-20 NOTE — PROGRESS NOTES
Progress Note    Patient: Gila Smith MRN: [id number]  SSN: [id number]    YOB: 1950  Age: 69 y.o. Sex: female      Admit Date: 11/7/2020    LOS: 13 days     Subjective:     Unresponsive. NPO. Review of Systems:  Review of systems not obtained due to patient factors. Objective:     Vitals:    11/18/20 1749 11/19/20 0529 11/19/20 1434 11/20/20 0436   BP: 126/60 (!) 85/45 (!) 96/49 119/60   Pulse: (!) 133 (!) 101 (!) 101 (!) 103   Resp: 24 22 20 22   Temp: 97.8 °F (36.6 °C) 97.2 °F (36.2 °C) 96.9 °F (36.1 °C) (!) 96.2 °F (35.7 °C)        Intake and Output:  Current Shift: No intake/output data recorded. Last three shifts: 11/18 1901 - 11/20 0700  In: -   Out: 400 [Urine:400]    Physical Exam:  GENERAL: Unresponsive, mild distress, appears older than stated age, pale, cachectic  LUNG: Clear diminished breath sounds with labored respirations.   HEART: regular rate and rhythm  ABDOMEN: soft, non-tender. Bowel sounds hypoactive. : Reid catheter with orestes urine.   EXTREMITIES:  extremities with + pulses. Mild generalized edema. SKIN: Pale. Cool to touch. Stage 2 wound to sacrum. Peripheral IV in the right antecubital with dressing intact.   NEUROLOGIC: Unresponsive. Right sided weakness. Bedbound.    PSYCHIATRIC: agitated with noxious stimuli. Lab/Data Review:  No new labs resulted in the last 24 hours.     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 ([facility] [address] (11/9/2020)    Active Problems:    Sequela of cardioembolic stroke (92/8/8524)        Plan:     Current Facility-Administered Medications   Medication Dose Route Frequency    fentaNYL (DURAGESIC) 25 mcg/hr patch 1 Patch  1 Patch TransDERmal Q72H    morphine injection 4 mg  4 mg IntraVENous Q20MIN PRN    Or    morphine injection 4 mg  4 mg SubCUTAneous Q20MIN PRN    sodium chloride (NS) flush 3 mL  3 mL IntraVENous PRN    sodium chloride (NS) flush 3 mL  3 mL IntraVENous Q12H    LORazepam (ATIVAN) injection 1 mg  1 mg IntraVENous Q4H PRN    bisacodyL (DULCOLAX) suppository 10 mg  10 mg Rectal PRN    LORazepam (ATIVAN) injection 2 mg  2 mg IntraVENous Q10MIN PRN    PHENobarbital (LUMINAL) injection 65 mg  65 mg SubCUTAneous Q12H       11/7: (SFD) Admitted GIP with 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      1. Pain: Morphine 4mg IV/SQ Q20 minutes as needed. Fentanyl 25mcg patch in place.      2. Dyspnea: Morphine 4mg IV/SQ Q20 minutes as needed. Glycopyrrolate 0.2mg q4 prn secretions. Oxygen at 2 L/min prn.     3. Agitation: Lorazepam 1mg IV/IM q4 hours prn.     4. Seizures: Phenobarbital 65mg SQ Q12. Lorazepam as ordered prn sustained seizure activity and notify provider.      5. Family/Pt Support: Family at bedside during exam. Medications and plan of care discussed with nursing staff and family. Will continue to monitor for symptoms and adjust medications as needed to maintain patient comfort. PPS 10%. Case discussed with Dr. Valle and in [address] [place] [address]. MI scheduled morphine.     Signed By: Leticia Garcia NP     November 20, 2020

## 2020-11-20 NOTE — [facility] IDG CHAPLAIN NOTES
Patient: Cristiano Dubois    Date: 11/20/20  Time: 4:00 PM    [facility]  Notes    Patient admitted to hospice house on 11/7/20. .  Initial Assessments completed by Ele Montez with follow up support provided. Patient/family are [employer]. Plan of Care:  Spiritual care and support to be provided as needed, requested, or referred.         Signed by: Krysta Nolasco

## 2020-11-21 NOTE — PROGRESS NOTES
Report received from Yina, [facility]. Care assumed and pt identified by name and . Pt resting in bed, a&ox , respirations present. No distress observed or voiced at this time and no s/sx of agitation, anxiety, pain, dyspnea, sob, n/v or seizures. FLACC score of 0/10. Bed low and locked, siderails x2, call light within pt's reach. Tab alert on and attached to pt. CNA operating under RN supervision. 2020No acute changes. Sleeping with respirations present and unlabored. Relaxed facial features at this time. Call light in reach. No s/sx of distress observed or voiced. FLACC score 0/10.    2237Sleeping with respirations present and unlabored. Call light in reach. No s/sx of distress observed or voiced. FLACC score 0/10.    0019Sleeping with respirations present. Call light in reach. No s/sx of distress observed or voiced. FLACC score 0/10.    0234No acute changes. Sleeping with respirations present. Relaxed facial features at this time. Call light in reach. No s/sx of distress observed or voiced. FLACC score 0/10.    0400:  No acute changes at this time. Pt remains sleeping with unlabored breathing. Call light remains in reach. Tab alarm remains connected to patient. No s/sx of agitation, anxiety, pain, dyspnea, sob, n/v or seizures. Bed in lowest locked position with siderails x2.    0600  Pt resting in bed. No distress visualized. No s/sx of agitation, anxiety, pain, dyspnea, sob, n/v or seizures. Respirations present. Call light in reach. Tab alert on. Bed remains lowered and locked. Ried anchor replaced. Report to MARAL BRUSH.

## 2020-11-21 NOTE — PROGRESS NOTES
1739 - Report received from Slim [address] 1257 identified.  Patient GIP level of care with hospice diagnosis of sequalae of stroke; brain mets of unknown origin.  Patient in bed, eyes closed.  No s/sx anxiety, agitation, NVD or respiratory distress.  FLACC 0/10.  Fent 25mcg to L arm verified.  Bed in lowest, locked position, call light within reach, tab alert on, and SR up for safety.      0843 - Assessment complete (see flowsheets). Scheduled phenobarbital given per orders. IV flushed per orders. Patient in bed, eyes closed. No s/sx anxiety, agitation, NVD or respiratory distress. FLACC 0/10.    1045 - Patient in bed, eyes closed. No s/sx anxiety, agitation, NVD or respiratory distress. FLACC 0/10. Brother and  at bedside. Provided with coffee and ice water per request.    1232 - Scheduled fentanyl applied to patient's L chest.  Old removed and wasted with [facility] RN. Patient in bed, eyes closed. No s/sx anxiety, agitation, NVD or respiratory distress. FLACC 0/10.  and brother remain at bedside. Provided with coffee per request.    3499 - Patient in bed, eyes closed.  No s/sx anxiety, agitation, NVD or respiratory distress.  FLACC 0/10.  and brother remain at bedside. 1730 - Patient in bed, eyes closed.  No s/sx anxiety, agitation, NVD or respiratory distress.  FLACC 0/10.     Report given to on-coming RN

## 2020-11-22 NOTE — PROGRESS NOTES
Problem: Pressure Injury - Risk of  Goal: *Prevention of pressure injury  Description: Document Bhupendra Scale and appropriate interventions in the flowsheet. Outcome: Progressing Towards Goal  Note: Pressure Injury Interventions:  Sensory Interventions: Assess changes in LOC, Float heels, Pressure redistribution bed/mattress (bed type), Check visual cues for pain, Keep linens dry and wrinkle-free    Moisture Interventions: Absorbent underpads, Internal/External urinary devices, Minimize layers, Moisture barrier, Limit adult briefs, Apply protective barrier, creams and emollients    Activity Interventions: Pressure redistribution bed/mattress(bed type)    Mobility Interventions: Pressure redistribution bed/mattress (bed type)    Nutrition Interventions: Document food/fluid/supplement intake    Friction and Shear Interventions: Apply protective barrier, creams and emollients, Minimize layers, Lift sheet                Problem: Pain  Goal: Verbalize satisfaction of level of comfort and symptom control  Description: Pt pain would be less then 4/10  Morphine 2 mg IV/SQ q 20 minutes prn   Outcome: Progressing Towards Goal     Problem: Falls - Risk of  Goal: *Absence of Falls  Description: Document Richi Fall Risk and appropriate interventions in the flowsheet.   Outcome: Progressing Towards Goal  Note: Fall Risk Interventions:  Mobility Interventions: Bed/chair exit alarm    Mentation Interventions: Bed/chair exit alarm, Door open when patient unattended, Evaluate medications/consider consulting pharmacy    Medication Interventions: Bed/chair exit alarm, Evaluate medications/consider consulting pharmacy    Elimination Interventions: Bed/chair exit alarm, Call light in reach              Problem: Infection - Risk of, Urinary Catheter-Associated Urinary Tract Infection  Goal: *Absence of infection signs and symptoms  Outcome: Progressing Towards Goal

## 2020-11-22 NOTE — PROGRESS NOTES
Aleksander Mcclellan [address] report from Lynsey Seals RN. Pt Id by name and  during walking walking rounds. 2017  Pt lying in  Bed with eyes closed. No facial grimace. Non interactive. Resp irreg non labored on RA. Lungs diminished. HR irreg. BS hypo. No edema noted. Reid cath draining clear orestes urine. Fentanyl 25 mcg/hr intact left chest and dated 20. SR up x 2. Bed low/locked. Call light with in reach. Door opened. 2100  Scheduled phenobarbital 65 mg sq given. Peripheral line flushed with 3 cc ns. Flushed well. Dressing clean/dry/intact. 2311  Pt with eyes closed. No moans. No facial grimace. Flacc =0-1. Resp shallow irreg non labored on RA. Reid cath leaking. Reid flushed with 10 cc ns. SR up x 2. Bed low/locked. Call light with in reach. Door opened. 0135  Pt with eyes closed. No facial grimacing. No s/sx of distress. Resp shallow irreg on RA. SR up x 2. Bed low/locked. Call light with in reach. Door opened. 0321  Pt with eyes closed. No facial grimacing. No s/sx of distress. Resp shallow irreg on RA. SR up x 2. Bed low/locked. Call light with in reach. Door opened. 0550  Pt lying in bed. Eyes closed. No s/sx of distress. No facial grimace. Flacc =0-1. Resp irreg on RA. SR up x 2. Bed low/locked. Call light with in reach. Door opened.      Report given to Lynsey Seals RN

## 2020-11-22 NOTE — PROGRESS NOTES
1500 - Report received from Jovanna Longo RN.  Patient identified.  Patient GIP level of care with hospice diagnosis of sequalae of stroke; brain mets of unknown origin.  Patient in bed, eyes closed.  No s/sx anxiety, agitation, NVD or respiratory distress.  FLACC 0/10.  Fent 25mcg to L chest verified.  Bed in lowest, locked position, call light within reach, tab alert on, and SR up for safety.     0843 - Assessment complete (see flowsheets). Scheduled phenobarbital given per orders. IV flushed per orders. Patient in bed, eyes closed.  No s/sx anxiety, agitation, NVD or respiratory distress.  FLACC 0/10.    1040 - Patient in bed, eyes closed.  No s/sx anxiety, agitation, NVD or respiratory distress.  FLACC 0/10. Family at bedside, now including RUPALI who is a retired hospice and ICU RN. Given update. Family provided with coffee and water. 1250 - Patient in bed, eyes closed.  No s/sx anxiety, agitation, NVD or respiratory distress.  FLACC 0/10.  and brother at bedside. 1441 - Patient in bed, eyes closed.  No s/sx anxiety, agitation, NVD or respiratory distress.  FLACC 0/10.  and brother at bedside. 1711 - Patient in bed, eyes closed.  No s/sx anxiety, agitation, NVD or respiratory distress.  FLACC 0/10.     Report given to Shayne Felix RN Anesthesia ROS/Med Hx    Overall Review:  Pts. EKG was reviewed     Pulmonary Review:    Pt. positive for sleep apnea     Cardiovascular Review:    Pt. positive for valvular problems/murmurs (benign heart murmer)  Pt. positive for hypertension    End/Other Review:    Pt. positive for obesity class I - 30.00 - 34.99      Anesthesia Plan     ASA Status: 2  Anesthesia Type: General  Induction: Intravenous  Preferred Airway Type: ETT  Reviewed: Lab Results, EKG, Allergies, Problem List, DNR Status, Past Med History, Medications, Nursing Notes, Patient Summary, Consultations and Beta Blocker Status  The proposed anesthetic plan, including its risks and benefits, have been discussed with the Patient - along with the risks and benefits of alternatives.  Questions were encouraged and answered and the patient and/or representative agrees to proceed.  Blood Products: Not Anticipated      Physical Exam  Mallampati: III  TM Distance: >3 FB  Neck ROM: Full  Cardio Rhythm: Regular  Cardio Rate: Normal  Patient demonstrates: Murmur  Breath sounds clear to auscultation:  Yes  pulmonary exam normal  Patient Demonstrates:  Obese

## 2020-11-23 NOTE — PROGRESS NOTES
Progress Note    Patient: Anneliese Torres MRN: [id number]  SSN: [id number]    YOB: 1950  Age: 69 y.o. Sex: female      Admit Date: 11/7/2020    LOS: 16 days     Subjective:     Unresponsive. NPO. Hypotensive. Review of Systems:  Review of systems not obtained due to patient factors. Objective:     Vitals:    11/21/20 1733 11/22/20 0428 11/22/20 1713 11/23/20 0332   BP: (!) 94/40  (!) 104/54 (!) 69/34   Pulse: (!) 105  (!) 107 (!) 101   Resp: 16  16 16   Temp: 97.5 °F (36.4 °C) (!) 96 °F (35.6 °C) 98.3 °F (36.8 °C) 99 °F (37.2 °C)        Intake and Output:  Current Shift: No intake/output data recorded. Last three shifts: 11/21 1901 - 11/23 0700  In: -   Out: 400 [Urine:400]    Physical Exam:  GENERAL: Unresponsive, mild distress, appears older than stated age, pale, cachectic  LUNG: Clear diminished breath sounds with labored respirations.   HEART: regular rate and rhythm, tachycardic  ABDOMEN: soft, non-tender. Bowel sounds hypoactive. : Reid catheter with orestes urine.   EXTREMITIES:  extremities with mild generalized edema. Weak pulses. SKIN: Pale. Cool to touch. Stage 2 wound to sacrum. Peripheral IV in the right antecubital with dressing intact.   NEUROLOGIC: Unresponsive. Right sided weakness. Bedbound.      Lab/Data Review:  No new labs resulted in the last 24 hours.     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 (Leland Gander) (11/9/2020)    Active Problems:    Sequela of cardioembolic stroke (60/6/4096)        Plan:     Current Facility-Administered Medications   Medication Dose Route Frequency    fentaNYL (DURAGESIC) 25 mcg/hr patch 1 Patch  1 Patch TransDERmal Q72H    morphine injection 4 mg  4 mg IntraVENous Q20MIN PRN    Or    morphine injection 4 mg  4 mg SubCUTAneous Q20MIN PRN    sodium chloride (NS) flush 3 mL  3 mL IntraVENous PRN    sodium chloride (NS) flush 3 mL  3 mL IntraVENous Q12H    LORazepam (ATIVAN) injection 1 mg  1 mg IntraVENous Q4H PRN    bisacodyL (DULCOLAX) suppository 10 mg  10 mg Rectal PRN    LORazepam (ATIVAN) injection 2 mg  2 mg IntraVENous Q10MIN PRN    PHENobarbital (LUMINAL) injection 65 mg  65 mg SubCUTAneous Q12H       11/7: (SFALFONSO) Admitted GIP with 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      1. Pain: Morphine 4mg IV/SQ Q20 minutes as needed. Fentanyl 25mcg patch in place.      2. Dyspnea: Morphine 4mg IV/SQ Q20 minutes as needed. Glycopyrrolate 0.2mg q4 prn secretions. Oxygen at 2 L/min prn.     3. Agitation: Lorazepam 1mg IV/IM q4 hours prn.     4. Seizures: Phenobarbital 65mg SQ Q12. Lorazepam as ordered prn sustained seizure activity and notify provider.      5. Family/Pt Support: Family at bedside during exam. Medications and plan of care discussed with nursing staff and family. Will continue to monitor for symptoms and adjust medications as needed to maintain patient comfort. PPS 10%. Case discussed with Dr. Valle and in [address] [place] [address] today. Change to routine level of care.     Signed By: Anand Head NP     November 23, 2020

## 2020-11-23 NOTE — [facility] IDG MASTER NOTE
Hospice Interdisciplinary Group Collaborative  Date: 11/23/20  Time: 12:36 PM    ___________________    Patient: Allen Garcia  Coverage Information:     Pay[place]: [place]     Plan: [facility] MEDICARE PART A AND B     Subscriber ID: [id number]     Phone Number:   MRN: [id number]    Current Benefit Period: Benefit Period 1  Start Date: 11/7/2020  End Date: 2/4/2021        Hospice Attending Provider: Allison Santoyo [address] [place] [address]  [id number]  Phone: [phone]  Fax: [phone]    Level of Care: Routine Home Care      ___________________    Diagnoses: There were no encounter diagnoses. Current Medications:    Current Facility-Administered Medications:     fentaNYL (DURAGESIC) 25 mcg/hr patch 1 Patch, 1 Patch, TransDERmal, Q72H, [place] Bernie, NP, 1 Patch at 11/21/20 1232    morphine injection 4 mg, 4 mg, IntraVENous, Q20MIN PRN, 4 mg at 11/17/20 1650 **OR** morphine injection 4 mg, 4 mg, SubCUTAneous, Q20MIN PRN, [place] Bernie, NP    sodium chloride (NS) flush 3 mL, 3 mL, IntraVENous, PRN, [place] Bernie, NP, 3 mL at 11/19/20 0616    sodium chloride (NS) flush 3 mL, 3 mL, IntraVENous, Q12H, Joanna Blakes T, NP, 3 mL at 11/23/20 0849    LORazepam (ATIVAN) injection 1 mg, 1 mg, IntraVENous, Q4H PRN, Joanna Blakes T, NP, 1 mg at 11/17/20 1650    bisacodyL (DULCOLAX) suppository 10 mg, 10 mg, Rectal, PRN, Joanna Blakes T, NP, 10 mg at 11/10/20 0557    LORazepam (ATIVAN) injection 2 mg, 2 mg, IntraVENous, Q10MIN PRN, Joanna Blakes T, NP    PHENobarbital (LUMINAL) injection 65 mg, 65 mg, SubCUTAneous, Q12H, Joanna Blakes T, NP, 65 mg at 11/23/20 3316    Orders:  Orders Placed This Encounter    IP CONSULT TO SPIRITUAL CARE     Standing Status:   Standing     Number of Occurrences:   1     Order Specific Question:   Reason for Consult: Answer: Once on week one, then PRN. For [facility] Only.  For contracted patients, their primary hospice will continue to manage spiritual care needs.  DIET PLEASURE     Standing Status:   Standing     Number of Occurrences:   1     Order Specific Question:   Likes/Dislikes/Preferences     Answer:   hold tray    VITAL SIGNS     Standing Status:   Standing     Number of Occurrences:   1    AVINA CATHETER, CARE     1. Avina Catheter care every shift and PRN  2. Notify Physician of Avina Catheter leakage, occlusion, gross adherent sediment or accidental removal  3. Change Avina 30 days after insertion. Standing Status:   Standing     Number of Occurrences:   21506    NURSING ASSESSMENT:  SPECIFY Assess for GIP, routine, or respite level of care. Q SHIFT Routine     Standing Status:   Standing     Number of Occurrences:   1     Order Specific Question:   Please describe the test or procedure you would like to order. Answer:   Assess for GIP, routine, or respite level of care.  BEDREST, COMPLETE     Standing Status:   Standing     Number of Occurrences:   1    WOUND CARE, DRESSING CHANGE     Wound Care:  Location: sacrum  Decubitus Wound Stage II (Cavalon)- Cleanse wound location with wound cleanser, pat dry and apply Cavilon Durable Barrier Cream every 12 hours and PRN if soiled. Turn every 2 hours. Assess with each nursing assessment visit. 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 ..     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    Other Hospice diagnoses:     Carcinoma metastatic to lung, right ([address] (C78.01)     Metastatic carcinoma to lung, left (HCC) (C78.02)     Bilateral pleural effusion (J90)     New onset seizure (HCC) (R56.9)     Hemiparesis affecting right side as late effect of stroke ([facility] (A67.379)     Aphasia as late effect of stroke (I69.320)     Altered sensorium (R40.4)     Intracranial pressure increased (G93.2)     Respiratory failure with hypoxia, unspecified chronicity (HCC) (J96.91)     Chronic systolic CHF (congestive heart failure) (HCC) (I50.22)     History of Hodgkin's disease (Z85.71)     Benefit Period 1  Start Date: 11/7/2020  End Date: 9/1/4654     I certifnegra Dubois has a prognosis for a life expectancy of 6 months or less if the terminal illness runs its normal course.     As evidenced by a 69-year-old woman with brain metastases, pulmonary metastases and pleural effusion from an undetermined anatomic origin and cell type.  Associated diagnoses include: New onset seizure disorder, right hemiparesis, altered sensorium, increased intracranial blood pressure, calvarial metastases, hypoxic respiratory failure.  The bilateral pleural effusions have been twice evacuated with a total volume of 3400 mL retrieved and 4 punctures.  These continue to reaccumulate rapidly.  Chronic LV systolic dysfunction (LVEF 35%) congestive heart failure, Hodgkin's disease treated to clinical remission, and thoracic hamartoma are diagnoses unrelated to metastatic cancer which adversely affect prognosis.  Acute left frontoparietal ischemic stroke with significant penumbra is related to her cancer in that it probably occurred secondary to hypercoagulability associated with very advanced malignant disease.  She has subsequent right hemiparesis and expressive aphasia with confusion.  Brain metastases include several left frontal lobe lesions, 2 pontine lesions and 2 right occipital lesions.  Pulmonary metastases are widely distributed in both lungs and have resulted in hypoxic respiratory failure.  The patient's spouse is significantly demented and her brother is serving as substituted decisional source. [facility] has elected on her behalf to avoid invasive procedures for determining cell type as he will also refuse any disease modifying therapy with chemo or radiation. Rojas Botello the circumstances this woman's life expectancy is less than 4 weeks. Howard Haque will be cared for at home with her  by her brother and his family with assistance from a age. Grecia Monteiro carotid atherosclerosis, occlusion proximal right common carotid artery, peripheral vascular disease or other diagnoses unrelated to cancer adversely affecting prognosis. Standing Status:   Standing     Number of Occurrences:   1     Order Specific Question:   Description of Order:     Answer:   admit    NURSING-MISCELLANEOUS: level of care change 11/23: Change to routine  CONTINUOUS     11/23: Change to routine     Standing Status:   Standing     Number of Occurrences:   1     Order Specific Question:   Description of Order:     Answer:   level of care change    DO NOT RESUSCITATE     Standing Status:   Standing     Number of Occurrences:   1     Order Specific Question:   Comfort Measures Only? Answer: Yes    OXYGEN CANNULA Liters per minute: 2; Indications for O2 therapy: RESPIRATORY DISTRESS PRN Routine     Standing Status:   Standing     Number of Occurrences:   [id number]     Order Specific Question:   Liters per minute:      Answer:   2     Order Specific Question:   Indications for O2 therapy     Answer:   RESPIRATORY DISTRESS    sodium chloride (NS) flush 3 mL    DISCONTD: morphine injection 2 mg    DISCONTD: morphine injection 2 mg    LORazepam (ATIVAN) injection 1 mg    bisacodyL (DULCOLAX) suppository 10 mg    LORazepam (ATIVAN) injection 2 mg    PHENobarbital (LUMINAL) injection 65 mg    DISCONTD: dexAMETHasone (DECADRON) tablet 4 mg    DISCONTD: fentaNYL (DURAGESIC) 12 mcg/hr patch 1 Patch    DISCONTD: dexamethasone (DECADRON) 4 mg/mL injection 4 mg    sodium chloride (NS) flush 3 mL    fentaNYL (DURAGESIC) 25 mcg/hr patch 1 Patch    OR Linked Order Group     morphine injection 4 mg     morphine injection 4 mg    DISCONTD: morphine injection 2 mg    INITIAL PHYSICIAN ORDER: HOSPICE Level Of Care: General Inpatient; Reason for Admission: GIP management of pain and seizures in light of dysphagia secondary to stroke     Standing Status:   Standing     Number of Occurrences:   1     Order Specific Question:   Status     Answer:   Hospice     Order Specific Question:   Level Of Care     Answer:   General Inpatient     Order Specific Question:   Reason for Admission     Answer:   GIP management of pain and seizures in light of dysphagia secondary to stroke     Order Specific Question:   Inpatient Hospitalization Certified Necessary for the Following Reasons     Answer:   3.  Patient receiving treatment that can only be provided in an inpatient setting (further clarification in H&P documentation)     Order Specific Question:   Admitting Diagnosis     Answer:   Sequela of cardioembolic stroke [[id number]]     Order Specific Question:   Terminal Prognosis Diagnosis(es)     Answer:   Metastasis to brain [facility]) [[id number]]     Order Specific Question:   Terminal Prognosis Diagnosis(es)     Answer:   Metastasis to lung [facility]) [[id number]]     Order Specific Question:   Terminal Prognosis Diagnosis(es)     Answer:   Lymphoma [facility]) [[id number]]     Order Specific Question:   Terminal Prognosis Diagnosis(es)     Answer:   Seizures ([address] [[id number]]     Order Specific Question:   Terminal Prognosis Diagnosis(es)     Answer:   Right carotid artery occlusion [[id number]]     Order Specific Question:   Terminal Prognosis Diagnosis(es)     Answer:   Metastasis to spinal column [facility]) [[id number]]     Order Specific Question:   Admitting Physician     Answer:   Bal Chavis     Order Specific Question:   Attending Physician     Answer:   Bal Chavis     Order Specific Question:   Discharge Plan:     Answer:   Home with Home Hospice     Comments:    is frail    IP CONSULT TO SOCIAL WORK     Standing Status:   Standing     Number of Occurrences:   1     Order Specific Question:   Reason for Consult: Answer: For [facility] Only. For contracted patients, their primary hospice will continue to manage social work needs. Allergies: Allergies   Allergen Reactions    Compazine [Prochlorperazine Edisylate] Swelling       Care Plan:  Multidisciplinary Problems (Active)     Problem: Anticipatory Grief     Dates: Start: 11/10/20       Disciplines: Interdisciplinary    Goal: Grief heard and acknowledged, anxiety reduced, patient coping identified, patient/family expressed gratitude     Dates: Start: 11/10/20   Expected End: 11/20/20       Description: Patient/ family will acknowledge feelings of anxiety and grief and utilize available support to assist in their grief journey. Disciplines: Interdisciplinary    Intervention: Assess grief responses     Dates: Start: 11/10/20       Description: [employer] will assess grief responses with each visit. Intervention: Support grieving process     Dates: Start: 11/10/20       Description: [employer] will support the grief process by providing opportunities for expression of feelings while attempting  to normalize the journey. Problem: End of Life Process     Dates: Start: 11/07/20       Description: Pt  and brother will be able to describe the changes indicate near death  Give  and brother a blue book   Answer questions     Disciplines: Interdisciplinary    Goal: Demonstrate understanding of end of life processes     Dates: Start: 11/07/20   Expected End: 11/21/20       Description: Manny Velasquez will understand end of life processes.     Disciplines: Interdisciplinary    Intervention: Assess for signs/symptoms of terminal restlessness     Dates: Start: 11/07/20             Intervention: Instruct on strategies to reduce terminal restlessness     Dates: Start: 11/07/20             Intervention: Instruct on the dying process     Dates: Start: 11/07/20             Intervention: Instruct: imminent death     Dates: Start: 11/07/20             Intervention: Instruct: process at end of life     Dates: Start: 11/07/20                         Problem: Falls - Risk of     Dates: Start: 11/09/20       Description: Pt will remain free from falls aeb no injuries while at [facility]. Disciplines: Interdisciplinary    Goal: *Absence of Falls     Dates: Start: 11/09/20   Expected End: 11/27/20       Description: Document Gita Varela Fall Risk and appropriate interventions in the flowsheet.     Disciplines: Interdisciplinary                Problem: Infection - Risk of, Urinary Catheter-Associated Urinary Tract Infection     Dates: Start: 11/16/20       Disciplines: Interdisciplinary    Goal: *Absence of infection signs and symptoms     Dates: Start: 11/16/20   Expected End: 11/23/20       Disciplines: Interdisciplinary    Intervention: Urinary catheter needs assessment (eg: Impaired neurologic status; post void residual; spinal cord injury; urinary outflow obstruction; urinary retention; urinary incontinence; fluid imbalance)     Dates: Start: 11/16/20             Intervention: Urine assessment (eg: Clarity; color; odor; volume)     Dates: Start: 11/16/20             Intervention: Infection signs and symptoms monitoring - urinary tract (eg: Flank or suprapubic pain; burning; fever; dysuria; frequency; urgency; cloudy/bloody/foul odor urine; confusion/agitation; incontinence)     Dates: Start: 11/16/20             Intervention: Lab monitoring (eg: Urinalysis; culture and sensitivity; complete blood count with differential)     Dates: Start: 11/16/20             Intervention: Urinary catheter management (eg: Closed urinary catheter system maintenance; urinary catheter patency with free downhill flow; perineal care; monitor intake and output)     Dates: Start: 11/16/20                         Problem: Pain     Dates: Start: 11/07/20       Description: Pt will remain free from pain aeb pain score or flacc less than 4 while at [facility]. Disciplines: Interdisciplinary    Goal: Verbalize satisfaction of level of comfort and symptom control     Dates: Start: 11/07/20   Expected End: 11/27/20       Description: Pt pain would be less then 4/10  Morphine 2 mg IV/SQ q 20 minutes prn     Disciplines: Interdisciplinary    Intervention: Assess effectiveness of pain management     Dates: Start: 11/07/20       Description: Pt will be relaxed not moaning or groaning or attempting to get out of bed  Ativan 1 mg IV/IM q 4 hours prn           Intervention: Assess for signs/symptoms of acute pain     Dates: Start: 11/07/20             Intervention: Assess for signs/symptoms of chronic pain     Dates: Start: 11/07/20             Intervention: Alonzo Romo on non-pharmacological pain management     Dates: Start: 11/07/20             Intervention: Alonzo Romo on pain scales     Dates: Start: 11/07/20             Intervention: Instruct on pharmacological pain management     Dates: Start: 11/07/20                         Problem: Pressure Injury - Risk of     Dates: Start: 11/07/20       Description: Pt will remain free from falls aeb no injuries while at [facility]. Disciplines: Interdisciplinary    Goal: *Prevention of pressure injury     Dates: Start: 11/07/20   Expected End: 11/28/20       Description: Document Bhupendra Scale and appropriate interventions in the flowsheet.     Disciplines: Interdisciplinary                  Care Plan Problems/Goals      Progressing Towards Goal (6)      *Prevention of pressure injury (Pressure Injury - Risk of)    Disciplines:  Interdisciplinary Expected end:  11/28/20        Outcome: Progressing Towards Goal By Karin Nichole RN on 11/23/20 0137            Verbalize satisfaction of level of comfort and symptom control (Pain)    Disciplines:  Interdisciplinary Expected end:  11/27/20        Outcome: Progressing Towards Goal By Elton Martinez RN on 11/23/20 0028            Demonstrate understanding of end of life processes (End of Life Process)    Disciplines:  Interdisciplinary Expected end:  11/21/20        Outcome: Progressing Towards Goal By Mook Mcgraw RN on 11/22/20 6696            *Absence of Falls (Falls - Risk of)    Disciplines:  Interdisciplinary Expected end:  11/27/20        Outcome: Progressing Towards Goal By Elton Mratinez RN on 11/23/20 9565            Grief heard and acknowledged, anxiety reduced, patient coping identified, patient/family expressed gratitude (Anticipatory Grief)    Disciplines:  Interdisciplinary Expected end:  11/20/20        Outcome: Progressing Towards Goal By Laura Mcdermott on 11/10/20 1554            *Absence of infection signs and symptoms (Infection - Risk of, Urinary Catheter-Associated Urinary Tract Infection)    Disciplines:  Interdisciplinary Expected end:  11/23/20        Outcome: Progressing Towards Goal By Elton Martinez RN on 11/23/20 0546                         Resolved/Met (4)      Patient/Family Education (Patient Education: Go to Patient Education Activity)    Disciplines:  Interdisciplinary Expected end:  11/21/20        Outcome: Resolved/Met By Mae Méndez RN on 11/11/20 1702            Demonstrate understanding of hospice philosophy, plan of care, and home hospice program (Hospice Orientation)    Disciplines:  Interdisciplinary Expected end:  11/13/20        Outcome: Resolved/Met By Lena Arias RN on 11/09/20 1657            Verbalize and demonstrate ability to manage anxiety (Anxiety/Agitation)    Disciplines:  Interdisciplinary Expected end:  11/21/20        Outcome: Resolved/Met By Mae Méndez RN on 11/11/20 1702            Patient/Family Education (Patient Education: Go to Patient Education Activity)    Disciplines:  Interdisciplinary Expected end:  -        Outcome: Resolved/Met By Mae Méndez RN on 11/11/20 1702 ___________________    Care Team Notes          POC/IDG Notes      [facility] IDG Nurse Notes by Michael Cosby RN at 11/23/20 1236  Version 1 of 1    Author:  Michael Cosby RN Service:  Hospice and Palliative Care Author Type:  Registered Nurse    Filed:  11/23/20 1236 Date of Service:  11/23/20 1236 Status:  Signed    :  Michael Cosby RN (Registered Nurse)       Patient: Benjamin Primer    Date: 11/23/20  Time: 12:36 PM    [facility] Nurse Notes  F/U IDG: Pt is a 76-year-old female with metastasis to brain of unknown origin who is here GIP level of care for management of pain, agitation and seizures. Reid with UOP of 300cc. No IV access. PO intake: none. Wounds: stage II on sacrum. PRN medications: none. Scheduled meds:  Fentanyl 25 mcg patch and Phenobarb 65 mg SQ q 12 hours. Plan: Change to routine level of care. Will not bill. Comprehensive plan of care reviewed. IDG and pt./family in agreement with plan of care. The IDG identifies through on-going assessment when a change is needed to the POC; the pt/family will receive care and services necessitated by changes in POC. Medications reviewed by the pharmacist and Medical Director. Signed by: Victorina Petersen RN       [facility] IDG  Notes by Ramandeep Mendoza at 11/23/20 0915  Version 1 of 1    Author:  Ramandeep Mendoza Service:  Licensed Clinical  Author Type:      Filed:  11/23/20 1142 Date of Service:  11/23/20 0915 Status:  Signed    :  Ramandeep Mendoza ()       Patient: Alexander Primer    Date: 11/23/20  Time: 9:15 AM    [facility]  Notes  LMSW will continue to provide emotional support. Pt was changed to [address] Today. No billing to family spouse has cognitive issues family is trying to care for him. Continue on with care plan as written   The volunteer program has been suspended due to the Covid-19 virus.    Signed by: Manuel White       [facility] IDG  Notes by Jax Ventura at 11/23/20 1134  Version 1 of 1    Author:  Jax Ventura Service:  Spiritual Care Author Type:  Pastoral Care    Filed:  11/23/20 1140 Date of Service:  11/23/20 1134 Status:  Signed    :  Jax Ventura ([facility])       Patient: Lydia Mantilla    Date: 11/23/20  Time: 11:34 AM    [facility]  Notes    Intervention: Patient and family care with ministry of presence, conversation with family, prayer. Outcome:  Patient's  appears to be coping ok. He has difficulty hearing. His brother in law shared concerns that Mr. Gideon Engle will need support when his wife is gone. Plan: Continue to provide spiritual and emotional support throughout patient's time with [facility]. Signed by: iKngs Parrish       [facility] IDG  Notes by Mario Corbin at 11/20/20 1600  Version 1 of 1    Author:  Mario Corbin Service:  Hospice and Palliative Care Author Type:  Pastoral Care    Filed:  11/20/20 1602 Date of Service:  11/20/20 1600 Status:  Signed    :  Mario Corbin ([facility])       Patient: Lydia Mantilla    Date: 11/20/20  Time: 4:00 PM    [facility]  Notes    Patient admitted to hospice house on 11/7/20. .  Initial Assessments completed by Rigoberto Dos Santos with follow up support provided. Patient/family are [employer]. Plan of Care:  Spiritual care and support to be provided as needed, requested, or referred. Signed by: Suleman Veloz       [address] [facility] Nurse Notes by Trace Rivera RN at 11/20/20 1550  Version 1 of 1    Author:  Trace Rivera RN Service:  Hospice and Palliative Care Author Type:  Registered Nurse    Filed:  11/20/20 1550 Date of Service:  11/20/20 1550 Status:  Signed    :  Trace Rivera RN (Registered Nurse)       Patient: Lydia Mantilla    Date: 11/20/20  Time: 3:50 PM    [address] [place] [address] Nurse Notes  F/U IDG: Pt is a 65-year-old female with sequala of stroke who is here GIP level of care for management of pain and agitation.  Reid with UOP of 400 cc in past 24 hours. IV access in peripheral IV. PO intake: none. Wounds: skin intact. PRN medications: none in past 24 hours. Scheduled meds:  Fentanyl 25 mcg patch, Phenobarb 65 mg SQ q 12 hours, morphine 2 mg IV q 6 hours. Plan: Discontinue scheduled morphine. Comprehensive plan of care reviewed. IDG and pt./family in agreement with plan of care. The IDG identifies through on-going assessment when a change is needed to the POC; the pt/family will receive care and services necessitated by changes in POC. Medications reviewed by the pharmacist and Medical Director. Signed by: Ruthann Shanks RN       [facility] IDG  Notes by Sarah Lowry at 11/20/20 0919  Version 1 of 1    Author:  Sarah Lowry Service:  Licensed Clinical  Author Type:      Filed:  11/20/20 1320 Date of Service:  11/20/20 0919 Status:  Signed    :  Sarah Lowry ()       Patient: Cristiano Dubois    Date: 11/20/20  Time: 9:19 AM    [facility]  Notes  Pt is here [facility] loc. LMSW will continue on with providing emotional support and community resources as the pt and family need. Continue on with the care plan as written. Signed by: Hernán Cavanaugh       [facility] IDG Nurse Notes by Sharla Chopra RN at 11/16/20 1328  Version 1 of 1    Author:  Sharla Chopra RN Service:  Hospice and Palliative Care Author Type:  Registered Nurse    Filed:  11/16/20 1328 Date of Service:  11/16/20 1328 Status:  Signed    :  Sharla Chopra RN (Registered Nurse)       Patient: Cristiano Dubois    Date: 11/16/20  Time: 1:28 PM    [facility] Nurse Notes  F/U IDG: Pt is a 43-year-old female with metastasis of brain of unknown origin who is here [facility] level of care for management of pain, agitation, and seizure management. Pt with irregular respiratory rate. Reid with UOP of 250cc. Reid changed out yesterday. IV access in right AC. PO intake: none. Wounds: stage II sacrum. PRN medications: Ativan 1 mg IV x 1 and morphine 4 mg IV x 3 for pain. Scheduled meds:  Fentanyl 25 mcg patch, Decadron 4 mg IV q 8 hours, phenobarb 65 mg SQ q 12 hours. Plan: Discontinue Decadron. Comprehensive plan of care reviewed. IDG and pt./family in agreement with plan of care. The IDG identifies through on-going assessment when a change is needed to the POC; the pt/family will receive care and services necessitated by changes in POC. Medications reviewed by the pharmacist and Medical Director. Signed by: Jessica Fernandez RN       [address] [place] [address]  Notes by Walt Vyas at 11/16/20 1035  Version 1 of 1    Author:  Walt Vyas Service:  Spiritual Care Author Type:  Pastoral Care    Filed:  11/16/20 1156 Date of Service:  11/16/20 1035 Status:  Signed    :  Walt Vyas (Pastoral Care)       Patient: Edd Cabral    Date: 11/16/20  Time: 10:35 AM    [facility]  Notes  Intervention: Ministry of presence, family support, prayer. Outcome: Conversation with family. Plan: Continued spiritual and emotional support. Grief support. Signed by: Santino Mcelroy       [address] [place] [address]  Notes by Gianna Sharpe at 11/16/20 0830  Version 1 of 1    Author:  Gianna Sharpe Service:  Licensed Clinical  Author Type:      Filed:  11/16/20 1153 Date of Service:  11/16/20 0830 Status:  Signed    :  Gianna Sharpe ()       Patient: Edd Cabral    Date: 11/16/20  Time: 8:30 AM    [facility]  Notes  LMSW will continue to provide support to the pt and her family. Family has concerns over what will happen with the spouse who has cogitative concerns. Resources given to the family for the spouses specific needs. Family lost the resources I previously gave them. Second resources given. Continue with care plan as written. The volunteer program has been suspended due to the Covid-19 virus.   LMSW will ensure the pt and families receive their comfort bags. Signed by: Sofía Nolan       hospitals IDG Nurse Notes by Lucy Dupree RN at 11/12/20 1253  Version 1 of 1    Author:  Lucy Dupree RN Service:  Hospice and Palliative Care Author Type:  Registered Nurse    Filed:  11/12/20 1254 Date of Service:  11/12/20 1253 Status:  Signed    :  Lucy Dupree RN (Registered Nurse)       Patient: Sheridan Hoffman    Date: 11/12/20  Time: 12:53 PM    hospitals Nurse Notes  F/U IDG: Pt is a 65-year-old female with metastasis to brain of unknown origin who is here GIP level of care for management of pain and agitation. Reid with UOP of 300cc. IV access via right forearm. PO intake: none. Wounds: stage II sacrum. PRN medications: Ativan 1 mg IV x1 for agitation, morphine 2 mg IV x 3 for pain. Scheduled meds:  phenobarb 65 mg SQ q 12 hours, Decadron 4 mg IV x 8 hours, Fentanyl 12 mcg patch. Plan: Increase Fentanyl to 25 mcg patch, increase PRN morphine to 4 mg IV/SQ q 20 min. Comprehensive plan of care reviewed. IDG and pt./family in agreement with plan of care. The IDG identifies through on-going assessment when a change is needed to the POC; the pt/family will receive care and services necessitated by changes in POC. Medications reviewed by the pharmacist and Medical Director. Signed by: Paul Miller RN       [facility]  Notes by Mell Donnelly at 11/12/20 1153  Version 1 of 1    Author:  Mell Donnelly Service:  Spiritual Care Author Type:  Pastoral Care    Filed:  11/12/20 1200 Date of Service:  11/12/20 1153 Status:  Signed    :  Mell Donnelly (Pastoral Care)       Patient: Sheridan Hoffman    Date: 11/12/20  Time: 11:53 AM    hospitals  Notes    Intervention: Spiritual and Bereavement Assessments completed. Ministry of presence, family support, prayer, meaningful conversation with brother and .      Outcome: Family shared their love for patient, Brother shared his concerns for his brother in law.     Plan: Continue to provide spiritual and emotional support with focus on anticipatory grief. Signed by: Tampa Jewels       [address] [facility]  Notes by Noam Leslie at 11/12/20 9103  Version 1 of 1    Author:  Noam Leslie Service:  Licensed Clinical  Author Type:      Filed:  11/12/20 1159 Date of Service:  11/12/20 0841 Status:  Signed    :  Noam Leslie ()       Patient: Jarek Lewis    Date: 11/12/20  Time: 8:41 AM    [facility]  Notes  LMSW Has completed the  initial assessment with the family. The family is very concerned about her spouse Dwayne Kemp. He has some cognitive disability and his wife was his care giver. He has no support here locally. All of his family lives in [place]. [facility] has provided the family with multiple resources to obtain a geriatric case manager in the area. Suggested contacting his PCP for additional referrals if needed for home health/ hospice  The volunteer program has been suspended due to the Covid-19 virus. Continue on with care plan as written. Working towards all goals. Signed by: Roberth Reyes       [facility] IDG Nurse Notes by Tasha Powers RN at 11/09/20 1316  Version 1 of 1    Author:  Tasha Powers RN Service:  Hospice and Palliative Care Author Type:  Registered Nurse    Filed:  11/09/20 1317 Date of Service:  11/09/20 1316 Status:  Signed    :  Tasha Powers RN (Registered Nurse)       Patient: Jarek Lewis    Date: 11/09/20  Time: 1:16 PM    [facility] Nurse Notes  1st IDG: Pt is a 30-year-old female with sequalae of stroke who is here GIP level of care for management of pain, agitation. BP 91/51. Reid with UOP of 540cc. IV access in right AC. 2l/min of oxygen. PO intake: bites and sips. Wounds: stage II on sacrum. PRN medications:  Morphine 2 mg IV x 3 for pain. Scheduled meds: Decadron 4 mg PO q 8h, Phenobarb 65 mg SQ q 12h.    Plan: Add Fentanyl 12 mcg patch. Comprehensive plan of care reviewed. IDG and pt./family in agreement with plan of care. The IDG identifies through on-going assessment when a change is needed to the POC; the pt/family will receive care and services necessitated by changes in POC. Medications reviewed by the pharmacist and Medical Director. Signed by: Jeff Bonner RN       [facility] IDG  Notes by Suzanne Regan at 11/09/20 1204  Version 1 of 1    Author:  Suzanne Regan Service:  Spiritual Care Author Type:  Pastoral Care    Filed:  11/09/20 1205 Date of Service:  11/09/20 1204 Status:  Signed    :  Suzanne Regan (Pastoral Care)       Patient: Larey Claude    Date: 11/09/20  Time: 12:04 PM    [facility]  Notes    Assessments pending for spiritual and bereavement support. Signed by: Rober Powers       [facility] IDG  Notes by Nolen Skiff at 11/09/20 1109  Version 1 of 1    Author:  Nolen Skiff Service:  Licensed Clinical  Author Type:      Filed:  11/09/20 1204 Date of Service:  11/09/20 1109 Status:  Signed    :  Nolen Skiff ()       Patient: Larey Claude    Date: 11/09/20  Time: 11:09 AM    [facility]  Notes  LMSW will meet with the pt and her family to complete the SW initial assessment. After the assessment is done the care plan will be completed. The volunteer program has been suspended due to the Covid-19 virus. LMSW will ensure the pt and families receive their comfort bags.         Signed by: Anant Hernandes       [facility] IDG  Notes by Nolen Skiff at 11/09/20 1022  Version 1 of 1    Author:  Nolen Skiff Service:  Licensed Clinical  Author Type:      Filed:  11/09/20 1022 Date of Service:  11/09/20 1022 Status:  Signed    :  Nolen Skiff ()       Patient: Larey Claude    Date: 11/09/20  Time: 10:22 AM    [facility]  Notes  LMSW has read and agrees with the RN initial assessment. LMSW will meet with pt and family to complete the SW assessment. The volunteer program has been suspended due to the Covid-19 virus. LMSW will ensure the pt and families receive their comfort bags. LMSW will meet with the patient and family to complete the SW initial assessment. The care plan will be created from there. Signed by: Ruthie Hodge       [facility]  Notes by Fiorella Root at 11/09/20 0932  Version 1 of 1    Author:  Fiorella Root Service:  Spiritual Care Author Type:  Pastoral Care    Filed:  11/09/20 0933 Date of Service:  11/09/20 0932 Status:  Signed    :  Fiorella Root (Pastoral Care)       Patient: Heide Trinidad    Date: 11/09/20  Time: 9:32 AM    [facility]  Notes    / Grief Counselor has reviewed  Initial Comprehensive Assessment and plan of care. Bereavement and Spiritual Care Assessments to be completed and plan of care put in place to meet the needs, requests and referrals. Signed by: Sudha Farmer       [address] [place] [address] Nurse Notes by Nisha Rader RN at 11/07/20 1742  Version 1 of 1    Author:  Nisha Rader RN Service:  NURSING Author Type:  Registered Nurse    Filed:  11/07/20 1742 Date of Service:  11/07/20 1742 Status:  Signed    :  Nisha Rader RN (Registered Nurse)       Patient: Heide Trinidad    Date: 11/07/20  Time: 5:42 PM    [address] [place] [address] Nurse Notes  Ms Zaheer Byrne is a 69year old female admitted to room 111 General in patient for sequalae of stroke along with brain metastasis of unknown primary origin, pulmonary metastasis, pulmonary edema, pulmonary effusions and increased intercranial pressure which altogether gives her a poor prognosis. She's admitted for control of pain, agitation and seizures. Pt arrived an tolerated transfer to bed with minimal complaint of pain. Pt denies pain, nausea or shortness of breath. Physical assessment completed.  Lung sounds clear but diminished in bases, heart sounds irregular, pt can answer name and date of birth but not date or current events. Bowel sounds hypoactive, pt has bustamante draining clear yellow urine. Pt extremities cool to cold. Upper extremities pale with cyanotic nail beds, cool with palpable radial pulse. Lower extremities cold, cyanotic, non palpable pedal pulses. Pt has iv that flushed without difficulty. Pt has , Anjelica Wright, and brother, Ariane Carter that visited. Anjelica Adriana appears confused asking what we are going to do next, wondered aimlessly from room asking each time what we were going to do. Explained the change of emphasis from treatment to comfort measures. Explained if pt has pain or discomfort we will medicate for that. Pt brother, Ariane Carter, appeared to understand and agreed with the emphasis on comfort. Pt states she has no children so her brother and  are her supporters. Pt asking for a drink of apple juice, gave her apple juice, water and strawberry ice cream and a couple of icee pops.    1300 pt ate half the the strawberry ice cream, one icee pop and apple juice and half the water.      1500 pt used call light, pt states she's uncomfortable. Repositioned. Pt voice garbled and confused.           Signed by: Vlad Waldron RN                Care Team Present:   Team Members Present: Physician, Nurse, , [address]  Physician Team Member: Lalitha Perdomo MD  Nurse Team Member: Jasmin Viera, MARAL  Social Work Team Member: Jose Luis Richard, [address] [facility] Team Member: Dominique Cruz.  Div

## 2020-11-23 NOTE — [facility] IDG CHAPLAIN NOTES
Patient: Jaime Rebollar    Date: 11/23/20  Time: 11:34 AM    [facility]  Notes    Intervention: Patient and family care with ministry of presence, conversation with family, prayer. Outcome:  Patient's  appears to be coping ok. He has difficulty hearing. His brother in law shared concerns that Mr. Loren Lambert will need support when his wife is gone. Plan: Continue to provide spiritual and emotional support throughout patient's time with [facility].        Signed by: Jatinder Thomas

## 2020-11-23 NOTE — PROGRESS NOTES
Problem: Pressure Injury - Risk of  Goal: *Prevention of pressure injury  Description: Document Bhupendra Scale and appropriate interventions in the flowsheet. Outcome: Progressing Towards Goal  Note: Pressure Injury Interventions:  Sensory Interventions: Assess changes in LOC, Float heels, Minimize linen layers, Check visual cues for pain, Keep linens dry and wrinkle-free    Moisture Interventions: Absorbent underpads, Internal/External urinary devices, Minimize layers, Moisture barrier, Limit adult briefs, Apply protective barrier, creams and emollients    Activity Interventions: Pressure redistribution bed/mattress(bed type)    Mobility Interventions: Pressure redistribution bed/mattress (bed type), Float heels    Nutrition Interventions: Document food/fluid/supplement intake    Friction and Shear Interventions: Apply protective barrier, creams and emollients, Minimize layers, Lift sheet                Problem: Pain  Goal: Verbalize satisfaction of level of comfort and symptom control  Description: Pt pain would be less then 4/10  Morphine 2 mg IV/SQ q 20 minutes prn   Outcome: Progressing Towards Goal     Problem: Falls - Risk of  Goal: *Absence of Falls  Description: Document Richi Fall Risk and appropriate interventions in the flowsheet.   Outcome: Progressing Towards Goal  Note: Fall Risk Interventions:  Mobility Interventions: Bed/chair exit alarm    Mentation Interventions: Bed/chair exit alarm, Door open when patient unattended, Evaluate medications/consider consulting pharmacy    Medication Interventions: Bed/chair exit alarm, Evaluate medications/consider consulting pharmacy    Elimination Interventions: Bed/chair exit alarm, Call light in reach              Problem: Infection - Risk of, Urinary Catheter-Associated Urinary Tract Infection  Goal: *Absence of infection signs and symptoms  Outcome: Progressing Towards Goal

## 2020-11-23 NOTE — PROGRESS NOTES
Problem: Pressure Injury - Risk of  Goal: *Prevention of pressure injury  Description: Document Bhupendra Scale and appropriate interventions in the flowsheet. Outcome: Progressing Towards Goal  Note: Pressure Injury Interventions:  Sensory Interventions: Assess changes in LOC, Float heels, Keep linens dry and wrinkle-free, Minimize linen layers    Moisture Interventions: Absorbent underpads, Apply protective barrier, creams and emollients, Internal/External urinary devices, Limit adult briefs, Minimize layers, Moisture barrier    Activity Interventions: Pressure redistribution bed/mattress(bed type)    Mobility Interventions: Pressure redistribution bed/mattress (bed type)    Nutrition Interventions: Document food/fluid/supplement intake    Friction and Shear Interventions: Apply protective barrier, creams and emollients, HOB 30 degrees or less, Lift sheet, Minimize layers                Problem: Falls - Risk of  Goal: *Absence of Falls  Description: Document Richi Fall Risk and appropriate interventions in the flowsheet.   Outcome: Progressing Towards Goal  Note: Fall Risk Interventions:  Mobility Interventions: Bed/chair exit alarm    Mentation Interventions: Bed/chair exit alarm, Door open when patient unattended, Familiar objects from home, Update white board    Medication Interventions: Bed/chair exit alarm    Elimination Interventions: Bed/chair exit alarm, Call light in reach

## 2020-11-23 NOTE — [facility] IDG SOCIAL WORKER NOTES
Patient: Nichole Yun    Date: 11/23/20  Time: 9:15 AM    [facility]  Notes  LMSW will continue to provide emotional support. Pt was changed to [address] Today. No billing to family spouse has cognitive issues family is trying to care for him. Continue on with care plan as written   The volunteer program has been suspended due to the Covid-19 virus.    Signed by: Nessa Giraldo

## 2020-11-23 NOTE — PROGRESS NOTES
0700- Patient report taken from Debbie Thomas RN and walking rounds completed. The patient was identified by name and . The patient is GIP with diagnosis or metastatic brain cancer of unknown origin. The patient shows no signs of pain, SOB, agitation or nausea / vomit. The bed is low and locked with two bed rails up and tab alert in place. The door to the patient's room remains open. 1267- Scheduled Phenobarbital 65 mg given in upper left arm. IV in right arm flushed with 3 ml of normal saline. Mouth care done. Patient's  and brother are in the room and were updated on the patient's morning and last evening and voiced understanding. 1100- The patient continues to rest with no signs of distress observed. Mouth care done. 1300- Patient continues to rest quietly in the bed.     1500- Patient continues to rest quietly in the bed with no signs of distress observed. Mouth care done. 1700- Patient continues to rest quietly in the bed. Respirations are shallow at 10 minute. Report off to Kristopher Smith RN and completed walking rounds.

## 2020-11-23 NOTE — [facility] IDG NURSE NOTES
Patient: Jose D Boles    Date: 11/23/20  Time: 12:36 PM    [facility] Nurse Notes  F/U IDG: Pt is a 65-year-old female with metastasis to brain of unknown origin who is here GIP level of care for management of pain, agitation and seizures. Reid with UOP of 300cc. No IV access. PO intake: none. Wounds: stage II on sacrum. PRN medications: none. Scheduled meds:  Fentanyl 25 mcg patch and Phenobarb 65 mg SQ q 12 hours. Plan: Change to routine level of care. Will not bill. Comprehensive plan of care reviewed. IDG and pt./family in agreement with plan of care. The IDG identifies through on-going assessment when a change is needed to the POC; the pt/family will receive care and services necessitated by changes in POC. Medications reviewed by the pharmacist and Medical Director.         Signed by: Yoni Morris RN

## 2020-11-23 NOTE — PROGRESS NOTES
[address] report from Jesus Alberto Peña RN. Pt Id by name and  during walking rounds.   Pt lying in bed. Eyes closed. Non interactive. Unresponsive. No facial grimace. Flacc =0-1. Pale in color. Mottling noted on bilateral knees. Resp irreg non labored on RA. Lungs diminished. HR irreg. BS hypo. Edema 1+ noted in BLE. Reid cath draining orestes urine with a lot of sediment noted. Fentanyl 25 mcg/hr patch intact on left chest and dated 20. SR up x 2. Bed low/locked. Call light with in reach. Door opened.   Scheduled phenobarbital 65 mh sq given. Peripheral line flushed with 3 ml ns. Flushed well. Dressing clean/dry/intact. Pt resting comfortably.   Pt lying in bed with eyes closed. No facial grimace. Flacc =0-1. Resp irreg non labored on RA. SR up x 2. Bed low/locked. Call light with in reach. Door opened. 0111  Pt with eyes closed. No s/sx of distress. Flacc =0-1. Resp irreg non labored on RA. SR up x 2. Bed low/locked. Call light with in reach. Door opened. 0348  Pt resting comfortably with eyes closed. No facial grimace. Flacc =0-1. Resp irreg non labored on RA. SR up x 2. Bed low/locked. Call light with in reach. Door opened. 0543  Pt with eyes closed. No s/sx of distress. Flacc =0-1. Resp irreg non labored on RA. SR up x 2. Bed low/locked. Call light with in reach. Door opened. Report given to Hansel Ware RN.

## 2020-11-24 NOTE — PROGRESS NOTES
Report received from , RN. Care assumed and pt identified by name and . Pt resting in bed, a&ox , respirations present. No distress observed or voiced at this time and no s/sx of agitation, anxiety, pain, dyspnea, sob, n/v or seizures. FLACC score of 0/10. Bed low and locked, siderails x2, call light within pt's reach. Tab alert on and attached to pt. CNA operating under RN supervision      No acute changes at this time. Pt remains sleeping with unlabored breathing. Call light remains in reach. Tab alarm remains connected to patient. No s/sx of agitation, anxiety, pain, dyspnea, sob, n/v or seizures. Bed in lowest locked position with siderails x2.    2245  Pt resting in bed. No distress visualized. No s/sx of agitation, anxiety, pain, dyspnea, sob, n/v or seizures. Respirations present. Call light in reach. Tab alert on. Bed remains lowered and locked. [address] with respirations present. Call light in reach. No s/sx of distress observed or voiced. FLACC score 0/10. Profound mottling noted to the right arm/hand.    0237  Pt resting in bed, no distress observed. Respirations present. Call light remains in reach. Tab alarm remains connected to patient. No s/sx of agitation, anxiety, pain, dyspnea, sob, n/v or seizures. 0441:   No acute changes at this time. Pt remains sleeping with unlabored breathing. Call light remains in reach. Tab alarm remains connected to patient. No s/sx of agitation, anxiety, pain, dyspnea, sob, n/v or seizures. Bed in lowest locked position with siderails x2.    9068:  Sleeping with respirations present. Call light in reach. No s/sx of distress observed or voiced. FLACC score 0/10. Report to Emerson Nicholas RN.

## 2020-11-24 NOTE — PROGRESS NOTES
provided support following patient's death. Patient's  Margarita Clayton and brother Arthur Ortiz were present. They were grieving appropriately. Margarita Clayton appears to be frail. He does not hear well. His  family all lives in [place]. They are aware he will need support and according to Arthur Ortiz, his brother in law, other family members will be here for a short time helping     During this encounter  offered support with ministry of presence, prayer and emotional support.  made a referral to Anusha Pratt, Bereavement Coordinator for additional support.

## 2020-11-24 NOTE — DEATH NOTE
0940-The patient was found to have no obtainable vital signs. Notified the patient's  and brother. The family is on the way in to the facility. The CNA has prepared the body. 65- The  and the brother have came and left. The family took all the patient belongings home with them. Loc Baker from [facility] is here to  the body.

## 2020-11-24 NOTE — PROGRESS NOTES
Bereavement Coordinator/ Niki received referral from [facility] [employer] after patient death this day. BC/CH made brief, supportive visit with patient's spouse and patient's brother. BC/CH introduced self, offered condolences to the family, and shared with both about bereavement support available to the family through [facility]. BC/CH gave card with name and contact info to pt's spouse and pt's brother, and made plans to follow up via phone with patient's spouse in 1-2 weeks.

## 2020-11-24 NOTE — PROGRESS NOTES
3015- The patient report and walking rounds completed with Nick Alves RN. The patient was identified by name and . The patient is now Routine with a diagnosis of metastatic brain cancer of unknown origin. The patient is obtunded with deep mottling to her right arm and hand. Respirations are very shallow. Pain is 0/10 using the none verbal scale. No signs of anxiety or nausea / vomit. The bed is low and locked with two bed rails up and the tab alert in place. The door to the patient's room is left open for close observation.

## 2020-11-28 NOTE — PROGRESS NOTES
Patient presents with complaints of migraine headache, pain behind right eye, and nausea. Problem: Falls - Risk of 
Goal: *Absence of Falls Description: Document Mariela Blood Fall Risk and appropriate interventions in the flowsheet. Outcome: Progressing Towards Goal 
Note: Fall Risk Interventions: 
Mobility Interventions: Bed/chair exit alarm, Communicate number of staff needed for ambulation/transfer, Patient to call before getting OOB Mentation Interventions: Bed/chair exit alarm, Increase mobility, More frequent rounding, Reorient patient Medication Interventions: Bed/chair exit alarm, Patient to call before getting OOB, Teach patient to arise slowly Elimination Interventions: Bed/chair exit alarm, Call light in reach, Patient to call for help with toileting needs, Stay With Me (per policy), Toilet paper/wipes in reach, Toileting schedule/hourly rounds Problem: Patient Education: Go to Patient Education Activity Goal: Patient/Family Education Outcome: Progressing Towards Goal 
  
Problem: Pressure Injury - Risk of 
Goal: *Prevention of pressure injury Description: Document Bhupendra Scale and appropriate interventions in the flowsheet. Outcome: Progressing Towards Goal 
Note: Pressure Injury Interventions: 
Sensory Interventions: Assess changes in LOC, Avoid rigorous massage over bony prominences Moisture Interventions: Absorbent underpads, Check for incontinence Q2 hours and as needed Activity Interventions: Pressure redistribution bed/mattress(bed type), Increase time out of bed, PT/OT evaluation Mobility Interventions: HOB 30 degrees or less, Pressure redistribution bed/mattress (bed type), PT/OT evaluation Nutrition Interventions: Offer support with meals,snacks and hydration, Document food/fluid/supplement intake Friction and Shear Interventions: HOB 30 degrees or less Problem: Patient Education: Go to Patient Education Activity Goal: Patient/Family Education Outcome: Progressing Towards Goal 
  
Problem: Dysphagia (Adult) Goal: *Speech Goal: (INSERT TEXT) Outcome: Progressing Towards Goal 
  
Problem: Patient Education: Go to Patient Education Activity Goal: Patient/Family Education Outcome: Progressing Towards Goal 
  
Problem: Patient Education: Go to Patient Education Activity Goal: Patient/Family Education Outcome: Progressing Towards Goal 
  
Problem: Patient Education: Go to Patient Education Activity Goal: Patient/Family Education Description: 1. Patient will complete lower body bathing and dressing with SBA and adaptive equipment as needed. 2. Patient will complete toileting with SBA. 3. Patient will tolerate 25 minutes of OT treatment with 1-2 rest breaks to increase activity tolerance for ADLs. 4. Patient will complete functional transfers with CGA and adaptive equipment as needed. 5. Patient will tolerate 15 minutes unsupported sitting balance with SBA and adaptive equipment as needed. 6. Patient will complete functional activity while seated edge of bed unsupported with SBA and adaptive equipment as needed. Timeframe: 7 visits Outcome: Progressing Towards Goal 
  
Problem: Patient Education: Go to Patient Education Activity Goal: Patient/Family Education Outcome: Progressing Towards Goal 
  
Problem: Patient Education: Go to Patient Education Activity Goal: Patient/Family Education Outcome: Progressing Towards Goal

## 2021-12-22 NOTE — PROGRESS NOTES
Problem: Pain  Goal: Verbalize satisfaction of level of comfort and symptom control  Description: Pt pain would be less then 4/10  Morphine 2 mg IV/SQ q 20 minutes prn   Outcome: Progressing Towards Goal     Problem: End of Life Process  Goal: Demonstrate understanding of end of life processes  Description: Patient/caregiver will understand end of life processes.   Outcome: Progressing Towards Goal 55

## 2022-01-28 NOTE — PROGRESS NOTES
11/04/20 1937 NIH Stroke Scale Interval Other (comment) (dual NIH with Ligia Cramer RN)  
LOC 0  
LOC Questions 0 LOC Commands 0 Best Gaze 0 Visual 0 Facial Palsy 2 Motor Right Arm 2 Motor Left Arm 0 Motor Right Leg 2 Motor Left Leg 0 Limb Ataxia 1 Sensory 1 Best Language 1 Dysarthria 1 Extinction and Inattention 0 Total 10  
 
 No

## 2022-11-19 NOTE — PROGRESS NOTES
11/06/20 0000 NIH Stroke Scale Interval Other (comment) (Neuro checks) LOC 0  
LOC Questions 0 LOC Commands 0 Motor Right Arm 3 Motor Left Arm 0 Motor Right Leg 2 Motor Left Leg 0 Dysarthria 1 Neuro checks 18-Nov-2022 23:59

## 2024-01-01 NOTE — HPI: SKIN LESION
How Severe Is Your Skin Lesion?: moderate
Has Your Skin Lesion Been Treated?: not been treated
Is This A New Presentation, Or A Follow-Up?: Skin Lesion
Site: Sternum (07 Jun 2024 08:52)  Bilirubin: 13.1 (07 Jun 2024 08:52)  Bilirubin Comment: d/c TcB 50HOL, threshold to confirm with TSB is 13.3. send TsB. (07 Jun 2024 08:52)

## 2024-06-18 NOTE — PROGRESS NOTES
Problem: Mobility Impaired (Adult and Pediatric) Goal: *Acute Goals and Plan of Care (Insert Text) Description: LTG: 
(1.)Ms. Maral Fonseca will move from supine to sit and sit to supine , scoot up and down, and roll side to side in bed with MINIMAL ASSIST within 7 treatment day(s). (2.)Ms. Maral Fonseca will transfer from bed to chair and chair to bed with MINIMAL ASSIST using the least restrictive device within 7 treatment day(s). (3.)Ms. Maral Fonseca will ambulate with MINIMAL ASSIST for 30+ feet with the least restrictive device within 7 treatment day(s). 4.  Ms. Maral Fonseca will sit EOB with good balance x10' while performing LE ex's within 7 treatment days. ________________________________________________________________________________________________ Outcome: Progressing Towards Goal 
  
PHYSICAL THERAPY: Daily Note 10/30/2020 INPATIENT: PT Visit Days : 3 Payor: Roxana Lucas / Plan: [facility] [employer] MEDICARE CHOICE PPO/PFFS / Product Type: Managed Care Medicare /   
  
NAME/AGE/GENDER: Joanna Young is a 69 y.o. female PRIMARY DIAGNOSIS: Frontal mass of brain [G93.89] Acute right-sided weakness [R53.1] Expressive aphasia [R47.01] Acute encephalopathy [G93.40] Multiple lesions on CT of brain and spine [R93.0, R93.7] Acute right-sided weakness Acute right-sided weakness ICD-10: Treatment Diagnosis:  
 · Other abnormalities of gait and mobility (R26.89) Precaution/Allergies: 
Compazine [prochlorperazine edisylate] ASSESSMENT:  
 
Ms. Maral Fonseca presents supine in bed with  and brother at bedside. At baseline she is independent. Pt to EOB with mod A x 2.  Static sitting balance fair today. Worked on dynamic balance in sitting. Patient stood with mod A x 2 . Worked on wt shifts with right knee being blocked. Able to march in place 2-3 times. Sat back to EOB. Performed AP's and LAQ's. Patient appeared very fatigued at end of treatment.    Demonstrated progress with sitting and standing balance. Pt returned supine with mod a x 2. Ms. Gilma Ortega would benefit from skilled physical therapy (medically necessary) to address her deficits and maximize her function. This section established at most recent assessment PROBLEM LIST (Impairments causing functional limitations): 1. Decreased ADL/Functional Activities 2. Decreased Transfer Abilities 3. Decreased Ambulation Ability/Technique 4. Decreased Balance 5. Increased Pain 6. Decreased Activity Tolerance 7. Decreased Knowledge of Precautions 8. Decreased [place] with Home Exercise Program 
9. Decreased Cognition INTERVENTIONS PLANNED: (Benefits and precautions of physical therapy have been discussed with the patient.) 1. Balance Exercise 2. Bed Mobility 3. Gait Training 4. Therapeutic Activites 5. Therapeutic Exercise/Strengthening 6. Transfer Training 7. education TREATMENT PLAN: Frequency/Duration: 3 times a week for duration of hospital stay Rehabilitation Potential For Stated Goals: Good REHAB RECOMMENDATIONS (at time of discharge pending progress):   
Placement: It is my opinion, based on this patient's performance to date, that Ms. Gilma Ortega may benefit from intensive therapy at a [address] [place] [address] after discharge due to the functional deficits listed above that are likely to improve with skilled rehabilitation and concerns that he/she may be unsafe to be unsupervised at home due to significant decline in functional mobility . Equipment:  
? None at this time HISTORY:  
History of Present Injury/Illness (Reason for Referral): 
Per MD note, \"Patient is a 69years old female with hx of HTN, GERD, Hodgkin's lymphoma s/p Radiation Rx, dyslipidemia, hypothyroidism, asthma, systolic CHF EF 77-67% recently diagnosed lung hamartomas presented to ER with acute onset of right sided weakness, aphasia and confusion that began this AM. Pt was recently discharged [facility] on 10/21 after being treated for PNA and was doing okay until today. Pt is not able to provide much history, she has sort of blank stare but can nod to yes or no questions. Per , pt was walking, doing ADL's  Until yesterday but this morning a drastic change in her mentation and speech was noted by him. Again, pt's  is also not a great historian. No reported fever, nausea/vomiting, fall, diarrhea, abdominal pain, headaches. ER work up showed CT evidence of enhancing 2.2 cm left frontal lobe mass with vasogenic edema with 2 mm left to right midline shift with 2 additional small masses in tracy and right occipital lobe. CTA head/neck short segment occlusion of left MCA with some changes suggestive of small core infarct and penumbra within the left frontal lobe, no evidence of intraparenchymal hemorrhage. CXR with interval worsening of pulmonary edema and bilateral pleural effusion. \" 
Past Medical History/Comorbidities: Ms. Gabrielle Aguirre  has a past medical history of Acute CVA (cerebrovascular accident) ([facility] [address] (10/25/2020), Asthma, Bite of nonvenomous arthropod (???), Cough, Esophageal stricture (2002), GERD (gastroesophageal reflux disease), History of rectal bleeding (???), Hodgkin's lymphoma ([facility] [address] (1980), Hypercholesterolemia, Menopause, Positive RAST testing (2007), and Thyroid disease. She also has no past medical history of Chronic kidney disease. Ms. Gabrielle Aguirre  has a past surgical history that includes hx tonsillectomy (1954); hx splenectomy (1973); hx appendectomy (1973); hx cataract removal (Bilateral, 2007, 2009); hx colonoscopy; hx other surgical; and hx breast biopsy (Left, 05/21/2020). Social History/Living Environment:  
Home Environment: Private residence One/Two Story Residence: One story Living Alone: No 
Support Systems: Spouse/Significant Other/Partner Patient Expects to be Discharged to[de-identified] Rehabilitation facility Current DME Used/Available at Home: None Prior Level of Function/Work/Activity: At baseline she is independent. Number of Personal Factors/Comorbidities that affect the Plan of Care: 3+: HIGH COMPLEXITY EXAMINATION:  
Most Recent Physical Functioning:  
Gross Assessment: 
  
         
  
Posture: 
  
Balance: 
Sitting: Impaired Sitting - Static: Fair (occasional) Sitting - Dynamic: Fair (occasional)(-) Standing: Impaired Standing - Static: Poor Standing - Dynamic : Poor Bed Mobility: 
Rolling: Minimum assistance Supine to Sit: Minimum assistance;Assist x2 Sit to Supine: Moderate assistance;Assist x2 Scooting: Minimum assistance Wheelchair Mobility: 
  
Transfers: 
Sit to Stand: Moderate assistance;Assist x2 Stand to Sit: Moderate assistance;Assist x2 Gait: 
  
   
  
Body Structures Involved: 1. Voice/Speech 2. Muscles Body Functions Affected: 1. Sensory/Pain 2. Voice and Speech 3. Neuromusculoskeletal 
4. Movement Related Activities and Participation Affected: 1. Mobility 2. Self Care 3. Domestic Life Number of elements that affect the Plan of Care: 4+: HIGH COMPLEXITY CLINICAL PRESENTATION:  
Presentation: Evolving clinical presentation with changing clinical characteristics: MODERATE COMPLEXITY CLINICAL DECISION MAKIN[address] [place] [address] AM-PAC 6 Clicks Basic Mobility Inpatient Short Form How much difficulty does the patient currently have. .. Unable A Lot A Little None 1. Turning over in bed (including adjusting bedclothes, sheets and blankets)? [] 1   [x] 2   [] 3   [] 4  
2. Sitting down on and standing up from a chair with arms ( e.g., wheelchair, bedside commode, etc.)   [] 1   [x] 2   [] 3   [] 4  
3. Moving from lying on back to sitting on the side of the bed? [] 1   [x] 2   [] 3   [] 4 How much help from another person does the patient currently need. .. Total A Lot A Little None 4. Moving to and from a bed to a chair (including a wheelchair)?    [] 1   [x] 2   [] 3   [] 4  
 5.  Need to walk in hospital room? [x] 1   [] 2   [] 3   [] 4  
6. Climbing 3-5 steps with a railing? [x] 1   [] 2   [] 3   [] 4  
© 2007, Trustees of [facility] [employer], under license to [employer]. All rights reserved Score:  Initial: 10 Most Recent: X (Date: -- ) Interpretation of Tool:  Represents activities that are increasingly more difficult (i.e. Bed mobility, Transfers, Gait). Medical Necessity:    
· Patient is expected to demonstrate progress in  
· balance and functional technique ·  to  
· decrease assistance required with all functional mobility · . Reason for Services/Other Comments: 
· Patient continues to require skilled intervention due to · medical complications and patient unable to attend/participate in therapy as expected · . Use of outcome tool(s) and clinical judgement create a POC that gives a: Questionable prediction of patient's progress: MODERATE COMPLEXITY  
  
 
 
 
TREATMENT:  
(In addition to Assessment/Re-Assessment sessions the following treatments were rendered) Pre-treatment Symptoms/Complaints:  none. Pain: Initial:  
   Post Session: 3 Therapeutic Activity: (    24 min): Therapeutic activities including bed mobility, sit and standing balance to improve mobility, strength and balance. Required mod/max x 2   to promote dynamic balance in standing. DATE: 10/26/20 10/28/20 Straight leg raise Hip abduct/ adduct X10 AAR Heel slides  X10 AAR 2x10 AL, AAR Hip external/ internal rotation Ankle dorsiflexion/ plantarflexion 2x10 AB 2x10 AB Key:  A=active, AA=active assisted, P=passive, B=bilaterally, R=right, L=left Braces/Orthotics/Lines/Etc:  
· bustamante catheter · O2 Device: Room air Treatment/Session Assessment:   
· Response to Treatment:  pleasant and cooperative. · Interdisciplinary Collaboration:  
o Physical Therapist 
o Registered Nurse · After treatment position/precautions:  
o Up in chair 
o Bed/Chair-wheels locked 
o Bed in low position 
o Call light within reach 
o RN notified 
o Family at bedside 
o Nurse at bedside · Compliance with Program/Exercises: Compliant all of the time, Will assess as treatment progresses · Recommendations/Intent for next treatment session: \"Next visit will focus on advancements to more challenging activities and reduction in assistance provided\". Total Treatment Duration: PT Patient Time In/Time Out Time In: 1103 Time Out: 1256 Jadyn Wallace, PTA  
    
 
 
 Oriented - self; Oriented - place; Oriented - time

## (undated) DEVICE — STERILE POLYISOPRENE POWDER-FREE SURGICAL GLOVES: Brand: PROTEXIS

## (undated) DEVICE — PATCH SENS PT FOR ELECTROMAGNETIC NAVIGATION BRONCHSCP SYS

## (undated) DEVICE — SYR 50ML SLIP TIP NSAF LF STRL --

## (undated) DEVICE — SINGLE USE BIOPSY VALVE MAJ-210: Brand: SINGLE USE BIOPSY VALVE (STERILE)

## (undated) DEVICE — FORCEPS BX WRK L110MM CHN L2MM DIA1.7MM SUPERDIMENSION

## (undated) DEVICE — KIT THORCENT 8FR L5IN POLYUR W/ 18/22/25GA NDL 3 W STPCOCK

## (undated) DEVICE — MEDI-VAC YANKAUER SUCTION HANDLE W/BULBOUS TIP: Brand: CARDINAL HEALTH

## (undated) DEVICE — LUER-LOK 360°: Brand: CONNECTA, LUER-LOK

## (undated) DEVICE — KENDALL RADIOLUCENT FOAM MONITORING ELECTRODE RECTANGULAR SHAPE: Brand: KENDALL

## (undated) DEVICE — ADAPTER BRONCHSCP FOR USE W/ OLY EDGE

## (undated) DEVICE — BRONCHOSCOPY PACK: Brand: MEDLINE INDUSTRIES, INC.

## (undated) DEVICE — GEL MEDC ULTRASOUND 5L -- REPLACED BY 326862

## (undated) DEVICE — MOUTHPIECE ENDOSCP 20X27MM --

## (undated) DEVICE — KIT PROC W/ 90DEG FIRM TIP EXT WRK CHN LOCATABLE GUID FOR

## (undated) DEVICE — SINGLE USE SUCTION VALVE MAJ-209: Brand: SINGLE USE SUCTION VALVE (STERILE)

## (undated) DEVICE — CONTAINER PREFIL FRMLN 40ML --